# Patient Record
Sex: MALE | Race: BLACK OR AFRICAN AMERICAN | NOT HISPANIC OR LATINO | Employment: UNEMPLOYED | URBAN - METROPOLITAN AREA
[De-identification: names, ages, dates, MRNs, and addresses within clinical notes are randomized per-mention and may not be internally consistent; named-entity substitution may affect disease eponyms.]

---

## 2017-09-22 ENCOUNTER — APPOINTMENT (EMERGENCY)
Dept: RADIOLOGY | Facility: HOSPITAL | Age: 52
DRG: 310 | End: 2017-09-22
Payer: MEDICARE

## 2017-09-22 ENCOUNTER — HOSPITAL ENCOUNTER (INPATIENT)
Facility: HOSPITAL | Age: 52
LOS: 3 days | Discharge: HOME | DRG: 310 | End: 2017-09-25
Attending: EMERGENCY MEDICINE | Admitting: INTERNAL MEDICINE
Payer: MEDICARE

## 2017-09-22 DIAGNOSIS — R07.9 CHEST PAIN: ICD-10-CM

## 2017-09-22 DIAGNOSIS — I48.92 ATRIAL FLUTTER (HCC): Primary | ICD-10-CM

## 2017-09-22 DIAGNOSIS — E87.6 HYPOKALEMIA: ICD-10-CM

## 2017-09-22 DIAGNOSIS — I42.9 CARDIOMYOPATHY, SECONDARY (HCC): Chronic | ICD-10-CM

## 2017-09-22 DIAGNOSIS — F10.10 ALCOHOL ABUSE: Chronic | ICD-10-CM

## 2017-09-22 PROBLEM — R73.09 ELEVATED GLUCOSE LEVEL: Status: ACTIVE | Noted: 2017-09-22

## 2017-09-22 LAB
ALBUMIN SERPL BCP-MCNC: 3.6 G/DL (ref 3.5–5)
ALP SERPL-CCNC: 56 U/L (ref 46–116)
ALT SERPL W P-5'-P-CCNC: 20 U/L (ref 12–78)
ANION GAP SERPL CALCULATED.3IONS-SCNC: 12 MMOL/L (ref 4–13)
APTT PPP: 25 SECONDS (ref 23–35)
AST SERPL W P-5'-P-CCNC: 38 U/L (ref 5–45)
BASOPHILS # BLD AUTO: 0.1 THOUSANDS/ΜL (ref 0–0.1)
BASOPHILS NFR BLD AUTO: 1 % (ref 0–1)
BILIRUB SERPL-MCNC: 0.2 MG/DL (ref 0.2–1)
BUN SERPL-MCNC: 19 MG/DL (ref 5–25)
CALCIUM SERPL-MCNC: 8.5 MG/DL (ref 8.3–10.1)
CHLORIDE SERPL-SCNC: 103 MMOL/L (ref 100–108)
CO2 SERPL-SCNC: 25 MMOL/L (ref 21–32)
CREAT SERPL-MCNC: 0.95 MG/DL (ref 0.6–1.3)
DEPRECATED D DIMER PPP: 240 NG/ML (FEU) (ref 190–520)
EOSINOPHIL # BLD AUTO: 0.1 THOUSAND/ΜL (ref 0–0.61)
EOSINOPHIL NFR BLD AUTO: 1 % (ref 0–6)
ERYTHROCYTE [DISTWIDTH] IN BLOOD BY AUTOMATED COUNT: 13.6 % (ref 11.6–15.1)
ETHANOL SERPL-MCNC: 110 MG/DL (ref 0–3)
GFR SERPL CREATININE-BSD FRML MDRD: 104 ML/MIN/1.73SQ M
GLUCOSE SERPL-MCNC: 146 MG/DL (ref 65–140)
HCT VFR BLD AUTO: 44.4 % (ref 42–52)
HGB BLD-MCNC: 14.9 G/DL (ref 14–18)
INR PPP: 0.99 (ref 0.86–1.16)
LYMPHOCYTES # BLD AUTO: 1.9 THOUSANDS/ΜL (ref 0.6–4.47)
LYMPHOCYTES NFR BLD AUTO: 26 % (ref 14–44)
MAGNESIUM SERPL-MCNC: 1.8 MG/DL (ref 1.6–2.6)
MCH RBC QN AUTO: 31.2 PG (ref 27–31)
MCHC RBC AUTO-ENTMCNC: 33.5 G/DL (ref 31.4–37.4)
MCV RBC AUTO: 93 FL (ref 82–98)
MONOCYTES # BLD AUTO: 0.7 THOUSAND/ΜL (ref 0.17–1.22)
MONOCYTES NFR BLD AUTO: 9 % (ref 4–12)
NEUTROPHILS # BLD AUTO: 4.7 THOUSANDS/ΜL (ref 1.85–7.62)
NEUTS SEG NFR BLD AUTO: 63 % (ref 43–75)
NRBC BLD AUTO-RTO: 0 /100 WBCS
NT-PROBNP SERPL-MCNC: 272 PG/ML
PLATELET # BLD AUTO: 369 THOUSANDS/UL (ref 130–400)
PMV BLD AUTO: 6.6 FL (ref 8.9–12.7)
POTASSIUM SERPL-SCNC: 3.1 MMOL/L (ref 3.5–5.3)
PROT SERPL-MCNC: 6.9 G/DL (ref 6.4–8.2)
PROTHROMBIN TIME: 10.4 SECONDS (ref 9.4–11.7)
RBC # BLD AUTO: 4.78 MILLION/UL (ref 4.7–6.1)
SODIUM SERPL-SCNC: 140 MMOL/L (ref 136–145)
TROPONIN I SERPL-MCNC: <0.02 NG/ML
TROPONIN I SERPL-MCNC: <0.02 NG/ML
TSH SERPL DL<=0.05 MIU/L-ACNC: 1.26 UIU/ML (ref 0.36–3.74)
WBC # BLD AUTO: 7.4 THOUSAND/UL (ref 4.8–10.8)

## 2017-09-22 PROCEDURE — 82948 REAGENT STRIP/BLOOD GLUCOSE: CPT

## 2017-09-22 PROCEDURE — 85379 FIBRIN DEGRADATION QUANT: CPT | Performed by: EMERGENCY MEDICINE

## 2017-09-22 PROCEDURE — 93005 ELECTROCARDIOGRAM TRACING: CPT | Performed by: EMERGENCY MEDICINE

## 2017-09-22 PROCEDURE — 96361 HYDRATE IV INFUSION ADD-ON: CPT

## 2017-09-22 PROCEDURE — 84443 ASSAY THYROID STIM HORMONE: CPT | Performed by: NURSE PRACTITIONER

## 2017-09-22 PROCEDURE — 85610 PROTHROMBIN TIME: CPT | Performed by: EMERGENCY MEDICINE

## 2017-09-22 PROCEDURE — 71010 HB CHEST X-RAY 1 VIEW FRONTAL (PORTABLE): CPT

## 2017-09-22 PROCEDURE — 83735 ASSAY OF MAGNESIUM: CPT | Performed by: EMERGENCY MEDICINE

## 2017-09-22 PROCEDURE — 84484 ASSAY OF TROPONIN QUANT: CPT | Performed by: NURSE PRACTITIONER

## 2017-09-22 PROCEDURE — 80053 COMPREHEN METABOLIC PANEL: CPT | Performed by: EMERGENCY MEDICINE

## 2017-09-22 PROCEDURE — 93005 ELECTROCARDIOGRAM TRACING: CPT | Performed by: NURSE PRACTITIONER

## 2017-09-22 PROCEDURE — 85730 THROMBOPLASTIN TIME PARTIAL: CPT | Performed by: EMERGENCY MEDICINE

## 2017-09-22 PROCEDURE — 84484 ASSAY OF TROPONIN QUANT: CPT | Performed by: EMERGENCY MEDICINE

## 2017-09-22 PROCEDURE — 96374 THER/PROPH/DIAG INJ IV PUSH: CPT

## 2017-09-22 PROCEDURE — 99285 EMERGENCY DEPT VISIT HI MDM: CPT

## 2017-09-22 PROCEDURE — 80320 DRUG SCREEN QUANTALCOHOLS: CPT | Performed by: EMERGENCY MEDICINE

## 2017-09-22 PROCEDURE — 87081 CULTURE SCREEN ONLY: CPT | Performed by: INTERNAL MEDICINE

## 2017-09-22 PROCEDURE — 85025 COMPLETE CBC W/AUTO DIFF WBC: CPT | Performed by: EMERGENCY MEDICINE

## 2017-09-22 PROCEDURE — 36415 COLL VENOUS BLD VENIPUNCTURE: CPT | Performed by: EMERGENCY MEDICINE

## 2017-09-22 PROCEDURE — 83880 ASSAY OF NATRIURETIC PEPTIDE: CPT | Performed by: EMERGENCY MEDICINE

## 2017-09-22 RX ORDER — METOPROLOL TARTRATE 5 MG/5ML
5 INJECTION INTRAVENOUS
Status: DISCONTINUED | OUTPATIENT
Start: 2017-09-22 | End: 2017-09-22

## 2017-09-22 RX ORDER — CHLORDIAZEPOXIDE HYDROCHLORIDE 25 MG/1
25 CAPSULE, GELATIN COATED ORAL EVERY 6 HOURS SCHEDULED
Status: DISCONTINUED | OUTPATIENT
Start: 2017-09-23 | End: 2017-09-25 | Stop reason: HOSPADM

## 2017-09-22 RX ORDER — DILTIAZEM HYDROCHLORIDE 5 MG/ML
10 INJECTION INTRAVENOUS ONCE
Status: COMPLETED | OUTPATIENT
Start: 2017-09-22 | End: 2017-09-22

## 2017-09-22 RX ORDER — DILTIAZEM HYDROCHLORIDE 5 MG/ML
5 INJECTION INTRAVENOUS ONCE
Status: COMPLETED | OUTPATIENT
Start: 2017-09-22 | End: 2017-09-22

## 2017-09-22 RX ORDER — POTASSIUM CHLORIDE 20 MEQ/1
60 TABLET, EXTENDED RELEASE ORAL ONCE
Status: COMPLETED | OUTPATIENT
Start: 2017-09-22 | End: 2017-09-22

## 2017-09-22 RX ORDER — FOLIC ACID 1 MG/1
1 TABLET ORAL DAILY
Status: DISCONTINUED | OUTPATIENT
Start: 2017-09-23 | End: 2017-09-25 | Stop reason: HOSPADM

## 2017-09-22 RX ORDER — THIAMINE MONONITRATE (VIT B1) 100 MG
100 TABLET ORAL DAILY
Status: DISCONTINUED | OUTPATIENT
Start: 2017-09-23 | End: 2017-09-25 | Stop reason: HOSPADM

## 2017-09-22 RX ORDER — NICOTINE 21 MG/24HR
1 PATCH, TRANSDERMAL 24 HOURS TRANSDERMAL DAILY
Status: DISCONTINUED | OUTPATIENT
Start: 2017-09-23 | End: 2017-09-25 | Stop reason: HOSPADM

## 2017-09-22 RX ORDER — MAGNESIUM SULFATE HEPTAHYDRATE 40 MG/ML
2 INJECTION, SOLUTION INTRAVENOUS ONCE
Status: COMPLETED | OUTPATIENT
Start: 2017-09-22 | End: 2017-09-23

## 2017-09-22 RX ORDER — IPRATROPIUM BROMIDE AND ALBUTEROL SULFATE 2.5; .5 MG/3ML; MG/3ML
3 SOLUTION RESPIRATORY (INHALATION) EVERY 6 HOURS PRN
Status: DISCONTINUED | OUTPATIENT
Start: 2017-09-22 | End: 2017-09-25 | Stop reason: HOSPADM

## 2017-09-22 RX ORDER — POTASSIUM CHLORIDE 14.9 MG/ML
20 INJECTION INTRAVENOUS ONCE
Status: COMPLETED | OUTPATIENT
Start: 2017-09-22 | End: 2017-09-23

## 2017-09-22 RX ORDER — POTASSIUM CHLORIDE 20 MEQ/1
40 TABLET, EXTENDED RELEASE ORAL ONCE
Status: CANCELLED | OUTPATIENT
Start: 2017-09-22

## 2017-09-22 RX ADMIN — ENOXAPARIN SODIUM 60 MG: 80 INJECTION SUBCUTANEOUS at 21:24

## 2017-09-22 RX ADMIN — DILTIAZEM HYDROCHLORIDE 7.5 MG/HR: 5 INJECTION INTRAVENOUS at 22:00

## 2017-09-22 RX ADMIN — DILTIAZEM HYDROCHLORIDE 5 MG/HR: 5 INJECTION INTRAVENOUS at 20:39

## 2017-09-22 RX ADMIN — SODIUM CHLORIDE 1000 ML: 0.9 INJECTION, SOLUTION INTRAVENOUS at 19:19

## 2017-09-22 RX ADMIN — MAGNESIUM SULFATE HEPTAHYDRATE 2 G: 40 INJECTION, SOLUTION INTRAVENOUS at 22:30

## 2017-09-22 RX ADMIN — POTASSIUM CHLORIDE 20 MEQ: 200 INJECTION, SOLUTION INTRAVENOUS at 22:30

## 2017-09-22 RX ADMIN — POTASSIUM CHLORIDE 60 MEQ: 1500 TABLET, EXTENDED RELEASE ORAL at 20:57

## 2017-09-22 RX ADMIN — DILTIAZEM HYDROCHLORIDE 10 MG: 5 INJECTION INTRAVENOUS at 19:24

## 2017-09-22 RX ADMIN — DILTIAZEM HYDROCHLORIDE 5 MG: 5 INJECTION INTRAVENOUS at 20:36

## 2017-09-23 ENCOUNTER — APPOINTMENT (INPATIENT)
Dept: NON INVASIVE DIAGNOSTICS | Facility: HOSPITAL | Age: 52
DRG: 310 | End: 2017-09-23
Payer: MEDICARE

## 2017-09-23 ENCOUNTER — GENERIC CONVERSION - ENCOUNTER (OUTPATIENT)
Dept: OTHER | Facility: OTHER | Age: 52
End: 2017-09-23

## 2017-09-23 PROBLEM — Z72.0 TOBACCO ABUSE: Chronic | Status: ACTIVE | Noted: 2017-09-23

## 2017-09-23 PROBLEM — R73.9 HYPERGLYCEMIA: Status: ACTIVE | Noted: 2017-09-22

## 2017-09-23 PROBLEM — I48.91 ATRIAL FIBRILLATION (HCC): Status: ACTIVE | Noted: 2017-09-23

## 2017-09-23 LAB
ANION GAP SERPL CALCULATED.3IONS-SCNC: 5 MMOL/L (ref 4–13)
BUN SERPL-MCNC: 13 MG/DL (ref 5–25)
CALCIUM SERPL-MCNC: 8.2 MG/DL (ref 8.3–10.1)
CHLORIDE SERPL-SCNC: 103 MMOL/L (ref 100–108)
CO2 SERPL-SCNC: 27 MMOL/L (ref 21–32)
CREAT SERPL-MCNC: 0.76 MG/DL (ref 0.6–1.3)
ERYTHROCYTE [DISTWIDTH] IN BLOOD BY AUTOMATED COUNT: 13.8 % (ref 11.6–15.1)
EST. AVERAGE GLUCOSE BLD GHB EST-MCNC: 108 MG/DL
GFR SERPL CREATININE-BSD FRML MDRD: 120 ML/MIN/1.73SQ M
GLUCOSE SERPL-MCNC: 135 MG/DL (ref 65–140)
HBA1C MFR BLD: 5.4 % (ref 4.2–6.3)
HCT VFR BLD AUTO: 42.3 % (ref 42–52)
HGB BLD-MCNC: 14 G/DL (ref 14–18)
MAGNESIUM SERPL-MCNC: 2.1 MG/DL (ref 1.6–2.6)
MCH RBC QN AUTO: 31.1 PG (ref 27–31)
MCHC RBC AUTO-ENTMCNC: 33.1 G/DL (ref 31.4–37.4)
MCV RBC AUTO: 94 FL (ref 82–98)
PLATELET # BLD AUTO: 347 THOUSANDS/UL (ref 130–400)
PMV BLD AUTO: 6 FL (ref 8.9–12.7)
POTASSIUM SERPL-SCNC: 3.7 MMOL/L (ref 3.5–5.3)
RBC # BLD AUTO: 4.51 MILLION/UL (ref 4.7–6.1)
SODIUM SERPL-SCNC: 135 MMOL/L (ref 136–145)
TROPONIN I SERPL-MCNC: <0.02 NG/ML
WBC # BLD AUTO: 7.4 THOUSAND/UL (ref 4.8–10.8)

## 2017-09-23 PROCEDURE — 84484 ASSAY OF TROPONIN QUANT: CPT | Performed by: NURSE PRACTITIONER

## 2017-09-23 PROCEDURE — 80048 BASIC METABOLIC PNL TOTAL CA: CPT | Performed by: NURSE PRACTITIONER

## 2017-09-23 PROCEDURE — 93306 TTE W/DOPPLER COMPLETE: CPT

## 2017-09-23 PROCEDURE — 94760 N-INVAS EAR/PLS OXIMETRY 1: CPT

## 2017-09-23 PROCEDURE — 93005 ELECTROCARDIOGRAM TRACING: CPT | Performed by: NURSE PRACTITIONER

## 2017-09-23 PROCEDURE — 83036 HEMOGLOBIN GLYCOSYLATED A1C: CPT | Performed by: NURSE PRACTITIONER

## 2017-09-23 PROCEDURE — 85027 COMPLETE CBC AUTOMATED: CPT | Performed by: NURSE PRACTITIONER

## 2017-09-23 PROCEDURE — 83735 ASSAY OF MAGNESIUM: CPT | Performed by: NURSE PRACTITIONER

## 2017-09-23 RX ORDER — TEMAZEPAM 15 MG/1
15 CAPSULE ORAL
Status: DISCONTINUED | OUTPATIENT
Start: 2017-09-23 | End: 2017-09-25 | Stop reason: HOSPADM

## 2017-09-23 RX ORDER — POTASSIUM CHLORIDE 20 MEQ/1
40 TABLET, EXTENDED RELEASE ORAL ONCE
Status: COMPLETED | OUTPATIENT
Start: 2017-09-23 | End: 2017-09-23

## 2017-09-23 RX ORDER — POTASSIUM CHLORIDE 14.9 MG/ML
20 INJECTION INTRAVENOUS ONCE
Status: COMPLETED | OUTPATIENT
Start: 2017-09-23 | End: 2017-09-23

## 2017-09-23 RX ORDER — SOTALOL HYDROCHLORIDE 80 MG/1
80 TABLET ORAL EVERY 12 HOURS SCHEDULED
Status: DISCONTINUED | OUTPATIENT
Start: 2017-09-23 | End: 2017-09-25 | Stop reason: HOSPADM

## 2017-09-23 RX ADMIN — POTASSIUM CHLORIDE 20 MEQ: 200 INJECTION, SOLUTION INTRAVENOUS at 08:57

## 2017-09-23 RX ADMIN — APIXABAN 5 MG: 5 TABLET, FILM COATED ORAL at 17:34

## 2017-09-23 RX ADMIN — CHLORDIAZEPOXIDE HYDROCHLORIDE 25 MG: 25 CAPSULE ORAL at 11:58

## 2017-09-23 RX ADMIN — CHLORDIAZEPOXIDE HYDROCHLORIDE 25 MG: 25 CAPSULE ORAL at 00:00

## 2017-09-23 RX ADMIN — CHLORDIAZEPOXIDE HYDROCHLORIDE 25 MG: 25 CAPSULE ORAL at 05:38

## 2017-09-23 RX ADMIN — POTASSIUM CHLORIDE 40 MEQ: 1500 TABLET, EXTENDED RELEASE ORAL at 08:57

## 2017-09-23 RX ADMIN — CHLORDIAZEPOXIDE HYDROCHLORIDE 25 MG: 25 CAPSULE ORAL at 23:46

## 2017-09-23 RX ADMIN — TEMAZEPAM 15 MG: 15 CAPSULE ORAL at 23:46

## 2017-09-23 RX ADMIN — CHLORDIAZEPOXIDE HYDROCHLORIDE 25 MG: 25 CAPSULE ORAL at 17:35

## 2017-09-23 RX ADMIN — FOLIC ACID 1 MG: 1 TABLET ORAL at 08:53

## 2017-09-23 RX ADMIN — NICOTINE 1 PATCH: 21 PATCH, EXTENDED RELEASE TRANSDERMAL at 08:53

## 2017-09-23 RX ADMIN — Medication 100 MG: at 08:53

## 2017-09-23 RX ADMIN — DILTIAZEM HYDROCHLORIDE 5 MG/HR: 5 INJECTION INTRAVENOUS at 15:38

## 2017-09-23 RX ADMIN — SOTALOL HYDROCHLORIDE 80 MG: 80 TABLET ORAL at 21:04

## 2017-09-23 RX ADMIN — ENOXAPARIN SODIUM 60 MG: 60 INJECTION SUBCUTANEOUS at 08:54

## 2017-09-23 RX ADMIN — Medication 1 TABLET: at 08:53

## 2017-09-24 LAB
ANION GAP SERPL CALCULATED.3IONS-SCNC: 6 MMOL/L (ref 4–13)
BUN SERPL-MCNC: 13 MG/DL (ref 5–25)
CALCIUM SERPL-MCNC: 9 MG/DL (ref 8.3–10.1)
CHLORIDE SERPL-SCNC: 102 MMOL/L (ref 100–108)
CO2 SERPL-SCNC: 30 MMOL/L (ref 21–32)
CREAT SERPL-MCNC: 0.77 MG/DL (ref 0.6–1.3)
GFR SERPL CREATININE-BSD FRML MDRD: 119 ML/MIN/1.73SQ M
GLUCOSE SERPL-MCNC: 89 MG/DL (ref 65–140)
MAGNESIUM SERPL-MCNC: 1.7 MG/DL (ref 1.6–2.6)
MRSA NOSE QL CULT: NORMAL
PHOSPHATE SERPL-MCNC: 2.9 MG/DL (ref 2.7–4.5)
POTASSIUM SERPL-SCNC: 4.1 MMOL/L (ref 3.5–5.3)
SODIUM SERPL-SCNC: 138 MMOL/L (ref 136–145)

## 2017-09-24 PROCEDURE — 83735 ASSAY OF MAGNESIUM: CPT | Performed by: NURSE PRACTITIONER

## 2017-09-24 PROCEDURE — 94760 N-INVAS EAR/PLS OXIMETRY 1: CPT

## 2017-09-24 PROCEDURE — 80048 BASIC METABOLIC PNL TOTAL CA: CPT | Performed by: NURSE PRACTITIONER

## 2017-09-24 PROCEDURE — 84100 ASSAY OF PHOSPHORUS: CPT | Performed by: NURSE PRACTITIONER

## 2017-09-24 RX ORDER — MAGNESIUM SULFATE HEPTAHYDRATE 40 MG/ML
4 INJECTION, SOLUTION INTRAVENOUS ONCE
Status: COMPLETED | OUTPATIENT
Start: 2017-09-24 | End: 2017-09-24

## 2017-09-24 RX ADMIN — APIXABAN 5 MG: 5 TABLET, FILM COATED ORAL at 08:14

## 2017-09-24 RX ADMIN — Medication 100 MG: at 08:14

## 2017-09-24 RX ADMIN — APIXABAN 5 MG: 5 TABLET, FILM COATED ORAL at 18:36

## 2017-09-24 RX ADMIN — NICOTINE 1 PATCH: 21 PATCH, EXTENDED RELEASE TRANSDERMAL at 08:14

## 2017-09-24 RX ADMIN — TEMAZEPAM 15 MG: 15 CAPSULE ORAL at 21:29

## 2017-09-24 RX ADMIN — SOTALOL HYDROCHLORIDE 80 MG: 80 TABLET ORAL at 08:14

## 2017-09-24 RX ADMIN — Medication 1 TABLET: at 08:14

## 2017-09-24 RX ADMIN — SOTALOL HYDROCHLORIDE 80 MG: 80 TABLET ORAL at 21:29

## 2017-09-24 RX ADMIN — CHLORDIAZEPOXIDE HYDROCHLORIDE 25 MG: 25 CAPSULE ORAL at 18:36

## 2017-09-24 RX ADMIN — MAGNESIUM SULFATE HEPTAHYDRATE 4 G: 40 INJECTION, SOLUTION INTRAVENOUS at 08:14

## 2017-09-24 RX ADMIN — FOLIC ACID 1 MG: 1 TABLET ORAL at 08:14

## 2017-09-24 RX ADMIN — CHLORDIAZEPOXIDE HYDROCHLORIDE 25 MG: 25 CAPSULE ORAL at 12:18

## 2017-09-24 RX ADMIN — CHLORDIAZEPOXIDE HYDROCHLORIDE 25 MG: 25 CAPSULE ORAL at 06:08

## 2017-09-25 ENCOUNTER — GENERIC CONVERSION - ENCOUNTER (OUTPATIENT)
Dept: OTHER | Facility: OTHER | Age: 52
End: 2017-09-25

## 2017-09-25 ENCOUNTER — APPOINTMENT (INPATIENT)
Dept: RADIOLOGY | Facility: HOSPITAL | Age: 52
DRG: 310 | End: 2017-09-25
Payer: MEDICARE

## 2017-09-25 ENCOUNTER — APPOINTMENT (INPATIENT)
Dept: NON INVASIVE DIAGNOSTICS | Facility: HOSPITAL | Age: 52
DRG: 310 | End: 2017-09-25
Payer: MEDICARE

## 2017-09-25 VITALS
BODY MASS INDEX: 23.42 KG/M2 | TEMPERATURE: 98.2 F | HEART RATE: 78 BPM | OXYGEN SATURATION: 97 % | HEIGHT: 66 IN | SYSTOLIC BLOOD PRESSURE: 99 MMHG | DIASTOLIC BLOOD PRESSURE: 61 MMHG | RESPIRATION RATE: 16 BRPM | WEIGHT: 145.72 LBS

## 2017-09-25 PROBLEM — R07.9 CHEST PAIN: Status: RESOLVED | Noted: 2017-09-22 | Resolved: 2017-09-25

## 2017-09-25 PROBLEM — E87.6 HYPOKALEMIA: Status: RESOLVED | Noted: 2017-09-22 | Resolved: 2017-09-25

## 2017-09-25 PROBLEM — R73.9 HYPERGLYCEMIA: Status: RESOLVED | Noted: 2017-09-22 | Resolved: 2017-09-25

## 2017-09-25 LAB
ANION GAP SERPL CALCULATED.3IONS-SCNC: 9 MMOL/L (ref 4–13)
ATRIAL RATE: 117 BPM
ATRIAL RATE: 117 BPM
BUN SERPL-MCNC: 15 MG/DL (ref 5–25)
CALCIUM SERPL-MCNC: 9 MG/DL (ref 8.3–10.1)
CHLORIDE SERPL-SCNC: 102 MMOL/L (ref 100–108)
CO2 SERPL-SCNC: 26 MMOL/L (ref 21–32)
CREAT SERPL-MCNC: 0.8 MG/DL (ref 0.6–1.3)
ERYTHROCYTE [DISTWIDTH] IN BLOOD BY AUTOMATED COUNT: 13.6 % (ref 11.6–15.1)
GFR SERPL CREATININE-BSD FRML MDRD: 117 ML/MIN/1.73SQ M
GLUCOSE SERPL-MCNC: 104 MG/DL (ref 65–140)
GLUCOSE SERPL-MCNC: 95 MG/DL (ref 65–140)
HCT VFR BLD AUTO: 45.5 % (ref 42–52)
HGB BLD-MCNC: 15.2 G/DL (ref 14–18)
MAGNESIUM SERPL-MCNC: 1.8 MG/DL (ref 1.6–2.6)
MCH RBC QN AUTO: 31.1 PG (ref 27–31)
MCHC RBC AUTO-ENTMCNC: 33.5 G/DL (ref 31.4–37.4)
MCV RBC AUTO: 93 FL (ref 82–98)
PHOSPHATE SERPL-MCNC: 3.8 MG/DL (ref 2.7–4.5)
PLATELET # BLD AUTO: 303 THOUSANDS/UL (ref 130–400)
PMV BLD AUTO: 6.6 FL (ref 8.9–12.7)
POTASSIUM SERPL-SCNC: 4 MMOL/L (ref 3.5–5.3)
QRS AXIS: -23 DEGREES
QRS AXIS: -25 DEGREES
QRSD INTERVAL: 100 MS
QRSD INTERVAL: 90 MS
QT INTERVAL: 320 MS
QT INTERVAL: 340 MS
QTC INTERVAL: 410 MS
QTC INTERVAL: 429 MS
RBC # BLD AUTO: 4.89 MILLION/UL (ref 4.7–6.1)
SODIUM SERPL-SCNC: 137 MMOL/L (ref 136–145)
T WAVE AXIS: 28 DEGREES
T WAVE AXIS: 32 DEGREES
VENTRICULAR RATE: 96 BPM
VENTRICULAR RATE: 99 BPM
WBC # BLD AUTO: 7.1 THOUSAND/UL (ref 4.8–10.8)

## 2017-09-25 PROCEDURE — 85027 COMPLETE CBC AUTOMATED: CPT | Performed by: STUDENT IN AN ORGANIZED HEALTH CARE EDUCATION/TRAINING PROGRAM

## 2017-09-25 PROCEDURE — 83735 ASSAY OF MAGNESIUM: CPT | Performed by: NURSE PRACTITIONER

## 2017-09-25 PROCEDURE — 80048 BASIC METABOLIC PNL TOTAL CA: CPT | Performed by: NURSE PRACTITIONER

## 2017-09-25 PROCEDURE — 94760 N-INVAS EAR/PLS OXIMETRY 1: CPT

## 2017-09-25 PROCEDURE — 93017 CV STRESS TEST TRACING ONLY: CPT

## 2017-09-25 PROCEDURE — A9502 TC99M TETROFOSMIN: HCPCS

## 2017-09-25 PROCEDURE — 78452 HT MUSCLE IMAGE SPECT MULT: CPT

## 2017-09-25 PROCEDURE — 84100 ASSAY OF PHOSPHORUS: CPT | Performed by: NURSE PRACTITIONER

## 2017-09-25 RX ORDER — NICOTINE 21 MG/24HR
1 PATCH, TRANSDERMAL 24 HOURS TRANSDERMAL DAILY
Qty: 28 PATCH | Refills: 0 | Status: SHIPPED | OUTPATIENT
Start: 2017-09-25 | End: 2018-06-06 | Stop reason: ALTCHOICE

## 2017-09-25 RX ORDER — FOLIC ACID 1 MG/1
1 TABLET ORAL DAILY
Qty: 30 TABLET | Refills: 0 | Status: SHIPPED | OUTPATIENT
Start: 2017-09-25 | End: 2018-06-06 | Stop reason: ALTCHOICE

## 2017-09-25 RX ORDER — LANOLIN ALCOHOL/MO/W.PET/CERES
100 CREAM (GRAM) TOPICAL DAILY
Qty: 30 TABLET | Refills: 0 | Status: SHIPPED | OUTPATIENT
Start: 2017-09-25 | End: 2018-06-06 | Stop reason: ALTCHOICE

## 2017-09-25 RX ORDER — SOTALOL HYDROCHLORIDE 80 MG/1
80 TABLET ORAL EVERY 12 HOURS SCHEDULED
Qty: 60 TABLET | Refills: 0 | Status: SHIPPED | OUTPATIENT
Start: 2017-09-25 | End: 2018-06-06 | Stop reason: DRUGHIGH

## 2017-09-25 RX ORDER — MAGNESIUM SULFATE HEPTAHYDRATE 40 MG/ML
2 INJECTION, SOLUTION INTRAVENOUS ONCE
Status: COMPLETED | OUTPATIENT
Start: 2017-09-25 | End: 2017-09-25

## 2017-09-25 RX ADMIN — Medication 1 TABLET: at 10:57

## 2017-09-25 RX ADMIN — APIXABAN 5 MG: 5 TABLET, FILM COATED ORAL at 10:58

## 2017-09-25 RX ADMIN — REGADENOSON 0.4 MG: 0.08 INJECTION, SOLUTION INTRAVENOUS at 10:20

## 2017-09-25 RX ADMIN — CHLORDIAZEPOXIDE HYDROCHLORIDE 25 MG: 25 CAPSULE ORAL at 11:18

## 2017-09-25 RX ADMIN — Medication 100 MG: at 10:57

## 2017-09-25 RX ADMIN — MAGNESIUM SULFATE HEPTAHYDRATE 2 G: 40 INJECTION, SOLUTION INTRAVENOUS at 11:00

## 2017-09-25 RX ADMIN — NICOTINE 1 PATCH: 21 PATCH, EXTENDED RELEASE TRANSDERMAL at 10:58

## 2017-09-25 RX ADMIN — FOLIC ACID 1 MG: 1 TABLET ORAL at 10:57

## 2017-09-26 LAB
ATRIAL RATE: 124 BPM
CHEST PAIN STATEMENT: NORMAL
MAX DIASTOLIC BP: 80 MMHG
MAX HEART RATE: 133 BPM
MAX PREDICTED HEART RATE: 166 BPM
MAX. SYSTOLIC BP: 132 MMHG
PR INTERVAL: 198 MS
PROTOCOL NAME: NORMAL
QRS AXIS: -32 DEGREES
QRSD INTERVAL: 88 MS
QT INTERVAL: 308 MS
QTC INTERVAL: 442 MS
REASON FOR TERMINATION: NORMAL
T WAVE AXIS: 11 DEGREES
TARGET HR FORMULA: NORMAL
TEST INDICATION: NORMAL
TIME IN EXERCISE PHASE: 548 S
VENTRICULAR RATE: 124 BPM

## 2017-10-09 ENCOUNTER — ALLSCRIPTS OFFICE VISIT (OUTPATIENT)
Dept: OTHER | Facility: OTHER | Age: 52
End: 2017-10-09

## 2017-11-28 ENCOUNTER — GENERIC CONVERSION - ENCOUNTER (OUTPATIENT)
Dept: OTHER | Facility: OTHER | Age: 52
End: 2017-11-28

## 2018-01-09 NOTE — PROCEDURES
Procedures by Cha Jean-Baptiste MD at  6/10/2016  3:43 PM      Author:  Cha Jean-Baptiste MD Service:  Cardiology Author Type:  Physician     Filed:  6/10/2016  3:46 PM Date of Service:  6/10/2016  3:43 PM Status:  Signed     :  Cha Jean-Baptiste MD (Physician)         Pre-procedure Diagnoses:       1  Uncontrolled atrial flutter [I48 92]                Procedures:       1  ELECTRICAL CARDIOVERSION [SBF678 (Custom)]                   PRE-OP DIAGNOSIS: Recurrent atrial fibrillation    POST-OP DIAGNOSIS:  Successful electrical cardioversion of atrial flutter to normal sinus rhythm     :  Dr Kun Huang  ANESTHESIA:  Propofol given by anesthesiology  COMPLICATIONS:  None  OPERATIVE TERM:  DC cardioversion  The nature of the procedure, risks and alternatives were discussed with the patient who gave informed consent  OPERATIVE TECHNIQUE:  The patient was sedated with propofol  When the patient was no longer responsive to quiet voice, DC cardioversion was performed with 100 J biphasically  and synchronously  The patient was then monitored until fully alert and left the procedure area in stable condition  The patient had TONYA prior to cardioversion with demonstration of no left atrial appendage thrombus  IMPRESSION:  Successful DC cardioversion of atrial flutter to normal sinus rhythm  No complications             Received for:Provider  [facility]   John 10 2016  3:45PM [facility] Standard Time

## 2018-01-13 NOTE — MISCELLANEOUS
Assessment    1  Atrial fibrillation, unspecified type (427 31) (I48 91)    Plan  Need for influenza vaccination    · Fluzone Quadrivalent 0 5 ML Intramuscular Suspension Prefilled Syringe   For: Need for influenza vaccination; Ordered By:Mindi Avelar; Effective Date:09Oct2017; Administered by: Simona Flow: 10/9/2017 4:37:00 PM; Last Updated By: Simona Sharma; 10/9/2017 4:38:24 PM  Screening for depression    · *VB-Depression Screening; Status:Resulted - Requires Verification;   Done: [id number]  01:47PM   Performed: In Office; [id number] [phone]; Ordered; For:Screening for depression; Ordered By:Mindi Avelar;    Discussion/Summary  Discussion Summary:   45years old male came for follow-up visit after hospitalization  EKG in the office today showed normal sinus rhythm  Patient has an upcoming appointment with Dr Mono Bentley next month  Patient was prescribed eliiquis in the hospital but he was not able to take because insurance was not covering the medication  As per cardiology recommendation He was told to take Eliquis for a month and after that he can take aspirin  Advised to continue with aspirin and we will do prior authorization for eliquis  Currently patient also started to drink alcohol( half a pine daily) and to smoke cigarettes  Advised to quit smoking and tried cut down alcohol intake slowly  Discussed with Dr Carolina Rowan  Medication SE Review and Pt Understands Tx: Possible side effects of new medications were reviewed with the patient/guardian today  The treatment plan was reviewed with the patient/guardian  The patient/guardian understands and agrees with the treatment plan   Self Referrals:   Self Referrals: No      Chief Complaint  Chief Complaint Free Text Note Form: OUMOU 2 weeks ago, wants flu vaccine      History of Present Illness  TCM Communication [facility]: The patient is being contacted for follow-up after hospitalization  Hospital records were reviewed  He was hospitalized at [address] [place] [address]  The date of admission: 9/22/17, date of discharge: 9/25/17  Diagnosis: Afib / AFlutter ,cardiomyopathy ,alcoholism  He was discharged to home  Medications reviewed and updated today  He scheduled a follow up appointment  Follow-up appointments with other specialists: Cardiology  Communication performed and completed by FAUSTINO Zhang LPN      Review of Systems  Complete-Male:   Constitutional: No fever or chills, feels well, no tiredness, no recent weight gain or weight loss  Cardiovascular: No complaints of slow heart rate, no fast heart rate, no chest pain, no palpitations, no leg claudication, no lower extremity  Respiratory: No complaints of shortness of breath, no wheezing, no cough, no SOB on exertion, no orthopnea or PND  Gastrointestinal: No complaints of abdominal pain, no constipation, no nausea or vomiting, no diarrhea or bloody stools  Active Problems    1  Atrial flutter (427 32) (I48 92)    Surgical History    1  History of Heart Valve Repair    Family History  Mother    1  No pertinent family history  Father    2  Family history of malignant neoplasm (V16 9) (Z80 9)    Social History    · Current every day smoker (305 1) (F17 200)   · Heavy alcohol use (305 00) (Z72 89)    Current Meds   1  Aspirin 81 MG TABS; TAKE 1 TABLET DAILY after the completion of Xarelto 20 mg;   Therapy: (Recorded:[id number]) to Recorded   2  Cardizem TABS; Therapy: (Recorded:13Zuw5274) to Recorded   3  Daily Multivitamin TABS; Therapy: (Tessa Newsome) to Recorded   4  DuoNeb 0 5-2 5 (3) MG/3ML Inhalation Solution; Therapy: (Recorded:66Gkv3359) to Recorded   5  Folic Acid 1 MG Oral Tablet; Therapy: (Tessa Newsome) to Recorded   6  Sotalol HCl - 80 MG Oral Tablet; TAKE 1 TABLET EVERY 12 HOURS DAILY; Therapy: (Recorded:38Chq4186) to Recorded   7  Thiamine HCl - 100 MG Oral Tablet; Therapy: (Tessa Newsome) to Recorded   8   Xarelto 20 MG Oral Tablet; take 1 20mg PO daily x 4weeks then switch to Aspirin 81 mg   PO Daily; Therapy: (Recorded:45Ugn7854) to Recorded    Allergies    1  No Known Drug Allergies    2  No Known Environmental Allergies   3  No Known Food Allergies   4  No Known Latex Allergies    Vitals  Signs   Recorded: 26ZVY3662 01:18PM   Heart Rate: 90  Respiration: 18  Systolic: 460  Diastolic: 90  Height: 5 ft 7 5 in  Weight: 147 lb   BMI Calculated: 22 68  BSA Calculated: 1 78  O2 Saturation: 100    Physical Exam    Constitutional   General appearance: No acute distress, well appearing and well nourished  Pulmonary   Respiratory effort: No increased work of breathing or signs of respiratory distress  Auscultation of lungs: Clear to auscultation, equal breath sounds bilaterally, no wheezes, no rales, no rhonci  Cardiovascular   Auscultation of heart: Normal rate and rhythm, normal S1 and S2, without murmurs  Results/Data  *VB-Depression Screening [id number] 01:47PM Patrizia Mayen     Test Name Result Flag Reference   Depression Scale Result      Depression Screen - Negative For Symptoms     PHQ-2 Adult Depression Screening [id number] 01:45PM Chance Castañeda     Test Name Result Flag Reference   PHQ-2 Adult Depression Score 0     Over the last two weeks, how often have you been bothered by any of the following problems? Little interest or pleasure in doing things: Not at all - 0  Feeling down, depressed, or hopeless: Not at all - 0   PHQ-2 Adult Depression Screening Negative         Attending Note  Attending Note: Attending Note: I interviewed and examined the patient, I supervised the Resident and I agree with the Resident management plan as it was presented to me  Level of Participation: I was present in clinic and examined the patient  Patient's History: Pt  here for OUMOU visit s/p hosp  for paroxysmal atrial fibrillation successfully chemically cardioverted to NSR  Key Parts of the Exam: As per resident's note  Diagnosis and Plan: Paroxysmal A  Fib- resolved, now in [facility]    Plan- Cont  present meds and f/y with cardiology as recommended  I agree with the Resident's note  Future Appointments    Date/Time Provider Specialty Site   11/14/2017 02:00 PM RACHEL Cruz  Cardiology [address]     Signatures   Electronically signed by : RACHEL Julien ; Oct 12 2017 11:28PM EST                       (Author)    Electronically signed by :  RACHEL De Leon ; Oct 13 2017  2:57PM EST                       (Co-author)

## 2018-01-14 VITALS
OXYGEN SATURATION: 100 % | WEIGHT: 147 LBS | BODY MASS INDEX: 22.28 KG/M2 | DIASTOLIC BLOOD PRESSURE: 90 MMHG | HEART RATE: 90 BPM | RESPIRATION RATE: 18 BRPM | SYSTOLIC BLOOD PRESSURE: 140 MMHG | HEIGHT: 68 IN

## 2018-01-16 NOTE — PROGRESS NOTES
History of Present Illness  Care Coordination Encounter Information:   Type of Encounter: Telephonic   Last Office Visit: 7/29/16   Spoke to Patient  Care Coordination SL Nurse Jorge Jaramillo:   The reason for call is to discuss outreach for follow up/needed services and coordination of meeting care plan treatment goals  I called Nic as per [facility] program  He did attend his PCP appointment on 7/29/16  He still has not picked up his medication which I have again encouraged him to do  We reviewed the dosing schedule  He claims he will get the medication today, keep track of 4 weeks time on the calendar, and then replace the Xarelto with 81 mg Aspirin daily  He claims he is decreasing his alcohol consumption  He claims he will have a colonoscopy this month and return to the PCP  Active Problems    1  Atrial flutter (427 32) (I48 92)    Surgical History    1  History of Heart Valve Repair    Family History  Mother    1  No pertinent family history  Father    2  Family history of malignant neoplasm (V16 9) (Z80 9)    Social History    · Current every day smoker (305 1) (F17 200)   · Heavy alcohol use (305 00) (Z78 9)    Current Meds    1  Aspirin 81 MG TABS; TAKE 1 TABLET DAILY after the completion of Xarelto 20 mg;   Therapy: (Recorded:97Xnt4350) to Recorded   2  Sotalol HCl - 80 MG Oral Tablet; TAKE 1 TABLET EVERY 12 HOURS DAILY; Therapy: (Recorded:46Kfb8060) to Recorded   3  Xarelto 20 MG Oral Tablet; take 1 20mg PO daily x 4weeks then switch to Aspirin 81 mg   PO Daily; Therapy: (Recorded:42Jgl8256) to Recorded    Allergies    1  No Known Drug Allergies    2  No Known Environmental Allergies   3  No Known Food Allergies   4  No Known Latex Allergies    End of Encounter Meds    1  Aspirin 81 MG TABS; TAKE 1 TABLET DAILY after the completion of Xarelto 20 mg;   Therapy: (Recorded:11Zvy2641) to Recorded   2  Sotalol HCl - 80 MG Oral Tablet; TAKE 1 TABLET EVERY 12 HOURS DAILY;    Therapy: (Recorded:00Omb4652) to Recorded   3  Xarelto 20 MG Oral Tablet; take 1 20mg PO daily x 4weeks then switch to Aspirin 81 mg   PO Daily; Therapy: (Recorded:87Zxw3094) to Recorded    Patient Care Team    Care Team Member Role Specialty Office Number   Neptali Weathers RN   [phone]     Signatures   Electronically signed by :  Brent Craig RN; Aug  4 2016  9:27AM EST                       (Author)

## 2018-02-13 NOTE — RESULT NOTES
Verified Results  *VB-Depression Screening [id number] 01:47PM Shanika Will     Test Name Result Flag Reference   Depression Scale Result      Depression Screen - Negative For Symptoms

## 2018-06-06 ENCOUNTER — HOSPITAL ENCOUNTER (INPATIENT)
Facility: HOSPITAL | Age: 53
LOS: 1 days | Discharge: HOME | DRG: 309 | End: 2018-06-08
Attending: EMERGENCY MEDICINE | Admitting: FAMILY MEDICINE
Payer: MEDICARE

## 2018-06-06 ENCOUNTER — APPOINTMENT (EMERGENCY)
Dept: RADIOLOGY | Facility: HOSPITAL | Age: 53
DRG: 309 | End: 2018-06-06
Payer: MEDICARE

## 2018-06-06 DIAGNOSIS — I48.91 ATRIAL FIBRILLATION WITH RAPID VENTRICULAR RESPONSE (HCC): Primary | ICD-10-CM

## 2018-06-06 DIAGNOSIS — I48.92 ATRIAL FLUTTER, UNSPECIFIED TYPE (HCC): ICD-10-CM

## 2018-06-06 DIAGNOSIS — I42.9 CARDIOMYOPATHY, SECONDARY (HCC): Chronic | ICD-10-CM

## 2018-06-06 DIAGNOSIS — I48.20 CHRONIC ATRIAL FIBRILLATION (HCC): ICD-10-CM

## 2018-06-06 PROBLEM — R00.2 HEART PALPITATIONS: Status: ACTIVE | Noted: 2018-06-06

## 2018-06-06 PROBLEM — R00.2 HEART PALPITATIONS: Status: RESOLVED | Noted: 2018-06-06 | Resolved: 2018-06-06

## 2018-06-06 LAB
ALBUMIN SERPL BCP-MCNC: 4 G/DL (ref 3.5–5)
ALP SERPL-CCNC: 52 U/L (ref 46–116)
ALT SERPL W P-5'-P-CCNC: 30 U/L (ref 12–78)
ANION GAP SERPL CALCULATED.3IONS-SCNC: 13 MMOL/L (ref 4–13)
APTT PPP: 25 SECONDS (ref 24–33)
AST SERPL W P-5'-P-CCNC: 40 U/L (ref 5–45)
BASOPHILS # BLD AUTO: 0.1 THOUSANDS/ΜL (ref 0–0.1)
BASOPHILS NFR BLD AUTO: 2 % (ref 0–1)
BILIRUB SERPL-MCNC: 0.5 MG/DL (ref 0.2–1)
BUN SERPL-MCNC: 10 MG/DL (ref 5–25)
CALCIUM SERPL-MCNC: 8.6 MG/DL (ref 8.3–10.1)
CHLORIDE SERPL-SCNC: 100 MMOL/L (ref 100–108)
CO2 SERPL-SCNC: 29 MMOL/L (ref 21–32)
CREAT SERPL-MCNC: 0.99 MG/DL (ref 0.6–1.3)
EOSINOPHIL # BLD AUTO: 0.09 THOUSAND/ΜL (ref 0–0.61)
EOSINOPHIL NFR BLD AUTO: 2 % (ref 0–6)
ERYTHROCYTE [DISTWIDTH] IN BLOOD BY AUTOMATED COUNT: 13.7 % (ref 11.6–15.1)
GFR SERPL CREATININE-BSD FRML MDRD: 99 ML/MIN/1.73SQ M
GLUCOSE SERPL-MCNC: 94 MG/DL (ref 65–140)
HCT VFR BLD AUTO: 47.5 % (ref 36.5–49.3)
HGB BLD-MCNC: 15.4 G/DL (ref 12–17)
IMM GRANULOCYTES # BLD AUTO: 0.03 THOUSAND/UL (ref 0–0.2)
IMM GRANULOCYTES NFR BLD AUTO: 1 % (ref 0–2)
INR PPP: 0.98 (ref 0.86–1.16)
LYMPHOCYTES # BLD AUTO: 1.47 THOUSANDS/ΜL (ref 0.6–4.47)
LYMPHOCYTES NFR BLD AUTO: 28 % (ref 14–44)
MCH RBC QN AUTO: 30.9 PG (ref 26.8–34.3)
MCHC RBC AUTO-ENTMCNC: 32.4 G/DL (ref 31.4–37.4)
MCV RBC AUTO: 95 FL (ref 82–98)
MONOCYTES # BLD AUTO: 0.51 THOUSAND/ΜL (ref 0.17–1.22)
MONOCYTES NFR BLD AUTO: 10 % (ref 4–12)
NEUTROPHILS # BLD AUTO: 3 THOUSANDS/ΜL (ref 1.85–7.62)
NEUTS SEG NFR BLD AUTO: 57 % (ref 43–75)
NRBC BLD AUTO-RTO: 0 /100 WBCS
PLATELET # BLD AUTO: 352 THOUSANDS/UL (ref 149–390)
PMV BLD AUTO: 8.2 FL (ref 8.9–12.7)
POTASSIUM SERPL-SCNC: 3.5 MMOL/L (ref 3.5–5.3)
PROT SERPL-MCNC: 8.1 G/DL (ref 6.4–8.2)
PROTHROMBIN TIME: 10.3 SECONDS (ref 9.4–11.7)
RBC # BLD AUTO: 4.99 MILLION/UL (ref 3.88–5.62)
SODIUM SERPL-SCNC: 142 MMOL/L (ref 136–145)
TROPONIN I SERPL-MCNC: <0.02 NG/ML
TROPONIN I SERPL-MCNC: <0.02 NG/ML
WBC # BLD AUTO: 5.2 THOUSAND/UL (ref 4.31–10.16)

## 2018-06-06 PROCEDURE — 99285 EMERGENCY DEPT VISIT HI MDM: CPT

## 2018-06-06 PROCEDURE — 96376 TX/PRO/DX INJ SAME DRUG ADON: CPT

## 2018-06-06 PROCEDURE — 80053 COMPREHEN METABOLIC PANEL: CPT | Performed by: EMERGENCY MEDICINE

## 2018-06-06 PROCEDURE — 99219 PR INITIAL OBSERVATION CARE/DAY 50 MINUTES: CPT | Performed by: FAMILY MEDICINE

## 2018-06-06 PROCEDURE — 85025 COMPLETE CBC W/AUTO DIFF WBC: CPT | Performed by: EMERGENCY MEDICINE

## 2018-06-06 PROCEDURE — 96365 THER/PROPH/DIAG IV INF INIT: CPT

## 2018-06-06 PROCEDURE — 36415 COLL VENOUS BLD VENIPUNCTURE: CPT | Performed by: EMERGENCY MEDICINE

## 2018-06-06 PROCEDURE — 84484 ASSAY OF TROPONIN QUANT: CPT | Performed by: EMERGENCY MEDICINE

## 2018-06-06 PROCEDURE — 85610 PROTHROMBIN TIME: CPT | Performed by: EMERGENCY MEDICINE

## 2018-06-06 PROCEDURE — 71045 X-RAY EXAM CHEST 1 VIEW: CPT

## 2018-06-06 PROCEDURE — 96366 THER/PROPH/DIAG IV INF ADDON: CPT

## 2018-06-06 PROCEDURE — 93005 ELECTROCARDIOGRAM TRACING: CPT

## 2018-06-06 PROCEDURE — 85730 THROMBOPLASTIN TIME PARTIAL: CPT | Performed by: EMERGENCY MEDICINE

## 2018-06-06 PROCEDURE — 96372 THER/PROPH/DIAG INJ SC/IM: CPT

## 2018-06-06 RX ORDER — CHLORDIAZEPOXIDE HYDROCHLORIDE 25 MG/1
50 CAPSULE, GELATIN COATED ORAL EVERY 6 HOURS SCHEDULED
Status: DISCONTINUED | OUTPATIENT
Start: 2018-06-07 | End: 2018-06-06

## 2018-06-06 RX ORDER — ONDANSETRON 2 MG/ML
4 INJECTION INTRAMUSCULAR; INTRAVENOUS EVERY 6 HOURS PRN
Status: DISCONTINUED | OUTPATIENT
Start: 2018-06-06 | End: 2018-06-08 | Stop reason: HOSPADM

## 2018-06-06 RX ORDER — SODIUM CHLORIDE 9 MG/ML
50 INJECTION, SOLUTION INTRAVENOUS CONTINUOUS
Status: DISCONTINUED | OUTPATIENT
Start: 2018-06-06 | End: 2018-06-08 | Stop reason: HOSPADM

## 2018-06-06 RX ORDER — ACETAMINOPHEN 325 MG/1
650 TABLET ORAL EVERY 6 HOURS PRN
Status: DISCONTINUED | OUTPATIENT
Start: 2018-06-06 | End: 2018-06-08 | Stop reason: HOSPADM

## 2018-06-06 RX ORDER — DILTIAZEM HYDROCHLORIDE 5 MG/ML
10 INJECTION INTRAVENOUS ONCE
Status: COMPLETED | OUTPATIENT
Start: 2018-06-06 | End: 2018-06-06

## 2018-06-06 RX ORDER — SOTALOL HYDROCHLORIDE 160 MG/1
320 TABLET ORAL 2 TIMES DAILY
COMMUNITY
End: 2018-06-08 | Stop reason: HOSPADM

## 2018-06-06 RX ORDER — CHLORDIAZEPOXIDE HYDROCHLORIDE 25 MG/1
50 CAPSULE, GELATIN COATED ORAL EVERY 6 HOURS SCHEDULED
Status: DISCONTINUED | OUTPATIENT
Start: 2018-06-08 | End: 2018-06-08 | Stop reason: HOSPADM

## 2018-06-06 RX ORDER — CHLORDIAZEPOXIDE HYDROCHLORIDE 25 MG/1
25 CAPSULE, GELATIN COATED ORAL EVERY 6 HOURS SCHEDULED
Status: DISCONTINUED | OUTPATIENT
Start: 2018-06-10 | End: 2018-06-06

## 2018-06-06 RX ORDER — CALCIUM CARBONATE 200(500)MG
1000 TABLET,CHEWABLE ORAL DAILY PRN
Status: DISCONTINUED | OUTPATIENT
Start: 2018-06-06 | End: 2018-06-08 | Stop reason: HOSPADM

## 2018-06-06 RX ORDER — CHLORDIAZEPOXIDE HYDROCHLORIDE 25 MG/1
25 CAPSULE, GELATIN COATED ORAL EVERY 6 HOURS SCHEDULED
Status: DISCONTINUED | OUTPATIENT
Start: 2018-06-10 | End: 2018-06-08 | Stop reason: HOSPADM

## 2018-06-06 RX ORDER — LORAZEPAM 2 MG/ML
1 INJECTION INTRAMUSCULAR EVERY 6 HOURS PRN
Status: DISCONTINUED | OUTPATIENT
Start: 2018-06-06 | End: 2018-06-08 | Stop reason: HOSPADM

## 2018-06-06 RX ORDER — CHLORDIAZEPOXIDE HYDROCHLORIDE 25 MG/1
25 CAPSULE, GELATIN COATED ORAL EVERY 4 HOURS
Status: DISCONTINUED | OUTPATIENT
Start: 2018-06-09 | End: 2018-06-08 | Stop reason: HOSPADM

## 2018-06-06 RX ORDER — CHLORDIAZEPOXIDE HYDROCHLORIDE 25 MG/1
50 CAPSULE, GELATIN COATED ORAL EVERY 4 HOURS
Status: COMPLETED | OUTPATIENT
Start: 2018-06-06 | End: 2018-06-07

## 2018-06-06 RX ORDER — NICOTINE 21 MG/24HR
1 PATCH, TRANSDERMAL 24 HOURS TRANSDERMAL DAILY
Status: DISCONTINUED | OUTPATIENT
Start: 2018-06-07 | End: 2018-06-08 | Stop reason: HOSPADM

## 2018-06-06 RX ORDER — SODIUM CHLORIDE 9 MG/ML
125 INJECTION, SOLUTION INTRAVENOUS CONTINUOUS
Status: DISCONTINUED | OUTPATIENT
Start: 2018-06-06 | End: 2018-06-06

## 2018-06-06 RX ORDER — CHLORDIAZEPOXIDE HYDROCHLORIDE 25 MG/1
25 CAPSULE, GELATIN COATED ORAL EVERY 4 HOURS
Status: DISCONTINUED | OUTPATIENT
Start: 2018-06-09 | End: 2018-06-06

## 2018-06-06 RX ORDER — CHLORDIAZEPOXIDE HYDROCHLORIDE 25 MG/1
25 CAPSULE, GELATIN COATED ORAL EVERY 4 HOURS
Status: DISCONTINUED | OUTPATIENT
Start: 2018-06-06 | End: 2018-06-06

## 2018-06-06 RX ORDER — MAGNESIUM HYDROXIDE/ALUMINUM HYDROXICE/SIMETHICONE 120; 1200; 1200 MG/30ML; MG/30ML; MG/30ML
30 SUSPENSION ORAL EVERY 6 HOURS PRN
Status: DISCONTINUED | OUTPATIENT
Start: 2018-06-06 | End: 2018-06-08 | Stop reason: HOSPADM

## 2018-06-06 RX ADMIN — SODIUM CHLORIDE 125 ML/HR: 0.9 INJECTION, SOLUTION INTRAVENOUS at 17:37

## 2018-06-06 RX ADMIN — SODIUM CHLORIDE 50 ML/HR: 0.9 INJECTION, SOLUTION INTRAVENOUS at 22:35

## 2018-06-06 RX ADMIN — CHLORDIAZEPOXIDE HYDROCHLORIDE 50 MG: 25 CAPSULE ORAL at 23:26

## 2018-06-06 RX ADMIN — ENOXAPARIN SODIUM 60 MG: 80 INJECTION SUBCUTANEOUS at 18:35

## 2018-06-06 RX ADMIN — DILTIAZEM HYDROCHLORIDE 10 MG: 5 INJECTION INTRAVENOUS at 17:25

## 2018-06-06 RX ADMIN — DILTIAZEM HYDROCHLORIDE 10 MG/HR: 5 INJECTION INTRAVENOUS at 16:21

## 2018-06-06 RX ADMIN — SODIUM CHLORIDE 1000 ML: 0.9 INJECTION, SOLUTION INTRAVENOUS at 16:08

## 2018-06-06 RX ADMIN — ALUMINUM HYDROXIDE, MAGNESIUM HYDROXIDE, AND SIMETHICONE 30 ML: 200; 200; 20 SUSPENSION ORAL at 23:07

## 2018-06-06 RX ADMIN — DILTIAZEM HYDROCHLORIDE 10 MG: 5 INJECTION INTRAVENOUS at 16:10
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
## 2018-06-06 NOTE — ED NOTES
Pt awake, alert and oriented, evaluated by Dr Bolivar Ta   Pt noncompliant with taking meds     Javier Garay RN  06/06/18 2115

## 2018-06-07 PROBLEM — F10.930 ALCOHOL WITHDRAWAL SYNDROME WITHOUT COMPLICATION (HCC): Status: ACTIVE | Noted: 2018-06-07

## 2018-06-07 PROBLEM — F10.230 ALCOHOL WITHDRAWAL SYNDROME WITHOUT COMPLICATION (HCC): Status: ACTIVE | Noted: 2018-06-07

## 2018-06-07 PROBLEM — E83.42 HYPOMAGNESEMIA: Status: ACTIVE | Noted: 2018-06-07

## 2018-06-07 LAB
AMPHETAMINES SERPL QL SCN: NEGATIVE
ANION GAP SERPL CALCULATED.3IONS-SCNC: 5 MMOL/L (ref 4–13)
ANION GAP SERPL CALCULATED.3IONS-SCNC: 6 MMOL/L (ref 4–13)
BARBITURATES UR QL: NEGATIVE
BENZODIAZ UR QL: POSITIVE
BUN SERPL-MCNC: 10 MG/DL (ref 5–25)
BUN SERPL-MCNC: 9 MG/DL (ref 5–25)
CALCIUM SERPL-MCNC: 7.8 MG/DL (ref 8.3–10.1)
CALCIUM SERPL-MCNC: 8.3 MG/DL (ref 8.3–10.1)
CHLORIDE SERPL-SCNC: 100 MMOL/L (ref 100–108)
CHLORIDE SERPL-SCNC: 98 MMOL/L (ref 100–108)
CO2 SERPL-SCNC: 28 MMOL/L (ref 21–32)
CO2 SERPL-SCNC: 29 MMOL/L (ref 21–32)
COCAINE UR QL: NEGATIVE
CREAT SERPL-MCNC: 0.78 MG/DL (ref 0.6–1.3)
CREAT SERPL-MCNC: 0.91 MG/DL (ref 0.6–1.3)
ERYTHROCYTE [DISTWIDTH] IN BLOOD BY AUTOMATED COUNT: 13.4 % (ref 11.6–15.1)
GFR SERPL CREATININE-BSD FRML MDRD: 109 ML/MIN/1.73SQ M
GFR SERPL CREATININE-BSD FRML MDRD: 117 ML/MIN/1.73SQ M
GLUCOSE SERPL-MCNC: 131 MG/DL (ref 65–140)
GLUCOSE SERPL-MCNC: 134 MG/DL (ref 65–140)
HCT VFR BLD AUTO: 40.6 % (ref 36.5–49.3)
HGB BLD-MCNC: 13.4 G/DL (ref 12–17)
MAGNESIUM SERPL-MCNC: 1.4 MG/DL (ref 1.6–2.6)
MCH RBC QN AUTO: 30.8 PG (ref 26.8–34.3)
MCHC RBC AUTO-ENTMCNC: 33 G/DL (ref 31.4–37.4)
MCV RBC AUTO: 93 FL (ref 82–98)
METHADONE UR QL: NEGATIVE
OPIATES UR QL SCN: NEGATIVE
PCP UR QL: NEGATIVE
PHOSPHATE SERPL-MCNC: 1.8 MG/DL (ref 2.7–4.5)
PLATELET # BLD AUTO: 333 THOUSANDS/UL (ref 149–390)
PMV BLD AUTO: 9.1 FL (ref 8.9–12.7)
POTASSIUM SERPL-SCNC: 2.9 MMOL/L (ref 3.5–5.3)
POTASSIUM SERPL-SCNC: 4 MMOL/L (ref 3.5–5.3)
RBC # BLD AUTO: 4.35 MILLION/UL (ref 3.88–5.62)
SODIUM SERPL-SCNC: 133 MMOL/L (ref 136–145)
SODIUM SERPL-SCNC: 133 MMOL/L (ref 136–145)
THC UR QL: NEGATIVE
WBC # BLD AUTO: 6.1 THOUSAND/UL (ref 4.31–10.16)

## 2018-06-07 PROCEDURE — 83735 ASSAY OF MAGNESIUM: CPT | Performed by: STUDENT IN AN ORGANIZED HEALTH CARE EDUCATION/TRAINING PROGRAM

## 2018-06-07 PROCEDURE — 85027 COMPLETE CBC AUTOMATED: CPT | Performed by: STUDENT IN AN ORGANIZED HEALTH CARE EDUCATION/TRAINING PROGRAM

## 2018-06-07 PROCEDURE — 99223 1ST HOSP IP/OBS HIGH 75: CPT | Performed by: INTERNAL MEDICINE

## 2018-06-07 PROCEDURE — 80048 BASIC METABOLIC PNL TOTAL CA: CPT | Performed by: FAMILY MEDICINE

## 2018-06-07 PROCEDURE — 93005 ELECTROCARDIOGRAM TRACING: CPT

## 2018-06-07 PROCEDURE — 84100 ASSAY OF PHOSPHORUS: CPT | Performed by: STUDENT IN AN ORGANIZED HEALTH CARE EDUCATION/TRAINING PROGRAM

## 2018-06-07 PROCEDURE — 80048 BASIC METABOLIC PNL TOTAL CA: CPT | Performed by: STUDENT IN AN ORGANIZED HEALTH CARE EDUCATION/TRAINING PROGRAM

## 2018-06-07 PROCEDURE — 87081 CULTURE SCREEN ONLY: CPT | Performed by: STUDENT IN AN ORGANIZED HEALTH CARE EDUCATION/TRAINING PROGRAM

## 2018-06-07 PROCEDURE — 80307 DRUG TEST PRSMV CHEM ANLYZR: CPT | Performed by: FAMILY MEDICINE

## 2018-06-07 RX ORDER — MAGNESIUM SULFATE HEPTAHYDRATE 40 MG/ML
4 INJECTION, SOLUTION INTRAVENOUS ONCE
Status: COMPLETED | OUTPATIENT
Start: 2018-06-07 | End: 2018-06-07

## 2018-06-07 RX ORDER — POTASSIUM CHLORIDE 29.8 MG/ML
40 INJECTION INTRAVENOUS ONCE
Status: DISCONTINUED | OUTPATIENT
Start: 2018-06-07 | End: 2018-06-07 | Stop reason: CLARIF

## 2018-06-07 RX ORDER — POTASSIUM CHLORIDE 20 MEQ/1
40 TABLET, EXTENDED RELEASE ORAL ONCE
Status: COMPLETED | OUTPATIENT
Start: 2018-06-07 | End: 2018-06-07

## 2018-06-07 RX ORDER — LANOLIN ALCOHOL/MO/W.PET/CERES
6 CREAM (GRAM) TOPICAL
Status: COMPLETED | OUTPATIENT
Start: 2018-06-07 | End: 2018-06-07

## 2018-06-07 RX ORDER — THIAMINE MONONITRATE (VIT B1) 100 MG
100 TABLET ORAL DAILY
Status: DISCONTINUED | OUTPATIENT
Start: 2018-06-07 | End: 2018-06-08 | Stop reason: HOSPADM

## 2018-06-07 RX ORDER — FOLIC ACID 1 MG/1
1 TABLET ORAL DAILY
Status: DISCONTINUED | OUTPATIENT
Start: 2018-06-07 | End: 2018-06-08 | Stop reason: HOSPADM

## 2018-06-07 RX ORDER — POTASSIUM CHLORIDE 14.9 MG/ML
20 INJECTION INTRAVENOUS ONCE
Status: COMPLETED | OUTPATIENT
Start: 2018-06-07 | End: 2018-06-07

## 2018-06-07 RX ADMIN — CHLORDIAZEPOXIDE HYDROCHLORIDE 50 MG: 25 CAPSULE ORAL at 19:22

## 2018-06-07 RX ADMIN — POTASSIUM CHLORIDE 20 MEQ: 200 INJECTION, SOLUTION INTRAVENOUS at 08:42

## 2018-06-07 RX ADMIN — POTASSIUM CHLORIDE 20 MEQ: 200 INJECTION, SOLUTION INTRAVENOUS at 11:26

## 2018-06-07 RX ADMIN — POTASSIUM CHLORIDE 40 MEQ: 1500 TABLET, EXTENDED RELEASE ORAL at 07:37

## 2018-06-07 RX ADMIN — CHLORDIAZEPOXIDE HYDROCHLORIDE 50 MG: 25 CAPSULE ORAL at 23:58

## 2018-06-07 RX ADMIN — Medication 12.5 MG/HR: at 12:43

## 2018-06-07 RX ADMIN — NICOTINE 1 PATCH: 21 PATCH, EXTENDED RELEASE TRANSDERMAL at 08:42

## 2018-06-07 RX ADMIN — MELATONIN TAB 3 MG 6 MG: 3 TAB at 23:58

## 2018-06-07 RX ADMIN — CHLORDIAZEPOXIDE HYDROCHLORIDE 50 MG: 25 CAPSULE ORAL at 03:32

## 2018-06-07 RX ADMIN — SODIUM CHLORIDE 50 ML/HR: 0.9 INJECTION, SOLUTION INTRAVENOUS at 19:04

## 2018-06-07 RX ADMIN — CHLORDIAZEPOXIDE HYDROCHLORIDE 50 MG: 25 CAPSULE ORAL at 07:37

## 2018-06-07 RX ADMIN — ENOXAPARIN SODIUM 60 MG: 80 INJECTION SUBCUTANEOUS at 05:19

## 2018-06-07 RX ADMIN — DIBASIC SODIUM PHOSPHATE, MONOBASIC POTASSIUM PHOSPHATE AND MONOBASIC SODIUM PHOSPHATE 1 TABLET: 852; 155; 130 TABLET ORAL at 07:37

## 2018-06-07 RX ADMIN — Medication 14 MG/HR: at 21:25

## 2018-06-07 RX ADMIN — Medication 100 MG: at 11:26

## 2018-06-07 RX ADMIN — FOLIC ACID 1 MG: 1 TABLET ORAL at 11:26

## 2018-06-07 RX ADMIN — Medication 12.5 MG/HR: at 01:44

## 2018-06-07 RX ADMIN — MAGNESIUM SULFATE HEPTAHYDRATE 4 G: 40 INJECTION, SOLUTION INTRAVENOUS at 09:05

## 2018-06-07 RX ADMIN — CHLORDIAZEPOXIDE HYDROCHLORIDE 50 MG: 25 CAPSULE ORAL at 11:28

## 2018-06-07 RX ADMIN — ENOXAPARIN SODIUM 60 MG: 80 INJECTION SUBCUTANEOUS at 21:25

## 2018-06-07 RX ADMIN — CHLORDIAZEPOXIDE HYDROCHLORIDE 50 MG: 25 CAPSULE ORAL at 15:40

## 2018-06-07 NOTE — SOCIAL WORK
Met with pt  Resides with sister Bev Dc in a house  Has no physical limitations  Uses no dme  No hhc  Has been independent, disabled, and does not   Sister provides transport  Per pt, has been drinking every day, mainly uses hard liquor  States he has quit before, but did offer information for etoh outpatient rehab  Called, left  for [facility]  Pt states he has not been following up with his physicians, encouraged him to do so  Explained role of cm, no anticipated d/c needs  CM reviewed d/c planning process including the following: identifying help at home, patient preference for d/c planning needs, and availability of the treatment team to discuss questions or concerns patient and/or family may have regarding understanding of medications and recognizing signs and symptoms once discharged  CM also encouraged patient to follow up with all recommended appointments after discharge  Patient advised of importance for patient and family to participate in managing patient's medical well being

## 2018-06-07 NOTE — ASSESSMENT & PLAN NOTE
· Patient has a history of notable noncompliance with his home medications and previous episodes of atrial fibrillation spurred by alcohol binges  In the ED patient EKG showed atrial fibrillation with a heart rate of 126 with no acute ST changes  Troponin I's were negative x3  Chest x-ray showed no acute disease  Home medications of sotalol and Eliquis were held at this time  Patient is on a cardizem drip  Placed on lovenox 1 mg/kg BID   · TONYA cardioversion to be done today  · Cardiology consult  · Will restart eliquis after cardioversion

## 2018-06-07 NOTE — CASE MANAGEMENT
Initial Clinical Review    PATIENT WAS OBSERVATION STATUS 6/6/18 CONVERTED TO INPATIENT ADMISSION FOR CONTINUED NEED IV CARDIZEM GTT FOR AFIB    Admission: Date/Time/Statement: 6/7/18     Inpatient Admission (Order [id number])   Admission   Date: 6/7/2018 Department: [place] [id number] Released By/Authorizing: Saadia Alex MD (auto-released)   Order Information     Order Name   INPATIENT ADMISSION [[id number]]     Order History   Inpatient   Date/Time Action Taken User Additional Information   06/07/18 1324 Release Saadia Alex MD (auto-released) sharita Puckettky   06/07/18 1324 Complete Saadia Alex MD    Order Details     Frequency Duration Priority Order Class   Once 1  occurrence Routine Hospital Performed   Inpatient Admission   Order: [id number]   Status:  Completed   Visible to patient:  No (Not Released) Next appt:  None                            Order Questions     Question Answer Comment   Admitting Physician Elvis Matias    Level of Care Med Surg    Estimated length of stay More than 2 Midnights    Certification I certify that inpatient services are medically necessary for this patient for a duration of greater than two midnights  See H&P and MD Progress Notes for additional information about the patient's course of treatment              ED: Date/Time/Mode of Arrival:   ED Arrival Information     Expected Arrival Acuity Means of Arrival Escorted By Service Admission Type    - 6/6/2018 15:29 Urgent Walk-In Self General Medicine Urgent    Arrival Complaint    heart palpitations          Chief Complaint:   Chief Complaint   Patient presents with    Rapid Heart Rate     Pt reports he noticed his heart was beating "real, real, real fast" when he woke up at 2pm         History of Illness: Stefan Adler is a 49-year-old male with a past medical history of chronic atrial fibrillation, nonischemic cardiomyopathy, chronic alcohol abuse, and tobacco dependence who presents with palpitations since earlier this afternoon  The patient states that his most recent episode began at approximately 2 o'clock this afternoon as he was waking up  He states that he woke up feeling palpitations and a rapid heart rate  Notably, he states that his birthday was yesterday and he drank an excessive amount of alcohol  He does not remember how much alcohol was consumed only that he was drinking hard liquor  He also states that he has been noncompliant with his medications  When asked why he does not take his medications, he simply states that he does not like to take medicine        ED Vital Signs:   ED Triage Vitals [06/06/18 1541]   Temperature Pulse Respirations Blood Pressure SpO2   (!) 96 5 °F (35 8 °C) (!) 114 18 120/80 97 %      Temp Source Heart Rate Source Patient Position - Orthostatic VS BP Location FiO2 (%)   Tympanic Monitor Sitting Right arm --      Pain Score       No Pain        Wt Readings from Last 1 Encounters:   06/06/18 62 kg (136 lb 11 oz)       Vital Signs (abnormal): Abnormal Labs/Diagnostic Test Results:  K 2 9 CA 7 8 PHOS 1 8 MG 1 4 TROPONIN <0 02 X2   CXR No acute cardiopulmonary disease  Suspected visualization of the nipple shadows    If relevant, repeat film with nipple markers could be obtained       ED Treatment:   Medication Administration from 06/06/2018 1529 to 06/06/2018 2043       Date/Time Order Dose Route Action Action by Comments     06/06/2018 1737 sodium chloride 0 9 % bolus 1,000 mL 0 mL Intravenous Stopped Darrin Davey RN      06/06/2018 1608 sodium chloride 0 9 % bolus 1,000 mL 1,000 mL Intravenous [address] Sapphire Mccoy RN      06/06/2018 1652 diltiazem (CARDIZEM) 125 mg in sodium chloride 0 9 % 125 mL infusion 12 5 mg/hr Intravenous Rate/Dose Change Darrin Davey RN      06/06/2018 1621 diltiazem (CARDIZEM) 125 mg in sodium chloride 0 9 % 125 mL infusion 10 mg/hr Intravenous [address] Sapphire Mccoy RN      06/06/2018 1610 diltiazem (CARDIZEM) injection 10 mg 10 mg Intravenous Given Alonzo Key RN      06/06/2018 1725 diltiazem (CARDIZEM) injection 10 mg 10 mg Intravenous Given Gian Staley RN      06/06/2018 1737 sodium chloride 0 9 % infusion 125 mL/hr Intravenous [address] [place], [address]      06/06/2018 1835 enoxaparin (LOVENOX) subcutaneous injection 60 mg 60 mg Subcutaneous Given Ping Prajapati RN           Past Medical/Surgical History: Active Ambulatory Problems     Diagnosis Date Noted    Cardiomyopathy, 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 06/10/2016    Alcohol abuse 06/10/2016    Acute on Chronic atrial fibrillation with RVR ([address] ) 09/23/2017    Tobacco abuse 09/23/2017     Resolved Ambulatory Problems     Diagnosis Date Noted    Atrial flutter ([address] ) 06/10/2016    Chest pain 09/22/2017    Hypokalemia 09/22/2017    Hyperglycemia 09/22/2017     Past Medical History:   Diagnosis Date    Alcohol abuse     Alcoholic hepatitis     Atrial flutter ([address] ) 08/2015    Cardiomyopathy, nonischemic (HCC)     Congenital heart disease     Hypokalemia     Murmur     Smoking        Admitting Diagnosis: Cardiomyopathy, secondary ([address] ) [I42 9]  Heart palpitations [R00 2]  Chronic atrial fibrillation (HCC) [I48 2]  Atrial fibrillation with rapid ventricular response (HCC) [I48 91]  Atrial flutter, unspecified type ([address] ) [I48 92]    Age/Sex: 52 y o  male    Assessment/Plan:   Assessment:  LAMONT Ayala is a 38-year-old male with a past medical history of chronic atrial fibrillation, nonischemic cardiomyopathy, chronic alcohol abuse, and tobacco dependence who presents with palpitations     Plan:     * Acute on Chronic atrial fibrillation with RVR ([address] )   Assessment & Plan     · Patient is notably noncompliant with his home medications & has several prior episodes of afib spurred by alcohol binges  · EKG on admission shows afib at 126 bpm, no acute ST changes  · Trop neg x2, CXR shows NAD  · CBC, CMP grossly WNL on admission     · Holding home sotalol  Continue rate control with cardizem gtt, titrate/wean as indicated  ? Currently rate-controlled but still in afib with HR in the low 90's and /70  · Anticoagulate with lovenox 1 mg / kg bid  First dose received in ED  Holding eliquis for now  · Cardiology consult for possible TONYA/cardioversion  Recommendations appreciated  · Monitor on telemetry  · Repeat EKG in AM  Will check for spontaneous conversion back to NSR while on cardizem gtt  · Monitor vital signs as per floor protocols  · IVF NS 50 cc/h       Cardiomyopathy, 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   Assessment & Plan     · Likely 2/2 chronic tachycardia vs chronic alcohol abuse  · NM Stress test 9/2017 was normal without reproduction of symptoms  · Echo 9/2017 shows EF 50-55% with wall thickness at upper limit of normal  LA moderately dilated  PA pressure 35 mmHg  · Followed by cardiology  · Management as above for atrial fibrillation  · Gentle fluid hydration with NS 50 cc/h        Alcohol abuse   Assessment & Plan     · Monitor with neuro checks q4h, watch for DTs or other signs of withdrawal   · If necessary, may need to initiate librium protocol or use prn ativan  · No  / psych consult will be placed, as patient does not seem interested in getting help for this issue       Tobacco abuse   Assessment & Plan     · 1 ppd smoker  · Tobacco cessation materials offered  · Nicotine patch 21 mg daily         Global  · IVF NS 50 cc/h  · Replete electrolytes as indicated  · Cardiac diet  NPO after midnight for possible TONYA/Cardioversion     · VTE prophylaxis with lovenox 1 mg/kg bid as above & SCDs          Admission Orders:  TELE MON  NPO  NEUROVASCULAR CHECKS  CONSULT CARDIOLOGY  Scheduled Meds:   Current Facility-Administered Medications:  acetaminophen 650 mg Oral Q6H PRN Yue Santos,     aluminum-magnesium hydroxide-simethicone 30 mL Oral Q6H PRN Yue Santos,     calcium carbonate 1,000 mg Oral Daily PRN Latanya Donovan, DO    [START ON 6/9/2018] chlordiazePOXIDE 25 mg Oral Q4H Latanya Donovan, DO    [START ON 6/10/2018] chlordiazePOXIDE 25 mg Oral Q6H [facility] Latanya Donovan, DO    chlordiazePOXIDE 50 mg Oral Q4H Latanya Donovan, DO    [START ON 6/8/2018] chlordiazePOXIDE 50 mg Oral Q6H [facility] Latanya Donovan, DO    diltiazem 12 5 mg/hr Intravenous Continuous Latanya Donovan, DO Last Rate: 12 5 mg/hr (06/07/18 0701)   enoxaparin 1 mg/kg Subcutaneous Q12H [facility] Latanya Donovan, DO    LORazepam 1 mg Intravenous Q6H PRN Latanya Donovan, DO    nicotine 1 patch Transdermal Daily Latanya Donovan, DO    ondansetron 4 mg Intravenous Q6H PRN Latanya Donovan, DO    potassium chloride 20 mEq Intravenous Once Edmundo Yo MD Last Rate: 20 mEq (06/07/18 0842)   Followed by        potassium chloride 20 mEq Intravenous Once Edmundo Yo MD    sodium chloride 50 mL/hr Intravenous Continuous Latanya Donovan, DO Last Rate: 50 mL/hr (06/06/18 2235)     Continuous Infusions:   diltiazem 12 5 mg/hr Last Rate: 12 5 mg/hr (06/07/18 0701)   sodium chloride 50 mL/hr Last Rate: 50 mL/hr (06/06/18 2235)     PRN Meds:   acetaminophen    aluminum-magnesium hydroxide-simethicone    calcium carbonate    LORazepam    ondansetron

## 2018-06-07 NOTE — ASSESSMENT & PLAN NOTE
· Likely 2/2 chronic tachycardia vs chronic alcohol abuse  · NM Stress test 9/2017 was normal without reproduction of symptoms  · Echo 9/2017 shows EF 50-55% with wall thickness at upper limit of normal  LA moderately dilated  PA pressure 35 mmHg  Today will get TONYA     · Cardiology is consulted

## 2018-06-07 NOTE — PLAN OF CARE
CARDIOVASCULAR - ADULT     Maintains optimal cardiac output and hemodynamic stability Progressing     Absence of cardiac dysrhythmias or at baseline rhythm Progressing        METABOLIC, FLUID AND ELECTROLYTES - ADULT     Electrolytes maintained within normal limits Progressing        Potential for Falls     Patient will remain free of falls Progressing

## 2018-06-07 NOTE — PROGRESS NOTES
[address] [facility] [address] [facility] Progress Note - Nic Ayala 52 y o  male MRN: [id number]    Unit/Bed#: [address] [place] [address] Encounter: [id number]      Assessment/Plan:  58-year-old male with a history of atrial fibrillation presents with atrial fibrillation with RVR  * Acute on Chronic atrial fibrillation with RVR (HCC)   Assessment & Plan    · Patient has a history of notable noncompliance with his home medications and previous episodes of atrial fibrillation spurred by alcohol binges  In the ED patient EKG showed atrial fibrillation with a heart rate of 126 with no acute ST changes  Troponin I's were negative x3  Chest x-ray showed no acute disease  Home medications of sotalol and Eliquis were held at this time  · Patient is currently on the Cardizem drip and will titrate as needed  · Lovenox 1 bassam per kg b i d  for possible TONYA with cardioversion  · Cardiology consult  · EKG in the morning        Hypomagnesemia   Assessment & Plan    1 4  Will replete        Alcohol withdrawal syndrome without complication (HCC)   Assessment & Plan    · Starting librium taper:  CIWA score on admission 8-9  · 50 mg po q4h for 6 doses  · 50 mg po q6h for 4 doses  · 25 mg po q4h for 6 doses  · 25 mg po q6h for 4 doses  · Ativan 1 mg IV q6h prn seizures  · Monitor for DTs/seizures  · Monitor vital signs per floor protocol  Tobacco abuse   Assessment & Plan    · 1 ppd smoker  · Tobacco cessation materials offered  · Continue with Nicotine patch 21 mg daily  Hypokalemia   Assessment & Plan    2 4  Will replete        Alcohol abuse   Assessment & Plan    · Librium taper as above  · Case management consulted for alcohol abuse concerns  Cardiomyopathy, 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   Assessment & Plan    · Likely 2/2 chronic tachycardia vs chronic alcohol abuse  · NM Stress test 9/2017 was normal without reproduction of symptoms     · Echo 9/2017 shows EF 50-55% with wall thickness at upper limit of normal  LA moderately dilated  PA pressure 35 mmHg  · Cardiology is consulted                Subjective:   Patient seen and examined at bedside  He states that his heart palpitations feel better at this time  His last drink was the day prior to admission for his birthday  He has an advocate every day drinker  He states that he is not compliant with his medications he takes his Eliquis and sotalol about once a week  He does not want to interact with his drinking  At this time he denies any shortness of breath or abdominal pain  Objective:     Vitals: Blood pressure 102/56, pulse 81, temperature 98 1 °F (36 7 °C), temperature source Oral, resp  rate 20, height 5' 6" (1 676 m), weight 62 kg (136 lb 11 oz), SpO2 96 %  ,Body mass index is 22 06 kg/m²  Wt Readings from Last 3 Encounters:   06/06/18 62 kg (136 lb 11 oz)   10/09/17 66 7 kg (147 lb)   09/25/17 66 1 kg (145 lb 11 6 oz)       Intake/Output Summary (Last 24 hours) at 06/07/18 1207  Last data filed at 06/07/18 1000   Gross per 24 hour   Intake             1120 ml   Output              400 ml   Net              720 ml       Physical Exam:   Physical Exam   Constitutional: He is oriented to person, place, and time  He appears well-developed and well-nourished  HENT:   Head: Normocephalic and atraumatic  Nose: Nose normal    Mouth/Throat: Oropharynx is clear and moist    Eyes: EOM are normal  Right eye exhibits no discharge  Left eye exhibits no discharge  Neck: Normal range of motion  Neck supple  Cardiovascular: Normal rate and intact distal pulses  A regularly irregular rhythm present  Murmur heard  Pulmonary/Chest: Effort normal and breath sounds normal  He has no wheezes  Abdominal: Soft  Bowel sounds are normal  There is no tenderness  Musculoskeletal: He exhibits no edema or tenderness  Neurological: He is alert and oriented to person, place, and time  + tremor   Skin: Skin is warm  No erythema     Psychiatric: He has a normal mood and affect  His behavior is normal    Vitals reviewed  Recent Results (from the past 24 hour(s))   CBC and differential    Collection Time: 06/06/18  4:03 PM   Result Value Ref Range    WBC 5 20 4  31 - 10 16 Thousand/uL    RBC 4 99 3 88 - 5 62 Million/uL    Hemoglobin 15 4 12 0 - 17 0 g/dL    Hematocrit 47 5 36 5 - 49 3 %    MCV 95 82 - 98 fL    MCH 30 9 26 8 - 34 3 pg    MCHC 32 4 31 4 - 37 4 g/dL    RDW 13 7 11 6 - 15 1 %    MPV 8 2 (L) 8 9 - 12 7 fL    Platelets 825 041 - 738 Thousands/uL    nRBC 0 /100 WBCs    Neutrophils Relative 57 43 - 75 %    Immat GRANS % 1 0 - 2 %    Lymphocytes Relative 28 14 - 44 %    Monocytes Relative 10 4 - 12 %    Eosinophils Relative 2 0 - 6 %    Basophils Relative 2 (H) 0 - 1 %    Neutrophils Absolute 3 00 1 85 - 7 62 Thousands/µL    Immature Grans Absolute 0 03 0 00 - 0 20 Thousand/uL    Lymphocytes Absolute 1 47 0 60 - 4 47 Thousands/µL    Monocytes Absolute 0 51 0 17 - 1 22 Thousand/µL    Eosinophils Absolute 0 09 0 00 - 0 61 Thousand/µL    Basophils Absolute 0 10 0 00 - 0 10 Thousands/µL   Protime-INR    Collection Time: 06/06/18  4:03 PM   Result Value Ref Range    Protime 10 3 9 4 - 11 7 seconds    INR 0 98 0 86 - 1 16   APTT    Collection Time: 06/06/18  4:03 PM   Result Value Ref Range    PTT 25 24 - 33 seconds   Comprehensive metabolic panel    Collection Time: 06/06/18  4:03 PM   Result Value Ref Range    Sodium 142 136 - 145 mmol/L    Potassium 3 5 3 5 - 5 3 mmol/L    Chloride 100 100 - 108 mmol/L    CO2 29 21 - 32 mmol/L    Anion Gap 13 4 - 13 mmol/L    BUN 10 5 - 25 mg/dL    Creatinine 0 99 0 60 - 1 30 mg/dL    Glucose 94 65 - 140 mg/dL    Calcium 8 6 8 3 - 10 1 mg/dL    AST 40 5 - 45 U/L    ALT 30 12 - 78 U/L    Alkaline Phosphatase 52 46 - 116 U/L    Total Protein 8 1 6 4 - 8 2 g/dL    Albumin 4 0 3 5 - 5 0 g/dL    Total Bilirubin 0 50 0 20 - 1 00 mg/dL    eGFR 99 ml/min/1 73sq m   Troponin I    Collection Time: 06/06/18  4:03 PM   Result Value Ref Range    Troponin I <0 02 <=0 04 ng/mL   Troponin I    Collection Time: 06/06/18  6:34 PM   Result Value Ref Range    Troponin I <0 02 <=0 04 ng/mL   Basic metabolic panel    Collection Time: 06/07/18  5:27 AM   Result Value Ref Range    Sodium 133 (L) 136 - 145 mmol/L    Potassium 2 9 (L) 3 5 - 5 3 mmol/L    Chloride 98 (L) 100 - 108 mmol/L    CO2 29 21 - 32 mmol/L    Anion Gap 6 4 - 13 mmol/L    BUN 10 5 - 25 mg/dL    Creatinine 0 78 0 60 - 1 30 mg/dL    Glucose 131 65 - 140 mg/dL    Calcium 7 8 (L) 8 3 - 10 1 mg/dL    eGFR 117 ml/min/1 73sq m   Magnesium    Collection Time: 06/07/18  5:27 AM   Result Value Ref Range    Magnesium 1 4 (L) 1 6 - 2 6 mg/dL   Phosphorus    Collection Time: 06/07/18  5:27 AM   Result Value Ref Range    Phosphorus 1 8 (L) 2 7 - 4 5 mg/dL   CBC (With Platelets)    Collection Time: 06/07/18  5:27 AM   Result Value Ref Range    WBC 6 10 4 31 - 10 16 Thousand/uL    RBC 4 35 3 88 - 5 62 Million/uL    Hemoglobin 13 4 12 0 - 17 0 g/dL    Hematocrit 40 6 36 5 - 49 3 %    MCV 93 82 - 98 fL    MCH 30 8 26 8 - 34 3 pg    MCHC 33 0 31 4 - 37 4 g/dL    RDW 13 4 11 6 - 15 1 %    Platelets 663 527 - 881 Thousands/uL    MPV 9 1 8 9 - 12 7 fL   Rapid drug screen, urine    Collection Time: 06/07/18 11:30 AM   Result Value Ref Range    Amph/Meth UR Negative Negative    Barbiturate Ur Negative Negative    Benzodiazepine Urine Positive (A) Negative    Cocaine Urine Negative Negative    Methadone Urine Negative Negative    Opiate Urine Negative Negative    PCP Ur Negative Negative    THC Urine Negative Negative       Current Facility-Administered Medications   Medication Dose Route Frequency Provider Last Rate Last Dose    acetaminophen (TYLENOL) tablet 650 mg  650 mg Oral Q6H PRN Clarnce Cain, DO        aluminum-magnesium hydroxide-simethicone (MYLANTA) 200-200-20 mg/5 mL oral suspension 30 mL  30 mL Oral Q6H PRN Clarnce Cain, DO   30 mL at 06/06/18 2307    calcium carbonate (TUMS) chewable tablet 1,000 mg  1,000 mg Oral Daily PRN [place] Barclay, DO        [START ON 6/9/2018] chlordiazePOXIDE (LIBRIUM) capsule 25 mg  25 mg Oral Q4H [place] Barclay, DO        [START ON 6/10/2018] chlordiazePOXIDE (LIBRIUM) capsule 25 mg  25 mg Oral Q6H [facility] [place] Barclay, DO        chlordiazePOXIDE (LIBRIUM) capsule 50 mg  50 mg Oral Q4H [place] Barclay, DO   50 mg at 06/07/18 1128    [START ON 6/8/2018] chlordiazePOXIDE (LIBRIUM) capsule 50 mg  50 mg Oral Q6H [facility] [place] Barclay, DO        diltiazem (CARDIZEM) 125 mg in sodium chloride 0 9 % 125 mL infusion  12 5 mg/hr Intravenous Continuous Juliocesar Barclay, DO 12 5 mL/hr at 06/07/18 0701 12 5 mg/hr at 06/07/18 0701    enoxaparin (LOVENOX) subcutaneous injection 60 mg  1 mg/kg Subcutaneous Q12H [facility] [place] Barclay, DO   60 mg at 89/24/80 6791    folic acid (FOLVITE) tablet 1 mg  1 mg Oral Daily Chelsea Haywood MD   1 mg at 06/07/18 1126    LORazepam (ATIVAN) 2 mg/mL injection 1 mg  1 mg Intravenous Q6H PRN [place] Barclay, DO        nicotine (NICODERM CQ) 21 mg/24 hr TD 24 hr patch 1 patch  1 patch Transdermal Daily [place] Barclay, DO   1 patch at 06/07/18 0842    ondansetron (ZOFRAN) injection 4 mg  4 mg Intravenous Q6H PRN [place] Barclay, DO        potassium chloride 20 mEq IVPB (premix)  20 mEq Intravenous Once Chelsea Haywood MD 50 mL/hr at 06/07/18 1126 20 mEq at 06/07/18 1126    sodium chloride 0 9 % infusion  50 mL/hr Intravenous Continuous Juliocesar Barclay, DO 50 mL/hr at 06/06/18 2235 50 mL/hr at 06/06/18 2235    thiamine (VITAMIN B1) tablet 100 mg  100 mg Oral Daily Chelsea Haywood MD   100 mg at 06/07/18 1126       Invasive Devices     Peripheral Intravenous Line            Peripheral IV 06/06/18 Left Antecubital less than 1 day    Peripheral IV 06/07/18 Left Arm less than 1 day                Lab, Imaging and other studies: I have personally reviewed pertinent reports      VTE Pharmacologic Prophylaxis: Enoxaparin (Lovenox)  VTE Mechanical Prophylaxis: sequential compression Marcello Maldonado MD

## 2018-06-07 NOTE — ASSESSMENT & PLAN NOTE
· Patient states he drinks everyday usually gin  · Librium taper as above  · Case management consulted for alcohol abuse concerns

## 2018-06-07 NOTE — ASSESSMENT & PLAN NOTE
· Starting librium taper:  CIWA score on admission 8-9  · 50 mg po q4h for 6 doses  · 50 mg po q6h for 4 doses  · 25 mg po q4h for 6 doses  · 25 mg po q6h for 4 doses  · Ativan 1 mg IV q6h prn seizures  · Monitor for DTs/seizures  · Monitor vital signs per floor protocol
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
## 2018-06-08 ENCOUNTER — ANESTHESIA (INPATIENT)
Dept: NON INVASIVE DIAGNOSTICS | Facility: HOSPITAL | Age: 53
DRG: 309 | End: 2018-06-08
Payer: MEDICARE

## 2018-06-08 VITALS
DIASTOLIC BLOOD PRESSURE: 79 MMHG | RESPIRATION RATE: 18 BRPM | OXYGEN SATURATION: 99 % | BODY MASS INDEX: 21.97 KG/M2 | TEMPERATURE: 97.7 F | WEIGHT: 136.69 LBS | HEIGHT: 66 IN | SYSTOLIC BLOOD PRESSURE: 132 MMHG | HEART RATE: 76 BPM

## 2018-06-08 PROBLEM — E87.6 HYPOKALEMIA: Status: RESOLVED | Noted: 2017-09-22 | Resolved: 2018-06-08

## 2018-06-08 LAB
ANION GAP SERPL CALCULATED.3IONS-SCNC: 7 MMOL/L (ref 4–13)
ATRIAL RATE: 234 BPM
BASOPHILS # BLD AUTO: 0.04 THOUSANDS/ΜL (ref 0–0.1)
BASOPHILS NFR BLD AUTO: 1 % (ref 0–1)
BUN SERPL-MCNC: 6 MG/DL (ref 5–25)
CALCIUM SERPL-MCNC: 8.1 MG/DL (ref 8.3–10.1)
CHLORIDE SERPL-SCNC: 101 MMOL/L (ref 100–108)
CO2 SERPL-SCNC: 25 MMOL/L (ref 21–32)
CREAT SERPL-MCNC: 0.7 MG/DL (ref 0.6–1.3)
EOSINOPHIL # BLD AUTO: 0.19 THOUSAND/ΜL (ref 0–0.61)
EOSINOPHIL NFR BLD AUTO: 3 % (ref 0–6)
ERYTHROCYTE [DISTWIDTH] IN BLOOD BY AUTOMATED COUNT: 12.8 % (ref 11.6–15.1)
GFR SERPL CREATININE-BSD FRML MDRD: 123 ML/MIN/1.73SQ M
GLUCOSE SERPL-MCNC: 93 MG/DL (ref 65–140)
HCT VFR BLD AUTO: 42 % (ref 36.5–49.3)
HGB BLD-MCNC: 14.2 G/DL (ref 12–17)
IMM GRANULOCYTES # BLD AUTO: 0.02 THOUSAND/UL (ref 0–0.2)
IMM GRANULOCYTES NFR BLD AUTO: 0 % (ref 0–2)
LYMPHOCYTES # BLD AUTO: 1.71 THOUSANDS/ΜL (ref 0.6–4.47)
LYMPHOCYTES NFR BLD AUTO: 29 % (ref 14–44)
MAGNESIUM SERPL-MCNC: 1.7 MG/DL (ref 1.6–2.6)
MCH RBC QN AUTO: 31.3 PG (ref 26.8–34.3)
MCHC RBC AUTO-ENTMCNC: 33.8 G/DL (ref 31.4–37.4)
MCV RBC AUTO: 93 FL (ref 82–98)
MONOCYTES # BLD AUTO: 0.7 THOUSAND/ΜL (ref 0.17–1.22)
MONOCYTES NFR BLD AUTO: 12 % (ref 4–12)
MRSA NOSE QL CULT: NORMAL
NEUTROPHILS # BLD AUTO: 3.24 THOUSANDS/ΜL (ref 1.85–7.62)
NEUTS SEG NFR BLD AUTO: 55 % (ref 43–75)
NRBC BLD AUTO-RTO: 0 /100 WBCS
P AXIS: 265 DEGREES
PHOSPHATE SERPL-MCNC: 1.9 MG/DL (ref 2.7–4.5)
PLATELET # BLD AUTO: 303 THOUSANDS/UL (ref 149–390)
PMV BLD AUTO: 9 FL (ref 8.9–12.7)
POTASSIUM SERPL-SCNC: 3.5 MMOL/L (ref 3.5–5.3)
QRS AXIS: -20 DEGREES
QRSD INTERVAL: 90 MS
QT INTERVAL: 360 MS
QTC INTERVAL: 459 MS
RBC # BLD AUTO: 4.53 MILLION/UL (ref 3.88–5.62)
SODIUM SERPL-SCNC: 133 MMOL/L (ref 136–145)
T WAVE AXIS: 46 DEGREES
VENTRICULAR RATE: 98 BPM
WBC # BLD AUTO: 5.9 THOUSAND/UL (ref 4.31–10.16)

## 2018-06-08 PROCEDURE — 92960 CARDIOVERSION ELECTRIC EXT: CPT

## 2018-06-08 PROCEDURE — 93320 DOPPLER ECHO COMPLETE: CPT | Performed by: INTERNAL MEDICINE

## 2018-06-08 PROCEDURE — 85025 COMPLETE CBC W/AUTO DIFF WBC: CPT | Performed by: FAMILY MEDICINE

## 2018-06-08 PROCEDURE — 93312 ECHO TRANSESOPHAGEAL: CPT

## 2018-06-08 PROCEDURE — 80048 BASIC METABOLIC PNL TOTAL CA: CPT | Performed by: FAMILY MEDICINE

## 2018-06-08 PROCEDURE — 84100 ASSAY OF PHOSPHORUS: CPT | Performed by: FAMILY MEDICINE

## 2018-06-08 PROCEDURE — 93312 ECHO TRANSESOPHAGEAL: CPT | Performed by: INTERNAL MEDICINE

## 2018-06-08 PROCEDURE — 99232 SBSQ HOSP IP/OBS MODERATE 35: CPT | Performed by: INTERNAL MEDICINE

## 2018-06-08 PROCEDURE — 93325 DOPPLER ECHO COLOR FLOW MAPG: CPT | Performed by: INTERNAL MEDICINE

## 2018-06-08 PROCEDURE — 92960 CARDIOVERSION ELECTRIC EXT: CPT | Performed by: INTERNAL MEDICINE

## 2018-06-08 PROCEDURE — 83735 ASSAY OF MAGNESIUM: CPT | Performed by: FAMILY MEDICINE

## 2018-06-08 PROCEDURE — 93010 ELECTROCARDIOGRAM REPORT: CPT | Performed by: INTERNAL MEDICINE

## 2018-06-08 PROCEDURE — 99238 HOSP IP/OBS DSCHRG MGMT 30/<: CPT | Performed by: FAMILY MEDICINE

## 2018-06-08 PROCEDURE — 93005 ELECTROCARDIOGRAM TRACING: CPT

## 2018-06-08 PROCEDURE — 5A2204Z RESTORATION OF CARDIAC RHYTHM, SINGLE: ICD-10-PCS | Performed by: INTERNAL MEDICINE

## 2018-06-08 RX ORDER — METOPROLOL SUCCINATE 25 MG/1
25 TABLET, EXTENDED RELEASE ORAL DAILY
Qty: 30 TABLET | Refills: 0
Start: 2018-06-09 | End: 2022-07-27

## 2018-06-08 RX ORDER — MAGNESIUM SULFATE HEPTAHYDRATE 40 MG/ML
2 INJECTION, SOLUTION INTRAVENOUS ONCE
Status: COMPLETED | OUTPATIENT
Start: 2018-06-08 | End: 2018-06-08

## 2018-06-08 RX ORDER — METOPROLOL SUCCINATE 25 MG/1
25 TABLET, EXTENDED RELEASE ORAL DAILY
Status: DISCONTINUED | OUTPATIENT
Start: 2018-06-08 | End: 2018-06-08 | Stop reason: HOSPADM

## 2018-06-08 RX ORDER — PROPOFOL 10 MG/ML
INJECTION, EMULSION INTRAVENOUS AS NEEDED
Status: DISCONTINUED | OUTPATIENT
Start: 2018-06-08 | End: 2018-06-08 | Stop reason: SURG

## 2018-06-08 RX ORDER — SODIUM CHLORIDE 9 MG/ML
75 INJECTION, SOLUTION INTRAVENOUS CONTINUOUS
Status: CANCELLED | OUTPATIENT
Start: 2018-06-08

## 2018-06-08 RX ORDER — PROPOFOL 10 MG/ML
INJECTION, EMULSION INTRAVENOUS CONTINUOUS PRN
Status: DISCONTINUED | OUTPATIENT
Start: 2018-06-08 | End: 2018-06-08 | Stop reason: SURG

## 2018-06-08 RX ADMIN — PROPOFOL 50 MG: 10 INJECTION, EMULSION INTRAVENOUS at 08:58

## 2018-06-08 RX ADMIN — Medication 14 MG/HR: at 07:46

## 2018-06-08 RX ADMIN — CHLORDIAZEPOXIDE HYDROCHLORIDE 50 MG: 25 CAPSULE ORAL at 05:58

## 2018-06-08 RX ADMIN — FOLIC ACID 1 MG: 1 TABLET ORAL at 10:11

## 2018-06-08 RX ADMIN — METOPROLOL SUCCINATE 25 MG: 25 TABLET, FILM COATED, EXTENDED RELEASE ORAL at 10:24

## 2018-06-08 RX ADMIN — Medication 100 MG: at 10:11

## 2018-06-08 RX ADMIN — PROPOFOL 30 MG: 10 INJECTION, EMULSION INTRAVENOUS at 09:00

## 2018-06-08 RX ADMIN — DIBASIC SODIUM PHOSPHATE, MONOBASIC POTASSIUM PHOSPHATE AND MONOBASIC SODIUM PHOSPHATE 2 TABLET: 852; 155; 130 TABLET ORAL at 10:11

## 2018-06-08 RX ADMIN — PROPOFOL 150 MCG/KG/MIN: 10 INJECTION, EMULSION INTRAVENOUS at 08:58

## 2018-06-08 RX ADMIN — MAGNESIUM SULFATE HEPTAHYDRATE 2 G: 40 INJECTION, SOLUTION INTRAVENOUS at 10:12

## 2018-06-08 RX ADMIN — ENOXAPARIN SODIUM 60 MG: 80 INJECTION SUBCUTANEOUS at 10:12

## 2018-06-08 RX ADMIN — CHLORDIAZEPOXIDE HYDROCHLORIDE 50 MG: 25 CAPSULE ORAL at 11:36

## 2018-06-08 RX ADMIN — NICOTINE 1 PATCH: 21 PATCH, EXTENDED RELEASE TRANSDERMAL at 10:25

## 2018-06-08 NOTE — ANESTHESIA PREPROCEDURE EVALUATION
Review of Systems/Medical History  Patient summary reviewed  Chart reviewed      Cardiovascular  EKG reviewed,   Comment: INDICATIONS: Detection of coronary artery disease      HISTORY: The patient is a 45year old  male  Chest pain status: no chest pain  Other symptoms: palpitations  Coronary artery disease risk factors: hypertension, smoking, and family history of premature coronary artery  disease  Cardiovascular history: congenital heart disease- per patient , he had surgery done  Co-morbidity: history of alcohol abuse  Medications: a beta blocker, Eliquis       PHYSICAL EXAM: Baseline physical exam screening: normal and no wheezes audible      REST ECG: Normal sinus rhythm      PROCEDURE: The study was performed in the stress lab  A regadenoson infusion pharmacologic stress test was performed  Gated SPECT myocardial perfusion imaging was performed after stress and at rest  Systolic blood pressure was 122 mmHg, at  the start of the study  Diastolic blood pressure was 80 mmHg, at the start of the study  The heart rate was 71 bpm, at the start of the study  IV double checked  Regadenoson protocol:  Time HR bpm SBP mmHg DBP mmHg Symptoms Rhythm/conduct  Baseline 10:04 86 122 80 none NSR  Immediate 10:20 111 111 61 mild dyspnea sinus tach  1 min 10:21 110 123 74 subsiding --  2 min 10:22 106 117 67 subsiding --  3 min 10:23 100 123 67 none --  protocol changed to Lexiscan, patient exercised for 9:08 mins, unable to achieve THR- blunted  No medications or fluids given      STRESS SUMMARY: Duration of pharmacologic stress was 3 min  Maximal heart rate during stress was 133 bpm  The heart rate response to stress was normal  There was normal resting blood pressure with an appropriate response to stress  The  rate-pressure product for the peak heart rate and blood pressure was 31115  There was no chest pain during stress  The stress test was terminated due to protocol completion   Pre oxygen saturation: 98 %  Peak oxygen saturation: 98 %  The  stress ECG was equivocal for ischemia  There were no stress arrhythmias or conduction abnormalities    ,  Pulmonary       GI/Hepatic    Liver disease , alcohol related, Hepatitis ,             Endo/Other     GYN       Hematology   Musculoskeletal       Neurology   Psychology         Cardiomyopathy, 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   Alcohol abuse   Hypokalemia   Acute on Chronic atrial fibrillation with RVR (HCC)   Tobacco abuse   Alcohol withdrawal syndrome without complication (HCC)   Hypomagnesemia         Physical Exam    Airway    Mallampati score: II  TM Distance: >3 FB  Neck ROM: full     Dental       Cardiovascular  Rhythm: regular, Rate: normal,     Pulmonary  Breath sounds clear to auscultation,     Other Findings        Anesthesia Plan  ASA Score- 3     Anesthesia Type- IV sedation with anesthesia with ASA Monitors  Additional Monitors:   Airway Plan:         Plan Factors-    Induction- intravenous  Postoperative Plan-     Informed Consent- Anesthetic plan and risks discussed with patient  I personally reviewed this patient with the CRNA  Discussed and agreed on the Anesthesia Plan with the CRNA  Oleg Vega

## 2018-06-08 NOTE — PROGRESS NOTES
[address] [facility] [address] [facility] Practice Progress Note - Nic Ayala 52 y o  male MRN: [id number]    Unit/Bed#: [address] [place], [place] [id number] Encounter: [id number]      Assessment/Plan:  51 y/o male with history of chronic atrial fibrillation noncompliant with medications presents with atrial fibrillation with RVR after alcohol binge  * Acute on Chronic atrial fibrillation with RVR (HCC)   Assessment & Plan    · Patient has a history of notable noncompliance with his home medications and previous episodes of atrial fibrillation spurred by alcohol binges  In the ED patient EKG showed atrial fibrillation with a heart rate of 126 with no acute ST changes  Troponin I's were negative x3  Chest x-ray showed no acute disease  Home medications of sotalol and Eliquis were held at this time  Patient is on a cardizem drip  Placed on lovenox 1 mg/kg BID   · TONYA cardioversion to be done today  · Cardiology consult  · Will restart eliquis after cardioversion  Hypomagnesemia   Assessment & Plan    1 7  Will give 2 g mag sulfate due to alcohol abuse history  Alcohol withdrawal syndrome without complication (HCC)   Assessment & Plan    · Starting librium taper:  CIWA score on admission 8-9  · 50 mg po q4h for 6 doses  · 50 mg po q6h for 4 doses  · 25 mg po q4h for 6 doses  · 25 mg po q6h for 4 doses  · Ativan 1 mg IV q6h prn seizures  · Monitor for DTs/seizures  · Monitor vital signs per floor protocol  Tobacco abuse   Assessment & Plan    · 1 ppd smoker  · Encourage smoking cessation  · Continue with Nicotine patch 21 mg daily  Alcohol abuse   Assessment & Plan    · Patient states he drinks everyday usually gin  · Librium taper as above  · Case management consulted for alcohol abuse concerns  Cardiomyopathy, 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   Assessment & Plan    · Likely 2/2 chronic tachycardia vs chronic alcohol abuse  · NM Stress test 9/2017 was normal without reproduction of symptoms     · Echo 9/2017 shows EF 50-55% with wall thickness at upper limit of normal  LA moderately dilated  PA pressure 35 mmHg  Today will get TONYA  · Cardiology is consulted        Hypokalemia-resolved as of 6/8/2018   Assessment & Plan    Stable after repletion  Subjective:   Patient seen and examined at bedside  Patient is anxious to get his procedure because he is hungry  Patient denies any chest pain, abdominal pain, or shortness of breath  He states his tremors have improved  Objective:     Vitals: Blood pressure 107/60, pulse 55, temperature 98 4 °F (36 9 °C), temperature source Oral, resp  rate 20, height 5' 6" (1 676 m), weight 62 kg (136 lb 11 oz), SpO2 94 %  ,Body mass index is 22 06 kg/m²  Wt Readings from Last 3 Encounters:   06/06/18 62 kg (136 lb 11 oz)   10/09/17 66 7 kg (147 lb)   09/25/17 66 1 kg (145 lb 11 6 oz)       Intake/Output Summary (Last 24 hours) at 06/08/18 0914  Last data filed at 06/07/18 1605   Gross per 24 hour   Intake              840 ml   Output             1250 ml   Net             -410 ml       Physical Exam:   Physical Exam   Constitutional: He is oriented to person, place, and time  He appears well-developed and well-nourished  HENT:   Head: Normocephalic and atraumatic  Nose: Nose normal    Mouth/Throat: Oropharynx is clear and moist    Eyes: EOM are normal  Right eye exhibits no discharge  Left eye exhibits no discharge  Neck: Normal range of motion  Neck supple  Cardiovascular: Normal rate, regular rhythm, normal heart sounds and intact distal pulses  Pulmonary/Chest: Effort normal and breath sounds normal  He has no wheezes  Abdominal: Soft  Bowel sounds are normal  There is no tenderness  Musculoskeletal: He exhibits no edema or tenderness  Neurological: He is alert and oriented to person, place, and time  Skin: Skin is warm  No erythema  Psychiatric: He has a normal mood and affect  His behavior is normal    Vitals reviewed        Recent Results (from the past 24 hour(s))   Rapid drug screen, urine    Collection Time: 06/07/18 11:30 AM   Result Value Ref Range    Amph/Meth UR Negative Negative    Barbiturate Ur Negative Negative    Benzodiazepine Urine Positive (A) Negative    Cocaine Urine Negative Negative    Methadone Urine Negative Negative    Opiate Urine Negative Negative    PCP Ur Negative Negative    THC Urine Negative Negative   Basic metabolic panel    Collection Time: 06/07/18  6:33 PM   Result Value Ref Range    Sodium 133 (L) 136 - 145 mmol/L    Potassium 4 0 3 5 - 5 3 mmol/L    Chloride 100 100 - 108 mmol/L    CO2 28 21 - 32 mmol/L    Anion Gap 5 4 - 13 mmol/L    BUN 9 5 - 25 mg/dL    Creatinine 0 91 0 60 - 1 30 mg/dL    Glucose 134 65 - 140 mg/dL    Calcium 8 3 8 3 - 10 1 mg/dL    eGFR 109 ml/min/1 73sq m   CBC and differential    Collection Time: 06/08/18  5:57 AM   Result Value Ref Range    WBC 5 90 4 31 - 10 16 Thousand/uL    RBC 4 53 3 88 - 5 62 Million/uL    Hemoglobin 14 2 12 0 - 17 0 g/dL    Hematocrit 42 0 36 5 - 49 3 %    MCV 93 82 - 98 fL    MCH 31 3 26 8 - 34 3 pg    MCHC 33 8 31 4 - 37 4 g/dL    RDW 12 8 11 6 - 15 1 %    MPV 9 0 8 9 - 12 7 fL    Platelets 231 417 - 775 Thousands/uL    nRBC 0 /100 WBCs    Neutrophils Relative 55 43 - 75 %    Immat GRANS % 0 0 - 2 %    Lymphocytes Relative 29 14 - 44 %    Monocytes Relative 12 4 - 12 %    Eosinophils Relative 3 0 - 6 %    Basophils Relative 1 0 - 1 %    Neutrophils Absolute 3 24 1 85 - 7 62 Thousands/µL    Immature Grans Absolute 0 02 0 00 - 0 20 Thousand/uL    Lymphocytes Absolute 1 71 0 60 - 4 47 Thousands/µL    Monocytes Absolute 0 70 0 17 - 1 22 Thousand/µL    Eosinophils Absolute 0 19 0 00 - 0 61 Thousand/µL    Basophils Absolute 0 04 0 00 - 0 10 Thousands/µL   Magnesium    Collection Time: 06/08/18  5:57 AM   Result Value Ref Range    Magnesium 1 7 1 6 - 2 6 mg/dL   Basic metabolic panel    Collection Time: 06/08/18  5:57 AM   Result Value Ref Range    Sodium 133 (L) 136 - 145 mmol/L Potassium 3 5 3 5 - 5 3 mmol/L    Chloride 101 100 - 108 mmol/L    CO2 25 21 - 32 mmol/L    Anion Gap 7 4 - 13 mmol/L    BUN 6 5 - 25 mg/dL    Creatinine 0 70 0 60 - 1 30 mg/dL    Glucose 93 65 - 140 mg/dL    Calcium 8 1 (L) 8 3 - 10 1 mg/dL    eGFR 123 ml/min/1 73sq m   Phosphorus    Collection Time: 06/08/18  5:57 AM   Result Value Ref Range    Phosphorus 1 9 (L) 2 7 - 4 5 mg/dL       Current Facility-Administered Medications   Medication Dose Route Frequency Provider Last Rate Last Dose    acetaminophen (TYLENOL) tablet 650 mg  650 mg Oral Q6H PRN Maki Riga, DO        aluminum-magnesium hydroxide-simethicone (MYLANTA) 200-200-20 mg/5 mL oral suspension 30 mL  30 mL Oral Q6H PRN Maki Riga, DO   30 mL at 06/06/18 2307    calcium carbonate (TUMS) chewable tablet 1,000 mg  1,000 mg Oral Daily PRN Maki Riga, DO        [START ON 6/9/2018] chlordiazePOXIDE (LIBRIUM) capsule 25 mg  25 mg Oral Q4H Maki Riga, DO        [START ON 6/10/2018] chlordiazePOXIDE (LIBRIUM) capsule 25 mg  25 mg Oral Q6H [address] Maki Riga, DO        chlordiazePOXIDE (LIBRIUM) capsule 50 mg  50 mg Oral Q6H [address] Maki Riga, DO   50 mg at 06/08/18 0558    diltiazem (CARDIZEM) 125 mg in sodium chloride 0 9 % 125 mL infusion  14 mg/hr Intravenous Continuous Sanford Schaffer MD 14 mL/hr at 06/08/18 0746 14 mg/hr at 06/08/18 0746    enoxaparin (LOVENOX) subcutaneous injection 60 mg  1 mg/kg Subcutaneous Q12H [address] Maki Riga, DO   60 mg at 99/72/09 7012    folic acid (FOLVITE) tablet 1 mg  1 mg Oral Daily Tereza Webb MD   1 mg at 06/07/18 1126    LORazepam (ATIVAN) 2 mg/mL injection 1 mg  1 mg Intravenous Q6H PRN Maki Riga, DO        magnesium sulfate 2 g/50 mL IVPB (premix) 2 g  2 g Intravenous Once Tereza Webb MD        nicotine (NICODERM CQ) 21 mg/24 hr TD 24 hr patch 1 patch  1 patch Transdermal Daily Shanthi Arriaza DO   1 patch at 06/07/18 0842    ondansetron (ZOFRAN) injection 4 mg  4 mg Intravenous Q6H PRN Layo Hives, DO        potassium-sodium phosphateS (K-PHOS,PHOSPHA 250) -506 mg tablet 2 tablet  2 tablet Oral Once Lesley Lindsay MD        sodium chloride 0 9 % infusion  50 mL/hr Intravenous Continuous Layo Hives, DO 50 mL/hr at 06/07/18 1904 50 mL/hr at 06/07/18 1904    thiamine (VITAMIN B1) tablet 100 mg  100 mg Oral Daily Lesley Lindsay MD   100 mg at 06/07/18 1126     Facility-Administered Medications Ordered in Other Encounters   Medication Dose Route Frequency Provider Last Rate Last Dose    propofol (DIPRIVAN) 1000 mg in 100 mL infusion (premix)    Continuous PRN Aura Block, CRNA 55 8 mL/hr at 06/08/18 0858 150 mcg/kg/min at 06/08/18 0858    propofol (DIPRIVAN) 200 MG/20ML bolus injection   Intravenous PRN Aura Block, CRNA   30 mg at 06/08/18 0900       Invasive Devices     Peripheral Intravenous Line            Peripheral IV 06/06/18 Left Antecubital 1 day    Peripheral IV 06/07/18 Left Arm 1 day                Lab, Imaging and other studies: I have personally reviewed pertinent reports      VTE Pharmacologic Prophylaxis: Enoxaparin (Lovenox)  VTE Mechanical Prophylaxis: sequential compression device    Lesley Lindsay MD

## 2018-06-08 NOTE — DISCHARGE SUMMARY
[facility] Discharge Summary - Medical [facility] [address] 52 y o  male MRN: [id number]    [address] CATH LAB Room / Bed: [place] [address]-* Encounter: [id number]    BRIEF OVERVIEW  Admitting Provider: Christiano Alvarez MD  Discharge Provider: Christiano Alvarez MD    Discharge To:  home  Facility: none    Outpatient Follow-Up:   1  [facility]   2  Cardiology     Things to address at first follow up visit:   1  Atrial Fibrillation   2  Alcohol abuse   3  Hypomagnesia   Labs and results pending at discharge: none    Admission Date: 6/6/2018     Discharge Date: 6/8/18    Primary Discharge Diagnosis  Principal Problem:    Acute on Chronic atrial fibrillation with RVR ([address] )  Active Problems:    Cardiomyopathy, 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    Alcohol abuse    Tobacco abuse    Alcohol withdrawal syndrome without complication (HCC)    Hypomagnesemia  Resolved Problems:    Atrial flutter ([address] )    Hypokalemia    Heart palpitations      Secondary Discharge Diagnoses  hypomagnesemia   Consulting Providers   Cardiology         [address] STAY    Procedures Performed/Pertinent Test results  TONYA with cardioversion     HPI  As per H &P     LAMONT Jamie is a 68-year-old male with a past medical history of chronic atrial fibrillation, nonischemic cardiomyopathy, chronic alcohol abuse, and tobacco dependence who presents with palpitations  he is being admitted under observation status under the service of Dr Zack Servin, within expected length of stay of less than 2 midnights, and anticipated disposition at discharge to home/self-care  Hospital Course    Patient has a history of notable noncompliance with his home medications and previous episodes of atrial fibrillation spurred by alcohol binges  In the ED patient EKG showed atrial fibrillation with a heart rate of 126 with no acute ST changes  Troponin I's were negative x3  Chest x-ray showed no acute disease  Home medications of sotalol and Eliquis were held at this time  Patient was on a cardizem drip  Placed on lovenox 1 mg/kg BID until TONYA with cardioversion and patient tolerated procedure  Converted back to normal sinus rhythm  After conversation with cardiology, it was decided patient will be discharged with toprol XL 25 mg qd  Patient given resources for alcohol abuse  Medication was called into the pharmacy  On day of discharge, patient was tolerating oral diet and ambulating at will  Advised patient to follow up in the office in 1 week  Medications   STOP taking these medications     STOP taking these medications    apixaban 5 mg   Commonly known as: ELIQUIS     sotalol 160 MG tablet   Commonly known as: BETAPACE    TAKE these medications     TAKE these medications     Morning Afternoon Evening Bedtime As Needed    metoprolol succinate 25 mg 24 hr tablet   Commonly known as: TOPROL-XL   Start taking on: 6/9/2018   Take 1 tablet (25 mg total) by mouth daily   Refills: 0               Allergies  No Known Allergies    Diet restrictions: cardiac diet  Activity restrictions: as tolerated  Code Status: Level 1 - Full Code  Advance Directive and Living Will: <no information>  Power of :    POLST:      Discharge Condition: stable      Discharge  Statement   I spent 25 minutes discharging the patient  This time was spent on the day of discharge  I had direct contact with the patient on the day of discharge  Additional documentation is required if more than 30 minutes were spent on discharge

## 2018-06-08 NOTE — ANESTHESIA POSTPROCEDURE EVALUATION
Post-Op Assessment Note      CV Status:  Stable    Mental Status:  Alert and awake    Hydration Status:  Euvolemic    PONV Controlled:  Controlled    Airway Patency:  Patent    Post Op Vitals Reviewed: Yes          Staff: Anesthesiologist           BP      Temp      Pulse     Resp      SpO2      Teeth in place, no missing teeth

## 2018-06-08 NOTE — NURSING NOTE
Patient refused to take pneumonia shot and was discharged in stable condition walking with a steady gait and waiting for sister for a ride  Discharge instructions reviewed and understood by patient

## 2018-06-08 NOTE — DISCHARGE INSTRUCTIONS
A-fib (Atrial Fibrillation)   WHAT YOU NEED TO KNOW:   A-fib may come and go, or it may be a long-term condition  A-fib can cause blood clots, stroke, or heart failure  These conditions may become life-threatening  It is important to treat and manage a-fib to help prevent a blood clot, stroke, or heart failure  DISCHARGE INSTRUCTIONS:   Call 911 for any of the following:   · You have any of the following signs of a heart attack:      ¨ Squeezing, pressure, or pain in your chest that lasts longer than 5 minutes or returns    ¨ Discomfort or pain in your back, neck, jaw, stomach, or arm     ¨ Trouble breathing    ¨ Nausea or vomiting    ¨ Lightheadedness or a sudden cold sweat, especially with chest pain or trouble breathing    · You have any of the following signs of a stroke:      ¨ Numbness or drooping on one side of your face     ¨ Weakness in an arm or leg    ¨ Confusion or difficulty speaking    ¨ Dizziness, a severe headache, or vision loss  Seek care immediately if:  You have any of the following signs of a blood clot:  · You feel lightheaded, are short of breath, and have chest pain  · You cough up blood  · You have swelling, redness, pain, or warmth in your arm or leg  Contact your cardiologist or healthcare provider if:   · Your heart rate is higher than your healthcare provider said it should be  · You have new or worsening swelling in your legs, feet, ankles, or abdomen  · You are short of breath, even at rest      · You have questions or concerns about your condition or care  Medicines: You may need any of the following:  · Heart medicines  help control your heart rate and rhythm  You may need more than one medicine to treat your symptoms  · Blood thinners    help prevent blood clots  Examples of blood thinners include heparin and warfarin  Clots can cause strokes, heart attacks, and death   The following are general safety guidelines to follow while you are taking a blood thinner:    ¨ Watch for bleeding and bruising while you take blood thinners  Watch for bleeding from your gums or nose  Watch for blood in your urine and bowel movements  Use a soft washcloth on your skin, and a soft toothbrush to brush your teeth  This can keep your skin and gums from bleeding  If you shave, use an electric shaver  Do not play contact sports  ¨ Tell your dentist and other healthcare providers that you take anticoagulants  Wear a bracelet or necklace that says you take this medicine  ¨ Do not start or stop any medicines unless your healthcare provider tells you to  Many medicines cannot be used with blood thinners  ¨ Tell your healthcare provider right away if you forget to take the medicine, or if you take too much  ¨ Warfarin  is a blood thinner that you may need to take  The following are things you should be aware of if you take warfarin  § Foods and medicines can affect the amount of warfarin in your blood  Do not make major changes to your diet while you take warfarin  Warfarin works best when you eat about the same amount of vitamin K every day  Vitamin K is found in green leafy vegetables and certain other foods  Ask for more information about what to eat when you are taking warfarin  § You will need to see your healthcare provider for follow-up visits when you are on warfarin  You will need regular blood tests  These tests are used to decide how much medicine you need  · Antiplatelets , such as aspirin, help prevent blood clots  Take your antiplatelet medicine exactly as directed  These medicines make it more likely for you to bleed or bruise  If you are told to take aspirin, do not take acetaminophen or ibuprofen instead  · Take your medicine as directed  Contact your healthcare provider if you think your medicine is not helping or if you have side effects  Tell him or her if you are allergic to any medicine   Keep a list of the medicines, vitamins, and herbs you take  Include the amounts, and when and why you take them  Bring the list or the pill bottles to follow-up visits  Carry your medicine list with you in case of an emergency  Follow up with your cardiologist as directed: You will need regular blood tests and monitoring  Write down your questions so you remember to ask them during your visits  Manage A-fib:   · Know your target heart rate  Learn how to take your pulse and monitor your heart rate  · Manage other health conditions  This includes high blood pressure, sleep apnea, thyroid disease, diabetes, and other heart conditions  Take medicine as directed and follow your treatment plan  · Limit or do not drink alcohol  Alcohol can make a-fib hard to manage  Ask your healthcare provider if it is safe for you to drink alcohol  A drink of alcohol is 12 ounces of beer, 5 ounces of wine, or 1½ ounces of liquor  · Do not smoke  Nicotine and other chemicals in cigarettes and cigars can cause heart and lung damage  Ask your healthcare provider for information if you currently smoke and need help to quit  E-cigarettes or smokeless tobacco still contain nicotine  Talk to your healthcare provider before you use these products  · Eat heart-healthy foods  Heart healthy foods will help keep your cholesterol low  These include fruits, vegetables, whole-grain breads, low-fat dairy products, beans, lean meats, and fish  Replace butter and margarine with heart-healthy oils such as olive oil and canola oil  · Maintain a healthy weight  Ask your healthcare provider how much you should weigh  Ask him to help you create a weight loss plan if you are overweight  · Exercise for 30 minutes  most days of the week  Ask your healthcare provider about the best exercise plan for you  © 2017 [employer] Information is for End User's use only and may not be sold, redistributed or otherwise used for commercial purposes   All illustrations and images included in CareNotes® are the copyrighted property of A [employer]  or Titus Gonzalez  The above information is an  only  It is not intended as medical advice for individual conditions or treatments  Talk to your doctor, nurse or pharmacist before following any medical regimen to see if it is safe and effective for you

## 2018-06-08 NOTE — PROGRESS NOTES
Progress Note - Cardiology   Nic Ayala 52 y o  male MRN: [id number]  : 1965  Unit/Bed#: [address] [place] [address] Encounter: [id number]    Assessment and Plan:   1  Recurrent atrial flutter/fibrillation secondary to EtOH abuse  Multiple previous episodes including 2017, 2016, 2015 and 2015  2  Chronic alcohol abuse  3  Tobacco abuse  4  Congenital heart disease  5  Chronic CHF without acute exacerbation - normal EF on recent echocardiogram but was previously reduced (chronic systolic CHF - ACC/AHA Stage B)  6  Hypokalemia    - Patient underwent cardioversion successfully  - no significant disease on mylene except for moderate pulmonic valve regurgitation  - patient has a history of nonadherence with medications and is not a good candidate for long-term anti rhythmic therapy because of this  If he eventually agrees with anticoagulation, should be on Coumadin to ensure adherence  - replete potassium  - may be discharged home later today if remains in sinus  - switch Cardizem to metoprolol ER 25 mg daily    Subjective / Objective:   Patient seen prior to cardioversion  Denies any complaints  Converted to atrial flutter overnight  Vitals: Blood pressure 124/71, pulse 71, temperature 98 6 °F (37 °C), temperature source Oral, resp  rate 20, height 5' 6" (1 676 m), weight 62 kg (136 lb 11 oz), SpO2 99 %  Vitals:    18 1541 18   Weight: 61 2 kg (135 lb) 62 kg (136 lb 11 oz)     Body mass index is 22 06 kg/m²  BP Readings from Last 3 Encounters:   18 124/71   10/09/17 140/90   17 99/61     Orthostatic Blood Pressures      Most Recent Value   Blood Pressure  124/71 filed at 2018 0914   Patient Position - Orthostatic VS  Lying filed at 2018 0357        I/O        07 -  0700 701 -  0700  07 -  0700    P  O   960     I V  (mL/kg)   50 (0 8)    IV Piggyback 1000      Total Intake(mL/kg) 1000 (16 1) 960 (15 5) 50 (0 8) Urine (mL/kg/hr) 200 1250 (0 8)     Total Output 200 1250      Net +800 -290 +50               Invasive Devices     Peripheral Intravenous Line            Peripheral IV 06/06/18 Left Antecubital 1 day    Peripheral IV 06/07/18 Left Arm 1 day                  Intake/Output Summary (Last 24 hours) at 06/08/18 0917  Last data filed at 06/08/18 0915   Gross per 24 hour   Intake              890 ml   Output             1250 ml   Net             -360 ml         Physical Exam:   Physical Exam   Constitutional: He is oriented to person, place, and time  He appears well-developed  No distress  HENT:   Head: Normocephalic and atraumatic  Poor dentition  Loose/missing lower teeth    Eyes: Conjunctivae and EOM are normal  Pupils are equal, round, and reactive to light  Neck: Neck supple  No JVD present  No thyromegaly present  Cardiovascular: Normal rate  Exam reveals no gallop and no friction rub  Murmur heard  Pulmonary/Chest: Effort normal and breath sounds normal    Abdominal: Soft  He exhibits no distension  There is no tenderness  Musculoskeletal: He exhibits deformity  He exhibits no edema or tenderness  Neurological: He is alert and oriented to person, place, and time  No cranial nerve deficit  Skin: Skin is warm and dry  No rash noted  He is not diaphoretic  No erythema  Psychiatric: He has a normal mood and affect   His behavior is normal  Judgment and thought content normal               Medications/ Allergies:     Current Facility-Administered Medications:  acetaminophen 650 mg Oral Q6H PRN Hunterell Momanuelito, DO    aluminum-magnesium hydroxide-simethicone 30 mL Oral Q6H PRN Kirsty Hagan, DO    calcium carbonate 1,000 mg Oral Daily PRN Kirsty Hagan, DO    [START ON 6/9/2018] chlordiazePOXIDE 25 mg Oral Q4H Hunterell Momanuelito, DO    [START ON 6/10/2018] chlordiazePOXIDE 25 mg Oral Q6H [address] Hunterell Mocha, DO    chlordiazePOXIDE 50 mg Oral Q6H [address] Hunterell Mocha, DO    diltiazem 14 mg/hr Intravenous Continuous Walter Mcdowell MD Last Rate: 14 mg/hr (06/08/18 0746)   enoxaparin 1 mg/kg Subcutaneous Q12H [address] Juliocesar Barclay, DO    folic acid 1 mg Oral Daily Chelsea Haywood MD    LORazepam 1 mg Intravenous Q6H PRN [place] Barclay, DO    magnesium sulfate 2 g Intravenous Once Chelsea Haywood MD    nicotine 1 patch Transdermal Daily [place] Barclay, DO    ondansetron 4 mg Intravenous Q6H PRN [place] Barclay, DO    potassium-sodium phosphateS 2 tablet Oral Once Chelsea Hawyood MD    sodium chloride 50 mL/hr Intravenous Continuous [place] Barclay, DO Last Rate: Stopped (06/08/18 0915)   thiamine 100 mg Oral Daily Chelsea Haywood MD        acetaminophen 650 mg Q6H PRN   aluminum-magnesium hydroxide-simethicone 30 mL Q6H PRN   calcium carbonate 1,000 mg Daily PRN   LORazepam 1 mg Q6H PRN   ondansetron 4 mg Q6H PRN     No Known Allergies    VTE Pharmacologic Prophylaxis:   Enoxaparin (Lovenox)    Labs:   Troponins:  Results from last 7 days  Lab Units 06/06/18  1834 06/06/18  1603   TROPONIN I ng/mL <0 02 <0 02       CBC with diff:  Results from last 7 days  Lab Units 06/08/18  0557 06/07/18  0527 06/06/18  1603   WBC Thousand/uL 5 90 6 10 5 20   HEMOGLOBIN g/dL 14 2 13 4 15 4   HEMATOCRIT % 42 0 40 6 47 5   MCV fL 93 93 95   PLATELETS Thousands/uL 303 333 352   MCH pg 31 3 30 8 30 9   MCHC g/dL 33 8 33 0 32 4   RDW % 12 8 13 4 13 7   MPV fL 9 0 9 1 8 2*   NRBC AUTO /100 WBCs 0  --  0       CMP:  Results from last 7 days  Lab Units 06/08/18  0557 06/07/18  1833 06/07/18  0527 06/06/18  1603   SODIUM mmol/L 133* 133* 133* 142   POTASSIUM mmol/L 3 5 4 0 2 9* 3 5   CHLORIDE mmol/L 101 100 98* 100   CO2 mmol/L 25 28 29 29   ANION GAP mmol/L 7 5 6 13   BUN mg/dL 6 9 10 10   CREATININE mg/dL 0 70 0 91 0 78 0 99   GLUCOSE RANDOM mg/dL 93 134 131 94   CALCIUM mg/dL 8 1* 8 3 7 8* 8 6   AST U/L  --   --   --  40   ALT U/L  --   --   --  30   ALK PHOS U/L  --   --   --  52   TOTAL PROTEIN g/dL  --   --   --  8 1   BILIRUBIN TOTAL mg/dL  -- --   --  0 50   EGFR ml/min/1 73sq m 123 109 117 99       Magnesium:  Results from last 7 days  Lab Units 18  0557 18  0527   MAGNESIUM mg/dL 1 7 1 4*     Coags:  Results from last 7 days  Lab Units 18  1603   PTT seconds 25   INR  0 98     TSH:    Lipid Profile:    Hgb A1c:    NT-proBNP: No results for input(s): NTBNP in the last 72 hours  Imaging & Testing   I have personally reviewed pertinent reports  Xr Chest 1 View Portable    Result Date: 2018  Narrative: CHEST INDICATION:   Atrial fibrillation  Chest pain, shortness of breath  COMPARISON:  2017, 2015 EXAM PERFORMED/VIEWS:  XR CHEST PORTABLE FINDINGS:  Monitoring leads and clips project over the chest  Cardiomediastinal silhouette appears stable  Left hilar fullness which is believed to be vascular again noted  Status post median sternotomy  Visualization of the bilateral nipple shadows suggested  The lungs are otherwise clear  No pneumothorax or pleural effusion  Osseous structures appear within normal limits for patient age  Impression: No acute cardiopulmonary disease  Suspected visualization of the nipple shadows  If relevant, repeat film with nipple markers could be obtained  Workstation performed: [id number]        EKG / Monitor: Personally reviewed      Atrial flutter with variable block    Cardiac testing:   Results for orders placed during the hospital encounter of 17   Echo complete with contrast if indicated    Narrative Keshawn 39  [address] [facility]  [phone]    Transthoracic Echocardiogram  2D, M-mode, Doppler, and Color Doppler    Study date:  23-Sep-2017    Patient: Romina Alexander  MR number: [id number]  Account number: [de-identified]  : 1965  Age: 45 years  Gender: Male  Status: Routine  Location: Bedside  Height: 66 in  Weight: 136 6 lb  BP: 104/ 68 mmHg    Indications: Atrial Flutter    Diagnoses: 427 32 - ATRIAL FLUTTER    Sonographer: Graham Rogers  Referring Physician:  Kitty Carey MD  Group:  Lacie Koo  Interpreting Physician:  Bobby Loyd MD    SUMMARY    LEFT VENTRICLE:  Systolic function was at the lower limits of normal  Ejection fraction was estimated in the range of 50 % to 55 % to be 55 %  Although no diagnostic regional wall motion abnormality was identified, this possibility cannot be completely excluded on the basis of this study  Wall thickness was at the upper limits of normal     LEFT ATRIUM:  The atrium was mildly to moderately dilated  RIGHT ATRIUM:  The atrium was mildly dilated  MITRAL VALVE:  There was mild regurgitation  TRICUSPID VALVE:  There was mild regurgitation  Estimated peak PA pressure was 35 mmHg  PULMONIC VALVE:  There was mild to moderate regurgitation  HISTORY: PRIOR HISTORY: CM, Alcoholic Hepatitis    PROCEDURE: The procedure was performed at the bedside  This was a routine study  The transthoracic approach was used  The study included complete 2D imaging, M-mode, complete spectral Doppler, and color Doppler  The heart rate was 80 bpm,  at the start of the study  Image quality was adequate  LEFT VENTRICLE: Size was normal  Systolic function was at the lower limits of normal  Ejection fraction was estimated in the range of 50 % to 55 % to be 55 %  Although no diagnostic regional wall motion abnormality was identified, this  possibility cannot be completely excluded on the basis of this study  Wall thickness was at the upper limits of normal  DOPPLER: The study was not technically sufficient to allow evaluation of LV diastolic function  RIGHT VENTRICLE: The size was normal  Systolic function was normal     LEFT ATRIUM: The atrium was mildly to moderately dilated  RIGHT ATRIUM: The atrium was mildly dilated  MITRAL VALVE: There was normal leaflet separation  DOPPLER: There was no evidence for stenosis  There was mild regurgitation      AORTIC VALVE: The valve was trileaflet  Leaflets exhibited mildly increased thickness and normal cuspal separation  DOPPLER: Transaortic velocity was within the normal range  There was no evidence for stenosis  There was no regurgitation  TRICUSPID VALVE: DOPPLER: There was mild regurgitation  Estimated peak PA pressure was 35 mmHg  PULMONIC VALVE: DOPPLER: There was mild to moderate regurgitation  PERICARDIUM: There was no thickening or calcification  There was no pericardial effusion  AORTA: The root exhibited normal size  SYSTEMIC VEINS: IVC: The inferior vena cava was normal in size  Respirophasic changes were normal     SYSTEM MEASUREMENT TABLES    2D mode  AoR Diam 2D: 3 cm  LA Diam (2D): 3 4 cm  LA/Ao (2D): 1 13  FS (2D Teich): 20 1 %  IVSd (2D): 1 07 cm  LVDEV: 67 1 cm³  LVESV: 39 1 cm³  LVIDd(2D): 3 93 cm  LVISd (2D): 3 14 cm  LVPWd (2D): 1 15 cm  SV (Teich): 28 cm³    Apical four chamber  LVEF A4C: 42 %    Unspecified Scan Mode  MV Peak E Buddy  Mean: 1080 mm/s  MVA (PHT): 3 14 cm squared  PHT: 70 ms  Max P mm[Hg]  V Max: 2260 mm/s  Vmax: 2390 mm/s  RA Area: 20 6 cm squared  RA Volume: 58 8 cm³  TAPSE: 1 7 cm    [employer] Accredited Echocardiography Laboratory    Prepared and electronically signed by    Karan Woodruff MD  Signed 23-Sep-2017 16:34:47           Jules Suggs DO, [facility]      "This note has been constructed using a voice recognition system  Therefore there may be syntax, spelling, and/or grammatical errors   Please call if you have any questions  "

## 2018-06-08 NOTE — PLAN OF CARE
CARDIOVASCULAR - ADULT     Maintains optimal cardiac output and hemodynamic stability Progressing     Absence of cardiac dysrhythmias or at baseline rhythm Progressing        DISCHARGE PLANNING - CARE MANAGEMENT     Discharge to post-acute care or home with appropriate resources Progressing        METABOLIC, FLUID AND ELECTROLYTES - ADULT     Electrolytes maintained within normal limits Progressing        Potential for Falls     Patient will remain free of falls Progressing

## 2018-06-09 LAB
ATRIAL RATE: 234 BPM
ATRIAL RATE: 240 BPM
P AXIS: 264 DEGREES
QRS AXIS: -20 DEGREES
QRS AXIS: -32 DEGREES
QRSD INTERVAL: 86 MS
QRSD INTERVAL: 88 MS
QT INTERVAL: 332 MS
QT INTERVAL: 420 MS
QTC INTERVAL: 478 MS
QTC INTERVAL: 480 MS
T WAVE AXIS: 26 DEGREES
T WAVE AXIS: 8 DEGREES
VENTRICULAR RATE: 126 BPM
VENTRICULAR RATE: 78 BPM

## 2018-06-09 PROCEDURE — 93010 ELECTROCARDIOGRAM REPORT: CPT | Performed by: INTERNAL MEDICINE

## 2018-06-16 ENCOUNTER — HOSPITAL ENCOUNTER (EMERGENCY)
Facility: HOSPITAL | Age: 53
Discharge: HOME | End: 2018-06-16
Attending: EMERGENCY MEDICINE
Payer: MEDICARE

## 2018-06-16 ENCOUNTER — APPOINTMENT (EMERGENCY)
Dept: RADIOLOGY | Facility: HOSPITAL | Age: 53
End: 2018-06-16
Payer: MEDICARE

## 2018-06-16 VITALS
OXYGEN SATURATION: 99 % | RESPIRATION RATE: 20 BRPM | SYSTOLIC BLOOD PRESSURE: 140 MMHG | DIASTOLIC BLOOD PRESSURE: 82 MMHG | TEMPERATURE: 97.6 F | HEART RATE: 102 BPM

## 2018-06-16 DIAGNOSIS — S09.90XA INJURY OF HEAD, INITIAL ENCOUNTER: Primary | ICD-10-CM

## 2018-06-16 DIAGNOSIS — F10.929 ALCOHOL INTOXICATION (HCC): ICD-10-CM
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
## 2018-06-16 PROCEDURE — 70450 CT HEAD/BRAIN W/O DYE: CPT

## 2018-06-16 PROCEDURE — 99284 EMERGENCY DEPT VISIT MOD MDM: CPT

## 2018-06-16 PROCEDURE — 72125 CT NECK SPINE W/O DYE: CPT

## 2018-06-16 RX ORDER — ASPIRIN 81 MG/1
81 TABLET, CHEWABLE ORAL DAILY
COMMUNITY
End: 2022-04-22 | Stop reason: HOSPADM

## 2018-06-16 NOTE — DISCHARGE INSTRUCTIONS
Alcohol Intoxication   WHAT YOU NEED TO KNOW:   What is alcohol intoxication? Alcohol intoxication is a harmful physical condition caused when you drink more alcohol than your body can handle  It is also called ethanol poisoning, or being drunk  What are the physical signs and symptoms of alcohol intoxication? · Breath that smells like alcohol     · Blackouts or seizures    · Enlarged pupils    · Eye movements that are faster than normal for you    · Fast heartbeats and slow breaths     · Loss of balance, or no ability to walk straight or stand still    · Nausea and vomiting     · Slurred or loud speech  What behaviors are common with alcohol intoxication? · Quick mood changes: You feel happy and quickly become angry, or you easily become sad  You may act out violently  · Risky sexual behavior:  You have sex that is not protected, or you have sex with many people  · Work or school trouble: You have many absences or do not finish your work  How is alcohol intoxication diagnosed? Your healthcare provider will examine you  He will ask about your signs and symptoms and use of alcohol  These questions may include how much, how often, and what kind of alcohol you drink  He may ask you questions to test your memory and judgment  He may also send blood or urine samples to a lab  The samples are tested for alcohol and for signs of liver, kidney, or heart damage caused by alcohol  You may need to have these tests more than once  How is alcohol intoxication treated? · Medicines:      ¨ Sedative: This medicine is given to help you stay calm and relaxed  ¨ Antinausea medicine: This medicine may be given to calm your stomach and prevent vomiting  ¨ Glucose: This medicine may be given to increase the amount of sugar in your blood  ¨ Vitamin B1:  This is also called Thiamine  You may be given vitamin B1 if your levels are low from excess alcohol      · Brief intervention therapy:  A healthcare provider meets with you to discuss ways to control your risky behaviors, such as drinking and driving  This therapy also helps you set goals to decrease the amount of alcohol you drink  · Breathing support: You may need the following if you are so intoxicated that you cannot breathe well on your own:     Geovanny Ware You may need extra oxygen  if your blood oxygen level is lower than it should be  You may get oxygen through a mask placed over your nose and mouth or through small tubes placed in your nostrils  Ask your healthcare provider before you take off the mask or oxygen tubing  ¨ A ventilator  is a machine that gives you oxygen and breathes for you when you cannot breathe well on your own  An endotracheal (ET) tube is put into your mouth or nose and attached to the ventilator  You may need a trach if an ET tube cannot be placed  A trach is a tube put through an incision and into your windpipe  What are the risks of alcohol intoxication? Alcohol intoxication puts you at risk for disease and injury  Alcohol can damage your brain, liver, heart, kidneys, and lungs  You may be more likely to act violently when you are intoxicated  You may break the law, or harm yourself and others  Risky sexual behavior could lead to sexually transmitted diseases (STDs)  Alcohol intoxication and poisoning can put you into a coma (sleep that you cannot wake up from) and may be life-threatening  Where can I find more information? · Alcoholics Anonymous  Web Address: http://www lezama info/  When should I contact my healthcare provider? Contact your healthcare provider if:  · You need help to stop drinking alcohol  · You have trouble with work or school because you drink too much alcohol  · You have physical or verbal fights because of alcohol  · You have questions or concerns about your condition or care  When should I seek immediate care?   Seek care immediately or call 911 if:  · You have sudden trouble breathing or chest pain     · You have a seizure  · You feel sad enough to harm yourself or others  · You have hallucinations (you see or hear things that are not real)  · You cannot stop vomiting  · You were in an accident because of alcohol  CARE AGREEMENT:   You have the right to help plan your care  Learn about your health condition and how it may be treated  Discuss treatment options with your caregivers to decide what care you want to receive  You always have the right to refuse treatment  The above information is an  only  It is not intended as medical advice for individual conditions or treatments  Talk to your doctor, nurse or pharmacist before following any medical regimen to see if it is safe and effective for you  © 2017 [address] Information is for End User's use only and may not be sold, redistributed or otherwise used for commercial purposes  All illustrations and images included in CareNotes® are the copyrighted property of [employer] M [employer]  or Titus Gonzalez  Head Injury   WHAT YOU NEED TO KNOW:   A head injury is most often caused by a blow to the head  This may occur from a fall, bicycle injury, sports injury, being struck in the head, or a motor vehicle accident  DISCHARGE INSTRUCTIONS:   Call 911 or have someone else call for any of the following:   · You cannot be woken  · You have a seizure  · You stop responding to others or you faint  · You have blurry or double vision  · Your speech becomes slurred or confused  · You have arm or leg weakness, loss of feeling, or new problems with coordination  · Your pupils are larger than usual or one pupil is a different size than the other  · You have blood or clear fluid coming out of your ears or nose  Seek care immediately if:   · You have repeated or forceful vomiting  · You feel confused  · Your headache gets worse or becomes severe      · You or someone caring for you notices that you are harder to wake than usual   Contact your healthcare provider if:   · Your symptoms last longer than 6 weeks after the injury  · You have questions or concerns about your condition or care  Medicines:   · Acetaminophen  decreases pain  Acetaminophen is available without a doctor's order  Ask how much to take and how often to take it  Follow directions  Acetaminophen can cause liver damage if not taken correctly  · Take your medicine as directed  Contact your healthcare provider if you think your medicine is not helping or if you have side effects  Tell him or her if you are allergic to any medicine  Keep a list of the medicines, vitamins, and herbs you take  Include the amounts, and when and why you take them  Bring the list or the pill bottles to follow-up visits  Carry your medicine list with you in case of an emergency  Self-care:   · Rest  or do quiet activities for 24 to 48 hours  Limit your time watching TV, using the computer, or doing tasks that require a lot of thinking  Slowly return to your normal activities as directed  Do not play sports or do activities that may cause you to get hit in the head  Ask your healthcare provider when you can return to sports  · Apply ice  on your head for 15 to 20 minutes every hour or as directed  Use an ice pack, or put crushed ice in a plastic bag  Cover it with a towel before you apply it to your skin  Ice helps prevent tissue damage and decreases swelling and pain  · Have someone stay with you for 24 hours  or as directed  This person can monitor you for complications and call [phone]  When you are awake the person should ask you a few questions to see if you are thinking clearly  An example would be to ask your name or your address  Prevent another head injury:   · Wear a helmet that fits properly  Do this when you play sports, or ride a bike, scooter, or skateboard  Helmets help decrease your risk of a serious head injury   Talk to your healthcare provider about other ways you can protect yourself if you play sports  · Wear your seat belt every time you are in a car  This helps to decrease your risk for a head injury if you are in a car accident  Follow up with your healthcare provider as directed:  Write down your questions so you remember to ask them during your visits  © 2017 [address]  Information is for End User's use only and may not be sold, redistributed or otherwise used for commercial purposes  All illustrations and images included in CareNotes® are the copyrighted property of [employer]  or Titus Gonzalez  The above information is an  only  It is not intended as medical advice for individual conditions or treatments  Talk to your doctor, nurse or pharmacist before following any medical regimen to see if it is safe and effective for you

## 2018-06-16 NOTE — ED NOTES
Pt sleeping,regular,unlabored respirations noted,friends at bedside aware CT results pending       Patrizia Juarez RN  06/16/18 3315
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
## 2018-09-04 PROBLEM — I48.0 PAROXYSMAL ATRIAL FIBRILLATION (HCC): Status: ACTIVE | Noted: 2017-09-23

## 2020-09-30 ENCOUNTER — HOSPITAL ENCOUNTER (EMERGENCY)
Facility: HOSPITAL | Age: 55
Discharge: HOME | End: 2020-09-30
Attending: EMERGENCY MEDICINE
Payer: COMMERCIAL

## 2020-09-30 VITALS
SYSTOLIC BLOOD PRESSURE: 149 MMHG | HEART RATE: 106 BPM | TEMPERATURE: 96.9 F | DIASTOLIC BLOOD PRESSURE: 88 MMHG | OXYGEN SATURATION: 99 %

## 2020-09-30 DIAGNOSIS — T50.901A OVERDOSE: Primary | ICD-10-CM

## 2020-09-30 LAB — ETHANOL SERPL-MCNC: 238 MG/DL (ref 0–3)

## 2020-09-30 PROCEDURE — 93005 ELECTROCARDIOGRAM TRACING: CPT

## 2020-09-30 PROCEDURE — 99285 EMERGENCY DEPT VISIT HI MDM: CPT | Performed by: EMERGENCY MEDICINE

## 2020-09-30 PROCEDURE — 80320 DRUG SCREEN QUANTALCOHOLS: CPT | Performed by: EMERGENCY MEDICINE

## 2020-09-30 PROCEDURE — 99284 EMERGENCY DEPT VISIT MOD MDM: CPT

## 2020-09-30 PROCEDURE — 36415 COLL VENOUS BLD VENIPUNCTURE: CPT | Performed by: EMERGENCY MEDICINE

## 2020-09-30 RX ORDER — NALOXONE HYDROCHLORIDE 1 MG/ML
1 INJECTION PARENTERAL ONCE
Status: COMPLETED | OUTPATIENT
Start: 2020-09-30 | End: 2020-09-30

## 2020-10-03 LAB
ATRIAL RATE: 98 BPM
P AXIS: 20 DEGREES
PR INTERVAL: 192 MS
QRS AXIS: -19 DEGREES
QRSD INTERVAL: 102 MS
QT INTERVAL: 382 MS
QTC INTERVAL: 487 MS
T WAVE AXIS: 25 DEGREES
VENTRICULAR RATE: 98 BPM

## 2020-10-03 PROCEDURE — 93010 ELECTROCARDIOGRAM REPORT: CPT | Performed by: INTERNAL MEDICINE

## 2020-10-04 ENCOUNTER — HOSPITAL ENCOUNTER (EMERGENCY)
Facility: HOSPITAL | Age: 55
Discharge: HOME | End: 2020-10-04
Attending: EMERGENCY MEDICINE | Admitting: EMERGENCY MEDICINE
Payer: COMMERCIAL

## 2020-10-04 VITALS
OXYGEN SATURATION: 98 % | BODY MASS INDEX: 22.35 KG/M2 | SYSTOLIC BLOOD PRESSURE: 140 MMHG | DIASTOLIC BLOOD PRESSURE: 70 MMHG | HEART RATE: 90 BPM | WEIGHT: 138.45 LBS | TEMPERATURE: 98.1 F | RESPIRATION RATE: 18 BRPM

## 2020-10-04 DIAGNOSIS — F10.929 ALCOHOL INTOXICATION (HCC): Primary | ICD-10-CM

## 2020-10-04 LAB — ETHANOL SERPL-MCNC: 257 MG/DL (ref 0–3)

## 2020-10-04 PROCEDURE — 36415 COLL VENOUS BLD VENIPUNCTURE: CPT | Performed by: EMERGENCY MEDICINE

## 2020-10-04 PROCEDURE — 99282 EMERGENCY DEPT VISIT SF MDM: CPT | Performed by: EMERGENCY MEDICINE

## 2020-10-04 PROCEDURE — 99284 EMERGENCY DEPT VISIT MOD MDM: CPT

## 2020-10-04 PROCEDURE — 80320 DRUG SCREEN QUANTALCOHOLS: CPT | Performed by: EMERGENCY MEDICINE

## 2021-04-01 ENCOUNTER — NURSE TRIAGE (OUTPATIENT)
Dept: OTHER | Facility: OTHER | Age: 56
End: 2021-04-01

## 2021-04-01 DIAGNOSIS — Z20.828 SARS-ASSOCIATED CORONAVIRUS EXPOSURE: Primary | ICD-10-CM

## 2021-04-01 NOTE — TELEPHONE ENCOUNTER
Reason for Disposition   [1] COVID-19 EXPOSURE (Close Contact) AND [2] within last 14 days BUT [3] NO symptoms    Answer Assessment - Initial Assessment Questions  1  COVID-19 CLOSE CONTACT: "Who is the person with the confirmed or suspected COVID-19 infection that you were exposed to?"      Cousin  2  PLACE of CONTACT: "Where were you when you were exposed to COVID-19?" (e g , home, school, medical waiting room; which city?)      Home  3  TYPE of CONTACT: "How much contact was there?" (e g , sitting next to, live in same house, work in same office, same building)      Sitting next to  4  DURATION of CONTACT: "How long were you in contact with the COVID-19 patient?" (e g , a few seconds, passed by person, a few minutes, 15 minutes or longer, live with the patient)      > 15 min  5  MASK: "Were you wearing a mask?" "Was the other person wearing a mask?" Note: wearing a mask reduces the risk of an otherwise close contact  Denies  6  DATE of CONTACT: "When did you have contact with a COVID-19 patient?" (e g , how many days ago)     3/30/21  7  COMMUNITY SPREAD: "Are there lots of cases of COVID-19 (community spread) where you live?" (See public health department website, if unsure)        Major  8  SYMPTOMS: "Do you have any symptoms?" (e g , fever, cough, breathing difficulty, loss of taste or smell)      Denies    10   HIGH RISK: "Do you have any heart or lung problems?" "Do you have a weak immune system?" (e g , heart failure, COPD, asthma, HIV positive, chemotherapy, renal failure, diabetes mellitus, sickle cell anemia, obesity)        Denies    Protocols used: CORONAVIRUS (COVID-19) EXPOSURE-ADULT-[place]

## 2021-04-01 NOTE — TELEPHONE ENCOUNTER
Regarding: COVID, exposure - 3/4 (Jamie/Juan)  ----- Message from Xavi Da Silva RN sent at 4/1/2021  1:13 PM EDT -----  "My family was exposed to a cousin who tested positive  She visited me on Tuesday (3/30/2021)  He doesn't have any symptoms  "

## 2021-04-05 DIAGNOSIS — Z20.828 SARS-ASSOCIATED CORONAVIRUS EXPOSURE: ICD-10-CM

## 2021-04-05 PROCEDURE — U0003 INFECTIOUS AGENT DETECTION BY NUCLEIC ACID (DNA OR RNA); SEVERE ACUTE RESPIRATORY SYNDROME CORONAVIRUS 2 (SARS-COV-2) (CORONAVIRUS DISEASE [COVID-19]), AMPLIFIED PROBE TECHNIQUE, MAKING USE OF HIGH THROUGHPUT TECHNOLOGIES AS DESCRIBED BY CMS-2020-01-R: HCPCS | Performed by: FAMILY MEDICINE

## 2021-04-05 PROCEDURE — U0005 INFEC AGEN DETEC AMPLI PROBE: HCPCS | Performed by: FAMILY MEDICINE

## 2021-04-06 ENCOUNTER — TELEPHONE (OUTPATIENT)
Dept: OTHER | Facility: OTHER | Age: 56
End: 2021-04-06

## 2021-04-06 LAB — SARS-COV-2 RNA RESP QL NAA+PROBE: POSITIVE

## 2021-04-06 NOTE — TELEPHONE ENCOUNTER
Your test for the novel coronavirus, also known as COVID-19, was positive  The sample showed that the virus was present  Positive COVID-19 test results are reportable to the [place] Department of Health  You may receive a call from trained public health staff to conduct an interview  It is important to answer their call  They will ask you to verify who you are  During the call they will ask you about what symptoms you have, what you did before you got sick, and who you were close to while sick  The health department does this to make sure everyone stays healthy and to reduce the spread of the virus  If you would like to verify if the caller does in fact work in contact tracing, call the [address] [place] [address] at [employer] ([phone])  For additional information, please visit the [employer]  website: www health [place] gov     If you have any additional questions, we can schedule a virtual visit for you with a provider or call the [facility] [phone], option 7, for care advice    For additional information, please visit the Coronavirus FAQ on the [facility] home page ([facility]ec[facility] Monreal  org)

## 2021-04-28 ENCOUNTER — VBI (OUTPATIENT)
Dept: ADMINISTRATIVE | Facility: OTHER | Age: 56
End: 2021-04-28

## 2021-04-28 NOTE — TELEPHONE ENCOUNTER
04/28/21 2:01 PM     See documentation in the [employer] Smart[employer]       Phylicia Dhaliwal, [address] [facility] Fany Julian

## 2021-05-04 ENCOUNTER — HOSPITAL ENCOUNTER (EMERGENCY)
Facility: HOSPITAL | Age: 56
Discharge: HOME | End: 2021-05-04
Attending: GENERAL PRACTICE | Admitting: GENERAL PRACTICE
Payer: COMMERCIAL

## 2021-05-04 VITALS
SYSTOLIC BLOOD PRESSURE: 99 MMHG | DIASTOLIC BLOOD PRESSURE: 61 MMHG | TEMPERATURE: 97.4 F | OXYGEN SATURATION: 95 % | RESPIRATION RATE: 20 BRPM | HEART RATE: 99 BPM

## 2021-05-04 DIAGNOSIS — F10.920 ALCOHOLIC INTOXICATION WITHOUT COMPLICATION (HCC): Primary | ICD-10-CM

## 2021-05-04 LAB — GLUCOSE SERPL-MCNC: 101 MG/DL (ref 65–140)

## 2021-05-04 PROCEDURE — 99285 EMERGENCY DEPT VISIT HI MDM: CPT

## 2021-05-04 PROCEDURE — 82948 REAGENT STRIP/BLOOD GLUCOSE: CPT

## 2021-05-04 PROCEDURE — 99282 EMERGENCY DEPT VISIT SF MDM: CPT | Performed by: GENERAL PRACTICE

## 2021-05-04 NOTE — ED NOTES
Rounded on patient attempting to get out bed saying he wants to pee  Very unstaedy on his feet  Jeanine PCA help out to him him with me by giving him the urinal, voided 500 cc clear yellow urine  Unfortunately patient also oulled his IV access out,  DEE DEE RN help put pressure and dressing on it  Now patient is back in bed and fell asleep, will continue to monitor       Danielito Morales RN  05/04/21 7039
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
## 2021-08-18 ENCOUNTER — VBI (OUTPATIENT)
Dept: ADMINISTRATIVE | Facility: OTHER | Age: 56
End: 2021-08-18

## 2022-04-15 ENCOUNTER — HOSPITAL ENCOUNTER (INPATIENT)
Facility: HOSPITAL | Age: 57
LOS: 6 days | Discharge: NURSING, REHAB OR LONG-TERM CARE | DRG: 439 | End: 2022-04-22
Attending: EMERGENCY MEDICINE | Admitting: INTERNAL MEDICINE
Payer: COMMERCIAL

## 2022-04-15 DIAGNOSIS — F10.929 ALCOHOL INTOXICATION (HCC): Primary | ICD-10-CM

## 2022-04-15 DIAGNOSIS — R06.2 WHEEZING: ICD-10-CM

## 2022-04-15 DIAGNOSIS — K85.90 PANCREATITIS: ICD-10-CM

## 2022-04-15 DIAGNOSIS — H57.89 EYE DRAINAGE: ICD-10-CM

## 2022-04-15 DIAGNOSIS — E83.42 HYPOMAGNESEMIA: ICD-10-CM

## 2022-04-15 DIAGNOSIS — M54.9 BACK PAIN: ICD-10-CM

## 2022-04-15 DIAGNOSIS — E87.6 HYPOKALEMIA: ICD-10-CM

## 2022-04-15 DIAGNOSIS — S51.019A SKIN TEAR OF ELBOW WITHOUT COMPLICATION: ICD-10-CM

## 2022-04-15 PROCEDURE — 99285 EMERGENCY DEPT VISIT HI MDM: CPT

## 2022-04-15 PROCEDURE — 99285 EMERGENCY DEPT VISIT HI MDM: CPT | Performed by: EMERGENCY MEDICINE

## 2022-04-16 ENCOUNTER — APPOINTMENT (EMERGENCY)
Dept: RADIOLOGY | Facility: HOSPITAL | Age: 57
DRG: 439 | End: 2022-04-16
Payer: COMMERCIAL

## 2022-04-16 PROBLEM — K85.90 PANCREATITIS: Status: ACTIVE | Noted: 2022-04-16

## 2022-04-16 PROBLEM — F10.929 ALCOHOL INTOXICATION (HCC): Status: ACTIVE | Noted: 2022-04-16

## 2022-04-16 LAB
ALBUMIN SERPL BCP-MCNC: 3.5 G/DL (ref 3.5–5)
ALBUMIN SERPL BCP-MCNC: 3.6 G/DL (ref 3.5–5)
ALP SERPL-CCNC: 130 U/L (ref 46–116)
ALP SERPL-CCNC: 131 U/L (ref 46–116)
ALT SERPL W P-5'-P-CCNC: 29 U/L (ref 12–78)
ALT SERPL W P-5'-P-CCNC: 30 U/L (ref 12–78)
ANION GAP SERPL CALCULATED.3IONS-SCNC: 14 MMOL/L (ref 4–13)
ANION GAP SERPL CALCULATED.3IONS-SCNC: 14 MMOL/L (ref 4–13)
AST SERPL W P-5'-P-CCNC: 110 U/L (ref 5–45)
AST SERPL W P-5'-P-CCNC: 129 U/L (ref 5–45)
BACTERIA UR QL AUTO: NORMAL /HPF
BASOPHILS # BLD AUTO: 0.09 THOUSANDS/ΜL (ref 0–0.1)
BASOPHILS # BLD AUTO: 0.11 THOUSANDS/ΜL (ref 0–0.1)
BASOPHILS NFR BLD AUTO: 2 % (ref 0–1)
BASOPHILS NFR BLD AUTO: 2 % (ref 0–1)
BILIRUB SERPL-MCNC: 0.72 MG/DL (ref 0.2–1)
BILIRUB SERPL-MCNC: 0.76 MG/DL (ref 0.2–1)
BILIRUB UR QL STRIP: NEGATIVE
BUN SERPL-MCNC: 6 MG/DL (ref 5–25)
BUN SERPL-MCNC: 9 MG/DL (ref 5–25)
CALCIUM SERPL-MCNC: 7.5 MG/DL (ref 8.3–10.1)
CALCIUM SERPL-MCNC: 7.7 MG/DL (ref 8.3–10.1)
CHLORIDE SERPL-SCNC: 92 MMOL/L (ref 100–108)
CHLORIDE SERPL-SCNC: 97 MMOL/L (ref 100–108)
CHOLEST SERPL-MCNC: 211 MG/DL
CLARITY UR: CLEAR
CO2 SERPL-SCNC: 27 MMOL/L (ref 21–32)
CO2 SERPL-SCNC: 30 MMOL/L (ref 21–32)
COLOR UR: YELLOW
CREAT SERPL-MCNC: 0.4 MG/DL (ref 0.6–1.3)
CREAT SERPL-MCNC: 0.44 MG/DL (ref 0.6–1.3)
EOSINOPHIL # BLD AUTO: 0.1 THOUSAND/ΜL (ref 0–0.61)
EOSINOPHIL # BLD AUTO: 0.12 THOUSAND/ΜL (ref 0–0.61)
EOSINOPHIL NFR BLD AUTO: 2 % (ref 0–6)
EOSINOPHIL NFR BLD AUTO: 2 % (ref 0–6)
ERYTHROCYTE [DISTWIDTH] IN BLOOD BY AUTOMATED COUNT: 12.7 % (ref 11.6–15.1)
ERYTHROCYTE [DISTWIDTH] IN BLOOD BY AUTOMATED COUNT: 12.8 % (ref 11.6–15.1)
ETHANOL SERPL-MCNC: 418 MG/DL (ref 0–3)
FLUAV RNA RESP QL NAA+PROBE: NEGATIVE
FLUBV RNA RESP QL NAA+PROBE: NEGATIVE
GFR SERPL CREATININE-BSD FRML MDRD: 125 ML/MIN/1.73SQ M
GFR SERPL CREATININE-BSD FRML MDRD: 130 ML/MIN/1.73SQ M
GLUCOSE SERPL-MCNC: 78 MG/DL (ref 65–140)
GLUCOSE SERPL-MCNC: 90 MG/DL (ref 65–140)
GLUCOSE UR STRIP-MCNC: NEGATIVE MG/DL
HCT VFR BLD AUTO: 36.8 % (ref 36.5–49.3)
HCT VFR BLD AUTO: 37.7 % (ref 36.5–49.3)
HCV AB SER QL: NORMAL
HDLC SERPL-MCNC: 111 MG/DL
HGB BLD-MCNC: 12.6 G/DL (ref 12–17)
HGB BLD-MCNC: 12.8 G/DL (ref 12–17)
HGB UR QL STRIP.AUTO: ABNORMAL
IMM GRANULOCYTES # BLD AUTO: 0.02 THOUSAND/UL (ref 0–0.2)
IMM GRANULOCYTES # BLD AUTO: 0.02 THOUSAND/UL (ref 0–0.2)
IMM GRANULOCYTES NFR BLD AUTO: 0 % (ref 0–2)
IMM GRANULOCYTES NFR BLD AUTO: 0 % (ref 0–2)
KETONES UR STRIP-MCNC: ABNORMAL MG/DL
LDLC SERPL CALC-MCNC: 90 MG/DL (ref 0–100)
LEUKOCYTE ESTERASE UR QL STRIP: NEGATIVE
LIPASE SERPL-CCNC: 1703 U/L (ref 73–393)
LIPASE SERPL-CCNC: 2224 U/L (ref 73–393)
LYMPHOCYTES # BLD AUTO: 0.98 THOUSANDS/ΜL (ref 0.6–4.47)
LYMPHOCYTES # BLD AUTO: 1.36 THOUSANDS/ΜL (ref 0.6–4.47)
LYMPHOCYTES NFR BLD AUTO: 19 % (ref 14–44)
LYMPHOCYTES NFR BLD AUTO: 23 % (ref 14–44)
MAGNESIUM SERPL-MCNC: 1.4 MG/DL (ref 1.6–2.6)
MAGNESIUM SERPL-MCNC: 1.9 MG/DL (ref 1.6–2.6)
MCH RBC QN AUTO: 30.4 PG (ref 26.8–34.3)
MCH RBC QN AUTO: 30.7 PG (ref 26.8–34.3)
MCHC RBC AUTO-ENTMCNC: 34 G/DL (ref 31.4–37.4)
MCHC RBC AUTO-ENTMCNC: 34.2 G/DL (ref 31.4–37.4)
MCV RBC AUTO: 90 FL (ref 82–98)
MCV RBC AUTO: 90 FL (ref 82–98)
MONOCYTES # BLD AUTO: 0.62 THOUSAND/ΜL (ref 0.17–1.22)
MONOCYTES # BLD AUTO: 0.72 THOUSAND/ΜL (ref 0.17–1.22)
MONOCYTES NFR BLD AUTO: 12 % (ref 4–12)
MONOCYTES NFR BLD AUTO: 12 % (ref 4–12)
NEUTROPHILS # BLD AUTO: 3.41 THOUSANDS/ΜL (ref 1.85–7.62)
NEUTROPHILS # BLD AUTO: 3.68 THOUSANDS/ΜL (ref 1.85–7.62)
NEUTS SEG NFR BLD AUTO: 61 % (ref 43–75)
NEUTS SEG NFR BLD AUTO: 65 % (ref 43–75)
NITRITE UR QL STRIP: NEGATIVE
NON-SQ EPI CELLS URNS QL MICRO: NORMAL /HPF
NONHDLC SERPL-MCNC: 100 MG/DL
NRBC BLD AUTO-RTO: 0 /100 WBCS
NRBC BLD AUTO-RTO: 0 /100 WBCS
PH UR STRIP.AUTO: 6 [PH]
PLATELET # BLD AUTO: 235 THOUSANDS/UL (ref 149–390)
PLATELET # BLD AUTO: 252 THOUSANDS/UL (ref 149–390)
PMV BLD AUTO: 8.9 FL (ref 8.9–12.7)
PMV BLD AUTO: 9.3 FL (ref 8.9–12.7)
POTASSIUM SERPL-SCNC: 2.7 MMOL/L (ref 3.5–5.3)
POTASSIUM SERPL-SCNC: 3.1 MMOL/L (ref 3.5–5.3)
PROT SERPL-MCNC: 6.6 G/DL (ref 6.4–8.2)
PROT SERPL-MCNC: 7.1 G/DL (ref 6.4–8.2)
PROT UR STRIP-MCNC: NEGATIVE MG/DL
RBC # BLD AUTO: 4.1 MILLION/UL (ref 3.88–5.62)
RBC # BLD AUTO: 4.21 MILLION/UL (ref 3.88–5.62)
RBC #/AREA URNS AUTO: NORMAL /HPF
RSV RNA RESP QL NAA+PROBE: NEGATIVE
SARS-COV-2 RNA RESP QL NAA+PROBE: NEGATIVE
SODIUM SERPL-SCNC: 136 MMOL/L (ref 136–145)
SODIUM SERPL-SCNC: 138 MMOL/L (ref 136–145)
SP GR UR STRIP.AUTO: 1.01 (ref 1–1.03)
TRIGL SERPL-MCNC: 52 MG/DL
UROBILINOGEN UR QL STRIP.AUTO: 1 E.U./DL
WBC # BLD AUTO: 5.24 THOUSAND/UL (ref 4.31–10.16)
WBC # BLD AUTO: 5.99 THOUSAND/UL (ref 4.31–10.16)
WBC #/AREA URNS AUTO: NORMAL /HPF

## 2022-04-16 PROCEDURE — 0241U HB NFCT DS VIR RESP RNA 4 TRGT: CPT | Performed by: EMERGENCY MEDICINE

## 2022-04-16 PROCEDURE — 80053 COMPREHEN METABOLIC PANEL: CPT | Performed by: EMERGENCY MEDICINE

## 2022-04-16 PROCEDURE — 99223 1ST HOSP IP/OBS HIGH 75: CPT | Performed by: FAMILY MEDICINE

## 2022-04-16 PROCEDURE — 83690 ASSAY OF LIPASE: CPT | Performed by: NURSE PRACTITIONER

## 2022-04-16 PROCEDURE — 86803 HEPATITIS C AB TEST: CPT | Performed by: NURSE PRACTITIONER

## 2022-04-16 PROCEDURE — 83735 ASSAY OF MAGNESIUM: CPT | Performed by: NURSE PRACTITIONER

## 2022-04-16 PROCEDURE — 83690 ASSAY OF LIPASE: CPT | Performed by: EMERGENCY MEDICINE

## 2022-04-16 PROCEDURE — 36415 COLL VENOUS BLD VENIPUNCTURE: CPT | Performed by: EMERGENCY MEDICINE

## 2022-04-16 PROCEDURE — 83735 ASSAY OF MAGNESIUM: CPT | Performed by: EMERGENCY MEDICINE

## 2022-04-16 PROCEDURE — 96374 THER/PROPH/DIAG INJ IV PUSH: CPT

## 2022-04-16 PROCEDURE — 82077 ASSAY SPEC XCP UR&BREATH IA: CPT | Performed by: EMERGENCY MEDICINE

## 2022-04-16 PROCEDURE — 80061 LIPID PANEL: CPT | Performed by: NURSE PRACTITIONER

## 2022-04-16 PROCEDURE — 81001 URINALYSIS AUTO W/SCOPE: CPT | Performed by: EMERGENCY MEDICINE

## 2022-04-16 PROCEDURE — 74177 CT ABD & PELVIS W/CONTRAST: CPT

## 2022-04-16 PROCEDURE — 85025 COMPLETE CBC W/AUTO DIFF WBC: CPT | Performed by: EMERGENCY MEDICINE

## 2022-04-16 PROCEDURE — 93005 ELECTROCARDIOGRAM TRACING: CPT

## 2022-04-16 PROCEDURE — 85025 COMPLETE CBC W/AUTO DIFF WBC: CPT | Performed by: NURSE PRACTITIONER

## 2022-04-16 PROCEDURE — G1004 CDSM NDSC: HCPCS

## 2022-04-16 PROCEDURE — 80053 COMPREHEN METABOLIC PANEL: CPT | Performed by: NURSE PRACTITIONER

## 2022-04-16 PROCEDURE — 96361 HYDRATE IV INFUSION ADD-ON: CPT

## 2022-04-16 RX ORDER — LIDOCAINE 50 MG/G
1 PATCH TOPICAL DAILY
Status: DISCONTINUED | OUTPATIENT
Start: 2022-04-16 | End: 2022-04-22 | Stop reason: HOSPADM

## 2022-04-16 RX ORDER — FOLIC ACID 1 MG/1
1 TABLET ORAL DAILY
Status: DISCONTINUED | OUTPATIENT
Start: 2022-04-16 | End: 2022-04-22 | Stop reason: HOSPADM

## 2022-04-16 RX ORDER — HYDROMORPHONE HCL/PF 1 MG/ML
0.5 SYRINGE (ML) INJECTION ONCE
Status: COMPLETED | OUTPATIENT
Start: 2022-04-16 | End: 2022-04-16

## 2022-04-16 RX ORDER — ONDANSETRON 2 MG/ML
4 INJECTION INTRAMUSCULAR; INTRAVENOUS EVERY 6 HOURS PRN
Status: DISCONTINUED | OUTPATIENT
Start: 2022-04-16 | End: 2022-04-22 | Stop reason: HOSPADM

## 2022-04-16 RX ORDER — POTASSIUM CHLORIDE 20 MEQ/1
40 TABLET, EXTENDED RELEASE ORAL EVERY 4 HOURS
Status: COMPLETED | OUTPATIENT
Start: 2022-04-16 | End: 2022-04-16

## 2022-04-16 RX ORDER — POTASSIUM CHLORIDE 20 MEQ/1
60 TABLET, EXTENDED RELEASE ORAL ONCE
Status: COMPLETED | OUTPATIENT
Start: 2022-04-16 | End: 2022-04-16

## 2022-04-16 RX ORDER — LANOLIN ALCOHOL/MO/W.PET/CERES
100 CREAM (GRAM) TOPICAL DAILY
Status: DISCONTINUED | OUTPATIENT
Start: 2022-04-16 | End: 2022-04-22 | Stop reason: HOSPADM

## 2022-04-16 RX ORDER — SODIUM CHLORIDE, SODIUM GLUCONATE, SODIUM ACETATE, POTASSIUM CHLORIDE, MAGNESIUM CHLORIDE, SODIUM PHOSPHATE, DIBASIC, AND POTASSIUM PHOSPHATE .53; .5; .37; .037; .03; .012; .00082 G/100ML; G/100ML; G/100ML; G/100ML; G/100ML; G/100ML; G/100ML
125 INJECTION, SOLUTION INTRAVENOUS CONTINUOUS
Status: DISCONTINUED | OUTPATIENT
Start: 2022-04-16 | End: 2022-04-18

## 2022-04-16 RX ORDER — LORAZEPAM 2 MG/ML
2 INJECTION INTRAMUSCULAR ONCE
Status: COMPLETED | OUTPATIENT
Start: 2022-04-16 | End: 2022-04-16

## 2022-04-16 RX ORDER — POTASSIUM CHLORIDE 14.9 MG/ML
20 INJECTION INTRAVENOUS ONCE
Status: COMPLETED | OUTPATIENT
Start: 2022-04-16 | End: 2022-04-16

## 2022-04-16 RX ORDER — MAGNESIUM SULFATE HEPTAHYDRATE 40 MG/ML
2 INJECTION, SOLUTION INTRAVENOUS ONCE
Status: COMPLETED | OUTPATIENT
Start: 2022-04-16 | End: 2022-04-16

## 2022-04-16 RX ADMIN — LORAZEPAM 2 MG: 2 INJECTION INTRAMUSCULAR; INTRAVENOUS at 17:52

## 2022-04-16 RX ADMIN — POTASSIUM CHLORIDE 20 MEQ: 14.9 INJECTION, SOLUTION INTRAVENOUS at 04:29

## 2022-04-16 RX ADMIN — MORPHINE SULFATE 2 MG: 2 INJECTION, SOLUTION INTRAMUSCULAR; INTRAVENOUS at 22:16

## 2022-04-16 RX ADMIN — MORPHINE SULFATE 2 MG: 2 INJECTION, SOLUTION INTRAMUSCULAR; INTRAVENOUS at 12:10

## 2022-04-16 RX ADMIN — FOLIC ACID 1 MG: 1 TABLET ORAL at 08:02

## 2022-04-16 RX ADMIN — IOHEXOL 100 ML: 350 INJECTION, SOLUTION INTRAVENOUS at 01:51

## 2022-04-16 RX ADMIN — SODIUM CHLORIDE, SODIUM GLUCONATE, SODIUM ACETATE, POTASSIUM CHLORIDE, MAGNESIUM CHLORIDE, SODIUM PHOSPHATE, DIBASIC, AND POTASSIUM PHOSPHATE 125 ML/HR: .53; .5; .37; .037; .03; .012; .00082 INJECTION, SOLUTION INTRAVENOUS at 04:29

## 2022-04-16 RX ADMIN — ONDANSETRON 4 MG: 2 INJECTION INTRAMUSCULAR; INTRAVENOUS at 17:56

## 2022-04-16 RX ADMIN — SODIUM CHLORIDE 1000 ML: 0.9 INJECTION, SOLUTION INTRAVENOUS at 00:13

## 2022-04-16 RX ADMIN — THIAMINE HCL TAB 100 MG 100 MG: 100 TAB at 08:02

## 2022-04-16 RX ADMIN — Medication 1 TABLET: at 08:02

## 2022-04-16 RX ADMIN — POTASSIUM CHLORIDE 40 MEQ: 1500 TABLET, EXTENDED RELEASE ORAL at 10:33

## 2022-04-16 RX ADMIN — SODIUM CHLORIDE, SODIUM GLUCONATE, SODIUM ACETATE, POTASSIUM CHLORIDE, MAGNESIUM CHLORIDE, SODIUM PHOSPHATE, DIBASIC, AND POTASSIUM PHOSPHATE 125 ML/HR: .53; .5; .37; .037; .03; .012; .00082 INJECTION, SOLUTION INTRAVENOUS at 19:26

## 2022-04-16 RX ADMIN — POTASSIUM CHLORIDE 60 MEQ: 1500 TABLET, EXTENDED RELEASE ORAL at 01:03

## 2022-04-16 RX ADMIN — HYDROMORPHONE HYDROCHLORIDE 0.5 MG: 1 INJECTION, SOLUTION INTRAMUSCULAR; INTRAVENOUS; SUBCUTANEOUS at 14:28

## 2022-04-16 RX ADMIN — MAGNESIUM SULFATE HEPTAHYDRATE 2 G: 40 INJECTION, SOLUTION INTRAVENOUS at 01:05

## 2022-04-16 RX ADMIN — LIDOCAINE 5% 1 PATCH: 700 PATCH TOPICAL at 14:28

## 2022-04-16 RX ADMIN — POTASSIUM CHLORIDE 40 MEQ: 1500 TABLET, EXTENDED RELEASE ORAL at 13:27

## 2022-04-16 RX ADMIN — SODIUM CHLORIDE, SODIUM GLUCONATE, SODIUM ACETATE, POTASSIUM CHLORIDE, MAGNESIUM CHLORIDE, SODIUM PHOSPHATE, DIBASIC, AND POTASSIUM PHOSPHATE 125 ML/HR: .53; .5; .37; .037; .03; .012; .00082 INJECTION, SOLUTION INTRAVENOUS at 12:11
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
## 2022-04-16 NOTE — PLAN OF CARE
Problem: Potential for Falls  Goal: Patient will remain free of falls  Description: INTERVENTIONS:  - Educate patient/family on patient safety including physical limitations  - Instruct patient to call for assistance with activity   - Consult OT/PT to assist with strengthening/mobility   - Keep Call bell within reach  - Keep bed low and locked with side rails adjusted as appropriate  - Keep care items and personal belongings within reach  - Initiate and maintain comfort rounds  - Make Fall Risk Sign visible to staff  - Offer Toileting every 2 Hours, in advance of need  - Initiate/Maintain bed alarm  - Obtain necessary fall risk management equipment: yes  - Apply yellow socks and bracelet for high fall risk patients  - Consider moving patient to room near nurses station  Outcome: Progressing     Problem: CARDIOVASCULAR - ADULT  Goal: Absence of cardiac dysrhythmias or at baseline rhythm  Description: INTERVENTIONS:  - Continuous cardiac monitoring, vital signs, obtain 12 lead EKG if ordered  - Administer antiarrhythmic and heart rate control medications as ordered  - Monitor electrolytes and administer replacement therapy as ordered  Outcome: Progressing     Problem: METABOLIC, FLUID AND ELECTROLYTES - ADULT  Goal: Electrolytes maintained within normal limits  Description: INTERVENTIONS:  - Monitor labs and assess patient for signs and symptoms of electrolyte imbalances  - Administer electrolyte replacement as ordered  - Monitor response to electrolyte replacements, including repeat lab results as appropriate  - Instruct patient on fluid and nutrition as appropriate  Outcome: Progressing     Problem: DISCHARGE PLANNING  Goal: Discharge to home or other facility with appropriate resources  Description: INTERVENTIONS:  - Identify barriers to discharge w/patient and caregiver  - Arrange for needed discharge resources and transportation as appropriate  - Identify discharge learning needs (meds, wound care, etc )  - Arrange for interpretive services to assist at discharge as needed  - Refer to Case Management Department for coordinating discharge planning if the patient needs post-hospital services based on physician/advanced practitioner order or complex needs related to functional status, cognitive ability, or social support system  Outcome: Progressing     Problem: Knowledge Deficit  Goal: Patient/family/caregiver demonstrates understanding of disease process, treatment plan, medications, and discharge instructions  Description: Complete learning assessment and assess knowledge base    Interventions:  - Provide teaching at level of understanding  - Provide teaching via preferred learning methods  Outcome: Progressing     Problem: Prexisting or High Potential for Compromised Skin Integrity  Goal: Skin integrity is maintained or improved  Description: INTERVENTIONS:  - Identify patients at risk for skin breakdown  - Assess and monitor skin integrity  - Assess and monitor nutrition and hydration status  - Monitor labs   - Assess for incontinence   - Turn and reposition patient  - Assist with mobility/ambulation  - Relieve pressure over bony prominences  - Avoid friction and shearing  - Provide appropriate hygiene as needed including keeping skin clean and dry  - Evaluate need for skin moisturizer/barrier cream  - Collaborate with interdisciplinary team   - Patient/family teaching  - Consider wound care consult   Outcome: Progressing

## 2022-04-16 NOTE — PLAN OF CARE
Problem: Potential for Falls  Goal: Patient will remain free of falls  Description: INTERVENTIONS:  - Educate patient/family on patient safety including physical limitations  - Instruct patient to call for assistance with activity   - Consult OT/PT to assist with strengthening/mobility   - Keep Call bell within reach  - Keep bed low and locked with side rails adjusted as appropriate  - Keep care items and personal belongings within reach  - Initiate and maintain comfort rounds  - Make Fall Risk Sign visible to staff  - Offer Toileting every 2 Hours, in advance of need  - Initiate/Maintain bed alarm  - Obtain necessary fall risk management equipment: yes  - Apply yellow socks and bracelet for high fall risk patients  - Consider moving patient to room near nurses station  Outcome: Progressing     Problem: MOBILITY - ADULT  Goal: Maintain or return to baseline ADL function  Description: INTERVENTIONS:  -  Assess patient's ability to carry out ADLs; assess patient's baseline for ADL function and identify physical deficits which impact ability to perform ADLs (bathing, care of mouth/teeth, toileting, grooming, dressing, etc )  - Assess/evaluate cause of self-care deficits   - Assess range of motion  - Assess patient's mobility; develop plan if impaired  - Assess patient's need for assistive devices and provide as appropriate  - Encourage maximum independence but intervene and supervise when necessary  - Involve family in performance of ADLs  - Assess for home care needs following discharge   - Consider OT consult to assist with ADL evaluation and planning for discharge  - Provide patient education as appropriate  Outcome: Progressing  Goal: Maintains/Returns to pre admission functional level  Description: INTERVENTIONS:  - Perform BMAT or MOVE assessment daily    - Set and communicate daily mobility goal to care team and patient/family/caregiver     - Collaborate with rehabilitation services on mobility goals if consulted  - Perform Range of Motion 3 times a day  - Reposition patient every 2 hours    - Dangle patient 3 times a day  - Stand patient 3 times a day  - Ambulate patient 3 times a day  - Out of bed to chair 3 times a day   - Out of bed for meals 3 times a day  - Out of bed for toileting  - Record patient progress and toleration of activity level   Outcome: Progressing     Problem: CARDIOVASCULAR - ADULT  Goal: Absence of cardiac dysrhythmias or at baseline rhythm  Description: INTERVENTIONS:  - Continuous cardiac monitoring, vital signs, obtain 12 lead EKG if ordered  - Administer antiarrhythmic and heart rate control medications as ordered  - Monitor electrolytes and administer replacement therapy as ordered  Outcome: Progressing     Problem: METABOLIC, FLUID AND ELECTROLYTES - ADULT  Goal: Electrolytes maintained within normal limits  Description: INTERVENTIONS:  - Monitor labs and assess patient for signs and symptoms of electrolyte imbalances  - Administer electrolyte replacement as ordered  - Monitor response to electrolyte replacements, including repeat lab results as appropriate  - Instruct patient on fluid and nutrition as appropriate  Outcome: Progressing     Problem: DISCHARGE PLANNING  Goal: Discharge to home or other facility with appropriate resources  Description: INTERVENTIONS:  - Identify barriers to discharge w/patient and caregiver  - Arrange for needed discharge resources and transportation as appropriate  - Identify discharge learning needs (meds, wound care, etc )  - Arrange for interpretive services to assist at discharge as needed  - Refer to Case Management Department for coordinating discharge planning if the patient needs post-hospital services based on physician/advanced practitioner order or complex needs related to functional status, cognitive ability, or social support system  Outcome: Progressing     Problem: Knowledge Deficit  Goal: Patient/family/caregiver demonstrates understanding of disease process, treatment plan, medications, and discharge instructions  Description: Complete learning assessment and assess knowledge base    Interventions:  - Provide teaching at level of understanding  - Provide teaching via preferred learning methods  Outcome: Progressing

## 2022-04-16 NOTE — ASSESSMENT & PLAN NOTE
Patient presented to ED with generalized weakness after drinking a bottle of gin  Lipase 2224  CT consistent with pancreatitis  Pancreatitis 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 abuse       Admit to medicine   NPO   Aggressive IV hydration   Trend labs - CBC, lipase   Check lipid panel   Antiemetics   Consider GI consult depending on clinical course

## 2022-04-16 NOTE — ASSESSMENT & PLAN NOTE
Patient is a daily drinker  [facility] protocol, start librium when withdrawal symptoms    Admit to telemetry   CIWA protocol   Neuro checks q 4 hours   Seizure precautions   Aspiration precautions   Folic acid, thiamine, MVI   IV hydration   Case management consult

## 2022-04-17 LAB
ALBUMIN SERPL BCP-MCNC: 3.2 G/DL (ref 3.5–5)
ALP SERPL-CCNC: 174 U/L (ref 46–116)
ALT SERPL W P-5'-P-CCNC: 28 U/L (ref 12–78)
ANION GAP SERPL CALCULATED.3IONS-SCNC: 20 MMOL/L (ref 4–13)
AST SERPL W P-5'-P-CCNC: 102 U/L (ref 5–45)
BILIRUB SERPL-MCNC: 1.13 MG/DL (ref 0.2–1)
BUN SERPL-MCNC: 5 MG/DL (ref 5–25)
CALCIUM ALBUM COR SERPL-MCNC: 8 MG/DL (ref 8.3–10.1)
CALCIUM SERPL-MCNC: 7.4 MG/DL (ref 8.3–10.1)
CHLORIDE SERPL-SCNC: 96 MMOL/L (ref 100–108)
CO2 SERPL-SCNC: 17 MMOL/L (ref 21–32)
CREAT SERPL-MCNC: 0.48 MG/DL (ref 0.6–1.3)
ERYTHROCYTE [DISTWIDTH] IN BLOOD BY AUTOMATED COUNT: 13.1 % (ref 11.6–15.1)
GFR SERPL CREATININE-BSD FRML MDRD: 121 ML/MIN/1.73SQ M
GLUCOSE SERPL-MCNC: 95 MG/DL (ref 65–140)
HCT VFR BLD AUTO: 37.7 % (ref 36.5–49.3)
HGB BLD-MCNC: 12.1 G/DL (ref 12–17)
LIPASE SERPL-CCNC: 973 U/L (ref 73–393)
MAGNESIUM SERPL-MCNC: 1.5 MG/DL (ref 1.6–2.6)
MCH RBC QN AUTO: 30.5 PG (ref 26.8–34.3)
MCHC RBC AUTO-ENTMCNC: 32.1 G/DL (ref 31.4–37.4)
MCV RBC AUTO: 95 FL (ref 82–98)
PLATELET # BLD AUTO: 221 THOUSANDS/UL (ref 149–390)
PMV BLD AUTO: 9.8 FL (ref 8.9–12.7)
POTASSIUM SERPL-SCNC: 4.1 MMOL/L (ref 3.5–5.3)
PROT SERPL-MCNC: 6.9 G/DL (ref 6.4–8.2)
RBC # BLD AUTO: 3.97 MILLION/UL (ref 3.88–5.62)
SODIUM SERPL-SCNC: 133 MMOL/L (ref 136–145)
WBC # BLD AUTO: 10.01 THOUSAND/UL (ref 4.31–10.16)

## 2022-04-17 PROCEDURE — 83690 ASSAY OF LIPASE: CPT | Performed by: FAMILY MEDICINE

## 2022-04-17 PROCEDURE — 83735 ASSAY OF MAGNESIUM: CPT | Performed by: FAMILY MEDICINE

## 2022-04-17 PROCEDURE — 80053 COMPREHEN METABOLIC PANEL: CPT | Performed by: FAMILY MEDICINE

## 2022-04-17 PROCEDURE — 99232 SBSQ HOSP IP/OBS MODERATE 35: CPT | Performed by: FAMILY MEDICINE

## 2022-04-17 PROCEDURE — 85027 COMPLETE CBC AUTOMATED: CPT | Performed by: FAMILY MEDICINE

## 2022-04-17 RX ORDER — LORAZEPAM 2 MG/ML
2 INJECTION INTRAMUSCULAR ONCE
Status: COMPLETED | OUTPATIENT
Start: 2022-04-17 | End: 2022-04-17

## 2022-04-17 RX ORDER — LORAZEPAM 1 MG/1
2 TABLET ORAL ONCE
Status: COMPLETED | OUTPATIENT
Start: 2022-04-17 | End: 2022-04-17

## 2022-04-17 RX ADMIN — ONDANSETRON 4 MG: 2 INJECTION INTRAMUSCULAR; INTRAVENOUS at 01:27

## 2022-04-17 RX ADMIN — LIDOCAINE 5% 1 PATCH: 700 PATCH TOPICAL at 09:08

## 2022-04-17 RX ADMIN — LORAZEPAM 2 MG: 1 TABLET ORAL at 22:40

## 2022-04-17 RX ADMIN — SODIUM CHLORIDE, SODIUM GLUCONATE, SODIUM ACETATE, POTASSIUM CHLORIDE, MAGNESIUM CHLORIDE, SODIUM PHOSPHATE, DIBASIC, AND POTASSIUM PHOSPHATE 125 ML/HR: .53; .5; .37; .037; .03; .012; .00082 INJECTION, SOLUTION INTRAVENOUS at 20:29

## 2022-04-17 RX ADMIN — THIAMINE HCL TAB 100 MG 100 MG: 100 TAB at 09:01

## 2022-04-17 RX ADMIN — FOLIC ACID 1 MG: 1 TABLET ORAL at 09:01

## 2022-04-17 RX ADMIN — MORPHINE SULFATE 2 MG: 2 INJECTION, SOLUTION INTRAMUSCULAR; INTRAVENOUS at 20:36

## 2022-04-17 RX ADMIN — ENOXAPARIN SODIUM 40 MG: 40 INJECTION SUBCUTANEOUS at 17:12

## 2022-04-17 RX ADMIN — LORAZEPAM 2 MG: 2 INJECTION INTRAMUSCULAR; INTRAVENOUS at 01:26

## 2022-04-17 RX ADMIN — Medication 1 TABLET: at 09:01

## 2022-04-17 RX ADMIN — SODIUM CHLORIDE, SODIUM GLUCONATE, SODIUM ACETATE, POTASSIUM CHLORIDE, MAGNESIUM CHLORIDE, SODIUM PHOSPHATE, DIBASIC, AND POTASSIUM PHOSPHATE 125 ML/HR: .53; .5; .37; .037; .03; .012; .00082 INJECTION, SOLUTION INTRAVENOUS at 03:39

## 2022-04-17 RX ADMIN — MAGNESIUM OXIDE TAB 400 MG (241.3 MG ELEMENTAL MG) 400 MG: 400 (241.3 MG) TAB at 11:30

## 2022-04-17 RX ADMIN — SODIUM CHLORIDE, SODIUM GLUCONATE, SODIUM ACETATE, POTASSIUM CHLORIDE, MAGNESIUM CHLORIDE, SODIUM PHOSPHATE, DIBASIC, AND POTASSIUM PHOSPHATE 125 ML/HR: .53; .5; .37; .037; .03; .012; .00082 INJECTION, SOLUTION INTRAVENOUS at 12:37

## 2022-04-17 NOTE — UTILIZATION REVIEW
Initial Clinical Review    Admission: Date/Time/Statement:   Admission Orders (From admission, onward)     Ordered        04/16/22 0201  INPATIENT ADMISSION  Once,   Status:                  Orders Placed This Encounter   Procedures    Inpatient Admission     Standing Status:   Standing     Number of Occurrences:   1     Order Specific Question:   Level of Care     Answer:   Med Surg [16]     Order Specific Question:   Estimated length of stay     Answer:   More than 2 Midnights     Order Specific Question:   Certification     Answer:   I certify that inpatient services are medically necessary for this patient for a duration of greater than two midnights  See H&P and MD Progress Notes for additional information about the patient's course of treatment  ED Arrival Information     Expected Arrival Acuity    - 4/15/2022 23:38 Urgent    Means of arrival Escorted by Service Admission type    [facility] Urgent    Arrival complaint    ALCOHOL INTOXICATION, WEAKNESS     Chief Complaint   Patient presents with    Alcohol Intoxication     Pt's family told squad he was lethargic  Pt admits to a 1/5th+ of liquor today  Denies seizures from withdrawl  Initial Presentation: 60 y o  male with PMH Atrial Fib post cardioversion no longer on meds, alcoholic hepatitis - presents urgently to [facility] ED on 4/15/22 late pm 2nd with generalized weakness and alcohol intoxication after drinking a bottle of gin  In ED -   Exam: intoxicated but AA+O x 3      CT consistent with pancreatitis  Labs:  Lipase 2224  K+ 2 7  Ca+ 7 7   Mg+ 1 4   ED Tx:  IVF x 1 L, IV KCL, IV Mag SO4  Admitted inpatient 4/16/22 at 0201 2nd Pancreatitis + Alcohol Intoxication   - CIWA protocol, Seizure precautions, neuro checks, continue NPO, IVF, IV antiemetics     4/27/22 - DAY 2:  Lipase improved to 973 this morning  No acute overnight events  Abdominal pain improved  Start a clear liquid diet at this time  Continue to monitor, Continue CIWA protocol  Continue folic acid, thiamine and MVI      ED Triage Vitals [04/15/22 2341]   Temperature Pulse Respirations Blood Pressure SpO2   97 5 °F (36 4 °C) (!) 107 22 126/82 95 %      Temp Source Heart Rate Source Patient Position - Orthostatic VS BP Location FiO2 (%)   Tympanic Monitor Sitting Left arm --      Wt Readings from Last 1 Encounters:   04/16/22 59 8 kg (131 lb 12 8 oz)     Additional Vital Signs:   Date/Time Temp Pulse Resp BP MAP (mmHg) SpO2 O2 Device Patient Position - Orthostatic VS   04/17/22 15:44:10 98 5 °F (36 9 °C) 118 Abnormal  -- 116/75 89 95 % -- --   04/17/22 13:28:26 -- 122 Abnormal  -- -- -- 96 % -- --   04/17/22 11:35:23 -- 122 Abnormal  -- 123/84 97 95 % -- --   04/17/22 07:08:39 98 6 °F (37 °C) 118 Abnormal  18 125/82 96 94 % None (Room air) Lying   04/17/22 02:34:15 98 7 °F (37 1 °C) 118 Abnormal  18 108/74 85 91 % -- --   04/17/22 0056 -- -- -- -- -- -- None (Room air) --   04/17/22 00:50:54 98 6 °F (37 °C) 122 Abnormal  20 138/93 108 94 % -- --   04/16/22 21:22:02 98 7 °F (37 1 °C) -- 20 156/93 114 -- -- --   04/16/22 21:21:53 98 7 °F (37 1 °C) 109 Abnormal  -- -- -- 88 % Abnormal  -- --   04/16/22 18:38:11 98 6 °F (37 °C) 107 Abnormal  19 136/89 105 94 % None (Room air) Lying   04/16/22 1725 -- 99 -- 158/95 116 94 % -- --   04/16/22 14:29:50 99 1 °F (37 3 °C) 105 17 150/83 105 94 % None (Room air) Lying   04/16/22 10:54:45 98 7 °F (37 1 °C) 108 Abnormal  18 145/82 103 91 % None (Room air) Lying   04/16/22 07:18:58 98 6 °F (37 °C) 107 Abnormal  16 129/94 106 90 % None (Room air) Lying   04/16/22 03:40:06 97 9 °F (36 6 °C) 97 17 137/91 106 95 % -- --   04/16/22 0335 -- -- -- -- -- 95 % None (Room air) --   04/16/22 0300 -- 94 12 107/67 81 95 % -- --   04/16/22 0230 -- 100 16 149/92 114 96 % -- --   04/16/22 0200 -- 114 Abnormal  17 137/84 105 94 % None (Room air) Lying   04/16/22 0115 -- 106 Abnormal  21 133/98 111 98 % -- --   04/15/22 2341 97 5 °F (36 4 °C) 107 Abnormal  22 126/82 -- 95 % None (Room air) Sitting     CIWA - AR SCORE  /89  -/95  -PT -- --   Pulse 107 Abnormal   -DI 99  -PT -- --   Nausea and Vomiting 0  -PT 0  -PT 0  -PT 0  -PT   Tactile Disturbances 0  -PT 0  -PT 0  -PT 0  -PT   Tremor 5  -PT 6  -PT 6  -PT 5  -PT   Auditory Disturbances 0  -PT 0  -PT 0  -PT 0  -PT   Paroxysmal Sweats 1  -PT 1  -PT 1  -PT 0  -PT   Visual Disturbances 0  -PT 0  -PT 0  -PT 0  -PT   Anxiety 1  -PT 3  -PT 3  -PT 0  -PT   Headache, Fullness in Head 0  -PT 0  -PT 0  -PT 0  -PT   Agitation 0  -PT 0  -PT 0  -PT 0  -PT   Orientation and Clouding of Sensorium 0  -PT 0  -PT 0  -PT 0  -PT   CIWA-Ar Total 7  -PT 10  -PT 10  -PT 5  -PT   Row Name 04/16/22 10:54:45 04/16/22 07:18:58 04/16/22 03:40:06 04/16/22 0300      04/16 0701   04/17 0700 04/17 0701   -   P  O   630   I V  (mL/kg) 1316 7 (22) 40 (0 7)   IV Piggyback     Total Intake(mL/kg) 1316 7 (22) 670 (11 2)   Urine (mL/kg/hr) 1125 (0 8) 650 (0 9)   Total Output 1125 650   Net +191 7 +20        Unmeasured Urine Occurrence  1 x     Pertinent Labs/Diagnostic Test Results:   4/16/22 -12 LEAD EKG:  Rhythm: sinus rhythm    Ectopy: none    QRS axis:  Normal  Conduction: normal    ST segments:  Non-specific  T waves: non-specific      CT abdomen pelvis with contrast   Final Result by Renetta Payne MD (04/16 0729)      Subtle peripancreatic haziness  Given clinical history acute pancreatitis is suspected  No evidence of collection or necrosis  Diffuse hepatic steatosis  Prostatomegaly          Results from last 7 days   Lab Units 04/16/22  0109   SARS-COV-2  Negative     Results from last 7 days   Lab Units 04/17/22  0456 04/16/22  0610 04/16/22  0012   WBC Thousand/uL 10 01 5 24 5 99   HEMOGLOBIN g/dL 12 1 12 6 12 8   HEMATOCRIT % 37 7 36 8 37 7   PLATELETS Thousands/uL 221 235 252   NEUTROS ABS Thousands/µL  --  3 41 3 68       Results from last 7 days   Lab Units 04/17/22  0650 04/16/22  0610 04/16/22  0012   SODIUM mmol/L 133* 138 136   POTASSIUM mmol/L 4 1 3 1* 2 7*   CHLORIDE mmol/L 96* 97* 92*   CO2 mmol/L 17* 27 30   ANION GAP mmol/L 20* 14* 14*   BUN mg/dL 5 6 9   CREATININE mg/dL 0 48* 0 40* 0 44*   EGFR ml/min/1 73sq m 121 130 125   CALCIUM mg/dL 7 4* 7 5* 7 7*   MAGNESIUM mg/dL 1 5* 1 9 1 4*     Results from last 7 days   Lab Units 04/17/22  0650 04/16/22  0610 04/16/22  0012   AST U/L 102* 110* 129*   ALT U/L 28 29 30   ALK PHOS U/L 174* 130* 131*   TOTAL PROTEIN g/dL 6 9 6 6 7 1   ALBUMIN g/dL 3 2* 3 5 3 6   TOTAL BILIRUBIN mg/dL 1 13* 0 72 0 76     Results from last 7 days   Lab Units 04/17/22  0650 04/16/22  0610 04/16/22  0012   GLUCOSE RANDOM mg/dL 95 78 90         Results from last 7 days   Lab Units 04/16/22  0610   HEP C AB  Non-reactive     Results from last 7 days   Lab Units 04/17/22  0650 04/16/22  0610 04/16/22  0012   LIPASE u/L 973* 1,703* 2,224*       Results from last 7 days   Lab Units 04/16/22  0149   CLARITY UA  Clear   COLOR UA  Yellow   SPEC GRAV UA  1 010   PH UA  6 0   GLUCOSE UA mg/dl Negative   KETONES UA mg/dl Trace*   BLOOD UA  Trace-Intact*   PROTEIN UA mg/dl Negative   NITRITE UA  Negative   BILIRUBIN UA  Negative   UROBILINOGEN UA E U /dl 1 0   LEUKOCYTES UA  Negative   WBC UA /hpf None Seen   RBC UA /hpf 0-1   BACTERIA UA /hpf Occasional   EPITHELIAL CELLS WET PREP /hpf Occasional     Results from last 7 days   Lab Units 04/16/22  0109   INFLUENZA A PCR  Negative   INFLUENZA B PCR  Negative   RSV PCR  Negative       Results from last 7 days   Lab Units 04/16/22  0012   ETHANOL LVL mg/dL 418*     ED Treatment:   Medication Administration from 04/15/2022 2337 to 04/16/2022 0334       Date/Time Order Dose Route Action     04/16/2022 0013 sodium chloride 0 9 % bolus 1,000 mL 1,000 mL Intravenous New Bag     04/16/2022 0103 potassium chloride (K-DUR,KLOR-CON) CR tablet 60 mEq 60 mEq Oral Given     04/16/2022 0105 magnesium sulfate 2 g/50 mL IVPB (premix) 2 g 2 g Intravenous New Bag     04/16/2022 0151 iohexol (OMNIPAQUE) 350 MG/ML injection (SINGLE-DOSE) 100 mL 100 mL Intravenous Given     Past Medical History:   Diagnosis Date    Alcohol abuse     1 pint of gin daily on weekends    Alcoholic hepatitis     Mild    Atrial flutter ([facility] [address] ) 08/2015    Recurrent and symptomatic, also occurring on 1/7/2015    Cardiomyopathy, nonischemic (HCC)     Caused by uncontrolled tachycardia    Congenital heart disease     Type unknown and not evident on echocardiography    Hypokalemia     Murmur     Smoking     One pack per day for 38 years     Present on Admission:   Hypomagnesemia    Admitting Diagnosis: Hypokalemia [E87 6]  Hypomagnesemia [E83 42]  Alcohol intoxication ([facility] [address] ) [F10 929]  Pancreatitis [K85 90]    Age/Sex: 60 y o  male    Admission Orders:  Telemetry  VS q4hrs  CIWA Ar Score q1 hr x 4 - q4hrs  Continuous Pulse Oximetry   NPO  I+O q shift  Up with assistance    Scheduled Medications:  enoxaparin, 40 mg, Subcutaneous, G57V [facility]  folic acid, 1 mg, Oral, Daily  lidocaine, 1 patch, Topical, Daily  magnesium oxide, 400 mg, Oral, Daily  multivitamin-minerals, 1 tablet, Oral, Daily  thiamine, 100 mg, Oral, Daily    Continuous IV Infusions:  IVF multi-electrolyte, 125 mL/hr, Intravenous, Continuous    PRN Meds:  IV morphine injection, 2 mg, Intravenous, Q4H PRN - 4/16 X 2  IV ondansetron, 4 mg, Intravenous, Q6H PRN - 4/16 X 1, 4/17 X 1    IP CONSULT TO CASE MANAGEMENT    Network Utilization Review Department  ATTENTION: Please call with any questions or concerns to [phone] and carefully listen to the prompts so that you are directed to the right person  All voicemails are confidential   Tasha Muss all requests for admission clinical reviews, approved or denied determinations and any other requests to dedicated fax number below belonging to the campus where the patient is receiving treatment   List of dedicated fax numbers for the Facilities:  [facility] ADMISSION DENIALS (Administrative/Medical Necessity) [phone]   [address] (Maternity/NICU/Pediatrics) [address] [place] [address],[address] Floor [facility] [address] [facility]  [phone]   [address] [address] [place] [address] [address] Chloe [address]eria [address] [facility] [address] [facility] [phone]

## 2022-04-17 NOTE — PLAN OF CARE
Problem: Potential for Falls  Goal: Patient will remain free of falls  Description: INTERVENTIONS:  - Educate patient/family on patient safety including physical limitations  - Instruct patient to call for assistance with activity   - Consult OT/PT to assist with strengthening/mobility   - Keep Call bell within reach  - Keep bed low and locked with side rails adjusted as appropriate  - Keep care items and personal belongings within reach  - Initiate and maintain comfort rounds  - Make Fall Risk Sign visible to staff  - Offer Toileting every 2 Hours, in advance of need  - Initiate/Maintain bed alarm  - Obtain necessary fall risk management equipment: yes  - Apply yellow socks and bracelet for high fall risk patients  - Consider moving patient to room near nurses station  Outcome: Progressing     Problem: MOBILITY - ADULT  Goal: Maintain or return to baseline ADL function  Description: INTERVENTIONS:  -  Assess patient's ability to carry out ADLs; assess patient's baseline for ADL function and identify physical deficits which impact ability to perform ADLs (bathing, care of mouth/teeth, toileting, grooming, dressing, etc )  - Assess/evaluate cause of self-care deficits   - Assess range of motion  - Assess patient's mobility; develop plan if impaired  - Assess patient's need for assistive devices and provide as appropriate  - Encourage maximum independence but intervene and supervise when necessary  - Involve family in performance of ADLs  - Assess for home care needs following discharge   - Consider OT consult to assist with ADL evaluation and planning for discharge  - Provide patient education as appropriate  Outcome: Progressing  Goal: Maintains/Returns to pre admission functional level  Description: INTERVENTIONS:  - Perform BMAT or MOVE assessment daily    - Set and communicate daily mobility goal to care team and patient/family/caregiver     - Collaborate with rehabilitation services on mobility goals if consulted  - Perform Range of Motion 3 times a day  - Reposition patient every 2 hours    - Dangle patient 3 times a day  - Stand patient 3 times a day  - Ambulate patient 3 times a day  - Out of bed to chair 3 times a day   - Out of bed for meals 3 times a day  - Out of bed for toileting  - Record patient progress and toleration of activity level   Outcome: Progressing     Problem: CARDIOVASCULAR - ADULT  Goal: Absence of cardiac dysrhythmias or at baseline rhythm  Description: INTERVENTIONS:  - Continuous cardiac monitoring, vital signs, obtain 12 lead EKG if ordered  - Administer antiarrhythmic and heart rate control medications as ordered  - Monitor electrolytes and administer replacement therapy as ordered  Outcome: Progressing     Problem: METABOLIC, FLUID AND ELECTROLYTES - ADULT  Goal: Electrolytes maintained within normal limits  Description: INTERVENTIONS:  - Monitor labs and assess patient for signs and symptoms of electrolyte imbalances  - Administer electrolyte replacement as ordered  - Monitor response to electrolyte replacements, including repeat lab results as appropriate  - Instruct patient on fluid and nutrition as appropriate  Outcome: Progressing     Problem: DISCHARGE PLANNING  Goal: Discharge to home or other facility with appropriate resources  Description: INTERVENTIONS:  - Identify barriers to discharge w/patient and caregiver  - Arrange for needed discharge resources and transportation as appropriate  - Identify discharge learning needs (meds, wound care, etc )  - Arrange for interpretive services to assist at discharge as needed  - Refer to Case Management Department for coordinating discharge planning if the patient needs post-hospital services based on physician/advanced practitioner order or complex needs related to functional status, cognitive ability, or social support system  Outcome: Progressing     Problem: Knowledge Deficit  Goal: Patient/family/caregiver demonstrates understanding of disease process, treatment plan, medications, and discharge instructions  Description: Complete learning assessment and assess knowledge base    Interventions:  - Provide teaching at level of understanding  - Provide teaching via preferred learning methods  Outcome: Progressing     Problem: Prexisting or High Potential for Compromised Skin Integrity  Goal: Skin integrity is maintained or improved  Description: INTERVENTIONS:  - Identify patients at risk for skin breakdown  - Assess and monitor skin integrity  - Assess and monitor nutrition and hydration status  - Monitor labs   - Assess for incontinence   - Turn and reposition patient  - Assist with mobility/ambulation  - Relieve pressure over bony prominences  - Avoid friction and shearing  - Provide appropriate hygiene as needed including keeping skin clean and dry  - Evaluate need for skin moisturizer/barrier cream  - Collaborate with interdisciplinary team   - Patient/family teaching  - Consider wound care consult   Outcome: Progressing

## 2022-04-17 NOTE — ASSESSMENT & PLAN NOTE
Patient is a daily drinker  Continue with CIWA protocol  Continue with folic acid, thiamine and MVI

## 2022-04-17 NOTE — ASSESSMENT & PLAN NOTE
Patient presented to ED with generalized weakness after drinking a bottle of gin  Lipase 2224 on admission  CT consistent with pancreatitis  Pancreatitis 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 abuse  Lipase improved to 973 this morning  Abdominal pain improved  Will start a clear liquid diet at this time  Will continue to monitor

## 2022-04-17 NOTE — PROGRESS NOTES
[facility] [address]  Progress Note - [address] 1965, 60 y o  male MRN: [id number]  Unit/Bed#: 2 [place] [address] Encounter: [id number]  Primary Care Provider: Arjun Teague MD   Date and time admitted to hospital: 4/15/2022 11:39 PM    Alcohol intoxication [facility])  Assessment & Plan  Patient is a daily drinker  Continue with CIWA protocol  Continue with folic acid, thiamine and MVI  Hypomagnesemia  Assessment & Plan  Repleted  Will continue to monitor  Hypokalemia  Assessment & Plan  Resolved  Potassium this morning is 4 1  Will continue to monitor  * Pancreatitis  Assessment & Plan  Patient presented to ED with generalized weakness after drinking a bottle of gin  Lipase 2224 on admission  CT consistent with pancreatitis  Pancreatitis 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 abuse  Lipase improved to 973 this morning  Abdominal pain improved  Will start a clear liquid diet at this time  Will continue to monitor  VTE Pharmacologic Prophylaxis: VTE Score: 2 Lovenox prophylaxis  Current Length of Stay: 1 day(s)  Current Patient Status: Inpatient     Code Status: Level 1 - Full Code    Subjective:   Patient seen and examined at bedside  No acute events overnight  Patient denies any new complaints  No abdominal pain  Objective:     Vitals:   Temp (24hrs), Av 7 °F (37 1 °C), Min:98 6 °F (37 °C), Max:99 1 °F (37 3 °C)    Temp:  [98 6 °F (37 °C)-99 1 °F (37 3 °C)] 98 6 °F (37 °C)  HR:  [] 118  Resp:  [17-20] 18  BP: (108-158)/(74-95) 125/82  SpO2:  [88 %-94 %] 94 %  Body mass index is 22 62 kg/m²  Input and Output Summary (last 24 hours): Intake/Output Summary (Last 24 hours) at 2022 1109  Last data filed at 2022 0037  Gross per 24 hour   Intake 1000 ml   Output 450 ml   Net 550 ml       Physical Exam:   Physical Exam  HENT:      Head: Normocephalic  Mouth/Throat:      Mouth: Mucous membranes are moist    Eyes:      Extraocular Movements: Extraocular movements intact  Cardiovascular:      Rate and Rhythm: Normal rate  Pulmonary:      Effort: Pulmonary effort is normal  No respiratory distress  Abdominal:      Palpations: Abdomen is soft  Tenderness: There is no abdominal tenderness  Skin:     General: Skin is warm  Neurological:      Mental Status: He is alert and oriented to person, place, and time  Psychiatric:         Mood and Affect: Mood normal          Behavior: Behavior normal         Additional Data:     Labs:  Results from last 7 days   Lab Units 04/17/22  0456 04/16/22  0610 04/16/22  0610   WBC Thousand/uL 10 01   < > 5 24   HEMOGLOBIN g/dL 12 1   < > 12 6   HEMATOCRIT % 37 7   < > 36 8   PLATELETS Thousands/uL 221   < > 235   NEUTROS PCT %  --   --  65   LYMPHS PCT %  --   --  19   MONOS PCT %  --   --  12   EOS PCT %  --   --  2    < > = values in this interval not displayed  Results from last 7 days   Lab Units 04/17/22  0650   SODIUM mmol/L 133*   POTASSIUM mmol/L 4 1   CHLORIDE mmol/L 96*   CO2 mmol/L 17*   BUN mg/dL 5   CREATININE mg/dL 0 48*   ANION GAP mmol/L 20*   CALCIUM mg/dL 7 4*   ALBUMIN g/dL 3 2*   TOTAL BILIRUBIN mg/dL 1 13*   ALK PHOS U/L 174*   ALT U/L 28   AST U/L 102*   GLUCOSE RANDOM mg/dL 95     Lines/Drains:  Invasive Devices  Report    Peripheral Intravenous Line            Peripheral IV 04/16/22 Dorsal (posterior); Left 1 day              Telemetry:  Telemetry Orders (From admission, onward)             24 Hour Telemetry Monitoring  Continuous x 24 Hours (Telem)        References:    Telemetry Guidelines   Question:  Reason for 24 Hour Telemetry  Answer:  Metabolic/Electrolyte Disturbance with High Probability of Dysrhythmia (K level <3 or >6, or KCL infusion >=10mEq/hr)                          Imaging: Reviewed      Recent Cultures (last 7 days):         Last 24 Hours Medication List:   Current Facility-Administered Medications   Medication Dose Route Frequency Provider Last Rate    folic acid  1 mg Oral Daily Susan HARMON CHEN Amezquita      lidocaine  1 patch Topical Daily Tacos Guerrero MD      magnesium oxide  400 mg Oral Daily Tacos Guerrero MD      morphine injection  2 mg Intravenous Q4H PRN Tacos Guerrero MD      multi-electrolyte  125 mL/hr Intravenous Continuous CHEN Marion 125 mL/hr (04/17/22 0339)    multivitamin-minerals  1 tablet Oral Daily CHEN Marion      ondansetron  4 mg Intravenous Q6H PRN CHEN Marion      thiamine  100 mg Oral Daily CHEN Marion          Today, Patient Was Seen By: Tacos Guerrero MD    **Please Note: This note may have been constructed using a voice recognition system  **

## 2022-04-18 LAB
ALBUMIN SERPL BCP-MCNC: 2.7 G/DL (ref 3.5–5)
ALP SERPL-CCNC: 146 U/L (ref 46–116)
ALT SERPL W P-5'-P-CCNC: 25 U/L (ref 12–78)
ANION GAP SERPL CALCULATED.3IONS-SCNC: 14 MMOL/L (ref 4–13)
AST SERPL W P-5'-P-CCNC: 87 U/L (ref 5–45)
BILIRUB SERPL-MCNC: 1.06 MG/DL (ref 0.2–1)
BUN SERPL-MCNC: 4 MG/DL (ref 5–25)
CALCIUM ALBUM COR SERPL-MCNC: 8.2 MG/DL (ref 8.3–10.1)
CALCIUM SERPL-MCNC: 7.2 MG/DL (ref 8.3–10.1)
CHLORIDE SERPL-SCNC: 94 MMOL/L (ref 100–108)
CO2 SERPL-SCNC: 23 MMOL/L (ref 21–32)
CREAT SERPL-MCNC: 0.38 MG/DL (ref 0.6–1.3)
ERYTHROCYTE [DISTWIDTH] IN BLOOD BY AUTOMATED COUNT: 12.7 % (ref 11.6–15.1)
GFR SERPL CREATININE-BSD FRML MDRD: 133 ML/MIN/1.73SQ M
GLUCOSE SERPL-MCNC: 94 MG/DL (ref 65–140)
HCT VFR BLD AUTO: 35.2 % (ref 36.5–49.3)
HGB BLD-MCNC: 11.7 G/DL (ref 12–17)
LIPASE SERPL-CCNC: 847 U/L (ref 73–393)
MAGNESIUM SERPL-MCNC: 1.6 MG/DL (ref 1.6–2.6)
MCH RBC QN AUTO: 30.9 PG (ref 26.8–34.3)
MCHC RBC AUTO-ENTMCNC: 33.2 G/DL (ref 31.4–37.4)
MCV RBC AUTO: 93 FL (ref 82–98)
PLATELET # BLD AUTO: 181 THOUSANDS/UL (ref 149–390)
PMV BLD AUTO: 9.7 FL (ref 8.9–12.7)
POTASSIUM SERPL-SCNC: 3 MMOL/L (ref 3.5–5.3)
PROT SERPL-MCNC: 5.5 G/DL (ref 6.4–8.2)
RBC # BLD AUTO: 3.79 MILLION/UL (ref 3.88–5.62)
SODIUM SERPL-SCNC: 131 MMOL/L (ref 136–145)
WBC # BLD AUTO: 9.54 THOUSAND/UL (ref 4.31–10.16)

## 2022-04-18 PROCEDURE — 99232 SBSQ HOSP IP/OBS MODERATE 35: CPT | Performed by: FAMILY MEDICINE

## 2022-04-18 PROCEDURE — 80053 COMPREHEN METABOLIC PANEL: CPT | Performed by: FAMILY MEDICINE

## 2022-04-18 PROCEDURE — 83690 ASSAY OF LIPASE: CPT | Performed by: FAMILY MEDICINE

## 2022-04-18 PROCEDURE — 85027 COMPLETE CBC AUTOMATED: CPT | Performed by: FAMILY MEDICINE

## 2022-04-18 PROCEDURE — 83735 ASSAY OF MAGNESIUM: CPT | Performed by: FAMILY MEDICINE

## 2022-04-18 RX ORDER — SODIUM CHLORIDE AND POTASSIUM CHLORIDE .9; .15 G/100ML; G/100ML
125 SOLUTION INTRAVENOUS CONTINUOUS
Status: DISCONTINUED | OUTPATIENT
Start: 2022-04-18 | End: 2022-04-22

## 2022-04-18 RX ORDER — POTASSIUM CHLORIDE 20 MEQ/1
40 TABLET, EXTENDED RELEASE ORAL EVERY 4 HOURS
Status: COMPLETED | OUTPATIENT
Start: 2022-04-18 | End: 2022-04-18

## 2022-04-18 RX ORDER — LORAZEPAM 2 MG/ML
4 INJECTION INTRAMUSCULAR ONCE
Status: COMPLETED | OUTPATIENT
Start: 2022-04-18 | End: 2022-04-18

## 2022-04-18 RX ORDER — ACETAMINOPHEN 325 MG/1
650 TABLET ORAL ONCE
Status: COMPLETED | OUTPATIENT
Start: 2022-04-18 | End: 2022-04-18

## 2022-04-18 RX ORDER — CHLORDIAZEPOXIDE HYDROCHLORIDE 25 MG/1
50 CAPSULE, GELATIN COATED ORAL EVERY 6 HOURS SCHEDULED
Status: DISCONTINUED | OUTPATIENT
Start: 2022-04-18 | End: 2022-04-21

## 2022-04-18 RX ORDER — LORAZEPAM 1 MG/1
2 TABLET ORAL ONCE
Status: COMPLETED | OUTPATIENT
Start: 2022-04-18 | End: 2022-04-18

## 2022-04-18 RX ADMIN — Medication 1 TABLET: at 08:12

## 2022-04-18 RX ADMIN — SODIUM CHLORIDE AND POTASSIUM CHLORIDE 125 ML/HR: .9; .15 SOLUTION INTRAVENOUS at 09:30

## 2022-04-18 RX ADMIN — CHLORDIAZEPOXIDE HYDROCHLORIDE 50 MG: 25 CAPSULE ORAL at 09:46

## 2022-04-18 RX ADMIN — POTASSIUM CHLORIDE 40 MEQ: 20 TABLET, EXTENDED RELEASE ORAL at 13:20

## 2022-04-18 RX ADMIN — ENOXAPARIN SODIUM 40 MG: 40 INJECTION SUBCUTANEOUS at 08:12

## 2022-04-18 RX ADMIN — FOLIC ACID 1 MG: 1 TABLET ORAL at 08:10

## 2022-04-18 RX ADMIN — SODIUM CHLORIDE, SODIUM GLUCONATE, SODIUM ACETATE, POTASSIUM CHLORIDE, MAGNESIUM CHLORIDE, SODIUM PHOSPHATE, DIBASIC, AND POTASSIUM PHOSPHATE 125 ML/HR: .53; .5; .37; .037; .03; .012; .00082 INJECTION, SOLUTION INTRAVENOUS at 04:22

## 2022-04-18 RX ADMIN — POTASSIUM CHLORIDE 40 MEQ: 20 TABLET, EXTENDED RELEASE ORAL at 09:48

## 2022-04-18 RX ADMIN — LIDOCAINE 5% 1 PATCH: 700 PATCH TOPICAL at 08:18

## 2022-04-18 RX ADMIN — LORAZEPAM 2 MG: 1 TABLET ORAL at 00:21

## 2022-04-18 RX ADMIN — CHLORDIAZEPOXIDE HYDROCHLORIDE 50 MG: 25 CAPSULE ORAL at 17:00

## 2022-04-18 RX ADMIN — LORAZEPAM 4 MG: 2 INJECTION INTRAMUSCULAR; INTRAVENOUS at 04:35

## 2022-04-18 RX ADMIN — THIAMINE HCL TAB 100 MG 100 MG: 100 TAB at 08:10

## 2022-04-18 RX ADMIN — MAGNESIUM OXIDE TAB 400 MG (241.3 MG ELEMENTAL MG) 400 MG: 400 (241.3 MG) TAB at 08:10

## 2022-04-18 RX ADMIN — ACETAMINOPHEN 650 MG: 325 TABLET, FILM COATED ORAL at 16:07

## 2022-04-18 RX ADMIN — LORAZEPAM 4 MG: 2 INJECTION INTRAMUSCULAR; INTRAVENOUS at 08:12

## 2022-04-18 RX ADMIN — SODIUM CHLORIDE AND POTASSIUM CHLORIDE 125 ML/HR: .9; .15 SOLUTION INTRAVENOUS at 16:52

## 2022-04-18 NOTE — ASSESSMENT & PLAN NOTE
· ETOH on admission was 418, alert and talking at the time  · Patient is a daily drinker  · Daily folic acid, thiamine and MVI  · Was on CIWA protocol, had escalating scores  Was 4 yesterday, increased to 14-->17 overnight with development of visual hallucinations, agitation and anxiety  Also with tremor   See Alcohol withdrawal below for plan

## 2022-04-18 NOTE — ASSESSMENT & PLAN NOTE
Patient presented to ED with generalized weakness after drinking a bottle of gin  Lipase 2224 on admission  CT consistent with pancreatitis  Pancreatitis 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 abuse  Lipase improved and adominal pain resolved  Continue low fat diet

## 2022-04-18 NOTE — PLAN OF CARE
Problem: Potential for Falls  Goal: Patient will remain free of falls  Description: INTERVENTIONS:  - Educate patient/family on patient safety including physical limitations  - Instruct patient to call for assistance with activity   - Consult OT/PT to assist with strengthening/mobility   - Keep Call bell within reach  - Keep bed low and locked with side rails adjusted as appropriate  - Keep care items and personal belongings within reach  - Initiate and maintain comfort rounds  - Make Fall Risk Sign visible to staff  - Offer Toileting every 2 Hours, in advance of need  - Initiate/Maintain bed alarm  - Obtain necessary fall risk management equipment: yes  - Apply yellow socks and bracelet for high fall risk patients  - Consider moving patient to room near nurses station  Outcome: Progressing     Problem: MOBILITY - ADULT  Goal: Maintain or return to baseline ADL function  Description: INTERVENTIONS:  -  Assess patient's ability to carry out ADLs; assess patient's baseline for ADL function and identify physical deficits which impact ability to perform ADLs (bathing, care of mouth/teeth, toileting, grooming, dressing, etc )  - Assess/evaluate cause of self-care deficits   - Assess range of motion  - Assess patient's mobility; develop plan if impaired  - Assess patient's need for assistive devices and provide as appropriate  - Encourage maximum independence but intervene and supervise when necessary  - Involve family in performance of ADLs  - Assess for home care needs following discharge   - Consider OT consult to assist with ADL evaluation and planning for discharge  - Provide patient education as appropriate  Outcome: Progressing  Goal: Maintains/Returns to pre admission functional level  Description: INTERVENTIONS:  - Perform BMAT or MOVE assessment daily    - Set and communicate daily mobility goal to care team and patient/family/caregiver     - Collaborate with rehabilitation services on mobility goals if consulted  - Perform Range of Motion 3 times a day  - Reposition patient every 2 hours    - Dangle patient 3 times a day  - Stand patient 3 times a day  - Ambulate patient 3 times a day  - Out of bed to chair 3 times a day   - Out of bed for meals 3 times a day  - Out of bed for toileting  - Record patient progress and toleration of activity level   Outcome: Progressing     Problem: CARDIOVASCULAR - ADULT  Goal: Absence of cardiac dysrhythmias or at baseline rhythm  Description: INTERVENTIONS:  - Continuous cardiac monitoring, vital signs, obtain 12 lead EKG if ordered  - Administer antiarrhythmic and heart rate control medications as ordered  - Monitor electrolytes and administer replacement therapy as ordered  Outcome: Progressing

## 2022-04-18 NOTE — PROGRESS NOTES
Leticia [address]  Progress Note - Pj [address] 1965, 60 y o  male MRN: [id number]  Unit/Bed#: 2 [place] [address] Encounter: [id number]  Primary Care Provider: Doree Bence, MD   Date and time admitted to hospital: 4/15/2022 11:39 PM    Alcohol withdrawal syndrome without complication [facility])  Assessment & Plan  Patient with worsening withdrawal symptoms this morning necessitating ativan  Will start oral librium 50mg every 6 hours  Will continue to closely monitor symptoms  Alcohol intoxication [facility])  Assessment & Plan  Patient is a daily drinker  Continue with CIWA protocol with the addition of standing librium given worsening withdrawal symptoms  Continue with folic acid, thiamine and MVI  * Pancreatitis  Assessment & Plan  Patient presented to ED with generalized weakness after drinking a bottle of gin  Lipase 2224 on admission  CT consistent with pancreatitis  Pancreatitis 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 abuse  Lipase improved to 847 this morning  Abdominal pain resolved  Continue with clear liquid diet for now  Patient with worsening alcohol withdrawal symptoms this morning as noted above  Hypomagnesemia  Assessment & Plan  Repleted  Will continue to monitor  Hypokalemia  Assessment & Plan  Potassium 3 0 this morning  Patient also noted to be hyponatremic with a sodium level of 131 and hypochloremic with a chloride level of 94  Will order 40meq of oral potassium every 4 hours for 2 doses in addition to changing the IV fluid to normal saline with 20meq of potassium at 125ml/hr  VTE Pharmacologic Prophylaxis: VTE Score: 2 Lovenox prophylaxis  Current Length of Stay: 2 day(s)  Current Patient Status: Inpatient     Code Status: Level 1 - Full Code    Subjective:   Patient seen and examined at bedside  Patient with worsening alcohol withdrawal symptoms overnight necessitating ativan administration  Patient currently sleeping      Objective:     Vitals:   Temp (24hrs), Av 7 °F (37 1 °C), Min:98 4 °F (36 9 °C), Max:99 °F (37 2 °C)    Temp:  [98 4 °F (36 9 °C)-99 °F (37 2 °C)] 98 7 °F (37 1 °C)  HR:  [110-130] 111  Resp:  [18-20] 18  BP: (106-123)/(65-84) 108/65  SpO2:  [93 %-96 %] 95 %  Body mass index is 22 62 kg/m²  Input and Output Summary (last 24 hours): Intake/Output Summary (Last 24 hours) at 4/18/2022 0937  Last data filed at 4/17/2022 2305  Gross per 24 hour   Intake 670 ml   Output 750 ml   Net -80 ml     Physical Exam:   Physical Exam  HENT:      Head: Normocephalic  Mouth/Throat:      Mouth: Mucous membranes are moist    Eyes:      Extraocular Movements: Extraocular movements intact  Cardiovascular:      Rate and Rhythm: Normal rate  Pulmonary:      Effort: Pulmonary effort is normal  No respiratory distress  Abdominal:      Palpations: Abdomen is soft  Tenderness: There is no abdominal tenderness  Skin:     General: Skin is warm  Neurological:      Mental Status: He is alert  Comments: Currently sleeping  More confused today secondary to worsening alcohol withdrawal    Psychiatric:         Mood and Affect: Mood normal          Behavior: Behavior normal         Additional Data:     Labs:  Results from last 7 days   Lab Units 04/18/22  0645 04/17/22  0456 04/16/22  0610   WBC Thousand/uL 9 54   < > 5 24   HEMOGLOBIN g/dL 11 7*   < > 12 6   HEMATOCRIT % 35 2*   < > 36 8   PLATELETS Thousands/uL 181   < > 235   NEUTROS PCT %  --   --  65   LYMPHS PCT %  --   --  19   MONOS PCT %  --   --  12   EOS PCT %  --   --  2    < > = values in this interval not displayed       Results from last 7 days   Lab Units 04/18/22  0645   SODIUM mmol/L 131*   POTASSIUM mmol/L 3 0*   CHLORIDE mmol/L 94*   CO2 mmol/L 23   BUN mg/dL 4*   CREATININE mg/dL 0 38*   ANION GAP mmol/L 14*   CALCIUM mg/dL 7 2*   ALBUMIN g/dL 2 7*   TOTAL BILIRUBIN mg/dL 1 06*   ALK PHOS U/L 146*   ALT U/L 25   AST U/L 87*   GLUCOSE RANDOM mg/dL 94     Lines/Drains:  Invasive Devices  Report Peripheral Intravenous Line            Peripheral IV 04/17/22 Left;Ventral (anterior) Forearm <1 day              Telemetry:  Telemetry Orders (From admission, onward)             24 Hour Telemetry Monitoring  Continuous x 24 Hours (Telem)        References:    Telemetry Guidelines   Question:  Reason for 24 Hour Telemetry  Answer:  Metabolic/Electrolyte Disturbance with High Probability of Dysrhythmia (K level <3 or >6, or KCL infusion >=10mEq/hr)                          Imaging: Reviewed  Recent Cultures (last 7 days):         Last 24 Hours Medication List:   Current Facility-Administered Medications   Medication Dose Route Frequency Provider Last Rate    chlordiazePOXIDE  50 mg Oral Q6H Sandy Delgado MD      enoxaparin  40 mg Subcutaneous Q24H [address] Javier Prabhakar MD      folic acid  1 mg Oral Daily Meriam Pouch, CRNP      lidocaine  1 patch Topical Daily Javier Prabhakar MD      magnesium oxide  400 mg Oral Daily Javier Prabhakar MD      morphine injection  2 mg Intravenous Q4H PRN Javier Prabhakar MD      multivitamin-minerals  1 tablet Oral Daily Meriam Pouch, CRNP      ondansetron  4 mg Intravenous Q6H PRN Meriam Pouch, CRNP      potassium chloride  40 mEq Oral Q4H Javier Prabhakar MD      sodium chloride 0 9 % with KCl 20 mEq/L  125 mL/hr Intravenous Continuous Javier Prabhakar  mL/hr (04/18/22 0930)    thiamine  100 mg Oral Daily Meriam Pouch, CRNP          Today, Patient Was Seen By: Javier Prabhakar MD    **Please Note: This note may have been constructed using a voice recognition system  **

## 2022-04-18 NOTE — ASSESSMENT & PLAN NOTE
Patient is a daily drinker  Alcohol withdrawal symptoms improved  Currently on librium taper  Continue with folic acid, thiamine and MVI

## 2022-04-18 NOTE — CASE MANAGEMENT
Case Management Assessment & Discharge Planning Note    Patient name Saray Francis  Location 18 [facility] 221/2 Chayo Hodges MRN [id number]  : 1965 Date 2022       Current Admission Date: 4/15/2022  Current Admission Diagnosis:Pancreatitis   Patient Active Problem List    Diagnosis Date Noted    Alcohol intoxication ([facility] ) 2022    Pancreatitis 2022    Alcohol withdrawal syndrome without complication ([place] [address] )     Hypomagnesemia 2018    Paroxysmal atrial fibrillation ([facility] ) 2017    Tobacco abuse 2017    Hypokalemia 2017    Cardiomyopathy, 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 06/10/2016    Alcohol abuse 06/10/2016      LOS (days): 2  Geometric Mean LOS (GMLOS) (days):   Days to GMLOS:     OBJECTIVE:    Risk of Unplanned Readmission Score: 12         Current admission status: Inpatient       Preferred Pharmacy:   PATIENT/FAMILY REPORTS NO PREFERRED PHARMACY  No address on file      [facility] [address] [facility] [address] [id number]  Phone: [phone] Fax: [phone]    Primary Care Provider: Octavio Hinds MD    Primary Insurance: [facility]  Secondary Insurance: [address] [place] [address]    ASSESSMENT:  Brie 26 Proxies    There are no active Health Care Proxies on file  Readmission Root Cause  30 Day Readmission: No    Patient Information  Admitted from[de-identified] Home  Mental Status: Other (Comment) (pt drowsy, withdrawal from etoh)  During Assessment patient was accompanied by: Not accompanied during assessment  Assessment information provided by[de-identified] Sister  Primary Caregiver: Self  Caregiver's Name[de-identified] Alec Button Relationship to Patient[de-identified] Family Member  Caregiver's Telephone Number[de-identified] [phone]  Support Systems: Family members  [place] of Residence: [address] [place] [address] do you live in?: [address] [place] [address] Road entry access options   Select all that apply : Stairs  Number of steps to enter home : 3  Do the steps have railings?: Yes  Type of Current Residence: 3 story home  In the last 12 months, was there a time when you were not able to pay the mortgage or rent on time?: No  In the last 12 months, was there a time when you did not have a steady place to sleep or slept in a shelter (including now)?: No  Living Arrangements: Lives w/ Extended Family  Is patient a ?: No    Activities of Daily Living Prior to Admission  Functional Status: Independent  Completes ADLs independently?: Yes  Ambulates independently?: Yes  Does patient use assisted devices?: Yes  Assisted Devices (DME) used:  Other (Comment) (heart monitor)  Does patient currently own DME?: No         Patient Information Continued  Income Source: SSI/SSD  Does patient have prescription coverage?: Yes  Within the past 12 months, you worried that your food would run out before you got the money to buy more : Never true  Within the past 12 months, the food you bought just didnt last and you didnt have money to get more : Never true  Does patient have a history of substance abuse?: Yes  Historical substance use preference: Alcohol/ETOH (pt having DT)  History of Withdrawal Symptoms: Delirium tremors  Does patient have a history of Mental Health Diagnosis?: No         Means of Transportation  Means of Transport to Appts[de-identified] Family transport  In the past 12 months, has lack of transportation kept you from medical appointments or from getting medications?: No  In the past 12 months, has lack of transportation kept you from meetings, work, or from getting things needed for daily living?: No  Was application for public transport provided?: N/A        DISCHARGE DETAILS:    Discharge planning discussed with[de-identified] pt's sister  Freedom of Choice: Yes  Comments - Freedom of Choice: pt's family understands ability to be involved in hospital stay and d/c planning  CM contacted family/caregiver?: Yes Ladarius Fabian)  Were Treatment Team discharge recommendations reviewed with patient/caregiver?: Yes  Did patient/caregiver verbalize understanding of patient care needs?: Yes  Were patient/caregiver advised of the risks associated with not following Treatment Team discharge recommendations?: Yes    Contacts  Relationship to Patient[de-identified] Family  Contact Method: Phone  Reason/Outcome: Discharge [address]         Is the patient interested in [place] [address] at discharge?: No    DME Referral Provided  Referral made for DME?: No         Would you like to participate in our [address] service program?  : No - Declined     Cm spoke with pt's sister and he is sleeping  Sister states that he would be open to going to a psych inpt rehab after post acute stay  Cm will speak with pt about it after he rests  Cm will follow

## 2022-04-18 NOTE — ASSESSMENT & PLAN NOTE
Alcohol withdrawal symptoms significantly improved     Continue Librium taper  Complete alcohol cessation discussed with patient

## 2022-04-18 NOTE — ASSESSMENT & PLAN NOTE
· History of ETOH withdrawal  · Per SLIM, was not interested in Detox on arrival  · Now in withdrawal with visual hallucinations, agitation, tremor  · S/p lorazepam 8mg total overnight  Did improve briefly with lorazepam 4mg dose around 0400    · May require transfer to ICU for phenobarb load: 236mg, followed by 177mg at 3 hour increments x2  · Haldol PRN if needed for severe agitation during load

## 2022-04-18 NOTE — ASSESSMENT & PLAN NOTE
· Presented to ED 4/15 with generalized weakness after drinking a bottle of gin  · Lipase 2224-> 1703->973  · CT consistent with pancreatitis  Likely secondary to alcohol abuse  · Abdominal pain improved  Started on clear liquid diet at this time     · Trend lipase  · Encourage total ETOH abstinence, see plan below

## 2022-04-19 LAB
ALBUMIN SERPL BCP-MCNC: 2.6 G/DL (ref 3.5–5)
ALP SERPL-CCNC: 118 U/L (ref 46–116)
ALT SERPL W P-5'-P-CCNC: 16 U/L (ref 12–78)
ANION GAP SERPL CALCULATED.3IONS-SCNC: 12 MMOL/L (ref 4–13)
AST SERPL W P-5'-P-CCNC: 50 U/L (ref 5–45)
BILIRUB SERPL-MCNC: 0.75 MG/DL (ref 0.2–1)
BUN SERPL-MCNC: 5 MG/DL (ref 5–25)
CALCIUM ALBUM COR SERPL-MCNC: 8.3 MG/DL (ref 8.3–10.1)
CALCIUM SERPL-MCNC: 7.2 MG/DL (ref 8.3–10.1)
CHLORIDE SERPL-SCNC: 103 MMOL/L (ref 100–108)
CO2 SERPL-SCNC: 21 MMOL/L (ref 21–32)
CREAT SERPL-MCNC: 0.44 MG/DL (ref 0.6–1.3)
ERYTHROCYTE [DISTWIDTH] IN BLOOD BY AUTOMATED COUNT: 13.1 % (ref 11.6–15.1)
GFR SERPL CREATININE-BSD FRML MDRD: 125 ML/MIN/1.73SQ M
GLUCOSE SERPL-MCNC: 86 MG/DL (ref 65–140)
HCT VFR BLD AUTO: 33.4 % (ref 36.5–49.3)
HGB BLD-MCNC: 10.8 G/DL (ref 12–17)
MAGNESIUM SERPL-MCNC: 1.5 MG/DL (ref 1.6–2.6)
MCH RBC QN AUTO: 30.8 PG (ref 26.8–34.3)
MCHC RBC AUTO-ENTMCNC: 32.3 G/DL (ref 31.4–37.4)
MCV RBC AUTO: 95 FL (ref 82–98)
PLATELET # BLD AUTO: 166 THOUSANDS/UL (ref 149–390)
PMV BLD AUTO: 10 FL (ref 8.9–12.7)
POTASSIUM SERPL-SCNC: 3.9 MMOL/L (ref 3.5–5.3)
PROT SERPL-MCNC: 5.5 G/DL (ref 6.4–8.2)
RBC # BLD AUTO: 3.51 MILLION/UL (ref 3.88–5.62)
SODIUM SERPL-SCNC: 136 MMOL/L (ref 136–145)
WBC # BLD AUTO: 7.91 THOUSAND/UL (ref 4.31–10.16)

## 2022-04-19 PROCEDURE — 83735 ASSAY OF MAGNESIUM: CPT | Performed by: FAMILY MEDICINE

## 2022-04-19 PROCEDURE — 85027 COMPLETE CBC AUTOMATED: CPT | Performed by: FAMILY MEDICINE

## 2022-04-19 PROCEDURE — 80053 COMPREHEN METABOLIC PANEL: CPT | Performed by: FAMILY MEDICINE

## 2022-04-19 PROCEDURE — 99232 SBSQ HOSP IP/OBS MODERATE 35: CPT | Performed by: FAMILY MEDICINE

## 2022-04-19 RX ADMIN — CHLORDIAZEPOXIDE HYDROCHLORIDE 50 MG: 25 CAPSULE ORAL at 00:24

## 2022-04-19 RX ADMIN — CHLORDIAZEPOXIDE HYDROCHLORIDE 50 MG: 25 CAPSULE ORAL at 11:45

## 2022-04-19 RX ADMIN — MAGNESIUM OXIDE TAB 400 MG (241.3 MG ELEMENTAL MG) 400 MG: 400 (241.3 MG) TAB at 18:25

## 2022-04-19 RX ADMIN — CHLORDIAZEPOXIDE HYDROCHLORIDE 50 MG: 25 CAPSULE ORAL at 06:04

## 2022-04-19 RX ADMIN — THIAMINE HCL TAB 100 MG 100 MG: 100 TAB at 08:39

## 2022-04-19 RX ADMIN — SODIUM CHLORIDE AND POTASSIUM CHLORIDE 125 ML/HR: .9; .15 SOLUTION INTRAVENOUS at 08:48

## 2022-04-19 RX ADMIN — MORPHINE SULFATE 2 MG: 2 INJECTION, SOLUTION INTRAMUSCULAR; INTRAVENOUS at 16:21

## 2022-04-19 RX ADMIN — Medication 1 TABLET: at 08:38

## 2022-04-19 RX ADMIN — LIDOCAINE 5% 1 PATCH: 700 PATCH TOPICAL at 08:41

## 2022-04-19 RX ADMIN — MAGNESIUM OXIDE TAB 400 MG (241.3 MG ELEMENTAL MG) 400 MG: 400 (241.3 MG) TAB at 08:37

## 2022-04-19 RX ADMIN — SODIUM CHLORIDE AND POTASSIUM CHLORIDE 125 ML/HR: .9; .15 SOLUTION INTRAVENOUS at 21:00

## 2022-04-19 RX ADMIN — FOLIC ACID 1 MG: 1 TABLET ORAL at 08:38

## 2022-04-19 RX ADMIN — SODIUM CHLORIDE AND POTASSIUM CHLORIDE 125 ML/HR: .9; .15 SOLUTION INTRAVENOUS at 00:45

## 2022-04-19 RX ADMIN — ENOXAPARIN SODIUM 40 MG: 40 INJECTION SUBCUTANEOUS at 08:44

## 2022-04-19 RX ADMIN — CHLORDIAZEPOXIDE HYDROCHLORIDE 50 MG: 25 CAPSULE ORAL at 18:25

## 2022-04-19 NOTE — PLAN OF CARE
Problem: Potential for Falls  Goal: Patient will remain free of falls  Description: INTERVENTIONS:  - Educate patient/family on patient safety including physical limitations  - Instruct patient to call for assistance with activity   - Consult OT/PT to assist with strengthening/mobility   - Keep Call bell within reach  - Keep bed low and locked with side rails adjusted as appropriate  - Keep care items and personal belongings within reach  - Initiate and maintain comfort rounds  - Make Fall Risk Sign visible to staff  - Offer Toileting every 2 Hours, in advance of need  - Initiate/Maintain bed alarm  - Obtain necessary fall risk management equipment: yes  - Apply yellow socks and bracelet for high fall risk patients  - Consider moving patient to room near nurses station  Outcome: Progressing     Problem: MOBILITY - ADULT  Goal: Maintain or return to baseline ADL function  Description: INTERVENTIONS:  -  Assess patient's ability to carry out ADLs; assess patient's baseline for ADL function and identify physical deficits which impact ability to perform ADLs (bathing, care of mouth/teeth, toileting, grooming, dressing, etc )  - Assess/evaluate cause of self-care deficits   - Assess range of motion  - Assess patient's mobility; develop plan if impaired  - Assess patient's need for assistive devices and provide as appropriate  - Encourage maximum independence but intervene and supervise when necessary  - Involve family in performance of ADLs  - Assess for home care needs following discharge   - Consider OT consult to assist with ADL evaluation and planning for discharge  - Provide patient education as appropriate  Outcome: Progressing  Goal: Maintains/Returns to pre admission functional level  Description: INTERVENTIONS:  - Perform BMAT or MOVE assessment daily    - Set and communicate daily mobility goal to care team and patient/family/caregiver     - Collaborate with rehabilitation services on mobility goals if consulted  - Perform Range of Motion 3 times a day  - Reposition patient every 2 hours    - Dangle patient 3 times a day  - Stand patient 3 times a day  - Ambulate patient 3 times a day  - Out of bed to chair 3 times a day   - Out of bed for meals 3 times a day  - Out of bed for toileting  - Record patient progress and toleration of activity level   Outcome: Progressing     Problem: CARDIOVASCULAR - ADULT  Goal: Absence of cardiac dysrhythmias or at baseline rhythm  Description: INTERVENTIONS:  - Continuous cardiac monitoring, vital signs, obtain 12 lead EKG if ordered  - Administer antiarrhythmic and heart rate control medications as ordered  - Monitor electrolytes and administer replacement therapy as ordered  Outcome: Progressing     Problem: METABOLIC, FLUID AND ELECTROLYTES - ADULT  Goal: Electrolytes maintained within normal limits  Description: INTERVENTIONS:  - Monitor labs and assess patient for signs and symptoms of electrolyte imbalances  - Administer electrolyte replacement as ordered  - Monitor response to electrolyte replacements, including repeat lab results as appropriate  - Instruct patient on fluid and nutrition as appropriate  Outcome: Progressing     Problem: DISCHARGE PLANNING  Goal: Discharge to home or other facility with appropriate resources  Description: INTERVENTIONS:  - Identify barriers to discharge w/patient and caregiver  - Arrange for needed discharge resources and transportation as appropriate  - Identify discharge learning needs (meds, wound care, etc )  - Refer to Case Management Department for coordinating discharge planning if the patient needs post-hospital services based on physician/advanced practitioner order or complex needs related to functional status, cognitive ability, or social support system  Outcome: Progressing     Problem: Knowledge Deficit  Goal: Patient/family/caregiver demonstrates understanding of disease process, treatment plan, medications, and discharge instructions  Description: Complete learning assessment and assess knowledge base    Interventions:  - Provide teaching at level of understanding  - Provide teaching via preferred learning methods  Outcome: Progressing     Problem: Prexisting or High Potential for Compromised Skin Integrity  Goal: Skin integrity is maintained or improved  Description: INTERVENTIONS:  - Identify patients at risk for skin breakdown  - Assess and monitor skin integrity  - Assess and monitor nutrition and hydration status  - Monitor labs   - Assess for incontinence   - Turn and reposition patient  - Assist with mobility/ambulation  - Relieve pressure over bony prominences  - Avoid friction and shearing  - Provide appropriate hygiene as needed including keeping skin clean and dry  - Evaluate need for skin moisturizer/barrier cream  - Collaborate with interdisciplinary team   - Patient/family teaching  - Consider wound care consult   Outcome: Progressing

## 2022-04-19 NOTE — PROGRESS NOTES
Leticia [address]  Progress Note - Pj [address] 1965, 60 y o  male MRN: [id number]  Unit/Bed#: 2 [place] [address] Encounter: [id number]  Primary Care Provider: Cris Lopez MD   Date and time admitted to hospital: 4/15/2022 11:39 PM    Alcohol withdrawal syndrome without complication [facility])  Assessment & Plan  Alcohol withdrawal symptoms appear to be improved today compared to yesterday  Continue with oral Librium 50 mg every 6 hours in addition to Ativan p r n  per CIWA protocol  Patient's family would like to patient to go to alcohol rehab upon discharge  Discussed with case management/social work  Alcohol intoxication [facility])  Assessment & Plan  Patient is a daily drinker  Continue with CIWA protocol with the addition of standing librium given withdrawal symptoms  Continue with folic acid, thiamine and MVI  * Pancreatitis  Assessment & Plan  Patient presented to ED with generalized weakness after drinking a bottle of gin  Lipase 2224 on admission  CT consistent with pancreatitis  Pancreatitis 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 abuse  Lipase improved and adominal pain resolved  Continue with clear liquid diet for now  Continue with treatment for alcohol withdrawal as noted above  Hypomagnesemia  Assessment & Plan  Repleted  Will continue to monitor  Hypokalemia  Assessment & Plan  Improved  Potassium is noted to be 3 9 this morning from 3 0 yesterday  Sodium is improved to 136 today from 131 yesterday and chloride is improved to 103 from 94 yesterday  Will continue to monitor and make further adjustments as needed  VTE Pharmacologic Prophylaxis: VTE Score: 2 Lovenox prophylaxis  Current Length of Stay: 3 day(s)  Current Patient Status: Inpatient     Code Status: Level 1 - Full Code    Subjective:   Patient seen and examined at bedside  Patient still with alcohol withdrawal symptoms however they appear improved compared to yesterday  Patient denies any complaints   No chest pain, shortness of breath or abdominal pain  Objective:     Vitals:   Temp (24hrs), Av 8 °F (37 1 °C), Min:98 °F (36 7 °C), Max:99 8 °F (37 7 °C)    Temp:  [98 °F (36 7 °C)-99 8 °F (37 7 °C)] 98 2 °F (36 8 °C)  HR:  [] 114  Resp:  [16-20] 16  BP: (102-123)/(57-80) 123/79  SpO2:  [94 %-99 %] 96 %  Body mass index is 22 62 kg/m²  Input and Output Summary (last 24 hours): Intake/Output Summary (Last 24 hours) at 2022 1334  Last data filed at 2022 0848  Gross per 24 hour   Intake 1531 25 ml   Output 200 ml   Net 1331 25 ml     Physical Exam:   Physical Exam  HENT:      Head: Normocephalic  Mouth/Throat:      Mouth: Mucous membranes are moist    Eyes:      Extraocular Movements: Extraocular movements intact  Cardiovascular:      Rate and Rhythm: Normal rate  Pulmonary:      Effort: Pulmonary effort is normal  No respiratory distress  Abdominal:      Palpations: Abdomen is soft  Tenderness: There is no abdominal tenderness  Skin:     General: Skin is warm  Neurological:      Mental Status: He is alert  Comments: Confused but improved compared to yesterday  Psychiatric:         Mood and Affect: Mood normal          Behavior: Behavior normal         Additional Data:     Labs:  Results from last 7 days   Lab Units 22  0623 22  0456 22  0610   WBC Thousand/uL 7 91   < > 5 24   HEMOGLOBIN g/dL 10 8*   < > 12 6   HEMATOCRIT % 33 4*   < > 36 8   PLATELETS Thousands/uL 166   < > 235   NEUTROS PCT %  --   --  65   LYMPHS PCT %  --   --  19   MONOS PCT %  --   --  12   EOS PCT %  --   --  2    < > = values in this interval not displayed       Results from last 7 days   Lab Units 22  0623   SODIUM mmol/L 136   POTASSIUM mmol/L 3 9   CHLORIDE mmol/L 103   CO2 mmol/L 21   BUN mg/dL 5   CREATININE mg/dL 0 44*   ANION GAP mmol/L 12   CALCIUM mg/dL 7 2*   ALBUMIN g/dL 2 6*   TOTAL BILIRUBIN mg/dL 0 75   ALK PHOS U/L 118*   ALT U/L 16   AST U/L 50*   GLUCOSE RANDOM mg/dL 86 Lines/Drains:  Invasive Devices  Report    Peripheral Intravenous Line            Peripheral IV 04/17/22 Left;Ventral (anterior) Forearm 2 days              Telemetry:  Telemetry Orders (From admission, onward)             24 Hour Telemetry Monitoring  Continuous x 24 Hours (Telem)        References:    Telemetry Guidelines   Question:  Reason for 24 Hour Telemetry  Answer:  Metabolic/Electrolyte Disturbance with High Probability of Dysrhythmia (K level <3 or >6, or KCL infusion >=10mEq/hr)                          Imaging: Reviewed  Recent Cultures (last 7 days):         Last 24 Hours Medication List:   Current Facility-Administered Medications   Medication Dose Route Frequency Provider Last Rate    chlordiazePOXIDE  50 mg Oral Q6H Jonathan Sen MD      enoxaparin  40 mg Subcutaneous Q24H [address] Daniel Pérez MD      folic acid  1 mg Oral Daily CHEN Tovar      lidocaine  1 patch Topical Daily Daniel Pérez MD      magnesium oxide  400 mg Oral BID Daniel Pérez MD      morphine injection  2 mg Intravenous Q4H PRN Daniel Pérez MD      multivitamin-minerals  1 tablet Oral Daily CHEN Tovar      ondansetron  4 mg Intravenous Q6H PRN CHEN Tovar      sodium chloride 0 9 % with KCl 20 mEq/L  125 mL/hr Intravenous Continuous Daniel Pérez  mL/hr (04/19/22 0848)    thiamine  100 mg Oral Daily CHEN Tovar          Today, Patient Was Seen By: Daniel Pérez MD    **Please Note: This note may have been constructed using a voice recognition system  **

## 2022-04-20 LAB
ANION GAP SERPL CALCULATED.3IONS-SCNC: 11 MMOL/L (ref 4–13)
ATRIAL RATE: 103 BPM
BUN SERPL-MCNC: 6 MG/DL (ref 5–25)
CALCIUM SERPL-MCNC: 7.4 MG/DL (ref 8.3–10.1)
CHLORIDE SERPL-SCNC: 104 MMOL/L (ref 100–108)
CO2 SERPL-SCNC: 20 MMOL/L (ref 21–32)
CREAT SERPL-MCNC: 0.48 MG/DL (ref 0.6–1.3)
ERYTHROCYTE [DISTWIDTH] IN BLOOD BY AUTOMATED COUNT: 13.2 % (ref 11.6–15.1)
GFR SERPL CREATININE-BSD FRML MDRD: 121 ML/MIN/1.73SQ M
GLUCOSE SERPL-MCNC: 101 MG/DL (ref 65–140)
HCT VFR BLD AUTO: 31.7 % (ref 36.5–49.3)
HGB BLD-MCNC: 10.3 G/DL (ref 12–17)
MAGNESIUM SERPL-MCNC: 1.5 MG/DL (ref 1.6–2.6)
MCH RBC QN AUTO: 31.1 PG (ref 26.8–34.3)
MCHC RBC AUTO-ENTMCNC: 32.5 G/DL (ref 31.4–37.4)
MCV RBC AUTO: 96 FL (ref 82–98)
P AXIS: 28 DEGREES
PLATELET # BLD AUTO: 182 THOUSANDS/UL (ref 149–390)
PMV BLD AUTO: 10 FL (ref 8.9–12.7)
POTASSIUM SERPL-SCNC: 3.4 MMOL/L (ref 3.5–5.3)
PR INTERVAL: 200 MS
QRS AXIS: -36 DEGREES
QRSD INTERVAL: 100 MS
QT INTERVAL: 380 MS
QTC INTERVAL: 497 MS
RBC # BLD AUTO: 3.31 MILLION/UL (ref 3.88–5.62)
SODIUM SERPL-SCNC: 135 MMOL/L (ref 136–145)
T WAVE AXIS: 27 DEGREES
VENTRICULAR RATE: 103 BPM
WBC # BLD AUTO: 6.29 THOUSAND/UL (ref 4.31–10.16)

## 2022-04-20 PROCEDURE — 83735 ASSAY OF MAGNESIUM: CPT | Performed by: FAMILY MEDICINE

## 2022-04-20 PROCEDURE — 99232 SBSQ HOSP IP/OBS MODERATE 35: CPT | Performed by: FAMILY MEDICINE

## 2022-04-20 PROCEDURE — 80048 BASIC METABOLIC PNL TOTAL CA: CPT | Performed by: FAMILY MEDICINE

## 2022-04-20 PROCEDURE — 85027 COMPLETE CBC AUTOMATED: CPT | Performed by: FAMILY MEDICINE

## 2022-04-20 PROCEDURE — 93010 ELECTROCARDIOGRAM REPORT: CPT | Performed by: INTERNAL MEDICINE

## 2022-04-20 RX ORDER — POTASSIUM CHLORIDE 20 MEQ/1
40 TABLET, EXTENDED RELEASE ORAL ONCE
Status: COMPLETED | OUTPATIENT
Start: 2022-04-20 | End: 2022-04-20

## 2022-04-20 RX ADMIN — SODIUM CHLORIDE AND POTASSIUM CHLORIDE 125 ML/HR: .9; .15 SOLUTION INTRAVENOUS at 12:27

## 2022-04-20 RX ADMIN — MAGNESIUM OXIDE TAB 400 MG (241.3 MG ELEMENTAL MG) 400 MG: 400 (241.3 MG) TAB at 17:58

## 2022-04-20 RX ADMIN — SODIUM CHLORIDE AND POTASSIUM CHLORIDE 125 ML/HR: .9; .15 SOLUTION INTRAVENOUS at 04:27

## 2022-04-20 RX ADMIN — CHLORDIAZEPOXIDE HYDROCHLORIDE 50 MG: 25 CAPSULE ORAL at 23:08

## 2022-04-20 RX ADMIN — CHLORDIAZEPOXIDE HYDROCHLORIDE 50 MG: 25 CAPSULE ORAL at 12:16

## 2022-04-20 RX ADMIN — CHLORDIAZEPOXIDE HYDROCHLORIDE 50 MG: 25 CAPSULE ORAL at 17:58

## 2022-04-20 RX ADMIN — FOLIC ACID 1 MG: 1 TABLET ORAL at 09:25

## 2022-04-20 RX ADMIN — THIAMINE HCL TAB 100 MG 100 MG: 100 TAB at 09:25

## 2022-04-20 RX ADMIN — POTASSIUM CHLORIDE 40 MEQ: 1500 TABLET, EXTENDED RELEASE ORAL at 09:25

## 2022-04-20 RX ADMIN — CHLORDIAZEPOXIDE HYDROCHLORIDE 50 MG: 25 CAPSULE ORAL at 06:41

## 2022-04-20 RX ADMIN — Medication 1 TABLET: at 09:25

## 2022-04-20 RX ADMIN — MAGNESIUM OXIDE TAB 400 MG (241.3 MG ELEMENTAL MG) 400 MG: 400 (241.3 MG) TAB at 09:25

## 2022-04-20 RX ADMIN — ENOXAPARIN SODIUM 40 MG: 40 INJECTION SUBCUTANEOUS at 09:25

## 2022-04-20 NOTE — PROGRESS NOTES
Sarath 43  Progress Note - Pj [address] 1965, 60 y o  male MRN: [id number]  Unit/Bed#: [address] [place] [address] Encounter: [id number]  Primary Care Provider: Arlene Rudolph MD   Date and time admitted to hospital: 4/15/2022 11:39 PM    Alcohol withdrawal syndrome without complication [facility])  Assessment & Plan  Alcohol withdrawal symptoms continue to gradually improve  Continue with oral Librium 50 mg every 6 hours  Patient has not been requiring Ativan per CIWA protocol  Mentation improving  Patient's family would like to patient to go to alcohol rehab upon discharge  Discussed with case management/social work  Alcohol intoxication [facility])  Assessment & Plan  Patient is a daily drinker  Continue with CIWA protocol with the addition of standing librium given withdrawal symptoms  Continue with folic acid, thiamine and MVI  * Pancreatitis  Assessment & Plan  Patient presented to ED with generalized weakness after drinking a bottle of gin  Lipase 2224 on admission  CT consistent with pancreatitis  Pancreatitis 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 abuse  Lipase improved and adominal pain resolved  Will advance to GI low fat diet  Continue with treatment for alcohol withdrawal as noted above  Hypomagnesemia  Assessment & Plan  Repleted  Will continue to monitor  Hypokalemia  Assessment & Plan  Potassium 3 4  Will replete  VTE Pharmacologic Prophylaxis: VTE Score: 2 Lovenox prophylaxis  Current Length of Stay: 4 day(s)  Current Patient Status: Inpatient     Code Status: Level 1 - Full Code    Subjective:   Patient seen and examined at bedside  No acute events overnight  Not requiring Ativan  Mentation gradually improving  No abdominal pain      Objective:     Vitals:   Temp (24hrs), Av 4 °F (36 9 °C), Min:97 7 °F (36 5 °C), Max:98 7 °F (37 1 °C)    Temp:  [97 7 °F (36 5 °C)-98 7 °F (37 1 °C)] 98 7 °F (37 1 °C)  HR:  [105-130] 105  Resp:  [18-20] 18  BP: ()/(61-81) 100/68  SpO2:  [91 %-97 %] 96 %  Body mass index is 22 62 kg/m²  Input and Output Summary (last 24 hours): Intake/Output Summary (Last 24 hours) at 4/20/2022 1022  Last data filed at 4/20/2022 0601  Gross per 24 hour   Intake 2652 08 ml   Output 300 ml   Net 2352 08 ml     Physical Exam:   Physical Exam  HENT:      Head: Normocephalic  Mouth/Throat:      Mouth: Mucous membranes are moist    Eyes:      Extraocular Movements: Extraocular movements intact  Cardiovascular:      Rate and Rhythm: Normal rate  Pulmonary:      Effort: Pulmonary effort is normal  No respiratory distress  Abdominal:      Palpations: Abdomen is soft  Tenderness: There is no abdominal tenderness  Skin:     General: Skin is warm  Neurological:      Mental Status: He is alert  Comments: Mentation improving  Oriented to person and place  Psychiatric:         Mood and Affect: Mood normal          Behavior: Behavior normal         Additional Data:     Labs:  Results from last 7 days   Lab Units 04/20/22  0557 04/17/22  0456 04/16/22  0610   WBC Thousand/uL 6 29   < > 5 24   HEMOGLOBIN g/dL 10 3*   < > 12 6   HEMATOCRIT % 31 7*   < > 36 8   PLATELETS Thousands/uL 182   < > 235   NEUTROS PCT %  --   --  65   LYMPHS PCT %  --   --  19   MONOS PCT %  --   --  12   EOS PCT %  --   --  2    < > = values in this interval not displayed  Results from last 7 days   Lab Units 04/20/22  0557 04/19/22  0623 04/19/22  0623   SODIUM mmol/L 135*   < > 136   POTASSIUM mmol/L 3 4*   < > 3 9   CHLORIDE mmol/L 104   < > 103   CO2 mmol/L 20*   < > 21   BUN mg/dL 6   < > 5   CREATININE mg/dL 0 48*   < > 0 44*   ANION GAP mmol/L 11   < > 12   CALCIUM mg/dL 7 4*   < > 7 2*   ALBUMIN g/dL  --   --  2 6*   TOTAL BILIRUBIN mg/dL  --   --  0 75   ALK PHOS U/L  --   --  118*   ALT U/L  --   --  16   AST U/L  --   --  50*   GLUCOSE RANDOM mg/dL 101   < > 86    < > = values in this interval not displayed       Lines/Drains:  Invasive Devices  Report    Peripheral Intravenous Line            Peripheral IV 04/17/22 Left;Ventral (anterior) Forearm 2 days    Peripheral IV 04/19/22 Dorsal (posterior); Proximal;Right Forearm <1 day              Telemetry:  Telemetry Orders (From admission, onward)             24 Hour Telemetry Monitoring  Continuous x 24 Hours (Telem)        References:    Telemetry Guidelines   Question:  Reason for 24 Hour Telemetry  Answer:  Metabolic/Electrolyte Disturbance with High Probability of Dysrhythmia (K level <3 or >6, or KCL infusion >=10mEq/hr)                          Imaging: Reviewed  Recent Cultures (last 7 days):         Last 24 Hours Medication List:   Current Facility-Administered Medications   Medication Dose Route Frequency Provider Last Rate    chlordiazePOXIDE  50 mg Oral Q6H Sadaf Thorpe MD      enoxaparin  40 mg Subcutaneous Q24H [address] Surjit Gonzalez MD      folic acid  1 mg Oral Daily Margretta Severe, CRNP      lidocaine  1 patch Topical Daily Surjit Gonzalez MD      magnesium oxide  400 mg Oral BID Surjit Gonzalez MD      morphine injection  2 mg Intravenous Q4H PRN Surjit Gonzalez MD      multivitamin-minerals  1 tablet Oral Daily Margretta Severe, CRNP      ondansetron  4 mg Intravenous Q6H PRN Margretta Severe, CRNP      sodium chloride 0 9 % with KCl 20 mEq/L  125 mL/hr Intravenous Continuous Surjit Gonzalez  mL/hr (04/20/22 0427)    thiamine  100 mg Oral Daily Margretta Severe, CRNP          Today, Patient Was Seen By: Surjit Gonzalez MD    **Please Note: This note may have been constructed using a voice recognition system  **

## 2022-04-20 NOTE — PLAN OF CARE
Problem: Potential for Falls  Goal: Patient will remain free of falls  Description: INTERVENTIONS:  - Educate patient/family on patient safety including physical limitations  - Instruct patient to call for assistance with activity   - Consult OT/PT to assist with strengthening/mobility   - Keep Call bell within reach  - Keep bed low and locked with side rails adjusted as appropriate  - Keep care items and personal belongings within reach  - Initiate and maintain comfort rounds  - Make Fall Risk Sign visible to staff  - Offer Toileting every 2 Hours, in advance of need  - Initiate/Maintain bed alarm  - Obtain necessary fall risk management equipment: yes  - Apply yellow socks and bracelet for high fall risk patients  - Consider moving patient to room near nurses station  Outcome: Progressing     Problem: METABOLIC, FLUID AND ELECTROLYTES - ADULT  Goal: Electrolytes maintained within normal limits  Description: INTERVENTIONS:  - Monitor labs and assess patient for signs and symptoms of electrolyte imbalances  - Administer electrolyte replacement as ordered  - Monitor response to electrolyte replacements, including repeat lab results as appropriate  - Instruct patient on fluid and nutrition as appropriate  Outcome: Progressing     Problem: Knowledge Deficit  Goal: Patient/family/caregiver demonstrates understanding of disease process, treatment plan, medications, and discharge instructions  Description: Complete learning assessment and assess knowledge base    Interventions:  - Provide teaching at level of understanding  - Provide teaching via preferred learning methods  Outcome: Progressing

## 2022-04-20 NOTE — POST FALL NOTE
Post Fall Note    Date of Fall: 04/20/22  Observer/Reported time of Fall: 805 Pamela Joshi  Name of Provider Notified: Dax Yi  Time Provider Notified: 1506  Assessment of Patient Injury: No injury noted  Post Fall Interventions: Physician notified; Circumstances of fall reviewed and documented; Fall risk precautions implemented/maitained; Comforts rounds continued  Family/Next of kin notified?: No (Attempted to call both contacts twice- contact not made)      Brief Description of Events  Patient found by nursing assistant sitting on floor with feces on him  Patient helped back into bed by nursing assistants and cleaned up  Vital signs taken and were stable  Dr Umair Muñoz made aware and assessed patient  Patient denied hitting head and upon assessment had no injuries noted  Last Recorded Vitals  Blood pressure 121/80, pulse (!) 123, temperature (!) 96 7 °F (35 9 °C), resp  rate 18, height 5' 4" (1 626 m), weight 59 8 kg (131 lb 12 8 oz), SpO2 94 %        Principal Problem:    Pancreatitis  Active Problems:    Hypokalemia    Alcohol withdrawal syndrome without complication (HCC)    Hypomagnesemia    Alcohol intoxication ([address] )

## 2022-04-21 PROBLEM — R00.0 TACHYCARDIA: Status: ACTIVE | Noted: 2022-04-21

## 2022-04-21 LAB
ANION GAP SERPL CALCULATED.3IONS-SCNC: 10 MMOL/L (ref 4–13)
BUN SERPL-MCNC: 8 MG/DL (ref 5–25)
CALCIUM SERPL-MCNC: 7.7 MG/DL (ref 8.3–10.1)
CHLORIDE SERPL-SCNC: 106 MMOL/L (ref 100–108)
CO2 SERPL-SCNC: 21 MMOL/L (ref 21–32)
CREAT SERPL-MCNC: 0.57 MG/DL (ref 0.6–1.3)
ERYTHROCYTE [DISTWIDTH] IN BLOOD BY AUTOMATED COUNT: 13.6 % (ref 11.6–15.1)
GFR SERPL CREATININE-BSD FRML MDRD: 113 ML/MIN/1.73SQ M
GLUCOSE SERPL-MCNC: 109 MG/DL (ref 65–140)
HCT VFR BLD AUTO: 32 % (ref 36.5–49.3)
HGB BLD-MCNC: 10.5 G/DL (ref 12–17)
MAGNESIUM SERPL-MCNC: 1.5 MG/DL (ref 1.6–2.6)
MCH RBC QN AUTO: 32.3 PG (ref 26.8–34.3)
MCHC RBC AUTO-ENTMCNC: 32.8 G/DL (ref 31.4–37.4)
MCV RBC AUTO: 99 FL (ref 82–98)
PLATELET # BLD AUTO: 204 THOUSANDS/UL (ref 149–390)
PMV BLD AUTO: 10.3 FL (ref 8.9–12.7)
POTASSIUM SERPL-SCNC: 3.8 MMOL/L (ref 3.5–5.3)
RBC # BLD AUTO: 3.25 MILLION/UL (ref 3.88–5.62)
SODIUM SERPL-SCNC: 137 MMOL/L (ref 136–145)
WBC # BLD AUTO: 6.06 THOUSAND/UL (ref 4.31–10.16)

## 2022-04-21 PROCEDURE — 80048 BASIC METABOLIC PNL TOTAL CA: CPT | Performed by: FAMILY MEDICINE

## 2022-04-21 PROCEDURE — 97167 OT EVAL HIGH COMPLEX 60 MIN: CPT

## 2022-04-21 PROCEDURE — 97163 PT EVAL HIGH COMPLEX 45 MIN: CPT

## 2022-04-21 PROCEDURE — 83735 ASSAY OF MAGNESIUM: CPT | Performed by: FAMILY MEDICINE

## 2022-04-21 PROCEDURE — 99232 SBSQ HOSP IP/OBS MODERATE 35: CPT | Performed by: FAMILY MEDICINE

## 2022-04-21 PROCEDURE — 85027 COMPLETE CBC AUTOMATED: CPT | Performed by: FAMILY MEDICINE

## 2022-04-21 RX ORDER — CHLORDIAZEPOXIDE HYDROCHLORIDE 25 MG/1
50 CAPSULE, GELATIN COATED ORAL EVERY 12 HOURS
Status: DISCONTINUED | OUTPATIENT
Start: 2022-04-22 | End: 2022-04-22 | Stop reason: HOSPADM

## 2022-04-21 RX ORDER — CHLORDIAZEPOXIDE HYDROCHLORIDE 25 MG/1
50 CAPSULE, GELATIN COATED ORAL EVERY 8 HOURS SCHEDULED
Status: COMPLETED | OUTPATIENT
Start: 2022-04-21 | End: 2022-04-22

## 2022-04-21 RX ORDER — KETOTIFEN FUMARATE 0.35 MG/ML
1 SOLUTION/ DROPS OPHTHALMIC 2 TIMES DAILY
Status: DISCONTINUED | OUTPATIENT
Start: 2022-04-21 | End: 2022-04-22 | Stop reason: HOSPADM

## 2022-04-21 RX ORDER — CHLORDIAZEPOXIDE HYDROCHLORIDE 25 MG/1
25 CAPSULE, GELATIN COATED ORAL EVERY MORNING
Status: DISCONTINUED | OUTPATIENT
Start: 2022-04-24 | End: 2022-04-22 | Stop reason: HOSPADM

## 2022-04-21 RX ORDER — ATENOLOL 25 MG/1
12.5 TABLET ORAL DAILY
Status: DISCONTINUED | OUTPATIENT
Start: 2022-04-21 | End: 2022-04-22 | Stop reason: HOSPADM

## 2022-04-21 RX ORDER — CHLORDIAZEPOXIDE HYDROCHLORIDE 25 MG/1
50 CAPSULE, GELATIN COATED ORAL EVERY MORNING
Status: DISCONTINUED | OUTPATIENT
Start: 2022-04-23 | End: 2022-04-22 | Stop reason: HOSPADM

## 2022-04-21 RX ADMIN — MAGNESIUM OXIDE TAB 400 MG (241.3 MG ELEMENTAL MG) 400 MG: 400 (241.3 MG) TAB at 08:05

## 2022-04-21 RX ADMIN — FOLIC ACID 1 MG: 1 TABLET ORAL at 08:05

## 2022-04-21 RX ADMIN — SODIUM CHLORIDE AND POTASSIUM CHLORIDE 125 ML/HR: .9; .15 SOLUTION INTRAVENOUS at 01:08

## 2022-04-21 RX ADMIN — CHLORDIAZEPOXIDE HYDROCHLORIDE 50 MG: 25 CAPSULE ORAL at 21:56

## 2022-04-21 RX ADMIN — Medication 1 TABLET: at 08:05

## 2022-04-21 RX ADMIN — SODIUM CHLORIDE AND POTASSIUM CHLORIDE 125 ML/HR: .9; .15 SOLUTION INTRAVENOUS at 09:00

## 2022-04-21 RX ADMIN — CHLORDIAZEPOXIDE HYDROCHLORIDE 50 MG: 25 CAPSULE ORAL at 13:54

## 2022-04-21 RX ADMIN — KETOTIFEN FUMARATE 1 DROP: 0.35 SOLUTION/ DROPS OPHTHALMIC at 17:44

## 2022-04-21 RX ADMIN — CHLORDIAZEPOXIDE HYDROCHLORIDE 50 MG: 25 CAPSULE ORAL at 05:38

## 2022-04-21 RX ADMIN — SODIUM CHLORIDE AND POTASSIUM CHLORIDE 125 ML/HR: .9; .15 SOLUTION INTRAVENOUS at 17:00

## 2022-04-21 RX ADMIN — LIDOCAINE 5% 1 PATCH: 700 PATCH TOPICAL at 08:05

## 2022-04-21 RX ADMIN — MAGNESIUM OXIDE TAB 400 MG (241.3 MG ELEMENTAL MG) 400 MG: 400 (241.3 MG) TAB at 17:00

## 2022-04-21 RX ADMIN — THIAMINE HCL TAB 100 MG 100 MG: 100 TAB at 08:05

## 2022-04-21 RX ADMIN — ATENOLOL 12.5 MG: 25 TABLET ORAL at 11:27

## 2022-04-21 RX ADMIN — ENOXAPARIN SODIUM 40 MG: 40 INJECTION SUBCUTANEOUS at 08:05

## 2022-04-21 NOTE — PHYSICAL THERAPY NOTE
PT EVALUATION       04/21/22 1100   PT Last Visit   PT Visit Date 04/21/22   Note Type   Note type Evaluation   Pain Assessment   Pain Assessment Tool 0-10   Pain Score No Pain   Restrictions/Precautions   Weight Bearing Precautions Per Order No   Other Precautions Chair Alarm; Bed Alarm;Cognitive; Fall Risk   Home Living   Type of Home House  (4 WANDER)   Home Layout Multi-level  (bathrooms on 1st and 2nd floor, his bedroom on 3rd floor)   Prior Function   Level of [place] Independent with ADLs and functional mobility   Lives With Family  (sister)   Receives Help From Family   ADL Assistance Independent   IADLs Independent   General   Additional Pertinent History admitted with alcohol intoxication, pancreatitis   Family/Caregiver Present No   Cognition   Overall Cognitive Status Impaired   Arousal/Participation Cooperative   Attention Attends with cues to redirect   Orientation Level Oriented to person;Oriented to situation   Following Commands Follows one step commands with increased time or repetition   Subjective   Subjective questionable historian   RLE Assessment   RLE Assessment WFL  (strength: at least 3+/5)   LLE Assessment   LLE Assessment WFL  (strength: at least 3+/5)   Bed Mobility   Supine to Sit 3  Moderate assistance   Additional items Verbal cues;LE management   Sit to Supine 3  Moderate assistance   Additional items Verbal cues;LE management   Transfers   Sit to Stand 3  Moderate assistance   Additional items Assist x 2; Increased time required;Verbal cues   Stand to Sit 3  Moderate assistance   Additional items Assist x 2;Verbal cues   Additional Comments Did not get to chair due to mentation and safety awareness at this time     Ambulation/Elevation   Gait pattern   (unsteady)   Gait Assistance 3  Moderate assist   Additional items Assist x 2;Verbal cues   Assistive Device None  (bilateral UE support)   Distance several steps along bed    Ambulation/Elevation Additional Comments steps taken only at bedside for safety  Pt is very unsteady and not safe to walk away from the bed at this time  Balance   Static Sitting Fair   Dynamic Sitting Fair -   Static Standing Poor   Dynamic Standing Poor   Ambulatory Poor -   Activity Tolerance   Activity Tolerance Treatment limited secondary to medical complications (Comment); Patient limited by fatigue  (due to mentation and very unsteady gait)   Nurse Made Aware yes: pt  back in bed with alarm activated   Assessment   Prognosis Good   Problem List Decreased strength;Decreased endurance; Impaired balance;Decreased mobility; Decreased coordination;Decreased cognition; Impaired judgement;Decreased safety awareness   Assessment Patient seen for Physical Therapy evaluation  Patient admitted with Pancreatitis  Comorbidities affecting patient's physical performance include: alcohol abuse, alcoholic hepatitis, cardiomyopathy   Personal factors affecting patient at time of initial evaluation include: lives in multi story house, ambulating with assistive device, stairs to enter home, inability to ambulate household distances, inability to navigate community distances, inability to navigate level surfaces without external assistance, decreased cognition, decreased initiation and engagement, inability to perform physical activity, limited insight into impairments, inability to perform ADLS and inability to perform IADLS    Prior to admission, patient was independent with functional mobility with probable use of AD, independent with ADLS, requiring assist for IADLS, living in a multi-level home, living with sister in a multi level home with 1 flight of  steps to enter, ambulating household distance and home with family assist   Please find objective findings from Physical Therapy assessment regarding body systems outlined above with impairments and limitations including weakness, impaired balance, decreased endurance, impaired coordination, gait deviations, decreased activity tolerance, decreased functional mobility tolerance, decreased safety awareness, impaired judgement, fall risk and decreased cognition  The Barthel Index was used as a functional outcome tool presenting with a score of Barthel Index Score: 25 today indicating marked limitations of functional mobility and ADLS  Patient's clinical presentation is currently unstable/unpredictable as seen in patient's presentation of vital sign response, varying levels of cognitive performance, increased fall risk, new onset of impairment of functional mobility, decreased endurance and new onset of weakness  Pt would benefit from continued Physical Therapy treatment to address deficits as defined above and maximize level of functional mobility  As demonstrated by objective findings, the assigned level of complexity for this evaluation is high  The patient's AM-[facility] Basic Mobility Inpatient Short Form Raw Score is 12  A Raw score of less than or equal to 16 suggests the patient may benefit from discharge to post-acute rehabilitation services  Please also refer to the recommendation of the Physical Therapist for safe discharge planning  Goals   Patient Goals does not state due to mentation ; cue to keep eyes open  STG Expiration Date 04/28/22   Short Term Goal #1 Min A for transfers supine <> short sit  and sit <> stand with use of RW and with fair balance   Short Term Goal #2 Min A for amb  with RW for functional household distances with fair balance   LTG Expiration Date 05/05/22   Long Term Goal #1 Supervision/Mod Ind  for all transfers supine <> short sit and sit <> stand with fair + balance or better   Long Term Goal #2 Supervision/Mod Ind  for amb  with RW  for functional household distances with good balance  Plan   Treatment/Interventions ADL retraining;Functional transfer training;LE strengthening/ROM; Therapeutic exercise; Endurance training;Cognitive reorientation;Patient/family training;Equipment eval/education; Bed mobility;Gait training;Spoke to nursing;OT;Spoke to case management   PT Frequency Other (Comment)  (5/wk)   Recommendation   PT Discharge Recommendation Post acute rehabilitation services   AM-PAC Basic Mobility Inpatient   Turning in Bed Without Bedrails 3   Lying on Back to Sitting on Edge of Flat Bed 2   Moving Bed to Chair 2   Standing Up From Chair 2   Walk in Room 2   Climb 3-5 Stairs 1   Basic Mobility Inpatient Raw Score 12   Basic Mobility Standardized Score 32 23   Highest Level Of Mobility   [facility] Goal 4: Move to chair/commode   [facility] Highest Level of Mobility 5: Stand (1 or more minutes)   [facility] Goal Achieved Yes  (no transfer to chair for safety)   Barthel Index   Feeding 5   Bathing 0   Grooming Score 0   Dressing Score 0   Bladder Score 5   Bowels Score 5   Toilet Use Score 5   Transfers (Bed/Chair) Score 5   Mobility (Level Surface) Score 0   Stairs Score 0   Barthel Index Score 25   End of Consult   Patient Position at End of Consult Supine;Bed/Chair alarm activated; All needs within reach   Licensure   [address] Number  Qiana Guzmanbooker Bravo PT  [phone]

## 2022-04-21 NOTE — CASE MANAGEMENT
Case Management Discharge Planning Note    Patient name Bina Agent  Location [address] [facility] [address] [place] [address] MRN [id number]  : 1965 Date 2022       Current Admission Date: 4/15/2022  Current Admission Diagnosis:Pancreatitis   Patient Active Problem List    Diagnosis Date Noted    Tachycardia 2022    Alcohol intoxication ([facility] ) 2022    Pancreatitis 2022    Alcohol withdrawal syndrome without complication ([facility] )     Hypomagnesemia 2018    Paroxysmal atrial fibrillation ([facility] ) 2017    Tobacco abuse 2017    Hypokalemia 2017    Cardiomyopathy, 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 06/10/2016    Alcohol abuse 06/10/2016      LOS (days): 5  Geometric Mean LOS (GMLOS) (days): 3 10  Days to GMLOS:-2 5     OBJECTIVE:  Risk of Unplanned Readmission Score: 13         Current admission status: Inpatient   Preferred Pharmacy:   PATIENT/FAMILY REPORTS NO PREFERRED PHARMACY  No address on file      [facility] [address] [facility]  [place] [address]  Phone: [phone] Fax: [phone]    Primary Care Provider: Grzegorz Bennett MD    Primary Insurance: [facility] [employer] [employer]  Secondary Insurance: [address] [place] [address]    DISCHARGE DETAILS:                                                                                                               [facility] Number: [facility]# [id number] for [facility]   clinicals attached in availity

## 2022-04-21 NOTE — PROGRESS NOTES
Leticia 89  Progress Note - Pj [address] 1965, 60 y o  male MRN: [id number]  Unit/Bed#: [address] [place] [address] Encounter: [id number]  Primary Care Provider: Alexandria Arnold MD   Date and time admitted to hospital: 4/15/2022 11:39 PM    Alcohol withdrawal syndrome without complication [facility])  Assessment & Plan  Alcohol withdrawal symptoms significantly improved  Will begin a librium taper at this time as patient has not been requiring any Ativan per CIWA protocol  Mentation significantly improved  Patient's family would like the patient to go to alcohol rehab upon discharge  Patient is noted to be weak  Will need a PT/OT evaluation as there is concern patient may need inpatient physical therapy rehab  Discussed in detail with the patient's sister  Discussed with case management/social work  Alcohol intoxication [facility])  Assessment & Plan  Patient is a daily drinker  Alcohol withdrawal symptoms improved  Will begin to taper librium  Continue with folic acid, thiamine and MVI  * Pancreatitis  Assessment & Plan  Patient presented to ED with generalized weakness after drinking a bottle of gin  Lipase 2224 on admission  CT consistent with pancreatitis  Pancreatitis 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 abuse  Lipase improved and adominal pain resolved  Continue with GI low fat diet  Continue with treatment for alcohol withdrawal as noted above  Tachycardia  Assessment & Plan  Secondary to alcohol withdrawal  Will add on low dose atenolol (12 5mg)  Hypomagnesemia  Assessment & Plan  Continue supplementation  Hypokalemia  Assessment & Plan  Resolved  Will continue to monitor  VTE Pharmacologic Prophylaxis: VTE Score: 2 Lovenox prophylaxis  Current Length of Stay: 5 day(s)  Current Patient Status: Inpatient     Code Status: Level 1 - Full Code    Subjective:   Patient seen and examined at bedside  No acute events overnight  Mentation significantly improved  No new complaints      Objective: Vitals:   Temp (24hrs), Av 9 °F (36 6 °C), Min:96 7 °F (35 9 °C), Max:98 6 °F (37 °C)    Temp:  [96 7 °F (35 9 °C)-98 6 °F (37 °C)] 97 6 °F (36 4 °C)  HR:  [109-124] 109  Resp:  [18-19] 18  BP: (109-123)/(75-88) 123/88  SpO2:  [92 %-98 %] 95 %  Body mass index is 22 62 kg/m²  Input and Output Summary (last 24 hours): Intake/Output Summary (Last 24 hours) at 2022 1016  Last data filed at 2022 0108  Gross per 24 hour   Intake --   Output 300 ml   Net -300 ml     Physical Exam:   Physical Exam  HENT:      Head: Normocephalic  Mouth/Throat:      Mouth: Mucous membranes are moist    Eyes:      Extraocular Movements: Extraocular movements intact  Cardiovascular:      Rate and Rhythm: Tachycardia present  Pulmonary:      Effort: Pulmonary effort is normal  No respiratory distress  Abdominal:      Palpations: Abdomen is soft  Tenderness: There is no abdominal tenderness  Skin:     General: Skin is warm  Neurological:      Mental Status: He is alert  Comments: Mentation significantly improved  Psychiatric:         Mood and Affect: Mood normal          Behavior: Behavior normal         Additional Data:     Labs:  Results from last 7 days   Lab Units 22  0550 22  0456 22  0610   WBC Thousand/uL 6 06   < > 5 24   HEMOGLOBIN g/dL 10 5*   < > 12 6   HEMATOCRIT % 32 0*   < > 36 8   PLATELETS Thousands/uL 204   < > 235   NEUTROS PCT %  --   --  65   LYMPHS PCT %  --   --  19   MONOS PCT %  --   --  12   EOS PCT %  --   --  2    < > = values in this interval not displayed       Results from last 7 days   Lab Units 22  0550 22  0557 22  0623   SODIUM mmol/L 137   < > 136   POTASSIUM mmol/L 3 8   < > 3 9   CHLORIDE mmol/L 106   < > 103   CO2 mmol/L 21   < > 21   BUN mg/dL 8   < > 5   CREATININE mg/dL 0 57*   < > 0 44*   ANION GAP mmol/L 10   < > 12   CALCIUM mg/dL 7 7*   < > 7 2*   ALBUMIN g/dL  --   --  2 6*   TOTAL BILIRUBIN mg/dL  --   --  0 75 ALK PHOS U/L  --   --  118*   ALT U/L  --   --  16   AST U/L  --   --  50*   GLUCOSE RANDOM mg/dL 109   < > 86    < > = values in this interval not displayed  Lines/Drains:  Invasive Devices  Report    Peripheral Intravenous Line            Peripheral IV 04/20/22 Left Forearm <1 day              Telemetry:  Telemetry Orders (From admission, onward)             24 Hour Telemetry Monitoring  Continuous x 24 Hours (Telem)        References:    Telemetry Guidelines   Question:  Reason for 24 Hour Telemetry  Answer:  Metabolic/Electrolyte Disturbance with High Probability of Dysrhythmia (K level <3 or >6, or KCL infusion >=10mEq/hr)                          Imaging: Reviewed  Recent Cultures (last 7 days):         Last 24 Hours Medication List:   Current Facility-Administered Medications   Medication Dose Route Frequency Provider Last Rate    atenolol  12 5 mg Oral Daily La Dorsey MD      chlordiazePOXIDE  50 mg Oral Q6H [address] La Dorsey MD      enoxaparin  40 mg Subcutaneous Q24H [address] La Dorsey MD      folic acid  1 mg Oral Daily CEHN Anderson      lidocaine  1 patch Topical Daily La Dorsey MD      magnesium oxide  400 mg Oral BID La Dorsey MD      morphine injection  2 mg Intravenous Q4H PRN La Dorsey MD      multivitamin-minerals  1 tablet Oral Daily CHEN Anderson      ondansetron  4 mg Intravenous Q6H PRN CHEN Anderson      sodium chloride 0 9 % with KCl 20 mEq/L  125 mL/hr Intravenous Continuous La Dorsey  mL/hr (04/21/22 0900)    thiamine  100 mg Oral Daily CHEN Anderson          Today, Patient Was Seen By: La Dorsey MD    **Please Note: This note may have been constructed using a voice recognition system  **

## 2022-04-21 NOTE — OCCUPATIONAL THERAPY NOTE
OT EVALUATION       04/21/22 1120   Note Type   Note type Evaluation   Restrictions/Precautions   Other Precautions Chair Alarm; Bed Alarm;Cognitive; Fall Risk   Pain Assessment   Pain Assessment Tool 0-10   Pain Score No Pain   Home Living   Type of Home House   Home Layout Multi-level  (4 WANDER, bathroom on 1st and 2nd floors )   Bathroom Shower/Tub Tub/shower unit   Prior Function   Level of [place] Independent with ADLs and functional mobility   Lives With Family  (sister)   Receives Help From Family   ADL Assistance Independent   IADLs Independent   Comments Patient admitted with alcohol intoxication and pancreatitis      ADL   Eating Assistance 4  Minimal Assistance   Grooming Assistance 3  Moderate Assistance   UB Bathing Assistance 3  Moderate Assistance   LB Bathing Assistance 2  Maximal Assistance   UB Dressing Assistance 3  Moderate Assistance   LB Dressing Assistance 2  Maximal Assistance   Toileting Assistance  2  Maximal Assistance   Bed Mobility   Supine to Sit 3  Moderate assistance   Sit to Supine 3  Moderate assistance   Transfers   Sit to Stand 3  Moderate assistance   Additional items Assist x 2;Verbal cues   Stand to Sit 3  Moderate assistance   Additional items Assist x 2;Verbal cues   Functional Mobility   Functional Mobility 3  Moderate assistance   Additional Comments few steps to head of bed with hand hold assist, poor balance    Balance   Static Sitting Fair   Dynamic Sitting Fair -   Static Standing Poor   Dynamic Standing Poor   Activity Tolerance   Activity Tolerance Patient limited by fatigue;Treatment limited secondary to medical complications (Comment)  (cognition, unsteady gait)   Nurse Made Aware yes   RUE Assessment   RUE Assessment WFL   LUE Assessment   LUE Assessment WFL   Cognition   Overall Cognitive Status Impaired   Arousal/Participation Cooperative   Attention Attends with cues to redirect   Orientation Level Oriented to person;Oriented to situation   Following Commands Follows one step commands with increased time or repetition   Assessment   Limitation Decreased ADL status; Decreased UE strength;Decreased Safe judgement during ADL;Decreased cognition;Decreased endurance;Decreased high-level ADLs; Decreased self-care trans  (decreased balance and mobility)   Prognosis Good   Assessment Patient evaluated by Occupational Therapy  Patient admitted with Pancreatitis  The patients occupational profile, medical and therapy history includes a extensive additional review of physical, cognitive, or psychosocial history related to current functional performance  Comorbidities affecting functional mobility and ADLS include: atrial flutter, alcohol abuse and cardiomyopathy  Prior to admission, patient was independent with functional mobility without assistive device, independent with ADLS and independent with IADLS  The evaluation identifies the following performance deficits: weakness, impaired balance, decreased endurance, increased fall risk, new onset of impairment of functional mobility, decreased ADLS, decreased IADLS, decreased activity tolerance, decreased safety awareness, impaired judgement, decreased cognition and decreased strength, that result in activity limitations and/or participation restrictions  This evaluation requires clinical decision making of high complexity, because the patient presents with comorbidites that affect occupational performance and required significant modification of tasks or assistance with consideration of multiple treatment options  The Barthel Index was used as a functional outcome tool presenting with a score of Barthel Index Score: 25, indicating marked limitations of functional mobility and ADLS  The patient's raw score on the -PAC Daily Activity inpatient short form is 11, standardized score is 29 04, less than 39 4  Patients at this level are likely to benefit from DC to post-acute rehabilitation services   Please refer to the recommendation of the Occupational Therapist for safe DC planning  Patient will benefit from skilled Occupational Therapy services to address above deficits and facilitate a safe return to prior level of function  Goals   Patient Goals unable to state due to cognitive impairment   STG Time Frame   (1-7 days)   Short Term Goal  Goals established to promote Patient Goals: unable to state due to cognitive impairment:  Patient will increase standing tolerance to 3 minutes during ADL task to decrease assistance level and decrease fall risk; Patient will increase bed mobility to min assist in preparation for ADLS and transfers; Patient will increase functional mobility to and from bathroom with rolling walker with mod assist to increase performance with ADLS and to use a toilet; Patient will tolerate 10 minutes of UE ROM/strengthening to increase general activity tolerance and performance in ADLS/IADLS; Patient will improve functional activity tolerance to 10 minutes of sustained functional tasks to increase participation in basic self-care and decrease assistance level;   Patient will increase dynamic sitting balance to fair to improve the ability to sit at edge of bed or on a chair for ADLS;  Patient will increase dynamic standing balance to poor+ to improve postural stability and decrease fall risk during standing ADLS and transfers  LTG Time Frame   (8-14 days)   Long Term Goal Patient will increase standing tolerance to 6 minutes during ADL task to decrease assistance level and decrease fall risk; Patient will increase bed mobility to supervision in preparation for ADLS and transfers;  Patient will increase functional mobility to and from bathroom with rolling walker with min assist to increase performance with ADLS and to use a toilet; Patient will tolerate 20 minutes of UE ROM/strengthening to increase general activity tolerance and performance in ADLS/IADLS; Patient will improve functional activity tolerance to 20 minutes of sustained functional tasks to increase participation in basic self-care and decrease assistance level;   Patient will increase dynamic sitting balance to fair+ to improve the ability to sit at edge of bed or on a chair for ADLS;  Patient will increase dynamic standing balance to fair- to improve postural stability and decrease fall risk during standing ADLS and transfers  Pt will score >/= 15/24 on AM-PAC Daily Activity Inpatient scale to promote safe independence with ADLs and functional mobility; Pt will score >/= 55/100 on Barthel Index in order to decrease caregiver assistance needed and increase ability to perform ADLs and functional mobility  Functional Transfer Goals   Pt Will Perform All Functional Transfers   (STG mod assist LTG min assist)   ADL Goals   Pt Will Perform Eating   (STG supervision LTG independent )   Pt Will Perform Grooming   (STG supervision LTG independent )   Pt Will Perform Bathing   (STG mod assist LTG min assist )   Pt Will Perform UE Dressing   (STG min assist LTG supervision)   Pt Will Perform LE Dressing   (STG mod assist LTG min assist )   Pt Will Perform Toileting   (STG mod assist LTG min assist )   Plan   Treatment Interventions ADL retraining;Functional transfer training;UE strengthening/ROM; Endurance training;Patient/family training;Equipment evaluation/education; Activityengagement; Compensatory technique education   Goal Expiration Date 05/05/22   OT Frequency 3-5x/wk   Recommendation   OT Discharge Recommendation Post acute rehabilitation services   AM-PAC Daily Activity Inpatient   Lower Body Dressing 1   Bathing 1   Toileting 1   Upper Body Dressing 2   Grooming 3   Eating 3   Daily Activity Raw Score 11   Daily Activity Standardized Score (Calc for Raw Score >=11) 29 04   AM-PAC Applied Cognition Inpatient   Following a Speech/Presentation 3   Understanding Ordinary Conversation 3   Taking Medications 1   Remembering Where Things Are Placed or Put Away 1   Remembering List of 4-5 Errands 1   Taking Care of Complicated Tasks 1   Applied Cognition Raw Score 10   Applied Cognition Standardized Score 24 98   Barthel Index   Feeding 5   Bathing 0   Grooming Score 0   Dressing Score 0   Bladder Score 5   Bowels Score 5   Toilet Use Score 5   Transfers (Bed/Chair) Score 5   Mobility (Level Surface) Score 0   Stairs Score 0   Barthel Index Score 25   Licensure   [place] License Number  [facility] Luite Agustin 85 OTR/L [id number]

## 2022-04-21 NOTE — CASE MANAGEMENT
Case Management Progress Note    Patient name Judd Conception  Location [address] [facility] [address] Daryl Reeder MRN [id number]  : 1965 Date 2022       LOS (days): 5  Geometric Mean LOS (GMLOS) (days): 3 10  Days to GMLOS:-2 4        OBJECTIVE:        Current admission status: Inpatient  Preferred Pharmacy:   PATIENT/FAMILY REPORTS NO PREFERRED PHARMACY  No address on file      [facility] [address] VIZCARRACHARY [facility], [address] [address]  Phone: [phone] Fax: [phone]    Primary Care Provider: Lupe Kang MD    Primary Insurance: [facility] [employer] [employer]  Secondary Insurance: [address] [place] [address]    PROGRESS NOTE:    Family and pt decided for pt to go to rehab at [facility]  Auth submitted via  d/c support  [facility] has bed available for time of d/c

## 2022-04-21 NOTE — CASE MANAGEMENT
Case Management Discharge Planning Note    Patient name Tania Catherine  Location 18 [facility] [address] [place] [address] MRN [id number]  : 1965 Date 2022       Current Admission Date: 4/15/2022  Current Admission Diagnosis:Pancreatitis   Patient Active Problem List    Diagnosis Date Noted    Tachycardia 2022    Alcohol intoxication ([facility] ) 2022    Pancreatitis 2022    Alcohol withdrawal syndrome without complication ([facility] )     Hypomagnesemia 2018    Paroxysmal atrial fibrillation ([facility] ) 2017    Tobacco abuse 2017    Hypokalemia 2017    Cardiomyopathy, 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 06/10/2016    Alcohol abuse 06/10/2016      LOS (days): 5  Geometric Mean LOS (GMLOS) (days): 3 10  Days to GMLOS:-2 3     OBJECTIVE:  Risk of Unplanned Readmission Score: 12         Current admission status: Inpatient   Preferred Pharmacy:   PATIENT/FAMILY REPORTS NO PREFERRED PHARMACY  No address on file      [address] [facility]  [place] [id number]  Phone: [phone] Fax: [phone]    Primary Care Provider: Sheri Dobson MD    Primary Insurance: [facility] [employer]  Secondary Insurance: [address] [place] [address]    DISCHARGE DETAILS:    Discharge planning discussed with[de-identified] patient  Freedom of Choice: Yes  Comments - Freedom of Choice: pt wants to go to [facility], he is ok with blanket referrals, referrals sent in ecin     Were Treatment Team discharge recommendations reviewed with patient/caregiver?: Yes  Did patient/caregiver verbalize understanding of patient care needs?: Yes  Were patient/caregiver advised of the risks associated with not following Treatment Team discharge recommendations?: Yes         [address]         Is the patient interested in [facility] at discharge?: No    DME Referral Provided  Referral made for DME?: No         Would you like to participate in our [address] service program?  : No - Declined    Treatment Team Recommendation: Short Term Rehab

## 2022-04-22 ENCOUNTER — APPOINTMENT (INPATIENT)
Dept: RADIOLOGY | Facility: HOSPITAL | Age: 57
DRG: 439 | End: 2022-04-22
Payer: COMMERCIAL

## 2022-04-22 VITALS
WEIGHT: 131.8 LBS | DIASTOLIC BLOOD PRESSURE: 94 MMHG | RESPIRATION RATE: 20 BRPM | SYSTOLIC BLOOD PRESSURE: 132 MMHG | BODY MASS INDEX: 22.5 KG/M2 | HEART RATE: 116 BPM | TEMPERATURE: 97.6 F | HEIGHT: 64 IN | OXYGEN SATURATION: 98 %
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
## 2022-04-22 PROBLEM — E87.6 HYPOKALEMIA: Status: RESOLVED | Noted: 2017-09-22 | Resolved: 2022-04-22

## 2022-04-22 PROBLEM — F10.929 ALCOHOL INTOXICATION (HCC): Status: RESOLVED | Noted: 2022-04-16 | Resolved: 2022-04-22

## 2022-04-22 PROBLEM — K85.90 PANCREATITIS: Status: RESOLVED | Noted: 2022-04-16 | Resolved: 2022-04-22

## 2022-04-22 LAB
ANION GAP SERPL CALCULATED.3IONS-SCNC: 14 MMOL/L (ref 4–13)
BUN SERPL-MCNC: 7 MG/DL (ref 5–25)
CALCIUM SERPL-MCNC: 8.5 MG/DL (ref 8.3–10.1)
CHLORIDE SERPL-SCNC: 106 MMOL/L (ref 100–108)
CO2 SERPL-SCNC: 19 MMOL/L (ref 21–32)
CREAT SERPL-MCNC: 0.67 MG/DL (ref 0.6–1.3)
GFR SERPL CREATININE-BSD FRML MDRD: 105 ML/MIN/1.73SQ M
GLUCOSE SERPL-MCNC: 110 MG/DL (ref 65–140)
MAGNESIUM SERPL-MCNC: 1.5 MG/DL (ref 1.6–2.6)
POTASSIUM SERPL-SCNC: 4.5 MMOL/L (ref 3.5–5.3)
SODIUM SERPL-SCNC: 139 MMOL/L (ref 136–145)

## 2022-04-22 PROCEDURE — 94640 AIRWAY INHALATION TREATMENT: CPT

## 2022-04-22 PROCEDURE — 80048 BASIC METABOLIC PNL TOTAL CA: CPT | Performed by: FAMILY MEDICINE

## 2022-04-22 PROCEDURE — 94760 N-INVAS EAR/PLS OXIMETRY 1: CPT

## 2022-04-22 PROCEDURE — 70450 CT HEAD/BRAIN W/O DYE: CPT

## 2022-04-22 PROCEDURE — G1004 CDSM NDSC: HCPCS

## 2022-04-22 PROCEDURE — 99239 HOSP IP/OBS DSCHRG MGMT >30: CPT | Performed by: FAMILY MEDICINE

## 2022-04-22 PROCEDURE — 83735 ASSAY OF MAGNESIUM: CPT | Performed by: FAMILY MEDICINE

## 2022-04-22 RX ORDER — MULTIVITAMIN
1 CAPSULE ORAL DAILY
Refills: 0 | Status: ON HOLD
Start: 2022-04-22

## 2022-04-22 RX ORDER — CHLORDIAZEPOXIDE HYDROCHLORIDE 25 MG/1
50 CAPSULE, GELATIN COATED ORAL EVERY MORNING
Refills: 0
Start: 2022-04-23 | End: 2022-05-16 | Stop reason: ALTCHOICE

## 2022-04-22 RX ORDER — LEVALBUTEROL 1.25 MG/.5ML
1.25 SOLUTION, CONCENTRATE RESPIRATORY (INHALATION)
Status: DISCONTINUED | OUTPATIENT
Start: 2022-04-22 | End: 2022-04-22 | Stop reason: HOSPADM

## 2022-04-22 RX ORDER — LEVALBUTEROL 1.25 MG/.5ML
1.25 SOLUTION, CONCENTRATE RESPIRATORY (INHALATION) EVERY 6 HOURS PRN
Refills: 0 | Status: ON HOLD
Start: 2022-04-22

## 2022-04-22 RX ORDER — LIDOCAINE 50 MG/G
1 PATCH TOPICAL DAILY
Refills: 0 | Status: ON HOLD
Start: 2022-04-23

## 2022-04-22 RX ORDER — LANOLIN ALCOHOL/MO/W.PET/CERES
100 CREAM (GRAM) TOPICAL DAILY
Refills: 0 | Status: ON HOLD
Start: 2022-04-23

## 2022-04-22 RX ORDER — CHLORDIAZEPOXIDE HYDROCHLORIDE 25 MG/1
25 CAPSULE, GELATIN COATED ORAL EVERY MORNING
Refills: 0
Start: 2022-04-24 | End: 2022-05-16 | Stop reason: ALTCHOICE

## 2022-04-22 RX ORDER — MAGNESIUM SULFATE HEPTAHYDRATE 40 MG/ML
2 INJECTION, SOLUTION INTRAVENOUS ONCE
Status: COMPLETED | OUTPATIENT
Start: 2022-04-22 | End: 2022-04-22

## 2022-04-22 RX ORDER — CHLORDIAZEPOXIDE HYDROCHLORIDE 25 MG/1
50 CAPSULE, GELATIN COATED ORAL EVERY 12 HOURS
Refills: 0
Start: 2022-04-22 | End: 2022-05-16 | Stop reason: ALTCHOICE

## 2022-04-22 RX ORDER — KETOTIFEN FUMARATE 0.35 MG/ML
1 SOLUTION/ DROPS OPHTHALMIC 2 TIMES DAILY
Qty: 5 ML | Refills: 0 | Status: ON HOLD
Start: 2022-04-22 | End: 2022-08-10

## 2022-04-22 RX ORDER — FOLIC ACID 1 MG/1
1 TABLET ORAL DAILY
Refills: 0 | Status: ON HOLD
Start: 2022-04-23

## 2022-04-22 RX ORDER — BACITRACIN, NEOMYCIN, POLYMYXIN B 400; 3.5; 5 [USP'U]/G; MG/G; [USP'U]/G
OINTMENT TOPICAL 2 TIMES DAILY
Qty: 15 G | Refills: 0 | Status: ON HOLD
Start: 2022-04-22

## 2022-04-22 RX ADMIN — Medication 1 TABLET: at 08:39

## 2022-04-22 RX ADMIN — LIDOCAINE 5% 1 PATCH: 700 PATCH TOPICAL at 08:39

## 2022-04-22 RX ADMIN — SODIUM CHLORIDE AND POTASSIUM CHLORIDE 125 ML/HR: .9; .15 SOLUTION INTRAVENOUS at 02:14

## 2022-04-22 RX ADMIN — LEVALBUTEROL HYDROCHLORIDE 1.25 MG: 1.25 SOLUTION, CONCENTRATE RESPIRATORY (INHALATION) at 10:17

## 2022-04-22 RX ADMIN — MAGNESIUM OXIDE TAB 400 MG (241.3 MG ELEMENTAL MG) 400 MG: 400 (241.3 MG) TAB at 08:39

## 2022-04-22 RX ADMIN — LEVALBUTEROL HYDROCHLORIDE 1.25 MG: 1.25 SOLUTION, CONCENTRATE RESPIRATORY (INHALATION) at 14:18

## 2022-04-22 RX ADMIN — CHLORDIAZEPOXIDE HYDROCHLORIDE 50 MG: 25 CAPSULE ORAL at 15:56

## 2022-04-22 RX ADMIN — THIAMINE HCL TAB 100 MG 100 MG: 100 TAB at 08:39

## 2022-04-22 RX ADMIN — ATENOLOL 12.5 MG: 25 TABLET ORAL at 08:38

## 2022-04-22 RX ADMIN — KETOTIFEN FUMARATE 1 DROP: 0.35 SOLUTION/ DROPS OPHTHALMIC at 08:39

## 2022-04-22 RX ADMIN — ENOXAPARIN SODIUM 40 MG: 40 INJECTION SUBCUTANEOUS at 08:38

## 2022-04-22 RX ADMIN — CHLORDIAZEPOXIDE HYDROCHLORIDE 50 MG: 25 CAPSULE ORAL at 06:19

## 2022-04-22 RX ADMIN — FOLIC ACID 1 MG: 1 TABLET ORAL at 08:39

## 2022-04-22 RX ADMIN — MAGNESIUM SULFATE HEPTAHYDRATE 2 G: 40 INJECTION, SOLUTION INTRAVENOUS at 10:20

## 2022-04-22 NOTE — CASE MANAGEMENT
Case Management Discharge Planning Note    Patient name Lara Malone  Location 18 [address] MRN [id number]  : 1965 Date 2022       Current Admission Date: 4/15/2022  Current Admission Diagnosis:Pancreatitis   Patient Active Problem List    Diagnosis Date Noted    Tachycardia 2022    Alcohol intoxication ([facility] ) 2022    Pancreatitis 2022    Alcohol withdrawal syndrome without complication ([facility] )     Hypomagnesemia 2018    Paroxysmal atrial fibrillation ([facility] ) 2017    Tobacco abuse 2017    Hypokalemia 2017    Cardiomyopathy, 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 06/10/2016    Alcohol abuse 06/10/2016      LOS (days): 6  Geometric Mean LOS (GMLOS) (days): 3 10  Days to GMLOS:-3 2     OBJECTIVE:  Risk of Unplanned Readmission Score: 13         Current admission status: Inpatient   Preferred Pharmacy:   PATIENT/FAMILY REPORTS NO PREFERRED PHARMACY  No address on file      [facility] [address] [facility] [facility]  [address] [place] [address]  Phone: [phone] Fax: [phone]    Primary Care Provider: Yusra Baldwin MD    Primary Insurance: [facility] [employer] [employer]  Secondary Insurance: [address] [place] [address]    DISCHARGE DETAILS:    Discharge planning discussed with[de-identified] patient  Freedom of Choice: Yes  Comments - Freedom of Choice: pt would like to go to [facility], family is in agreement, awaiting auth from insurance now  CM contacted family/caregiver?: Yes  Were Treatment Team discharge recommendations reviewed with patient/caregiver?: Yes  Did patient/caregiver verbalize understanding of patient care needs?: Yes  Were patient/caregiver advised of the risks associated with not following Treatment Team discharge recommendations?: Yes         [address]         Is the patient interested in [address] at discharge?: No    DME Referral Provided  Referral made for DME?: No         Would you like to participate in our [address] service program?  : No - Declined    Treatment Team Recommendation: Short Term Rehab  Discharge Destination Plan[de-identified] Short Term Rehab         IMM Given (Date):: 04/22/22 (reviewed with pt, pt agreeable to plan, imm placed in scan bin)  IMM Given to[de-identified] Patient

## 2022-04-22 NOTE — DISCHARGE SUMMARY
Discharge Summary - [facility] Internal Medicine    Patient Information: Julissa Horton 60 y o  male MRN: [id number]  Unit/Bed#: [address] [place] [address] Encounter: [id number]    Discharging Physician / Practitioner: Shaista Marroquin DO  PCP: Naomi Butler MD  Admission Date: 4/15/2022  Discharge Date: 04/22/22    Reason for Admission: Alcohol Intoxication (Pt's family told squad he was lethargic  Pt admits to a 1/5th+ of liquor today  Denies seizures from withdrawl )      Discharge Diagnoses:     Principal Problem (Resolved): Pancreatitis  Active Problems:    Hypomagnesemia    Fall    Alcohol withdrawal syndrome without complication ([address] )    Tachycardia  Resolved Problems:    Hypokalemia    Alcohol intoxication ([facility] )        * Pancreatitis-resolved as of 4/22/2022  Assessment & Plan  Patient presented to ED with generalized weakness after drinking a bottle of gin  Lipase 2224 on admission  CT consistent with pancreatitis  Pancreatitis 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 abuse  Lipase improved and adominal pain resolved  Continue low fat diet  Hypomagnesemia  Assessment & Plan  Continue supplementation  Repeat labs as outpatient    Fall  Assessment & Plan  Reported by nursing that patient was found sitting on the floor by the window  Fall was unwitnessed  Patient was evaluated in denies any head trauma or loss of consciousness  Stat CT head done which was negative for acute pathology  Patient does have a skin tear to the right elbow - neosporin to the area    Alcohol withdrawal syndrome without complication ([facility] )  Assessment & Plan  Alcohol withdrawal symptoms significantly improved  Continue Librium taper  Complete alcohol cessation discussed with patient    Tachycardia  Assessment & Plan  Secondary to alcohol withdrawal    Restart Toprol-XL as he was supposed to be taking at home    Alcohol intoxication (HCC)-resolved as of 4/22/2022  Assessment & Plan  Patient is a daily drinker  Alcohol withdrawal symptoms improved  Currently on librium taper  Continue with folic acid, thiamine and MVI  Hypokalemia-resolved as of 4/22/2022  Assessment & Plan  Resolved  Rpt labs as outpatient      Consultations During Hospital Stay:  Maritza Grande TO CASE MANAGEMENT    Procedures Performed:     · none    Significant Findings:     · Refer to hospital course and above listed diagnosis related plan for details    Imaging while in hospital:    CT head wo contrast    Result Date: 4/22/2022  Narrative: CT BRAIN - WITHOUT CONTRAST INDICATION:   unwitnessed fall, possible head trauma  COMPARISON:  June 16, 2018 TECHNIQUE:  CT examination of the brain was performed  In addition to axial images, sagittal and coronal 2D reformatted images were created and submitted for interpretation  Radiation dose length product (DLP) for this visit:  879 85 mGy-cm   This examination, like all CT scans performed in the [facility], was performed utilizing techniques to minimize radiation dose exposure, including the use of iterative  reconstruction and automated exposure control  IMAGE QUALITY:  Diagnostic  FINDINGS: PARENCHYMA:  No intracranial mass, mass effect or midline shift  No CT signs of acute infarction  No acute parenchymal hemorrhage  VENTRICLES AND EXTRA-AXIAL SPACES:  Normal for the patient's age  VISUALIZED ORBITS AND PARANASAL SINUSES:  Unremarkable  CALVARIUM AND EXTRACRANIAL SOFT TISSUES:  Normal      Impression: No acute intracranial abnormality  Workstation performed: [id number]     CT abdomen pelvis with contrast    Result Date: 4/16/2022  Narrative: CT ABDOMEN AND PELVIS WITH IV CONTRAST INDICATION:   Abdominal pain, acute, nonlocalized abdominal pain  COMPARISON:  None  TECHNIQUE:  CT examination of the abdomen and pelvis was performed  Axial, sagittal, and coronal 2D reformatted images were created from the source data and submitted for interpretation  Radiation dose length product (DLP) for this visit:  1000 mGy-cm     This examination, like all CT scans performed in the [facility], was performed utilizing techniques to minimize radiation dose exposure, including the use of iterative reconstruction and automated exposure control  IV Contrast:  100 mL of iohexol (OMNIPAQUE) Enteric Contrast:  Enteric contrast was not administered  FINDINGS: ABDOMEN LOWER CHEST:  No clinically significant abnormality identified in the visualized lower chest  LIVER/BILIARY TREE:  Liver is diffusely decreased in density consistent with fatty change  No CT evidence of suspicious hepatic mass  Normal hepatic contours  No biliary dilatation  GALLBLADDER:  No calcified gallstones  No pericholecystic inflammatory change  SPLEEN:  Unremarkable  PANCREAS:  Subtle peripancreatic haziness is noted  Mild prominence of the main pancreatic duct, nonspecific  ADRENAL GLANDS:  Unremarkable  KIDNEYS/URETERS:  Unremarkable  No hydronephrosis  STOMACH AND BOWEL:  Unremarkable  APPENDIX:  No findings to suggest appendicitis  ABDOMINOPELVIC CAVITY:  Mild mesenteric vascular congestion  No pneumoperitoneum  No lymphadenopathy  VESSELS:  Unremarkable for patient's age  PELVIS REPRODUCTIVE ORGANS:  The prostate is enlarged  URINARY BLADDER:  Unremarkable  ABDOMINAL WALL/INGUINAL REGIONS:  Unremarkable  OSSEOUS STRUCTURES:  No acute fracture or destructive osseous lesion  Impression: Subtle peripancreatic haziness  Given clinical history acute pancreatitis is suspected  No evidence of collection or necrosis  Diffuse hepatic steatosis  Prostatomegaly   Workstation performed: [id number]       Incidental Findings:   · Hepatic steatosis    Test Results Pending at Discharge (will require follow up):   · As per After Visit Summary     Outpatient Tests Requested:  · BMP, Mag    Complications:  Refer to hospital course and above listed diagnosis related plan, if any    Hospital Course:     Tasha Douglas is a 60 y o  male patient who originally presented to the hospital on 4/15/2022 due to Alcohol intoxication  Reported some abdominal discomfort/bloating  On imaging patient was noted to have pancreatitis with an elevated lipase level  Patient was placed on CIWA protocol and started on Librium  Lipase level has improved  Patient also noted to have electrolyte abnormalities which were repleted  Patient being discharged to short-term rehab facility  Discharge plan discussed with patient and his sister Levy Noonan over the phone    Please see above list of diagnoses and related plan for additional information  Condition at Discharge: stable     Discharge Day Visit / Exam:     Subjective:  Patient seen after the fall  Denies any head trauma or loss of consciousness  States he is doing better    Vitals: Blood Pressure: 132/94 (04/22/22 0905)  Pulse: (!) 116 (04/22/22 0905)  Temperature: 97 6 °F (36 4 °C) (04/22/22 0905)  Temp Source: Oral (04/22/22 0905)  Respirations: 20 (04/22/22 0905)  Height: 5' 4" (162 6 cm) (04/16/22 0342)  Weight - Scale: 59 8 kg (131 lb 12 8 oz) (04/16/22 0342)  SpO2: 98 % (04/22/22 1418)  Exam:   Physical Exam  Constitutional:       General: He is not in acute distress  Appearance: He is ill-appearing ( chronically)  He is not diaphoretic  HENT:      Head: Normocephalic and atraumatic  Eyes:      General:         Right eye: Discharge present  Left eye: Discharge present  Cardiovascular:      Rate and Rhythm: Regular rhythm  Tachycardia present  Pulmonary:      Effort: Pulmonary effort is normal  No respiratory distress  Breath sounds: Wheezing (Intermittent) present  No rales  Comments: Decreased breath sounds bilaterally  Abdominal:      General: Bowel sounds are normal  There is no distension  Palpations: Abdomen is soft  Tenderness: There is no abdominal tenderness  Musculoskeletal:      Right lower leg: No edema  Left lower leg: No edema        Comments: Right elbow skin tear Neurological:      Mental Status: He is alert  Comments: Oriented to self and place         Discharge instructions/Information to patient and family:(Discharge Medications and Follow up):   See after visit summary for information provided to patient and family  Provisions for Follow-Up Care:  See after visit summary for information related to follow-up care and any pertinent home health orders  Disposition: Short-term rehab at [address] [place] [address]    Planned Readmission:  No     Discharge Statement:  I spent 35 minutes discharging the patient  This time was spent on the day of discharge  I had direct contact with the patient on the day of discharge  Greater than 50% of the total time was spent examining patient, answering all patient questions, arranging and discussing plan of care with patient as well as directly providing post-discharge instructions  Additional time then spent on discharge activities  Discharge Medications:  See after visit summary for reconciled discharge medications provided to patient and family  ** Please Note: "This note has been constructed using a voice recognition system  Therefore there may be syntax, spelling, and/or grammatical errors   Please call if you have any questions  "**

## 2022-04-22 NOTE — [place]IVERSAL TRANSF[place] FORM
[place] UN[place] FOR[place]  (ALL ITEMS MUST BE COMPLETED)    1  TRANSFER FROM: [address]      TRANSFER TO: [facility]    2  DATE OF TRANSFER: 4/22/2022                        TIME OF TRANSFER: 1500    3  PATIENT NAME: Nic Ayala,        YOB: 1965                             GENDER: male    4  LANGUAGE:   English    5  PHYSICIAN NAME:  Meek Franklin DO                   PHONE: [phone]    6  CODE STATUS: Level 1 - Full Code        Out of Hospital DNR Attached: No    7  :                                      :  Extended Emergency Contact Information  Primary Emergency Contact: Radha Butler   [place]  Mobile Phone: [phone]  Relation: Sister  Secondary Emergency Contact: Lacey Collazo  Mobile Phone: [phone]  Relation: Nia Cr Kevin Representative/Proxy:  Yes           Legal Guardian:  No             NAME OF:           HEALTH CARE REPRESENTATIVE/PROXY:                                         OR           LEGAL GUARDIAN, IF NOT :                                               PHONE:  (Day)           (Night)                        (Cell)    8  REASON FOR TRANSFER: (Must include brief medical history and recent changes in physical function or cognition ) weakness            V/S: /94 (BP Location: Right arm)   Pulse (!) 116   Temp 97 6 °F (36 4 °C) (Oral)   Resp 20   Ht 5' 4" (1 626 m)   Wt 59 8 kg (131 lb 12 8 oz)   SpO2 98%   BMI 22 62 kg/m²           PAIN: None    9  PRIMARY DIAGNOSIS: Pancreatitis      Secondary Diagnosis:         Pacemaker: No      Internal Defib: No          Mental Health Diagnosis (if Applicable):    10  RESTRAINTS: No     11  RESPIRATORY NEEDS: None    12  ISOLATION/PRECAUTION: None    13  ALLERGY: Patient has no known allergies  14  SENSORY:       Vision Good, Hearing Good  and Speech Clear    15   SKIN CONDITION: Yes:  Other skin tear - R elbow    16  DIET: Special (describe) GI Lo Fat    17  IV ACCESS: None    18  PERSONAL ITEMS SENT WITH PATIENT: Other clothes    19  ATTACHED DOCUMENTS: MUST ATTACH CURRENT MEDICATION INFORMATION Face Sheet, MAR and Discharge Summary    20  AT RISK ALERTS:Falls        HARM TO: N/A    21  WEIGHT BEARING STATUS:         Left Leg: Full        Right Leg: Full    22  MENTAL STATUS:Alert and Oriented    23  FUNCTION:        Walk: With Help        Transfer: With Help        Toilet: With Help        Feed: Self    24  IMMUNIZATIONS/SCREENING:     Immunization History   Administered Date(s) Administered    Influenza Quadrivalent Preservative Free 3 years and older IM 10/09/2017       25  BOWEL: Continent    26  BLADDER: Continent    27   SENDING FACILITY CONTACT: Vahe Rosado                  Title: RN        Unit: 2S - med/surg        Phone: [phone] [address] (if known):        Title:        Unit:         Phone:         FORM PREFILLED BY (if applicable)       Title:       Unit:        Phone:         FORM COMPLETED BY Vahe Rosado RN      Title:       Phone: [phone]

## 2022-04-22 NOTE — POST FALL NOTE
Post Fall Note    Date of Fall: 04/22/22  Observer/Reported time of Fall: 0900  Name of Provider Notified: Aurelia Hence  Time Provider Notified: [id number]  Assessment of Patient Injury: Interruption in skin integrity (skin tear - R elbow)  Post Fall Interventions: Physician notified; Circumstances of fall reviewed and documented; Fall risk precautions implemented/maitained; Comforts rounds continued; Need for additional safety measures intiated if necessary; Other (comment) (Q15 safety check)  Family/Next of kin notified?: No      Brief Description of Events  I was notified by another nurse Jorge Rae) that the pt was on the floor and that the fall was not witnessed  When I came into the room the pt was sitting on the floor by the chair near the window  His back was propped up against the heating vent  Bethany Elroy and I picked him up and walked him to the bed  While checking his skin, we found a skin tear on his R elbow, no laceration or bump on his head  Revankar was made awareI was notified by another nurse Jorge Rae) that the pt was on the floor and that the fall was not witnessed  When I came into the room the pt was sitting on the floor by the chair near the window  His back was propped up against the heating vent  Bethany Elroy and I picked him up and walked him to the bed  While checking his skin, we found a skin tear on his R elbow, no laceration or bump on his head  Revankar was made aware    Last Recorded Vitals  Blood pressure 132/94, pulse (!) 116, temperature 97 6 °F (36 4 °C), temperature source Oral, resp  rate 20, height 5' 4" (1 626 m), weight 59 8 kg (131 lb 12 8 oz), SpO2 98 %        Principal Problem:    Pancreatitis  Active Problems:    Alcohol withdrawal syndrome without complication (HCC)    Hypomagnesemia    Tachycardia    Fall

## 2022-04-22 NOTE — PLAN OF CARE
Problem: Potential for Falls  Goal: Patient will remain free of falls  Description: INTERVENTIONS:  - Educate patient/family on patient safety including physical limitations  - Instruct patient to call for assistance with activity   - Consult OT/PT to assist with strengthening/mobility   - Keep Call bell within reach  - Keep bed low and locked with side rails adjusted as appropriate  - Keep care items and personal belongings within reach  - Initiate and maintain comfort rounds  - Make Fall Risk Sign visible to staff  - Offer Toileting every 2 Hours, in advance of need  - Initiate/Maintain bed alarm  - Obtain necessary fall risk management equipment: yes  - Apply yellow socks and bracelet for high fall risk patients  - Consider moving patient to room near nurses station  Outcome: Progressing     Problem: MOBILITY - ADULT  Goal: Maintain or return to baseline ADL function  Description: INTERVENTIONS:  -  Assess patient's ability to carry out ADLs; assess patient's baseline for ADL function and identify physical deficits which impact ability to perform ADLs (bathing, care of mouth/teeth, toileting, grooming, dressing, etc )  - Assess/evaluate cause of self-care deficits   - Assess range of motion  - Assess patient's mobility; develop plan if impaired  - Assess patient's need for assistive devices and provide as appropriate  - Encourage maximum independence but intervene and supervise when necessary  - Involve family in performance of ADLs  - Assess for home care needs following discharge   - Consider OT consult to assist with ADL evaluation and planning for discharge  - Provide patient education as appropriate  Outcome: Progressing  Goal: Maintains/Returns to pre admission functional level  Description: INTERVENTIONS:  - Perform BMAT or MOVE assessment daily    - Set and communicate daily mobility goal to care team and patient/family/caregiver     - Collaborate with rehabilitation services on mobility goals if consulted  - Perform Range of Motion 3 times a day  - Reposition patient every 2 hours    - Dangle patient 3 times a day  - Stand patient 3 times a day  - Ambulate patient 3 times a day  - Out of bed to chair 3 times a day   - Out of bed for meals 3 times a day  - Out of bed for toileting  - Record patient progress and toleration of activity level   Outcome: Progressing     Problem: CARDIOVASCULAR - ADULT  Goal: Absence of cardiac dysrhythmias or at baseline rhythm  Description: INTERVENTIONS:  - Continuous cardiac monitoring, vital signs, obtain 12 lead EKG if ordered  - Administer antiarrhythmic and heart rate control medications as ordered  - Monitor electrolytes and administer replacement therapy as ordered  Outcome: Progressing     Problem: METABOLIC, FLUID AND ELECTROLYTES - ADULT  Goal: Electrolytes maintained within normal limits  Description: INTERVENTIONS:  - Monitor labs and assess patient for signs and symptoms of electrolyte imbalances  - Administer electrolyte replacement as ordered  - Monitor response to electrolyte replacements, including repeat lab results as appropriate  - Instruct patient on fluid and nutrition as appropriate  Outcome: Progressing     Problem: DISCHARGE PLANNING  Goal: Discharge to home or other facility with appropriate resources  Description: INTERVENTIONS:  - Identify barriers to discharge w/patient and caregiver  - Arrange for needed discharge resources and transportation as appropriate  - Identify discharge learning needs (meds, wound care, etc )  - Refer to Case Management Department for coordinating discharge planning if the patient needs post-hospital services based on physician/advanced practitioner order or complex needs related to functional status, cognitive ability, or social support system  Outcome: Progressing     Problem: Knowledge Deficit  Goal: Patient/family/caregiver demonstrates understanding of disease process, treatment plan, medications, and discharge instructions  Description: Complete learning assessment and assess knowledge base    Interventions:  - Provide teaching at level of understanding  - Provide teaching via preferred learning methods  Outcome: Progressing     Problem: Prexisting or High Potential for Compromised Skin Integrity  Goal: Skin integrity is maintained or improved  Description: INTERVENTIONS:  - Identify patients at risk for skin breakdown  - Assess and monitor skin integrity  - Assess and monitor nutrition and hydration status  - Monitor labs   - Assess for incontinence   - Turn and reposition patient  - Assist with mobility/ambulation  - Relieve pressure over bony prominences  - Avoid friction and shearing  - Provide appropriate hygiene as needed including keeping skin clean and dry  - Evaluate need for skin moisturizer/barrier cream  - Collaborate with interdisciplinary team   - Patient/family teaching  - Consider wound care consult   Outcome: Progressing     Problem: RESPIRATORY - ADULT  Goal: Achieves optimal ventilation and oxygenation  Description: INTERVENTIONS:  - Assess for changes in respiratory status  - Assess for changes in mentation and behavior  - Position to facilitate oxygenation and minimize respiratory effort  - Oxygen administered by appropriate delivery if ordered  - Initiate smoking cessation education as indicated  - Encourage broncho-pulmonary hygiene including cough, deep breathe, Incentive Spirometry  - Assess the need for suctioning and aspirate as needed  - Assess and instruct to report SOB or any respiratory difficulty  - Respiratory Therapy support as indicated  Outcome: Progressing

## 2022-04-22 NOTE — ASSESSMENT & PLAN NOTE
Reported by nursing that patient was found sitting on the floor by the window  Fall was unwitnessed  Patient was evaluated in denies any head trauma or loss of consciousness  Stat CT head done which was negative for acute pathology  Patient does have a skin tear to the right elbow - neosporin to the area

## 2022-04-22 NOTE — NURSING NOTE
I was notified by another nurse Luh Bardales) that the pt was on the floor and that the fall was not witnessed  When I came into the room the pt was sitting on the floor by the chair near the window  His back was propped up against the heating vent  Roberta  and I picked him up and walked him to the bed  While checking his skin, we found a skin tear on his R elbow, no laceration or bump on his head  Revankar was made aware

## 2022-04-22 NOTE — PLAN OF CARE
Problem: Potential for Falls  Goal: Patient will remain free of falls  Description: INTERVENTIONS:  - Educate patient/family on patient safety including physical limitations  - Instruct patient to call for assistance with activity   - Consult OT/PT to assist with strengthening/mobility   - Keep Call bell within reach  - Keep bed low and locked with side rails adjusted as appropriate  - Keep care items and personal belongings within reach  - Initiate and maintain comfort rounds  - Make Fall Risk Sign visible to staff  - Offer Toileting every 2 Hours, in advance of need  - Initiate/Maintain bed alarm  - Obtain necessary fall risk management equipment: yes  - Apply yellow socks and bracelet for high fall risk patients  - Consider moving patient to room near nurses station  4/22/2022 1216 by Shirleyann Sacks, RN  Outcome: Completed  4/22/2022 1104 by Shirleyann Sacks, RN  Outcome: Progressing     Problem: MOBILITY - ADULT  Goal: Maintain or return to baseline ADL function  Description: INTERVENTIONS:  -  Assess patient's ability to carry out ADLs; assess patient's baseline for ADL function and identify physical deficits which impact ability to perform ADLs (bathing, care of mouth/teeth, toileting, grooming, dressing, etc )  - Assess/evaluate cause of self-care deficits   - Assess range of motion  - Assess patient's mobility; develop plan if impaired  - Assess patient's need for assistive devices and provide as appropriate  - Encourage maximum independence but intervene and supervise when necessary  - Involve family in performance of ADLs  - Assess for home care needs following discharge   - Consider OT consult to assist with ADL evaluation and planning for discharge  - Provide patient education as appropriate  4/22/2022 1216 by Shirleyann Sacks, RN  Outcome: Completed  4/22/2022 1104 by Shirleyann Sacks, RN  Outcome: Progressing  Goal: Maintains/Returns to pre admission functional level  Description: INTERVENTIONS:  - Perform BMAT or MOVE assessment daily    - Set and communicate daily mobility goal to care team and patient/family/caregiver  - Collaborate with rehabilitation services on mobility goals if consulted  - Perform Range of Motion 3 times a day  - Reposition patient every 2 hours    - Dangle patient 3 times a day  - Stand patient 3 times a day  - Ambulate patient 3 times a day  - Out of bed to chair 3 times a day   - Out of bed for meals 3 times a day  - Out of bed for toileting  - Record patient progress and toleration of activity level   4/22/2022 1216 by Leanne Baldwin RN  Outcome: Completed  4/22/2022 1104 by Leanne Baldwin RN  Outcome: Progressing     Problem: CARDIOVASCULAR - ADULT  Goal: Absence of cardiac dysrhythmias or at baseline rhythm  Description: INTERVENTIONS:  - Continuous cardiac monitoring, vital signs, obtain 12 lead EKG if ordered  - Administer antiarrhythmic and heart rate control medications as ordered  - Monitor electrolytes and administer replacement therapy as ordered  4/22/2022 1216 by Leanne Baldwin RN  Outcome: Completed  4/22/2022 1104 by Leanne Baldwin RN  Outcome: Progressing     Problem: METABOLIC, FLUID AND ELECTROLYTES - ADULT  Goal: Electrolytes maintained within normal limits  Description: INTERVENTIONS:  - Monitor labs and assess patient for signs and symptoms of electrolyte imbalances  - Administer electrolyte replacement as ordered  - Monitor response to electrolyte replacements, including repeat lab results as appropriate  - Instruct patient on fluid and nutrition as appropriate  4/22/2022 1216 by Leanne Baldwin RN  Outcome: Completed  4/22/2022 1104 by Leanne Baldwin RN  Outcome: Progressing     Problem: DISCHARGE PLANNING  Goal: Discharge to home or other facility with appropriate resources  Description: INTERVENTIONS:  - Identify barriers to discharge w/patient and caregiver  - Arrange for needed discharge resources and transportation as appropriate  - Identify discharge learning needs (meds, wound care, etc )  - Refer to Case Management Department for coordinating discharge planning if the patient needs post-hospital services based on physician/advanced practitioner order or complex needs related to functional status, cognitive ability, or social support system  4/22/2022 1216 by Lori Geller RN  Outcome: Completed  4/22/2022 1104 by Lori Geller RN  Outcome: Progressing     Problem: Knowledge Deficit  Goal: Patient/family/caregiver demonstrates understanding of disease process, treatment plan, medications, and discharge instructions  Description: Complete learning assessment and assess knowledge base    Interventions:  - Provide teaching at level of understanding  - Provide teaching via preferred learning methods  4/22/2022 1216 by Lori Geller RN  Outcome: Completed  4/22/2022 1104 by Lori Geller RN  Outcome: Progressing     Problem: Prexisting or High Potential for Compromised Skin Integrity  Goal: Skin integrity is maintained or improved  Description: INTERVENTIONS:  - Identify patients at risk for skin breakdown  - Assess and monitor skin integrity  - Assess and monitor nutrition and hydration status  - Monitor labs   - Assess for incontinence   - Turn and reposition patient  - Assist with mobility/ambulation  - Relieve pressure over bony prominences  - Avoid friction and shearing  - Provide appropriate hygiene as needed including keeping skin clean and dry  - Evaluate need for skin moisturizer/barrier cream  - Collaborate with interdisciplinary team   - Patient/family teaching  - Consider wound care consult   4/22/2022 1216 by Lori Geller RN  Outcome: Completed  4/22/2022 1104 by Lori Geller RN  Outcome: Progressing     Problem: RESPIRATORY - ADULT  Goal: Achieves optimal ventilation and oxygenation  Description: INTERVENTIONS:  - Assess for changes in respiratory status  - Assess for changes in mentation and behavior  - Position to facilitate oxygenation and minimize respiratory effort  - Oxygen administered by appropriate delivery if ordered  - Initiate smoking cessation education as indicated  - Encourage broncho-pulmonary hygiene including cough, deep breathe, Incentive Spirometry  - Assess the need for suctioning and aspirate as needed  - Assess and instruct to report SOB or any respiratory difficulty  - Respiratory Therapy support as indicated  4/22/2022 1216 by Leanne Baldwin RN  Outcome: Completed  4/22/2022 1104 by Leanne Baldwin RN  Outcome: Progressing

## 2022-04-22 NOTE — PLAN OF CARE
Problem: Potential for Falls  Goal: Patient will remain free of falls  Description: INTERVENTIONS:  - Educate patient/family on patient safety including physical limitations  - Instruct patient to call for assistance with activity   - Consult OT/PT to assist with strengthening/mobility   - Keep Call bell within reach  - Keep bed low and locked with side rails adjusted as appropriate  - Keep care items and personal belongings within reach  - Initiate and maintain comfort rounds  - Make Fall Risk Sign visible to staff  - Offer Toileting every 2 Hours, in advance of need  - Initiate/Maintain bed alarm  - Obtain necessary fall risk management equipment: yes  - Apply yellow socks and bracelet for high fall risk patients  - Consider moving patient to room near nurses station  Outcome: Progressing     Problem: MOBILITY - ADULT  Goal: Maintain or return to baseline ADL function  Description: INTERVENTIONS:  -  Assess patient's ability to carry out ADLs; assess patient's baseline for ADL function and identify physical deficits which impact ability to perform ADLs (bathing, care of mouth/teeth, toileting, grooming, dressing, etc )  - Assess/evaluate cause of self-care deficits   - Assess range of motion  - Assess patient's mobility; develop plan if impaired  - Assess patient's need for assistive devices and provide as appropriate  - Encourage maximum independence but intervene and supervise when necessary  - Involve family in performance of ADLs  - Assess for home care needs following discharge   - Consider OT consult to assist with ADL evaluation and planning for discharge  - Provide patient education as appropriate  Outcome: Progressing  Goal: Maintains/Returns to pre admission functional level  Description: INTERVENTIONS:  - Perform BMAT or MOVE assessment daily    - Set and communicate daily mobility goal to care team and patient/family/caregiver     - Collaborate with rehabilitation services on mobility goals if consulted  - Perform Range of Motion 3 times a day  - Reposition patient every 2 hours    - Dangle patient 3 times a day  - Stand patient 3 times a day  - Ambulate patient 3 times a day  - Out of bed to chair 3 times a day   - Out of bed for meals 3 times a day  - Out of bed for toileting  - Record patient progress and toleration of activity level   Outcome: Progressing

## 2022-04-22 NOTE — CASE MANAGEMENT
Case Management Discharge Planning Note    Patient name Bina Agent  Location 18 [facility] 221/2 [address] 221 MRN [id number]  : 1965 Date 2022       Current Admission Date: 4/15/2022  Current Admission Diagnosis:Pancreatitis   Patient Active Problem List    Diagnosis Date Noted    Tachycardia 2022    [facility] 20[facility]    Pancreatitis 2022    Alcohol withdrawal syndrome without complication [facility]     Hypomagnesemia 2018    [facility]oxysmal atrial fibrillation [facility] 2017    Tobacco abuse 2017    Hypokalemia 2017    Cardiomyopathy, 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 06/10/2016    Alcohol abuse 06/10/2016      LOS (days): 6  Geometric Mean LOS (GMLOS) (days): 3 10  Days to GMLOS:-3 3     OBJECTIVE:  Risk of Unplanned Readmission Score: 13         Current admission status: Inpatient   Preferred Pharmacy:   PATIENT/FAMILY REPORTS NO PREFERRED PHARMACY  No address on file      [facility] [address]  [address] [address]  Phone: [phone] Fax: [phone]    Primary Care Provider: Grzegorz Bennett MD    Primary Insurance: [facility]  Secondary Insurance: [address] [place] [address] - [facility]    DISCHARGE DETAILS:                                                                                                               [facility] [address] [place] [address] Number: Approved SNF auth # [id number] for Complete Care SOC: 22 NRD: 2022 Care Coord: Enmanuel Escobar Fax clinical review to [phone]

## 2022-04-22 NOTE — CASE MANAGEMENT
Case Management Progress Note    Patient name Shahram Maravilla  Location 18 [facility] 221/2 Daryl Reeder MRN [id number]  : 1965 Date 2022       LOS (days): 6  Geometric Mean LOS (GMLOS) (days): 3 10  Days to GMLOS:-3 4        OBJECTIVE:        Current admission status: Inpatient  Preferred Pharmacy:   PATIENT/FAMILY REPORTS NO PREFERRED PHARMACY  No address on file      [facility] [address] [facility], [address] [address] [place] [address]  Phone: [phone] Fax: [phone]    Primary Care Provider: Jia Hurley MD    Primary Insurance: [facility] RE[facility]  Secondary Insurance: [address] [place] [address]    PROGRESS NOTE:    Facility, Pt, and RN notified of transport time to [facility] by [employer]

## 2022-04-25 ENCOUNTER — TELEPHONE (OUTPATIENT)
Dept: FAMILY MEDICINE CLINIC | Facility: CLINIC | Age: 57
End: 2022-04-25

## 2022-04-25 ENCOUNTER — TRANSITIONAL CARE MANAGEMENT (OUTPATIENT)
Dept: FAMILY MEDICINE CLINIC | Facility: CLINIC | Age: 57
End: 2022-04-25

## 2022-04-25 NOTE — TELEPHONE ENCOUNTER
Complete care    Pt was admitted om 4/25/2022    Scanned into encounter    Placed in blue clinical folder    Fax [phone]

## 2022-04-25 NOTE — UTILIZATION REVIEW
Notification of Discharge   This is a Notification of Discharge from our facility [address]  Please be advised that this patient has been discharge from our facility  Below you will find the admission and discharge date and time including the patients disposition  UTILIZATION REVIEW CONTACT:  Kay Montana  Utilization   Network Utilization Review Department  Phone: [phone] x carefully listen to the prompts  All voicemails are confidential   Email: [email or web address]     PHYSICIAN ADVISORY SERVICES:  FOR [id number] REVIEW - MEDICAL NECESSITY DENIAL  Phone: [phone]  Fax: [phone]  Email: [email or web address]     PRESENTATION DATE: 4/15/2022 11:39 PM  OBERVATION ADMISSION DATE:   INPATIENT ADMISSION DATE: 4/16/22  1:20 AM   DISCHARGE DATE: 4/22/2022  5:57 PM  DISPOSITION: Non [facility] Non [facility]      IMPORTANT INFORMATION:  Send all requests for admission clinical reviews, approved or denied determinations and any other requests to dedicated fax number below belonging to the campus where the patient is receiving treatment   List of dedicated fax numbers:  [address] DENIALS (Administrative/Medical Necessity) [phone]   [address] (Maternity/NICU/Pediatrics) [phone]   Terri Thao [phone]   130 [facility] [phone]   [address] [place] [address] [phone]   [address] [place] [address] [address] [place] [address],[address] [phone]   [facility]  [phone]   [address] [place] [address], [facility]  [address] [facility] [address] [facility] [phone]

## 2022-05-16 RX ORDER — IPRATROPIUM BROMIDE AND ALBUTEROL SULFATE 2.5; .5 MG/3ML; MG/3ML
SOLUTION RESPIRATORY (INHALATION)
COMMUNITY
End: 2022-08-17 | Stop reason: SDUPTHER

## 2022-05-16 RX ORDER — SOTALOL HYDROCHLORIDE 80 MG/1
1 TABLET ORAL EVERY 12 HOURS
COMMUNITY
End: 2022-07-19

## 2022-05-16 RX ORDER — DILTIAZEM HYDROCHLORIDE 60 MG/1
TABLET, FILM COATED ORAL
COMMUNITY
End: 2022-07-19

## 2022-05-17 ENCOUNTER — TELEPHONE (OUTPATIENT)
Dept: UROLOGY | Facility: CLINIC | Age: 57
End: 2022-05-17

## 2022-05-17 NOTE — TELEPHONE ENCOUNTER
Called and spoke to pt He missed his appointment today do to thinking it was yesterday   After speaking to him and his sister pt will get the lab work for PSA or try and contact his PCP for get a new PSA PTV

## 2022-06-23 ENCOUNTER — TELEPHONE (OUTPATIENT)
Dept: UROLOGY | Facility: CLINIC | Age: 57
End: 2022-06-23

## 2022-07-08 ENCOUNTER — APPOINTMENT (EMERGENCY)
Dept: RADIOLOGY | Facility: HOSPITAL | Age: 57
DRG: 199 | End: 2022-07-08
Payer: COMMERCIAL

## 2022-07-08 ENCOUNTER — HOSPITAL ENCOUNTER (INPATIENT)
Facility: HOSPITAL | Age: 57
LOS: 11 days | DRG: 199 | End: 2022-07-19
Attending: EMERGENCY MEDICINE | Admitting: ANESTHESIOLOGY
Payer: COMMERCIAL

## 2022-07-08 DIAGNOSIS — F10.10 ALCOHOL ABUSE: ICD-10-CM

## 2022-07-08 DIAGNOSIS — I42.9 CARDIOMYOPATHY, SECONDARY (HCC): ICD-10-CM

## 2022-07-08 DIAGNOSIS — J93.9 PNEUMOTHORAX: Primary | ICD-10-CM

## 2022-07-08 DIAGNOSIS — R10.10 PAIN OF UPPER ABDOMEN: ICD-10-CM

## 2022-07-08 DIAGNOSIS — K85.90 PANCREATITIS: ICD-10-CM

## 2022-07-08 PROBLEM — R79.89 ELEVATED LFTS: Status: ACTIVE | Noted: 2022-07-08

## 2022-07-08 PROBLEM — E87.6 HYPOKALEMIA: Status: ACTIVE | Noted: 2022-07-08

## 2022-07-08 PROBLEM — K85.20 ALCOHOL-INDUCED ACUTE PANCREATITIS: Status: ACTIVE | Noted: 2022-07-08

## 2022-07-08 LAB
2HR DELTA HS TROPONIN: -1 NG/L
ALBUMIN SERPL BCP-MCNC: 4 G/DL (ref 3.5–5)
ALP SERPL-CCNC: 58 U/L (ref 46–116)
ALT SERPL W P-5'-P-CCNC: 18 U/L (ref 12–78)
ANION GAP SERPL CALCULATED.3IONS-SCNC: 17 MMOL/L (ref 4–13)
APTT PPP: 30 SECONDS (ref 23–37)
AST SERPL W P-5'-P-CCNC: 80 U/L (ref 5–45)
BACTERIA UR QL AUTO: ABNORMAL /HPF
BASOPHILS # BLD AUTO: 0.04 THOUSANDS/ΜL (ref 0–0.1)
BASOPHILS NFR BLD AUTO: 1 % (ref 0–1)
BILIRUB SERPL-MCNC: 1.13 MG/DL (ref 0.2–1)
BILIRUB UR QL STRIP: ABNORMAL
BUN SERPL-MCNC: 12 MG/DL (ref 5–25)
CALCIUM SERPL-MCNC: 9.1 MG/DL (ref 8.3–10.1)
CARDIAC TROPONIN I PNL SERPL HS: 5 NG/L
CARDIAC TROPONIN I PNL SERPL HS: 6 NG/L
CHLORIDE SERPL-SCNC: 90 MMOL/L (ref 100–108)
CLARITY UR: CLEAR
CO2 SERPL-SCNC: 30 MMOL/L (ref 21–32)
COLOR UR: ABNORMAL
CREAT SERPL-MCNC: 0.65 MG/DL (ref 0.6–1.3)
EOSINOPHIL # BLD AUTO: 0.03 THOUSAND/ΜL (ref 0–0.61)
EOSINOPHIL NFR BLD AUTO: 1 % (ref 0–6)
ERYTHROCYTE [DISTWIDTH] IN BLOOD BY AUTOMATED COUNT: 14.1 % (ref 11.6–15.1)
ETHANOL SERPL-MCNC: 182 MG/DL (ref 0–3)
GFR SERPL CREATININE-BSD FRML MDRD: 106 ML/MIN/1.73SQ M
GLUCOSE SERPL-MCNC: 84 MG/DL (ref 65–140)
GLUCOSE UR STRIP-MCNC: NEGATIVE MG/DL
HCT VFR BLD AUTO: 41 % (ref 36.5–49.3)
HGB BLD-MCNC: 13.9 G/DL (ref 12–17)
HGB UR QL STRIP.AUTO: ABNORMAL
HYALINE CASTS #/AREA URNS LPF: ABNORMAL /LPF
IMM GRANULOCYTES # BLD AUTO: 0.02 THOUSAND/UL (ref 0–0.2)
IMM GRANULOCYTES NFR BLD AUTO: 0 % (ref 0–2)
INR PPP: 0.92 (ref 0.84–1.19)
KETONES UR STRIP-MCNC: ABNORMAL MG/DL
LEUKOCYTE ESTERASE UR QL STRIP: NEGATIVE
LIPASE SERPL-CCNC: 1700 U/L (ref 73–393)
LYMPHOCYTES # BLD AUTO: 1.23 THOUSANDS/ΜL (ref 0.6–4.47)
LYMPHOCYTES NFR BLD AUTO: 19 % (ref 14–44)
MAGNESIUM SERPL-MCNC: 1.4 MG/DL (ref 1.6–2.6)
MCH RBC QN AUTO: 30.2 PG (ref 26.8–34.3)
MCHC RBC AUTO-ENTMCNC: 33.9 G/DL (ref 31.4–37.4)
MCV RBC AUTO: 89 FL (ref 82–98)
MONOCYTES # BLD AUTO: 0.72 THOUSAND/ΜL (ref 0.17–1.22)
MONOCYTES NFR BLD AUTO: 11 % (ref 4–12)
MUCOUS THREADS UR QL AUTO: ABNORMAL
NEUTROPHILS # BLD AUTO: 4.55 THOUSANDS/ΜL (ref 1.85–7.62)
NEUTS SEG NFR BLD AUTO: 68 % (ref 43–75)
NITRITE UR QL STRIP: NEGATIVE
NON-SQ EPI CELLS URNS QL MICRO: ABNORMAL /HPF
NRBC BLD AUTO-RTO: 0 /100 WBCS
PH UR STRIP.AUTO: 6 [PH]
PHOSPHATE SERPL-MCNC: 4.2 MG/DL (ref 2.7–4.5)
PLATELET # BLD AUTO: 211 THOUSANDS/UL (ref 149–390)
PMV BLD AUTO: 9 FL (ref 8.9–12.7)
POTASSIUM SERPL-SCNC: 2.5 MMOL/L (ref 3.5–5.3)
PROT SERPL-MCNC: 7.8 G/DL (ref 6.4–8.2)
PROT UR STRIP-MCNC: ABNORMAL MG/DL
PROTHROMBIN TIME: 12.2 SECONDS (ref 11.6–14.5)
RBC # BLD AUTO: 4.6 MILLION/UL (ref 3.88–5.62)
RBC #/AREA URNS AUTO: ABNORMAL /HPF
SODIUM SERPL-SCNC: 137 MMOL/L (ref 136–145)
SP GR UR STRIP.AUTO: 1.02 (ref 1–1.03)
UROBILINOGEN UR QL STRIP.AUTO: 2 E.U./DL
WBC # BLD AUTO: 6.59 THOUSAND/UL (ref 4.31–10.16)
WBC #/AREA URNS AUTO: ABNORMAL /HPF

## 2022-07-08 PROCEDURE — 93005 ELECTROCARDIOGRAM TRACING: CPT

## 2022-07-08 PROCEDURE — 99285 EMERGENCY DEPT VISIT HI MDM: CPT

## 2022-07-08 PROCEDURE — 82077 ASSAY SPEC XCP UR&BREATH IA: CPT | Performed by: EMERGENCY MEDICINE

## 2022-07-08 PROCEDURE — 74176 CT ABD & PELVIS W/O CONTRAST: CPT

## 2022-07-08 PROCEDURE — 85610 PROTHROMBIN TIME: CPT | Performed by: EMERGENCY MEDICINE

## 2022-07-08 PROCEDURE — C9113 INJ PANTOPRAZOLE SODIUM, VIA: HCPCS | Performed by: EMERGENCY MEDICINE

## 2022-07-08 PROCEDURE — 83735 ASSAY OF MAGNESIUM: CPT | Performed by: NURSE PRACTITIONER

## 2022-07-08 PROCEDURE — 32556 INSERT CATH PLEURA W/O IMAGE: CPT | Performed by: EMERGENCY MEDICINE

## 2022-07-08 PROCEDURE — 96365 THER/PROPH/DIAG IV INF INIT: CPT

## 2022-07-08 PROCEDURE — 99285 EMERGENCY DEPT VISIT HI MDM: CPT | Performed by: EMERGENCY MEDICINE

## 2022-07-08 PROCEDURE — 83690 ASSAY OF LIPASE: CPT | Performed by: EMERGENCY MEDICINE

## 2022-07-08 PROCEDURE — 84100 ASSAY OF PHOSPHORUS: CPT | Performed by: NURSE PRACTITIONER

## 2022-07-08 PROCEDURE — 36415 COLL VENOUS BLD VENIPUNCTURE: CPT

## 2022-07-08 PROCEDURE — 96374 THER/PROPH/DIAG INJ IV PUSH: CPT

## 2022-07-08 PROCEDURE — 81001 URINALYSIS AUTO W/SCOPE: CPT | Performed by: EMERGENCY MEDICINE

## 2022-07-08 PROCEDURE — 71045 X-RAY EXAM CHEST 1 VIEW: CPT

## 2022-07-08 PROCEDURE — 80053 COMPREHEN METABOLIC PANEL: CPT | Performed by: EMERGENCY MEDICINE

## 2022-07-08 PROCEDURE — 96375 TX/PRO/DX INJ NEW DRUG ADDON: CPT

## 2022-07-08 PROCEDURE — 85025 COMPLETE CBC W/AUTO DIFF WBC: CPT | Performed by: EMERGENCY MEDICINE

## 2022-07-08 PROCEDURE — 84484 ASSAY OF TROPONIN QUANT: CPT | Performed by: EMERGENCY MEDICINE

## 2022-07-08 PROCEDURE — 99222 1ST HOSP IP/OBS MODERATE 55: CPT | Performed by: NURSE PRACTITIONER

## 2022-07-08 PROCEDURE — G1004 CDSM NDSC: HCPCS

## 2022-07-08 PROCEDURE — 85730 THROMBOPLASTIN TIME PARTIAL: CPT | Performed by: EMERGENCY MEDICINE

## 2022-07-08 PROCEDURE — 96366 THER/PROPH/DIAG IV INF ADDON: CPT

## 2022-07-08 RX ORDER — MAGNESIUM SULFATE HEPTAHYDRATE 40 MG/ML
4 INJECTION, SOLUTION INTRAVENOUS ONCE
Status: DISCONTINUED | OUTPATIENT
Start: 2022-07-08 | End: 2022-07-08 | Stop reason: SDUPTHER

## 2022-07-08 RX ORDER — LANOLIN ALCOHOL/MO/W.PET/CERES
100 CREAM (GRAM) TOPICAL DAILY
Status: DISCONTINUED | OUTPATIENT
Start: 2022-07-09 | End: 2022-07-10

## 2022-07-08 RX ORDER — MAGNESIUM SULFATE HEPTAHYDRATE 40 MG/ML
2 INJECTION, SOLUTION INTRAVENOUS ONCE
Status: COMPLETED | OUTPATIENT
Start: 2022-07-09 | End: 2022-07-09

## 2022-07-08 RX ORDER — MAGNESIUM SULFATE HEPTAHYDRATE 40 MG/ML
2 INJECTION, SOLUTION INTRAVENOUS ONCE
Status: COMPLETED | OUTPATIENT
Start: 2022-07-08 | End: 2022-07-09

## 2022-07-08 RX ORDER — HYDROMORPHONE HCL/PF 1 MG/ML
0.5 SYRINGE (ML) INJECTION ONCE
Status: COMPLETED | OUTPATIENT
Start: 2022-07-08 | End: 2022-07-08

## 2022-07-08 RX ORDER — FAMOTIDINE 10 MG/ML
20 INJECTION, SOLUTION INTRAVENOUS EVERY 12 HOURS SCHEDULED
Status: DISCONTINUED | OUTPATIENT
Start: 2022-07-09 | End: 2022-07-10

## 2022-07-08 RX ORDER — PANTOPRAZOLE SODIUM 40 MG/10ML
40 INJECTION, POWDER, LYOPHILIZED, FOR SOLUTION INTRAVENOUS ONCE
Status: COMPLETED | OUTPATIENT
Start: 2022-07-08 | End: 2022-07-08

## 2022-07-08 RX ORDER — KETOROLAC TROMETHAMINE 30 MG/ML
15 INJECTION, SOLUTION INTRAMUSCULAR; INTRAVENOUS EVERY 6 HOURS PRN
Status: DISCONTINUED | OUTPATIENT
Start: 2022-07-08 | End: 2022-07-10

## 2022-07-08 RX ORDER — FOLIC ACID 1 MG/1
1 TABLET ORAL DAILY
Status: DISCONTINUED | OUTPATIENT
Start: 2022-07-09 | End: 2022-07-10

## 2022-07-08 RX ORDER — POTASSIUM CHLORIDE 20 MEQ/1
40 TABLET, EXTENDED RELEASE ORAL ONCE
Status: COMPLETED | OUTPATIENT
Start: 2022-07-08 | End: 2022-07-08

## 2022-07-08 RX ORDER — POTASSIUM CHLORIDE 14.9 MG/ML
20 INJECTION INTRAVENOUS ONCE
Status: COMPLETED | OUTPATIENT
Start: 2022-07-08 | End: 2022-07-08

## 2022-07-08 RX ADMIN — PANTOPRAZOLE SODIUM 40 MG: 40 INJECTION, POWDER, FOR SOLUTION INTRAVENOUS at 18:45

## 2022-07-08 RX ADMIN — POTASSIUM CHLORIDE 40 MEQ: 1500 TABLET, EXTENDED RELEASE ORAL at 19:23

## 2022-07-08 RX ADMIN — POTASSIUM CHLORIDE 20 MEQ: 14.9 INJECTION, SOLUTION INTRAVENOUS at 19:22

## 2022-07-08 RX ADMIN — HYDROMORPHONE HYDROCHLORIDE 0.5 MG: 1 INJECTION, SOLUTION INTRAMUSCULAR; INTRAVENOUS; SUBCUTANEOUS at 21:16
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
## 2022-07-09 ENCOUNTER — APPOINTMENT (INPATIENT)
Dept: RADIOLOGY | Facility: HOSPITAL | Age: 57
DRG: 199 | End: 2022-07-09
Payer: COMMERCIAL

## 2022-07-09 ENCOUNTER — APPOINTMENT (INPATIENT)
Dept: RADIOLOGY | Facility: HOSPITAL | Age: 57
DRG: 199 | End: 2022-07-09
Attending: RADIOLOGY
Payer: COMMERCIAL

## 2022-07-09 LAB
4HR DELTA HS TROPONIN: -1 NG/L
ALBUMIN SERPL BCP-MCNC: 3.6 G/DL (ref 3.5–5)
ALP SERPL-CCNC: 49 U/L (ref 46–116)
ALT SERPL W P-5'-P-CCNC: 16 U/L (ref 12–78)
ANION GAP SERPL CALCULATED.3IONS-SCNC: 22 MMOL/L (ref 4–13)
ANION GAP SERPL CALCULATED.3IONS-SCNC: 25 MMOL/L (ref 4–13)
AST SERPL W P-5'-P-CCNC: 67 U/L (ref 5–45)
BILIRUB SERPL-MCNC: 1.2 MG/DL (ref 0.2–1)
BUN SERPL-MCNC: 11 MG/DL (ref 5–25)
BUN SERPL-MCNC: 11 MG/DL (ref 5–25)
CALCIUM SERPL-MCNC: 8.6 MG/DL (ref 8.3–10.1)
CALCIUM SERPL-MCNC: 8.8 MG/DL (ref 8.3–10.1)
CARDIAC TROPONIN I PNL SERPL HS: 5 NG/L
CHLORIDE SERPL-SCNC: 91 MMOL/L (ref 100–108)
CHLORIDE SERPL-SCNC: 94 MMOL/L (ref 100–108)
CO2 SERPL-SCNC: 20 MMOL/L (ref 21–32)
CO2 SERPL-SCNC: 22 MMOL/L (ref 21–32)
CREAT SERPL-MCNC: 0.63 MG/DL (ref 0.6–1.3)
CREAT SERPL-MCNC: 0.82 MG/DL (ref 0.6–1.3)
ERYTHROCYTE [DISTWIDTH] IN BLOOD BY AUTOMATED COUNT: 14.4 % (ref 11.6–15.1)
GFR SERPL CREATININE-BSD FRML MDRD: 107 ML/MIN/1.73SQ M
GFR SERPL CREATININE-BSD FRML MDRD: 96 ML/MIN/1.73SQ M
GLUCOSE SERPL-MCNC: 78 MG/DL (ref 65–140)
GLUCOSE SERPL-MCNC: 79 MG/DL (ref 65–140)
HCT VFR BLD AUTO: 37.2 % (ref 36.5–49.3)
HGB BLD-MCNC: 12.4 G/DL (ref 12–17)
INR PPP: 1.04 (ref 0.84–1.19)
LIPASE SERPL-CCNC: 971 U/L (ref 73–393)
MAGNESIUM SERPL-MCNC: 2.5 MG/DL (ref 1.6–2.6)
MAGNESIUM SERPL-MCNC: 3.3 MG/DL (ref 1.6–2.6)
MCH RBC QN AUTO: 30.6 PG (ref 26.8–34.3)
MCHC RBC AUTO-ENTMCNC: 33.3 G/DL (ref 31.4–37.4)
MCV RBC AUTO: 92 FL (ref 82–98)
PLATELET # BLD AUTO: 196 THOUSANDS/UL (ref 149–390)
PMV BLD AUTO: 9.6 FL (ref 8.9–12.7)
POTASSIUM SERPL-SCNC: 3 MMOL/L (ref 3.5–5.3)
POTASSIUM SERPL-SCNC: 3 MMOL/L (ref 3.5–5.3)
PROT SERPL-MCNC: 7 G/DL (ref 6.4–8.2)
PROTHROMBIN TIME: 13.4 SECONDS (ref 11.6–14.5)
RBC # BLD AUTO: 4.05 MILLION/UL (ref 3.88–5.62)
SODIUM SERPL-SCNC: 136 MMOL/L (ref 136–145)
SODIUM SERPL-SCNC: 138 MMOL/L (ref 136–145)
WBC # BLD AUTO: 8.54 THOUSAND/UL (ref 4.31–10.16)

## 2022-07-09 PROCEDURE — 80048 BASIC METABOLIC PNL TOTAL CA: CPT | Performed by: NURSE PRACTITIONER

## 2022-07-09 PROCEDURE — 84484 ASSAY OF TROPONIN QUANT: CPT | Performed by: NURSE PRACTITIONER

## 2022-07-09 PROCEDURE — C1769 GUIDE WIRE: HCPCS

## 2022-07-09 PROCEDURE — 83690 ASSAY OF LIPASE: CPT | Performed by: NURSE PRACTITIONER

## 2022-07-09 PROCEDURE — 71045 X-RAY EXAM CHEST 1 VIEW: CPT

## 2022-07-09 PROCEDURE — 99223 1ST HOSP IP/OBS HIGH 75: CPT | Performed by: INTERNAL MEDICINE

## 2022-07-09 PROCEDURE — 32557 INSERT CATH PLEURA W/ IMAGE: CPT | Performed by: RADIOLOGY

## 2022-07-09 PROCEDURE — C1729 CATH, DRAINAGE: HCPCS

## 2022-07-09 PROCEDURE — 85027 COMPLETE CBC AUTOMATED: CPT | Performed by: NURSE PRACTITIONER

## 2022-07-09 PROCEDURE — 85610 PROTHROMBIN TIME: CPT | Performed by: NURSE PRACTITIONER

## 2022-07-09 PROCEDURE — 32551 INSERTION OF CHEST TUBE: CPT

## 2022-07-09 PROCEDURE — 0W9930Z DRAINAGE OF RIGHT PLEURAL CAVITY WITH DRAINAGE DEVICE, PERCUTANEOUS APPROACH: ICD-10-PCS | Performed by: RADIOLOGY

## 2022-07-09 PROCEDURE — 93005 ELECTROCARDIOGRAM TRACING: CPT

## 2022-07-09 PROCEDURE — 80053 COMPREHEN METABOLIC PANEL: CPT | Performed by: NURSE PRACTITIONER

## 2022-07-09 PROCEDURE — 83735 ASSAY OF MAGNESIUM: CPT | Performed by: NURSE PRACTITIONER

## 2022-07-09 PROCEDURE — NC001 PR NO CHARGE: Performed by: RADIOLOGY

## 2022-07-09 PROCEDURE — 99232 SBSQ HOSP IP/OBS MODERATE 35: CPT | Performed by: FAMILY MEDICINE

## 2022-07-09 RX ORDER — POTASSIUM CHLORIDE 14.9 MG/ML
20 INJECTION INTRAVENOUS ONCE
Status: COMPLETED | OUTPATIENT
Start: 2022-07-09 | End: 2022-07-09

## 2022-07-09 RX ORDER — POTASSIUM CHLORIDE 20 MEQ/1
40 TABLET, EXTENDED RELEASE ORAL ONCE
Status: COMPLETED | OUTPATIENT
Start: 2022-07-09 | End: 2022-07-09

## 2022-07-09 RX ORDER — LIDOCAINE WITH 8.4% SOD BICARB 0.9%(10ML)
SYRINGE (ML) INJECTION CODE/TRAUMA/SEDATION MEDICATION
Status: COMPLETED | OUTPATIENT
Start: 2022-07-09 | End: 2022-07-09

## 2022-07-09 RX ORDER — HYDROMORPHONE HCL/PF 1 MG/ML
0.5 SYRINGE (ML) INJECTION EVERY 4 HOURS PRN
Status: DISCONTINUED | OUTPATIENT
Start: 2022-07-09 | End: 2022-07-10

## 2022-07-09 RX ORDER — NICOTINE 21 MG/24HR
1 PATCH, TRANSDERMAL 24 HOURS TRANSDERMAL DAILY
Status: DISCONTINUED | OUTPATIENT
Start: 2022-07-09 | End: 2022-07-19 | Stop reason: HOSPADM

## 2022-07-09 RX ORDER — SODIUM CHLORIDE, SODIUM GLUCONATE, SODIUM ACETATE, POTASSIUM CHLORIDE, MAGNESIUM CHLORIDE, SODIUM PHOSPHATE, DIBASIC, AND POTASSIUM PHOSPHATE .53; .5; .37; .037; .03; .012; .00082 G/100ML; G/100ML; G/100ML; G/100ML; G/100ML; G/100ML; G/100ML
75 INJECTION, SOLUTION INTRAVENOUS CONTINUOUS
Status: DISCONTINUED | OUTPATIENT
Start: 2022-07-09 | End: 2022-07-11

## 2022-07-09 RX ORDER — ENOXAPARIN SODIUM 100 MG/ML
40 INJECTION SUBCUTANEOUS DAILY
Status: DISCONTINUED | OUTPATIENT
Start: 2022-07-09 | End: 2022-07-15

## 2022-07-09 RX ORDER — SODIUM CHLORIDE, SODIUM LACTATE, POTASSIUM CHLORIDE, CALCIUM CHLORIDE 600; 310; 30; 20 MG/100ML; MG/100ML; MG/100ML; MG/100ML
150 INJECTION, SOLUTION INTRAVENOUS CONTINUOUS
Status: DISCONTINUED | OUTPATIENT
Start: 2022-07-09 | End: 2022-07-09

## 2022-07-09 RX ORDER — ONDANSETRON 2 MG/ML
4 INJECTION INTRAMUSCULAR; INTRAVENOUS EVERY 6 HOURS PRN
Status: DISCONTINUED | OUTPATIENT
Start: 2022-07-09 | End: 2022-07-19 | Stop reason: HOSPADM

## 2022-07-09 RX ADMIN — ENOXAPARIN SODIUM 40 MG: 40 INJECTION SUBCUTANEOUS at 09:07

## 2022-07-09 RX ADMIN — Medication 10 ML: at 17:07

## 2022-07-09 RX ADMIN — POTASSIUM CHLORIDE 20 MEQ: 14.9 INJECTION, SOLUTION INTRAVENOUS at 04:39

## 2022-07-09 RX ADMIN — ONDANSETRON 4 MG: 2 INJECTION INTRAMUSCULAR; INTRAVENOUS at 06:40

## 2022-07-09 RX ADMIN — FOLIC ACID 1 MG: 1 TABLET ORAL at 09:07

## 2022-07-09 RX ADMIN — POTASSIUM CHLORIDE 40 MEQ: 1500 TABLET, EXTENDED RELEASE ORAL at 10:54

## 2022-07-09 RX ADMIN — ONDANSETRON 4 MG: 2 INJECTION INTRAMUSCULAR; INTRAVENOUS at 12:52

## 2022-07-09 RX ADMIN — Medication 1 TABLET: at 09:07

## 2022-07-09 RX ADMIN — MAGNESIUM SULFATE HEPTAHYDRATE 2 G: 40 INJECTION, SOLUTION INTRAVENOUS at 00:02

## 2022-07-09 RX ADMIN — NICOTINE 1 PATCH: 21 PATCH, EXTENDED RELEASE TRANSDERMAL at 09:07

## 2022-07-09 RX ADMIN — THIAMINE HCL TAB 100 MG 100 MG: 100 TAB at 09:07

## 2022-07-09 RX ADMIN — HYDROMORPHONE HYDROCHLORIDE 0.5 MG: 1 INJECTION, SOLUTION INTRAMUSCULAR; INTRAVENOUS; SUBCUTANEOUS at 14:15

## 2022-07-09 RX ADMIN — SODIUM CHLORIDE, SODIUM GLUCONATE, SODIUM ACETATE, POTASSIUM CHLORIDE, MAGNESIUM CHLORIDE, SODIUM PHOSPHATE, DIBASIC, AND POTASSIUM PHOSPHATE 150 ML/HR: .53; .5; .37; .037; .03; .012; .00082 INJECTION, SOLUTION INTRAVENOUS at 10:54

## 2022-07-09 RX ADMIN — SODIUM CHLORIDE, SODIUM GLUCONATE, SODIUM ACETATE, POTASSIUM CHLORIDE, MAGNESIUM CHLORIDE, SODIUM PHOSPHATE, DIBASIC, AND POTASSIUM PHOSPHATE 150 ML/HR: .53; .5; .37; .037; .03; .012; .00082 INJECTION, SOLUTION INTRAVENOUS at 04:39

## 2022-07-09 RX ADMIN — SODIUM CHLORIDE, SODIUM GLUCONATE, SODIUM ACETATE, POTASSIUM CHLORIDE, MAGNESIUM CHLORIDE, SODIUM PHOSPHATE, DIBASIC, AND POTASSIUM PHOSPHATE 150 ML/HR: .53; .5; .37; .037; .03; .012; .00082 INJECTION, SOLUTION INTRAVENOUS at 21:34

## 2022-07-09 RX ADMIN — HYDROMORPHONE HYDROCHLORIDE 0.5 MG: 1 INJECTION, SOLUTION INTRAMUSCULAR; INTRAVENOUS; SUBCUTANEOUS at 02:27

## 2022-07-09 RX ADMIN — SODIUM CHLORIDE, SODIUM LACTATE, POTASSIUM CHLORIDE, AND CALCIUM CHLORIDE 150 ML/HR: .6; .31; .03; .02 INJECTION, SOLUTION INTRAVENOUS at 01:13

## 2022-07-09 RX ADMIN — KETOROLAC TROMETHAMINE 15 MG: 30 INJECTION, SOLUTION INTRAMUSCULAR at 00:03

## 2022-07-09 RX ADMIN — FAMOTIDINE 20 MG: 10 INJECTION INTRAVENOUS at 22:17

## 2022-07-09 RX ADMIN — POTASSIUM CHLORIDE 40 MEQ: 1500 TABLET, EXTENDED RELEASE ORAL at 04:39

## 2022-07-09 RX ADMIN — HYDROMORPHONE HYDROCHLORIDE 0.5 MG: 1 INJECTION, SOLUTION INTRAMUSCULAR; INTRAVENOUS; SUBCUTANEOUS at 22:28

## 2022-07-09 RX ADMIN — HYDROMORPHONE HYDROCHLORIDE 0.5 MG: 1 INJECTION, SOLUTION INTRAMUSCULAR; INTRAVENOUS; SUBCUTANEOUS at 07:24

## 2022-07-09 RX ADMIN — MAGNESIUM SULFATE HEPTAHYDRATE 2 G: 40 INJECTION, SOLUTION INTRAVENOUS at 01:33

## 2022-07-09 RX ADMIN — KETOROLAC TROMETHAMINE 15 MG: 30 INJECTION, SOLUTION INTRAMUSCULAR at 12:05

## 2022-07-09 RX ADMIN — KETOROLAC TROMETHAMINE 15 MG: 30 INJECTION, SOLUTION INTRAMUSCULAR at 19:42

## 2022-07-09 RX ADMIN — FAMOTIDINE 20 MG: 10 INJECTION INTRAVENOUS at 09:09

## 2022-07-09 NOTE — SEDATION DOCUMENTATION
New anterior right chest tube successfully placed with resolution of PTX  Old right sided chest tube removed  Patient tolerated well and transferred back to room via bed  Report and care assumed by primary RN

## 2022-07-09 NOTE — ASSESSMENT & PLAN NOTE
Incidental finding  Patient denies SOB, chest pain  Reports having cough for a while  Denies fall or trauma, however patient is a poor historian  · Large right pneumothorax  · Status post right-sided chest tube insertion in ED,to wall suction  · Patient is room air, satting mid 80s    · Incentive spirometer  · Consult Pulmonary

## 2022-07-09 NOTE — ASSESSMENT & PLAN NOTE
Incidental finding  Patient denies SOB, chest pain  Reports having cough for a while  Denies fall or trauma, however patient is a poor historian  · Large right pneumothorax  · Status post right-sided chest tube insertion in ED,to wall suction  · Patient is room air, satting mid 80s  · Incentive spirometer    - Large residual right sided spontaneous pneumothorax despite chest tube placement  Discussed with pulmonology  Interventional Radiology was consulted with plan for right apical chest tube placement and removal of the existing chest tube as it is likely in a fissure and non-functional   Follow-up repeat chest x-ray  We will continue to closely monitor and follow up with further recommendations from pulmonology

## 2022-07-09 NOTE — ASSESSMENT & PLAN NOTE
Patient presents with epigastric pain nausea vomiting for about 1 week  Reports daily alcohol use, denies history of alcohol withdrawal seizure,does report hands shaking if not drinking  History of alcohol induced pancreatitis and alcoholic hepatitis  · CT showed -Mild peripancreatic stranding most concerning for pancreatitis  No peripancreatic fluid collections  Severe hepatic steatosis  · Lipase 1700 on admission  · Alcoholic level on admission 182  · Check lipid panel  · Aggressive IV hydrate  · Pain control  · Antiemetic p r n   · NPO  · Repeat lipase on 7/9 is 971

## 2022-07-09 NOTE — ASSESSMENT & PLAN NOTE
Patient reports daily alcohol use, about 6 drinks per day  Last drink today    · Follow CIWA protocol  · Alcohol cessation  · Patient declined inpatient alcohol rehab

## 2022-07-09 NOTE — ASSESSMENT & PLAN NOTE
Patient reports daily alcohol use, about 6 drinks per day  Last drink today    · Follow CIWA protocol  · Alcohol cessation  · Patient declined inpatient alcohol rehab  · MVI, thiamine, folic acid

## 2022-07-09 NOTE — DISCHARGE INSTRUCTIONS
Chest Tubes   AMBULATORY CARE:   What you need to know about a chest tube:  A chest tube is also known as chest drain or chest drainage tube  It is a plastic tube that is put through the side of your chest  It uses a suction device to remove air, blood, or fluid from around your heart or lung  A chest tube will help you breathe more easily  How to prepare for a chest tube to be inserted: Your healthcare provider will tell you how to prepare  You may be given general anesthesia to keep you asleep and free from pain during the procedure  You may instead be given local anesthesia or a nerve block  You will be able to feel some pressure during the procedure, but you should not feel any pain  You may be given antibiotics to prevent a bacterial infection  Tell your healthcare provider if you have ever had an allergic reaction to anesthesia or an antibiotic  What happens when a chest tube is inserted: Your healthcare provider will make a small incision in your chest  A tool is used to make an opening through the chest muscle  The chest tube is inserted slowly until it reaches the pleural space or chest cavity  Your healthcare provider may use an ultrasound to guide him or her  When the tube is in place, it will be connected to a suction and drainage system  Stitches may be sewn into your chest wall to hold the tube in place  Tape may also be used to secure the tube before it is covered with a bandage  What happens after a chest tube has been inserted:   Medicines  may be given to relieve pain or prevent a bacterial infection  An x-ray or a CT scan  may be used to make sure the tube is in the right place  You may also need an x-ray after your chest tube is removed  What you need to know about chest tube removal:   Your healthcare provider will tell you when the chest tube can be removed  After heart surgery, your chest tube may be removed within 72 hours   For lungs, the chest tube can be taken out when your lung is working normally again  One sign of this is little or no fluid draining into the chest tube  Another sign is no air leaking for 1 to 2 days  You may need a chest x-ray to make sure your lung is working as it should  You may be given medicine to treat pain before the tube is removed  The tape will be removed  The stitches holding the tube in place will be loosened  You may need to breathe a certain way as the tube is taken out  Your healthcare provider will remove the tube  He or she may tighten the stitches to close the opening  He or she will cover the area with a bandage that will stop air from getting into your chest     Risks of a chest tube: You may get an infection in the area where the tube was inserted  The tube may damage organs that are close to your lungs  Your chest tube may move out of place when you move or turn  If this happens, you may need to have another chest tube put in  You may get a blood clot in your leg or arm  This can cause pain and swelling, and it can stop blood from flowing where it needs to go in your body  The blood clot can break loose and travel to your lungs  A blood clot in your lungs can cause chest pain and trouble breathing  This can be life-threatening  Seek care immediately if:   Blood or fluid soaks through your bandage  Your bandage comes off  Your arm or leg feels warm, tender, and painful  It may look swollen and red  You suddenly feel lightheaded and have shortness of breath  You have chest pain  You may have more pain when you take a deep breath or cough  You may cough up blood  Contact your healthcare provider if:   You have a fever  You have severe pain and swelling at your wound area  Your wound is red, draining pus, or has a bad smell coming from it  You have questions or concerns about your condition or care  Medicines:   You may need any of the following:  Antibiotics  help prevent or fight an infection caused by bacteria  Prescription pain medicine  may be given  Ask your healthcare provider how to take this medicine safely  Some prescription pain medicines contain acetaminophen  Do not take other medicines that contain acetaminophen without talking to your healthcare provider  Too much acetaminophen may cause liver damage  Prescription pain medicine may cause constipation  Ask your healthcare provider how to prevent or treat constipation  Take your medicine as directed  Contact your healthcare provider if you think your medicine is not helping or if you have side effects  Tell him or her if you are allergic to any medicine  Keep a list of the medicines, vitamins, and herbs you take  Include the amounts, and when and why you take them  Bring the list or the pill bottles to follow-up visits  Carry your medicine list with you in case of an emergency  Self-care:   Find a comfortable position  You may have pain or discomfort while the chest tube is in  Lie in a different position to help decrease your pain  Cough and breathe deeply as directed  This will decrease your risk for a lung infection  Take deep breaths and cough 10 times each hour  Hold a pillow tightly against your incision when you cough  Take a deep breath and hold it for as long as your can  Then let the air out and cough strongly  Care for your chest tube:   Check your chest tube for kinks or loops  Keep the tube close to you when you are in bed, but do not lie on it  Do not let loops of tubing hang down the side of your bed  Be sure your tubing is long enough so that you can move and turn in bed without pulling on it  Never clamp the tube yourself  Keep the suction device below the level of your chest   This will help fluids drain from your chest to the container below  This will also help prevent fluids from flowing back into your chest     Make sure your chest tube is secure  Make sure your chest tube is securely taped to your body  Your chest tube may also be taped to the suction device to help prevent the tubes from coming apart  Do not turn knobs or change settings on your device unless a healthcare provider tells you to  If your suction device has water in it, the water should bubble gently, with short periods of no bubbling  A lot of bubbling that does not stop may mean air is leaking  Wound care:  Keep your bandage clean and dry  Ask your healthcare provider when you can bathe  Follow up with your doctor as directed:  Write down your questions so you remember to ask them during your visits  © Copyright [employer] 2022 Information is for End User's use only and may not be sold, redistributed or otherwise used for commercial purposes  All illustrations and images included in CareNotes® are the copyrighted property of [employer]  or Paul Springer  The above information is an  only  It is not intended as medical advice for individual conditions or treatments  Talk to your doctor, nurse or pharmacist before following any medical regimen to see if it is safe and effective for you

## 2022-07-09 NOTE — ASSESSMENT & PLAN NOTE
Potassium 2 5   · EKG findings as above  · Patient received total of 60 mEq potassium in ED  · Will repeat potassium level at midnight    · Telemetry

## 2022-07-09 NOTE — CONSULTS
e-Consult (IPC)  - Interventional Radiology  Nic Ayala 59 y o  male MRN: [id number]  Unit/Bed#: [address] [place] [address] Encounter: [id number]          Interventional Radiology has been consulted to evaluate Nic Ayala    IP Consult to IR  Consult performed by: Darby Molina MD  Consult ordered by: Derrick Mclaughlin DO        07/09/22    Assessment/Recommendation:   59year old male with right pneumothorax s/p chest tube placement has persistent large pneumothorax  - Plan for right apical chest tube placement and removal of the existing chest tube as it is likely in a fissure and non-functional       Total time spent in review of data, discussion with requesting provider and rendering advice was 10 min  Thank you for allowing Interventional Radiology to participate in the care of [address]  Please don't hesitate to call or TigerText us with any questions       Darby Molina MD

## 2022-07-09 NOTE — PROGRESS NOTES
[facility] 43  Progress Note - Nic Ayala 1965, 59 y o  male MRN: [id number]  Unit/Bed#: [address] [place] [address] Encounter: [id number]  Primary Care Provider: Chela Mcgill MD   Date and time admitted to hospital: 7/8/2022  5:50 PM    Hypokalemia  Assessment & Plan  Potassium 2 5   · EKG findings as above  · Patient received total of 60 mEq potassium in ED  · Will repeat potassium level at midnight  · Telemetry        Elevated LFTs  Assessment & Plan  Mild AST and TB elevation  Chronic  Likely from alcohol use  INR normal   CT showed -Severe hepatic steatosis  Monitor LFTs  Alcohol cessation  Results from last 7 days   Lab Units 07/09/22  0454 07/08/22  1801   TOTAL BILIRUBIN mg/dL 1 20* 1 13*   ALK PHOS U/L 49 58   ALT U/L 16 18   AST U/L 67* 80*          Pneumothorax  Assessment & Plan  Incidental finding  Patient denies SOB, chest pain  Reports having cough for a while  Denies fall or trauma, however patient is a poor historian  · Large right pneumothorax  · Status post right-sided chest tube insertion in ED,to wall suction  · Patient is room air, satting mid 80s  · Incentive spirometer    - Large residual right sided spontaneous pneumothorax despite chest tube placement  Discussed with pulmonology  Interventional Radiology was consulted with plan for right apical chest tube placement and removal of the existing chest tube as it is likely in a fissure and non-functional   Follow-up repeat chest x-ray  We will continue to closely monitor and follow up with further recommendations from pulmonology  Hypomagnesemia  Assessment & Plan  Mag 1 4  · Will order Mag sulfate 4 g x 1   · Telemetry      Tobacco abuse  Assessment & Plan  Smoking cessation, nicotine patch  Paroxysmal atrial fibrillation [facility])  Assessment & Plan  Patient reports he stopped taking Xarelto about 1 month ago due to out of supply  Patient appears to be on Cardizem, sotalol, Toprol XL in the past as well  Patient denies taking any prescription medications except Xarelto which he stopped 1 month ago  History of cardioversion in 2018  · Tachycardic in ED  · EKG showed sinus tach, rate 106, , V1 to V3 TWI  · Troponins unremarkable  · Telemetry  · Resume Toprol XL 25mg p o  Daily  · Check 2D echo  · Optimize electrolytes   · Cardiology consulted        Alcohol abuse  Assessment & Plan  Patient reports daily alcohol use, about 6 drinks per day  Last drink today  · Follow [facility] protocol  · Alcohol cessation  · Patient declined inpatient alcohol rehab  · MVI, thiamine, folic acid    Cardiomyopathy, 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  Assessment & Plan  TONYA in 2018 showed normal EF  Update 2D echo    * Alcohol-induced acute pancreatitis  Assessment & Plan  Patient presents with epigastric pain nausea vomiting for about 1 week  Reports daily alcohol use, denies history of alcohol withdrawal seizure,does report hands shaking if not drinking  History of alcohol induced pancreatitis and alcoholic hepatitis  · CT showed -Mild peripancreatic stranding most concerning for pancreatitis  No peripancreatic fluid collections  Severe hepatic steatosis  · Lipase 1700 on admission  · Alcoholic level on admission 182  · Check lipid panel  · Aggressive IV hydrate  · Pain control  · Antiemetic p r n   · NPO  · Repeat lipase on  is 971  VTE Pharmacologic Prophylaxis:   Lovenox prophylaxis  Code Status: Level 1 - Full Code    Subjective:   Patient seen and examined at bedside  Repeat chest x-ray shows large residual pneumothorax  Patient reports some pain with the chest tube is currently inserted however denies any abdominal pain      Objective:     Vitals:   Temp (24hrs), Av 5 °F (36 4 °C), Min:96 8 °F (36 °C), Max:97 8 °F (36 6 °C)    Temp:  [96 8 °F (36 °C)-97 8 °F (36 6 °C)] 97 8 °F (36 6 °C)  HR:  [101-120] 109  Resp:  [17-75] 18  BP: (120-154)/(81-94) 139/87  SpO2:  [91 %-97 %] 94 %  Body mass index is 19 99 kg/m²  Input and Output Summary (last 24 hours): Intake/Output Summary (Last 24 hours) at 7/9/2022 1609  Last data filed at 7/9/2022 1401  Gross per 24 hour   Intake 2945 5 ml   Output 1250 ml   Net 1695 5 ml       Physical Exam:   Physical Exam  HENT:      Head: Normocephalic  Mouth/Throat:      Mouth: Mucous membranes are moist    Eyes:      Extraocular Movements: Extraocular movements intact  Cardiovascular:      Rate and Rhythm: Normal rate  Pulmonary:      Effort: Pulmonary effort is normal       Comments: Right-sided chest tube  Diminished breath sounds on the right side  Abdominal:      Palpations: Abdomen is soft  Tenderness: There is no abdominal tenderness  Skin:     General: Skin is warm  Neurological:      Mental Status: He is alert     Psychiatric:         Mood and Affect: Mood normal          Behavior: Behavior normal         Additional Data:     Labs:  Results from last 7 days   Lab Units 07/09/22  0454 07/08/22  1801   WBC Thousand/uL 8 54 6 59   HEMOGLOBIN g/dL 12 4 13 9   HEMATOCRIT % 37 2 41 0   PLATELETS Thousands/uL 196 211   NEUTROS PCT %  --  68   LYMPHS PCT %  --  19   MONOS PCT %  --  11   EOS PCT %  --  1     Results from last 7 days   Lab Units 07/09/22  0454   SODIUM mmol/L 138   POTASSIUM mmol/L 3 0*   CHLORIDE mmol/L 94*   CO2 mmol/L 22   BUN mg/dL 11   CREATININE mg/dL 0 82   ANION GAP mmol/L 22*   CALCIUM mg/dL 8 6   ALBUMIN g/dL 3 6   TOTAL BILIRUBIN mg/dL 1 20*   ALK PHOS U/L 49   ALT U/L 16   AST U/L 67*   GLUCOSE RANDOM mg/dL 78     Results from last 7 days   Lab Units 07/09/22  0454   INR  1 04     Lines/Drains:  Invasive Devices  Report    Peripheral Intravenous Line  Duration           Peripheral IV 07/08/22 Left Arm <1 day    Peripheral IV 07/08/22 Proximal;Right;Ventral (anterior) Forearm <1 day          Drain  Duration           Chest Tube Right <1 day              Telemetry:  Telemetry Orders (From admission, onward)             24 Hour Telemetry Monitoring  Continuous x 24 Hours (Telem)        References:    Telemetry Guidelines   Question:  Reason for 24 Hour Telemetry  Answer:  Metabolic/Electrolyte Disturbance with High Probability of Dysrhythmia (K level <3 or >6, or KCL infusion >=10mEq/hr)                          Imaging:  Reviewed    Recent Cultures (last 7 days):         Last 24 Hours Medication List:   Current Facility-Administered Medications   Medication Dose Route Frequency Provider Last Rate    enoxaparin  40 mg Subcutaneous Daily Cuipati Ohk, DOYLENP      famotidine  20 mg Intravenous Q12H [facility] Cuiyin Jeanetterik, CRNP      folic acid  1 mg Oral Daily Cuiyin Jeanetterik, CRNP      HYDROmorphone  0 5 mg Intravenous Q4H PRN Cuiyin Yurik, CRNP      ketorolac  15 mg Intravenous Q6H PRN Cuiyin Yurik, CRNP      multi-electrolyte  150 mL/hr Intravenous Continuous Cuiyin Yurik, CRNP 150 mL/hr (07/09/22 1054)    multivitamin-minerals  1 tablet Oral Daily Cuipati Reidrik, CRNP      nicotine  1 patch Transdermal Daily Cuiyin Jeanetterik, CRNP      ondansetron  4 mg Intravenous Q6H PRN Cuiyin Yurik, CRNP      thiamine  100 mg Oral Daily Cuipati Shetty, DOYLENP          Today, Patient Was Seen By: Laura London MD    **Please Note: This note may have been constructed using a voice recognition system  **

## 2022-07-09 NOTE — BRIEF OP NOTE (RAD/CATH)
INTERVENTIONAL RADIOLOGY PROCEDURE NOTE    Date: 7/9/2022    Procedure:   1  Right apical chest tube placement  2  Removal of existing right lateral chest tube  Preoperative diagnosis:   1  Pneumothorax    2  Pain of upper abdomen    3  Pancreatitis    4  Alcohol abuse         Postoperative diagnosis: Same  Surgeon: Maryan Macario MD     Assistant: None  No qualified resident was available  Blood loss: 1 mL    Specimens: None     Findings:   1  Successful right apical chest tube placement using 10 Fr catheter  2  Existing right lateral chest tube was in a fissure and non functional   This was removed  Complications: None immediate      Anesthesia: local

## 2022-07-09 NOTE — ASSESSMENT & PLAN NOTE
Patient presents with epigastric pain nausea vomiting for about 1 week  Reports daily alcohol use, denies history of alcohol withdrawal seizure,does report hands shaking if not drinking  History of alcohol induced pancreatitis and alcoholic hepatitis  · CT showed -Mild peripancreatic stranding most concerning for pancreatitis  No peripancreatic fluid collections  Severe hepatic steatosis  · Lipase 1700  · Alcoholic level 893  · Check lipid panel  · Aggressive IV hydrate  · Pain control  · Antiemetic p r n   · NPO  · Repeat lipase in [place]
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
## 2022-07-09 NOTE — ASSESSMENT & PLAN NOTE
Patient reports he stopped taking Xarelto about 1 month ago due to out of supply  Patient appears to be on Cardizem, sotalol, Toprol XL in the past as well  Patient denies taking any prescription medications except Xarelto which he stopped 1 month ago  History of cardioversion in 2018  · Tachycardic in ED  · EKG showed sinus tach, rate 106, , V1 to V3 TWI  · Troponins unremarkable  · Telemetry  · Resume Toprol XL 25mg p o   Daily  · Check 2D echo  · Optimize electrolytes   · Consult Cardiology  · Repeat EKG in the morning

## 2022-07-09 NOTE — UTILIZATION REVIEW
Inpatient Admission Authorization Request   NOTIFICATION OF INPATIENT ADMISSION/INPATIENT AUTHORIZATION REQUEST   SERVICING FACILITY:   [place] [address]  Tax ID: [id number]  NPI: [id number]  Place of Service: Inpatient [address] [facility]  [address]  Place of Service Code: 24     ATTENDING PROVIDER:  Attending Name and NPI#: Jada Noemi Salcedo [[id number]]  Address: [address] [place] [address]  Phone: [phone]     UTILIZATION REVIEW CONTACT:  Juan Cano Utilization   Network Utilization Review Department  Phone: [phone]  Fax [phone]  Email: [email or web address]     PHYSICIAN ADVISORY SERVICES:  FOR [id number] REVIEW - MEDICAL NECESSITY DENIAL  Phone: [phone]  Fax: [phone]  Email: [email or web address]     TYPE OF REQUEST:  Inpatient Status     ADMISSION INFORMATION:  ADMISSION DATE/TIME: 7/8/22  9:30 PM  PATIENT DIAGNOSIS CODE/DESCRIPTION:  Alcohol abuse [F10 10]  Pancreatitis [K85 90]  Abdominal pain [R10 9]  Pain of upper abdomen [R10 10]  Pneumothorax [J93 9]  DISCHARGE DATE/TIME: No discharge date for patient encounter  IMPORTANT INFORMATION:  Please contact Veronica Ceron directly with any questions or concerns regarding this request  Department voicemails are confidential     Send requests for admission clinical reviews, concurrent reviews, approvals, and administrative denials due to lack of clinical to fax [phone]

## 2022-07-09 NOTE — ASSESSMENT & PLAN NOTE
Mild AST and TB elevation  Chronic  Likely from alcohol use  INR normal   CT showed -Severe hepatic steatosis  Monitor LFTs  Alcohol cessation      Results from last 7 days   Lab Units 07/08/22  1801   TOTAL BILIRUBIN mg/dL 1 13*   ALK PHOS U/L 58   ALT U/L 18   AST U/L 80*

## 2022-07-09 NOTE — ASSESSMENT & PLAN NOTE
Patient reports he stopped taking Xarelto about 1 month ago due to out of supply  Patient appears to be on Cardizem, sotalol, Toprol XL in the past as well  Patient denies taking any prescription medications except Xarelto which he stopped 1 month ago  History of cardioversion in 2018  · Tachycardic in ED  · EKG showed sinus tach, rate 106, , V1 to V3 TWI  · Troponins unremarkable  · Telemetry  · Resume Toprol XL 25mg p o   Daily  · Check 2D echo  · Optimize electrolytes   · Cardiology consulted

## 2022-07-09 NOTE — ASSESSMENT & PLAN NOTE
Mild AST and TB elevation  Chronic  Likely from alcohol use  INR normal   CT showed -Severe hepatic steatosis  Monitor LFTs  Alcohol cessation      Results from last 7 days   Lab Units 07/09/22  0454 07/08/22  1801   TOTAL BILIRUBIN mg/dL 1 20* 1 13*   ALK PHOS U/L 49 58   ALT U/L 16 18   AST U/L 67* 80*
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
## 2022-07-09 NOTE — PLAN OF CARE
Problem: Nutrition/Hydration-ADULT  Goal: Nutrient/Hydration intake appropriate for improving, restoring or maintaining nutritional needs  Description: Monitor and assess patient's nutrition/hydration status for malnutrition  Collaborate with interdisciplinary team and initiate plan and interventions as ordered  Monitor patient's weight and dietary intake as ordered or per policy  Utilize nutrition screening tool and intervene as necessary  Determine patient's food preferences and provide high-protein, high-caloric foods as appropriate       INTERVENTIONS:  - Monitor oral intake, urinary output, labs, and treatment plans  - Assess nutrition and hydration status and recommend course of action  - Evaluate amount of meals eaten  - Assist patient with eating if necessary   - Allow adequate time for meals  - Recommend/ encourage appropriate diets, oral nutritional supplements, and vitamin/mineral supplements  - Order, calculate, and assess calorie counts as needed  - Recommend, monitor, and adjust tube feedings and TPN/PPN based on assessed needs  - Assess need for intravenous fluids  - Provide specific nutrition/hydration education as appropriate  - Include patient/family/caregiver in decisions related to nutrition  Outcome: Progressing     Problem: MOBILITY - ADULT  Goal: Maintain or return to baseline ADL function  Description: INTERVENTIONS:  -  Assess patient's ability to carry out ADLs; assess patient's baseline for ADL function and identify physical deficits which impact ability to perform ADLs (bathing, care of mouth/teeth, toileting, grooming, dressing, etc )  - Assess/evaluate cause of self-care deficits   - Assess range of motion  - Assess patient's mobility; develop plan if impaired  - Assess patient's need for assistive devices and provide as appropriate  - Encourage maximum independence but intervene and supervise when necessary  - Involve family in performance of ADLs  - Assess for home care needs following discharge   - Consider OT consult to assist with ADL evaluation and planning for discharge  - Provide patient education as appropriate  Outcome: Progressing  Goal: Maintains/Returns to pre admission functional level  Description: INTERVENTIONS:  - Perform BMAT or MOVE assessment daily    - Set and communicate daily mobility goal to care team and patient/family/caregiver  - Collaborate with rehabilitation services on mobility goals if consulted  - Perform Range of Motion 2 times a day  - Reposition patient every 2 hours    - Dangle patient 2 times a day  - Stand patient 2 times a day  - Ambulate patient                                                                                                                                                                                                                                                      times a day  - Out of bed to chair 2 times a day   - Out of bed for meals 3 times a day  - Out of bed for toileting  - Record patient progress and toleration of activity level   Outcome: Progressing     Problem: PAIN - ADULT  Goal: Verbalizes/displays adequate comfort level or baseline comfort level  Description: Interventions:  - Encourage patient to monitor pain and request assistance  - Assess pain using appropriate pain scale  - Administer analgesics based on type and severity of pain and evaluate response  - Implement non-pharmacological measures as appropriate and evaluate response  - Consider cultural and social influences on pain and pain management  - Notify physician/advanced practitioner if interventions unsuccessful or patient reports new pain  Outcome: Progressing     Problem: INFECTION - ADULT  Goal: Absence or prevention of progression during hospitalization  Description: INTERVENTIONS:  - Assess and monitor for signs and symptoms of infection  - Monitor lab/diagnostic results  - Monitor all insertion sites, i e  indwelling lines, tubes, and drains  - Monitor endotracheal if appropriate and nasal secretions for changes in amount and color  - [place] appropriate cooling/warming therapies per order  - Administer medications as ordered  - Instruct and encourage patient and family to use good hand hygiene technique  - Identify and instruct in appropriate isolation precautions for identified infection/condition  Outcome: Progressing  Goal: Absence of fever/infection during neutropenic period  Description: INTERVENTIONS:  - Monitor WBC    Outcome: Progressing

## 2022-07-09 NOTE — ED NOTES
Pt given incentive spirometer and educated on use  Pt demonstrated teach back and voiced understanding         Gurpreet Donahue [facility]  07/08/22 3164

## 2022-07-09 NOTE — UTILIZATION REVIEW
Initial Clinical Review    Admission: Date/Time/Statement:   Admission Orders (From admission, onward)     Ordered        07/08/22 2130  Inpatient Admission  Once                   Orders Placed This Encounter   Procedures    Inpatient Admission     Standing Status:   Standing     Number of Occurrences:   1     Order Specific Question:   Level of Care     Answer:   Med Surg [16]     Order Specific Question:   Estimated length of stay     Answer:   More than 2 Midnights     Order Specific Question:   Certification     Answer:   I certify that inpatient services are medically necessary for this patient for a duration of greater than two midnights  See H&P and MD Progress Notes for additional information about the patient's course of treatment  ED Arrival Information     Expected   -    Arrival   7/8/2022 17:13    Acuity   Urgent            Means of arrival   Walk-In    Escorted by   Self    Service   Hospitalist    Admission type   Urgent            Arrival complaint   abdominal pain        Chief Complaint   Patient presents with    Abdominal Pain     Abd pain for few days, unable to eat for 3 days  NO nausea, just pain  Daily drinking 6 drinks a day per pt  Initial Presentation:   58 y/o male with PMHx alcohol abuse, alcohol-induced pancreatitis, alcoholic hepatitis, Cardiomyopathy,  paroxysmal Atrial Fib - Atrial Flutter, nicotine abuse - presents urgently to [facility] ED on 7/8/22 2nd 1 week history of intermittent epigastric pain with nausea vomiting and cough, unable to eat x 3 days  Reports daily alcohol use of about 6 drinks per day in past 8 months, denies history alcohol withdrawal seizures but reports hands shake if not drinking  In ED -   Rm Air SpO2 94 %  Exam: Right upper lobe diminished breath sounds  Chest x ray + CT chest showed large right pneumothorax and partial right lung atelectasis    Labs: Alcohol 182   K+ 2 5  Mg+ 1 4   Cl 90  T Bili 1 13   BNP 1700   UA 3+ ketones, +protein  ED TX: # 16 Right chest tube inserted, IV Protonix, IV KCL, IV Dilaudid  Admitted Inpatient 7/8/22 at 2130 2nd Acute Alcohol-induced acute pancreatitis - NPO, IVF, pain control; Large Right Pneumothorax - chest tube to suction, supp O2 prn, Incentive Spirometry; Hypokalemia - monitor + replace K+ prn    7/9/22:   Acute Alcohol-induced acute pancreatiti:  + epigastric pain - IV Toradol x 2 + IV Dilaudid x 2   nausea - IV Zofran x 2  Lipase down 974     IVF continues at 150 cc/hr  Right Pneumothorax - Rt chest tube to suction continues   Hypokalemia - K+ 3 0    ED Triage Vitals [07/08/22 1757]   Temperature Pulse Respirations Blood Pressure SpO2   (!) 96 8 °F (36 °C) (!) 120 20 120/85 94 %      Temp Source Heart Rate Source Patient Position - Orthostatic VS BP Location FiO2 (%)   Tympanic Monitor Sitting Right arm --      Pain Score       8          Wt Readings from Last 1 Encounters:   07/08/22 59 6 kg (131 lb 8 oz)     Additional Vital Signs:   Date/Time Temp Pulse Resp BP MAP (mmHg) SpO2 O2 Device Patient Position - Orthostatic VS   07/09/22 0720 -- 114 Abnormal  20 154/94 114 93 % None (Room air) --   07/09/22 04:56:18 97 6 °F (36 4 °C) 101 20 138/90 106 91 % -- --   07/09/22 02:14:11 -- 119 Abnormal  18 146/91 109 96 % -- --   07/08/22 23:38:14 -- 105 -- 143/93 110 93 % None (Room air) Lying   07/08/22 23:27:18 97 6 °F (36 4 °C) 105 20 -- -- 94 % -- Lying   07/08/22 2320 -- -- -- -- -- -- None (Room air) --   07/08/22 2030 -- 108 Abnormal  75 Abnormal  146/82 106 96 % -- --   07/08/22 1915 -- 109 Abnormal  17 135/81 103 97 % None (Room air) Lying   07/08/22 1757 96 8 °F (36 °C) Abnormal  120 Abnormal  20 120/85 -- 94 % None (Room air) Sitting      07/08 0701   07/09 0700 07/09 0701   -   I V  (mL/kg) 2008 (33 7) 937 5 (15 7)   Total Intake(mL/kg) 2008 (33 7) 937 5 (15 7)   Urine (mL/kg/hr) 650    Emesis/NG output 0    Stool 0    Total Output 650    Net +1358 +937 5        Unmeasured Stool Occurrence 0 x    Unmeasured Emesis Occurrence 400 x      Pertinent Labs/Diagnostic Test Results:   XR chest portable   Right chest tube with persistent large right pneumothorax and partial right lung atelectasis  XR chest 1 view portable   Right chest tube with persistent large right pneumothorax and partial right lung atelectasis  CT abdomen pelvis wo contrast   Final Result by Barbra Guerra MD (07/08 1904)   1  Incompletely visualized large right pneumothorax  Radiographic evaluation recommended  2   Mild peripancreatic stranding most concerning for pancreatitis  No peripancreatic fluid collections  3   Severe hepatic steatosis              Results from last 7 days   Lab Units 07/09/22  0454 07/08/22  1801   WBC Thousand/uL 8 54 6 59   HEMOGLOBIN g/dL 12 4 13 9   HEMATOCRIT % 37 2 41 0   PLATELETS Thousands/uL 196 211   NEUTROS ABS Thousands/µL  --  4 55     Results from last 7 days   Lab Units 07/09/22  0454 07/09/22  0159 07/08/22  1801   SODIUM mmol/L 138 136 137   POTASSIUM mmol/L 3 0* 3 0* 2 5*   CHLORIDE mmol/L 94* 91* 90*   CO2 mmol/L 22 20* 30   ANION GAP mmol/L 22* 25* 17*   BUN mg/dL 11 11 12   CREATININE mg/dL 0 82 0 63 0 65   EGFR ml/min/1 73sq m 96 107 106   CALCIUM mg/dL 8 6 8 8 9 1   MAGNESIUM mg/dL 3 3* 2 5 1 4*   PHOSPHORUS mg/dL  --   --  4 2     Results from last 7 days   Lab Units 07/09/22  0454 07/08/22  1801   AST U/L 67* 80*   ALT U/L 16 18   ALK PHOS U/L 49 58   TOTAL PROTEIN g/dL 7 0 7 8   ALBUMIN g/dL 3 6 4 0   TOTAL BILIRUBIN mg/dL 1 20* 1 13*     Results from last 7 days   Lab Units 07/09/22  0454 07/09/22  0159 07/08/22  1801   GLUCOSE RANDOM mg/dL 78 79 84     Results from last 7 days   Lab Units 07/09/22  0159 07/08/22  2041 07/08/22  1845   HS TNI 0HR ng/L  --   --  6   HS TNI 2HR ng/L  --  5  --    HSTNI D2 ng/L  --  -1  --    HS TNI 4HR ng/L 5  --   --    HSTNI D4 ng/L -1  --   --      Results from last 7 days   Lab Units 07/09/22  0454 07/08/22  1845   PROTIME seconds 13 4 12 2   INR  1 04 0 92   PTT seconds  --  30     Results from last 7 days   Lab Units 07/09/22  0454 07/08/22  1801   LIPASE u/L 971* 1,700*     Results from last 7 days   Lab Units 07/08/22  2224   CLARITY UA  Clear   COLOR UA  [place]   SPEC GRAV UA  1 025   PH UA  6 0   GLUCOSE UA mg/dl Negative   KETONES UA mg/dl >=80 (3+)*   BLOOD UA  Small*   PROTEIN UA mg/dl 100 (2+)*   NITRITE UA  Negative   BILIRUBIN UA  Interference- unable to analyze*   UROBILINOGEN UA E U /dl 2 0*   LEUKOCYTES UA  Negative   WBC UA /hpf 1-2   RBC UA /hpf 2-4   BACTERIA UA /hpf Occasional   EPITHELIAL CELLS WET PREP /hpf Occasional   MUCUS THREADS  Occasional*     Results from last 7 days   Lab Units 07/08/22  1801   ETHANOL LVL mg/dL 182*     ED Treatment:   Medication Administration from 07/08/2022 1712 to 07/08/2022 2314       Date/Time Order Dose Route Action     07/08/2022 1845 pantoprazole (PROTONIX) injection 40 mg 40 mg Intravenous Given     07/08/2022 1923 potassium chloride (K-DUR,KLOR-CON) CR tablet 40 mEq 40 mEq Oral Given     07/08/2022 1922 potassium chloride 20 mEq IVPB (premix) 20 mEq Intravenous New Bag     07/08/2022 2116 HYDROmorphone (DILAUDID) injection 0 5 mg 0 5 mg Intravenous Given     Past Medical History:   Diagnosis Date    Alcohol abuse     1 pint of gin daily on weekends    Alcoholic hepatitis     Mild    Atrial flutter ([facility] [address] ) 08/2015    Recurrent and symptomatic, also occurring on 1/7/2015    Cardiomyopathy, nonischemic (HCC)     Caused by uncontrolled tachycardia    Congenital heart disease     Type unknown and not evident on echocardiography    Hypokalemia     Murmur     Smoking     One pack per day for 38 years     Present on Admission:   Alcohol abuse   Cardiomyopathy, 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   Paroxysmal atrial fibrillation (HCC)   Tobacco abuse   Hypomagnesemia    Admitting Diagnosis: Alcohol abuse [F10 10]  Pancreatitis [K85 90]  Abdominal pain [R10 9]  Pain of upper abdomen [R10 10]  Pneumothorax [J93 9]    Age/Sex: 59 y o  male    Admission Orders:  1:1 Continual Observation  Telemetry  VS q4hrs  CIWA Ar Score q1 hr x 4 - q4hrs  Nasal O2 at 2 L/min - Titrate SpO2 > 92 %  Continuous Pulse Oximetry  Rt Chest Tube - suction  NPO  SCD  Up + OOB as tolerated  I+O q shift    Scheduled Medications:  enoxaparin, 40 mg, Subcutaneous, Daily  famotidine, 20 mg, Intravenous, E25P BRANDY  folic acid, 1 mg, Oral, Daily  multivitamin-minerals, 1 tablet, Oral, Daily  nicotine, 1 patch, Transdermal, Daily  thiamine, 100 mg, Oral, Daily    Continuous IV Infusions:  IVF multi-electrolyte, 150 mL/hr, Intravenous, Continuous    PRN Meds:  IV HYDROmorphone, 0 5 mg, Intravenous, Q4H PRN - 7/9 X 2  IV ketorolac, 15 mg, Intravenous, Q6H PRN - 7/9 X 2  IV ondansetron, 4 mg, Intravenous, Q6H PRN - 7/9 X 2    IP CONSULT TO PULMONOLOGY    Network Utilization Review Department  ATTENTION: Please call with any questions or concerns to [phone] and carefully listen to the prompts so that you are directed to the right person  All voicemails are confidential   [facility] all requests for admission clinical reviews, approved or denied determinations and any other requests to dedicated fax number below belonging to the campus where the patient is receiving treatment   List of dedicated fax numbers for the Facilities:  [address] DENIALS (Administrative/Medical Necessity) [phone]   [address] (Maternity/NICU/Pediatrics) [phone]   [address] [facility] [place] [address] [address] [address] 974 [facility] [phone]   [address] [place] [address] [phone]

## 2022-07-10 ENCOUNTER — APPOINTMENT (INPATIENT)
Dept: RADIOLOGY | Facility: HOSPITAL | Age: 57
DRG: 199 | End: 2022-07-10
Payer: COMMERCIAL

## 2022-07-10 PROBLEM — F10.939 ALCOHOL WITHDRAWAL (HCC): Status: ACTIVE | Noted: 2018-06-07
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
## 2022-07-10 PROBLEM — E87.6 HYPOKALEMIA: Status: RESOLVED | Noted: 2022-07-08 | Resolved: 2022-07-10

## 2022-07-10 PROBLEM — F10.239 ALCOHOL WITHDRAWAL (HCC): Status: ACTIVE | Noted: 2018-06-07

## 2022-07-10 PROBLEM — G93.40 ENCEPHALOPATHY: Status: ACTIVE | Noted: 2022-07-10

## 2022-07-10 PROBLEM — R00.0 TACHYCARDIA: Status: RESOLVED | Noted: 2022-04-21 | Resolved: 2022-07-10

## 2022-07-10 LAB
ANION GAP SERPL CALCULATED.3IONS-SCNC: 16 MMOL/L (ref 4–13)
BASOPHILS # BLD AUTO: 0.03 THOUSANDS/ΜL (ref 0–0.1)
BASOPHILS NFR BLD AUTO: 0 % (ref 0–1)
BUN SERPL-MCNC: 7 MG/DL (ref 5–25)
CALCIUM SERPL-MCNC: 7.9 MG/DL (ref 8.3–10.1)
CHLORIDE SERPL-SCNC: 99 MMOL/L (ref 100–108)
CHOLEST SERPL-MCNC: 153 MG/DL
CO2 SERPL-SCNC: 20 MMOL/L (ref 21–32)
CREAT SERPL-MCNC: 0.64 MG/DL (ref 0.6–1.3)
EOSINOPHIL # BLD AUTO: 0.04 THOUSAND/ΜL (ref 0–0.61)
EOSINOPHIL NFR BLD AUTO: 0 % (ref 0–6)
ERYTHROCYTE [DISTWIDTH] IN BLOOD BY AUTOMATED COUNT: 14.6 % (ref 11.6–15.1)
GFR SERPL CREATININE-BSD FRML MDRD: 107 ML/MIN/1.73SQ M
GLUCOSE SERPL-MCNC: 97 MG/DL (ref 65–140)
HCT VFR BLD AUTO: 34.6 % (ref 36.5–49.3)
HDLC SERPL-MCNC: 95 MG/DL
HGB BLD-MCNC: 11 G/DL (ref 12–17)
IMM GRANULOCYTES # BLD AUTO: 0.04 THOUSAND/UL (ref 0–0.2)
IMM GRANULOCYTES NFR BLD AUTO: 0 % (ref 0–2)
LDLC SERPL CALC-MCNC: 49 MG/DL (ref 0–100)
LIPASE SERPL-CCNC: 1165 U/L (ref 73–393)
LYMPHOCYTES # BLD AUTO: 1.04 THOUSANDS/ΜL (ref 0.6–4.47)
LYMPHOCYTES NFR BLD AUTO: 12 % (ref 14–44)
MAGNESIUM SERPL-MCNC: 1.7 MG/DL (ref 1.6–2.6)
MCH RBC QN AUTO: 30.1 PG (ref 26.8–34.3)
MCHC RBC AUTO-ENTMCNC: 31.8 G/DL (ref 31.4–37.4)
MCV RBC AUTO: 95 FL (ref 82–98)
MONOCYTES # BLD AUTO: 0.62 THOUSAND/ΜL (ref 0.17–1.22)
MONOCYTES NFR BLD AUTO: 7 % (ref 4–12)
NEUTROPHILS # BLD AUTO: 7.15 THOUSANDS/ΜL (ref 1.85–7.62)
NEUTS SEG NFR BLD AUTO: 81 % (ref 43–75)
NRBC BLD AUTO-RTO: 0 /100 WBCS
PHOSPHATE SERPL-MCNC: 2 MG/DL (ref 2.7–4.5)
PLATELET # BLD AUTO: 186 THOUSANDS/UL (ref 149–390)
PMV BLD AUTO: 10.2 FL (ref 8.9–12.7)
POTASSIUM SERPL-SCNC: 4.1 MMOL/L (ref 3.5–5.3)
RBC # BLD AUTO: 3.66 MILLION/UL (ref 3.88–5.62)
SODIUM SERPL-SCNC: 135 MMOL/L (ref 136–145)
TRIGL SERPL-MCNC: 44 MG/DL
WBC # BLD AUTO: 8.92 THOUSAND/UL (ref 4.31–10.16)

## 2022-07-10 PROCEDURE — 84100 ASSAY OF PHOSPHORUS: CPT | Performed by: NURSE PRACTITIONER

## 2022-07-10 PROCEDURE — 80048 BASIC METABOLIC PNL TOTAL CA: CPT | Performed by: FAMILY MEDICINE

## 2022-07-10 PROCEDURE — 83690 ASSAY OF LIPASE: CPT | Performed by: FAMILY MEDICINE

## 2022-07-10 PROCEDURE — 87081 CULTURE SCREEN ONLY: CPT | Performed by: NURSE PRACTITIONER

## 2022-07-10 PROCEDURE — 83735 ASSAY OF MAGNESIUM: CPT | Performed by: FAMILY MEDICINE

## 2022-07-10 PROCEDURE — 71045 X-RAY EXAM CHEST 1 VIEW: CPT

## 2022-07-10 PROCEDURE — 80061 LIPID PANEL: CPT | Performed by: NURSE PRACTITIONER

## 2022-07-10 PROCEDURE — 99223 1ST HOSP IP/OBS HIGH 75: CPT | Performed by: ANESTHESIOLOGY

## 2022-07-10 PROCEDURE — 85025 COMPLETE CBC W/AUTO DIFF WBC: CPT | Performed by: FAMILY MEDICINE

## 2022-07-10 RX ORDER — DIAZEPAM 5 MG/ML
5 INJECTION, SOLUTION INTRAMUSCULAR; INTRAVENOUS ONCE
Status: COMPLETED | OUTPATIENT
Start: 2022-07-10 | End: 2022-07-10

## 2022-07-10 RX ORDER — LEVALBUTEROL INHALATION SOLUTION 0.63 MG/3ML
0.63 SOLUTION RESPIRATORY (INHALATION) EVERY 6 HOURS PRN
Status: DISCONTINUED | OUTPATIENT
Start: 2022-07-10 | End: 2022-07-19 | Stop reason: HOSPADM

## 2022-07-10 RX ORDER — ACETAMINOPHEN 650 MG/1
650 SUPPOSITORY RECTAL EVERY 6 HOURS PRN
Status: DISCONTINUED | OUTPATIENT
Start: 2022-07-10 | End: 2022-07-12

## 2022-07-10 RX ORDER — LORAZEPAM 2 MG/ML
4 INJECTION INTRAMUSCULAR ONCE
Status: COMPLETED | OUTPATIENT
Start: 2022-07-10 | End: 2022-07-10

## 2022-07-10 RX ORDER — ALBUMIN, HUMAN INJ 5% 5 %
12.5 SOLUTION INTRAVENOUS ONCE
Status: COMPLETED | OUTPATIENT
Start: 2022-07-10 | End: 2022-07-10

## 2022-07-10 RX ORDER — CHLORDIAZEPOXIDE HYDROCHLORIDE 25 MG/1
50 CAPSULE, GELATIN COATED ORAL EVERY 6 HOURS SCHEDULED
Status: DISCONTINUED | OUTPATIENT
Start: 2022-07-10 | End: 2022-07-10

## 2022-07-10 RX ORDER — DEXMEDETOMIDINE HYDROCHLORIDE 4 UG/ML
.1-.7 INJECTION, SOLUTION INTRAVENOUS
Status: DISCONTINUED | OUTPATIENT
Start: 2022-07-10 | End: 2022-07-12

## 2022-07-10 RX ORDER — DIAZEPAM 5 MG/ML
INJECTION, SOLUTION INTRAMUSCULAR; INTRAVENOUS
Status: COMPLETED
Start: 2022-07-10 | End: 2022-07-10

## 2022-07-10 RX ORDER — MAGNESIUM SULFATE HEPTAHYDRATE 40 MG/ML
2 INJECTION, SOLUTION INTRAVENOUS ONCE
Status: COMPLETED | OUTPATIENT
Start: 2022-07-10 | End: 2022-07-10

## 2022-07-10 RX ORDER — PHENOBARBITAL SODIUM 65 MG/ML
130 INJECTION INTRAMUSCULAR ONCE
Status: COMPLETED | OUTPATIENT
Start: 2022-07-10 | End: 2022-07-10

## 2022-07-10 RX ORDER — PHENOBARBITAL SODIUM 65 MG/ML
130 INJECTION INTRAMUSCULAR EVERY 6 HOURS PRN
Status: DISCONTINUED | OUTPATIENT
Start: 2022-07-10 | End: 2022-07-10

## 2022-07-10 RX ORDER — LORAZEPAM 2 MG/ML
2 INJECTION INTRAMUSCULAR ONCE
Status: COMPLETED | OUTPATIENT
Start: 2022-07-10 | End: 2022-07-10

## 2022-07-10 RX ADMIN — DIAZEPAM 5 MG: 10 INJECTION, SOLUTION INTRAMUSCULAR; INTRAVENOUS at 19:14

## 2022-07-10 RX ADMIN — PHENOBARBITAL SODIUM 136 MG: 65 INJECTION INTRAMUSCULAR; INTRAVENOUS at 11:34

## 2022-07-10 RX ADMIN — PHENOBARBITAL SODIUM 182 MG: 65 INJECTION INTRAMUSCULAR; INTRAVENOUS at 07:28

## 2022-07-10 RX ADMIN — FAMOTIDINE 20 MG: 10 INJECTION INTRAVENOUS at 09:08

## 2022-07-10 RX ADMIN — MAGNESIUM SULFATE HEPTAHYDRATE 2 G: 40 INJECTION, SOLUTION INTRAVENOUS at 09:11

## 2022-07-10 RX ADMIN — NICOTINE 1 PATCH: 21 PATCH, EXTENDED RELEASE TRANSDERMAL at 09:12

## 2022-07-10 RX ADMIN — LORAZEPAM 4 MG: 2 INJECTION INTRAMUSCULAR; INTRAVENOUS at 05:47

## 2022-07-10 RX ADMIN — SODIUM CHLORIDE, SODIUM GLUCONATE, SODIUM ACETATE, POTASSIUM CHLORIDE, MAGNESIUM CHLORIDE, SODIUM PHOSPHATE, DIBASIC, AND POTASSIUM PHOSPHATE 150 ML/HR: .53; .5; .37; .037; .03; .012; .00082 INJECTION, SOLUTION INTRAVENOUS at 04:21

## 2022-07-10 RX ADMIN — PHENOBARBITAL SODIUM 136 MG: 65 INJECTION INTRAMUSCULAR; INTRAVENOUS at 14:13

## 2022-07-10 RX ADMIN — FOLIC ACID: 5 INJECTION, SOLUTION INTRAMUSCULAR; INTRAVENOUS; SUBCUTANEOUS at 09:04

## 2022-07-10 RX ADMIN — SODIUM CHLORIDE, SODIUM GLUCONATE, SODIUM ACETATE, POTASSIUM CHLORIDE, MAGNESIUM CHLORIDE, SODIUM PHOSPHATE, DIBASIC, AND POTASSIUM PHOSPHATE 150 ML/HR: .53; .5; .37; .037; .03; .012; .00082 INJECTION, SOLUTION INTRAVENOUS at 13:07

## 2022-07-10 RX ADMIN — SODIUM CHLORIDE, SODIUM GLUCONATE, SODIUM ACETATE, POTASSIUM CHLORIDE, MAGNESIUM CHLORIDE, SODIUM PHOSPHATE, DIBASIC, AND POTASSIUM PHOSPHATE 150 ML/HR: .53; .5; .37; .037; .03; .012; .00082 INJECTION, SOLUTION INTRAVENOUS at 18:51

## 2022-07-10 RX ADMIN — PHENOBARBITAL SODIUM 130 MG: 65 INJECTION INTRAMUSCULAR; INTRAVENOUS at 19:47

## 2022-07-10 RX ADMIN — SODIUM PHOSPHATE, MONOBASIC, MONOHYDRATE 21 MMOL: 276; 142 INJECTION, SOLUTION INTRAVENOUS at 10:04

## 2022-07-10 RX ADMIN — PHENOBARBITAL SODIUM 150 MG: 65 INJECTION INTRAMUSCULAR; INTRAVENOUS at 17:15

## 2022-07-10 RX ADMIN — ENOXAPARIN SODIUM 40 MG: 40 INJECTION SUBCUTANEOUS at 09:10

## 2022-07-10 RX ADMIN — DIAZEPAM 5 MG: 10 INJECTION, SOLUTION INTRAMUSCULAR; INTRAVENOUS at 17:42

## 2022-07-10 RX ADMIN — DEXMEDETOMIDINE HYDROCHLORIDE 0.2 MCG/KG/HR: 400 INJECTION INTRAVENOUS at 18:51

## 2022-07-10 RX ADMIN — DIAZEPAM 5 MG: 5 INJECTION, SOLUTION INTRAMUSCULAR; INTRAVENOUS at 19:14

## 2022-07-10 RX ADMIN — LORAZEPAM 2 MG: 2 INJECTION INTRAMUSCULAR; INTRAVENOUS at 04:46

## 2022-07-10 RX ADMIN — SODIUM CHLORIDE, SODIUM GLUCONATE, SODIUM ACETATE, POTASSIUM CHLORIDE, MAGNESIUM CHLORIDE, SODIUM PHOSPHATE, DIBASIC, AND POTASSIUM PHOSPHATE 150 ML/HR: .53; .5; .37; .037; .03; .012; .00082 INJECTION, SOLUTION INTRAVENOUS at 06:50

## 2022-07-10 RX ADMIN — ALBUMIN (HUMAN) 12.5 G: 12.5 INJECTION, SOLUTION INTRAVENOUS at 09:06

## 2022-07-10 NOTE — ASSESSMENT & PLAN NOTE
· Patient presented with abdominal pain and underwent CT and incidentally was found to have a large right pneumothorax     · 12 [place] percutaneous chest tube was inserted in the ED, however on repeat imaging patient had persistent pneumothorax, therefore went to IR and had a 10 [employer] apical right chest tube placed with resolution of pneumothorax  · Continue CT to -20cm suction  · Monitor serial CXRs, if repeat imaging stable tomorrow will place to [facility]

## 2022-07-10 NOTE — ASSESSMENT & PLAN NOTE
· On admission reported drinking 6 beers a day  · Per note yesterday declined alcohol rehab  · Encourage alcohol cessation once medically optimized   · Rest of plan as above

## 2022-07-10 NOTE — ASSESSMENT & PLAN NOTE
· Drinks 6 beer a day  · Was drinking up to the point of admission (last drink 7/8)   · Alcohol level 182 on admission  · Patient started on CIWA protocol on admission  · Patient reported hx of handshaking when not drinking upon admission  · Patient with developed severe alcohol withdrawal symptoms overnight with DTs, tachycardia, confusion, and agitation requiring 4 point restraints  · Received total of 6mg IV Ativan without improvement   · Transferred to [place] and started on IV phenobarb load-[facility] 39 at this time   · Total load 10mg/kg in 3 divided doses   · Close monitoring of respiratory status  · Thiamine and folic acid supplementation (changed to IV)  · Seizure precautions  · 1:1 monitoring

## 2022-07-10 NOTE — ASSESSMENT & PLAN NOTE
· Per documentation PAF after alcohol binges  · Currently sinus tachycardia 100-120s  · Home regimen: cardizem, sotolol, xarelto  · Unable to take PO, unclear if patient is compliant with this regimen at home   · Consider heparin gtt for [facility]   · BPs soft will hold rate control agents   · S/p cardioversion 2018  · Continue to monitor on telemetry  · Optimize electrolytes

## 2022-07-10 NOTE — QUICK NOTE
Sister Felisha Guido updated via phone  We discussed patient's alcohol withdrawal and treatment with IV phenobarbital necessitating ICU transfer  She was be in to visit this afternoon  All questions have been answered to her satisfaction

## 2022-07-10 NOTE — ASSESSMENT & PLAN NOTE
· Secondary to alcoholic hepatitis   · CT 7/8 with severe hepatic steatosis  · MDF 7 1   · Trend hepatic function panel daily

## 2022-07-10 NOTE — ASSESSMENT & PLAN NOTE
· Patient presented 7/8 with complaints of epigastric pain, nausea, and vomiting X 1 week  · History of alcohol induced pancreatitis   · CT A/P 7/8: Mild peripancreatic stranding most concerning for pancreatitis  No peripancreatic fluid collections  · Lipase 1700 on admission, decreased to 971, now 1165  · Continue to trend daily   · Alcoholic level on admission 182  · Check lipid panel  · Continue IVF;  Isolyte 150mL/hr   · Antiemetic p r n   · NPO  · Serial abdominal exams

## 2022-07-10 NOTE — QUICK NOTE
Called by RN's for severe agitation, shaking, combativeness  Unable to de-escalate verbally  Kicking and punching at staff  Valium 5mg IVP given with minimal result  Ordered additional doses of PRN phenobarbital 130mg IV, and increased precedex to max 0 7mcg/kg/hr

## 2022-07-10 NOTE — NURSING NOTE
Patient is with severe alcohol widrawal with a score of 24 on ciwa protocol   4 mg of ativan given with no relief of symptoms  Nurse practitioner Zechariah Rodriguez at bedside to assess the patient for transfer to step down   Patient is dozing on and off during transfer

## 2022-07-10 NOTE — ASSESSMENT & PLAN NOTE
· Secondary to alcohol withdrawal delirium-plan as above  · Thiamine/folic acid supplementation   · Neuro checks q2h  · Delirium precautions; regulate sleep/wake cycle, daily CAM ICU, environmental controls
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
## 2022-07-11 ENCOUNTER — APPOINTMENT (INPATIENT)
Dept: RADIOLOGY | Facility: HOSPITAL | Age: 57
DRG: 199 | End: 2022-07-11
Payer: COMMERCIAL

## 2022-07-11 LAB
ALBUMIN SERPL BCP-MCNC: 2.5 G/DL (ref 3.5–5)
ALP SERPL-CCNC: 36 U/L (ref 46–116)
ALT SERPL W P-5'-P-CCNC: 7 U/L (ref 12–78)
ANION GAP SERPL CALCULATED.3IONS-SCNC: 10 MMOL/L (ref 4–13)
ANION GAP SERPL CALCULATED.3IONS-SCNC: 9 MMOL/L (ref 4–13)
AST SERPL W P-5'-P-CCNC: 28 U/L (ref 5–45)
ATRIAL RATE: 100 BPM
ATRIAL RATE: 106 BPM
ATRIAL RATE: 96 BPM
BASOPHILS # BLD AUTO: 0.02 THOUSANDS/ΜL (ref 0–0.1)
BASOPHILS NFR BLD AUTO: 0 % (ref 0–1)
BILIRUB SERPL-MCNC: 0.6 MG/DL (ref 0.2–1)
BUN SERPL-MCNC: 4 MG/DL (ref 5–25)
BUN SERPL-MCNC: 6 MG/DL (ref 5–25)
CA-I BLD-SCNC: 1.08 MMOL/L (ref 1.12–1.32)
CALCIUM ALBUM COR SERPL-MCNC: 8.8 MG/DL (ref 8.3–10.1)
CALCIUM SERPL-MCNC: 7.6 MG/DL (ref 8.3–10.1)
CALCIUM SERPL-MCNC: 8 MG/DL (ref 8.3–10.1)
CHLORIDE SERPL-SCNC: 96 MMOL/L (ref 100–108)
CHLORIDE SERPL-SCNC: 99 MMOL/L (ref 100–108)
CO2 SERPL-SCNC: 26 MMOL/L (ref 21–32)
CO2 SERPL-SCNC: 27 MMOL/L (ref 21–32)
CREAT SERPL-MCNC: 0.51 MG/DL (ref 0.6–1.3)
CREAT SERPL-MCNC: 0.55 MG/DL (ref 0.6–1.3)
EOSINOPHIL # BLD AUTO: 0.25 THOUSAND/ΜL (ref 0–0.61)
EOSINOPHIL NFR BLD AUTO: 4 % (ref 0–6)
ERYTHROCYTE [DISTWIDTH] IN BLOOD BY AUTOMATED COUNT: 14.1 % (ref 11.6–15.1)
GFR SERPL CREATININE-BSD FRML MDRD: 114 ML/MIN/1.73SQ M
GFR SERPL CREATININE-BSD FRML MDRD: 117 ML/MIN/1.73SQ M
GLUCOSE SERPL-MCNC: 90 MG/DL (ref 65–140)
GLUCOSE SERPL-MCNC: 98 MG/DL (ref 65–140)
HCT VFR BLD AUTO: 29.9 % (ref 36.5–49.3)
HGB BLD-MCNC: 9.7 G/DL (ref 12–17)
IMM GRANULOCYTES # BLD AUTO: 0.04 THOUSAND/UL (ref 0–0.2)
IMM GRANULOCYTES NFR BLD AUTO: 1 % (ref 0–2)
LIPASE SERPL-CCNC: 392 U/L (ref 73–393)
LYMPHOCYTES # BLD AUTO: 1.1 THOUSANDS/ΜL (ref 0.6–4.47)
LYMPHOCYTES NFR BLD AUTO: 18 % (ref 14–44)
MAGNESIUM SERPL-MCNC: 1.4 MG/DL (ref 1.6–2.6)
MAGNESIUM SERPL-MCNC: 1.5 MG/DL (ref 1.6–2.6)
MCH RBC QN AUTO: 30.1 PG (ref 26.8–34.3)
MCHC RBC AUTO-ENTMCNC: 32.4 G/DL (ref 31.4–37.4)
MCV RBC AUTO: 93 FL (ref 82–98)
MONOCYTES # BLD AUTO: 0.52 THOUSAND/ΜL (ref 0.17–1.22)
MONOCYTES NFR BLD AUTO: 8 % (ref 4–12)
NEUTROPHILS # BLD AUTO: 4.29 THOUSANDS/ΜL (ref 1.85–7.62)
NEUTS SEG NFR BLD AUTO: 69 % (ref 43–75)
NRBC BLD AUTO-RTO: 0 /100 WBCS
P AXIS: 0 DEGREES
P AXIS: 42 DEGREES
P AXIS: 6 DEGREES
PHOSPHATE SERPL-MCNC: 2.3 MG/DL (ref 2.7–4.5)
PLATELET # BLD AUTO: 187 THOUSANDS/UL (ref 149–390)
PMV BLD AUTO: 10.2 FL (ref 8.9–12.7)
POTASSIUM SERPL-SCNC: 2.9 MMOL/L (ref 3.5–5.3)
POTASSIUM SERPL-SCNC: 3.5 MMOL/L (ref 3.5–5.3)
PR INTERVAL: 140 MS
PR INTERVAL: 166 MS
PR INTERVAL: 180 MS
PROT SERPL-MCNC: 5.1 G/DL (ref 6.4–8.2)
QRS AXIS: -14 DEGREES
QRS AXIS: -18 DEGREES
QRS AXIS: -25 DEGREES
QRSD INTERVAL: 84 MS
QRSD INTERVAL: 94 MS
QRSD INTERVAL: 96 MS
QT INTERVAL: 350 MS
QT INTERVAL: 374 MS
QT INTERVAL: 378 MS
QTC INTERVAL: 464 MS
QTC INTERVAL: 477 MS
QTC INTERVAL: 482 MS
RBC # BLD AUTO: 3.22 MILLION/UL (ref 3.88–5.62)
SODIUM SERPL-SCNC: 132 MMOL/L (ref 136–145)
SODIUM SERPL-SCNC: 135 MMOL/L (ref 136–145)
T WAVE AXIS: 20 DEGREES
T WAVE AXIS: 29 DEGREES
T WAVE AXIS: 76 DEGREES
VENTRICULAR RATE: 100 BPM
VENTRICULAR RATE: 106 BPM
VENTRICULAR RATE: 96 BPM
WBC # BLD AUTO: 6.22 THOUSAND/UL (ref 4.31–10.16)

## 2022-07-11 PROCEDURE — 80053 COMPREHEN METABOLIC PANEL: CPT | Performed by: NURSE PRACTITIONER

## 2022-07-11 PROCEDURE — 83735 ASSAY OF MAGNESIUM: CPT | Performed by: NURSE PRACTITIONER

## 2022-07-11 PROCEDURE — 99291 CRITICAL CARE FIRST HOUR: CPT | Performed by: ANESTHESIOLOGY

## 2022-07-11 PROCEDURE — 84100 ASSAY OF PHOSPHORUS: CPT | Performed by: NURSE PRACTITIONER

## 2022-07-11 PROCEDURE — 80048 BASIC METABOLIC PNL TOTAL CA: CPT

## 2022-07-11 PROCEDURE — 93010 ELECTROCARDIOGRAM REPORT: CPT | Performed by: INTERNAL MEDICINE

## 2022-07-11 PROCEDURE — 85025 COMPLETE CBC W/AUTO DIFF WBC: CPT | Performed by: NURSE PRACTITIONER

## 2022-07-11 PROCEDURE — 83690 ASSAY OF LIPASE: CPT | Performed by: NURSE PRACTITIONER

## 2022-07-11 PROCEDURE — 82330 ASSAY OF CALCIUM: CPT | Performed by: NURSE PRACTITIONER

## 2022-07-11 PROCEDURE — 71045 X-RAY EXAM CHEST 1 VIEW: CPT

## 2022-07-11 RX ORDER — POTASSIUM CHLORIDE 14.9 MG/ML
20 INJECTION INTRAVENOUS
Status: COMPLETED | OUTPATIENT
Start: 2022-07-11 | End: 2022-07-11

## 2022-07-11 RX ORDER — ALBUMIN, HUMAN INJ 5% 5 %
25 SOLUTION INTRAVENOUS ONCE
Status: COMPLETED | OUTPATIENT
Start: 2022-07-11 | End: 2022-07-11

## 2022-07-11 RX ORDER — POTASSIUM CHLORIDE 14.9 MG/ML
20 INJECTION INTRAVENOUS ONCE
Status: COMPLETED | OUTPATIENT
Start: 2022-07-11 | End: 2022-07-12

## 2022-07-11 RX ORDER — MAGNESIUM SULFATE HEPTAHYDRATE 40 MG/ML
2 INJECTION, SOLUTION INTRAVENOUS ONCE
Status: COMPLETED | OUTPATIENT
Start: 2022-07-11 | End: 2022-07-11

## 2022-07-11 RX ORDER — MAGNESIUM SULFATE HEPTAHYDRATE 40 MG/ML
4 INJECTION, SOLUTION INTRAVENOUS ONCE
Status: COMPLETED | OUTPATIENT
Start: 2022-07-11 | End: 2022-07-12

## 2022-07-11 RX ORDER — POTASSIUM CHLORIDE 14.9 MG/ML
20 INJECTION INTRAVENOUS ONCE
Status: COMPLETED | OUTPATIENT
Start: 2022-07-12 | End: 2022-07-12

## 2022-07-11 RX ADMIN — SODIUM PHOSPHATE, MONOBASIC, MONOHYDRATE 21 MMOL: 276; 142 INJECTION, SOLUTION INTRAVENOUS at 11:12

## 2022-07-11 RX ADMIN — MAGNESIUM SULFATE HEPTAHYDRATE 2 G: 40 INJECTION, SOLUTION INTRAVENOUS at 07:44

## 2022-07-11 RX ADMIN — ENOXAPARIN SODIUM 40 MG: 40 INJECTION SUBCUTANEOUS at 09:02

## 2022-07-11 RX ADMIN — POTASSIUM CHLORIDE 20 MEQ: 14.9 INJECTION, SOLUTION INTRAVENOUS at 22:35

## 2022-07-11 RX ADMIN — POTASSIUM CHLORIDE 20 MEQ: 200 INJECTION, SOLUTION INTRAVENOUS at 07:44

## 2022-07-11 RX ADMIN — FOLIC ACID: 5 INJECTION, SOLUTION INTRAMUSCULAR; INTRAVENOUS; SUBCUTANEOUS at 10:01

## 2022-07-11 RX ADMIN — ALBUMIN (HUMAN) 25 G: 12.5 INJECTION, SOLUTION INTRAVENOUS at 06:16

## 2022-07-11 RX ADMIN — SODIUM CHLORIDE, SODIUM GLUCONATE, SODIUM ACETATE, POTASSIUM CHLORIDE, MAGNESIUM CHLORIDE, SODIUM PHOSPHATE, DIBASIC, AND POTASSIUM PHOSPHATE 150 ML/HR: .53; .5; .37; .037; .03; .012; .00082 INJECTION, SOLUTION INTRAVENOUS at 01:52

## 2022-07-11 RX ADMIN — DEXMEDETOMIDINE HYDROCHLORIDE 0.3 MCG/KG/HR: 400 INJECTION INTRAVENOUS at 14:45

## 2022-07-11 RX ADMIN — MAGNESIUM SULFATE HEPTAHYDRATE 4 G: 40 INJECTION, SOLUTION INTRAVENOUS at 22:35

## 2022-07-11 RX ADMIN — POTASSIUM CHLORIDE 20 MEQ: 200 INJECTION, SOLUTION INTRAVENOUS at 09:54

## 2022-07-11 RX ADMIN — POTASSIUM CHLORIDE 20 MEQ: 200 INJECTION, SOLUTION INTRAVENOUS at 17:18

## 2022-07-11 RX ADMIN — NICOTINE 1 PATCH: 21 PATCH, EXTENDED RELEASE TRANSDERMAL at 09:02

## 2022-07-11 RX ADMIN — DEXMEDETOMIDINE HYDROCHLORIDE 0.7 MCG/KG/HR: 400 INJECTION INTRAVENOUS at 01:52

## 2022-07-11 RX ADMIN — SODIUM CHLORIDE, SODIUM GLUCONATE, SODIUM ACETATE, POTASSIUM CHLORIDE, MAGNESIUM CHLORIDE, SODIUM PHOSPHATE, DIBASIC, AND POTASSIUM PHOSPHATE 150 ML/HR: .53; .5; .37; .037; .03; .012; .00082 INJECTION, SOLUTION INTRAVENOUS at 09:01

## 2022-07-11 RX ADMIN — POTASSIUM CHLORIDE 20 MEQ: 200 INJECTION, SOLUTION INTRAVENOUS at 14:44

## 2022-07-11 NOTE — ASSESSMENT & PLAN NOTE
· Per documentation PAF after alcohol binges; s/p cardioversion in 2018  · Currently NSR  · Outpatient regimen: cardizem, sotolol, xarelto  · Unable to take PO + unclear if patient is compliant with this regimen at home   · Hold anticoagulation as risk may out way benefit   · Continue to monitor on telemetry  · Optimize electrolytes

## 2022-07-11 NOTE — PROGRESS NOTES
[address]  Progress Note - Nic Ayala 1965, 59 y o  male MRN: [id number]  Unit/Bed#: ICU 11 Encounter: [id number]  Primary Care Provider: Howie Crowley MD   Date and time admitted to hospital: 7/8/2022  5:50 PM    * Alcohol withdrawal ([address] )  Assessment & Plan  · Reportedly drinks 6 beers/ day +/- liquor   · Was drinking up to the point of admission (last drink 7/8)   · Alcohol level 182 on admission  · Patient started on CIWA protocol on admission  · Patient reported hx of handshaking when not drinking upon admission  · Patient with developed severe alcohol withdrawal symptoms on 7/9- 7/10 with DTs, tachycardia, confusion, and agitation requiring 4 point restraints  · Received total of 6mg IV Ativan without improvement   · Transferred to SD and started on IV phenobarb load-CIWA 39 at this time   · Total load 10mg/kg in 3 divided doses   · Close monitoring of respiratory status  · Continue thiamine and folic acid supplementation   · Continue precedex with goal RASS 0  · Seizure precautions  · 1:1 monitoring    Alcohol-induced acute pancreatitis  Assessment & Plan  · Patient presented 7/8 with complaints of epigastric pain, nausea, and vomiting X 1 week  · History of alcohol induced pancreatitis   · CT A/P 7/8: Mild peripancreatic stranding most concerning for pancreatitis  No peripancreatic fluid collections  · Lipase 1700 on admission> 971 >1165 > now 210  · Alcoholic level on admission 182  · NPO; IVF with Isolyte @ 150mL/hr   · Antiemetic p r n  · Serial abdominal exams       Pneumothorax, right  Assessment & Plan  · Patient presented with abdominal pain and underwent CT and incidentally was found to have a large right pneumothorax     · 16 Greenlandic percutaneous chest tube was inserted in the ED on admission, 7/8  · Repeat imaging with persistent pneumothorax  · 7/9 S/p IR chest tube placement with 10 Greenlandic to apical right chest tube with resolution of pneumothorax  · CXR 7/11 revealing Interval development of small right apical pneumothorax with diffuse right lung airspace disease  Right-sided chest tube remains in place  · Continue CT to -20cm suction  · Monitor serial CXRs    Hypomagnesemia  Assessment & Plan  · Secondary to alcohol abuse  · Trend daily and Replete PRN     Elevated LFTs  Assessment & Plan  · Secondary to alcoholic hepatitis   · CT 7/8 with severe hepatic steatosis  · Currently trending down; Trend as appropriate     Encephalopathy  Assessment & Plan  · Secondary to alcohol withdrawal  · Thiamine/folic acid supplementation   · Neuro checks q2h  · Delirium precautions; regulate sleep/wake cycle, daily CAM ICU, environmental controls    Alcohol abuse  Assessment & Plan  · On admission reported drinking 6 beers a day  · Encourage alcohol cessation once medically optimized   · Per previous notes, declining alcohol rehab at this time  · Rest of plan as outlined    Paroxysmal atrial fibrillation ([facility] [address] )  Assessment & Plan  · Per documentation PAF after alcohol binges; s/p cardioversion in 2018  · Currently NSR  · Outpatient regimen: cardizem, sotolol, xarelto  · Unable to take PO + unclear if patient is compliant with this regimen at home   · Hold anticoagulation as risk may out way benefit   · Continue to monitor on telemetry  · Optimize electrolytes     Tobacco abuse  Assessment & Plan  · Smokes 1 ppd  · Continue nicotine patch  · Smoking cessation education upon discharge    ----------------------------------------------------------------------------------------  HPI/24hr events: Patient with increasing agitation overnight requiring  5 mg of Valium x2  He continues on Precedex infusion  He subsequently had decrease in blood pressure with systolics in the 34'W, but maintained a MAP of 65  1x dose of 5%/25g Albumin ordered  He also required straight cath x1 for urinary retention overnight  No other significant overnight events       Patient appropriate for transfer out of the ICU today?: No  Disposition: Continue Critical Care   Code Status: Level 1 - Full Code  ---------------------------------------------------------------------------------------  SUBJECTIVE  TEE    Review of Systems  Review of systems was reviewed and negative unless stated above in HPI/24-hour events   ---------------------------------------------------------------------------------------  OBJECTIVE    Vitals   Vitals:    22 0100 22 0200 22 0300 22 0400   BP: (!) 87/59 (!) 87/57 (!) 86/56 96/57   Pulse: 98 80 81 80   Resp: 14 15 14 15   Temp:    (!) 97 3 °F (36 3 °C)   TempSrc:    Temporal   SpO2: 94% 97% 96% 96%   Weight:       Height:         Temp (24hrs), Av 5 °F (36 4 °C), Min:96 6 °F (35 9 °C), Max:98 1 °F (36 7 °C)  Current: Temperature: (!) 97 3 °F (36 3 °C)          Respiratory:  SpO2: SpO2: 96 %, SpO2 Activity: SpO2 Activity: At Rest       Invasive/non-invasive ventilation settings   Respiratory  Report   Lab Data (Last 4 hours)    None         O2/Vent Data (Last 4 hours)    None                Physical Exam  Vitals reviewed  Constitutional:       General: He is not in acute distress  Interventions: He is restrained  HENT:      Head: Normocephalic  Nose: Nose normal       Mouth/Throat:      Mouth: Mucous membranes are moist       Pharynx: Oropharynx is clear  Eyes:      Pupils: Pupils are equal, round, and reactive to light  Cardiovascular:      Rate and Rhythm: Normal rate and regular rhythm  Pulses: Normal pulses  Heart sounds: Normal heart sounds  Pulmonary:      Effort: Pulmonary effort is normal  No respiratory distress  Breath sounds: Normal breath sounds  No wheezing, rhonchi or rales  Abdominal:      General: Bowel sounds are normal       Palpations: Abdomen is soft  Skin:     General: Skin is warm and dry  Neurological:      Mental Status: He is lethargic        Comments: Responds to sternal rub  precedex held for further assessment Laboratory and Diagnostics:  Results from last 7 days   Lab Units 07/11/22  0517 07/10/22  0450 07/09/22  0454 07/08/22  1801   WBC Thousand/uL 6 22 8 92 8 54 6 59   HEMOGLOBIN g/dL 9 7* 11 0* 12 4 13 9   HEMATOCRIT % 29 9* 34 6* 37 2 41 0   PLATELETS Thousands/uL 187 186 196 211   NEUTROS PCT % 69 81*  --  68   MONOS PCT % 8 7  --  11     Results from last 7 days   Lab Units 07/11/22  0517 07/10/22  0450 07/09/22  0454 07/09/22  0159 07/08/22  1801   SODIUM mmol/L 135* 135* 138 136 137   POTASSIUM mmol/L 2 9* 4 1 3 0* 3 0* 2 5*   CHLORIDE mmol/L 99* 99* 94* 91* 90*   CO2 mmol/L 26 20* 22 20* 30   ANION GAP mmol/L 10 16* 22* 25* 17*   BUN mg/dL 4* 7 11 11 12   CREATININE mg/dL 0 51* 0 64 0 82 0 63 0 65   CALCIUM mg/dL 7 6* 7 9* 8 6 8 8 9 1   GLUCOSE RANDOM mg/dL 90 97 78 79 84   ALT U/L 7*  --  16  --  18   AST U/L 28  --  67*  --  80*   ALK PHOS U/L 36*  --  49  --  58   ALBUMIN g/dL 2 5*  --  3 6  --  4 0   TOTAL BILIRUBIN mg/dL 0 60  --  1 20*  --  1 13*     Results from last 7 days   Lab Units 07/11/22  0517 07/10/22  0450 07/09/22  0454 07/09/22  0159 07/08/22  1801   MAGNESIUM mg/dL 1 5* 1 7 3 3* 2 5 1 4*   PHOSPHORUS mg/dL 2 3* 2 0*  --   --  4 2      Results from last 7 days   Lab Units 07/09/22 0454 07/08/22  1845   INR  1 04 0 92   PTT seconds  --  30              ABG:    VBG:          Micro        EKG: NSR  Imaging: I have personally reviewed pertinent reports  Intake and Output  I/O       07/09 0701  07/10 0700 07/10 0701  07/11 0700 07/11 0701  07/12 0700    I V  (mL/kg) 2955 5 (48 8) 2047 5 (33 8)     IV Piggyback  800     Total Intake(mL/kg) 2955 5 (48 8) 2847 5 (47)     Urine (mL/kg/hr) 1400 (1) 600 (0 4)     Emesis/NG output       Stool       Chest Tube 0 35     Total Output 1400 635     Net +1555 5 +2212 5            Unmeasured Urine Occurrence  4 x           Height and Weights   Height: 5' 8" (172 7 cm)  IBW (Ideal Body Weight): 68 4 kg  Body mass index is 20 31 kg/m²    Weight (last 2 days)     Date/Time Weight    07/10/22 0650 60 6 (133 6)            Nutrition       Diet Orders   (From admission, onward)             Start     Ordered    07/10/22 1220  Diet NPO  Diet effective now        References:    Nutrtion Support Algorithm Enteral vs  Parenteral   Question Answer Comment   Diet Type NPO    RD to adjust diet per protocol?  No        07/10/22 1219    07/09/22 1219  Room Service  Once        Question:  Type of Service  Answer:  Room Service-Appropriate    07/09/22 1218                  Active Medications  Scheduled Meds:  Current Facility-Administered Medications   Medication Dose Route Frequency Provider Last Rate    acetaminophen  650 mg Rectal Q6H PRN Dorothy Ra V, CRNP      dexmedetomidine  0 1-0 7 mcg/kg/hr Intravenous Titrated Olga Spironello V, CRNP 0 7 mcg/kg/hr (07/11/22 0152)    enoxaparin  40 mg Subcutaneous Daily Olga Spironello V, CRNP      folic acid 1 mg, thiamine 100 mg in 0 9% sodium chloride 100 mL IVPB   Intravenous Daily Olga Spironello V, CRNP Stopped (07/10/22 0945)    levalbuterol  0 63 mg Nebulization Q6H PRN Olga Spironello V, CRNP      magnesium sulfate  2 g Intravenous Once Prudy Quirk, CRNP      multi-electrolyte  150 mL/hr Intravenous Continuous Olga Spironello V, CRNP 150 mL/hr (07/11/22 0152)    nicotine  1 patch Transdermal Daily Olga Spironello V, CRNP      ondansetron  4 mg Intravenous Q6H PRN Olga Spironello V, CRNP      potassium chloride  20 mEq Intravenous Q2H Prudy Quirk, CRNP       Continuous Infusions:  dexmedetomidine, 0 1-0 7 mcg/kg/hr, Last Rate: 0 7 mcg/kg/hr (07/11/22 0152)  multi-electrolyte, 150 mL/hr, Last Rate: 150 mL/hr (07/11/22 0152)      PRN Meds:   acetaminophen, 650 mg, Q6H PRN  levalbuterol, 0 63 mg, Q6H PRN  ondansetron, 4 mg, Q6H PRN        Invasive Devices Review  Invasive Devices  Report    Peripheral Intravenous Line  Duration           Peripheral IV 07/08/22 Proximal;Right;Ventral (anterior) Forearm 2 days    Long-Dwell Peripheral IV (Midline) 52/58/92 Right Basilic <1 day          Drain  Duration           Chest Tube Right Second intercostal space 10 2 Fr  1 day                Rationale for remaining devices: medically necessary   ---------------------------------------------------------------------------------------  Advance Directive and Living Will:      Power of :    POLST:    ---------------------------------------------------------------------------------------  Care Time Delivered:   No Critical Care time spent       CHEN Augustin      Portions of the record may have been created with voice recognition software  Occasional wrong word or "sound a like" substitutions may have occurred due to the inherent limitations of voice recognition software    Read the chart carefully and recognize, using context, where substitutions have occurred

## 2022-07-11 NOTE — ASSESSMENT & PLAN NOTE
· Secondary to alcoholic hepatitis   · CT 7/8 with severe hepatic steatosis  · Currently trending down; Trend as appropriate

## 2022-07-11 NOTE — ASSESSMENT & PLAN NOTE
· On admission reported drinking 6 beers a day  · Encourage alcohol cessation once medically optimized   · Per previous notes, declining alcohol rehab at this time  · Rest of plan as outlined

## 2022-07-11 NOTE — ASSESSMENT & PLAN NOTE
· Reportedly drinks 6 beers/ day +/- liquor   · Was drinking up to the point of admission (last drink 7/8)   · Alcohol level 182 on admission  · Patient started on CIWA protocol on admission  · Patient reported hx of handshaking when not drinking upon admission  · Patient with developed severe alcohol withdrawal symptoms on 7/9- 7/10 with DTs, tachycardia, confusion, and agitation requiring 4 point restraints  · Received total of 6mg IV Ativan without improvement   · Transferred to [place] and started on IV phenobarb load-WA 39 at this time   · Total load 10mg/kg in 3 divided doses   · Close monitoring of respiratory status  · Continue thiamine and folic acid supplementation   · Continue precedex with goal RASS 0  · Seizure precautions  · 1:1 monitoring

## 2022-07-11 NOTE — CASE MANAGEMENT
Case Management Assessment & Discharge Planning Note    Patient name Abby Rivers  Location ICU 11ICU 11 MRN [id number]  : 1965 Date 2022       Current Admission Date: 2022  Current Admission Diagnosis:Alcohol withdrawal [facility])   Patient Active Problem List    Diagnosis Date Noted    Encephalopathy 07/10/2022    Pneumothorax, right 2022    Alcohol-induced acute pancreatitis 2022    Elevated LFTs 2022    Alcohol withdrawal ([facility] [address] ) 2018    Hypomagnesemia 2018    Paroxysmal atrial fibrillation ([facility] [address] ) 2017    Tobacco abuse 2017    Alcohol abuse 06/10/2016      LOS (days): 3  Geometric Mean LOS (GMLOS) (days):   Days to GMLOS:     OBJECTIVE:    Risk of Unplanned Readmission Score: 16 55         Current admission status: Inpatient     Preferred Pharmacy:   [address] ([address], [address] ([address] [place] [address])  [id number] [place] [address] [address] ([address])  [place] [address]  Phone: [phone] Fax: [phone]    Primary Care Provider: Migdalia Smalls MD    Primary Insurance: [facility]  Secondary Insurance: [facility] [address] [facility]    ASSESSMENT:  [address] Proxies    There are no active Health Care Proxies on file  Readmission Root Cause  30 Day Readmission: No    Patient Information  Admitted from[de-identified] Home  Mental Status: Confused, Sedated (due to ETOH withdrawal/agitation)  During Assessment patient was accompanied by: Not accompanied during assessment  Assessment information provided by[de-identified] Sister  Primary Caregiver: Self  Support Systems: Self, Family members  [place] of Residence: [address] [place] [address] do you live in?: Sarath Meyers 45 entry access options   Select all that apply : Stairs  Number of steps to enter home : 10  Type of Current Residence: [address] [place] home  Upon entering residence, is there a bedroom on the main floor (no further steps)?: Yes  Upon entering residence, is there a bathroom on the main floor (no further steps)?: No  Indicate which floors of current residence have a bathroom (select all the apply):: 2nd Floor (Tub/shower)  Number of steps to 2nd floor from main floor: One Flight  In the last 12 months, was there a time when you were not able to pay the mortgage or rent on time?: No  In the last 12 months, how many places have you lived?: 2  In the last 12 months, was there a time when you did not have a steady place to sleep or slept in a shelter (including now)?: No  Homeless/housing insecurity resource given?: N/A  Living Arrangements:  (Lives with sister Gail Mckinney)  Is patient a ?: No    Activities of Daily Living Prior to Admission  Functional Status: Independent  Completes ADLs independently?: Yes  Ambulates independently?: Yes  Does patient use assisted devices?: No  Does patient currently own DME?: No  Does patient have a history of Outpatient Therapy (PT/OT)?: No  Does the patient have a history of Short-Term Rehab?: Yes (Hx at [address] [place] [address])  Does patient have a history of HHC?: No  Does patient currently have [facility]?: No     Patient Information Continued  Income Source: SSI/SSD  Does patient have prescription coverage?: Yes (Uses [facility] pharmacy in [place])  Within the past 12 months, you worried that your food would run out before you got the money to buy more : Never true  Within the past 12 months, the food you bought just didn't last and you didn't have money to get more : Never true  Food insecurity resource given?: N/A  Does patient receive dialysis treatments?: No  Does patient have a history of substance abuse?: Yes  Historical substance use preference: Alcohol/ETOH (Pt reports to drinking 6 beers/day)  Is patient currently in treatment for substance abuse?:  (No hx of rehab treatment)     Means of Transportation  Means of Transport to Appts[de-identified] Family transport  In the past 12 months, has lack of transportation kept you from medical appointments or from getting medications?: No  In the past 12 months, has lack of transportation kept you from meetings, work, or from getting things needed for daily living?: No  Was application for public transport provided?: N/A    DISCHARGE DETAILS:    Discharge planning discussed with[de-identified] Sister     CM contacted family/caregiver?: Yes  Were Treatment Team discharge recommendations reviewed with patient/caregiver?: Yes  Did patient/caregiver verbalize understanding of patient care needs?: Yes  Were patient/caregiver advised of the risks associated with not following Treatment Team discharge recommendations?: Yes    Contacts  Patient Contacts: Trace Snider (sister)  Relationship to Patient[de-identified] Family  Contact Method: Phone  Phone Number: [phone]  Reason/Outcome: Emergency Contact, Continuity of Care, Discharge Planning    SW contacted pt's sister Charles Gamez to introduce role, complete assessment, and discuss discharge planning needs  Charles Gamez confirmed that pt lives with her  They recently moved to another home in [facility] updated demographics information  Prior to admission, pt was fully independent with mobility and IADLs w/o AD  Charles Gamez assists with all transportation needs  She confirmed pt uses [facility] for [facility] care and [employer]  Charles Gamez provided with SW's contact information and was encouraged to reach out with questions/concerns  Charles Gamez is hopeful that pt will be agreeable to going to ETOH rehab  SW will f/u with patient once he is closer to being medically stable for discharge  No questions or concerns expressed by Charles Gamez at this time

## 2022-07-11 NOTE — ASSESSMENT & PLAN NOTE
· Patient presented with abdominal pain and underwent CT and incidentally was found to have a large right pneumothorax  · 16 Spanish percutaneous chest tube was inserted in the ED on admission, 7/8  · Repeat imaging with persistent pneumothorax  · 7/9 S/p IR chest tube placement with 10 Spanish to apical right chest tube with resolution of pneumothorax  · CXR 7/11 revealing Interval development of small right apical pneumothorax with diffuse right lung airspace disease   Right-sided chest tube remains in place  · Continue CT to -20cm suction  · Monitor serial CXRs

## 2022-07-11 NOTE — ASSESSMENT & PLAN NOTE
· Secondary to alcohol withdrawal  · Thiamine/folic acid supplementation   · Neuro checks q2h  · Delirium precautions; regulate sleep/wake cycle, daily CAM ICU, environmental controls

## 2022-07-11 NOTE — ASSESSMENT & PLAN NOTE
· Patient presented 7/8 with complaints of epigastric pain, nausea, and vomiting X 1 week  · History of alcohol induced pancreatitis   · CT A/P 7/8: Mild peripancreatic stranding most concerning for pancreatitis  No peripancreatic fluid collections  · Lipase 1700 on admission> 971 >1165 > now 629  · Alcoholic level on admission 182  · NPO; IVF with Isolyte @ 150mL/hr   · Antiemetic p r n    · Serial abdominal exams

## 2022-07-12 ENCOUNTER — APPOINTMENT (INPATIENT)
Dept: RADIOLOGY | Facility: HOSPITAL | Age: 57
DRG: 199 | End: 2022-07-12
Payer: COMMERCIAL

## 2022-07-12 PROBLEM — R79.89 ELEVATED LFTS: Status: RESOLVED | Noted: 2022-07-08 | Resolved: 2022-07-12

## 2022-07-12 LAB
ALBUMIN SERPL BCP-MCNC: 2.7 G/DL (ref 3.5–5)
ALP SERPL-CCNC: 46 U/L (ref 46–116)
ALT SERPL W P-5'-P-CCNC: 10 U/L (ref 12–78)
ANION GAP SERPL CALCULATED.3IONS-SCNC: 6 MMOL/L (ref 4–13)
ANION GAP SERPL CALCULATED.3IONS-SCNC: 6 MMOL/L (ref 4–13)
AST SERPL W P-5'-P-CCNC: 26 U/L (ref 5–45)
BASOPHILS # BLD AUTO: 0.03 THOUSANDS/ΜL (ref 0–0.1)
BASOPHILS NFR BLD AUTO: 0 % (ref 0–1)
BILIRUB SERPL-MCNC: 0.37 MG/DL (ref 0.2–1)
BUN SERPL-MCNC: 4 MG/DL (ref 5–25)
BUN SERPL-MCNC: 5 MG/DL (ref 5–25)
CALCIUM ALBUM COR SERPL-MCNC: 8.5 MG/DL (ref 8.3–10.1)
CALCIUM SERPL-MCNC: 7.5 MG/DL (ref 8.3–10.1)
CALCIUM SERPL-MCNC: 8.2 MG/DL (ref 8.3–10.1)
CARDIAC TROPONIN I PNL SERPL HS: 18 NG/L (ref 8–18)
CHLORIDE SERPL-SCNC: 98 MMOL/L (ref 100–108)
CHLORIDE SERPL-SCNC: 98 MMOL/L (ref 100–108)
CO2 SERPL-SCNC: 28 MMOL/L (ref 21–32)
CO2 SERPL-SCNC: 30 MMOL/L (ref 21–32)
CREAT SERPL-MCNC: 0.51 MG/DL (ref 0.6–1.3)
CREAT SERPL-MCNC: 0.58 MG/DL (ref 0.6–1.3)
EOSINOPHIL # BLD AUTO: 0.22 THOUSAND/ΜL (ref 0–0.61)
EOSINOPHIL NFR BLD AUTO: 3 % (ref 0–6)
ERYTHROCYTE [DISTWIDTH] IN BLOOD BY AUTOMATED COUNT: 13.9 % (ref 11.6–15.1)
GFR SERPL CREATININE-BSD FRML MDRD: 111 ML/MIN/1.73SQ M
GFR SERPL CREATININE-BSD FRML MDRD: 117 ML/MIN/1.73SQ M
GLUCOSE SERPL-MCNC: 139 MG/DL (ref 65–140)
GLUCOSE SERPL-MCNC: 194 MG/DL (ref 65–140)
HCT VFR BLD AUTO: 30.2 % (ref 36.5–49.3)
HGB BLD-MCNC: 10.2 G/DL (ref 12–17)
IMM GRANULOCYTES # BLD AUTO: 0.09 THOUSAND/UL (ref 0–0.2)
IMM GRANULOCYTES NFR BLD AUTO: 1 % (ref 0–2)
LYMPHOCYTES # BLD AUTO: 1.02 THOUSANDS/ΜL (ref 0.6–4.47)
LYMPHOCYTES NFR BLD AUTO: 14 % (ref 14–44)
MAGNESIUM SERPL-MCNC: 1.8 MG/DL (ref 1.6–2.6)
MAGNESIUM SERPL-MCNC: 1.9 MG/DL (ref 1.6–2.6)
MCH RBC QN AUTO: 30.5 PG (ref 26.8–34.3)
MCHC RBC AUTO-ENTMCNC: 33.8 G/DL (ref 31.4–37.4)
MCV RBC AUTO: 90 FL (ref 82–98)
MONOCYTES # BLD AUTO: 0.47 THOUSAND/ΜL (ref 0.17–1.22)
MONOCYTES NFR BLD AUTO: 6 % (ref 4–12)
MRSA NOSE QL CULT: NORMAL
NEUTROPHILS # BLD AUTO: 5.53 THOUSANDS/ΜL (ref 1.85–7.62)
NEUTS SEG NFR BLD AUTO: 76 % (ref 43–75)
NRBC BLD AUTO-RTO: 0 /100 WBCS
PHOSPHATE SERPL-MCNC: 1.6 MG/DL (ref 2.7–4.5)
PHOSPHATE SERPL-MCNC: 4.6 MG/DL (ref 2.7–4.5)
PLATELET # BLD AUTO: 230 THOUSANDS/UL (ref 149–390)
PMV BLD AUTO: 9.8 FL (ref 8.9–12.7)
POTASSIUM SERPL-SCNC: 2.9 MMOL/L (ref 3.5–5.3)
POTASSIUM SERPL-SCNC: 4 MMOL/L (ref 3.5–5.3)
PROT SERPL-MCNC: 5.5 G/DL (ref 6.4–8.2)
RBC # BLD AUTO: 3.34 MILLION/UL (ref 3.88–5.62)
SODIUM SERPL-SCNC: 132 MMOL/L (ref 136–145)
SODIUM SERPL-SCNC: 134 MMOL/L (ref 136–145)
WBC # BLD AUTO: 7.36 THOUSAND/UL (ref 4.31–10.16)

## 2022-07-12 PROCEDURE — 83735 ASSAY OF MAGNESIUM: CPT | Performed by: NURSE PRACTITIONER

## 2022-07-12 PROCEDURE — 84100 ASSAY OF PHOSPHORUS: CPT | Performed by: NURSE PRACTITIONER

## 2022-07-12 PROCEDURE — 80053 COMPREHEN METABOLIC PANEL: CPT

## 2022-07-12 PROCEDURE — 84100 ASSAY OF PHOSPHORUS: CPT

## 2022-07-12 PROCEDURE — 80048 BASIC METABOLIC PNL TOTAL CA: CPT | Performed by: NURSE PRACTITIONER

## 2022-07-12 PROCEDURE — 71045 X-RAY EXAM CHEST 1 VIEW: CPT

## 2022-07-12 PROCEDURE — 83735 ASSAY OF MAGNESIUM: CPT

## 2022-07-12 PROCEDURE — 85025 COMPLETE CBC W/AUTO DIFF WBC: CPT

## 2022-07-12 PROCEDURE — 84484 ASSAY OF TROPONIN QUANT: CPT | Performed by: NURSE PRACTITIONER

## 2022-07-12 PROCEDURE — 99232 SBSQ HOSP IP/OBS MODERATE 35: CPT | Performed by: ANESTHESIOLOGY

## 2022-07-12 RX ORDER — ASPIRIN 81 MG/1
81 TABLET ORAL DAILY
Status: DISCONTINUED | OUTPATIENT
Start: 2022-07-12 | End: 2022-07-15

## 2022-07-12 RX ORDER — POTASSIUM CHLORIDE 20 MEQ/1
40 TABLET, EXTENDED RELEASE ORAL ONCE
Status: COMPLETED | OUTPATIENT
Start: 2022-07-12 | End: 2022-07-12

## 2022-07-12 RX ORDER — MAGNESIUM SULFATE HEPTAHYDRATE 40 MG/ML
2 INJECTION, SOLUTION INTRAVENOUS ONCE
Status: COMPLETED | OUTPATIENT
Start: 2022-07-12 | End: 2022-07-12

## 2022-07-12 RX ORDER — FOLIC ACID 1 MG/1
1 TABLET ORAL DAILY
Status: DISCONTINUED | OUTPATIENT
Start: 2022-07-13 | End: 2022-07-19 | Stop reason: HOSPADM

## 2022-07-12 RX ORDER — LANOLIN ALCOHOL/MO/W.PET/CERES
100 CREAM (GRAM) TOPICAL DAILY
Status: DISCONTINUED | OUTPATIENT
Start: 2022-07-13 | End: 2022-07-19 | Stop reason: HOSPADM

## 2022-07-12 RX ORDER — ACETAMINOPHEN 325 MG/1
650 TABLET ORAL EVERY 6 HOURS PRN
Status: DISCONTINUED | OUTPATIENT
Start: 2022-07-12 | End: 2022-07-19 | Stop reason: HOSPADM

## 2022-07-12 RX ADMIN — ASPIRIN 81 MG: 81 TABLET, COATED ORAL at 10:08

## 2022-07-12 RX ADMIN — MAGNESIUM SULFATE HEPTAHYDRATE 2 G: 40 INJECTION, SOLUTION INTRAVENOUS at 08:15

## 2022-07-12 RX ADMIN — MAGNESIUM SULFATE HEPTAHYDRATE 2 G: 40 INJECTION, SOLUTION INTRAVENOUS at 15:42

## 2022-07-12 RX ADMIN — POTASSIUM CHLORIDE 40 MEQ: 1500 TABLET, EXTENDED RELEASE ORAL at 08:16

## 2022-07-12 RX ADMIN — ENOXAPARIN SODIUM 40 MG: 40 INJECTION SUBCUTANEOUS at 08:16

## 2022-07-12 RX ADMIN — POTASSIUM CHLORIDE 20 MEQ: 14.9 INJECTION, SOLUTION INTRAVENOUS at 01:21

## 2022-07-12 RX ADMIN — POTASSIUM PHOSPHATE, MONOBASIC AND POTASSIUM PHOSPHATE, DIBASIC 30 MMOL: 224; 236 INJECTION, SOLUTION, CONCENTRATE INTRAVENOUS at 07:56

## 2022-07-12 RX ADMIN — FOLIC ACID: 5 INJECTION, SOLUTION INTRAMUSCULAR; INTRAVENOUS; SUBCUTANEOUS at 10:08

## 2022-07-12 RX ADMIN — NICOTINE 1 PATCH: 21 PATCH, EXTENDED RELEASE TRANSDERMAL at 08:16

## 2022-07-12 NOTE — ASSESSMENT & PLAN NOTE
· Secondary to alcohol withdrawal  · Improving, patient alert and oriented this morning, occasional confusion but easily redirectable   · Thiamine/folic acid supplementation   · Neuro checks q4h  · Delirium precautions; regulate sleep/wake cycle, daily CAM ICU, environmental controls

## 2022-07-12 NOTE — ASSESSMENT & PLAN NOTE
· Patient presented 7/8 with complaints of epigastric pain, nausea, and vomiting X 1 week  · History of alcohol induced pancreatitis   · CT A/P 7/8: Mild peripancreatic stranding most concerning for pancreatitis  No peripancreatic fluid collections  · Lipase 1700 on admission> 971 >1165 > now 392 (7/11)  · IVF d/c yesterday, tolerating PO diet   · Antiemetic p r n    · Serial abdominal exams

## 2022-07-12 NOTE — ASSESSMENT & PLAN NOTE
· Reportedly drinks 6 beers/ day +/- liquor   · Was drinking up to the point of admission (last drink 7/8)   · Alcohol level 182 on admission  · Patient started on CIWA protocol on admission  · Patient reported hx of handshaking when not drinking upon admission  · Patient developed severe alcohol withdrawal symptoms on 7/9- 7/10 with DTs, tachycardia, confusion, and agitation requiring 4 point restraints  · Received total of 6mg IV Ativan without improvement   · Transferred to [place] and started on IV phenobarb load-[facility] 39 at this time   · Total load 10mg/kg in 3 divided doses   · PRN doses and PRN valium doses also required (has not needed in the last 24 hours)   · Close monitoring of respiratory status  · Continue thiamine and folic acid supplementation   · Precedex on at 0 3mcg, plan to wean off today  · Seizure precautions  · Appropriate for 1:1 monitoring D/C today

## 2022-07-12 NOTE — QUICK NOTE
Patient seen and evaluated by Critical Care today and deemed to be appropriate for transfer to [facility] with Telemetry   Spoke to Dr Emily Youngblood from [address] [place] [address] to accept patient transfer  Major changes to treatment plan from the day of transfer include check surveillance Echo, negative trop-will repeat again in AM and repeat EKG in AM, no further complaints of chest pain    Critical care can be contacted via [employer] with any questions or concerns

## 2022-07-12 NOTE — ASSESSMENT & PLAN NOTE
· Per documentation PAF after alcohol binges; s/p cardioversion in 2018  · Currently NSR  · Outpatient regimen: cardizem, sotolol, xarelto  · Unclear if patient is compliant with this regimen at home   · Hold anticoagulation as risk may out way benefit, patient admits to frequent falls at home, 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 intoxication   · Continue to monitor on telemetry-no Afib events  · Continue to hold home meds  · Optimize electrolytes

## 2022-07-12 NOTE — ASSESSMENT & PLAN NOTE
· Patient presented with abdominal pain and underwent CT and incidentally was found to have a large right pneumothorax  · 16 Sinhala percutaneous chest tube was inserted in the ED on admission, 7/8  · Repeat imaging with persistent pneumothorax  · 7/9 S/p IR chest tube placement with 10 Sinhala to apical right chest tube with resolution of pneumothorax  · CXR 7/11 revealing Interval development of small right apical pneumothorax with diffuse right lung airspace disease   Right-sided chest tube remains in place  · CXR 7/12 resolution of right apical pneumo, improved right lung airspace disease  · Plan to place chest tube to H2O seal today and repeat CXR in AM

## 2022-07-12 NOTE — ASSESSMENT & PLAN NOTE
· On admission reported drinking 6 beers a day, family also reporting frequent liquor use   · Encourage alcohol cessation once medically optimized   · Per previous notes, declining alcohol rehab, will re-address and consult case management when appropriate   · Rest of plan as outlined

## 2022-07-12 NOTE — PROGRESS NOTES
[address] 43  Progress Note - Nic Ayala 1965, 59 y o  male MRN: [id number]  Unit/Bed#: ICU 11 Encounter: [id number]  Primary Care Provider: Andrey Hobbs MD   Date and time admitted to hospital: 7/8/2022  5:50 PM    * Alcohol withdrawal ([facility] [address] )  Assessment & Plan  · Reportedly drinks 6 beers/ day +/- liquor   · Was drinking up to the point of admission (last drink 7/8)   · Alcohol level 182 on admission  · Patient started on CIWA protocol on admission  · Patient reported hx of handshaking when not drinking upon admission  · Patient developed severe alcohol withdrawal symptoms on 7/9- 7/10 with DTs, tachycardia, confusion, and agitation requiring 4 point restraints  · Received total of 6mg IV Ativan without improvement   · Transferred to [place] and started on IV phenobarb load-[facility] 39 at this time   · Total load 10mg/kg in 3 divided doses   · PRN doses and PRN valium doses also required (has not needed in the last 24 hours)   · Close monitoring of respiratory status  · Continue thiamine and folic acid supplementation   · Precedex on at 0 3mcg, plan to wean off today  · Seizure precautions  · Appropriate for 1:1 monitoring D/C today    Alcohol-induced acute pancreatitis  Assessment & Plan  · Patient presented 7/8 with complaints of epigastric pain, nausea, and vomiting X 1 week  · History of alcohol induced pancreatitis   · CT A/P 7/8: Mild peripancreatic stranding most concerning for pancreatitis  No peripancreatic fluid collections  · Lipase 1700 on admission> 971 >1165 > now 392 (7/11)  · IVF d/c yesterday, tolerating PO diet   · Antiemetic p r n  · Serial abdominal exams       Pneumothorax, right  Assessment & Plan  · Patient presented with abdominal pain and underwent CT and incidentally was found to have a large right pneumothorax     · 16 Italian percutaneous chest tube was inserted in the ED on admission, 7/8  · Repeat imaging with persistent pneumothorax  · 7/9 S/p IR chest tube placement with 10 Malagasy to apical right chest tube with resolution of pneumothorax  · CXR 7/11 revealing Interval development of small right apical pneumothorax with diffuse right lung airspace disease   Right-sided chest tube remains in place  · CXR 7/12 resolution of right apical pneumo, improved right lung airspace disease  · Plan to place chest tube to H2O seal today and repeat CXR in AM    Hypomagnesemia  Assessment & Plan  · Secondary to alcohol abuse  · Trend daily and Replete PRN     Elevated LFTs-resolved as of 7/12/2022  Assessment & Plan  · Secondary to alcoholic hepatitis   · CT 7/8 with severe hepatic steatosis  · Currently trending down; Trend as appropriate     Encephalopathy  Assessment & Plan  · Secondary to alcohol withdrawal  · Improving, patient alert and oriented this morning, occasional confusion but easily redirectable   · Thiamine/folic acid supplementation   · Neuro checks q4h  · Delirium precautions; regulate sleep/wake cycle, daily CAM ICU, environmental controls    Chest pain  Assessment & Plan  · Patient c/o chest pain this morning, pointing to area around chest tube site  · EKG obtained which demonstrated new ST depressions in I, aVL, new ST elevation in AVR, ST depression V2 which looks stable from prior, has resolution of ST depressions that were previously present in V1, V4-V6, stable left korey deviation  · Check troponin (trops negative on admission)  · Start daily ASA     Alcohol abuse  Assessment & Plan  · On admission reported drinking 6 beers a day, family also reporting frequent liquor use   · Encourage alcohol cessation once medically optimized   · Per previous notes, declining alcohol rehab, will re-address and consult case management when appropriate   · Rest of plan as outlined    Paroxysmal atrial fibrillation ([facility] [address] )  Assessment & Plan  · Per documentation PAF after alcohol binges; s/p cardioversion in 2018  · Currently NSR  · Outpatient regimen: cardizem, sotolol, xarelto  · Unclear if patient is compliant with this regimen at home   · Hold anticoagulation as risk may out way benefit, patient admits to frequent falls at home, 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 intoxication   · Continue to monitor on telemetry-no Afib events  · Continue to hold home meds  · Optimize electrolytes     Tobacco abuse  Assessment & Plan  · Smokes 1 ppd  · Continue nicotine patch  · Smoking cessation education upon discharge      ----------------------------------------------------------------------------------------  HPI/24hr events: new c/o chest pain this morning, EKG and troponin obtained  Patient alert, oriented, and cooperative  Periodic confusion but easily redirectable  Remains on precedex 0 3mcg, plan to D/C today  Patient appropriate for transfer out of the ICU today?: Patient does not meet criteria for ICU Follow-up Clinic; referral has not been made  Disposition: Transfer to Med Surg with Telemetry   Code Status: Level 1 - Full Code  ---------------------------------------------------------------------------------------  SUBJECTIVE  "It hurts right here (points to chest tube insertio site)    Review of Systems   Cardiovascular: Positive for chest pain  All other systems reviewed and are negative      Review of systems was reviewed and negative unless stated above in HPI/24-hour events   ---------------------------------------------------------------------------------------  OBJECTIVE    Vitals   Vitals:    22 0400 22 0500 22 0554 22 0600   BP: 108/74 106/62  116/74   Pulse: 100 101  (!) 109   Resp: (!) 29 (!) 27  (!) 27   Temp: (!) 97 4 °F (36 3 °C)      TempSrc: Temporal      SpO2: 98% 98%  98%   Weight:   65 7 kg (144 lb 13 5 oz)    Height:         Temp (24hrs), Av 4 °F (36 3 °C), Min:96 8 °F (36 °C), Max:97 7 °F (36 5 °C)  Current: Temperature: (!) 97 4 °F (36 3 °C)          Respiratory:  SpO2: SpO2: 98 %, SpO2 Activity: SpO2 Activity: At Rest, SpO2 Device: O2 Device: None (Room air)       Invasive/non-invasive ventilation settings   Respiratory  Report   Lab Data (Last 4 hours)    None         O2/Vent Data (Last 4 hours)    None                Physical Exam  Constitutional:       General: He is awake  Appearance: He is well-developed and underweight  HENT:      Head: Normocephalic and atraumatic  Eyes:      General: Lids are normal       Extraocular Movements: Extraocular movements intact  Conjunctiva/sclera: Conjunctivae normal    Cardiovascular:      Rate and Rhythm: Normal rate and regular rhythm  Pulses: Normal pulses  Radial pulses are 2+ on the right side and 2+ on the left side  Dorsalis pedis pulses are 2+ on the right side and 2+ on the left side  Heart sounds: Normal heart sounds, S1 normal and S2 normal    Pulmonary:      Effort: Pulmonary effort is normal       Comments: Course rhonchi RUL, left sided breath sounds clear  Chest:      Comments: R apical chest tube in place, intermittent tidaling, no air leak present   Abdominal:      General: Bowel sounds are normal       Palpations: Abdomen is soft  Musculoskeletal:      Cervical back: Full passive range of motion without pain, normal range of motion and neck supple  Comments: Normal ROM    Skin:     General: Skin is warm and dry  Capillary Refill: Capillary refill takes less than 2 seconds  Neurological:      General: No focal deficit present  Mental Status: He is alert and oriented to person, place, and time  GCS: GCS eye subscore is 4  GCS verbal subscore is 5  GCS motor subscore is 6  Comments: Periodically confused    Psychiatric:         Attention and Perception: Attention and perception normal          Mood and Affect: Mood and affect normal          Speech: Speech normal          Behavior: Behavior normal  Behavior is cooperative  Thought Content:  Thought content normal          Cognition and Memory: Cognition and memory normal          Judgment: Judgment is inappropriate               Laboratory and Diagnostics:  Results from last 7 days   Lab Units 07/12/22  0541 07/11/22  0517 07/10/22  0450 07/09/22  0454 07/08/22  1801   WBC Thousand/uL 7 36 6 22 8 92 8 54 6 59   HEMOGLOBIN g/dL 10 2* 9 7* 11 0* 12 4 13 9   HEMATOCRIT % 30 2* 29 9* 34 6* 37 2 41 0   PLATELETS Thousands/uL 230 187 186 196 211   NEUTROS PCT % 76* 69 81*  --  68   MONOS PCT % 6 8 7  --  11     Results from last 7 days   Lab Units 07/12/22  0541 07/11/22 2108 07/11/22  0517 07/10/22  0450 07/09/22  0454 07/09/22  0159 07/08/22  1801   SODIUM mmol/L 132* 132* 135* 135* 138 136 137   POTASSIUM mmol/L 2 9* 3 5 2 9* 4 1 3 0* 3 0* 2 5*   CHLORIDE mmol/L 98* 96* 99* 99* 94* 91* 90*   CO2 mmol/L 28 27 26 20* 22 20* 30   ANION GAP mmol/L 6 9 10 16* 22* 25* 17*   BUN mg/dL 5 6 4* 7 11 11 12   CREATININE mg/dL 0 58* 0 55* 0 51* 0 64 0 82 0 63 0 65   CALCIUM mg/dL 7 5* 8 0* 7 6* 7 9* 8 6 8 8 9 1   GLUCOSE RANDOM mg/dL 194* 98 90 97 78 79 84   ALT U/L 10*  --  7*  --  16  --  18   AST U/L 26  --  28  --  67*  --  80*   ALK PHOS U/L 46  --  36*  --  49  --  58   ALBUMIN g/dL 2 7*  --  2 5*  --  3 6  --  4 0   TOTAL BILIRUBIN mg/dL 0 37  --  0 60  --  1 20*  --  1 13*     Results from last 7 days   Lab Units 07/12/22  0541 07/11/22 2108 07/11/22  0517 07/10/22  0450 07/09/22  0454 07/09/22  0159 07/08/22  1801   MAGNESIUM mg/dL 1 9 1 4* 1 5* 1 7 3 3* 2 5 1 4*   PHOSPHORUS mg/dL 1 6*  --  2 3* 2 0*  --   --  4 2      Results from last 7 days   Lab Units 07/09/22  0454 07/08/22  1845   INR  1 04 0 92   PTT seconds  --  30              ABG:    VBG:          Micro        EKG: new ST depressions in I, aVL, new ST elevation in AVR, ST depression V2 which looks stable from prior, has resolution of ST depressions that were previously present in V1, V4-V6, stable left korey deviation  Imaging:CXR 7/12 resolution of right apical pneumo, improved right lung airspace disease I have personally reviewed pertinent films in PACS    Intake and Output  I/O       07/10 0701  07/11 0700 07/11 0701 07/12 0700 07/12 0701 07/13 0700    P  O   120     I V  (mL/kg) 4083 2 (66) 193 3 (2 9)     IV Piggyback 1300 650     Total Intake(mL/kg) 5383 2 (87) 963 3 (14 7)     Urine (mL/kg/hr) 600 (0 4) 1600 (1)     Stool  0     Chest Tube 85 20     Total Output 685 1620     Net +4698 2 -656  7            Unmeasured Urine Occurrence 4 x 3 x     Unmeasured Stool Occurrence  3 x           Height and Weights   Height: 5' 8" (172 7 cm)  IBW (Ideal Body Weight): 68 4 kg  Body mass index is 22 02 kg/m²  Weight (last 2 days)     Date/Time Weight    07/12/22 0554 65 7 (144 84)    07/11/22 0600 61 9 (136 47)    07/10/22 0650 60 6 (133 6)            Nutrition       Diet Orders   (From admission, onward)             Start     Ordered    07/11/22 1737  Diet Regular; Regular House  Diet effective now        References:    Nutrtion Support Algorithm Enteral vs  Parenteral   Question Answer Comment   Diet Type Regular    Regular Regular House    RD to adjust diet per protocol?  No        07/11/22 1737    07/09/22 1219  Room Service  Once        Question:  Type of Service  Answer:  Room Service-Appropriate    07/09/22 1218                  Active Medications  Scheduled Meds:  Current Facility-Administered Medications   Medication Dose Route Frequency Provider Last Rate    acetaminophen  650 mg Rectal Q6H PRN CHEN Sun      aspirin  81 mg Oral Daily Olga Spironello V, CRNP      dexmedetomidine  0 1-0 7 mcg/kg/hr Intravenous Titrated Olga Spironello V, CRNP 0 3 mcg/kg/hr (07/11/22 1445)    enoxaparin  40 mg Subcutaneous Daily Olga Spironello V, CRNP      folic acid 1 mg, thiamine 100 mg in 0 9% sodium chloride 100 mL IVPB   Intravenous Daily Olga Spironello V, CRNP 0 mL/hr at 07/10/22 0945    levalbuterol  0 63 mg Nebulization Q6H PRN Olga Spironello V, CRNP      magnesium sulfate  2 g Intravenous Once [employer], CRNP      nicotine  1 patch Transdermal Daily Olga Spironello V, CRNP      ondansetron  4 mg Intravenous Q6H PRN Olga Spironello V, CRNP      potassium phosphate  30 mmol Intravenous Once Olga Spironello V, CRNP 30 mmol (07/12/22 1506)     Continuous Infusions:  dexmedetomidine, 0 1-0 7 mcg/kg/hr, Last Rate: 0 3 mcg/kg/hr (07/11/22 3275)      PRN Meds:   acetaminophen, 650 mg, Q6H PRN  levalbuterol, 0 63 mg, Q6H PRN  ondansetron, 4 mg, Q6H PRN        Invasive Devices Review  Invasive Devices  Report    Peripheral Intravenous Line  Duration           Long-Dwell Peripheral IV (Midline) 11/95/11 Right Basilic 1 day    Peripheral IV 07/12/22 Distal;Dorsal (posterior); Right Forearm <1 day          Drain  Duration           Chest Tube Right Second intercostal space 10 2 Fr  2 days                Rationale for remaining devices: maintain chest tube for treatment of pneumothorax   ---------------------------------------------------------------------------------------  Advance Directive and Living Will:      Power of :    POLST:    ---------------------------------------------------------------------------------------  Care Time Delivered:   No Critical Care time spent       CHEN Starr      Portions of the record may have been created with voice recognition software  Occasional wrong word or "sound a like" substitutions may have occurred due to the inherent limitations of voice recognition software    Read the chart carefully and recognize, using context, where substitutions have occurred

## 2022-07-12 NOTE — ASSESSMENT & PLAN NOTE
· Patient c/o chest pain this morning, pointing to area around chest tube site  · EKG obtained which demonstrated new ST depressions in I, aVL, new ST elevation in AVR, ST depression V2 which looks stable from prior, has resolution of ST depressions that were previously present in V1, V4-V6, stable left korey deviation  · Check troponin (trops negative on admission)  · Start daily ASA

## 2022-07-12 NOTE — QUICK NOTE
Sister Adriana Merlin updated by phone, she was made aware that patient continues to clinically improve and will be transferred to the general medicine service today

## 2022-07-13 ENCOUNTER — APPOINTMENT (INPATIENT)
Dept: NON INVASIVE DIAGNOSTICS | Facility: HOSPITAL | Age: 57
DRG: 199 | End: 2022-07-13
Payer: COMMERCIAL

## 2022-07-13 ENCOUNTER — APPOINTMENT (INPATIENT)
Dept: RADIOLOGY | Facility: HOSPITAL | Age: 57
DRG: 199 | End: 2022-07-13
Payer: COMMERCIAL

## 2022-07-13 ENCOUNTER — TELEPHONE (OUTPATIENT)
Dept: FAMILY MEDICINE CLINIC | Facility: CLINIC | Age: 57
End: 2022-07-13

## 2022-07-13 PROBLEM — E87.8 ELECTROLYTE ABNORMALITY: Status: ACTIVE | Noted: 2018-06-07

## 2022-07-13 LAB
ANION GAP SERPL CALCULATED.3IONS-SCNC: 9 MMOL/L (ref 4–13)
AORTIC ROOT: 3.2 CM
APICAL FOUR CHAMBER EJECTION FRACTION: 44 %
BASOPHILS # BLD AUTO: 0.03 THOUSANDS/ΜL (ref 0–0.1)
BASOPHILS NFR BLD AUTO: 0 % (ref 0–1)
BUN SERPL-MCNC: 3 MG/DL (ref 5–25)
CALCIUM SERPL-MCNC: 7.9 MG/DL (ref 8.3–10.1)
CARDIAC TROPONIN I PNL SERPL HS: 15 NG/L (ref 8–18)
CHLORIDE SERPL-SCNC: 96 MMOL/L (ref 100–108)
CO2 SERPL-SCNC: 28 MMOL/L (ref 21–32)
CREAT SERPL-MCNC: 0.48 MG/DL (ref 0.6–1.3)
E WAVE DECELERATION TIME: 134 MS
EOSINOPHIL # BLD AUTO: 0.17 THOUSAND/ΜL (ref 0–0.61)
EOSINOPHIL NFR BLD AUTO: 2 % (ref 0–6)
ERYTHROCYTE [DISTWIDTH] IN BLOOD BY AUTOMATED COUNT: 14.5 % (ref 11.6–15.1)
FRACTIONAL SHORTENING: 22 (ref 28–44)
GFR SERPL CREATININE-BSD FRML MDRD: 120 ML/MIN/1.73SQ M
GLUCOSE SERPL-MCNC: 101 MG/DL (ref 65–140)
HCT VFR BLD AUTO: 30.2 % (ref 36.5–49.3)
HGB BLD-MCNC: 10.1 G/DL (ref 12–17)
IMM GRANULOCYTES # BLD AUTO: 0.1 THOUSAND/UL (ref 0–0.2)
IMM GRANULOCYTES NFR BLD AUTO: 1 % (ref 0–2)
INTERVENTRICULAR SEPTUM IN DIASTOLE (PARASTERNAL SHORT AXIS VIEW): 1 CM
INTERVENTRICULAR SEPTUM: 1 CM (ref 0.6–1.1)
LAAS-AP2: 15.9 CM2
LAAS-AP4: 17.3 CM2
LEFT ATRIUM SIZE: 3.1 CM
LEFT INTERNAL DIMENSION IN SYSTOLE: 3.8 CM (ref 2.1–4)
LEFT VENTRICLE DIASTOLIC VOLUME (MOD BIPLANE): 64 ML
LEFT VENTRICLE SYSTOLIC VOLUME (MOD BIPLANE): 37 ML
LEFT VENTRICULAR INTERNAL DIMENSION IN DIASTOLE: 4.9 CM (ref 3.5–6)
LEFT VENTRICULAR POSTERIOR WALL IN END DIASTOLE: 1.1 CM
LEFT VENTRICULAR STROKE VOLUME: 50 ML
LV EF: 42 %
LVSV (TEICH): 50 ML
LYMPHOCYTES # BLD AUTO: 1.59 THOUSANDS/ΜL (ref 0.6–4.47)
LYMPHOCYTES NFR BLD AUTO: 21 % (ref 14–44)
MAGNESIUM SERPL-MCNC: 1.3 MG/DL (ref 1.6–2.6)
MCH RBC QN AUTO: 30.3 PG (ref 26.8–34.3)
MCHC RBC AUTO-ENTMCNC: 33.4 G/DL (ref 31.4–37.4)
MCV RBC AUTO: 91 FL (ref 82–98)
MONOCYTES # BLD AUTO: 0.93 THOUSAND/ΜL (ref 0.17–1.22)
MONOCYTES NFR BLD AUTO: 12 % (ref 4–12)
MV E'TISSUE VEL-SEP: 5 CM/S
MV PEAK A VEL: 0.56 M/S
MV PEAK E VEL: 43 CM/S
MV STENOSIS PRESSURE HALF TIME: 39 MS
MV VALVE AREA P 1/2 METHOD: 5.64
NEUTROPHILS # BLD AUTO: 4.7 THOUSANDS/ΜL (ref 1.85–7.62)
NEUTS SEG NFR BLD AUTO: 64 % (ref 43–75)
NRBC BLD AUTO-RTO: 1 /100 WBCS
PHOSPHATE SERPL-MCNC: 3.5 MG/DL (ref 2.7–4.5)
PLATELET # BLD AUTO: 277 THOUSANDS/UL (ref 149–390)
PMV BLD AUTO: 9.7 FL (ref 8.9–12.7)
POTASSIUM SERPL-SCNC: 2.9 MMOL/L (ref 3.5–5.3)
RBC # BLD AUTO: 3.33 MILLION/UL (ref 3.88–5.62)
RIGHT ATRIUM AREA SYSTOLE A4C: 14.1 CM2
RIGHT VENTRICLE ID DIMENSION: 3.5 CM
SL CV LEFT ATRIUM LENGTH A2C: 4.9 CM
SL CV PED ECHO LEFT VENTRICLE DIASTOLIC VOLUME (MOD BIPLANE) 2D: 112 ML
SL CV PED ECHO LEFT VENTRICLE SYSTOLIC VOLUME (MOD BIPLANE) 2D: 62 ML
SODIUM SERPL-SCNC: 133 MMOL/L (ref 136–145)
TR MAX PG: 28 MMHG
TR PEAK VELOCITY: 2.7 M/S
TRICUSPID VALVE PEAK REGURGITATION VELOCITY: 2.65 M/S
WBC # BLD AUTO: 7.52 THOUSAND/UL (ref 4.31–10.16)

## 2022-07-13 PROCEDURE — 93005 ELECTROCARDIOGRAM TRACING: CPT

## 2022-07-13 PROCEDURE — NC001 PR NO CHARGE: Performed by: INTERNAL MEDICINE

## 2022-07-13 PROCEDURE — 97163 PT EVAL HIGH COMPLEX 45 MIN: CPT

## 2022-07-13 PROCEDURE — 93306 TTE W/DOPPLER COMPLETE: CPT

## 2022-07-13 PROCEDURE — 84100 ASSAY OF PHOSPHORUS: CPT | Performed by: NURSE PRACTITIONER

## 2022-07-13 PROCEDURE — 83735 ASSAY OF MAGNESIUM: CPT | Performed by: NURSE PRACTITIONER

## 2022-07-13 PROCEDURE — 97110 THERAPEUTIC EXERCISES: CPT

## 2022-07-13 PROCEDURE — 80048 BASIC METABOLIC PNL TOTAL CA: CPT | Performed by: NURSE PRACTITIONER

## 2022-07-13 PROCEDURE — 99232 SBSQ HOSP IP/OBS MODERATE 35: CPT | Performed by: INTERNAL MEDICINE

## 2022-07-13 PROCEDURE — 84484 ASSAY OF TROPONIN QUANT: CPT | Performed by: NURSE PRACTITIONER

## 2022-07-13 PROCEDURE — 71045 X-RAY EXAM CHEST 1 VIEW: CPT

## 2022-07-13 PROCEDURE — 93306 TTE W/DOPPLER COMPLETE: CPT | Performed by: INTERNAL MEDICINE

## 2022-07-13 PROCEDURE — 99232 SBSQ HOSP IP/OBS MODERATE 35: CPT | Performed by: FAMILY MEDICINE

## 2022-07-13 PROCEDURE — 85025 COMPLETE CBC W/AUTO DIFF WBC: CPT | Performed by: NURSE PRACTITIONER

## 2022-07-13 RX ORDER — POTASSIUM CHLORIDE 20 MEQ/1
20 TABLET, EXTENDED RELEASE ORAL ONCE
Status: COMPLETED | OUTPATIENT
Start: 2022-07-13 | End: 2022-07-13

## 2022-07-13 RX ORDER — MAGNESIUM SULFATE HEPTAHYDRATE 40 MG/ML
2 INJECTION, SOLUTION INTRAVENOUS ONCE
Status: COMPLETED | OUTPATIENT
Start: 2022-07-13 | End: 2022-07-13

## 2022-07-13 RX ORDER — POTASSIUM CHLORIDE 29.8 MG/ML
40 INJECTION INTRAVENOUS ONCE
Status: DISCONTINUED | OUTPATIENT
Start: 2022-07-13 | End: 2022-07-13 | Stop reason: RX

## 2022-07-13 RX ORDER — LIDOCAINE 50 MG/G
1 PATCH TOPICAL DAILY
Status: DISCONTINUED | OUTPATIENT
Start: 2022-07-14 | End: 2022-07-19 | Stop reason: HOSPADM

## 2022-07-13 RX ORDER — POTASSIUM CHLORIDE 14.9 MG/ML
20 INJECTION INTRAVENOUS ONCE
Status: COMPLETED | OUTPATIENT
Start: 2022-07-13 | End: 2022-07-13

## 2022-07-13 RX ORDER — METOPROLOL SUCCINATE 25 MG/1
25 TABLET, EXTENDED RELEASE ORAL DAILY
Status: DISCONTINUED | OUTPATIENT
Start: 2022-07-14 | End: 2022-07-14

## 2022-07-13 RX ADMIN — POTASSIUM CHLORIDE 20 MEQ: 14.9 INJECTION, SOLUTION INTRAVENOUS at 10:56

## 2022-07-13 RX ADMIN — ASPIRIN 81 MG: 81 TABLET, COATED ORAL at 09:17

## 2022-07-13 RX ADMIN — POTASSIUM CHLORIDE 20 MEQ: 20 TABLET, EXTENDED RELEASE ORAL at 20:45

## 2022-07-13 RX ADMIN — POTASSIUM CHLORIDE 20 MEQ: 14.9 INJECTION, SOLUTION INTRAVENOUS at 15:02

## 2022-07-13 RX ADMIN — ACETAMINOPHEN 650 MG: 325 TABLET ORAL at 23:09

## 2022-07-13 RX ADMIN — THIAMINE HCL TAB 100 MG 100 MG: 100 TAB at 09:17

## 2022-07-13 RX ADMIN — NICOTINE 1 PATCH: 21 PATCH, EXTENDED RELEASE TRANSDERMAL at 09:17

## 2022-07-13 RX ADMIN — MAGNESIUM SULFATE HEPTAHYDRATE 2 G: 40 INJECTION, SOLUTION INTRAVENOUS at 11:40

## 2022-07-13 RX ADMIN — ENOXAPARIN SODIUM 40 MG: 40 INJECTION SUBCUTANEOUS at 09:17

## 2022-07-13 RX ADMIN — FOLIC ACID 1 MG: 1 TABLET ORAL at 09:17

## 2022-07-13 RX ADMIN — ACETAMINOPHEN 650 MG: 325 TABLET ORAL at 05:49

## 2022-07-13 NOTE — ASSESSMENT & PLAN NOTE
Patient reports drinking about 6 drinks per day  Last drink was on day of admission  · Complete alcohol cessation discussed with patient  · Initially on CIWA protocol on admission  Continue multivitamin, thiamine, folic acid  · Developed alcohol withdrawal symptoms 07/09-7/10 with confusion, tachycardia, agitation, DTs  Received total of 6 mg of IV Ativan without any improvement  · Transferred to step-down unit and received IV phenobarb and also required p r n   Valium doses  · Was also on Precedex drip which was later discontinued  · Patient interested in inpatient alcohol rehab after discharge

## 2022-07-13 NOTE — ASSESSMENT & PLAN NOTE
Incidental finding  Denied SOB, chest pain  Denied fall or trauma  · Large right pneumothorax initially, S/P right chest tube insertion in ED to wall suction on 07/08  · On 07/09 underwent IR chest tube placement due to persistent pneumothorax  · Chest x-ray 7/13 shows small PTX  · CT chest 7/14 - moderate right PTX, bullous changes with largest bulla 2 5 cm  Hematoma in the tract of prior large bore chest tube, hematoma in chest wall, moderate right pleural effusion with some hemorrhagic fluid noted  · Repeat chest x-ray 7/18 shows small pneumothorax  · Case discussed with thoracic surgery and plan is for transfer to [place] when bed available  · Case was discussed with Dr Ange Winston over there  · Risks/benefits of transfer discussed with patient and he is in agreement  Patient's sister also aware of the transfer  · Hemoglobin stable  · Patient is being transferred to [facility] to be evaluated by thoracic surgery

## 2022-07-13 NOTE — PROGRESS NOTES
Progress Note - Pulmonary   Nic yAala 59 y o  male MRN: [id number]  Unit/Bed#: 2 [place] [address] Encounter: [id number]    Assessment:  Alcohol withdraw last EtOH consumption 7/8    Alcohol abuse    Acute pancreatitis EtOH induced, now downtrending    R pneumothorax s/p apical CT     Hx of cardiomyopathy, EF recovered    Hx of afib/flutter was cardioverted in 2018    Tobacco abuse     Plan:  Monitor for ETOH withdrawal  Pain control: add lidocaine patch 5%   Incentive spirometry  CXR done today AM reviewed: slight apical pneumothorax: cont under negative suction for now   Rest of the management as per primary         Subjective:   Pt is sleeping comfortably  He denies any symptoms except pain upon deep inhalation       Objective:     Vitals: Blood pressure 118/79, pulse (!) 106, temperature 98 6 °F (37 °C), resp  rate 20, height 5' 8" (1 727 m), weight 65 7 kg (144 lb 13 5 oz), SpO2 96 %  ,Body mass index is 22 02 kg/m²  Intake/Output Summary (Last 24 hours) at 7/13/2022 1657  Last data filed at 7/13/2022 0357  Gross per 24 hour   Intake --   Output 1140 ml   Net -1140 ml       Invasive Devices  Report    Peripheral Intravenous Line  Duration           Long-Dwell Peripheral IV (Midline) 66/42/53 Right Basilic 3 days    Peripheral IV 07/12/22 Distal;Dorsal (posterior); Right Forearm 1 day          Drain  Duration           Chest Tube Right Second intercostal space 10 2 Fr  3 days    External Urinary Catheter 1 day                Physical Exam:   General: AAOX3  HEENT: atrumatic head  Lungs: decreased breath sound; chest tube present   CVS: S1S2+, no m/r/g  Abdomen: soft, Nd, NT  Ext; no edema  Neuro: AAXO3         Labs: I have personally reviewed pertinent lab results  Imaging and other studies: I have personally reviewed pertinent reports     and I have personally reviewed pertinent films in Suzan Cavazos MD

## 2022-07-13 NOTE — PROGRESS NOTES
[facility] Internal [facility] Progress Note  Patient: Yong Swain 59 y o  male   MRN: [id number]  PCP: Drake Dia MD  Unit/Bed#: [address] [place] [address] Encounter: [id number]  Date Of Visit: 07/14/22    Problem List:    Active Problems:    Pneumothorax    Electrolyte abnormality    Alcohol withdrawal ([facility] )    Cardiomyopathy, 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    Chest pain    Paroxysmal atrial fibrillation (HCC)    Tobacco abuse    Alcohol-induced acute pancreatitis    Encephalopathy      Assessment & Plan:    Pneumothorax  Assessment & Plan  Incidental finding  Denied SOB, chest pain  Denied fall or trauma, however patient is a poor historian  · Large right pneumothorax  · Status post right-sided chest tube insertion in ED to wall suction on 07/08  · On 07/09 underwent IR chest tube placement due to persistent pneumothorax  · Chest x-ray 7/13 shows small pneumothorax  · Check CT chest per pulmonary  · Incentive spirometer      Electrolyte abnormality  Assessment & Plan  · Patient with hypomagnesemia on lab work today  · Replete with IV Mag   20 mEq p o  Potassium level given today  · get repeat lab work in a m  Alcohol withdrawal ([facility] )  Assessment & Plan  Patient reports drinking about 6 drinks per day  Last drink was on day of admission  · Complete Alcohol cessation discussed  · Patient was started on CIWA protocol on admission  · Developed alcohol withdrawal symptoms on 07/09-7/10 bid tachycardia, confusion, agitation, DTs  Received total of 6 mg of IV Ativan without any improvement  · Transferred to step-down unit and started on IV phenobarb and also required p r n  Valium doses  · Was also on Precedex drip which has been discontinued  · Patient states he would be interested in inpatient alcohol rehab if that is what it takes to quit drinking  · MVI, thiamine, folic acid    Cardiomyopathy, 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  Assessment & Plan  TONYA in 2018 showed normal EF     Echo on this admission shows EF of 25-30% with grade 1 diastolic dysfunction  Cardiology consulted for medication optimization as patient on many medications at that are not ideal  Continue Toprol-XL  Aldactone, losartan added    Encephalopathy  Assessment & Plan  Due to alcohol abuse/withdrawal  Mental status has improved    Alcohol-induced acute pancreatitis  Assessment & Plan  Patient presented with epigastric pain nausea vomiting for about 1 week  History of alcohol induced pancreatitis and alcoholic hepatitis  · CT showed - Mild peripancreatic stranding suggestive of pancreatitis  Severe hepatic steatosis was noted  No fluid collections  · Lipase 1700 on admission --> 392 on 7/11  Denies any abdominal pain  Tolerating diet  · Alcohol level on admission 182  · Triglycerides 44  · Was on IV fluids previously which has been discontinued      Tobacco abuse  Assessment & Plan  Smoking cessation  Nicotine patch    Paroxysmal atrial fibrillation (HCC)  Assessment & Plan  Reported he stopped taking Xarelto about 1 month ago as he ran out of it  Patient appears to be on Cardizem, sotalol, Toprol XL in the past as well  He denies taking any prescription medications except Xarelto which he stopped 1 month ago  History of cardioversion in 2018  · Tachycardic in ED  · Per prior provider, EKG showed sinus tach, rate 106, , V1 to V3 TWI  · Continue Toprol XL 25mg p o  Daily  · Holding anticoagulation as patient reports frequent falls at home        Chest pain  Assessment & Plan  Per ICU note, patient reported some chest pain around chest tube site  Troponins negative          VTE Pharmacologic Prophylaxis: VTE Score: 1 Lovenox    Patient Centered Rounds: I performed bedside rounds with nursing staff today  Discussions with Specialists or Other Care Team Provider: yes - cardio    Education and Discussions with Family / Patient: Updated  (sister) at bedside  Time Spent for Care: 30 minutes   More than 50% of total time spent on counseling and coordination of care as described above  Current Length of Stay: 6 day(s)  Current Patient Status: Inpatient   Certification Statement: The patient will continue to require additional inpatient hospital stay due to Pneumothorax with chest tube in place, cardiomyopathy requiring adjustment of medications  Discharge Plan: Anticipate discharge in 48-72 hrs to rehab facility  Code Status: Level 1 - Full Code    Subjective:     Patient denies any abdominal pain, nausea, vomiting  Denies any shortness of breath    Objective:     Vitals:   Temp (24hrs), Av 3 °F (36 8 °C), Min:97 4 °F (36 3 °C), Max:99 2 °F (37 3 °C)    Temp:  [97 4 °F (36 3 °C)-99 2 °F (37 3 °C)] 97 6 °F (36 4 °C)  HR:  [101-110] 101  Resp:  [20-21] 21  BP: (118-133)/(79-84) 133/84  SpO2:  [90 %-97 %] 90 %  Body mass index is 22 02 kg/m²  Input and Output Summary (last 24 hours): Intake/Output Summary (Last 24 hours) at 2022 0947  Last data filed at 2022 0442  Gross per 24 hour   Intake --   Output 1420 ml   Net -1420 ml       Physical Exam:   Physical Exam  Constitutional:       General: He is not in acute distress  Appearance: He is ill-appearing (Chronically)  He is not diaphoretic  HENT:      Head: Normocephalic and atraumatic  Eyes:      General:         Right eye: No discharge  Left eye: No discharge  Cardiovascular:      Rate and Rhythm: Regular rhythm  Tachycardia present  Pulmonary:      Effort: Pulmonary effort is normal  No respiratory distress  Breath sounds: Normal breath sounds  No wheezing or rales  Comments: Right-sided chest tube in place  Abdominal:      General: Bowel sounds are normal  There is no distension  Palpations: Abdomen is soft  Tenderness: There is no abdominal tenderness  Neurological:      Mental Status: He is alert           Additional Data:     Labs:  Results from last 7 days   Lab Units 22  0544   WBC Thousand/uL 7 52   HEMOGLOBIN g/dL 10 1*   HEMATOCRIT % 30 2*   PLATELETS Thousands/uL 277   NEUTROS PCT % 64   LYMPHS PCT % 21   MONOS PCT % 12   EOS PCT % 2     Results from last 7 days   Lab Units 07/14/22  0459   SODIUM mmol/L 133*   POTASSIUM mmol/L 3 7   CHLORIDE mmol/L 98*   CO2 mmol/L 30   BUN mg/dL 6   CREATININE mg/dL 0 53*   ANION GAP mmol/L 5   CALCIUM mg/dL 8 2*   ALBUMIN g/dL 2 7*   TOTAL BILIRUBIN mg/dL 0 35   ALK PHOS U/L 43*   ALT U/L 12   AST U/L 26   GLUCOSE RANDOM mg/dL 95     Results from last 7 days   Lab Units 07/09/22  0454   INR  1 04                   Lines/Drains:  Invasive Devices  Report    Peripheral Intravenous Line  Duration           Long-Dwell Peripheral IV (Midline) 77/98/71 Right Basilic 4 days    Peripheral IV 07/13/22 Distal;Left;Ventral (anterior) Forearm <1 day          Drain  Duration           Chest Tube Right Second intercostal space 10 2 Fr  4 days    External Urinary Catheter 1 day                  Telemetry:  Telemetry Orders (From admission, onward)             24 Hour Telemetry Monitoring  Continuous x 24 Hours (Telem)        References:    Telemetry Guidelines   Question:  Reason for 24 Hour Telemetry  Answer:  Metabolic/Electrolyte Disturbance with High Probability of Dysrhythmia (K level <3 or >6, or KCL infusion >=10mEq/hr)                 Telemetry Reviewed: Sinus Tachycardia  Indication for Continued Telemetry Use: Arrthymias requiring medical therapy             Imaging: No pertinent imaging reviewed      Recent Cultures (last 7 days):         Last 24 Hours Medication List:   Current Facility-Administered Medications   Medication Dose Route Frequency Provider Last Rate    acetaminophen  650 mg Oral Q6H PRN Olga Spironello V, CRNP      aspirin  81 mg Oral Daily Olga Spironello V, CRNP      enoxaparin  40 mg Subcutaneous Daily Olga Spironello V, CRNP      folic acid  1 mg Oral Daily Olga Spironello V, CRNP      levalbuterol  0 63 mg Nebulization Q6H PRN Dennis Shelby CHEN YOUNG      lidocaine  1 patch Topical Daily Ernesto Zamarripa MD      magnesium sulfate  2 g Intravenous Once Denita Revankar, DO      metoprolol succinate  25 mg Oral Daily Denita Revankar, DO      nicotine  1 patch Transdermal Daily CHEN Melendrez      ondansetron  4 mg Intravenous Q6H PRN CHEN Melendrez      potassium chloride  20 mEq Oral Once Denita Revankar, DO      thiamine  100 mg Oral Daily CHEN Melendrez          Today, Patient Was Seen By: Jordana Barrios DO    ** Please Note: "This note has been constructed using a voice recognition system  Therefore there may be syntax, spelling, and/or grammatical errors   Please call if you have any questions  "**

## 2022-07-13 NOTE — PHYSICAL THERAPY NOTE
PHYSICAL THERAPY EVALUATION/TREATMENT     07/13/22 1500   Note Type   Note type Evaluation   Pain Assessment   Pain Assessment Tool 0-10   Pain Score 6  (Chest tube site)   Restrictions/Precautions   Other Precautions Chair Alarm; Bed Alarm;Multiple lines; Fall Risk;Pain  (Chest tube with wall suction)   Home Living   Type of [address] [place] [address] Two level; Able to live on main level with bedroom/bathroom;Stairs to enter with rails  (Ten stairs to enter)   Home Equipment   (None)   Additional Comments Patient states independence without assistive device prior to admission   Prior Function   Level of [place] Independent with ADLs and functional mobility   Lives With Family  (Sister)   Receives Help From Family   ADL Assistance Independent   IADLs Needs assistance   Comments Patient states not driving and sister providing transportation   General   Additional Pertinent History Chart reviewed, patient admitted with alcohol withdrawal   Patient now with chest tube in place and presents as generally weak and deconditioned   Family/Caregiver Present No   Cognition   Overall Cognitive Status WFL   Arousal/Participation Cooperative   Orientation Level Oriented to person;Oriented to place;Oriented to time   Following Commands Follows multistep commands with increased time or repetition   Subjective   Subjective Patient states inability to walk at this time   RLE Assessment   RLE Assessment   (ROM WFL, strength 3+/5)   LLE Assessment   LLE Assessment   (ROM WFL, strength 3+/5)   Coordination   Movements are Fluid and Coordinated 0   Bed Mobility   Supine to Sit 3  Moderate assistance   Additional items Assist x 1   Sit to Supine 3  Moderate assistance   Additional items Assist x 1   Transfers   Sit to Stand 3  Moderate assistance   Additional items Assist x 1   Stand to Sit 3  Moderate assistance   Additional items Assist x 1;Verbal cues   Ambulation/Elevation   Gait Assistance 3  Moderate assist   Additional items Assist x 1   Assistive Device   (Handhold assist)   Distance 5 ft at bedside with antalgic gait patterning narrow base of support shortened stride length   Balance   Static Sitting Fair   Dynamic Sitting Fair -   Static Standing Fair -   Dynamic Standing Poor +   Ambulatory Poor +   Activity Tolerance   Activity Tolerance Patient limited by fatigue;Patient limited by pain   Nurse Made Aware yes   Assessment   Prognosis Good   Problem List Decreased strength;Decreased range of motion;Decreased endurance; Impaired balance;Decreased mobility; Decreased coordination;Pain   Assessment Patient seen for Physical Therapy evaluation  Patient admitted with Alcohol withdrawal ([facility] )  Comorbidities affecting patient's physical performance include: alcohol abuse, alcohol-induced pancreatitis, alcoholic hepatitis, cardiomyopathy, paroxysmal AFib/a flutter, nicotine abuse who presents with epigastric pain nausea vomiting for about    Personal factors affecting patient at time of initial evaluation include: lives in two story house, inability to ambulate household distances, inability to navigate community distances, inability to navigate level surfaces without external assistance, inability to perform dynamic tasks in community, limited home support, inability to perform physical activity, inability to perform ADLS and inability to perform IADLS    Prior to admission, patient was independent with functional mobility without assistive device, independent with ADLS, requiring assist for IADLS, living in a multi-level home, ambulating household distance, ambulating community distances and home with family assist   Please find objective findings from Physical Therapy assessment regarding body systems outlined above with impairments and limitations including weakness, decreased ROM, impaired balance, decreased endurance, impaired coordination, gait deviations, pain, decreased activity tolerance, decreased functional mobility tolerance, fall risk and SOB upon exertion  The Barthel Index was used as a functional outcome tool presenting with a score of Barthel Index Score: 30 today indicating marked limitations of functional mobility and ADLS  Patient's clinical presentation is currently unstable/unpredictable as seen in patient's presentation of vital sign response, changing level of pain, increased fall risk, new onset of impairment of functional mobility, decreased endurance and new onset of weakness  Pt would benefit from continued Physical Therapy treatment to address deficits as defined above and maximize level of functional mobility  As demonstrated by objective findings, the assigned level of complexity for this evaluation is high  The patient's [facility] Basic Mobility Inpatient Short Form Raw Score is 12  A Raw score of less than or equal to 16 suggests the patient may benefit from discharge to post-acute rehabilitation services  Please also refer to the recommendation of the Physical Therapist for safe discharge planning  Goals   Patient Goals To have less pain andget stronger   STG Expiration Date 07/20/22   Short Term Goal #1 Transfers and gait with roller walker with supervision   Short Term Goal #2 Gait endurance to 50 ft   LTG Expiration Date 07/27/22   Long Term Goal #1 Strength bilateral lower extremities 4- /5   Long Term Goal #2 Transfers and gait independently with roller walker for functional household distances   Plan   Treatment/Interventions ADL retraining;Functional transfer training;LE strengthening/ROM; Therapeutic exercise; Endurance training;Patient/family training;Equipment eval/education; Bed mobility;Gait training; Compensatory technique education;Elevations   PT Frequency Other (Comment)  (5 times per week)   Recommendation   PT Discharge Recommendation Post acute rehabilitation services   [facility] Basic Mobility Inpatient   Turning in Bed Without Bedrails 3   Lying on Back to Sitting on Edge of Flat Bed 2 Moving Bed to Chair 2   Standing Up From Chair 2   Walk in Room 2   Climb 3-5 Stairs 1   Basic Mobility Inpatient Raw Score 12   Basic Mobility Standardized Score 32 23   Highest Level Of Mobility   [facility] Goal 4: Move to chair/commode   -[facility] Achieved 6: Walk 10 steps or more   Barthel Index   Feeding 5   Bathing 0   Grooming Score 0   Dressing Score 5   Bladder Score 0   Bowels Score 10   Toilet Use Score 5   Transfers (Bed/Chair) Score 5   Mobility (Level Surface) Score 0   Stairs Score 0   Barthel Index Score 30   Additional Treatment Session   Start Time 1315   End Time 1330   Treatment Assessment S:  6/10 chest wall pain O:  Bilateral lower extremity exercise completed as listed below, standing balance activity also completed A:  Patient will benefit from continued physical therapy with progression as tolerated   Exercises   Heelslides Supine;10 reps;Bilateral   Glute Sets Supine;5 reps   Hip Flexion Sitting;5 reps;Bilateral   Hip Abduction Sitting;5 reps;Bilateral   Knee AROM Short Arc Quad Supine;5 reps;Bilateral   Knee AROM Long Arc Quad Sitting;10 reps;Bilateral   Ankle Pumps Sitting;10 reps;Bilateral   Balance training  Sidestepping completed for balance coordination   Licensure   [address] [place] [address] License Number  Denae Lion  [id number]

## 2022-07-13 NOTE — ASSESSMENT & PLAN NOTE
· Hyponatremia worsening  Hold Aldactone  Check urine sodium, osm, serum osm for further evaluation  · Other Electrolytes improved status post repletion  · get repeat lab work in a m

## 2022-07-13 NOTE — ASSESSMENT & PLAN NOTE
Patient states he stopped taking Xarelto about 1 month ago as he ran out of it  Patient was on Cardizem, sotalol, Toprol XL in the past as well  He denies taking any prescription medications except Xarelto which he stopped 1 month ago  History of cardioversion in 2018  · Tachycardic in ED  · Per prior provider, EKG showed sinus tach, rate 106, , V1 to V3 TWI  · Continue Toprol-XL  · 81 mg of aspirin discontinued due to findings of hematoma/hemorrhagic fluid    Per Cardiology ASA can be started at a later time  · Continue to hold anticoagulation as patient reports frequent falls at home

## 2022-07-13 NOTE — ASSESSMENT & PLAN NOTE
Patient presented with epigastric pain, nausea, vomiting for about 1 week  History of alcohol induced pancreatitis and alcoholic hepatitis  · CT showed - Mild peripancreatic stranding suggestive of pancreatitis  Severe hepatic steatosis was noted  No fluid collections  · Lipase 1700 on admission --> 392 on 7/11  Denies any abdominal pain  Tolerating diet  · Alcohol level on admission 182    · Triglycerides 44  · Was on IV fluids previously which has been discontinued

## 2022-07-13 NOTE — TELEPHONE ENCOUNTER
Patient Attribution- called pt to confirm if he has established care with another office or not  He confirmed he has not  Informed pt- once released from hospital to call to schedule appt to continue care       Keeping Dr Luh Lyons as PCP

## 2022-07-13 NOTE — TELEPHONE ENCOUNTER
----- Message from Ying Amaro DO sent at 7/12/2022  8:02 PM EDT -----  Regarding: Attribution  Hello! Pt has not been seen in clinic over 3 years  Last office visit in 2017  Please check for attribution  Etelvina Amanda, is this a pt Dr Leisa Severs sees for home visits or anything? I don't see any notes from him  Thank you!

## 2022-07-13 NOTE — ASSESSMENT & PLAN NOTE
· TONYA in 2018 showed normal EF  · Echo on this admission shows EF of 25-30% with grade 1 diastolic dysfunction  · Continue losartan, Toprol-XL with holding parameter    Holding Aldactone due to hyponatremia  · Cardiology was consulted for medication optimization as patient on many medications at home that are not ideal

## 2022-07-13 NOTE — PROGRESS NOTES
[facility] Internal [facility] Progress Note  Patient: Konrad Brody 59 y o  male   MRN: [id number]  PCP: Danilo Lucas MD  Unit/Bed#: [address] [place] [address] Encounter: [id number]  Date Of Visit: 07/13/22    Problem List:    Active Problems:    Pneumothorax, right    Electrolyte abnormality    Alcohol withdrawal ([facility] )    Cardiomyopathy, 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    Chest pain    Paroxysmal atrial fibrillation (HCC)    Tobacco abuse    Alcohol-induced acute pancreatitis    Encephalopathy      Assessment & Plan:    Pneumothorax, right  Assessment & Plan  Incidental finding  Denied SOB, chest pain  Denied fall or trauma, however patient is a poor historian  · Large right pneumothorax  · Status post right-sided chest tube insertion in ED to wall suction on 07/08  · On 07/09 underwent IR chest tube placement due to persistent pneumothorax  · Chest x-ray today shows small pneumothorax  · Awaiting pulmonary put for further recommendations  · Incentive spirometer      Electrolyte abnormality  Assessment & Plan  · Patient with hypokalemia/hypomagnesemia on lab work today  · Replete with IV NPO and get repeat lab work in a m  Alcohol withdrawal ([facility] )  Assessment & Plan  Patient reports drinking about 6 drinks per day  Last drink was on day of admission  · Alcohol cessation discussed  · Patient was started on CIWA protocol on admission  · Developed alcohol withdrawal symptoms on 07/09-7/10 bid tachycardia, confusion, agitation, DTs  Received total of 6 mg of IV Ativan without any improvement  · Transferred to step-down unit and started on IV phenobarb and also required p r n  Valium doses  · Was also on Precedex drip which has been discontinued  · Patient declined inpatient alcohol rehab  · MVI, thiamine, folic acid    Cardiomyopathy, 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  Assessment & Plan  TONYA in 2018 showed normal EF     Echo on this admission shows EF of 25-30% with grade 1 diastolic dysfunction    Encephalopathy  Assessment & Plan  Due to alcohol abuse/withdrawal  Mental status improving    Alcohol-induced acute pancreatitis  Assessment & Plan  Patient presented with epigastric pain nausea vomiting for about 1 week  History of alcohol induced pancreatitis and alcoholic hepatitis  · CT showed - Mild peripancreatic stranding suggestive of pancreatitis  Severe hepatic steatosis was noted  No fluid collections  · Lipase 1700 on admission --> 392 on 7/11  · Alcohol level on admission 182  · Triglycerides 44  · Was on IV fluids previously which has been discontinued      Tobacco abuse  Assessment & Plan  Smoking cessation  Nicotine patch     Paroxysmal atrial fibrillation ([facility] )  Assessment & Plan  Reported he stopped taking Xarelto about 1 month ago as he ran out of it  Patient appears to be on Cardizem, sotalol, Toprol XL in the past as well  He denies taking any prescription medications except Xarelto which he stopped 1 month ago  History of cardioversion in 2018  · Tachycardic in ED  · Per prior provider, EKG showed sinus tach, rate 106, , V1 to V3 TWI  · Telemetry  · Resume Toprol XL 25mg p o  Daily  · Holding anticoagulation as patient reports frequent falls at home        Chest pain  Assessment & Plan  Per ICU note, patient reported some chest pain around chest tube site  Troponin negative            VTE Pharmacologic Prophylaxis: VTE Score: 1 Lovenox    Patient Centered Rounds: I performed bedside rounds with nursing staff today  Discussions with Specialists or Other Care Team Provider: yes - pulm    Education and Discussions with Family / Patient: Attempted to update  (sister) via phone  Left voicemail  Time Spent for Care: 35 min  More than 50% of total time spent on counseling and coordination of care as described above      Current Length of Stay: 5 day(s)  Current Patient Status: Inpatient   Certification Statement: The patient will continue to require additional inpatient hospital stay due to Pneumothorax, cardiomyopathy  Discharge Plan: Anticipate discharge in 48-72 hrs to discharge location to be determined pending rehab evaluations  Code Status: Level 1 - Full Code    Subjective:     Patient denies any shortness of breath  States he is doing better compared to before    Objective:     Vitals:   Temp (24hrs), Av 8 °F (37 1 °C), Min:97 4 °F (36 3 °C), Max:100 4 °F (38 °C)    Temp:  [97 4 °F (36 3 °C)-100 4 °F (38 °C)] 98 6 °F (37 °C)  HR:  [101-123] 106  Resp:  [18-24] 20  BP: (118-138)/(69-88) 118/79  SpO2:  [96 %-99 %] 96 %  Body mass index is 22 02 kg/m²  Input and Output Summary (last 24 hours): Intake/Output Summary (Last 24 hours) at 2022 1706  Last data filed at 2022 0357  Gross per 24 hour   Intake --   Output 1140 ml   Net -1140 ml       Physical Exam:   Physical Exam  Constitutional:       General: He is not in acute distress  Appearance: He is not diaphoretic  HENT:      Head: Normocephalic and atraumatic  Eyes:      General: No scleral icterus  Cardiovascular:      Rate and Rhythm: Normal rate and regular rhythm  Pulmonary:      Effort: Pulmonary effort is normal  No respiratory distress  Breath sounds: No wheezing or rales  Comments: Right-sided chest tube in place  Decreased breath sounds  Abdominal:      General: Bowel sounds are normal  There is no distension  Palpations: Abdomen is soft  Tenderness: There is no abdominal tenderness  Neurological:      Mental Status: He is alert           Additional Data:     Labs:  Results from last 7 days   Lab Units 22  0544   WBC Thousand/uL 7 52   HEMOGLOBIN g/dL 10 1*   HEMATOCRIT % 30 2*   PLATELETS Thousands/uL 277   NEUTROS PCT % 64   LYMPHS PCT % 21   MONOS PCT % 12   EOS PCT % 2     Results from last 7 days   Lab Units 22  0544 22  1356 22  0541   SODIUM mmol/L 133*   < > 132*   POTASSIUM mmol/L 2 9*   < > 2 9*   CHLORIDE mmol/L 96*   < > 98*   CO2 mmol/L 28 < > 28   BUN mg/dL 3*   < > 5   CREATININE mg/dL 0 48*   < > 0 58*   ANION GAP mmol/L 9   < > 6   CALCIUM mg/dL 7 9*   < > 7 5*   ALBUMIN g/dL  --   --  2 7*   TOTAL BILIRUBIN mg/dL  --   --  0 37   ALK PHOS U/L  --   --  46   ALT U/L  --   --  10*   AST U/L  --   --  26   GLUCOSE RANDOM mg/dL 101   < > 194*    < > = values in this interval not displayed  Results from last 7 days   Lab Units 07/09/22  0454   INR  1 04                   Lines/Drains:  Invasive Devices  Report    Peripheral Intravenous Line  Duration           Long-Dwell Peripheral IV (Midline) 93/64/16 Right Basilic 3 days    Peripheral IV 07/12/22 Distal;Dorsal (posterior); Right Forearm 1 day          Drain  Duration           Chest Tube Right Second intercostal space 10 2 Fr  3 days    External Urinary Catheter 1 day              Imaging: Reviewed radiology reports from this admission including: chest xray and ECHO    Recent Cultures (last 7 days):         Last 24 Hours Medication List:   Current Facility-Administered Medications   Medication Dose Route Frequency Provider Last Rate    acetaminophen  650 mg Oral Q6H PRN Olga Spironello V, CRNP      aspirin  81 mg Oral Daily Olga Spironello V, CRNP      enoxaparin  40 mg Subcutaneous Daily Olga Spironello V, DOYLENP      folic acid  1 mg Oral Daily Olga Spironello V, CRNP      levalbuterol  0 63 mg Nebulization Q6H PRN Olga Spironello DOYLE YOUNGNP      [START ON 7/14/2022] lidocaine  1 patch Topical Daily Cecilia House MD      [START ON 7/14/2022] metoprolol succinate  25 mg Oral Daily Denita Hammer DO      nicotine  1 patch Transdermal Daily Olga Spironello VDOYLENP      ondansetron  4 mg Intravenous Q6H PRN Olga Spironello VDOYLENP      potassium chloride  20 mEq Oral Once Denita Hammer DO      thiamine  100 mg Oral Daily Olga Spironello VCHEN          Today, Patient Was Seen By: Mark Pastor DO    ** Please Note: "This note has been constructed using a voice recognition system  Therefore there may be syntax, spelling, and/or grammatical errors   Please call if you have any questions  "**

## 2022-07-14 ENCOUNTER — APPOINTMENT (INPATIENT)
Dept: RADIOLOGY | Facility: HOSPITAL | Age: 57
DRG: 199 | End: 2022-07-14
Payer: COMMERCIAL

## 2022-07-14 LAB
ALBUMIN SERPL BCP-MCNC: 2.7 G/DL (ref 3.5–5)
ALP SERPL-CCNC: 43 U/L (ref 46–116)
ALT SERPL W P-5'-P-CCNC: 12 U/L (ref 12–78)
ANION GAP SERPL CALCULATED.3IONS-SCNC: 5 MMOL/L (ref 4–13)
AST SERPL W P-5'-P-CCNC: 26 U/L (ref 5–45)
BILIRUB SERPL-MCNC: 0.35 MG/DL (ref 0.2–1)
BUN SERPL-MCNC: 6 MG/DL (ref 5–25)
CALCIUM ALBUM COR SERPL-MCNC: 9.2 MG/DL (ref 8.3–10.1)
CALCIUM SERPL-MCNC: 8.2 MG/DL (ref 8.3–10.1)
CHLORIDE SERPL-SCNC: 98 MMOL/L (ref 100–108)
CO2 SERPL-SCNC: 30 MMOL/L (ref 21–32)
CREAT SERPL-MCNC: 0.53 MG/DL (ref 0.6–1.3)
GFR SERPL CREATININE-BSD FRML MDRD: 115 ML/MIN/1.73SQ M
GLUCOSE SERPL-MCNC: 95 MG/DL (ref 65–140)
MAGNESIUM SERPL-MCNC: 1.4 MG/DL (ref 1.6–2.6)
POTASSIUM SERPL-SCNC: 3.7 MMOL/L (ref 3.5–5.3)
PROT SERPL-MCNC: 5.5 G/DL (ref 6.4–8.2)
SODIUM SERPL-SCNC: 133 MMOL/L (ref 136–145)

## 2022-07-14 PROCEDURE — G1004 CDSM NDSC: HCPCS

## 2022-07-14 PROCEDURE — 71045 X-RAY EXAM CHEST 1 VIEW: CPT

## 2022-07-14 PROCEDURE — 71250 CT THORAX DX C-: CPT

## 2022-07-14 PROCEDURE — NC001 PR NO CHARGE: Performed by: INTERNAL MEDICINE

## 2022-07-14 PROCEDURE — 99232 SBSQ HOSP IP/OBS MODERATE 35: CPT | Performed by: INTERNAL MEDICINE

## 2022-07-14 PROCEDURE — 99222 1ST HOSP IP/OBS MODERATE 55: CPT | Performed by: INTERNAL MEDICINE

## 2022-07-14 PROCEDURE — 80053 COMPREHEN METABOLIC PANEL: CPT | Performed by: FAMILY MEDICINE

## 2022-07-14 PROCEDURE — 97167 OT EVAL HIGH COMPLEX 60 MIN: CPT

## 2022-07-14 PROCEDURE — 83735 ASSAY OF MAGNESIUM: CPT | Performed by: FAMILY MEDICINE

## 2022-07-14 PROCEDURE — 99232 SBSQ HOSP IP/OBS MODERATE 35: CPT | Performed by: FAMILY MEDICINE

## 2022-07-14 RX ORDER — SPIRONOLACTONE 25 MG/1
25 TABLET ORAL DAILY
Status: DISCONTINUED | OUTPATIENT
Start: 2022-07-14 | End: 2022-07-19

## 2022-07-14 RX ORDER — METOPROLOL SUCCINATE 25 MG/1
25 TABLET, EXTENDED RELEASE ORAL
Status: DISCONTINUED | OUTPATIENT
Start: 2022-07-15 | End: 2022-07-19 | Stop reason: HOSPADM

## 2022-07-14 RX ORDER — LOSARTAN POTASSIUM 25 MG/1
25 TABLET ORAL DAILY
Status: DISCONTINUED | OUTPATIENT
Start: 2022-07-15 | End: 2022-07-19 | Stop reason: HOSPADM

## 2022-07-14 RX ORDER — POTASSIUM CHLORIDE 20 MEQ/1
20 TABLET, EXTENDED RELEASE ORAL ONCE
Status: COMPLETED | OUTPATIENT
Start: 2022-07-14 | End: 2022-07-14

## 2022-07-14 RX ORDER — MAGNESIUM SULFATE HEPTAHYDRATE 40 MG/ML
2 INJECTION, SOLUTION INTRAVENOUS ONCE
Status: COMPLETED | OUTPATIENT
Start: 2022-07-14 | End: 2022-07-14

## 2022-07-14 RX ADMIN — MAGNESIUM SULFATE HEPTAHYDRATE 2 G: 40 INJECTION, SOLUTION INTRAVENOUS at 15:03

## 2022-07-14 RX ADMIN — ASPIRIN 81 MG: 81 TABLET, COATED ORAL at 08:44

## 2022-07-14 RX ADMIN — POTASSIUM CHLORIDE 20 MEQ: 1500 TABLET, EXTENDED RELEASE ORAL at 10:00

## 2022-07-14 RX ADMIN — THIAMINE HCL TAB 100 MG 100 MG: 100 TAB at 08:44

## 2022-07-14 RX ADMIN — LIDOCAINE 5% 1 PATCH: 700 PATCH TOPICAL at 08:44

## 2022-07-14 RX ADMIN — MAGNESIUM SULFATE HEPTAHYDRATE 2 G: 40 INJECTION, SOLUTION INTRAVENOUS at 10:00

## 2022-07-14 RX ADMIN — ENOXAPARIN SODIUM 40 MG: 40 INJECTION SUBCUTANEOUS at 08:45

## 2022-07-14 RX ADMIN — NICOTINE 1 PATCH: 21 PATCH, EXTENDED RELEASE TRANSDERMAL at 08:44

## 2022-07-14 RX ADMIN — METOPROLOL SUCCINATE 25 MG: 25 TABLET, EXTENDED RELEASE ORAL at 08:44

## 2022-07-14 RX ADMIN — FOLIC ACID 1 MG: 1 TABLET ORAL at 08:44

## 2022-07-14 NOTE — OCCUPATIONAL THERAPY NOTE
OT EVALUATION     07/14/22 1526   Note Type   Note type Evaluation   Restrictions/Precautions   Other Precautions Chair Alarm; Bed Alarm; Fall Risk;Multiple lines  (Chest tube with wall suction on R side)   Pain Assessment   Pain Assessment Tool 0-10   Pain Score No Pain   Home Living   Type of Home House   Home Layout Two level; Able to live on main level with bedroom/bathroom;Stairs to enter with rails  (10STE)   Bathroom Shower/Tub Tub/shower unit   Bathroom Toilet Standard   Home Equipment Other (Comment)  (None)   Additional Comments Patient reports being independent without assistive device PTA  Pt's sister assists with cooking, driving, shopping  Prior Function   Level of [place] Independent with ADLs and functional mobility; Needs assistance with IADLs   Lives With Family  (Sister)   Receives Help From Family   ADL Assistance Independent   IADLs Needs assistance   Vocational On disability   Comments Patient admitted with epigastric pain nausea vomiting for about 1 week  Reports daily alcohol use  Lifestyle   Intrinsic Gratification Watching T V     Subjective   Subjective "I want to get walking and get stronger"   ADL   Eating Assistance 5  Supervision/Setup   Grooming Assistance 5  Supervision/Setup   UB Bathing Assistance 5  Supervision/Setup    [address] 5  Supervision/Setup   LB Dressing Assistance 4  Minimal Assistance   LB Dressing Deficit Don/doff R sock   Toileting Assistance  4  Minimal Assistance   Bed Mobility   Supine to Sit 5  Supervision   Additional items Assist x 1;Verbal cues   Sit to Supine 5  Supervision   Additional items Assist x 1;Verbal cues   Transfers   Sit to Stand 4  Minimal assistance   Additional items Assist x 1;Verbal cues   Stand to Sit 4  Minimal assistance   Additional items Assist x 1;Verbal cues   Functional Mobility   Functional Mobility 4  Minimal assistance   Additional Comments assist of 1, few steps to the head of bed with RW  Balance   Static Sitting Fair   Dynamic Sitting Fair -   Static Standing Fair -   Dynamic Standing Poor +   Activity Tolerance   Activity Tolerance Patient tolerated treatment well;Patient limited by fatigue   RUE Assessment   RUE Assessment WFL   LUE Assessment   LUE Assessment WFL   Cognition   Overall Cognitive Status WFL   Arousal/Participation Alert; Responsive; Cooperative   Attention Within functional limits   Orientation Level Oriented X4   Following Commands Follows all commands and directions without difficulty   Assessment   Limitation Decreased ADL status; Decreased endurance;Decreased self-care trans;Decreased high-level ADLs   Prognosis Good   Assessment Patient evaluated by Occupational Therapy  Patient admitted with <principal problem not specified>  The patients occupational profile, medical and therapy history includes a extensive additional review of physical, cognitive, or psychosocial history related to current functional performance  Comorbidities affecting functional mobility and ADLS include: Afib and alcohol abuse  Prior to admission, patient was independent with ADLS and requiring assist for IADLS  The evaluation identifies the following performance deficits: weakness, decreased endurance, increased fall risk, decreased ADLS, decreased IADLS, pain, decreased activity tolerance and decreased strength, that result in activity limitations and/or participation restrictions  This evaluation requires clinical decision making of high complexity, because the patient presents with comorbidites that affect occupational performance and required significant modification of tasks or assistance with consideration of multiple treatment options  The Barthel Index was used as a functional outcome tool presenting with a score of Barthel Index Score: 35, indicating marked limitations of functional mobility and ADLS    The patient's raw score on the -PAC Daily Activity inpatient short form is 18, standardized score is 38 66, less than 39 4  Patients at this level are likely to benefit from DC to post-acute rehabilitation services  Please refer to the recommendation of the Occupational Therapist for safe DC planning  Patient will benefit from skilled Occupational Therapy services to address above deficits and facilitate a safe return to prior level of function  Goals   Patient Goals to get stronger   STG Time Frame   (1-7 days)   Short Term Goal  Goals established to promote Patient Goals: to get stronger:  Patient will increase standing tolerance to 5 minutes during ADL task to decrease assistance level and decrease fall risk; Patient will increase bed mobility to independent in preparation for ADLS and transfers; Patient will increase functional mobility to and from bathroom with no assistive device with supervision to increase performance with ADLS and to use a toilet; Patient will tolerate 10 minutes of UE ROM/strengthening to increase general activity tolerance and performance in ADLS/IADLS; Patient will improve functional activity tolerance to 10 minutes of sustained functional tasks to increase participation in basic self-care and decrease assistance level;  Patient will be able to to verbalize understanding and perform energy conservation/proper body mechanics during ADLS and functional mobility at least 75% of the time with minimal cueing to decrease signs of fatigue and increase stamina to return to prior level of function; Patient will increase dynamic sitting balance to fair to improve the ability to sit at edge of bed or on a chair for ADLS;  Patient will increase dynamic standing balance to fair- to improve postural stability and decrease fall risk during standing ADLS and transfers     LTG Time Frame   (8-14 days)   Long Term Goal Patient will increase standing tolerance to 10 minutes during ADL task to decrease assistance level and decrease fall risk; Patient will increase functional mobility to and from bathroom with no assistive device independently to increase performance with ADLS and to use a toilet; Patient will tolerate 20 minutes of UE ROM/strengthening to increase general activity tolerance and performance in ADLS/IADLS; Patient will improve functional activity tolerance to 20 minutes of sustained functional tasks to increase participation in basic self-care and decrease assistance level;  Patient will be able to to verbalize understanding and perform energy conservation/proper body mechanics during ADLS and functional mobility at least 90% of the time with minimal cueing to decrease signs of fatigue and increase stamina to return to prior level of function; Patient will increase static/dynamic sitting balance to fair+ to improve the ability to sit at edge of bed or on a chair for ADLS;  Patient will increase static/dynamic standing balance to fair to improve postural stability and decrease fall risk during standing ADLS and transfers  Pt will score >/= 22/24 on AM-PAC Daily Activity Inpatient scale to promote safe independence with ADLs and functional mobility; Pt will score >/= 65/100 on Barthel Index in order to decrease caregiver assistance needed and increase ability to perform ADLs and functional mobility  Functional Transfer Goals   Pt Will Perform All Functional Transfers   (STG supervision LTG independent)   ADL Goals   Pt Will Perform Eating   (STG independent)   Pt Will Perform Grooming   (STG independent)   Pt Will Perform Bathing   (STG supervision LTG independent)   Pt Will Perform UE Dressing   (STG independent)   Pt Will Perform LE Dressing   (STG supervision LTG independent)   Pt Will Perform Toileting   (STG supervision LTG independent)   Plan   Treatment Interventions ADL retraining;Functional transfer training;UE strengthening/ROM; Endurance training;Patient/family training;Equipment evaluation/education; Compensatory technique education; Energy conservation; Activityengagement   Goal Expiration Date 07/28/22   OT Frequency 3-5x/wk   Recommendation   OT Discharge Recommendation Post acute rehabilitation services   AM-PAC Daily Activity Inpatient   Lower Body Dressing 3   Bathing 3   Toileting 3   Upper Body Dressing 3   Grooming 3   Eating 3   Daily Activity Raw Score 18   Daily Activity Standardized Score (Calc for Raw Score >=11) 38 66   AM-PAC Applied Cognition Inpatient   Following a Speech/Presentation 4   Understanding Ordinary Conversation 4   Taking Medications 4   Remembering Where Things Are Placed or Put Away 4   Remembering List of 4-5 Errands 4   Taking Care of Complicated Tasks 4   Applied Cognition Raw Score 24   Applied Cognition Standardized Score 62 21   Barthel Index   Feeding 5   Bathing 0   Grooming Score 0   Dressing Score 5   Bladder Score 0   Bowels Score 10   Toilet Use Score 5   Transfers (Bed/Chair) Score 10   Mobility (Level Surface) Score 0   Stairs Score 0   Barthel Index Score 35   Licensure   [place] License Number  [employer], OTS

## 2022-07-14 NOTE — CASE MANAGEMENT
Case Management Discharge Planning Note    Patient name Jose Montgomery  Location [facility] [facility] MRN [id number]  : 1965 Date 2022       Current Admission Date: 2022  Current Admission Diagnosis:Alcohol withdrawal [facility])   Patient Active Problem List    Diagnosis Date Noted    Encephalopathy 07/10/2022    Pneumothorax, right 2022    Alcohol-induced acute pancreatitis 2022    Electrolyte abnormality 2018    Paroxysmal atrial fibrillation ([facility] ) 2017    Tobacco abuse 2017    Chest pain 2017    Cardiomyopathy, 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 06/10/2016    Alcohol withdrawal ([facility] ) 06/10/2016      LOS (days): 6  Geometric Mean LOS (GMLOS) (days): 5 10  Days to GMLOS:-0 5     OBJECTIVE:  Risk of Unplanned Readmission Score: 20 15         Current admission status: Inpatient   Preferred Pharmacy:   [address] ([address] [facility], [facility] ([address] [place] [address])  [address] [facility] [address] ([address])  [place] [address]  Phone: [phone] Fax: [phone]    Primary Care Provider: Shari Gomez MD    Primary Insurance: [facility]  Secondary Insurance: [address] [facility]    DISCHARGE DETAILS:    Discharge planning discussed with[de-identified] Patient  Freedom of Choice: Yes  Comments - Freedom of Choice: Choice is for blanket STR referrals  CM contacted family/caregiver?: Yes  Were Treatment Team discharge recommendations reviewed with patient/caregiver?: Yes  Did patient/caregiver verbalize understanding of patient care needs?: N/A- going to facility  Were patient/caregiver advised of the risks associated with not following Treatment Team discharge recommendations?: Yes    Contacts  Patient Contacts: Jackie Paredes  Relationship to Patient[de-identified] Family  Contact Method: Phone  Phone Number: [phone]  Reason/Outcome: Emergency Contact, Continuity of Care, Discharge Planning (VM left to review patient's choice for STR at discharge   Notified of pending referrals )    Requested [address]         Is the patient interested in [address] at discharge?: No    DME Referral Provided  Referral made for DME?: No    Other Referral/Resources/Interventions Provided:  Interventions: Short Term Rehab  Referral Comments: [place] referrals opened via [address]  Would you like to participate in our [address] service program?  : No - Declined    Treatment Team Recommendation: Short Term Rehab  Discharge Destination Plan[de-identified] Short Term Rehab (REFERRALS PENDING )     Additional Comments: Per rounding, patient still with chest tube in place - pulm following for management  ECHO results yesterday showing drop in EF, concerns for alcoholic cardiomyopathy  Expecting D/C in 72+ hours  Patient will need insurance authorization to admit to STR  CM will following through D/C to STR

## 2022-07-14 NOTE — PLAN OF CARE
Problem: Nutrition/Hydration-ADULT  Goal: Nutrient/Hydration intake appropriate for improving, restoring or maintaining nutritional needs  Description: Monitor and assess patient's nutrition/hydration status for malnutrition  Collaborate with interdisciplinary team and initiate plan and interventions as ordered  Monitor patient's weight and dietary intake as ordered or per policy  Utilize nutrition screening tool and intervene as necessary  Determine patient's food preferences and provide high-protein, high-caloric foods as appropriate       INTERVENTIONS:  - Monitor oral intake, urinary output, labs, and treatment plans  - Assess nutrition and hydration status and recommend course of action  - Evaluate amount of meals eaten  - Assist patient with eating if necessary   - Allow adequate time for meals  - Recommend/ encourage appropriate diets, oral nutritional supplements, and vitamin/mineral supplements  - Order, calculate, and assess calorie counts as needed  - Recommend, monitor, and adjust tube feedings and TPN/PPN based on assessed needs  - Assess need for intravenous fluids  - Provide specific nutrition/hydration education as appropriate  - Include patient/family/caregiver in decisions related to nutrition  Outcome: Progressing     Problem: MOBILITY - ADULT  Goal: Maintain or return to baseline ADL function  Description: INTERVENTIONS:  -  Assess patient's ability to carry out ADLs; assess patient's baseline for ADL function and identify physical deficits which impact ability to perform ADLs (bathing, care of mouth/teeth, toileting, grooming, dressing, etc )  - Assess/evaluate cause of self-care deficits   - Assess range of motion  - Assess patient's mobility; develop plan if impaired  - Assess patient's need for assistive devices and provide as appropriate  - Encourage maximum independence but intervene and supervise when necessary  - Involve family in performance of ADLs  - Assess for home care needs following discharge   - Consider OT consult to assist with ADL evaluation and planning for discharge  - Provide patient education as appropriate  Outcome: Progressing  Goal: Maintains/Returns to pre admission functional level  Description: INTERVENTIONS:  - Perform BMAT or MOVE assessment daily    - Set and communicate daily mobility goal to care team and patient/family/caregiver  - Collaborate with rehabilitation services on mobility goals if consulted  - Perform Range of Motion 3 times a day  - Reposition patient every prn hours    - Dangle patient 2 times a day  - Stand patient 4 times a day  - Ambulate patient 4 times a day  - Out of bed to chair 4 times a day   - Out of bed for meals 3  Problem: PAIN - ADULT  Goal: Verbalizes/displays adequate comfort level or baseline comfort level  Description: Interventions:  - Encourage patient to monitor pain and request assistance  - Assess pain using appropriate pain scale  - Administer analgesics based on type and severity of pain and evaluate response  - Implement non-pharmacological measures as appropriate and evaluate response  - Consider cultural and social influences on pain and pain management  - Notify physician/advanced practitioner if interventions unsuccessful or patient reports new pain  Outcome: Progressing     Problem: INFECTION - ADULT  Goal: Absence or prevention of progression during hospitalization  Description: INTERVENTIONS:  - Assess and monitor for signs and symptoms of infection  - Monitor lab/diagnostic results  - Monitor all insertion sites, i e  indwelling lines, tubes, and drains  - Monitor endotracheal if appropriate and nasal secretions for changes in amount and color  - [place] appropriate cooling/warming therapies per order  - Administer medications as ordered  - Instruct and encourage patient and family to use good hand hygiene technique  - Identify and instruct in appropriate isolation precautions for identified infection/condition  Outcome: Progressing  Goal: Absence of fever/infection during neutropenic period  Description: INTERVENTIONS:  - Monitor WBC    Outcome: Progressing     Problem: DISCHARGE PLANNING  Goal: Discharge to home or other facility with appropriate resources  Description: INTERVENTIONS:  - Identify barriers to discharge w/patient and caregiver  - Arrange for needed discharge resources and transportation as appropriate  - Identify discharge learning needs (meds, wound care, etc )  - Arrange for interpretive services to assist at discharge as needed  - Refer to Case Management Department for coordinating discharge planning if the patient needs post-hospital services based on physician/advanced practitioner order or complex needs related to functional status, cognitive ability, or social support system  Outcome: Progressing     Problem: Knowledge Deficit  Goal: Patient/family/caregiver demonstrates understanding of disease process, treatment plan, medications, and discharge instructions  Description: Complete learning assessment and assess knowledge base    Interventions:  - Provide teaching at level of understanding  - Provide teaching via preferred learning methods  Outcome: Progressing     Problem: RESPIRATORY - ADULT  Goal: Achieves optimal ventilation and oxygenation  Description: INTERVENTIONS:  - Assess for changes in respiratory status  - Assess for changes in mentation and behavior  - Position to facilitate oxygenation and minimize respiratory effort  - Oxygen administered by appropriate delivery if ordered  - Initiate smoking cessation education as indicated  - Encourage broncho-pulmonary hygiene including cough, deep breathe, Incentive Spirometry  - Assess the need for suctioning and aspirate as needed  - Assess and instruct to report SOB or any respiratory difficulty  - Respiratory Therapy support as indicated  Outcome: Progressing   times a day  - Out of bed for toileting  - Record patient progress and toleration of activity level   Outcome: Progressing

## 2022-07-14 NOTE — PLAN OF CARE
Problem: Nutrition/Hydration-ADULT  Goal: Nutrient/Hydration intake appropriate for improving, restoring or maintaining nutritional needs  Description: Monitor and assess patient's nutrition/hydration status for malnutrition  Collaborate with interdisciplinary team and initiate plan and interventions as ordered  Monitor patient's weight and dietary intake as ordered or per policy  Utilize nutrition screening tool and intervene as necessary  Determine patient's food preferences and provide high-protein, high-caloric foods as appropriate       INTERVENTIONS:  - Monitor oral intake, urinary output, labs, and treatment plans  - Assess nutrition and hydration status and recommend course of action  - Evaluate amount of meals eaten  - Assist patient with eating if necessary   - Allow adequate time for meals  - Recommend/ encourage appropriate diets, oral nutritional supplements, and vitamin/mineral supplements  - Order, calculate, and assess calorie counts as needed  - Recommend, monitor, and adjust tube feedings and TPN/PPN based on assessed needs  - Assess need for intravenous fluids  - Provide specific nutrition/hydration education as appropriate  - Include patient/family/caregiver in decisions related to nutrition  Outcome: Progressing     Problem: MOBILITY - ADULT  Goal: Maintain or return to baseline ADL function  Description: INTERVENTIONS:  -  Assess patient's ability to carry out ADLs; assess patient's baseline for ADL function and identify physical deficits which impact ability to perform ADLs (bathing, care of mouth/teeth, toileting, grooming, dressing, etc )  - Assess/evaluate cause of self-care deficits   - Assess range of motion  - Assess patient's mobility; develop plan if impaired  - Assess patient's need for assistive devices and provide as appropriate  - Encourage maximum independence but intervene and supervise when necessary  - Involve family in performance of ADLs  - Assess for home care needs following discharge   - Consider OT consult to assist with ADL evaluation and planning for discharge  - Provide patient education as appropriate  Outcome: Progressing  Goal: Maintains/Returns to pre admission functional level  Description: INTERVENTIONS:  - Perform BMAT or MOVE assessment daily    - Set and communicate daily mobility goal to care team and patient/family/caregiver  - Collaborate with rehabilitation services on mobility goals if consulted  - Perform Range of Motion 2 times a day  - Reposition patient every 2 hours    - Dangle patient 2 times a day  - Stand patient 2 times a day  - Ambulate patient 2 times a day  - Out of bed to chair 2 times a day   - Out of bed for meals 2 times a day  - Out of bed for toileting  - Record patient progress and toleration of activity level   Outcome: Progressing     Problem: PAIN - ADULT  Goal: Verbalizes/displays adequate comfort level or baseline comfort level  Description: Interventions:  - Encourage patient to monitor pain and request assistance  - Assess pain using appropriate pain scale  - Administer analgesics based on type and severity of pain and evaluate response  - Implement non-pharmacological measures as appropriate and evaluate response  - Consider cultural and social influences on pain and pain management  - Notify physician/advanced practitioner if interventions unsuccessful or patient reports new pain  Outcome: Progressing     Problem: INFECTION - ADULT  Goal: Absence or prevention of progression during hospitalization  Description: INTERVENTIONS:  - Assess and monitor for signs and symptoms of infection  - Monitor lab/diagnostic results  - Monitor all insertion sites, i e  indwelling lines, tubes, and drains  - Monitor endotracheal if appropriate and nasal secretions for changes in amount and color  - [place] appropriate cooling/warming therapies per order  - Administer medications as ordered  - Instruct and encourage patient and family to use good hand hygiene technique  - Identify and instruct in appropriate isolation precautions for identified infection/condition  Outcome: Progressing  Goal: Absence of fever/infection during neutropenic period  Description: INTERVENTIONS:  - Monitor WBC    Outcome: Progressing

## 2022-07-14 NOTE — PROGRESS NOTES
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   Hx of cardiomyopathy, EF recovered    Hx of afib/flutter was cardioverted in 2018    Tobacco abuse     Plan:  Obtain CT chest: removal or adjustment of chest tube will depend upon CT chest   Monitor for ETOH withdrawal  Pain control: add lidocaine patch 5%   Incentive spirometry  Rest of the management as per primary          Subjective:   Pt is sitting comfortably  He denies any symptoms except pain upon deep inhalation        Objective:     Vitals: Blood pressure 95/62, pulse 86, temperature 98 7 °F (37 1 °C), resp  rate 21, height 5' 8" (1 727 m), weight 65 7 kg (144 lb 13 5 oz), SpO2 95 %  ,Body mass index is 22 02 kg/m²  Intake/Output Summary (Last 24 hours) at 7/14/2022 1644  Last data filed at 7/14/2022 0442  Gross per 24 hour   Intake --   Output 1420 ml   Net -1420 ml       Invasive Devices  Report    Peripheral Intravenous Line  Duration           Long-Dwell Peripheral IV (Midline) 85/82/18 Right Basilic 4 days    Peripheral IV 07/13/22 Distal;Left;Ventral (anterior) Forearm 1 day          Drain  Duration           Chest Tube Right Second intercostal space 10 2 Fr  4 days    External Urinary Catheter 2 days                Physical Exam:   General: AAOX3  HEENT: atrumatic head  Lungs: decreased breath sound; chest tube present   CVS: S1S2+, no m/r/g  Abdomen: soft, Nd, NT  Ext; no edema  Neuro: AAXO3    Labs: I have personally reviewed pertinent lab results    Imaging and other studies: I have personally reviewed pertinent films in Yvette Crystal MD
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
## 2022-07-14 NOTE — PLAN OF CARE
Problem: RESPIRATORY - ADULT  Goal: Achieves optimal ventilation and oxygenation  Description: INTERVENTIONS:  - Assess for changes in respiratory status  - Assess for changes in mentation and behavior  - Position to facilitate oxygenation and minimize respiratory effort  - Oxygen administered by appropriate delivery if ordered  - Initiate smoking cessation education as indicated  - Encourage broncho-pulmonary hygiene including cough, deep breathe, Incentive Spirometry  - Assess the need for suctioning and aspirate as needed  - Assess and instruct to report SOB or any respiratory difficulty  - Respiratory Therapy support as indicated  Outcome: Progressing  Patient has chest tube  Monitoring oxygen saturation   On room air     Problem: METABOLIC, FLUID AND ELECTROLYTES - ADULT  Goal: Electrolytes maintained within normal limits  Description: INTERVENTIONS:  - Monitor labs and assess patient for signs and symptoms of electrolyte imbalances  - Administer electrolyte replacement as ordered  - Monitor response to electrolyte replacements, including repeat lab results as appropriate  - Instruct patient on fluid and nutrition as appropriate  Outcome: Progressing  IV potassium and magnesium ordered for repletion     Problem: Potential for Falls  Goal: Patient will remain free of falls  Description: INTERVENTIONS:  - Educate patient/family on patient safety including physical limitations  - Instruct patient to call for assistance with activity   - Consult OT/PT to assist with strengthening/mobility   - Keep Call bell within reach  - Keep bed low and locked with side rails adjusted as appropriate  - Keep care items and personal belongings within reach  - Initiate and maintain comfort rounds  - Make Fall Risk Sign visible to staff  - Offer Toileting every 2 Hours, in advance of need  - Initiate/Maintain bed/chair alarm  - Obtain necessary fall risk management equipment: fall risk sign  - Apply yellow socks and bracelet for high fall risk patients  - Consider moving patient to room near nurses station  Outcome: Progressing  Bed alarm on  Call bell in reach of patient

## 2022-07-15 PROBLEM — K85.20 ALCOHOL-INDUCED ACUTE PANCREATITIS: Status: RESOLVED | Noted: 2022-07-08 | Resolved: 2022-07-15

## 2022-07-15 PROBLEM — G93.40 ENCEPHALOPATHY: Status: RESOLVED | Noted: 2022-07-10 | Resolved: 2022-07-15

## 2022-07-15 LAB
ANION GAP SERPL CALCULATED.3IONS-SCNC: 8 MMOL/L (ref 4–13)
BUN SERPL-MCNC: 6 MG/DL (ref 5–25)
CALCIUM SERPL-MCNC: 8.4 MG/DL (ref 8.3–10.1)
CHLORIDE SERPL-SCNC: 98 MMOL/L (ref 100–108)
CO2 SERPL-SCNC: 29 MMOL/L (ref 21–32)
CREAT SERPL-MCNC: 0.49 MG/DL (ref 0.6–1.3)
GFR SERPL CREATININE-BSD FRML MDRD: 119 ML/MIN/1.73SQ M
GLUCOSE SERPL-MCNC: 92 MG/DL (ref 65–140)
MAGNESIUM SERPL-MCNC: 1.7 MG/DL (ref 1.6–2.6)
POTASSIUM SERPL-SCNC: 4.1 MMOL/L (ref 3.5–5.3)
SODIUM SERPL-SCNC: 135 MMOL/L (ref 136–145)

## 2022-07-15 PROCEDURE — 99232 SBSQ HOSP IP/OBS MODERATE 35: CPT | Performed by: INTERNAL MEDICINE

## 2022-07-15 PROCEDURE — 80048 BASIC METABOLIC PNL TOTAL CA: CPT | Performed by: FAMILY MEDICINE

## 2022-07-15 PROCEDURE — 97110 THERAPEUTIC EXERCISES: CPT

## 2022-07-15 PROCEDURE — 99232 SBSQ HOSP IP/OBS MODERATE 35: CPT | Performed by: FAMILY MEDICINE

## 2022-07-15 PROCEDURE — 83735 ASSAY OF MAGNESIUM: CPT | Performed by: FAMILY MEDICINE

## 2022-07-15 PROCEDURE — NC001 PR NO CHARGE: Performed by: INTERNAL MEDICINE

## 2022-07-15 RX ADMIN — ASPIRIN 81 MG: 81 TABLET, COATED ORAL at 09:34

## 2022-07-15 RX ADMIN — MAGNESIUM OXIDE TAB 400 MG (241.3 MG ELEMENTAL MG) 400 MG: 400 (241.3 MG) TAB at 17:20

## 2022-07-15 RX ADMIN — METOPROLOL SUCCINATE 25 MG: 25 TABLET, EXTENDED RELEASE ORAL at 22:55

## 2022-07-15 RX ADMIN — FOLIC ACID 1 MG: 1 TABLET ORAL at 09:34

## 2022-07-15 RX ADMIN — MAGNESIUM OXIDE TAB 400 MG (241.3 MG ELEMENTAL MG) 400 MG: 400 (241.3 MG) TAB at 09:34

## 2022-07-15 RX ADMIN — NICOTINE 1 PATCH: 21 PATCH, EXTENDED RELEASE TRANSDERMAL at 09:35

## 2022-07-15 RX ADMIN — THIAMINE HCL TAB 100 MG 100 MG: 100 TAB at 09:34

## 2022-07-15 RX ADMIN — ENOXAPARIN SODIUM 40 MG: 40 INJECTION SUBCUTANEOUS at 09:33

## 2022-07-15 RX ADMIN — LIDOCAINE 5% 1 PATCH: 700 PATCH TOPICAL at 09:34

## 2022-07-15 NOTE — PLAN OF CARE
Problem: Nutrition/Hydration-ADULT  Goal: Nutrient/Hydration intake appropriate for improving, restoring or maintaining nutritional needs  Description: Monitor and assess patient's nutrition/hydration status for malnutrition  Collaborate with interdisciplinary team and initiate plan and interventions as ordered  Monitor patient's weight and dietary intake as ordered or per policy  Utilize nutrition screening tool and intervene as necessary  Determine patient's food preferences and provide high-protein, high-caloric foods as appropriate       INTERVENTIONS:  - Monitor oral intake, urinary output, labs, and treatment plans  - Assess nutrition and hydration status and recommend course of action  - Evaluate amount of meals eaten  - Assist patient with eating if necessary   - Allow adequate time for meals  - Recommend/ encourage appropriate diets, oral nutritional supplements, and vitamin/mineral supplements  - Order, calculate, and assess calorie counts as needed  - Recommend, monitor, and adjust tube feedings and TPN/PPN based on assessed needs  - Assess need for intravenous fluids  - Provide specific nutrition/hydration education as appropriate  - Include patient/family/caregiver in decisions related to nutrition  Outcome: Progressing     Problem: MOBILITY - ADULT  Goal: Maintain or return to baseline ADL function  Description: INTERVENTIONS:  -  Assess patient's ability to carry out ADLs; assess patient's baseline for ADL function and identify physical deficits which impact ability to perform ADLs (bathing, care of mouth/teeth, toileting, grooming, dressing, etc )  - Assess/evaluate cause of self-care deficits   - Assess range of motion  - Assess patient's mobility; develop plan if impaired  - Assess patient's need for assistive devices and provide as appropriate  - Encourage maximum independence but intervene and supervise when necessary  - Involve family in performance of ADLs  - Assess for home care needs following discharge   - Consider OT consult to assist with ADL evaluation and planning for discharge  - Provide patient education as appropriate  Outcome: Progressing  Goal: Maintains/Returns to pre admission functional level  Description: INTERVENTIONS:  - Perform BMAT or MOVE assessment daily    - Set and communicate daily mobility goal to care team and patient/family/caregiver  - Collaborate with rehabilitation services on mobility goals if consulted  - Perform Range of Motion 3 times a day  - Reposition patient every 2 hours    - Dangle patient 3 times a day  - Stand patient 3 times a day  - Ambulate patient 3 times a day  - Out of bed to chair 3 times a day   - Out of bed for meals 3 times a day  - Out of bed for toileting  - Record patient progress and toleration of activity level   Outcome: Progressing     Problem: PAIN - ADULT  Goal: Verbalizes/displays adequate comfort level or baseline comfort level  Description: Interventions:  - Encourage patient to monitor pain and request assistance  - Assess pain using appropriate pain scale  - Administer analgesics based on type and severity of pain and evaluate response  - Implement non-pharmacological measures as appropriate and evaluate response  - Consider cultural and social influences on pain and pain management  - Notify physician/advanced practitioner if interventions unsuccessful or patient reports new pain  Outcome: Progressing     Problem: INFECTION - ADULT  Goal: Absence or prevention of progression during hospitalization  Description: INTERVENTIONS:  - Assess and monitor for signs and symptoms of infection  - Monitor lab/diagnostic results  - Monitor all insertion sites, i e  indwelling lines, tubes, and drains  - Monitor endotracheal if appropriate and nasal secretions for changes in amount and color  - [place] appropriate cooling/warming therapies per order  - Administer medications as ordered  - Instruct and encourage patient and family to use good hand hygiene technique  - Identify and instruct in appropriate isolation precautions for identified infection/condition  Outcome: Progressing  Goal: Absence of fever/infection during neutropenic period  Description: INTERVENTIONS:  - Monitor WBC    Outcome: Progressing     Problem: SAFETY ADULT  Goal: Maintain or return to baseline ADL function  Description: INTERVENTIONS:  -  Assess patient's ability to carry out ADLs; assess patient's baseline for ADL function and identify physical deficits which impact ability to perform ADLs (bathing, care of mouth/teeth, toileting, grooming, dressing, etc )  - Assess/evaluate cause of self-care deficits   - Assess range of motion  - Assess patient's mobility; develop plan if impaired  - Assess patient's need for assistive devices and provide as appropriate  - Encourage maximum independence but intervene and supervise when necessary  - Involve family in performance of ADLs  - Assess for home care needs following discharge   - Consider OT consult to assist with ADL evaluation and planning for discharge  - Provide patient education as appropriate  Outcome: Progressing  Goal: Maintains/Returns to pre admission functional level  Description: INTERVENTIONS:  - Perform BMAT or MOVE assessment daily    - Set and communicate daily mobility goal to care team and patient/family/caregiver  - Collaborate with rehabilitation services on mobility goals if consulted  - Perform Range of Motion 3 times a day  - Reposition patient every 2 hours    - Dangle patient 3 times a day  - Stand patient 3 times a day  - Ambulate patient 3 times a day  - Out of bed to chair 3 times a day   - Out of bed for meals 3 times a day  - Out of bed for toileting  - Record patient progress and toleration of activity level   Outcome: Progressing  Goal: Patient will remain free of falls  Description: INTERVENTIONS:  - Educate patient/family on patient safety including physical limitations  - Instruct patient to call for assistance with activity   - Consult OT/PT to assist with strengthening/mobility   - Keep Call bell within reach  - Keep bed low and locked with side rails adjusted as appropriate  - Keep care items and personal belongings within reach  - Initiate and maintain comfort rounds  - Make Fall Risk Sign visible to staff  - Offer Toileting every 2 Hours, in advance of need  - Initiate/Maintain bed alarm  - Obtain necessary fall risk management equipment:   - Apply yellow socks and bracelet for high fall risk patients  - Consider moving patient to room near nurses station  Outcome: Progressing     Problem: DISCHARGE PLANNING  Goal: Discharge to home or other facility with appropriate resources  Description: INTERVENTIONS:  - Identify barriers to discharge w/patient and caregiver  - Arrange for needed discharge resources and transportation as appropriate  - Identify discharge learning needs (meds, wound care, etc )  - Arrange for interpretive services to assist at discharge as needed  - Refer to Case Management Department for coordinating discharge planning if the patient needs post-hospital services based on physician/advanced practitioner order or complex needs related to functional status, cognitive ability, or social support system  Outcome: Progressing     Problem: Knowledge Deficit  Goal: Patient/family/caregiver demonstrates understanding of disease process, treatment plan, medications, and discharge instructions  Description: Complete learning assessment and assess knowledge base    Interventions:  - Provide teaching at level of understanding  - Provide teaching via preferred learning methods  Outcome: Progressing     Problem: Prexisting or High Potential for Compromised Skin Integrity  Goal: Skin integrity is maintained or improved  Description: INTERVENTIONS:  - Identify patients at risk for skin breakdown  - Assess and monitor skin integrity  - Assess and monitor nutrition and hydration status  - Monitor labs   - Assess for incontinence   - Turn and reposition patient  - Assist with mobility/ambulation  - Relieve pressure over bony prominences  - Avoid friction and shearing  - Provide appropriate hygiene as needed including keeping skin clean and dry  - Evaluate need for skin moisturizer/barrier cream  - Collaborate with interdisciplinary team   - Patient/family teaching  - Consider wound care consult   Outcome: Progressing     Problem: NEUROSENSORY - ADULT  Goal: Achieves stable or improved neurological status  Description: INTERVENTIONS  - Monitor and report changes in neurological status  - Monitor vital signs such as temperature, blood pressure, glucose, and any other labs ordered   - Initiate measures to prevent increased intracranial pressure  - Monitor for seizure activity and implement precautions if appropriate      Outcome: Progressing  Goal: Remains free of injury related to seizures activity  Description: INTERVENTIONS  - Maintain airway, patient safety  and administer oxygen as ordered  - Monitor patient for seizure activity, document and report duration and description of seizure to physician/advanced practitioner  - If seizure occurs,  ensure patient safety during seizure  - Reorient patient post seizure  - Seizure pads on all 4 side rails  - Instruct patient/family to notify RN of any seizure activity including if an aura is experienced  - Instruct patient/family to call for assistance with activity based on nursing assessment  - Administer anti-seizure medications if ordered    Outcome: Progressing  Goal: Achieves maximal functionality and self care  Description: INTERVENTIONS  - Monitor swallowing and airway patency with patient fatigue and changes in neurological status  - Encourage and assist patient to increase activity and self care     - Encourage visually impaired, hearing impaired and aphasic patients to use assistive/communication devices  Outcome: Progressing     Problem: RESPIRATORY - ADULT  Goal: Achieves optimal ventilation and oxygenation  Description: INTERVENTIONS:  - Assess for changes in respiratory status  - Assess for changes in mentation and behavior  - Position to facilitate oxygenation and minimize respiratory effort  - Oxygen administered by appropriate delivery if ordered  - Initiate smoking cessation education as indicated  - Encourage broncho-pulmonary hygiene including cough, deep breathe, Incentive Spirometry  - Assess the need for suctioning and aspirate as needed  - Assess and instruct to report SOB or any respiratory difficulty  - Respiratory Therapy support as indicated  Outcome: Progressing     Problem: METABOLIC, FLUID AND ELECTROLYTES - ADULT  Goal: Electrolytes maintained within normal limits  Description: INTERVENTIONS:  - Monitor labs and assess patient for signs and symptoms of electrolyte imbalances  - Administer electrolyte replacement as ordered  - Monitor response to electrolyte replacements, including repeat lab results as appropriate  - Instruct patient on fluid and nutrition as appropriate  Outcome: Progressing  Goal: Fluid balance maintained  Description: INTERVENTIONS:  - Monitor labs   - Monitor I/O and WT  - Instruct patient on fluid and nutrition as appropriate  - Assess for signs & symptoms of volume excess or deficit  Outcome: Progressing  Goal: Glucose maintained within target range  Description: INTERVENTIONS:  - Monitor Blood Glucose as ordered  - Assess for signs and symptoms of hyperglycemia and hypoglycemia  - Administer ordered medications to maintain glucose within target range  - Assess nutritional intake and initiate nutrition service referral as needed  Outcome: Progressing     Problem: Potential for Falls  Goal: Patient will remain free of falls  Description: INTERVENTIONS:  - Educate patient/family on patient safety including physical limitations  - Instruct patient to call for assistance with activity   - Consult OT/PT to assist with strengthening/mobility   - Keep Call bell within reach  - Keep bed low and locked with side rails adjusted as appropriate  - Keep care items and personal belongings within reach  - Initiate and maintain comfort rounds  - Make Fall Risk Sign visible to staff  - Offer Toileting every 2 Hours, in advance of need  - Initiate/Maintain bed alarm  - Obtain necessary fall risk management equipment:   - Apply yellow socks and bracelet for high fall risk patients  - Consider moving patient to room near nurses station  Outcome: Progressing

## 2022-07-15 NOTE — PROGRESS NOTES
Progress Note - Cardiology   [facility]     Nic Ayala 59 y o  male MRN: [id number]  : 1965  Unit/Bed#: [address] [place] [address] Encounter: [id number]    Assessment and Plan:   1  Spontaneous right pneumothorax: Followed by Pulmonary    -  still with moderate right pneumothorax on CT, also concern for moderate right pleural effusion with hemorrhagic fluid    2  Nonischemic cardiomyopathy:  EF 25-30%    -  continue Toprol XL 25 mg daily    -  continue Aldactone 25 mg daily    -  continue to add heart failure medication as patient tolerates    -  monitor electrolytes and replete as needed    -  low-sodium diet    -  monitor I&O daily weights    3  Alcoholic pancreatitis:  Encourage abstinence of alcohol    4  Paroxysmal atrial fibrillation:  Patient currently sinus rhythm    -  has loop recorder for AFib surveillance    -  continue Toprol XL 25 mg daily    -  Betapace discontinue due to low EF    -  CHADS2 Vasc score = 0,  HASBLED = 2, due to significant use of alcohol patient would be at high risk for bleeding, GI or secondary to injury from fall       -  Would hold on Xarelto at this time  5  Tobacco abuse     Subjective / Objective:   Seen and examined  Denies any chest discomfort  Records from primary cardiologist still pending  He feels his breathing is stable  CT scan of the chest performed today ordered by pulmonology demonstrated a moderate right pneumothorax with right apical anterior approach pigtail in place  Bullous changes predominant in the right upper lobe near the apex    Vitals: Blood pressure 106/68, pulse 86, temperature 98 9 °F (37 2 °C), resp  rate 16, height 5' 8" (1 727 m), weight 65 7 kg (144 lb 13 5 oz), SpO2 95 %  Vitals:    22 0554 22 0749   Weight: 65 7 kg (144 lb 13 5 oz) 65 7 kg (144 lb 13 5 oz)     Body mass index is 22 02 kg/m²    BP Readings from Last 3 Encounters:   22 106/68   22 132/94   21 99/61     Orthostatic Blood Pressures    Flowsheet Row Most Recent Value   Blood Pressure 106/68 filed at 07/14/2022 2235   Patient Position - Orthostatic VS Sitting filed at 07/14/2022 0746        I/O       07/13 0701  07/14 0700 07/14 0701  07/15 0700 07/15 0701 07/16 0700    Urine (mL/kg/hr) 1400 (0 9) 1400 (0 9)     Stool  0     Chest Tube 20 50     Total Output 1420 1450     Net -1420 -1450            Unmeasured Stool Occurrence  1 x         Invasive Devices  Report    Peripheral Intravenous Line  Duration           Long-Dwell Peripheral IV (Midline) 15/66/35 Right Basilic 4 days    Peripheral IV 07/13/22 Distal;Left;Ventral (anterior) Forearm 1 day          Drain  Duration           Chest Tube Right Second intercostal space 10 2 Fr  5 days    External Urinary Catheter 2 days                  Intake/Output Summary (Last 24 hours) at 7/15/2022 0815  Last data filed at 7/15/2022 0600  Gross per 24 hour   Intake --   Output 1450 ml   Net -1450 ml         Physical Exam:   Physical Exam  Vitals and nursing note reviewed  Constitutional:       Appearance: Normal appearance  He is normal weight  He is ill-appearing (Chronically)  HENT:      Right Ear: External ear normal       Left Ear: External ear normal    Eyes:      General: No scleral icterus  Right eye: No discharge  Left eye: No discharge  Cardiovascular:      Rate and Rhythm: Normal rate and regular rhythm  Pulses: Normal pulses  Heart sounds: Normal heart sounds  Pulmonary:      Effort: Pulmonary effort is normal  No accessory muscle usage or respiratory distress  Breath sounds: Examination of the right-lower field reveals decreased breath sounds  Examination of the left-lower field reveals decreased breath sounds  Decreased breath sounds present  Comments: Chest to to right  No crepitus  Abdominal:      General: Bowel sounds are normal  There is no distension  Palpations: Abdomen is soft     Musculoskeletal:      Right lower leg: No edema  Left lower leg: No edema  Skin:     General: Skin is warm and dry  Capillary Refill: Capillary refill takes less than 2 seconds  Neurological:      General: No focal deficit present  Mental Status: He is alert and oriented to person, place, and time  Mental status is at baseline     Psychiatric:         Mood and Affect: Mood normal                    Medications/ Allergies:     Current Facility-Administered Medications   Medication Dose Route Frequency Provider Last Rate    acetaminophen  650 mg Oral Q6H PRN Olga Spironello V, CRNP      aspirin  81 mg Oral Daily Olga Spironello V, CRNP      enoxaparin  40 mg Subcutaneous Daily Olga Spironello V, CRNP      folic acid  1 mg Oral Daily Olga Spironello V, CRNP      levalbuterol  0 63 mg Nebulization Q6H PRN Olga Spironello V, CRNP      lidocaine  1 patch Topical Daily Micheal Stubbs MD      losartan  25 mg Oral Daily Jenel Galeazzi, MD      metoprolol succinate  25 mg Oral HS Shereen Phlegm, CRNP      nicotine  1 patch Transdermal Daily Olga Spironello V, CRNP      ondansetron  4 mg Intravenous Q6H PRN Olga Spironello V, CRNP      spironolactone  25 mg Oral Daily Shereen Phlegm, CRNP      thiamine  100 mg Oral Daily Olga Spironello V, CRNP       acetaminophen, 650 mg, Q6H PRN  levalbuterol, 0 63 mg, Q6H PRN  ondansetron, 4 mg, Q6H PRN      No Known Allergies    VTE Pharmacologic Prophylaxis:   Sequential compression device (Venodyne)     Labs:   Troponins:  Results from last 7 days   Lab Units 07/13/22  0544 07/12/22  0803 07/09/22  0159 07/08/22  2041   HS TNI RAND ng/L 15 18  --   --    HSTNI D2 ng/L  --   --   --  -1   HSTNI D4 ng/L  --   --  -1  --      CBC with diff:  Results from last 7 days   Lab Units 07/13/22  0544 07/12/22  0541 07/11/22  0517 07/10/22  0450 07/09/22  0454 07/08/22  1801   WBC Thousand/uL 7 52 7 36 6 22 8 92 8 54 6 59   HEMOGLOBIN g/dL 10 1* 10 2* 9 7* 11 0* 12 4 13 9 HEMATOCRIT % 30 2* 30 2* 29 9* 34 6* 37 2 41 0   MCV fL 91 90 93 95 92 89   PLATELETS Thousands/uL 277 230 187 186 196 211   MCH pg 30 3 30 5 30 1 30 1 30 6 30 2   MCHC g/dL 33 4 33 8 32 4 31 8 33 3 33 9   RDW % 14 5 13 9 14 1 14 6 14 4 14 1   MPV fL 9 7 9 8 10 2 10 2 9 6 9 0   NRBC AUTO /100 WBCs 1 0 0 0  --  0     CMP:  Results from last 7 days   Lab Units 07/15/22  0453 07/14/22  0459 07/13/22  0544 07/12/22  1356 07/12/22  0541 07/11/22  2108 07/11/22  0517 07/10/22  0450 07/09/22  0454 07/09/22  0159 07/08/22  1801   SODIUM mmol/L 135* 133* 133* 134* 132* 132* 135*   < > 138   < > 137   POTASSIUM mmol/L 4 1 3 7 2 9* 4 0 2 9* 3 5 2 9*   < > 3 0*   < > 2 5*   CHLORIDE mmol/L 98* 98* 96* 98* 98* 96* 99*   < > 94*   < > 90*   CO2 mmol/L 29 30 28 30 28 27 26   < > 22   < > 30   ANION GAP mmol/L 8 5 9 6 6 9 10   < > 22*   < > 17*   BUN mg/dL 6 6 3* 4* 5 6 4*   < > 11   < > 12   CREATININE mg/dL 0 49* 0 53* 0 48* 0 51* 0 58* 0 55* 0 51*   < > 0 82   < > 0 65   CALCIUM mg/dL 8 4 8 2* 7 9* 8 2* 7 5* 8 0* 7 6*   < > 8 6   < > 9 1   AST U/L  --  26  --   --  26  --  28  --  67*  --  80*   ALT U/L  --  12  --   --  10*  --  7*  --  16  --  18   ALK PHOS U/L  --  43*  --   --  46  --  36*  --  49  --  58   TOTAL PROTEIN g/dL  --  5 5*  --   --  5 5*  --  5 1*  --  7 0  --  7 8   ALBUMIN g/dL  --  2 7*  --   --  2 7*  --  2 5*  --  3 6  --  4 0   TOTAL BILIRUBIN mg/dL  --  0 35  --   --  0 37  --  0 60  --  1 20*  --  1 13*   EGFR ml/min/1 73sq m 119 115 120 117 111 114 117   < > 96   < > 106    < > = values in this interval not displayed       Magnesium:  Results from last 7 days   Lab Units 07/15/22  0453 07/14/22  0459 07/13/22  0544 07/12/22  1356 07/12/22  0541 07/11/22  2108 07/11/22  0517   MAGNESIUM mg/dL 1 7 1 4* 1 3* 1 8 1 9 1 4* 1 5*     Coags:  Results from last 7 days   Lab Units 07/09/22  0454 07/08/22  1845   PTT seconds  --  30   INR  1 04 0 92     Lipid Profile:  Results from last 7 days   Lab Units 07/10/22  0450   CHOLESTEROL mg/dL 153   TRIGLYCERIDES mg/dL 44   HDL mg/dL 95   LDL CALC mg/dL 49       Imaging & Testing   I have personally reviewed pertinent reports  CT abdomen pelvis wo contrast    Result Date: 7/8/2022  Narrative: CT ABDOMEN AND PELVIS WITHOUT IV CONTRAST INDICATION:   Abdominal pain  COMPARISON:  CT 4/16/2022 TECHNIQUE:  CT examination of the abdomen and pelvis was performed without intravenous contrast  This examination was performed without intravenous contrast in the context of the critical nationwide Omnipaque shortage  Axial, sagittal, and coronal 2D reformatted images were created from the source data and submitted for interpretation  Radiation dose length product (DLP) for this visit:  425 65 mGy-cm   This examination, like all CT scans performed in the [facility], was performed utilizing techniques to minimize radiation dose exposure, including the use of iterative  reconstruction and automated exposure control  Enteric contrast was not administered  FINDINGS: ABDOMEN LOWER CHEST:  Partially visualized large right pneumothorax  Right basilar atelectasis  LIVER/BILIARY TREE:  Liver is diffusely decreased in density consistent with fatty change  No CT evidence of suspicious hepatic mass  Normal hepatic contours  No biliary dilatation  GALLBLADDER:  No calcified gallstones  No pericholecystic inflammatory change  SPLEEN:  Unremarkable  PANCREAS:  Mild peripancreatic stranding suspicious for pancreatitis  No fluid collections  ADRENAL GLANDS:  Unremarkable  KIDNEYS/URETERS:  Unremarkable  No hydronephrosis  STOMACH AND BOWEL:  Unremarkable  APPENDIX:  No findings to suggest appendicitis  ABDOMINOPELVIC CAVITY:  No ascites  No pneumoperitoneum  No lymphadenopathy  VESSELS:  Unremarkable for patient's age  PELVIS REPRODUCTIVE ORGANS:  Prominent prostate  URINARY BLADDER:  Unremarkable  ABDOMINAL WALL/INGUINAL REGIONS:  Unremarkable   OSSEOUS STRUCTURES:  No acute fracture or destructive osseous lesion  Impression: 1  Incompletely visualized large right pneumothorax  Radiographic evaluation recommended  2   Mild peripancreatic stranding most concerning for pancreatitis  No peripancreatic fluid collections  3   Severe hepatic steatosis  I personally discussed this study with Dr Andrea Acosta on 7/8/2022 at 7:03 PM  Workstation performed: [id number]     XR chest portable    Result Date: 7/14/2022  Narrative: CHEST INDICATION:   chest tube  COMPARISON:  7/13/2022 EXAM PERFORMED/VIEWS:  XR CHEST PORTABLE FINDINGS:  There is a right-sided chest tube in place  Cardiomediastinal silhouette appears unremarkable  Loop recorder projects over the left chest wall  Stable small right-sided pneumothorax  Stable right lung hazy airspace disease  Left hilar prominence, also unchanged  Sternal wires are present  Visualized osseous structures appear otherwise unremarkable for the patient's age  Impression: Stable small right-sided pneumothorax with a chest tube in place  Workstation performed: [id number]     XR chest portable    Result Date: 7/13/2022  Narrative: CHEST INDICATION:   f/u R pneumothorax  COMPARISON:  07/12/2022 07/11/2022  EXAM PERFORMED/VIEWS:  XR CHEST PORTABLE FINDINGS: Right chest tube change in orientation and location, coiled at the medial superior mediastinum  EKG leads overlie the patient  Left chest loop recorder  Cardiomediastinal silhouette appears stable  Small right apical pneumothorax, similar from 07/11/2022  Patchy opacifications throughout the right lung, similar from 07/12/2022  Left perihilar opacities redemonstrated  Sternotomy wires  Osseous structures appear within normal limits for patient age  Impression: Small right apical pneumothorax, similar from 07/11/2022  The study was marked in [facility] for immediate notification   Workstation performed: [id number]     XR chest portable    Result Date: 7/11/2022  Narrative: CHEST INDICATION:   right sided pneumothorax  COMPARISON:  Chest x-ray 7/9/2022 EXAM PERFORMED/VIEWS:  XR CHEST PORTABLE  Image: 1 FINDINGS:  Interval replacement of pre-existing right chest tube with the right apical pigtail chest tube  Reexpansion of right lung with multiple opacities present  Left lung is clear without pneumothorax  No pleural effusion  Cardiomediastinal silhouette appears unremarkable  Osseous structures appear within normal limits for patient age  Impression: Right-sided chest tube in place without pneumothorax  Multiple pulmonary opacities likely representing airspace disease and pulmonary hemorrhage  Workstation performed: [id number]     XR chest portable    Result Date: 7/9/2022  Narrative: CHEST INDICATION:   right sided pneumothorax  COMPARISON:  Chest x-ray 7/9/2022 at 07:57 EXAM PERFORMED/VIEWS:  XR CHEST PORTABLE  Image: 1 FINDINGS:  Right-sided chest tube remains in stable position with residual large right pneumothorax  Left lung is clear, no left-sided pneumothorax  No pleural effusion  Cardiomediastinal silhouette appears unremarkable  Osseous structures appear within normal limits for patient age  Left chest wall loop recorder in place  Impression: Right-sided chest tube is stable in position with persistent large right pneumothorax  Workstation performed: [id number]     XR chest portable    Result Date: 7/9/2022  Narrative: CHEST INDICATION:   followup ptx  COMPARISON:  Chest radiograph and abdomen CT from 7/8/2022  EXAM PERFORMED/VIEWS:  XR CHEST PORTABLE FINDINGS: Normal heart size, median sternotomy  Loop recorder in the left chest wall  Right chest tube with persistent large right pneumothorax and partial right lung atelectasis  No effusion  Osseous structures appear within normal limits for patient age  Impression: Right chest tube with persistent large right pneumothorax and partial right lung atelectasis   Workstation performed: [id number]     XR chest 1 view portable    Result Date: 7/9/2022  Narrative: CHEST INDICATION:   s/p chest tube  COMPARISON:  Chest radiograph from 6/6/2018, abdomen CT from 7/8/2022  EXAM PERFORMED/VIEWS:  XR CHEST PORTABLE FINDINGS: Normal heart size  Median sternotomy  Loop recorder in the left chest wall  Right chest tube with persistent large right pneumothorax and partial right lung atelectasis  Osseous structures appear within normal limits for patient age  Impression: Right chest tube with persistent large right pneumothorax and partial right lung atelectasis  Workstation performed: [id number]     CT chest wo contrast    Result Date: 7/15/2022  Narrative: CT CHEST WITHOUT IV CONTRAST INDICATION:   Pneumothorax nonhealing pneumothorax: possible bullous lung disease  COMPARISON:  None  TECHNIQUE: CT examination of the chest was performed without intravenous contrast  This examination was performed without intravenous contrast in the context of the critical nationwide Omnipaque shortage  Axial, sagittal, and coronal 2D reformatted images were created from the source data and submitted for interpretation  Radiation dose length product (DLP) for this visit:  328 91 mGy-cm   This examination, like all CT scans performed in the [facility], was performed utilizing techniques to minimize radiation dose exposure, including the use of iterative  reconstruction and automated exposure control  FINDINGS: LUNGS: Posterior right lower lobe atelectasis  Bullous changes in the right apex with multiple subpleural bullae in the right upper lobe  Largest bulla measuring 2 5 cm  Right upper lobe parenchymal groundglass changes  Tubular appearing density extending from the chest wall to the right upper lobe may represent hematoma in the previously placed chest tube  PLEURA:  Moderate right pleural effusion with hyperdensity laterally suggesting hemorrhagic fluid  Right upper anterior approach pigtail chest tube in place  Moderate right pneumothorax  Septations in the pleural space anteriorly  HEART/GREAT VESSELS: Heart is unremarkable for patient's age  No thoracic aortic aneurysm  Enlarged main pulmonary artery measuring 4 cm  Left Main pulmonary artery measures 3 4 cm and right pulmonary artery measures 3 cm  MEDIASTINUM AND FRANCISCO:  Unremarkable  CHEST WALL AND LOWER NECK:  Right chest wall hematoma in series 2 image 29 measuring 5 7 x 2 3 cm in the subcutis fat with pleural deeper hematoma measuring 2 cm  Midline sternotomy  VISUALIZED STRUCTURES IN THE UPPER ABDOMEN:  Unremarkable  OSSEOUS STRUCTURES:  No acute fracture or destructive osseous lesion  Impression: 1  Moderate right pneumothorax with right apical anterior approach pigtail catheter in place  Septations in the anterior pleural space superiorly  2 Bullous changes predominantly in the right upper lobe near the apex  Largest bulla measuring 2 5 cm 3  Groundglass changes in the right upper lobe with a tubular hyperdensity extending to the pleural space laterally suggesting hematoma in the tract of the previous large bore chest tube  Hematoma in the chest wall is seen at the previous entry site  If there is persistent air leak in the Pleuro-evac pleural fistula cannot be excluded  4  Moderate right pleural effusion with small amount of hemorrhagic fluid  Workstation performed: [id number]     XR chest portable ICU    Result Date: 7/13/2022  Narrative: CHEST INDICATION:   eval pneumo  COMPARISON:  07/11/2022  EXAM PERFORMED/VIEWS:  XR CHEST PORTABLE ICU FINDINGS: Right chest tube at the lung apex  EKG leads overlie the patient  Sternotomy wires  Left chest loop recorder  Cardiomediastinal silhouette appears stable  Right pneumothorax decreased in size from prior with likely small amounts of gas trapped at the right minor fissure medially     Hazy opacification throughout the right lung slightly improved from prior  Left perihilar opacities    No sizable pleural effusion  Osseous structures appear within normal limits for patient age  Impression: Right pneumothorax decreased in size from prior with likely small amounts of gas trapped at the right minor fissure medially  Improved aeration of the right lung  Workstation performed: [id number]     XR chest portable ICU    Result Date: 7/11/2022  Narrative: CHEST INDICATION:   f/u R pneumothorax  COMPARISON:  Chest x-ray 7/10/2022 EXAM PERFORMED/VIEWS:  XR CHEST PORTABLE ICU  Images: 4 FINDINGS:  Right chest tube remains in place  Interval development of small right apical pneumothorax  Diffuse right lung opacity  Left lung is clear without pneumothorax  No pleural effusions  Cardiomediastinal silhouette appears unremarkable  Osseous structures appear within normal limits for patient age  Left chest loop recorder in place  Impression: Interval development of small right apical pneumothorax with diffuse right lung airspace disease  Right-sided chest tube remains in place  Workstation performed: [id number]     IR chest tube placement    Result Date: 7/9/2022  Narrative: PROCEDURE: Right chest tube placement Procedural Personnel Attending physician(s): Dr Maritza Evans Pre-procedure diagnosis: Pneumothorax Post-procedure diagnosis: Same Indication: Persistent right pneumothorax despite existing right-sided chest tube  PROCEDURE SUMMARY: - Right chest tube placement under CT guidance PROCEDURE DETAILS: Pre-procedure Consent: Informed consent for the procedure including risks, benefits and alternatives was obtained and time-out was performed prior to the procedure  Preparation: The site was prepared and draped using maximal sterile barrier technique including cutaneous antisepsis  Anesthesia/sedation Level of anesthesia/sedation: Local lidocaine Right chest tube placement The patient was positioned supine  Initial imaging was performed  Local anesthesia was administered   A 19-gauge needle was advanced into the right thoracic cavity through the anterior second intercostal space  Amplatz wire was advanced through the needle  to maintain the tract  Tract was dilated and a 10 Japanese catheter advanced over the wire with pigtail loop forming in the right apical chest cavity  Catheter was secured to skin using 2-0 Prolene suture and connected to pleural vac suction  Existing right lateral chest tube was removed  Radiation Dose CT dose length product (mGy-cm): 346 68 Additional Details Specimens removed: Existing right lateral chest tube  Estimated blood loss (mL): 1 Complications: No immediate complications  Impression: 1  Right apical chest tube placement using 10 Japanese catheter  2  Existing right lateral chest tube appeared to be within the right lung parenchyma and was nonfunctional  This existing chest tube was removed  Plan: - Right chest tube to pleural vac suction  - Monitor for resolution of right pneumothorax  Workstation performed: [id number]     Echo complete w/ contrast if indicated    Result Date: 7/13/2022  Narrative: Sergio Rincon  Left Ventricle: Left ventricular cavity size is normal  Wall thickness is mildly increased  Systolic function is severely reduced  Estimated ejection fraction is 25-30% on a noncontrast study  Wall motion cannot be accurately assessed  Diastolic function is mildly abnormal, consistent with grade I (abnormal) relaxation    Right Ventricle: Systolic function is low normal    Mitral Valve: There is mild regurgitation    Tricuspid Valve: There is mild regurgitation    Pulmonic Valve: There is mild to moderate regurgitation  EKG / Monitor: Personally reviewed  Sinus rhythm      "This note has been constructed using a voice recognition system  Therefore there may be syntax, spelling, and/or grammatical errors   Please call if you have any questions  "

## 2022-07-15 NOTE — PROGRESS NOTES
Pt is referred to CT surgery at [address]  I received phone call and upon reviewing all information, CT surgery team agree that patient likely will require intervention  Plan:  Transfer pt to [address] [place] [address] under hospitalist  CT surgery will follow  D/w Dr Lus Hashimoto

## 2022-07-15 NOTE — PHYSICAL THERAPY NOTE
PT TREATMENT     07/15/22 3690   Note Type   Note Type Treatment   Pain Assessment   Pain Assessment Tool Obando-Baker FACES   Obando-Baker FACES Pain Rating 0   Restrictions/Precautions   Other Precautions Chair Alarm; Bed Alarm; Fall Risk;Multiple lines  (Chest tube with wall suction)   General   Chart Reviewed Yes   Family/Caregiver Present No   Cognition   Arousal/Participation Cooperative   Subjective   Subjective Patient reports attempting to exercise on his own in the bed(lower extremity exercise)   Bed Mobility   Supine to Sit 7  Independent   Sit to Supine 7  Independent   Transfers   Sit to Stand 5  Supervision   Stand to Sit 5  Supervision   Ambulation/Elevation   Gait Assistance 4  Minimal assist   Additional items Assist x 1;Verbal cues; Tactile cues   Assistive Device Rolling walker   Distance 20 ft with numerous changes in direction verbal cuing for safe walker usage   Balance   Static Sitting Good   Dynamic Sitting Fair +   Static Standing Fair   Dynamic Standing Fair -   Ambulatory Fair -   Activity Tolerance   Activity Tolerance Patient tolerated treatment well;Patient limited by fatigue;Treatment limited secondary to medical complications (Comment)   Nurse Made Aware Yes   Exercises   Quad Sets Supine;20 reps;Bilateral   Heelslides Supine;20 reps;Bilateral   Glute Sets Supine;10 reps   Hip Flexion Sitting;15 reps;Bilateral   Hip Abduction Sitting;15 reps;Bilateral   Hip Extension Supine;10 reps;Bilateral  (Hip internal and external rotation)   Knee AROM Short Arc Quad Supine;10 reps;Bilateral   Knee AROM Long Arc Quad Sitting;10 reps;Bilateral   Ankle Pumps Sitting;10 reps;Bilateral   Balance training  Sidestepping and backward walking completed for balance and coordination   Assessment   Assessment Patient cooperative and demonstrating improving gait balance and endurance as well as transfer ability  Patient will benefit from continued physical therapy as medically able   The patient's AM-PAC Basic Mobility Inpatient Short Form Raw Score is 19  A Raw score of greater than 16 suggests the patient may benefit from discharge to homealthough patient with continued medical issues and may require STR  Please also refer to the recommendation of the Physical Therapist for safe discharge planning  Plan   Treatment/Interventions ADL retraining;Functional transfer training;LE strengthening/ROM; Elevations; Therapeutic exercise; Endurance training;Patient/family training;Equipment eval/education;Gait training; Compensatory technique education   PT Frequency Other (Comment)  (5 times a week)   Recommendation   PT Discharge Recommendation Post acute rehabilitation services   AM-PAC Basic Mobility Inpatient   Turning in Bed Without Bedrails 4   Lying on Back to Sitting on Edge of Flat Bed 4   Moving Bed to Chair 3   Standing Up From Chair 3   Walk in Room 3   Climb 3-5 Stairs 2   Basic Mobility Inpatient Raw Score 19   Basic Mobility Standardized Score 42 48   Highest Level Of Mobility   JH-HLM Goal 6: Walk 10 steps or more   JH-HLM Achieved 6: Walk 10 steps or more   Education   Patient Reinforcement needed; Explanation/teachback used;Demonstrates verbal understanding   Licensure   [place] License Number  Graciela Gabriel [id number]

## 2022-07-15 NOTE — PLAN OF CARE
Problem: Nutrition/Hydration-ADULT  Goal: Nutrient/Hydration intake appropriate for improving, restoring or maintaining nutritional needs  Description: Monitor and assess patient's nutrition/hydration status for malnutrition  Collaborate with interdisciplinary team and initiate plan and interventions as ordered  Monitor patient's weight and dietary intake as ordered or per policy  Utilize nutrition screening tool and intervene as necessary  Determine patient's food preferences and provide high-protein, high-caloric foods as appropriate       INTERVENTIONS:  - Monitor oral intake, urinary output, labs, and treatment plans  - Assess nutrition and hydration status and recommend course of action  - Evaluate amount of meals eaten  - Assist patient with eating if necessary   - Allow adequate time for meals  - Recommend/ encourage appropriate diets, oral nutritional supplements, and vitamin/mineral supplements  - Order, calculate, and assess calorie counts as needed  - Recommend, monitor, and adjust tube feedings and TPN/PPN based on assessed needs  - Assess need for intravenous fluids  - Provide specific nutrition/hydration education as appropriate  - Include patient/family/caregiver in decisions related to nutrition  Outcome: Progressing     Problem: MOBILITY - ADULT  Goal: Maintain or return to baseline ADL function  Description: INTERVENTIONS:  -  Assess patient's ability to carry out ADLs; assess patient's baseline for ADL function and identify physical deficits which impact ability to perform ADLs (bathing, care of mouth/teeth, toileting, grooming, dressing, etc )  - Assess/evaluate cause of self-care deficits   - Assess range of motion  - Assess patient's mobility; develop plan if impaired  - Assess patient's need for assistive devices and provide as appropriate  - Encourage maximum independence but intervene and supervise when necessary  - Involve family in performance of ADLs  - Assess for home care needs following discharge   - Consider OT consult to assist with ADL evaluation and planning for discharge  - Provide patient education as appropriate  Outcome: Progressing  Goal: Maintains/Returns to pre admission functional level  Description: INTERVENTIONS:  - Perform BMAT or MOVE assessment daily    - Set and communicate daily mobility goal to care team and patient/family/caregiver  - Collaborate with rehabilitation services on mobility goals if consulted  - Perform Range of Motion 2 times a day  - Reposition patient every 2 hours    - Dangle patient 2 times a day  - Stand patient 2 times a day  - Ambulate patient 2 times a day  - Out of bed to chair 2 times a day   - Out of bed for meals 2 times a day  - Out of bed for toileting  - Record patient progress and toleration of activity level   Outcome: Progressing     Problem: PAIN - ADULT  Goal: Verbalizes/displays adequate comfort level or baseline comfort level  Description: Interventions:  - Encourage patient to monitor pain and request assistance  - Assess pain using appropriate pain scale  - Administer analgesics based on type and severity of pain and evaluate response  - Implement non-pharmacological measures as appropriate and evaluate response  - Consider cultural and social influences on pain and pain management  - Notify physician/advanced practitioner if interventions unsuccessful or patient reports new pain  Outcome: Progressing     Problem: INFECTION - ADULT  Goal: Absence or prevention of progression during hospitalization  Description: INTERVENTIONS:  - Assess and monitor for signs and symptoms of infection  - Monitor lab/diagnostic results  - Monitor all insertion sites, i e  indwelling lines, tubes, and drains  - Monitor endotracheal if appropriate and nasal secretions for changes in amount and color  - [place] appropriate cooling/warming therapies per order  - Administer medications as ordered  - Instruct and encourage patient and family to use good hand hygiene technique  - Identify and instruct in appropriate isolation precautions for identified infection/condition  Outcome: Progressing  Goal: Absence of fever/infection during neutropenic period  Description: INTERVENTIONS:  - Monitor WBC    Outcome: Progressing     Problem: SAFETY ADULT  Goal: Maintain or return to baseline ADL function  Description: INTERVENTIONS:  -  Assess patient's ability to carry out ADLs; assess patient's baseline for ADL function and identify physical deficits which impact ability to perform ADLs (bathing, care of mouth/teeth, toileting, grooming, dressing, etc )  - Assess/evaluate cause of self-care deficits   - Assess range of motion  - Assess patient's mobility; develop plan if impaired  - Assess patient's need for assistive devices and provide as appropriate  - Encourage maximum independence but intervene and supervise when necessary  - Involve family in performance of ADLs  - Assess for home care needs following discharge   - Consider OT consult to assist with ADL evaluation and planning for discharge  - Provide patient education as appropriate  Outcome: Progressing  Goal: Maintains/Returns to pre admission functional level  Description: INTERVENTIONS:  - Perform BMAT or MOVE assessment daily    - Set and communicate daily mobility goal to care team and patient/family/caregiver  - Collaborate with rehabilitation services on mobility goals if consulted  - Perform Range of Motion 2 times a day  - Reposition patient every 2 hours    - Dangle patient 2 times a day  - Stand patient 2 times a day  - Ambulate patient 2 times a day  - Out of bed to chair 2 times a day   - Out of bed for meals 2 times a day  - Out of bed for toileting  - Record patient progress and toleration of activity level   Outcome: Progressing  Goal: Patient will remain free of falls  Description: INTERVENTIONS:  - Educate patient/family on patient safety including physical limitations  - Instruct patient to call for assistance with activity   - Consult OT/PT to assist with strengthening/mobility   - Keep Call bell within reach  - Keep bed low and locked with side rails adjusted as appropriate  - Keep care items and personal belongings within reach  - Initiate and maintain comfort rounds  - Make Fall Risk Sign visible to staff  - Offer Toileting every 2 Hours, in advance of need  - Initiate/Maintain bed alarm  - Obtain necessary fall risk management equipment: call bell   - Apply yellow socks and bracelet for high fall risk patients  - Consider moving patient to room near nurses station  Outcome: Progressing     Problem: DISCHARGE PLANNING  Goal: Discharge to home or other facility with appropriate resources  Description: INTERVENTIONS:  - Identify barriers to discharge w/patient and caregiver  - Arrange for needed discharge resources and transportation as appropriate  - Identify discharge learning needs (meds, wound care, etc )  - Arrange for interpretive services to assist at discharge as needed  - Refer to Case Management Department for coordinating discharge planning if the patient needs post-hospital services based on physician/advanced practitioner order or complex needs related to functional status, cognitive ability, or social support system  Outcome: Progressing     Problem: Knowledge Deficit  Goal: Patient/family/caregiver demonstrates understanding of disease process, treatment plan, medications, and discharge instructions  Description: Complete learning assessment and assess knowledge base    Interventions:  - Provide teaching at level of understanding  - Provide teaching via preferred learning methods  Outcome: Progressing     Problem: Prexisting or High Potential for Compromised Skin Integrity  Goal: Skin integrity is maintained or improved  Description: INTERVENTIONS:  - Identify patients at risk for skin breakdown  - Assess and monitor skin integrity  - Assess and monitor nutrition and hydration status  - Monitor labs   - Assess for incontinence   - Turn and reposition patient  - Assist with mobility/ambulation  - Relieve pressure over bony prominences  - Avoid friction and shearing  - Provide appropriate hygiene as needed including keeping skin clean and dry  - Evaluate need for skin moisturizer/barrier cream  - Collaborate with interdisciplinary team   - Patient/family teaching  - Consider wound care consult   Outcome: Progressing     Problem: NEUROSENSORY - ADULT  Goal: Achieves stable or improved neurological status  Description: INTERVENTIONS  - Monitor and report changes in neurological status  - Monitor vital signs such as temperature, blood pressure, glucose, and any other labs ordered   - Initiate measures to prevent increased intracranial pressure  - Monitor for seizure activity and implement precautions if appropriate      Outcome: Progressing  Goal: Remains free of injury related to seizures activity  Description: INTERVENTIONS  - Maintain airway, patient safety  and administer oxygen as ordered  - Monitor patient for seizure activity, document and report duration and description of seizure to physician/advanced practitioner  - If seizure occurs,  ensure patient safety during seizure  - Reorient patient post seizure  - Seizure pads on all 4 side rails  - Instruct patient/family to notify RN of any seizure activity including if an aura is experienced  - Instruct patient/family to call for assistance with activity based on nursing assessment  - Administer anti-seizure medications if ordered    Outcome: Progressing  Goal: Achieves maximal functionality and self care  Description: INTERVENTIONS  - Monitor swallowing and airway patency with patient fatigue and changes in neurological status  - Encourage and assist patient to increase activity and self care     - Encourage visually impaired, hearing impaired and aphasic patients to use assistive/communication devices  Outcome: Progressing     Problem: RESPIRATORY - ADULT  Goal: Achieves optimal ventilation and oxygenation  Description: INTERVENTIONS:  - Assess for changes in respiratory status  - Assess for changes in mentation and behavior  - Position to facilitate oxygenation and minimize respiratory effort  - Oxygen administered by appropriate delivery if ordered  - Initiate smoking cessation education as indicated  - Encourage broncho-pulmonary hygiene including cough, deep breathe, Incentive Spirometry  - Assess the need for suctioning and aspirate as needed  - Assess and instruct to report SOB or any respiratory difficulty  - Respiratory Therapy support as indicated  Outcome: Progressing     Problem: METABOLIC, FLUID AND ELECTROLYTES - ADULT  Goal: Electrolytes maintained within normal limits  Description: INTERVENTIONS:  - Monitor labs and assess patient for signs and symptoms of electrolyte imbalances  - Administer electrolyte replacement as ordered  - Monitor response to electrolyte replacements, including repeat lab results as appropriate  - Instruct patient on fluid and nutrition as appropriate  Outcome: Progressing  Goal: Fluid balance maintained  Description: INTERVENTIONS:  - Monitor labs   - Monitor I/O and WT  - Instruct patient on fluid and nutrition as appropriate  - Assess for signs & symptoms of volume excess or deficit  Outcome: Progressing  Goal: Glucose maintained within target range  Description: INTERVENTIONS:  - Monitor Blood Glucose as ordered  - Assess for signs and symptoms of hyperglycemia and hypoglycemia  - Administer ordered medications to maintain glucose within target range  - Assess nutritional intake and initiate nutrition service referral as needed  Outcome: Progressing     Problem: Potential for Falls  Goal: Patient will remain free of falls  Description: INTERVENTIONS:  - Educate patient/family on patient safety including physical limitations  - Instruct patient to call for assistance with activity   - Consult OT/PT to assist with strengthening/mobility   - Keep Call bell within reach  - Keep bed low and locked with side rails adjusted as appropriate  - Keep care items and personal belongings within reach  - Initiate and maintain comfort rounds  - Make Fall Risk Sign visible to staff  - Offer Toileting every 2 Hours, in advance of need  - Initiate/Maintain bed alarm  - Obtain necessary fall risk management equipment: call bell   - Apply yellow socks and bracelet for high fall risk patients  - Consider moving patient to room near nurses station  Outcome: Progressing

## 2022-07-15 NOTE — CASE MANAGEMENT
Case Management Progress Note    Patient name Abby Rivers  Location [address] [address] [id number] MRN [id number]  : 1965 Date 7/15/2022       LOS (days): 7  Geometric Mean LOS (GMLOS) (days): 5 10  Days to GMLOS:-1 7        OBJECTIVE:        Current admission status: Inpatient  Preferred Pharmacy:   [address] ([address], [address] [facility] ([address] [address])  [address] [address] [place] [address] [phone])  [place] [address]  Phone: [phone] Fax: [phone]    Primary Care Provider: Migdalia Smalls MD    Primary Insurance: [facility] [employer] [employer]  Secondary Insurance: [address] [place] [address]    PROGRESS NOTE:    Patient pending transfer to [address] [place] [address] for CT surgery evaluation/consult  [facility] made aware via [facility]

## 2022-07-15 NOTE — PROGRESS NOTES
[facility] Internal [facility] Progress Note  Patient: Elwyn Phalen 59 y o  male   MRN: [id number]  PCP: Huang Milner MD  Unit/Bed#: [address] [place] [address] Encounter: [id number]  Date Of Visit: 07/15/22    Problem List:    Principal Problem:    Pneumothorax  Active Problems:    Electrolyte abnormality    Alcohol withdrawal ([facility] )    Cardiomyopathy, 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    Chest pain    Paroxysmal atrial fibrillation ([facility] )    Tobacco abuse      Assessment & Plan:    * Pneumothorax  Assessment & Plan  Incidental finding  Denied SOB, chest pain  Denied fall or trauma  · Large right pneumothorax initially  · Status post right-sided chest tube insertion in ED to wall suction on 07/08  · On 07/09 underwent IR chest tube placement due to persistent pneumothorax  · Chest x-ray 7/13 shows small pneumothorax  · CT chest showed moderate right pneumothorax, bullous changes with largest bulla 2 5 cm  Hematoma in the tract of prior large bore chest tube, hematoma in chest wall, moderate right pleural effusion with some hemorrhagic fluid  · Case discussed with thoracic surgery and plan is for transfer to [place] when bed available  · Case was discussed with Dr Patricia Eisenberg over there      Electrolyte abnormality  Assessment & Plan  · Electrolytes have improved status post repletion  · get repeat lab work in a   Alcohol withdrawal ([place] [address] )  Assessment & Plan  Patient reports drinking about 6 drinks per day  Last drink was on day of admission  · Complete Alcohol cessation discussed with patient  · Initially started on CIWA protocol on admission  · Developed alcohol withdrawal symptoms on 07/09-7/10 with tachycardia, confusion, agitation, DTs  Received total of 6 mg of IV Ativan without any improvement  · Transferred to step-down unit and received IV phenobarb and also required p r n   Valium doses  · Was also on Precedex drip which has been discontinued  · Patient states he would be interested in inpatient alcohol rehab if that is what it takes to quit drinking  · MVI, thiamine, folic acid    Cardiomyopathy, 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  Assessment & Plan  TONYA in 2018 showed normal EF  Echo on this admission shows EF of 25-30% with grade 1 diastolic dysfunction  Cardiology consulted for medication optimization as patient on many medications at that are not ideal  Continue losartan, Aldactone, Toprol-XL    Tobacco abuse  Assessment & Plan  Complete Smoking cessation discussed  Nicotine patch    Paroxysmal atrial fibrillation (HCC)  Assessment & Plan  Reported he stopped taking Xarelto about 1 month ago as he ran out of it  Patient appears to be on Cardizem, sotalol, Toprol XL in the past as well  He denies taking any prescription medications except Xarelto which he stopped 1 month ago  History of cardioversion in 2018  · Tachycardic in ED  · Per prior provider, EKG showed sinus tach, rate 106, , V1 to V3 TWI  · Continue with Toprol-XL  · 81 mg of aspirin discontinued due to findings of hematoma  Discussed with Cardiology to who stated that this can be started at a later time  · Holding anticoagulation as patient reports frequent falls at home        Chest pain  Assessment & Plan  Per ICU note, patient reported some chest pain around chest tube site  Troponins negative    Encephalopathy-resolved as of 7/15/2022  Assessment & Plan  Due to alcohol abuse/withdrawal  Mental status has improved & patient AAO x3    Alcohol-induced acute pancreatitis-resolved as of 7/15/2022  Assessment & Plan  Patient presented with epigastric pain, nausea, vomiting for about 1 week  History of alcohol induced pancreatitis and alcoholic hepatitis  · CT showed - Mild peripancreatic stranding suggestive of pancreatitis  Severe hepatic steatosis was noted  No fluid collections  · Lipase 1700 on admission --> 392 on 7/11  Denies any abdominal pain  Tolerating diet  · Alcohol level on admission 182    · Triglycerides 44  · Was on IV fluids previously which has been discontinued        VTE Pharmacologic Prophylaxis: VTE Score: 1 Non due to hematoma on CT scan    Patient Centered Rounds: I performed bedside rounds with nursing staff today  Discussions with Specialists or Other Care Team Provider: yes - pulm    Education and Discussions with Family / Patient: Updated  (sister) via phone  Time Spent for Care: 40 min  More than 50% of total time spent on counseling and coordination of care as described above  Current Length of Stay: 7 day(s)  Current Patient Status: Inpatient   Certification Statement: The patient will continue to require additional inpatient hospital stay due to Persistent pneumothorax requiring transfer to [facility]  Discharge Plan: Anticipate discharge tomorrow to [facility]    Code Status: Level 1 - Full Code    Subjective:     Patient denies any shortness of breath  Reports some pain by the chest tube insertion site    Objective:     Vitals:   Temp (24hrs), Av 8 °F (37 1 °C), Min:98 7 °F (37 1 °C), Max:98 9 °F (37 2 °C)    Temp:  [98 7 °F (37 1 °C)-98 9 °F (37 2 °C)] 98 8 °F (37 1 °C)  HR:  [86-92] 89  Resp:  [16-21] 21  BP: (100-109)/(64-69) 109/69  SpO2:  [94 %-98 %] 97 %  Body mass index is 22 02 kg/m²  Input and Output Summary (last 24 hours): Intake/Output Summary (Last 24 hours) at 7/15/2022 1835  Last data filed at 7/15/2022 1601  Gross per 24 hour   Intake 420 ml   Output 2350 ml   Net -1930 ml       Physical Exam:   Physical Exam  Constitutional:       General: He is not in acute distress  Appearance: He is well-developed  He is ill-appearing (Chronically)  HENT:      Head: Normocephalic and atraumatic  Eyes:      General: No scleral icterus  Cardiovascular:      Rate and Rhythm: Normal rate and regular rhythm  Pulmonary:      Effort: Pulmonary effort is normal  No respiratory distress  Breath sounds: No wheezing or rales        Comments: Decreased breath sounds  Chest tube in place with air leak  Abdominal:      General: Bowel sounds are normal  There is no distension  Palpations: Abdomen is soft  Tenderness: There is no abdominal tenderness  Neurological:      Mental Status: He is alert and oriented to person, place, and time           Additional Data:     Labs:  Results from last 7 days   Lab Units 07/13/22  0544   WBC Thousand/uL 7 52   HEMOGLOBIN g/dL 10 1*   HEMATOCRIT % 30 2*   PLATELETS Thousands/uL 277   NEUTROS PCT % 64   LYMPHS PCT % 21   MONOS PCT % 12   EOS PCT % 2     Results from last 7 days   Lab Units 07/15/22  0453 07/14/22  0459   SODIUM mmol/L 135* 133*   POTASSIUM mmol/L 4 1 3 7   CHLORIDE mmol/L 98* 98*   CO2 mmol/L 29 30   BUN mg/dL 6 6   CREATININE mg/dL 0 49* 0 53*   ANION GAP mmol/L 8 5   CALCIUM mg/dL 8 4 8 2*   ALBUMIN g/dL  --  2 7*   TOTAL BILIRUBIN mg/dL  --  0 35   ALK PHOS U/L  --  43*   ALT U/L  --  12   AST U/L  --  26   GLUCOSE RANDOM mg/dL 92 95     Results from last 7 days   Lab Units 07/09/22  0454   INR  1 04                   Lines/Drains:  Invasive Devices  Report    Peripheral Intravenous Line  Duration           Long-Dwell Peripheral IV (Midline) 71/52/63 Right Basilic 5 days    Peripheral IV 07/13/22 Distal;Left;Ventral (anterior) Forearm 2 days          Drain  Duration           Chest Tube Right Second intercostal space 10 2 Fr  6 days    External Urinary Catheter 3 days                Imaging: Reviewed radiology reports from this admission including: chest CT scan    Recent Cultures (last 7 days):         Last 24 Hours Medication List:   Current Facility-Administered Medications   Medication Dose Route Frequency Provider Last Rate    acetaminophen  650 mg Oral Q6H PRN Olga Spironello V, CRNP      enoxaparin  40 mg Subcutaneous Daily Olga Spironello V, CRNP      folic acid  1 mg Oral Daily Olga Spironello V, CRNP      levalbuterol  0 63 mg Nebulization Q6H PRN Olga Spironello V, CRNP      lidocaine  1 patch Topical Daily Andi Morris MD      losartan  25 mg Oral Daily Marna Fabry, MD      magnesium oxide  400 mg Oral BID CHEN Valera      metoprolol succinate  25 mg Oral HS CHEN Valera      nicotine  1 patch Transdermal Daily CHEN Melendrez      ondansetron  4 mg Intravenous Q6H PRN CHEN Melendrez      spironolactone  25 mg Oral Daily CHEN Valera      thiamine  100 mg Oral Daily CHEN Melendrez          Today, Patient Was Seen By: Kareem Isabel DO    ** Please Note: "This note has been constructed using a voice recognition system  Therefore there may be syntax, spelling, and/or grammatical errors   Please call if you have any questions  "**

## 2022-07-15 NOTE — PROGRESS NOTES
Progress Note - Pulmonary   Nic Ayala 59 y o  male MRN: [id number]  Unit/Bed#: [address] [place] [address] Encounter: [id number]    Assessment:  Alcohol abuse    Acute pancreatitis EtOH induced, now downtrending    R pneumothorax s/p apical CT --  Ct chest reviewed: complex pneumothorax with bullous lung disease:    Hx of afib/flutter was cardioverted in 2018    Tobacco abuse     Plan:  CT surgery consultation: pt may require VATS due to persistent BPF  Monitor for ETOH withdrawal  Pain control: add lidocaine patch 5%   Aggressive incentive spirometry benefits explained  Rest of the management as per primary       Subjective:   Pt is sitting comfortably  He denies any symptoms except pain upon deep inhalation     Objective:     Vitals: Blood pressure 108/68, pulse 92, temperature 98 9 °F (37 2 °C), resp  rate 16, height 5' 8" (1 727 m), weight 65 7 kg (144 lb 13 5 oz), SpO2 94 %  ,Body mass index is 22 02 kg/m²  Intake/Output Summary (Last 24 hours) at 7/15/2022 0943  Last data filed at 7/15/2022 0600  Gross per 24 hour   Intake --   Output 1450 ml   Net -1450 ml       Invasive Devices  Report    Peripheral Intravenous Line  Duration           Long-Dwell Peripheral IV (Midline) 79/42/01 Right Basilic 5 days    Peripheral IV 07/13/22 Distal;Left;Ventral (anterior) Forearm 1 day          Drain  Duration           Chest Tube Right Second intercostal space 10 2 Fr  5 days    External Urinary Catheter 2 days                Physical Exam:    General: AAOX3  HEENT: atrumatic head  Lungs: decreased breath sound; chest tube present with persistent air leak  CVS: S1S2+, no m/r/g  Abdomen: soft, Nd, NT  Ext; no edema  Neuro: AAXO3    Labs: I have personally reviewed pertinent lab results    Imaging and other studies: I have personally reviewed pertinent films in Lyudmila Swann MD

## 2022-07-16 PROBLEM — R07.9 CHEST PAIN: Status: RESOLVED | Noted: 2017-09-22 | Resolved: 2022-07-16

## 2022-07-16 LAB
ANION GAP SERPL CALCULATED.3IONS-SCNC: 8 MMOL/L (ref 4–13)
ATRIAL RATE: 103 BPM
ATRIAL RATE: 104 BPM
ATRIAL RATE: 107 BPM
ATRIAL RATE: 107 BPM
BUN SERPL-MCNC: 6 MG/DL (ref 5–25)
CALCIUM SERPL-MCNC: 8.6 MG/DL (ref 8.3–10.1)
CHLORIDE SERPL-SCNC: 97 MMOL/L (ref 100–108)
CO2 SERPL-SCNC: 26 MMOL/L (ref 21–32)
CREAT SERPL-MCNC: 0.49 MG/DL (ref 0.6–1.3)
ERYTHROCYTE [DISTWIDTH] IN BLOOD BY AUTOMATED COUNT: 15.1 % (ref 11.6–15.1)
GFR SERPL CREATININE-BSD FRML MDRD: 119 ML/MIN/1.73SQ M
GLUCOSE SERPL-MCNC: 104 MG/DL (ref 65–140)
HCT VFR BLD AUTO: 33.4 % (ref 36.5–49.3)
HGB BLD-MCNC: 11 G/DL (ref 12–17)
MAGNESIUM SERPL-MCNC: 1.5 MG/DL (ref 1.6–2.6)
MCH RBC QN AUTO: 30.6 PG (ref 26.8–34.3)
MCHC RBC AUTO-ENTMCNC: 32.9 G/DL (ref 31.4–37.4)
MCV RBC AUTO: 93 FL (ref 82–98)
P AXIS: 13 DEGREES
P AXIS: 16 DEGREES
P AXIS: 48 DEGREES
P AXIS: 91 DEGREES
PLATELET # BLD AUTO: 494 THOUSANDS/UL (ref 149–390)
PMV BLD AUTO: 9.1 FL (ref 8.9–12.7)
POTASSIUM SERPL-SCNC: 4.1 MMOL/L (ref 3.5–5.3)
PR INTERVAL: 158 MS
PR INTERVAL: 162 MS
PR INTERVAL: 178 MS
PR INTERVAL: 202 MS
QRS AXIS: -52 DEGREES
QRS AXIS: -57 DEGREES
QRS AXIS: -57 DEGREES
QRS AXIS: -60 DEGREES
QRSD INTERVAL: 80 MS
QRSD INTERVAL: 80 MS
QRSD INTERVAL: 88 MS
QRSD INTERVAL: 90 MS
QT INTERVAL: 356 MS
QT INTERVAL: 356 MS
QT INTERVAL: 378 MS
QT INTERVAL: 382 MS
QTC INTERVAL: 466 MS
QTC INTERVAL: 468 MS
QTC INTERVAL: 504 MS
QTC INTERVAL: 509 MS
RBC # BLD AUTO: 3.6 MILLION/UL (ref 3.88–5.62)
SODIUM SERPL-SCNC: 131 MMOL/L (ref 136–145)
T WAVE AXIS: 134 DEGREES
T WAVE AXIS: 139 DEGREES
T WAVE AXIS: 143 DEGREES
T WAVE AXIS: 144 DEGREES
VENTRICULAR RATE: 103 BPM
VENTRICULAR RATE: 104 BPM
VENTRICULAR RATE: 107 BPM
VENTRICULAR RATE: 107 BPM
WBC # BLD AUTO: 6.9 THOUSAND/UL (ref 4.31–10.16)

## 2022-07-16 PROCEDURE — 93010 ELECTROCARDIOGRAM REPORT: CPT | Performed by: INTERNAL MEDICINE

## 2022-07-16 PROCEDURE — 83735 ASSAY OF MAGNESIUM: CPT | Performed by: FAMILY MEDICINE

## 2022-07-16 PROCEDURE — 80048 BASIC METABOLIC PNL TOTAL CA: CPT | Performed by: FAMILY MEDICINE

## 2022-07-16 PROCEDURE — 85027 COMPLETE CBC AUTOMATED: CPT | Performed by: FAMILY MEDICINE

## 2022-07-16 PROCEDURE — 99232 SBSQ HOSP IP/OBS MODERATE 35: CPT | Performed by: FAMILY MEDICINE

## 2022-07-16 PROCEDURE — 99232 SBSQ HOSP IP/OBS MODERATE 35: CPT | Performed by: INTERNAL MEDICINE

## 2022-07-16 RX ORDER — MAGNESIUM SULFATE HEPTAHYDRATE 40 MG/ML
2 INJECTION, SOLUTION INTRAVENOUS ONCE
Status: COMPLETED | OUTPATIENT
Start: 2022-07-16 | End: 2022-07-16

## 2022-07-16 RX ADMIN — METOPROLOL SUCCINATE 25 MG: 25 TABLET, EXTENDED RELEASE ORAL at 22:31

## 2022-07-16 RX ADMIN — NICOTINE 1 PATCH: 21 PATCH, EXTENDED RELEASE TRANSDERMAL at 08:51

## 2022-07-16 RX ADMIN — MAGNESIUM OXIDE TAB 400 MG (241.3 MG ELEMENTAL MG) 400 MG: 400 (241.3 MG) TAB at 08:51

## 2022-07-16 RX ADMIN — MAGNESIUM OXIDE TAB 400 MG (241.3 MG ELEMENTAL MG) 400 MG: 400 (241.3 MG) TAB at 18:12

## 2022-07-16 RX ADMIN — FOLIC ACID 1 MG: 1 TABLET ORAL at 08:51

## 2022-07-16 RX ADMIN — THIAMINE HCL TAB 100 MG 100 MG: 100 TAB at 08:51

## 2022-07-16 RX ADMIN — MAGNESIUM SULFATE HEPTAHYDRATE 2 G: 40 INJECTION, SOLUTION INTRAVENOUS at 10:14

## 2022-07-16 RX ADMIN — LOSARTAN POTASSIUM 25 MG: 25 TABLET, FILM COATED ORAL at 08:51

## 2022-07-16 RX ADMIN — LIDOCAINE 5% 1 PATCH: 700 PATCH TOPICAL at 08:50

## 2022-07-16 RX ADMIN — SPIRONOLACTONE 25 MG: 25 TABLET ORAL at 08:51

## 2022-07-16 NOTE — ASSESSMENT & PLAN NOTE
Patient reports drinking about 6 drinks per day  Last drink was on day of admission  - Complete Alcohol cessation discussed with patient  -Initially started on CIWA protocol on admission  -Developed alcohol withdrawal symptoms on 07/09-7/10 with tachycardia, confusion, agitation, DTs  Received total of 6 mg of IV Ativan without any improvement  -Transferred to step-down unit and received IV phenobarb and also required p r n   Valium doses  -Was also on Precedex drip which has been discontinued  -Patient states he would be interested in inpatient alcohol rehab if that is what it takes to quit drinking  -MVI, thiamine, folic acid

## 2022-07-16 NOTE — ASSESSMENT & PLAN NOTE
Patient reports drinking about 6 drinks per day  Last drink was on day of admission  Complete Alcohol cessation discussed with patient  Initially started on CIWA protocol on admission  Developed alcohol withdrawal symptoms on 07/09-7/10 with tachycardia, confusion, agitation, DTs  Received total of 6 mg of IV Ativan without any improvement  Transferred to step-down unit and received IV phenobarb and also required p r n  Valium doses  Was also on Precedex drip which has been discontinued  Patient states he would be interested in inpatient alcohol rehab if that is what it takes to quit drinking      Plan:  · Continue to encourage alcohol cessation and assess patient interest in rehab  · MVI, thiamine, folic acid

## 2022-07-16 NOTE — PLAN OF CARE
Problem: Nutrition/Hydration-ADULT  Goal: Nutrient/Hydration intake appropriate for improving, restoring or maintaining nutritional needs  Description: Monitor and assess patient's nutrition/hydration status for malnutrition  Collaborate with interdisciplinary team and initiate plan and interventions as ordered  Monitor patient's weight and dietary intake as ordered or per policy  Utilize nutrition screening tool and intervene as necessary  Determine patient's food preferences and provide high-protein, high-caloric foods as appropriate       INTERVENTIONS:  - Monitor oral intake, urinary output, labs, and treatment plans  - Assess nutrition and hydration status and recommend course of action  - Evaluate amount of meals eaten  - Assist patient with eating if necessary   - Allow adequate time for meals  - Recommend/ encourage appropriate diets, oral nutritional supplements, and vitamin/mineral supplements  - Order, calculate, and assess calorie counts as needed  - Recommend, monitor, and adjust tube feedings and TPN/PPN based on assessed needs  - Assess need for intravenous fluids  - Provide specific nutrition/hydration education as appropriate  - Include patient/family/caregiver in decisions related to nutrition  Outcome: Progressing     Problem: MOBILITY - ADULT  Goal: Maintain or return to baseline ADL function  Description: INTERVENTIONS:  -  Assess patient's ability to carry out ADLs; assess patient's baseline for ADL function and identify physical deficits which impact ability to perform ADLs (bathing, care of mouth/teeth, toileting, grooming, dressing, etc )  - Assess/evaluate cause of self-care deficits   - Assess range of motion  - Assess patient's mobility; develop plan if impaired  - Assess patient's need for assistive devices and provide as appropriate  - Encourage maximum independence but intervene and supervise when necessary  - Involve family in performance of ADLs  - Assess for home care needs following discharge   - Consider OT consult to assist with ADL evaluation and planning for discharge  - Provide patient education as appropriate  Outcome: Progressing  Goal: Maintains/Returns to pre admission functional level  Description: INTERVENTIONS:  - Perform BMAT or MOVE assessment daily    - Set and communicate daily mobility goal to care team and patient/family/caregiver  - Collaborate with rehabilitation services on mobility goals if consulted  - Perform Range of Motion 2 times a day  - Reposition patient every 2 hours    - Dangle patient 2 times a day  - Stand patient 2 times a day  - Ambulate patient 2 times a day  - Out of bed to chair 2 times a day   - Out of bed for meals 2 times a day  - Out of bed for toileting  - Record patient progress and toleration of activity level   Outcome: Progressing     Problem: PAIN - ADULT  Goal: Verbalizes/displays adequate comfort level or baseline comfort level  Description: Interventions:  - Encourage patient to monitor pain and request assistance  - Assess pain using appropriate pain scale  - Administer analgesics based on type and severity of pain and evaluate response  - Implement non-pharmacological measures as appropriate and evaluate response  - Consider cultural and social influences on pain and pain management  - Notify physician/advanced practitioner if interventions unsuccessful or patient reports new pain  Outcome: Progressing     Problem: INFECTION - ADULT  Goal: Absence or prevention of progression during hospitalization  Description: INTERVENTIONS:  - Assess and monitor for signs and symptoms of infection  - Monitor lab/diagnostic results  - Monitor all insertion sites, i e  indwelling lines, tubes, and drains  - Monitor endotracheal if appropriate and nasal secretions for changes in amount and color  - [place] appropriate cooling/warming therapies per order  - Administer medications as ordered  - Instruct and encourage patient and family to use good hand hygiene technique  - Identify and instruct in appropriate isolation precautions for identified infection/condition  Outcome: Progressing  Goal: Absence of fever/infection during neutropenic period  Description: INTERVENTIONS:  - Monitor WBC    Outcome: Progressing     Problem: SAFETY ADULT  Goal: Maintain or return to baseline ADL function  Description: INTERVENTIONS:  -  Assess patient's ability to carry out ADLs; assess patient's baseline for ADL function and identify physical deficits which impact ability to perform ADLs (bathing, care of mouth/teeth, toileting, grooming, dressing, etc )  - Assess/evaluate cause of self-care deficits   - Assess range of motion  - Assess patient's mobility; develop plan if impaired  - Assess patient's need for assistive devices and provide as appropriate  - Encourage maximum independence but intervene and supervise when necessary  - Involve family in performance of ADLs  - Assess for home care needs following discharge   - Consider OT consult to assist with ADL evaluation and planning for discharge  - Provide patient education as appropriate  Outcome: Progressing  Goal: Maintains/Returns to pre admission functional level  Description: INTERVENTIONS:  - Perform BMAT or MOVE assessment daily    - Set and communicate daily mobility goal to care team and patient/family/caregiver  - Collaborate with rehabilitation services on mobility goals if consulted  - Perform Range of Motion 2 times a day  - Reposition patient every 2 hours    - Dangle patient 2 times a day  - Stand patient 2 times a day  - Ambulate patient 2 times a day  - Out of bed to chair 2 times a day   - Out of bed for meals 2 times a day  - Out of bed for toileting  - Record patient progress and toleration of activity level   Outcome: Progressing  Goal: Patient will remain free of falls  Description: INTERVENTIONS:  - Educate patient/family on patient safety including physical limitations  - Instruct patient to call for assistance with activity   - Consult OT/PT to assist with strengthening/mobility   - Keep Call bell within reach  - Keep bed low and locked with side rails adjusted as appropriate  - Keep care items and personal belongings within reach  - Initiate and maintain comfort rounds  - Make Fall Risk Sign visible to staff  - Offer Toileting every 2 Hours, in advance of need  - Initiate/Maintain bed alarm  - Obtain necessary fall risk management equipment: call bell   - Apply yellow socks and bracelet for high fall risk patients  - Consider moving patient to room near nurses station  Outcome: Progressing     Problem: DISCHARGE PLANNING  Goal: Discharge to home or other facility with appropriate resources  Description: INTERVENTIONS:  - Identify barriers to discharge w/patient and caregiver  - Arrange for needed discharge resources and transportation as appropriate  - Identify discharge learning needs (meds, wound care, etc )  - Arrange for interpretive services to assist at discharge as needed  - Refer to Case Management Department for coordinating discharge planning if the patient needs post-hospital services based on physician/advanced practitioner order or complex needs related to functional status, cognitive ability, or social support system  Outcome: Progressing     Problem: Knowledge Deficit  Goal: Patient/family/caregiver demonstrates understanding of disease process, treatment plan, medications, and discharge instructions  Description: Complete learning assessment and assess knowledge base    Interventions:  - Provide teaching at level of understanding  - Provide teaching via preferred learning methods  Outcome: Progressing     Problem: Prexisting or High Potential for Compromised Skin Integrity  Goal: Skin integrity is maintained or improved  Description: INTERVENTIONS:  - Identify patients at risk for skin breakdown  - Assess and monitor skin integrity  - Assess and monitor nutrition and hydration status  - Monitor labs   - Assess for incontinence   - Turn and reposition patient  - Assist with mobility/ambulation  - Relieve pressure over bony prominences  - Avoid friction and shearing  - Provide appropriate hygiene as needed including keeping skin clean and dry  - Evaluate need for skin moisturizer/barrier cream  - Collaborate with interdisciplinary team   - Patient/family teaching  - Consider wound care consult   Outcome: Progressing     Problem: NEUROSENSORY - ADULT  Goal: Achieves stable or improved neurological status  Description: INTERVENTIONS  - Monitor and report changes in neurological status  - Monitor vital signs such as temperature, blood pressure, glucose, and any other labs ordered   - Initiate measures to prevent increased intracranial pressure  - Monitor for seizure activity and implement precautions if appropriate      Outcome: Progressing  Goal: Remains free of injury related to seizures activity  Description: INTERVENTIONS  - Maintain airway, patient safety  and administer oxygen as ordered  - Monitor patient for seizure activity, document and report duration and description of seizure to physician/advanced practitioner  - If seizure occurs,  ensure patient safety during seizure  - Reorient patient post seizure  - Seizure pads on all 4 side rails  - Instruct patient/family to notify RN of any seizure activity including if an aura is experienced  - Instruct patient/family to call for assistance with activity based on nursing assessment  - Administer anti-seizure medications if ordered    Outcome: Progressing  Goal: Achieves maximal functionality and self care  Description: INTERVENTIONS  - Monitor swallowing and airway patency with patient fatigue and changes in neurological status  - Encourage and assist patient to increase activity and self care     - Encourage visually impaired, hearing impaired and aphasic patients to use assistive/communication devices  Outcome: Progressing     Problem: RESPIRATORY - ADULT  Goal: Achieves optimal ventilation and oxygenation  Description: INTERVENTIONS:  - Assess for changes in respiratory status  - Assess for changes in mentation and behavior  - Position to facilitate oxygenation and minimize respiratory effort  - Oxygen administered by appropriate delivery if ordered  - Initiate smoking cessation education as indicated  - Encourage broncho-pulmonary hygiene including cough, deep breathe, Incentive Spirometry  - Assess the need for suctioning and aspirate as needed  - Assess and instruct to report SOB or any respiratory difficulty  - Respiratory Therapy support as indicated  Outcome: Progressing     Problem: METABOLIC, FLUID AND ELECTROLYTES - ADULT  Goal: Electrolytes maintained within normal limits  Description: INTERVENTIONS:  - Monitor labs and assess patient for signs and symptoms of electrolyte imbalances  - Administer electrolyte replacement as ordered  - Monitor response to electrolyte replacements, including repeat lab results as appropriate  - Instruct patient on fluid and nutrition as appropriate  Outcome: Progressing  Goal: Fluid balance maintained  Description: INTERVENTIONS:  - Monitor labs   - Monitor I/O and WT  - Instruct patient on fluid and nutrition as appropriate  - Assess for signs & symptoms of volume excess or deficit  Outcome: Progressing  Goal: Glucose maintained within target range  Description: INTERVENTIONS:  - Monitor Blood Glucose as ordered  - Assess for signs and symptoms of hyperglycemia and hypoglycemia  - Administer ordered medications to maintain glucose within target range  - Assess nutritional intake and initiate nutrition service referral as needed  Outcome: Progressing     Problem: Potential for Falls  Goal: Patient will remain free of falls  Description: INTERVENTIONS:  - Educate patient/family on patient safety including physical limitations  - Instruct patient to call for assistance with activity   - Consult OT/PT to assist with strengthening/mobility   - Keep Call bell within reach  - Keep bed low and locked with side rails adjusted as appropriate  - Keep care items and personal belongings within reach  - Initiate and maintain comfort rounds  - Make Fall Risk Sign visible to staff  - Offer Toileting every 2 Hours, in advance of need  - Initiate/Maintain bed alarm  - Obtain necessary fall risk management equipment: call bell   - Apply yellow socks and bracelet for high fall risk patients  - Consider moving patient to room near nurses station  Outcome: Progressing
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
## 2022-07-16 NOTE — ASSESSMENT & PLAN NOTE
Incidental finding  Denied SOB, chest pain  Denied fall or trauma  Large right pneumothorax initially, S/P right chest tube insertion in ED to wall suction on 07/08  On 07/09 underwent IR chest tube placement due to persistent pneumothorax  Chest x-ray 7/13 shows small PTX  CT chest - moderate right PTX, bullous changes with largest bulla 2 5 cm  Hematoma in the tract of prior large bore chest tube, hematoma in chest wall, moderate right pleural effusion with some hemorrhagic fluid noted  Transferred to [facility] for further management with thoracic surgery  Repeat chest x-ray 7/18 shows small pneumothorax  7/19 Stable on Room air  Denies any chest pain or respiratory distress    Plan:  · Right apical chest tube in place to suction with persistent large air leak    · Thoracic surgery consulted, appreciate recommendations  · Plan for VATS decortication procedure 7/21  · PT/OT eval pending after VATS

## 2022-07-16 NOTE — ASSESSMENT & PLAN NOTE
Reported he stopped taking Xarelto about 1 month ago as he ran out of it  Patient appears to be on Cardizem, sotalol, Toprol XL in the past as well  He denies taking any prescription medications except Xarelto which he stopped 1 month ago  History of cardioversion in 2018    -Tachycardic in ED  -Per prior provider, EKG showed sinus tach, rate 106, , V1 to V3 TWI  -Continue with Toprol-XL  -81 mg of aspirin discontinued due to findings of hematoma    Discussed with Cardiology to who stated that this can be started at a later time  -Holding anticoagulation as patient reports frequent falls at home

## 2022-07-16 NOTE — ASSESSMENT & PLAN NOTE
Reported he stopped taking Xarelto about 1 before admission as he ran out of it  Patient appears to be on Cardizem, sotalol, Toprol XL in the past as well  He denies taking any prescription medications except Xarelto which he stopped  History of cardioversion in 2018  CHADS2 Vasc score = 0,  HASBLED = 2, due to significant use of alcohol patient would be at high risk for bleeding    Plan:  · Continue Toprol-XL  · 81 mg of aspirin discontinued, holding Xarelto due to findings of hematoma

## 2022-07-16 NOTE — ASSESSMENT & PLAN NOTE
Incidental finding  Denied SOB, chest pain  Denied fall or trauma  -Large right pneumothorax initially  -Status post right-sided chest tube insertion in ED to wall suction on 07/08  -On 07/09 underwent IR chest tube placement due to persistent pneumothorax  -Chest x-ray 7/13 shows small pneumothorax  -CT chest showed moderate right pneumothorax, bullous changes with largest bulla 2 5 cm    Hematoma in the tract of prior large bore chest tube, hematoma in chest wall, moderate right pleural effusion with some hemorrhagic fluid  -Case discussed with thoracic surgery and plan is for transfer to [place] when bed available  -Case was discussed with Dr Tala Olmstead over there
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
## 2022-07-16 NOTE — PROGRESS NOTES
[address] [facility] Internal Medicine Progress Note  Patient: Ana Madrigal 59 y o  male   MRN: [id number]  PCP: Danielle Colon MD  Unit/Bed#: [address] [place] [address] Encounter: [id number]  Date Of Visit: 07/16/22    Problem List:    Principal Problem:    Pneumothorax  Active Problems:    Electrolyte abnormality    Alcohol withdrawal ([facility] )    Cardiomyopathy, 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    Paroxysmal atrial fibrillation ([facility] )    Tobacco abuse      Assessment & Plan:    * Pneumothorax  Assessment & Plan  Incidental finding  Denied SOB, chest pain  Denied fall or trauma  · Large right pneumothorax initially, S/P right chest tube insertion in ED to wall suction on 07/08  · On 07/09 underwent IR chest tube placement due to persistent pneumothorax  · Chest x-ray 7/13 shows small pneumothorax  · CT chest showed moderate right pneumothorax, bullous changes with largest bulla 2 5 cm  Hematoma in the tract of prior large bore chest tube, hematoma in chest wall, moderate right pleural effusion with some hemorrhagic fluid  · Case discussed with thoracic surgery and plan is for transfer to [facility] when bed available  · Case was discussed with Dr Tala Olmstead over there  · Risks/benefits of transfer discussed with patient and he is in agreement      Electrolyte abnormality  Assessment & Plan  · R [place] [address] today  · get repeat lab work in a m  Alcohol withdrawal ([place] [address] )  Assessment & Plan  Patient reports drinking about 6 drinks per day  Last drink was on day of admission  · Complete alcohol cessation discussed with patient  · Initially started on CIWA protocol on admission  · Developed alcohol withdrawal symptoms on 07/09-7/10 with tachycardia, confusion, agitation, DTs  Received total of 6 mg of IV Ativan without any improvement  · Transferred to step-down unit and received IV phenobarb and also required p r n   Valium doses  · Was also on Precedex drip which has been discontinued  · Patient now stating that he would be interested in inpatient alcohol rehab after discharge  · Continue MVI, thiamine, folic acid    Cardiomyopathy, 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  Assessment & Plan  · TONYA in 2018 showed normal EF  · Echo on this admission shows EF of 25-30% with grade 1 diastolic dysfunction  · Continue Toprol-XL, Aldactone, losartan  · Cardiology was consulted for medication optimization as patient on many medications at home that are not ideal    Tobacco abuse  Assessment & Plan  Continue nicotine patch   Complete Smoking cessation discussed  Paroxysmal atrial fibrillation [facility])  Assessment & Plan  Reported he stopped taking Xarelto about 1 month ago as he ran out of it  Patient was on Cardizem, sotalol, Toprol XL in the past as well  He denies taking any prescription medications except Xarelto which he stopped 1 month ago  History of cardioversion in 2018  · Tachycardic in ED  · Per prior provider, EKG showed sinus tach, rate 106, , V1 to V3 TWI  · Continue with Toprol-XL  · 81 mg of aspirin discontinued due to findings of hematoma  Per Cardiology ASA can be started at a later time  · Holding anticoagulation as patient reports frequent falls at home        Encephalopathy-resolved as of 7/15/2022  Assessment & Plan  Due to alcohol abuse/withdrawal  Mental status has improved & patient AAO x3    Alcohol-induced acute pancreatitis-resolved as of 7/15/2022  Assessment & Plan  Patient presented with epigastric pain, nausea, vomiting for about 1 week  History of alcohol induced pancreatitis and alcoholic hepatitis  · CT showed - Mild peripancreatic stranding suggestive of pancreatitis  Severe hepatic steatosis was noted  No fluid collections  · Lipase 1700 on admission --> 392 on 7/11  Denies any abdominal pain  Tolerating diet  · Alcohol level on admission 182    · Triglycerides 44  · Was on IV fluids previously which has been discontinued      Chest pain-resolved as of 7/16/2022  Assessment & Plan  Per ICU note, patient reported some chest pain around chest tube site  Troponins negative  VTE Pharmacologic Prophylaxis: VTE Score: 1 None due to hematoma    Patient Centered Rounds: I performed bedside rounds with nursing staff today  Discussions with Specialists or Other Care Team Provider: yes - pulm    Education and Discussions with Family / Patient: Yes    Time Spent for Care: 30 minutes  More than 50% of total time spent on counseling and coordination of care as described above  Current Length of Stay: 8 day(s)  Current Patient Status: Inpatient   Certification Statement: The patient will continue to require additional inpatient hospital stay due to Persistent pneumothorax with bullous lung disease requiring transfer to [place]  Discharge Plan: Discharge when bed available    Code Status: Level 1 - Full Code    Subjective:     Denies any chest pain, shortness of breath  Wants to know when he is being transferred    Objective:     Vitals:   Temp (24hrs), Av 2 °F (37 3 °C), Min:98 5 °F (36 9 °C), Max:100 1 °F (37 8 °C)    Temp:  [98 5 °F (36 9 °C)-100 1 °F (37 8 °C)] 98 5 °F (36 9 °C)  HR:  [87-95] 87  Resp:  [16-20] 16  BP: (107-119)/(69-74) 119/74  SpO2:  [93 %-98 %] 97 %  Body mass index is 22 02 kg/m²  Input and Output Summary (last 24 hours): Intake/Output Summary (Last 24 hours) at 2022 1801  Last data filed at 2022 0543  Gross per 24 hour   Intake --   Output 1550 ml   Net -1550 ml       Physical Exam:   Physical Exam  Constitutional:       General: He is not in acute distress  Appearance: He is well-developed  He is ill-appearing (Chronically)  HENT:      Head: Normocephalic and atraumatic  Eyes:      General: No scleral icterus  Cardiovascular:      Rate and Rhythm: Normal rate and regular rhythm  Pulmonary:      Effort: Pulmonary effort is normal  No respiratory distress  Breath sounds: No wheezing or rales  Comments: Right-sided chest tube in place    Decreased breath sounds  Abdominal: General: Bowel sounds are normal  There is no distension  Palpations: Abdomen is soft  Tenderness: There is no abdominal tenderness  Neurological:      Mental Status: He is alert and oriented to person, place, and time  Additional Data:     Labs:  Results from last 7 days   Lab Units 07/16/22  0538 07/13/22  0544   WBC Thousand/uL 6 90 7 52   HEMOGLOBIN g/dL 11 0* 10 1*   HEMATOCRIT % 33 4* 30 2*   PLATELETS Thousands/uL 494* 277   NEUTROS PCT %  --  64   LYMPHS PCT %  --  21   MONOS PCT %  --  12   EOS PCT %  --  2     Results from last 7 days   Lab Units 07/16/22  0538 07/15/22  0453 07/14/22  0459   SODIUM mmol/L 131*   < > 133*   POTASSIUM mmol/L 4 1   < > 3 7   CHLORIDE mmol/L 97*   < > 98*   CO2 mmol/L 26   < > 30   BUN mg/dL 6   < > 6   CREATININE mg/dL 0 49*   < > 0 53*   ANION GAP mmol/L 8   < > 5   CALCIUM mg/dL 8 6   < > 8 2*   ALBUMIN g/dL  --   --  2 7*   TOTAL BILIRUBIN mg/dL  --   --  0 35   ALK PHOS U/L  --   --  43*   ALT U/L  --   --  12   AST U/L  --   --  26   GLUCOSE RANDOM mg/dL 104   < > 95    < > = values in this interval not displayed  Lines/Drains:  Invasive Devices  Report    Peripheral Intravenous Line  Duration           Long-Dwell Peripheral IV (Midline) 50/53/45 Right Basilic 6 days    Peripheral IV 07/13/22 Distal;Left;Ventral (anterior) Forearm 3 days          Drain  Duration           Chest Tube Right Second intercostal space 10 2 Fr  7 days    External Urinary Catheter 4 days                Imaging: No pertinent imaging reviewed      Recent Cultures (last 7 days):         Last 24 Hours Medication List:   Current Facility-Administered Medications   Medication Dose Route Frequency Provider Last Rate    acetaminophen  650 mg Oral Q6H PRN Olga Spironello V, CRNP      folic acid  1 mg Oral Daily Olga Spironello V, CRNP      levalbuterol  0 63 mg Nebulization Q6H PRN Olga Spironello V, CRNP      lidocaine  1 patch Topical Daily Diane Roach MD      losartan  25 mg Oral Daily Reed Fox MD      magnesium oxide  400 mg Oral BID CHEN Guerrero      metoprolol succinate  25 mg Oral HS CHEN Guerrero      nicotine  1 patch Transdermal Daily CHEN Melendrez      ondansetron  4 mg Intravenous Q6H PRN CHEN Melendrez      spironolactone  25 mg Oral Daily CHEN Guerrero      thiamine  100 mg Oral Daily CHEN Melendrez          Today, Patient Was Seen By: Karissa Hobbs DO    ** Please Note: "This note has been constructed using a voice recognition system  Therefore there may be syntax, spelling, and/or grammatical errors   Please call if you have any questions  "**

## 2022-07-16 NOTE — ASSESSMENT & PLAN NOTE
TONYA in 2018 showed normal EF  Echo on this admission shows EF of 25-30% with grade 1 diastolic dysfunction   Cardiology consulted for medication optimization as patient on many medications that are not ideal    Plan:  · Consulted Heart failure team, appreciate recommendations:  · Continue losartan,Toprol-XL  · Aldactone held for hyponatremia

## 2022-07-16 NOTE — ASSESSMENT & PLAN NOTE
TONYA in 2018 showed normal EF     Echo on this admission shows EF of 25-30% with grade 1 diastolic dysfunction  Cardiology consulted for medication optimization as patient on many medications at that are not ideal  Continue losartan, Aldactone, Toprol-XL

## 2022-07-16 NOTE — PLAN OF CARE
Problem: Nutrition/Hydration-ADULT  Goal: Nutrient/Hydration intake appropriate for improving, restoring or maintaining nutritional needs  Description: Monitor and assess patient's nutrition/hydration status for malnutrition  Collaborate with interdisciplinary team and initiate plan and interventions as ordered  Monitor patient's weight and dietary intake as ordered or per policy  Utilize nutrition screening tool and intervene as necessary  Determine patient's food preferences and provide high-protein, high-caloric foods as appropriate       INTERVENTIONS:  - Monitor oral intake, urinary output, labs, and treatment plans  - Assess nutrition and hydration status and recommend course of action  - Evaluate amount of meals eaten  - Assist patient with eating if necessary   - Allow adequate time for meals  - Recommend/ encourage appropriate diets, oral nutritional supplements, and vitamin/mineral supplements  - Order, calculate, and assess calorie counts as needed  - Recommend, monitor, and adjust tube feedings and TPN/PPN based on assessed needs  - Assess need for intravenous fluids  - Provide specific nutrition/hydration education as appropriate  - Include patient/family/caregiver in decisions related to nutrition  Outcome: Progressing     Problem: MOBILITY - ADULT  Goal: Maintain or return to baseline ADL function  Description: INTERVENTIONS:  -  Assess patient's ability to carry out ADLs; assess patient's baseline for ADL function and identify physical deficits which impact ability to perform ADLs (bathing, care of mouth/teeth, toileting, grooming, dressing, etc )  - Assess/evaluate cause of self-care deficits   - Assess range of motion  - Assess patient's mobility; develop plan if impaired  - Assess patient's need for assistive devices and provide as appropriate  - Encourage maximum independence but intervene and supervise when necessary  - Involve family in performance of ADLs  - Assess for home care needs following discharge   - Consider OT consult to assist with ADL evaluation and planning for discharge  - Provide patient education as appropriate  Outcome: Progressing  Goal: Maintains/Returns to pre admission functional level  Description: INTERVENTIONS:  - Perform BMAT or MOVE assessment daily    - Set and communicate daily mobility goal to care team and patient/family/caregiver  - Collaborate with rehabilitation services on mobility goals if consulted  - Perform Range of Motion 3 times a day  - Reposition patient every 2 hours    - Dangle patient 3 times a day  - Stand patient 3 times a day  - Ambulate patient 3 times a day  - Out of bed to chair 3 times a day   - Out of bed for meals 3 times a day  - Out of bed for toileting  - Record patient progress and toleration of activity level   Outcome: Progressing     Problem: PAIN - ADULT  Goal: Verbalizes/displays adequate comfort level or baseline comfort level  Description: Interventions:  - Encourage patient to monitor pain and request assistance  - Assess pain using appropriate pain scale  - Administer analgesics based on type and severity of pain and evaluate response  - Implement non-pharmacological measures as appropriate and evaluate response  - Consider cultural and social influences on pain and pain management  - Notify physician/advanced practitioner if interventions unsuccessful or patient reports new pain  Outcome: Progressing     Problem: INFECTION - ADULT  Goal: Absence or prevention of progression during hospitalization  Description: INTERVENTIONS:  - Assess and monitor for signs and symptoms of infection  - Monitor lab/diagnostic results  - Monitor all insertion sites, i e  indwelling lines, tubes, and drains  - Monitor endotracheal if appropriate and nasal secretions for changes in amount and color  - [place] appropriate cooling/warming therapies per order  - Administer medications as ordered  - Instruct and encourage patient and family to use good hand hygiene technique  - Identify and instruct in appropriate isolation precautions for identified infection/condition  Outcome: Progressing  Goal: Absence of fever/infection during neutropenic period  Description: INTERVENTIONS:  - Monitor WBC    Outcome: Progressing     Problem: SAFETY ADULT  Goal: Maintain or return to baseline ADL function  Description: INTERVENTIONS:  -  Assess patient's ability to carry out ADLs; assess patient's baseline for ADL function and identify physical deficits which impact ability to perform ADLs (bathing, care of mouth/teeth, toileting, grooming, dressing, etc )  - Assess/evaluate cause of self-care deficits   - Assess range of motion  - Assess patient's mobility; develop plan if impaired  - Assess patient's need for assistive devices and provide as appropriate  - Encourage maximum independence but intervene and supervise when necessary  - Involve family in performance of ADLs  - Assess for home care needs following discharge   - Consider OT consult to assist with ADL evaluation and planning for discharge  - Provide patient education as appropriate  Outcome: Progressing  Goal: Maintains/Returns to pre admission functional level  Description: INTERVENTIONS:  - Perform BMAT or MOVE assessment daily    - Set and communicate daily mobility goal to care team and patient/family/caregiver  - Collaborate with rehabilitation services on mobility goals if consulted  - Perform Range of Motion 3 times a day  - Reposition patient every 2 hours    - Dangle patient 3 times a day  - Stand patient 3 times a day  - Ambulate patient 3 times a day  - Out of bed to chair 3 times a day   - Out of bed for meals 3 times a day  - Out of bed for toileting  - Record patient progress and toleration of activity level   Outcome: Progressing  Goal: Patient will remain free of falls  Description: INTERVENTIONS:  - Educate patient/family on patient safety including physical limitations  - Instruct patient to call for assistance with activity   - Consult OT/PT to assist with strengthening/mobility   - Keep Call bell within reach  - Keep bed low and locked with side rails adjusted as appropriate  - Keep care items and personal belongings within reach  - Initiate and maintain comfort rounds  - Make Fall Risk Sign visible to staff  - Offer Toileting every 2 Hours, in advance of need  - Initiate/Maintain bed alarm  - Obtain necessary fall risk management equipment:   - Apply yellow socks and bracelet for high fall risk patients  - Consider moving patient to room near nurses station  Outcome: Progressing     Problem: DISCHARGE PLANNING  Goal: Discharge to home or other facility with appropriate resources  Description: INTERVENTIONS:  - Identify barriers to discharge w/patient and caregiver  - Arrange for needed discharge resources and transportation as appropriate  - Identify discharge learning needs (meds, wound care, etc )  - Arrange for interpretive services to assist at discharge as needed  - Refer to Case Management Department for coordinating discharge planning if the patient needs post-hospital services based on physician/advanced practitioner order or complex needs related to functional status, cognitive ability, or social support system  Outcome: Progressing     Problem: Knowledge Deficit  Goal: Patient/family/caregiver demonstrates understanding of disease process, treatment plan, medications, and discharge instructions  Description: Complete learning assessment and assess knowledge base    Interventions:  - Provide teaching at level of understanding  - Provide teaching via preferred learning methods  Outcome: Progressing     Problem: Prexisting or High Potential for Compromised Skin Integrity  Goal: Skin integrity is maintained or improved  Description: INTERVENTIONS:  - Identify patients at risk for skin breakdown  - Assess and monitor skin integrity  - Assess and monitor nutrition and hydration status  - Monitor labs   - Assess for incontinence   - Turn and reposition patient  - Assist with mobility/ambulation  - Relieve pressure over bony prominences  - Avoid friction and shearing  - Provide appropriate hygiene as needed including keeping skin clean and dry  - Evaluate need for skin moisturizer/barrier cream  - Collaborate with interdisciplinary team   - Patient/family teaching  - Consider wound care consult   Outcome: Progressing     Problem: NEUROSENSORY - ADULT  Goal: Achieves stable or improved neurological status  Description: INTERVENTIONS  - Monitor and report changes in neurological status  - Monitor vital signs such as temperature, blood pressure, glucose, and any other labs ordered   - Initiate measures to prevent increased intracranial pressure  - Monitor for seizure activity and implement precautions if appropriate      Outcome: Progressing  Goal: Remains free of injury related to seizures activity  Description: INTERVENTIONS  - Maintain airway, patient safety  and administer oxygen as ordered  - Monitor patient for seizure activity, document and report duration and description of seizure to physician/advanced practitioner  - If seizure occurs,  ensure patient safety during seizure  - Reorient patient post seizure  - Seizure pads on all 4 side rails  - Instruct patient/family to notify RN of any seizure activity including if an aura is experienced  - Instruct patient/family to call for assistance with activity based on nursing assessment  - Administer anti-seizure medications if ordered    Outcome: Progressing  Goal: Achieves maximal functionality and self care  Description: INTERVENTIONS  - Monitor swallowing and airway patency with patient fatigue and changes in neurological status  - Encourage and assist patient to increase activity and self care     - Encourage visually impaired, hearing impaired and aphasic patients to use assistive/communication devices  Outcome: Progressing     Problem: RESPIRATORY - ADULT  Goal: Achieves optimal ventilation and oxygenation  Description: INTERVENTIONS:  - Assess for changes in respiratory status  - Assess for changes in mentation and behavior  - Position to facilitate oxygenation and minimize respiratory effort  - Oxygen administered by appropriate delivery if ordered  - Initiate smoking cessation education as indicated  - Encourage broncho-pulmonary hygiene including cough, deep breathe, Incentive Spirometry  - Assess the need for suctioning and aspirate as needed  - Assess and instruct to report SOB or any respiratory difficulty  - Respiratory Therapy support as indicated  Outcome: Progressing     Problem: METABOLIC, FLUID AND ELECTROLYTES - ADULT  Goal: Electrolytes maintained within normal limits  Description: INTERVENTIONS:  - Monitor labs and assess patient for signs and symptoms of electrolyte imbalances  - Administer electrolyte replacement as ordered  - Monitor response to electrolyte replacements, including repeat lab results as appropriate  - Instruct patient on fluid and nutrition as appropriate  Outcome: Progressing  Goal: Fluid balance maintained  Description: INTERVENTIONS:  - Monitor labs   - Monitor I/O and WT  - Instruct patient on fluid and nutrition as appropriate  - Assess for signs & symptoms of volume excess or deficit  Outcome: Progressing  Goal: Glucose maintained within target range  Description: INTERVENTIONS:  - Monitor Blood Glucose as ordered  - Assess for signs and symptoms of hyperglycemia and hypoglycemia  - Administer ordered medications to maintain glucose within target range  - Assess nutritional intake and initiate nutrition service referral as needed  Outcome: Progressing     Problem: Potential for Falls  Goal: Patient will remain free of falls  Description: INTERVENTIONS:  - Educate patient/family on patient safety including physical limitations  - Instruct patient to call for assistance with activity   - Consult OT/PT to assist with strengthening/mobility   - Keep Call bell within reach  - Keep bed low and locked with side rails adjusted as appropriate  - Keep care items and personal belongings within reach  - Initiate and maintain comfort rounds  - Make Fall Risk Sign visible to staff  - Offer Toileting every 2 Hours, in advance of need  - Initiate/Maintain  bed alarm  - Obtain necessary fall risk management equipment:   - Apply yellow socks and bracelet for high fall risk patients  - Consider moving patient to room near nurses station  Outcome: Progressing

## 2022-07-16 NOTE — PROGRESS NOTES
Progress Note - Cardiology   [facility]     Nic Ayala 59 y o  male MRN: [id number]  : 1965  Unit/Bed#: [address] [place] [address] Encounter: [id number]    ASSESSMENT:    Nonischemic cardiomyopathy, possibly secondary to alcohol abuse    EF 25-30%     PAF, currently in sinus rhythm,        [id number] score is 1  Status post loop recorder implantation      EF 25-30%, G1DD      Unsuccessful in obtaining records from patient's primary cardiologist         Spontaneous pneumothorax,     s/p right chest tube insertion         Alcoholic pancreatitis         Tobacco abuse      Hypomagnesemia, serum magnesium 1 5  Hyponatremia, serum sodium 131            RECOMMENDATIONS:    Continue aspirin, Toprol, losartan and spironolactone    Advised to quit alcohol and tobacco consumption   Magnesium supplementation  Management of pancreatitis and chest tube as per primary service and other specialists                 Subjective / Objective:     Review of Systems   Awake and alert , in no distress   Denies any chest pain or acute dyspnea  Vitals: Blood pressure 117/74, pulse 89, temperature 100 1 °F (37 8 °C), temperature source Oral, resp  rate 20, height 5' 8" (1 727 m), weight 65 7 kg (144 lb 13 5 oz), SpO2 97 %  Vitals:    22 0554 22 0749   Weight: 65 7 kg (144 lb 13 5 oz) 65 7 kg (144 lb 13 5 oz)     Body mass index is 22 02 kg/m²  BP Readings from Last 3 Encounters:   22 117/74   22 132/94   21 99/61     Orthostatic Blood Pressures    Flowsheet Row Most Recent Value   Blood Pressure 117/74 filed at 2022 0747   Patient Position - Orthostatic VS Lying filed at 2022 0747        I/O        0701  07/15 0700 07/15 0701   0700  0701   0700    P  O   400     I V  (mL/kg)  20 (0 3)     Total Intake(mL/kg)  420 (6 4)     Urine (mL/kg/hr) 1400 (0 9) 3150 (2)     Stool 0      Chest Tube 50 10     Total Output 1450 3160     Net -1450 -3330            Unmeasured Stool Occurrence 1 x          Invasive Devices  Report    Peripheral Intravenous Line  Duration           Long-Dwell Peripheral IV (Midline) 36/15/98 Right Basilic 6 days    Peripheral IV 07/13/22 Distal;Left;Ventral (anterior) Forearm 2 days          Drain  Duration           Chest Tube Right Second intercostal space 10 2 Fr  6 days    External Urinary Catheter 3 days                  Intake/Output Summary (Last 24 hours) at 7/16/2022 0928  Last data filed at 7/16/2022 0543  Gross per 24 hour   Intake 420 ml   Output 3160 ml   Net -2740 ml         Physical Exam:     Neurologic:  Alert & oriented x 3,  no focal deficits noted   Constitutional:  Appears chronically  Eyes:  PERRL, conjunctiva normal   HENT:  Atraumatic, external ears normal, nose normal,   NECK: Normal range of motion, no tenderness, neck is supple , No JVP  Respiratory:  Decreased breath sounds right base  Cardiovascular: Regular S1-S2 with a I/VI ejection systolic murmur  No pericardial rub or gallop audible  GI:  Soft, nondistended, audible bowel sounds, nontender, no hepatosplenomegaly appreciated  Musculoskeletal:  No tenderness, no deformities  Extremities:  No appreciable pitting edema   Distal pulses are present  Psychiatric:  Speech and behavior appropriate             Medications/ Allergies:     Current Facility-Administered Medications   Medication Dose Route Frequency Provider Last Rate    acetaminophen  650 mg Oral Q6H PRN Olga SpiroCHEN Valle      folic acid  1 mg Oral Daily Olga SpiroCHEN Valle      levalbuterol  0 63 mg Nebulization Q6H PRN CHEN Melendrez      lidocaine  1 patch Topical Daily Shane Domingo MD      losartan  25 mg Oral Daily Devora Davenport MD      magnesium oxide  400 mg Oral BID CHEN Hutchins      metoprolol succinate  25 mg Oral HS CHEN Hutchins      nicotine  1 patch Transdermal Daily CHEN Melendrez      ondansetron  4 mg Intravenous Q6H PRN Ramón Monroy Spironello V, CRNP      spironolactone  25 mg Oral Daily Aleksandar Brown, CRNP      thiamine  100 mg Oral Daily Olga Spironello V, CRNP       acetaminophen, 650 mg, Q6H PRN  levalbuterol, 0 63 mg, Q6H PRN  ondansetron, 4 mg, Q6H PRN      No Known Allergies        Labs:   Troponins:      CBC with diff:  Results from last 7 days   Lab Units 07/16/22  0538 07/13/22  0544 07/12/22  0541 07/11/22  0517 07/10/22  0450   WBC Thousand/uL 6 90 7 52 7 36 6 22 8 92   HEMOGLOBIN g/dL 11 0* 10 1* 10 2* 9 7* 11 0*   HEMATOCRIT % 33 4* 30 2* 30 2* 29 9* 34 6*   MCV fL 93 91 90 93 95   PLATELETS Thousands/uL 494* 277 230 187 186   MCH pg 30 6 30 3 30 5 30 1 30 1   MCHC g/dL 32 9 33 4 33 8 32 4 31 8   RDW % 15 1 14 5 13 9 14 1 14 6   MPV fL 9 1 9 7 9 8 10 2 10 2   NRBC AUTO /100 WBCs  --  1 0 0 0       CMP:  Results from last 7 days   Lab Units 07/16/22  0538 07/15/22  0453 07/14/22  0459 07/13/22  0544 07/12/22  1356 07/12/22  0541 07/11/22  2108 07/11/22  0517   SODIUM mmol/L 131* 135* 133* 133* 134* 132* 132* 135*   POTASSIUM mmol/L 4 1 4 1 3 7 2 9* 4 0 2 9* 3 5 2 9*   CHLORIDE mmol/L 97* 98* 98* 96* 98* 98* 96* 99*   CO2 mmol/L 26 29 30 28 30 28 27 26   ANION GAP mmol/L 8 8 5 9 6 6 9 10   BUN mg/dL 6 6 6 3* 4* 5 6 4*   CREATININE mg/dL 0 49* 0 49* 0 53* 0 48* 0 51* 0 58* 0 55* 0 51*   CALCIUM mg/dL 8 6 8 4 8 2* 7 9* 8 2* 7 5* 8 0* 7 6*   AST U/L  --   --  26  --   --  26  --  28   ALT U/L  --   --  12  --   --  10*  --  7*   ALK PHOS U/L  --   --  43*  --   --  46  --  36*   TOTAL PROTEIN g/dL  --   --  5 5*  --   --  5 5*  --  5 1*   ALBUMIN g/dL  --   --  2 7*  --   --  2 7*  --  2 5*   TOTAL BILIRUBIN mg/dL  --   --  0 35  --   --  0 37  --  0 60   EGFR ml/min/1 73sq m 119 119 115 120 117 111 114 117       Magnesium:  Results from last 7 days   Lab Units 07/16/22  0538 07/15/22  0453 07/14/22  0459 07/13/22  0544 07/12/22  1356 07/12/22  0541 07/11/22  2108   MAGNESIUM mg/dL 1 5* 1 7 1 4* 1 3* 1 8 1 9 1 4*     Coags: TSH:    No components found for: TSH3  Lipid Profile:  Results from last 7 days   Lab Units 07/10/22  0450   CHOLESTEROL mg/dL 153   TRIGLYCERIDES mg/dL 44   HDL mg/dL 95   LDL CALC mg/dL 49     Hgb A1c:    NT-proBNP: No results for input(s): NTBNP in the last 72 hours  Imaging & Testing   I have personally reviewed pertinent reports  CT abdomen pelvis wo contrast    Result Date: 7/8/2022  Narrative: CT ABDOMEN AND PELVIS WITHOUT IV CONTRAST INDICATION:   Abdominal pain  COMPARISON:  CT 4/16/2022 TECHNIQUE:  CT examination of the abdomen and pelvis was performed without intravenous contrast  This examination was performed without intravenous contrast in the context of the critical nationwide Omnipaque shortage  Axial, sagittal, and coronal 2D reformatted images were created from the source data and submitted for interpretation  Radiation dose length product (DLP) for this visit:  425 65 mGy-cm   This examination, like all CT scans performed in the [facility], was performed utilizing techniques to minimize radiation dose exposure, including the use of iterative  reconstruction and automated exposure control  Enteric contrast was not administered  FINDINGS: ABDOMEN LOWER CHEST:  Partially visualized large right pneumothorax  Right basilar atelectasis  LIVER/BILIARY TREE:  Liver is diffusely decreased in density consistent with fatty change  No CT evidence of suspicious hepatic mass  Normal hepatic contours  No biliary dilatation  GALLBLADDER:  No calcified gallstones  No pericholecystic inflammatory change  SPLEEN:  Unremarkable  PANCREAS:  Mild peripancreatic stranding suspicious for pancreatitis  No fluid collections  ADRENAL GLANDS:  Unremarkable  KIDNEYS/URETERS:  Unremarkable  No hydronephrosis  STOMACH AND BOWEL:  Unremarkable  APPENDIX:  No findings to suggest appendicitis  ABDOMINOPELVIC CAVITY:  No ascites  No pneumoperitoneum  No lymphadenopathy   VESSELS:  Unremarkable for patient's age  PELVIS REPRODUCTIVE ORGANS:  Prominent prostate  URINARY BLADDER:  Unremarkable  ABDOMINAL WALL/INGUINAL REGIONS:  Unremarkable  OSSEOUS STRUCTURES:  No acute fracture or destructive osseous lesion  Impression: 1  Incompletely visualized large right pneumothorax  Radiographic evaluation recommended  2   Mild peripancreatic stranding most concerning for pancreatitis  No peripancreatic fluid collections  3   Severe hepatic steatosis  I personally discussed this study with Dr Andrea Acosta on 7/8/2022 at 7:03 PM  Workstation performed: [id number]     XR chest portable    Result Date: 7/14/2022  Narrative: CHEST INDICATION:   chest tube  COMPARISON:  7/13/2022 EXAM PERFORMED/VIEWS:  XR CHEST PORTABLE FINDINGS:  There is a right-sided chest tube in place  Cardiomediastinal silhouette appears unremarkable  Loop recorder projects over the left chest wall  Stable small right-sided pneumothorax  Stable right lung hazy airspace disease  Left hilar prominence, also unchanged  Sternal wires are present  Visualized osseous structures appear otherwise unremarkable for the patient's age  Impression: Stable small right-sided pneumothorax with a chest tube in place  Workstation performed: [id number]     XR chest portable    Result Date: 7/13/2022  Narrative: CHEST INDICATION:   f/u R pneumothorax  COMPARISON:  07/12/2022 07/11/2022  EXAM PERFORMED/VIEWS:  XR CHEST PORTABLE FINDINGS: Right chest tube change in orientation and location, coiled at the medial superior mediastinum  EKG leads overlie the patient  Left chest loop recorder  Cardiomediastinal silhouette appears stable  Small right apical pneumothorax, similar from 07/11/2022  Patchy opacifications throughout the right lung, similar from 07/12/2022  Left perihilar opacities redemonstrated  Sternotomy wires  Osseous structures appear within normal limits for patient age       Impression: Small right apical pneumothorax, similar from 07/11/2022  The study was marked in [facility] for immediate notification  Workstation performed: [id number]     XR chest portable    Result Date: 7/11/2022  Narrative: CHEST INDICATION:   right sided pneumothorax  COMPARISON:  Chest x-ray 7/9/2022 EXAM PERFORMED/VIEWS:  XR CHEST PORTABLE  Image: 1 FINDINGS:  Interval replacement of pre-existing right chest tube with the right apical pigtail chest tube  Reexpansion of right lung with multiple opacities present  Left lung is clear without pneumothorax  No pleural effusion  Cardiomediastinal silhouette appears unremarkable  Osseous structures appear within normal limits for patient age  Impression: Right-sided chest tube in place without pneumothorax  Multiple pulmonary opacities likely representing airspace disease and pulmonary hemorrhage  Workstation performed: [id number]     XR chest portable    Result Date: 7/9/2022  Narrative: CHEST INDICATION:   right sided pneumothorax  COMPARISON:  Chest x-ray 7/9/2022 at 07:57 EXAM PERFORMED/VIEWS:  XR CHEST PORTABLE  Image: 1 FINDINGS:  Right-sided chest tube remains in stable position with residual large right pneumothorax  Left lung is clear, no left-sided pneumothorax  No pleural effusion  Cardiomediastinal silhouette appears unremarkable  Osseous structures appear within normal limits for patient age  Left chest wall loop recorder in place  Impression: Right-sided chest tube is stable in position with persistent large right pneumothorax  Workstation performed: [id number]     XR chest portable    Result Date: 7/9/2022  Narrative: CHEST INDICATION:   followup ptx  COMPARISON:  Chest radiograph and abdomen CT from 7/8/2022  EXAM PERFORMED/VIEWS:  XR CHEST PORTABLE FINDINGS: Normal heart size, median sternotomy  Loop recorder in the left chest wall  Right chest tube with persistent large right pneumothorax and partial right lung atelectasis  No effusion   Osseous structures appear within normal limits for patient age  Impression: Right chest tube with persistent large right pneumothorax and partial right lung atelectasis  Workstation performed: [id number]     XR chest 1 view portable    Result Date: 7/9/2022  Narrative: CHEST INDICATION:   s/p chest tube  COMPARISON:  Chest radiograph from 6/6/2018, abdomen CT from 7/8/2022  EXAM PERFORMED/VIEWS:  XR CHEST PORTABLE FINDINGS: Normal heart size  Median sternotomy  Loop recorder in the left chest wall  Right chest tube with persistent large right pneumothorax and partial right lung atelectasis  Osseous structures appear within normal limits for patient age  Impression: Right chest tube with persistent large right pneumothorax and partial right lung atelectasis  Workstation performed: [id number]     CT chest wo contrast    Result Date: 7/15/2022  Narrative: CT CHEST WITHOUT IV CONTRAST INDICATION:   Pneumothorax nonhealing pneumothorax: possible bullous lung disease  COMPARISON:  None  TECHNIQUE: CT examination of the chest was performed without intravenous contrast  This examination was performed without intravenous contrast in the context of the critical nationwide Omnipaque shortage  Axial, sagittal, and coronal 2D reformatted images were created from the source data and submitted for interpretation  Radiation dose length product (DLP) for this visit:  328 91 mGy-cm   This examination, like all CT scans performed in the [facility], was performed utilizing techniques to minimize radiation dose exposure, including the use of iterative  reconstruction and automated exposure control  FINDINGS: LUNGS: Posterior right lower lobe atelectasis  Bullous changes in the right apex with multiple subpleural bullae in the right upper lobe  Largest bulla measuring 2 5 cm  Right upper lobe parenchymal groundglass changes   Tubular appearing density extending from the chest wall to the right upper lobe may represent hematoma in the previously placed chest tube    PLEURA:  Moderate right pleural effusion with hyperdensity laterally suggesting hemorrhagic fluid  Right upper anterior approach pigtail chest tube in place  Moderate right pneumothorax  Septations in the pleural space anteriorly  HEART/GREAT VESSELS: Heart is unremarkable for patient's age  No thoracic aortic aneurysm  Enlarged main pulmonary artery measuring 4 cm  Left Main pulmonary artery measures 3 4 cm and right pulmonary artery measures 3 cm  MEDIASTINUM AND FRANCISCO:  Unremarkable  CHEST WALL AND LOWER NECK:  Right chest wall hematoma in series 2 image 29 measuring 5 7 x 2 3 cm in the subcutis fat with pleural deeper hematoma measuring 2 cm  Midline sternotomy  VISUALIZED STRUCTURES IN THE UPPER ABDOMEN:  Unremarkable  OSSEOUS STRUCTURES:  No acute fracture or destructive osseous lesion  Impression: 1  Moderate right pneumothorax with right apical anterior approach pigtail catheter in place  Septations in the anterior pleural space superiorly  2 Bullous changes predominantly in the right upper lobe near the apex  Largest bulla measuring 2 5 cm 3  Groundglass changes in the right upper lobe with a tubular hyperdensity extending to the pleural space laterally suggesting hematoma in the tract of the previous large bore chest tube  Hematoma in the chest wall is seen at the previous entry site  If there is persistent air leak in the Pleuro-evac pleural fistula cannot be excluded  4  Moderate right pleural effusion with small amount of hemorrhagic fluid  Workstation performed: [id number]     XR chest portable ICU    Result Date: 7/13/2022  Narrative: CHEST INDICATION:   eval pneumo  COMPARISON:  07/11/2022  EXAM PERFORMED/VIEWS:  XR CHEST PORTABLE ICU FINDINGS: Right chest tube at the lung apex  EKG leads overlie the patient  Sternotomy wires  Left chest loop recorder  Cardiomediastinal silhouette appears stable   Right pneumothorax decreased in size from prior with likely small amounts of gas trapped at the right minor fissure medially     Hazy opacification throughout the right lung slightly improved from prior  Left perihilar opacities  No sizable pleural effusion  Osseous structures appear within normal limits for patient age  Impression: Right pneumothorax decreased in size from prior with likely small amounts of gas trapped at the right minor fissure medially  Improved aeration of the right lung  Workstation performed: [id number]     XR chest portable ICU    Result Date: 7/11/2022  Narrative: CHEST INDICATION:   f/u R pneumothorax  COMPARISON:  Chest x-ray 7/10/2022 EXAM PERFORMED/VIEWS:  XR CHEST PORTABLE ICU  Images: 4 FINDINGS:  Right chest tube remains in place  Interval development of small right apical pneumothorax  Diffuse right lung opacity  Left lung is clear without pneumothorax  No pleural effusions  Cardiomediastinal silhouette appears unremarkable  Osseous structures appear within normal limits for patient age  Left chest loop recorder in place  Impression: Interval development of small right apical pneumothorax with diffuse right lung airspace disease  Right-sided chest tube remains in place  Workstation performed: [id number]     IR chest tube placement    Result Date: 7/9/2022  Narrative: PROCEDURE: Right chest tube placement Procedural Personnel Attending physician(s): Dr Ada Lim Pre-procedure diagnosis: Pneumothorax Post-procedure diagnosis: Same Indication: Persistent right pneumothorax despite existing right-sided chest tube  PROCEDURE SUMMARY: - Right chest tube placement under CT guidance PROCEDURE DETAILS: Pre-procedure Consent: Informed consent for the procedure including risks, benefits and alternatives was obtained and time-out was performed prior to the procedure  Preparation: The site was prepared and draped using maximal sterile barrier technique including cutaneous antisepsis   Anesthesia/sedation Level of anesthesia/sedation: Local lidocaine Right chest tube placement The patient was positioned supine  Initial imaging was performed  Local anesthesia was administered  A 19-gauge needle was advanced into the right thoracic cavity through the anterior second intercostal space  Amplatz wire was advanced through the needle  to maintain the tract  Tract was dilated and a 10 Swedish catheter advanced over the wire with pigtail loop forming in the right apical chest cavity  Catheter was secured to skin using 2-0 Prolene suture and connected to pleural vac suction  Existing right lateral chest tube was removed  Radiation Dose CT dose length product (mGy-cm): 346 68 Additional Details Specimens removed: Existing right lateral chest tube  Estimated blood loss (mL): 1 Complications: No immediate complications  Impression: 1  Right apical chest tube placement using 10 Swedish catheter  2  Existing right lateral chest tube appeared to be within the right lung parenchyma and was nonfunctional  This existing chest tube was removed  Plan: - Right chest tube to pleural vac suction  - Monitor for resolution of right pneumothorax  Workstation performed: [id number]     Echo complete w/ contrast if indicated    Result Date: 7/13/2022  Narrative: Ruth Narvaez  Left Ventricle: Left ventricular cavity size is normal  Wall thickness is mildly increased  Systolic function is severely reduced  Estimated ejection fraction is 25-30% on a noncontrast study  Wall motion cannot be accurately assessed  Diastolic function is mildly abnormal, consistent with grade I (abnormal) relaxation    Right Ventricle: Systolic function is low normal    Mitral Valve: There is mild regurgitation    Tricuspid Valve: There is mild regurgitation    Pulmonic Valve: There is mild to moderate regurgitation            Cardiac testing:   Results for orders placed during the hospital encounter of 09/22/17    Echo complete with contrast if indicated    Narrative  [address]  [address] [facility]  [place] [address]  [phone]    Transthoracic Echocardiogram  2D, M-mode, Doppler, and Color Doppler    Study date:  23-Sep-2017    Patient: Mishel Freeman  MR number: [id number]  Account number: [de-identified]  : 1965  Age: 45 years  Gender: Male  Status: Routine  Location: Bedside  Height: 66 in  Weight: 136 6 lb  BP: 104/ 68 mmHg    Indications: Atrial Flutter    Diagnoses: 427 32 - ATRIAL FLUTTER    Sonographer:  Graham Case  Referring Physician:  Sury Figueroa MD  Group:  Prakash Swann  Interpreting Physician:  Willow Galaviz MD    SUMMARY    LEFT VENTRICLE:  Systolic function was at the lower limits of normal  Ejection fraction was estimated in the range of 50 % to 55 % to be 55 %  Although no diagnostic regional wall motion abnormality was identified, this possibility cannot be completely excluded on the basis of this study  Wall thickness was at the upper limits of normal     LEFT ATRIUM:  The atrium was mildly to moderately dilated  RIGHT ATRIUM:  The atrium was mildly dilated  MITRAL VALVE:  There was mild regurgitation  TRICUSPID VALVE:  There was mild regurgitation  Estimated peak PA pressure was 35 mmHg  PULMONIC VALVE:  There was mild to moderate regurgitation  HISTORY: PRIOR HISTORY: CM, Alcoholic Hepatitis    PROCEDURE: The procedure was performed at the bedside  This was a routine study  The transthoracic approach was used  The study included complete 2D imaging, M-mode, complete spectral Doppler, and color Doppler  The heart rate was 80 bpm,  at the start of the study  Image quality was adequate  LEFT VENTRICLE: Size was normal  Systolic function was at the lower limits of normal  Ejection fraction was estimated in the range of 50 % to 55 % to be 55 %  Although no diagnostic regional wall motion abnormality was identified, this  possibility cannot be completely excluded on the basis of this study   Wall thickness was at the upper limits of normal  DOPPLER: The study was not technically sufficient to allow evaluation of LV diastolic function  RIGHT VENTRICLE: The size was normal  Systolic function was normal     LEFT ATRIUM: The atrium was mildly to moderately dilated  RIGHT ATRIUM: The atrium was mildly dilated  MITRAL VALVE: There was normal leaflet separation  DOPPLER: There was no evidence for stenosis  There was mild regurgitation  AORTIC VALVE: The valve was trileaflet  Leaflets exhibited mildly increased thickness and normal cuspal separation  DOPPLER: Transaortic velocity was within the normal range  There was no evidence for stenosis  There was no regurgitation  TRICUSPID VALVE: DOPPLER: There was mild regurgitation  Estimated peak PA pressure was 35 mmHg  PULMONIC VALVE: DOPPLER: There was mild to moderate regurgitation  PERICARDIUM: There was no thickening or calcification  There was no pericardial effusion  AORTA: The root exhibited normal size  SYSTEMIC VEINS: IVC: The inferior vena cava was normal in size  Respirophasic changes were normal     SYSTEM MEASUREMENT TABLES    2D mode  AoR Diam 2D: 3 cm  LA Diam (2D): 3 4 cm  LA/Ao (2D): 1 13  FS (2D Teich): 20 1 %  IVSd (2D): 1 07 cm  LVDEV: 67 1 cm³  LVESV: 39 1 cm³  LVIDd(2D): 3 93 cm  LVISd (2D): 3 14 cm  LVPWd (2D): 1 15 cm  SV (Teich): 28 cm³    Apical four chamber  LVEF A4C: 42 %    Unspecified Scan Mode  MV Peak E Buddy   Mean: 1080 mm/s  MVA (PHT): 3 14 cm squared  PHT: 70 ms  Max P mm[Hg]  V Max: 2260 mm/s  Vmax: 2390 mm/s  RA Area: 20 6 cm squared  RA Volume: 58 8 cm³  TAPSE: 1 7 cm    [facility]    Prepared and electronically signed by    Rosaline Sawyer MD  Signed 23-Sep-2017 16:34:47    Results for orders placed during the hospital encounter of 18    TONYA    Narrative  [address]  [address] [place] [address]  [phone]    Transesophageal Echocardiogram  2D and Doppler    Study date: 2018    Patient: Oliver Atkinson  MR number: [id number]  Account number: [de-identified]  : 1965  Age: 52 years  Gender: Male  Status: Routine  Location: Cath lab  Height: 66 in  Weight: 136 lb  BP: 116/ 74 mmHg    Indications: Atrial Flutter    Diagnoses: 427 32 - ATRIAL FLUTTER    Sonographer:  TD Hart  Primary Physician:  Leonila Bolden MD  Referring Physician:  Marian Ambrocio MD  Group:  [facility]  Interpreting Physician:  Gustabo Cuba DO    SUMMARY    LEFT VENTRICLE:  Systolic function was normal by visual assessment  Ejection fraction was estimated in the range of 55 % to 60 %  There were no regional wall motion abnormalities  ATRIAL SEPTUM:  No defect or patent foramen ovale was identified  Contrast injection was performed  There was no right-to-left shunt, with provocative maneuvers to increase right atrial pressure  MITRAL VALVE:  There was trace regurgitation  AORTIC VALVE:  There was trace regurgitation  TRICUSPID VALVE:  There was mild regurgitation  PULMONIC VALVE:  There was moderate regurgitation  HISTORY: PRIOR HISTORY: Atrial Flutter, Cardiomyopathy, Alcohol Abuse, Alcoholic Hepatitis    PROCEDURE: The procedure was performed in the catheterization laboratory  This was a routine study  The risks and alternatives of the procedure were explained to the patient and informed consent was obtained  The transesophageal approach  was used  The study included complete 2D imaging and limited spectral Doppler  The heart rate was 120 bpm, at the start of the study  An adult omniplane probe was inserted by the attending cardiologist  Intubated with ease  One intubation  attempt(s)  There was no blood detected on the probe  LEFT VENTRICLE: Size was normal  Systolic function was normal by visual assessment  Ejection fraction was estimated in the range of 55 % to 60 %  There were no regional wall motion abnormalities   Wall thickness was normal  DOPPLER: The  study was not technically sufficient to allow evaluation of LV diastolic function  RIGHT VENTRICLE: The size was normal  Systolic function was normal  Wall thickness was normal     LEFT ATRIUM: Size was normal  No thrombus was identified  APPENDAGE: The size was normal  No thrombus was identified  DOPPLER: The function was normal (normal emptying velocity)  ATRIAL SEPTUM: No defect or patent foramen ovale was identified  Contrast injection was performed  There was no right-to-left shunt, with provocative maneuvers to increase right atrial pressure  RIGHT ATRIUM: Size was normal     MITRAL VALVE: Valve structure was normal  There was normal leaflet separation  DOPPLER: The transmitral velocity was within the normal range  There was no evidence for stenosis  There was trace regurgitation  AORTIC VALVE: The valve was trileaflet  Leaflets exhibited normal cuspal separation and sclerosis  DOPPLER: There was no evidence for stenosis  There was trace regurgitation  TRICUSPID VALVE: The valve structure was normal  There was normal leaflet separation  DOPPLER: There was mild regurgitation  PULMONIC VALVE: Leaflets exhibited normal thickness, no calcification, and normal cuspal separation  DOPPLER: The transpulmonic velocity was within the normal range  There was moderate regurgitation  PERICARDIUM: There was no pericardial effusion  The pericardium was normal in appearance  AORTA: The root exhibited normal size  There was no atheroma  SYSTEMIC VEINS: IVC: The inferior vena cava was normal in size  [address] Accredited Echocardiography Laboratory    Prepared and electronically signed by    Raad Evans DO  Signed 08-Jun-2018 14:21:34          Dr Leonel Douglas MD, [facility] [place]      "This note has been constructed using a voice recognition system  Therefore there may be syntax, spelling, and/or grammatical errors   Please call if you have any questions  "

## 2022-07-16 NOTE — ASSESSMENT & PLAN NOTE
Patient reports drinking about 6 drinks per day  Last drink was on day of admission  Initially started on CIWA protocol on admission  Developed alcohol withdrawal symptoms on 07/09-7/10 with tachycardia, confusion, agitation, DTs  Received total of 6 mg of IV Ativan without any improvement  Transferred to step-down unit and received IV phenobarb and also required p r n   Valium doses    Plan  · MVI, thiamine, folic acid supplementation continue  · Evaluation for rehab on discharge - patient is agreeable    · ETOH cessation education provided

## 2022-07-16 NOTE — EMTALA/ACUTE CARE TRANSFER
[address] [facility] [address]  [place]  [place] [address]  Dept: [phone]      ACUTE CARE TRANSFER CONSENT    NAME Nic Ayala                                         1965                              MRN [id number]    I have been informed of my rights regarding examination, treatment, and transfer   by Dr Hiral Rosado DO    Benefits: Specialized equipment and/or services available at the receiving facility (Include comment)________________________, Continuity of care    Risks: Potential for delay in receiving treatment, Potential deterioration of medical condition, Loss of IV, Increased discomfort during transfer, Possible worsening of condition or death during transfer      Consent for Transfer:  I acknowledge that my medical condition has been evaluated and explained to me by the treating physician or other qualified medical person and/or my attending physician, who has recommended that I be transferred to the service of  Accepting Physician: Dr Iraida Lewis at [address], [address]  The above potential benefits of such transfer, the potential risks associated with such transfer, and the probable risks of not being transferred have been explained to me, and I fully understand them  The doctor has explained that, in my case, the benefits of transfer outweigh the risks  I agree to be transferred  I authorize the performance of emergency medical procedures and treatments upon me in both transit and upon arrival at the receiving facility  Additionally, I authorize the release of any and all medical records to the receiving facility and request they be transported with me, if possible  I understand that the safest mode of transportation during a medical emergency is an ambulance and that the Hospital advocates the use of this mode of transport   Risks of traveling to the receiving facility by car, including absence of medical control, life sustaining equipment, such as oxygen, and medical personnel has been explained to me and I fully understand them  (LINA CORRECT BOX BELOW)  [  ]  I consent to the stated transfer and to be transported by ambulance/helicopter  [  ]  I consent to the stated transfer, but refuse transportation by ambulance and accept full responsibility for my transportation by car  I understand the risks of non-ambulance transfers and I exonerate the Hospital and its staff from any deterioration in my condition that results from this refusal     X___________________________________________    DATE  22  TIME________  Signature of patient or legally responsible individual signing on patient behalf           RELATIONSHIP TO PATIENT_________________________          Provider Certification    NAME Sue Johnson                                        [facility] 1965                              MRN [id number]    A medical screening exam was performed on the above named patient    Based on the examination:    Condition Necessitating Transfer Persistent pneumothorax with bullous lung disease requiring further evaluation/management by Thoracic surgery    Patient Condition: The patient has been stabilized such that within reasonable medical probability, no material deterioration of the patient condition or the condition of the unborn child(oscar) is likely to result from the transfer    Reason for Transfer: Level of Care needed not available at this facility    [facility]   · Space available and qualified personnel available for treatment as acknowledged by    · Agreed to accept transfer and to provide appropriate medical treatment as acknowledged by       Dr Brenden Joshi  · Appropriate medical records of the examination and treatment of the patient are provided at the time of transfer   [address] _______  · Transfer will be performed by qualified personnel from    and appropriate transfer equipment as required, including the use of necessary and appropriate life support measures  Provider Certification: I have examined the patient and explained the following risks and benefits of being transferred/refusing transfer to the patient/family:  General risk, such as traffic hazards, adverse weather conditions, rough terrain or turbulence, possible failure of equipment (including vehicle or aircraft), or consequences of actions of persons outside the control of the transport personnel, Unanticipated needs of medical equipment and personnel during transport, Risk of worsening condition      Based on these reasonable risks and benefits to the patient and/or the unborn child(oscar), and based upon the information available at the time of the patients examination, I certify that the medical benefits reasonably to be expected from the provision of appropriate medical treatments at another medical facility outweigh the increasing risks, if any, to the individuals medical condition, and in the case of labor to the unborn child, from effecting the transfer      X____________________________________________ DATE 07/16/22        TIME_______      ORIGINAL - SEND TO MEDICAL RECORDS   COPY - SEND WITH PATIENT DURING TRANSFER

## 2022-07-17 LAB
ANION GAP SERPL CALCULATED.3IONS-SCNC: 7 MMOL/L (ref 4–13)
BUN SERPL-MCNC: 8 MG/DL (ref 5–25)
CALCIUM SERPL-MCNC: 8.9 MG/DL (ref 8.3–10.1)
CHLORIDE SERPL-SCNC: 98 MMOL/L (ref 100–108)
CO2 SERPL-SCNC: 27 MMOL/L (ref 21–32)
CREAT SERPL-MCNC: 0.61 MG/DL (ref 0.6–1.3)
GFR SERPL CREATININE-BSD FRML MDRD: 109 ML/MIN/1.73SQ M
GLUCOSE SERPL-MCNC: 99 MG/DL (ref 65–140)
HCT VFR BLD AUTO: 35.7 % (ref 36.5–49.3)
HGB BLD-MCNC: 11.5 G/DL (ref 12–17)
MAGNESIUM SERPL-MCNC: 1.8 MG/DL (ref 1.6–2.6)
POTASSIUM SERPL-SCNC: 4.7 MMOL/L (ref 3.5–5.3)
SODIUM SERPL-SCNC: 132 MMOL/L (ref 136–145)

## 2022-07-17 PROCEDURE — 85018 HEMOGLOBIN: CPT | Performed by: FAMILY MEDICINE

## 2022-07-17 PROCEDURE — 83735 ASSAY OF MAGNESIUM: CPT | Performed by: FAMILY MEDICINE

## 2022-07-17 PROCEDURE — NC001 PR NO CHARGE: Performed by: INTERNAL MEDICINE

## 2022-07-17 PROCEDURE — 99232 SBSQ HOSP IP/OBS MODERATE 35: CPT | Performed by: FAMILY MEDICINE

## 2022-07-17 PROCEDURE — 80048 BASIC METABOLIC PNL TOTAL CA: CPT | Performed by: FAMILY MEDICINE

## 2022-07-17 PROCEDURE — 99232 SBSQ HOSP IP/OBS MODERATE 35: CPT | Performed by: INTERNAL MEDICINE

## 2022-07-17 PROCEDURE — 85014 HEMATOCRIT: CPT | Performed by: FAMILY MEDICINE

## 2022-07-17 RX ADMIN — LIDOCAINE 5% 1 PATCH: 700 PATCH TOPICAL at 09:33

## 2022-07-17 RX ADMIN — METOPROLOL SUCCINATE 25 MG: 25 TABLET, EXTENDED RELEASE ORAL at 22:03

## 2022-07-17 RX ADMIN — THIAMINE HCL TAB 100 MG 100 MG: 100 TAB at 09:29

## 2022-07-17 RX ADMIN — MAGNESIUM OXIDE TAB 400 MG (241.3 MG ELEMENTAL MG) 400 MG: 400 (241.3 MG) TAB at 17:43

## 2022-07-17 RX ADMIN — MAGNESIUM OXIDE TAB 400 MG (241.3 MG ELEMENTAL MG) 400 MG: 400 (241.3 MG) TAB at 09:29

## 2022-07-17 RX ADMIN — FOLIC ACID 1 MG: 1 TABLET ORAL at 09:29

## 2022-07-17 RX ADMIN — NICOTINE 1 PATCH: 21 PATCH, EXTENDED RELEASE TRANSDERMAL at 09:33

## 2022-07-17 NOTE — PROGRESS NOTES
[facility] Internal Medicine Progress Note  Patient: Sue Johnson 59 y o  male   MRN: [id number]  PCP: Chela Mcgill MD  Unit/Bed#: [address] [place] [address] Encounter: [id number]  Date Of Visit: 07/17/22    Problem List:    Principal Problem:    Pneumothorax  Active Problems:    Electrolyte abnormality    Alcohol withdrawal ([facility] )    Cardiomyopathy, 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    Paroxysmal atrial fibrillation ([facility] )    Tobacco abuse      Assessment & Plan:    * Pneumothorax  Assessment & Plan  Incidental finding  Denied SOB, chest pain  Denied fall or trauma  · Large right pneumothorax initially, S/P right chest tube insertion in ED to wall suction on 07/08  · On 07/09 underwent IR chest tube placement due to persistent pneumothorax  · Chest x-ray 7/13 shows small PTX  · CT chest - moderate right PTX, bullous changes with largest bulla 2 5 cm  Hematoma in the tract of prior large bore chest tube, hematoma in chest wall, moderate right pleural effusion with some hemorrhagic fluid  · Case discussed with thoracic surgery and plan is for transfer to [place] when bed available  · Case was discussed with Dr Yamil Shaikh over there  · Risks/benefits of transfer discussed with patient and he is in agreement      Electrolyte abnormality  Assessment & Plan  · Electrolytes improved status post repletion  · get repeat lab work in a m  Alcohol withdrawal ([place] [address] )  Assessment & Plan  Patient reports drinking about 6 drinks per day  Last drink was on day of admission  · Complete alcohol cessation discussed with patient  · Initially on CIWA protocol on admission  · Developed alcohol withdrawal symptoms 07/09-7/10 with confusion, tachycardia, agitation, DTs  Received total of 6 mg of IV Ativan without any improvement  · Transferred to step-down unit and received IV phenobarb and also required p r n   Valium doses  · Was also on Precedex drip which was later discontinued  · Patient now stating that he would be interested in inpatient alcohol rehab after discharge  · Continue MVI, thiamine, folic acid    Cardiomyopathy, 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  Assessment & Plan  · TONYA in 2018 showed normal EF  · Echo on this admission shows EF of 25-30% with grade 1 diastolic dysfunction  · Continue losartan, Toprol-XL, Aldactone  · Cardiology was consulted for medication optimization as patient on many medications at home that are not ideal    Tobacco abuse  Assessment & Plan  Continue nicotine patch   Smoking cessation discussed  Paroxysmal atrial fibrillation [facility])  Assessment & Plan  Reported he stopped taking Xarelto about 1 month ago as he ran out of it  Patient was on Cardizem, sotalol, Toprol XL in the past as well  He denies taking any prescription medications except Xarelto which he stopped 1 month ago  History of cardioversion in 2018  · Tachycardic in ED  · Per prior provider, EKG showed sinus tach, rate 106, , V1 to V3 TWI  · Continue Toprol-XL  · 81 mg of aspirin discontinued due to findings of hematoma  Per Cardiology ASA can be started at a later time  · Holding anticoagulation as patient reports frequent falls at home        Encephalopathy-resolved as of 7/15/2022  Assessment & Plan  Due to alcohol abuse/withdrawal  Mental status has improved & patient AAO x3    Alcohol-induced acute pancreatitis-resolved as of 7/15/2022  Assessment & Plan  Patient presented with epigastric pain, nausea, vomiting for about 1 week  History of alcohol induced pancreatitis and alcoholic hepatitis  · CT showed - Mild peripancreatic stranding suggestive of pancreatitis  Severe hepatic steatosis was noted  No fluid collections  · Lipase 1700 on admission --> 392 on 7/11  Denies any abdominal pain  Tolerating diet  · Alcohol level on admission 182    · Triglycerides 44  · Was on IV fluids previously which has been discontinued      Chest pain-resolved as of 7/16/2022  Assessment & Plan  Per ICU note, patient reported some chest pain around chest tube site  Troponins negative  VTE Pharmacologic Prophylaxis: VTE Score: 1 Low Risk (Score 0-2) - Encourage Ambulation  Patient Centered Rounds: I performed bedside rounds with nursing staff today  Discussions with Specialists or Other Care Team Provider: yes    Education and Discussions with Family / Patient: Updated  (sister) via phone  Time Spent for Care: 30 minutes  More than 50% of total time spent on counseling and coordination of care as described above  Current Length of Stay: 9 day(s)  Current Patient Status: Inpatient   Certification Statement: The patient will continue to require additional inpatient hospital stay due to Persistent pneumothorax requiring transfer to [facility]  Discharge Plan: Pending bed availability    Code Status: Level 1 - Full Code    Subjective:     Wants to know when he is getting transferred out  Denies any chest pain, shortness of breath    Objective:     Vitals:   Temp (24hrs), Av 5 °F (36 9 °C), Min:98 2 °F (36 8 °C), Max:98 9 °F (37 2 °C)    Temp:  [98 2 °F (36 8 °C)-98 9 °F (37 2 °C)] 98 2 °F (36 8 °C)  HR:  [72-86] 73  Resp:  [15-18] 18  BP: ()/(55-74) 114/66  SpO2:  [95 %-99 %] 99 %  Body mass index is 22 02 kg/m²  Input and Output Summary (last 24 hours): Intake/Output Summary (Last 24 hours) at 2022 1806  Last data filed at 2022 1601  Gross per 24 hour   Intake 770 ml   Output 2930 ml   Net -2160 ml       Physical Exam:   Physical Exam  Constitutional:       General: He is not in acute distress  Appearance: He is ill-appearing (Chronically)  He is not diaphoretic  HENT:      Head: Normocephalic and atraumatic  Eyes:      General:         Right eye: No discharge  Left eye: No discharge  Cardiovascular:      Rate and Rhythm: Normal rate and regular rhythm  Pulmonary:      Effort: Pulmonary effort is normal  No respiratory distress  Breath sounds: No wheezing or rales        Comments: Right-sided chest tube  Dressing in place  Decreased breath sounds  Abdominal:      General: Bowel sounds are normal  There is no distension  Palpations: Abdomen is soft  Tenderness: There is no abdominal tenderness  Neurological:      Mental Status: He is alert and oriented to person, place, and time  Additional Data:     Labs:  Results from last 7 days   Lab Units 07/17/22  0556 07/16/22  0538 07/13/22  0544   WBC Thousand/uL  --  6 90 7 52   HEMOGLOBIN g/dL 11 5* 11 0* 10 1*   HEMATOCRIT % 35 7* 33 4* 30 2*   PLATELETS Thousands/uL  --  494* 277   NEUTROS PCT %  --   --  64   LYMPHS PCT %  --   --  21   MONOS PCT %  --   --  12   EOS PCT %  --   --  2     Results from last 7 days   Lab Units 07/17/22  0556 07/15/22  0453 07/14/22  0459   SODIUM mmol/L 132*   < > 133*   POTASSIUM mmol/L 4 7   < > 3 7   CHLORIDE mmol/L 98*   < > 98*   CO2 mmol/L 27   < > 30   BUN mg/dL 8   < > 6   CREATININE mg/dL 0 61   < > 0 53*   ANION GAP mmol/L 7   < > 5   CALCIUM mg/dL 8 9   < > 8 2*   ALBUMIN g/dL  --   --  2 7*   TOTAL BILIRUBIN mg/dL  --   --  0 35   ALK PHOS U/L  --   --  43*   ALT U/L  --   --  12   AST U/L  --   --  26   GLUCOSE RANDOM mg/dL 99   < > 95    < > = values in this interval not displayed  Lines/Drains:  Invasive Devices  Report    Peripheral Intravenous Line  Duration           Long-Dwell Peripheral IV (Midline) 94/78/41 Right Basilic 7 days    Peripheral IV 07/13/22 Distal;Left;Ventral (anterior) Forearm 4 days          Drain  Duration           Chest Tube Right Second intercostal space 10 2 Fr  8 days    External Urinary Catheter 5 days                  Imaging: No pertinent imaging reviewed      Recent Cultures (last 7 days):         Last 24 Hours Medication List:   Current Facility-Administered Medications   Medication Dose Route Frequency Provider Last Rate    acetaminophen  650 mg Oral Q6H PRN Olga Spironello V, CRNP      folic acid  1 mg Oral Daily CHEN Melendrez      levalbuterol  0 63 mg Nebulization Q6H PRN CHEN Engel      lidocaine  1 patch Topical Daily Hyun Charles MD      losartan  25 mg Oral Daily Pamela Nolan MD      magnesium oxide  400 mg Oral BID CHEN Dejesus      metoprolol succinate  25 mg Oral HS CHEN Dejesus      nicotine  1 patch Transdermal Daily CHEN Melendrez      ondansetron  4 mg Intravenous Q6H PRN CHEN Melendrez      spironolactone  25 mg Oral Daily CHEN Dejesus      thiamine  100 mg Oral Daily CHEN Melendrez          Today, Patient Was Seen By: Honorio Richards,     ** Please Note: "This note has been constructed using a voice recognition system  Therefore there may be syntax, spelling, and/or grammatical errors   Please call if you have any questions  "**

## 2022-07-17 NOTE — NURSING NOTE
PLEASE NOTIFY SISTER, YON WILBURNJENNIFERSHOSHANA PRIOR TO PATIENT'S TRANSFER TO [facility]

## 2022-07-17 NOTE — PLAN OF CARE
Problem: Nutrition/Hydration-ADULT  Goal: Nutrient/Hydration intake appropriate for improving, restoring or maintaining nutritional needs  Description: Monitor and assess patient's nutrition/hydration status for malnutrition  Collaborate with interdisciplinary team and initiate plan and interventions as ordered  Monitor patient's weight and dietary intake as ordered or per policy  Utilize nutrition screening tool and intervene as necessary  Determine patient's food preferences and provide high-protein, high-caloric foods as appropriate       INTERVENTIONS:  - Monitor oral intake, urinary output, labs, and treatment plans  - Assess nutrition and hydration status and recommend course of action  - Evaluate amount of meals eaten  - Assist patient with eating if necessary   - Allow adequate time for meals  - Recommend/ encourage appropriate diets, oral nutritional supplements, and vitamin/mineral supplements  - Order, calculate, and assess calorie counts as needed  - Recommend, monitor, and adjust tube feedings and TPN/PPN based on assessed needs  - Assess need for intravenous fluids  - Provide specific nutrition/hydration education as appropriate  - Include patient/family/caregiver in decisions related to nutrition  Outcome: Progressing     Problem: MOBILITY - ADULT  Goal: Maintain or return to baseline ADL function  Description: INTERVENTIONS:  -  Assess patient's ability to carry out ADLs; assess patient's baseline for ADL function and identify physical deficits which impact ability to perform ADLs (bathing, care of mouth/teeth, toileting, grooming, dressing, etc )  - Assess/evaluate cause of self-care deficits   - Assess range of motion  - Assess patient's mobility; develop plan if impaired  - Assess patient's need for assistive devices and provide as appropriate  - Encourage maximum independence but intervene and supervise when necessary  - Involve family in performance of ADLs  - Assess for home care needs following discharge   - Consider OT consult to assist with ADL evaluation and planning for discharge  - Provide patient education as appropriate  Outcome: Progressing  Goal: Maintains/Returns to pre admission functional level  Description: INTERVENTIONS:  - Perform BMAT or MOVE assessment daily    - Set and communicate daily mobility goal to care team and patient/family/caregiver  - Collaborate with rehabilitation services on mobility goals if consulted  - Perform Range of Motion 2 times a day  - Reposition patient every 2 hours    - Dangle patient 2 times a day  - Stand patient 2 times a day  - Ambulate patient 2 times a day  - Out of bed to chair 2 times a day   - Out of bed for meals 2 times a day  - Out of bed for toileting  - Record patient progress and toleration of activity level   Outcome: Progressing     Problem: PAIN - ADULT  Goal: Verbalizes/displays adequate comfort level or baseline comfort level  Description: Interventions:  - Encourage patient to monitor pain and request assistance  - Assess pain using appropriate pain scale  - Administer analgesics based on type and severity of pain and evaluate response  - Implement non-pharmacological measures as appropriate and evaluate response  - Consider cultural and social influences on pain and pain management  - Notify physician/advanced practitioner if interventions unsuccessful or patient reports new pain  Outcome: Progressing     Problem: INFECTION - ADULT  Goal: Absence or prevention of progression during hospitalization  Description: INTERVENTIONS:  - Assess and monitor for signs and symptoms of infection  - Monitor lab/diagnostic results  - Monitor all insertion sites, i e  indwelling lines, tubes, and drains  - Monitor endotracheal if appropriate and nasal secretions for changes in amount and color  - [place] appropriate cooling/warming therapies per order  - Administer medications as ordered  - Instruct and encourage patient and family to use good hand hygiene technique  - Identify and instruct in appropriate isolation precautions for identified infection/condition  Outcome: Progressing  Goal: Absence of fever/infection during neutropenic period  Description: INTERVENTIONS:  - Monitor WBC    Outcome: Progressing     Problem: SAFETY ADULT  Goal: Maintain or return to baseline ADL function  Description: INTERVENTIONS:  -  Assess patient's ability to carry out ADLs; assess patient's baseline for ADL function and identify physical deficits which impact ability to perform ADLs (bathing, care of mouth/teeth, toileting, grooming, dressing, etc )  - Assess/evaluate cause of self-care deficits   - Assess range of motion  - Assess patient's mobility; develop plan if impaired  - Assess patient's need for assistive devices and provide as appropriate  - Encourage maximum independence but intervene and supervise when necessary  - Involve family in performance of ADLs  - Assess for home care needs following discharge   - Consider OT consult to assist with ADL evaluation and planning for discharge  - Provide patient education as appropriate  Outcome: Progressing  Goal: Maintains/Returns to pre admission functional level  Description: INTERVENTIONS:  - Perform BMAT or MOVE assessment daily    - Set and communicate daily mobility goal to care team and patient/family/caregiver  - Collaborate with rehabilitation services on mobility goals if consulted  - Perform Range of Motion 2 times a day  - Reposition patient every 2 hours    - Dangle patient 2 times a day  - Stand patient 2 times a day  - Ambulate patient 2 times a day  - Out of bed to chair 2 times a day   - Out of bed for meals 2 times a day  - Out of bed for toileting  - Record patient progress and toleration of activity level   Outcome: Progressing  Goal: Patient will remain free of falls  Description: INTERVENTIONS:  - Educate patient/family on patient safety including physical limitations  - Instruct patient to call for assistance with activity   - Consult OT/PT to assist with strengthening/mobility   - Keep Call bell within reach  - Keep bed low and locked with side rails adjusted as appropriate  - Keep care items and personal belongings within reach  - Initiate and maintain comfort rounds  - Make Fall Risk Sign visible to staff  - Offer Toileting every 2 Hours, in advance of need  - Initiate/Maintain bed alarm  - Obtain necessary fall risk management equipment: call bell   - Apply yellow socks and bracelet for high fall risk patients  - Consider moving patient to room near nurses station  Outcome: Progressing     Problem: DISCHARGE PLANNING  Goal: Discharge to home or other facility with appropriate resources  Description: INTERVENTIONS:  - Identify barriers to discharge w/patient and caregiver  - Arrange for needed discharge resources and transportation as appropriate  - Identify discharge learning needs (meds, wound care, etc )  - Arrange for interpretive services to assist at discharge as needed  - Refer to Case Management Department for coordinating discharge planning if the patient needs post-hospital services based on physician/advanced practitioner order or complex needs related to functional status, cognitive ability, or social support system  Outcome: Progressing     Problem: Knowledge Deficit  Goal: Patient/family/caregiver demonstrates understanding of disease process, treatment plan, medications, and discharge instructions  Description: Complete learning assessment and assess knowledge base    Interventions:  - Provide teaching at level of understanding  - Provide teaching via preferred learning methods  Outcome: Progressing     Problem: Prexisting or High Potential for Compromised Skin Integrity  Goal: Skin integrity is maintained or improved  Description: INTERVENTIONS:  - Identify patients at risk for skin breakdown  - Assess and monitor skin integrity  - Assess and monitor nutrition and hydration status  - Monitor labs   - Assess for incontinence   - Turn and reposition patient  - Assist with mobility/ambulation  - Relieve pressure over bony prominences  - Avoid friction and shearing  - Provide appropriate hygiene as needed including keeping skin clean and dry  - Evaluate need for skin moisturizer/barrier cream  - Collaborate with interdisciplinary team   - Patient/family teaching  - Consider wound care consult   Outcome: Progressing     Problem: NEUROSENSORY - ADULT  Goal: Achieves stable or improved neurological status  Description: INTERVENTIONS  - Monitor and report changes in neurological status  - Monitor vital signs such as temperature, blood pressure, glucose, and any other labs ordered   - Initiate measures to prevent increased intracranial pressure  - Monitor for seizure activity and implement precautions if appropriate      Outcome: Progressing  Goal: Remains free of injury related to seizures activity  Description: INTERVENTIONS  - Maintain airway, patient safety  and administer oxygen as ordered  - Monitor patient for seizure activity, document and report duration and description of seizure to physician/advanced practitioner  - If seizure occurs,  ensure patient safety during seizure  - Reorient patient post seizure  - Seizure pads on all 4 side rails  - Instruct patient/family to notify RN of any seizure activity including if an aura is experienced  - Instruct patient/family to call for assistance with activity based on nursing assessment  - Administer anti-seizure medications if ordered    Outcome: Progressing  Goal: Achieves maximal functionality and self care  Description: INTERVENTIONS  - Monitor swallowing and airway patency with patient fatigue and changes in neurological status  - Encourage and assist patient to increase activity and self care     - Encourage visually impaired, hearing impaired and aphasic patients to use assistive/communication devices  Outcome: Progressing     Problem: RESPIRATORY - ADULT  Goal: Achieves optimal ventilation and oxygenation  Description: INTERVENTIONS:  - Assess for changes in respiratory status  - Assess for changes in mentation and behavior  - Position to facilitate oxygenation and minimize respiratory effort  - Oxygen administered by appropriate delivery if ordered  - Initiate smoking cessation education as indicated  - Encourage broncho-pulmonary hygiene including cough, deep breathe, Incentive Spirometry  - Assess the need for suctioning and aspirate as needed  - Assess and instruct to report SOB or any respiratory difficulty  - Respiratory Therapy support as indicated  Outcome: Progressing     Problem: METABOLIC, FLUID AND ELECTROLYTES - ADULT  Goal: Electrolytes maintained within normal limits  Description: INTERVENTIONS:  - Monitor labs and assess patient for signs and symptoms of electrolyte imbalances  - Administer electrolyte replacement as ordered  - Monitor response to electrolyte replacements, including repeat lab results as appropriate  - Instruct patient on fluid and nutrition as appropriate  Outcome: Progressing  Goal: Fluid balance maintained  Description: INTERVENTIONS:  - Monitor labs   - Monitor I/O and WT  - Instruct patient on fluid and nutrition as appropriate  - Assess for signs & symptoms of volume excess or deficit  Outcome: Progressing  Goal: Glucose maintained within target range  Description: INTERVENTIONS:  - Monitor Blood Glucose as ordered  - Assess for signs and symptoms of hyperglycemia and hypoglycemia  - Administer ordered medications to maintain glucose within target range  - Assess nutritional intake and initiate nutrition service referral as needed  Outcome: Progressing     Problem: Potential for Falls  Goal: Patient will remain free of falls  Description: INTERVENTIONS:  - Educate patient/family on patient safety including physical limitations  - Instruct patient to call for assistance with activity   - Consult OT/PT to assist with strengthening/mobility   - Keep Call bell within reach  - Keep bed low and locked with side rails adjusted as appropriate  - Keep care items and personal belongings within reach  - Initiate and maintain comfort rounds  - Make Fall Risk Sign visible to staff  - Offer Toileting every 2 Hours, in advance of need  - Initiate/Maintain bed alarm  - Obtain necessary fall risk management equipment: call bell   - Apply yellow socks and bracelet for high fall risk patients  - Consider moving patient to room near nurses station  Outcome: Progressing

## 2022-07-17 NOTE — PLAN OF CARE
Problem: Nutrition/Hydration-ADULT  Goal: Nutrient/Hydration intake appropriate for improving, restoring or maintaining nutritional needs  Description: Monitor and assess patient's nutrition/hydration status for malnutrition  Collaborate with interdisciplinary team and initiate plan and interventions as ordered  Monitor patient's weight and dietary intake as ordered or per policy  Utilize nutrition screening tool and intervene as necessary  Determine patient's food preferences and provide high-protein, high-caloric foods as appropriate       INTERVENTIONS:  - Monitor oral intake, urinary output, labs, and treatment plans  - Assess nutrition and hydration status and recommend course of action  - Evaluate amount of meals eaten  - Assist patient with eating if necessary   - Allow adequate time for meals  - Recommend/ encourage appropriate diets, oral nutritional supplements, and vitamin/mineral supplements  - Order, calculate, and assess calorie counts as needed  - Recommend, monitor, and adjust tube feedings and TPN/PPN based on assessed needs  - Assess need for intravenous fluids  - Provide specific nutrition/hydration education as appropriate  - Include patient/family/caregiver in decisions related to nutrition  Outcome: Progressing     Problem: PAIN - ADULT  Goal: Verbalizes/displays adequate comfort level or baseline comfort level  Description: Interventions:  - Encourage patient to monitor pain and request assistance  - Assess pain using appropriate pain scale  - Administer analgesics based on type and severity of pain and evaluate response  - Implement non-pharmacological measures as appropriate and evaluate response  - Consider cultural and social influences on pain and pain management  - Notify physician/advanced practitioner if interventions unsuccessful or patient reports new pain  Outcome: Progressing     Problem: INFECTION - ADULT  Goal: Absence or prevention of progression during hospitalization  Description: INTERVENTIONS:  - Assess and monitor for signs and symptoms of infection  - Monitor lab/diagnostic results  - Monitor all insertion sites, i e  indwelling lines, tubes, and drains  - Monitor endotracheal if appropriate and nasal secretions for changes in amount and color  - [place] appropriate cooling/warming therapies per order  - Administer medications as ordered  - Instruct and encourage patient and family to use good hand hygiene technique  - Identify and instruct in appropriate isolation precautions for identified infection/condition  Outcome: Progressing  Goal: Absence of fever/infection during neutropenic period  Description: INTERVENTIONS:  - Monitor WBC    Outcome: Progressing

## 2022-07-17 NOTE — PROGRESS NOTES
Progress Note - Cardiology   [facility]     Nic Ayala 59 y o  male MRN: [id number]  : 1965  Unit/Bed#: 2 [place] [address] Encounter: [id number]    ASSESSMENT:    Nonischemic cardiomyopathy, possibly secondary to alcohol abuse    EF 25-30%     PAF, currently in sinus rhythm,        [id number] score is 1     S/p loop recorder implantation    EF 25-30%, G1DD     Still waiting for records from patient's primary cardiologist         Spontaneous pneumothorax,     s/p right chest tube insertion         Alcoholic pancreatitis         Tobacco abuse       Hyponatremia, serum sodium 132            RECOMMENDATIONS:    Continue aspirin, Toprol, losartan and spironolactone    Alcohol and tobacco cessation  Management of pancreatitis and chest tube as per primary service and other specialists                   Subjective / Objective:     Review of Systems   Awake and alert, denies any new or acute symptoms  Denies chest pain or acute dyspnea  Vitals: Blood pressure 91/55, pulse 72, temperature 98 3 °F (36 8 °C), temperature source Oral, resp  rate 18, height 5' 8" (1 727 m), weight 65 7 kg (144 lb 13 5 oz), SpO2 95 %  Vitals:    22 0554 22 0749   Weight: 65 7 kg (144 lb 13 5 oz) 65 7 kg (144 lb 13 5 oz)     Body mass index is 22 02 kg/m²  BP Readings from Last 3 Encounters:   22 91/55   22 132/94   21 99/61     Orthostatic Blood Pressures    Flowsheet Row Most Recent Value   Blood Pressure 91/55 filed at 2022 0858   Patient Position - Orthostatic VS Lying filed at 2022 0858        I/O       07/15 0701   0700  0701   0700  0701   0700    P  O  400 500     I V  (mL/kg) 20 (0 3) 20 (0 3)     Total Intake(mL/kg) 420 (6 4) 520 (7 9)     Urine (mL/kg/hr) 3150 (2) 4000 (2 5)     Stool       Chest Tube 10 40     Total Output 3160 4040     Net -6993 -1022                Invasive Devices  Report    Peripheral Intravenous Line  Duration Long-Dwell Peripheral IV (Midline) 57/61/67 Right Basilic 7 days    Peripheral IV 07/13/22 Distal;Left;Ventral (anterior) Forearm 3 days          Drain  Duration           Chest Tube Right Second intercostal space 10 2 Fr  7 days    External Urinary Catheter 4 days                  Intake/Output Summary (Last 24 hours) at 7/17/2022 0941  Last data filed at 7/17/2022 0501  Gross per 24 hour   Intake 520 ml   Output 4040 ml   Net -3520 ml         Physical Exam:   Physical Exam    Neurologic:  Alert & oriented x 3,  no focal deficits noted   Constitutional:  Appears chronically ill  Eyes:  PERRL, conjunctiva normal   HENT:  Atraumatic, external ears normal, nose normal,   NECK: Normal range of motion, no tenderness, neck is supple , No JVP  Respiratory:  Decreased breath sounds on right side  Cardiovascular: Regular S1-S2 with a I/VI ejection systolic murmur  No pericardial rub or gallop audible  GI:  Soft, nondistended, audible bowel sounds, nontender, no hepatosplenomegaly appreciated  Musculoskeletal:  No tenderness, no deformities  Extremities:  No appreciable pitting edema   Distal pulses are present  Psychiatric:  Speech and behavior appropriate              Medications/ Allergies:     Current Facility-Administered Medications   Medication Dose Route Frequency Provider Last Rate    acetaminophen  650 mg Oral Q6H PRN Olga Spironello CHEN YOUNG      folic acid  1 mg Oral Daily Olga SpiroCHEN Valle      levalbuterol  0 63 mg Nebulization Q6H PRN CHEN Melendrez      lidocaine  1 patch Topical Daily Nimo Richards MD      losartan  25 mg Oral Daily Nishant Mack MD      magnesium oxide  400 mg Oral BID CHEN Mccartney      metoprolol succinate  25 mg Oral HS CHEN Mccartney      nicotine  1 patch Transdermal Daily Olga SpiroCHEN Valle      ondansetron  4 mg Intravenous Q6H PRN Olga Spirofranciscao CHEN YOUNG      spironolactone  25 mg Oral Daily CHEN Mccartney      thiamine  100 mg Oral Daily CHEN Melendrez       acetaminophen, 650 mg, Q6H PRN  levalbuterol, 0 63 mg, Q6H PRN  ondansetron, 4 mg, Q6H PRN      No Known Allergies        Labs:   Troponins:      CBC with diff:  Results from last 7 days   Lab Units 07/17/22  0556 07/16/22  0538 07/13/22  0544 07/12/22  0541 07/11/22  0517   WBC Thousand/uL  --  6 90 7 52 7 36 6 22   HEMOGLOBIN g/dL 11 5* 11 0* 10 1* 10 2* 9 7*   HEMATOCRIT % 35 7* 33 4* 30 2* 30 2* 29 9*   MCV fL  --  93 91 90 93   PLATELETS Thousands/uL  --  494* 277 230 187   MCH pg  --  30 6 30 3 30 5 30 1   MCHC g/dL  --  32 9 33 4 33 8 32 4   RDW %  --  15 1 14 5 13 9 14 1   MPV fL  --  9 1 9 7 9 8 10 2   NRBC AUTO /100 WBCs  --   --  1 0 0       CMP:  Results from last 7 days   Lab Units 07/17/22  0556 07/16/22  0538 07/15/22  0453 07/14/22  0459 07/13/22  0544 07/12/22  1356 07/12/22  0541 07/11/22  2108 07/11/22  0517   SODIUM mmol/L 132* 131* 135* 133* 133* 134* 132*   < > 135*   POTASSIUM mmol/L 4 7 4 1 4 1 3 7 2 9* 4 0 2 9*   < > 2 9*   CHLORIDE mmol/L 98* 97* 98* 98* 96* 98* 98*   < > 99*   CO2 mmol/L 27 26 29 30 28 30 28   < > 26   ANION GAP mmol/L 7 8 8 5 9 6 6   < > 10   BUN mg/dL 8 6 6 6 3* 4* 5   < > 4*   CREATININE mg/dL 0 61 0 49* 0 49* 0 53* 0 48* 0 51* 0 58*   < > 0 51*   CALCIUM mg/dL 8 9 8 6 8 4 8 2* 7 9* 8 2* 7 5*   < > 7 6*   AST U/L  --   --   --  26  --   --  26  --  28   ALT U/L  --   --   --  12  --   --  10*  --  7*   ALK PHOS U/L  --   --   --  43*  --   --  46  --  36*   TOTAL PROTEIN g/dL  --   --   --  5 5*  --   --  5 5*  --  5 1*   ALBUMIN g/dL  --   --   --  2 7*  --   --  2 7*  --  2 5*   TOTAL BILIRUBIN mg/dL  --   --   --  0 35  --   --  0 37  --  0 60   EGFR ml/min/1 73sq m 109 119 119 115 120 117 111   < > 117    < > = values in this interval not displayed         Magnesium:  Results from last 7 days   Lab Units 07/17/22  0556 07/16/22  0538 07/15/22  0453 07/14/22  0459 07/13/22  0544 07/12/22  1351 07/12/22  0541   MAGNESIUM mg/dL 1 8 1 5* 1 7 1 4* 1 3* 1 8 1 9     Coags:    TSH:    No components found for: TSH3  Lipid Profile:    Hgb A1c:    NT-proBNP: No results for input(s): NTBNP in the last 72 hours  Imaging & Testing   I have personally reviewed pertinent reports  CT abdomen pelvis wo contrast    Result Date: 7/8/2022  Narrative: CT ABDOMEN AND PELVIS WITHOUT IV CONTRAST INDICATION:   Abdominal pain  COMPARISON:  CT 4/16/2022 TECHNIQUE:  CT examination of the abdomen and pelvis was performed without intravenous contrast  This examination was performed without intravenous contrast in the context of the critical nationwide Omnipaque shortage  Axial, sagittal, and coronal 2D reformatted images were created from the source data and submitted for interpretation  Radiation dose length product (DLP) for this visit:  425 65 mGy-cm   This examination, like all CT scans performed in the [facility], was performed utilizing techniques to minimize radiation dose exposure, including the use of iterative  reconstruction and automated exposure control  Enteric contrast was not administered  FINDINGS: ABDOMEN LOWER CHEST:  Partially visualized large right pneumothorax  Right basilar atelectasis  LIVER/BILIARY TREE:  Liver is diffusely decreased in density consistent with fatty change  No CT evidence of suspicious hepatic mass  Normal hepatic contours  No biliary dilatation  GALLBLADDER:  No calcified gallstones  No pericholecystic inflammatory change  SPLEEN:  Unremarkable  PANCREAS:  Mild peripancreatic stranding suspicious for pancreatitis  No fluid collections  ADRENAL GLANDS:  Unremarkable  KIDNEYS/URETERS:  Unremarkable  No hydronephrosis  STOMACH AND BOWEL:  Unremarkable  APPENDIX:  No findings to suggest appendicitis  ABDOMINOPELVIC CAVITY:  No ascites  No pneumoperitoneum  No lymphadenopathy  VESSELS:  Unremarkable for patient's age  PELVIS REPRODUCTIVE ORGANS:  Prominent prostate  URINARY BLADDER:  Unremarkable  ABDOMINAL WALL/INGUINAL REGIONS:  Unremarkable  OSSEOUS STRUCTURES:  No acute fracture or destructive osseous lesion  Impression: 1  Incompletely visualized large right pneumothorax  Radiographic evaluation recommended  2   Mild peripancreatic stranding most concerning for pancreatitis  No peripancreatic fluid collections  3   Severe hepatic steatosis  I personally discussed this study with Dr Cyndee Gerardo on 7/8/2022 at 7:03 PM  Workstation performed: [id number]     XR chest portable    Result Date: 7/14/2022  Narrative: CHEST INDICATION:   chest tube  COMPARISON:  7/13/2022 EXAM PERFORMED/VIEWS:  XR CHEST PORTABLE FINDINGS:  There is a right-sided chest tube in place  Cardiomediastinal silhouette appears unremarkable  Loop recorder projects over the left chest wall  Stable small right-sided pneumothorax  Stable right lung hazy airspace disease  Left hilar prominence, also unchanged  Sternal wires are present  Visualized osseous structures appear otherwise unremarkable for the patient's age  Impression: Stable small right-sided pneumothorax with a chest tube in place  Workstation performed: [id number]     XR chest portable    Result Date: 7/13/2022  Narrative: CHEST INDICATION:   f/u R pneumothorax  COMPARISON:  07/12/2022 07/11/2022  EXAM PERFORMED/VIEWS:  XR CHEST PORTABLE FINDINGS: Right chest tube change in orientation and location, coiled at the medial superior mediastinum  EKG leads overlie the patient  Left chest loop recorder  Cardiomediastinal silhouette appears stable  Small right apical pneumothorax, similar from 07/11/2022  Patchy opacifications throughout the right lung, similar from 07/12/2022  Left perihilar opacities redemonstrated  Sternotomy wires  Osseous structures appear within normal limits for patient age  Impression: Small right apical pneumothorax, similar from 07/11/2022   The study was marked in [facility] for immediate notification  Workstation performed: [id number]     XR chest portable    Result Date: 7/11/2022  Narrative: CHEST INDICATION:   right sided pneumothorax  COMPARISON:  Chest x-ray 7/9/2022 EXAM PERFORMED/VIEWS:  XR CHEST PORTABLE  Image: 1 FINDINGS:  Interval replacement of pre-existing right chest tube with the right apical pigtail chest tube  Reexpansion of right lung with multiple opacities present  Left lung is clear without pneumothorax  No pleural effusion  Cardiomediastinal silhouette appears unremarkable  Osseous structures appear within normal limits for patient age  Impression: Right-sided chest tube in place without pneumothorax  Multiple pulmonary opacities likely representing airspace disease and pulmonary hemorrhage  Workstation performed: [id number]     XR chest portable    Result Date: 7/9/2022  Narrative: CHEST INDICATION:   right sided pneumothorax  COMPARISON:  Chest x-ray 7/9/2022 at 07:57 EXAM PERFORMED/VIEWS:  XR CHEST PORTABLE  Image: 1 FINDINGS:  Right-sided chest tube remains in stable position with residual large right pneumothorax  Left lung is clear, no left-sided pneumothorax  No pleural effusion  Cardiomediastinal silhouette appears unremarkable  Osseous structures appear within normal limits for patient age  Left chest wall loop recorder in place  Impression: Right-sided chest tube is stable in position with persistent large right pneumothorax  Workstation performed: [id number]     XR chest portable    Result Date: 7/9/2022  Narrative: CHEST INDICATION:   followup ptx  COMPARISON:  Chest radiograph and abdomen CT from 7/8/2022  EXAM PERFORMED/VIEWS:  XR CHEST PORTABLE FINDINGS: Normal heart size, median sternotomy  Loop recorder in the left chest wall  Right chest tube with persistent large right pneumothorax and partial right lung atelectasis  No effusion  Osseous structures appear within normal limits for patient age       Impression: Right chest tube with persistent large right pneumothorax and partial right lung atelectasis  Workstation performed: [id number]     XR chest 1 view portable    Result Date: 7/9/2022  Narrative: CHEST INDICATION:   s/p chest tube  COMPARISON:  Chest radiograph from 6/6/2018, abdomen CT from 7/8/2022  EXAM PERFORMED/VIEWS:  XR CHEST PORTABLE FINDINGS: Normal heart size  Median sternotomy  Loop recorder in the left chest wall  Right chest tube with persistent large right pneumothorax and partial right lung atelectasis  Osseous structures appear within normal limits for patient age  Impression: Right chest tube with persistent large right pneumothorax and partial right lung atelectasis  Workstation performed: [id number]     CT chest wo contrast    Result Date: 7/15/2022  Narrative: CT CHEST WITHOUT IV CONTRAST INDICATION:   Pneumothorax nonhealing pneumothorax: possible bullous lung disease  COMPARISON:  None  TECHNIQUE: CT examination of the chest was performed without intravenous contrast  This examination was performed without intravenous contrast in the context of the critical nationwide Omnipaque shortage  Axial, sagittal, and coronal 2D reformatted images were created from the source data and submitted for interpretation  Radiation dose length product (DLP) for this visit:  328 91 mGy-cm   This examination, like all CT scans performed in the [facility], was performed utilizing techniques to minimize radiation dose exposure, including the use of iterative  reconstruction and automated exposure control  FINDINGS: LUNGS: Posterior right lower lobe atelectasis  Bullous changes in the right apex with multiple subpleural bullae in the right upper lobe  Largest bulla measuring 2 5 cm  Right upper lobe parenchymal groundglass changes  Tubular appearing density extending from the chest wall to the right upper lobe may represent hematoma in the previously placed chest tube     PLEURA:  Moderate right pleural effusion with hyperdensity laterally suggesting hemorrhagic fluid  Right upper anterior approach pigtail chest tube in place  Moderate right pneumothorax  Septations in the pleural space anteriorly  HEART/GREAT VESSELS: Heart is unremarkable for patient's age  No thoracic aortic aneurysm  Enlarged main pulmonary artery measuring 4 cm  Left Main pulmonary artery measures 3 4 cm and right pulmonary artery measures 3 cm  MEDIASTINUM AND FRANCISCO:  Unremarkable  CHEST WALL AND LOWER NECK:  Right chest wall hematoma in series 2 image 29 measuring 5 7 x 2 3 cm in the subcutis fat with pleural deeper hematoma measuring 2 cm  Midline sternotomy  VISUALIZED STRUCTURES IN THE UPPER ABDOMEN:  Unremarkable  OSSEOUS STRUCTURES:  No acute fracture or destructive osseous lesion  Impression: 1  Moderate right pneumothorax with right apical anterior approach pigtail catheter in place  Septations in the anterior pleural space superiorly  2 Bullous changes predominantly in the right upper lobe near the apex  Largest bulla measuring 2 5 cm 3  Groundglass changes in the right upper lobe with a tubular hyperdensity extending to the pleural space laterally suggesting hematoma in the tract of the previous large bore chest tube  Hematoma in the chest wall is seen at the previous entry site  If there is persistent air leak in the Pleuro-evac pleural fistula cannot be excluded  4  Moderate right pleural effusion with small amount of hemorrhagic fluid  Workstation performed: [id number]     XR chest portable ICU    Result Date: 7/13/2022  Narrative: CHEST INDICATION:   eval pneumo  COMPARISON:  07/11/2022  EXAM PERFORMED/VIEWS:  XR CHEST PORTABLE ICU FINDINGS: Right chest tube at the lung apex  EKG leads overlie the patient  Sternotomy wires  Left chest loop recorder  Cardiomediastinal silhouette appears stable  Right pneumothorax decreased in size from prior with likely small amounts of gas trapped at the right minor fissure medially  Shawn Omalley opacification throughout the right lung slightly improved from prior  Left perihilar opacities  No sizable pleural effusion  Osseous structures appear within normal limits for patient age  Impression: Right pneumothorax decreased in size from prior with likely small amounts of gas trapped at the right minor fissure medially  Improved aeration of the right lung  Workstation performed: [id number]     XR chest portable ICU    Result Date: 7/11/2022  Narrative: CHEST INDICATION:   f/u R pneumothorax  COMPARISON:  Chest x-ray 7/10/2022 EXAM PERFORMED/VIEWS:  XR CHEST PORTABLE ICU  Images: 4 FINDINGS:  Right chest tube remains in place  Interval development of small right apical pneumothorax  Diffuse right lung opacity  Left lung is clear without pneumothorax  No pleural effusions  Cardiomediastinal silhouette appears unremarkable  Osseous structures appear within normal limits for patient age  Left chest loop recorder in place  Impression: Interval development of small right apical pneumothorax with diffuse right lung airspace disease  Right-sided chest tube remains in place  Workstation performed: [id number]     IR chest tube placement    Result Date: 7/9/2022  Narrative: PROCEDURE: Right chest tube placement Procedural Personnel Attending physician(s): Dr Magaly Rivers Pre-procedure diagnosis: Pneumothorax Post-procedure diagnosis: Same Indication: Persistent right pneumothorax despite existing right-sided chest tube  PROCEDURE SUMMARY: - Right chest tube placement under CT guidance PROCEDURE DETAILS: Pre-procedure Consent: Informed consent for the procedure including risks, benefits and alternatives was obtained and time-out was performed prior to the procedure  Preparation: The site was prepared and draped using maximal sterile barrier technique including cutaneous antisepsis  Anesthesia/sedation Level of anesthesia/sedation: Local lidocaine Right chest tube placement The patient was positioned supine   Initial imaging was performed  Local anesthesia was administered  A 19-gauge needle was advanced into the right thoracic cavity through the anterior second intercostal space  Amplatz wire was advanced through the needle  to maintain the tract  Tract was dilated and a 10 Greek catheter advanced over the wire with pigtail loop forming in the right apical chest cavity  Catheter was secured to skin using 2-0 Prolene suture and connected to pleural vac suction  Existing right lateral chest tube was removed  Radiation Dose CT dose length product (mGy-cm): 346 68 Additional Details Specimens removed: Existing right lateral chest tube  Estimated blood loss (mL): 1 Complications: No immediate complications  Impression: 1  Right apical chest tube placement using 10 Greek catheter  2  Existing right lateral chest tube appeared to be within the right lung parenchyma and was nonfunctional  This existing chest tube was removed  Plan: - Right chest tube to pleural vac suction  - Monitor for resolution of right pneumothorax  Workstation performed: [id number]     Echo complete w/ contrast if indicated    Result Date: 7/13/2022  Narrative: Corrie Lynn  Left Ventricle: Left ventricular cavity size is normal  Wall thickness is mildly increased  Systolic function is severely reduced  Estimated ejection fraction is 25-30% on a noncontrast study  Wall motion cannot be accurately assessed  Diastolic function is mildly abnormal, consistent with grade I (abnormal) relaxation    Right Ventricle: Systolic function is low normal    Mitral Valve: There is mild regurgitation    Tricuspid Valve: There is mild regurgitation    Pulmonic Valve: There is mild to moderate regurgitation          Cardiac testing:   Results for orders placed during the hospital encounter of 09/22/17    Echo complete with contrast if indicated    Narrative  [address]  [address] [facility]  [address] [address]  [phone]    Transthoracic Echocardiogram  2D, M-mode, Doppler, and Color Doppler    Study date:  23-Sep-2017    Patient: Nigel Barrett  MR number: [id number]  Account number: [de-identified]  : 1965  Age: 45 years  Gender: Male  Status: Routine  Location: Bedside  Height: 66 in  Weight: 136 6 lb  BP: 104/ 68 mmHg    Indications: Atrial Flutter    Diagnoses: 427 32 - ATRIAL FLUTTER    Sonographer:  Graham Madrigal  Referring Physician:  Chan Cherry MD  Group:  Sravan Morin  Interpreting Physician:  Gianni Coronel MD    SUMMARY    LEFT VENTRICLE:  Systolic function was at the lower limits of normal  Ejection fraction was estimated in the range of 50 % to 55 % to be 55 %  Although no diagnostic regional wall motion abnormality was identified, this possibility cannot be completely excluded on the basis of this study  Wall thickness was at the upper limits of normal     LEFT ATRIUM:  The atrium was mildly to moderately dilated  RIGHT ATRIUM:  The atrium was mildly dilated  MITRAL VALVE:  There was mild regurgitation  TRICUSPID VALVE:  There was mild regurgitation  Estimated peak PA pressure was 35 mmHg  PULMONIC VALVE:  There was mild to moderate regurgitation  HISTORY: PRIOR HISTORY: CM, Alcoholic Hepatitis    PROCEDURE: The procedure was performed at the bedside  This was a routine study  The transthoracic approach was used  The study included complete 2D imaging, M-mode, complete spectral Doppler, and color Doppler  The heart rate was 80 bpm,  at the start of the study  Image quality was adequate  LEFT VENTRICLE: Size was normal  Systolic function was at the lower limits of normal  Ejection fraction was estimated in the range of 50 % to 55 % to be 55 %  Although no diagnostic regional wall motion abnormality was identified, this  possibility cannot be completely excluded on the basis of this study   Wall thickness was at the upper limits of normal  DOPPLER: The study was not technically sufficient to allow evaluation of LV diastolic function  RIGHT VENTRICLE: The size was normal  Systolic function was normal     LEFT ATRIUM: The atrium was mildly to moderately dilated  RIGHT ATRIUM: The atrium was mildly dilated  MITRAL VALVE: There was normal leaflet separation  DOPPLER: There was no evidence for stenosis  There was mild regurgitation  AORTIC VALVE: The valve was trileaflet  Leaflets exhibited mildly increased thickness and normal cuspal separation  DOPPLER: Transaortic velocity was within the normal range  There was no evidence for stenosis  There was no regurgitation  TRICUSPID VALVE: DOPPLER: There was mild regurgitation  Estimated peak PA pressure was 35 mmHg  PULMONIC VALVE: DOPPLER: There was mild to moderate regurgitation  PERICARDIUM: There was no thickening or calcification  There was no pericardial effusion  AORTA: The root exhibited normal size  SYSTEMIC VEINS: IVC: The inferior vena cava was normal in size  Respirophasic changes were normal     SYSTEM MEASUREMENT TABLES    2D mode  AoR Diam 2D: 3 cm  LA Diam (2D): 3 4 cm  LA/Ao (2D): 1 13  FS (2D Teich): 20 1 %  IVSd (2D): 1 07 cm  LVDEV: 67 1 cm³  LVESV: 39 1 cm³  LVIDd(2D): 3 93 cm  LVISd (2D): 3 14 cm  LVPWd (2D): 1 15 cm  SV (Teich): 28 cm³    Apical four chamber  LVEF A4C: 42 %    Unspecified Scan Mode  MV Peak E Buddy   Mean: 1080 mm/s  MVA (PHT): 3 14 cm squared  PHT: 70 ms  Max P mm[Hg]  V Max: 2260 mm/s  Vmax: 2390 mm/s  RA Area: 20 6 cm squared  RA Volume: 58 8 cm³  TAPSE: 1 7 cm    [employer] Accredited Echocardiography Laboratory    Prepared and electronically signed by    Segun Walker MD  Signed 23-Sep-2017 16:34:47    Results for orders placed during the hospital encounter of 18    TONYA    Narrative  [address]  [address] [facility], [facility] 6 [phone]    Transesophageal Echocardiogram  2D and Doppler    Study date:  2018    Patient: Charla MONTEZ number: [id number]  Account number: [de-identified]  : 1965  Age: 52 years  Gender: Male  Status: Routine  Location: Cath lab  Height: 66 in  Weight: 136 lb  BP: 116/ 74 mmHg    Indications: Atrial Flutter    Diagnoses: 427 32 - ATRIAL FLUTTER    Sonographer:  TD Mars  Primary Physician:  Mike Gibson MD  Referring Physician:  Keri English MD  Group:  [facility]  Interpreting Physician:  Merlin Abdullahi DO    SUMMARY    LEFT VENTRICLE:  Systolic function was normal by visual assessment  Ejection fraction was estimated in the range of 55 % to 60 %  There were no regional wall motion abnormalities  ATRIAL SEPTUM:  No defect or patent foramen ovale was identified  Contrast injection was performed  There was no right-to-left shunt, with provocative maneuvers to increase right atrial pressure  MITRAL VALVE:  There was trace regurgitation  AORTIC VALVE:  There was trace regurgitation  TRICUSPID VALVE:  There was mild regurgitation  PULMONIC VALVE:  There was moderate regurgitation  HISTORY: PRIOR HISTORY: Atrial Flutter, Cardiomyopathy, Alcohol Abuse, Alcoholic Hepatitis    PROCEDURE: The procedure was performed in the catheterization laboratory  This was a routine study  The risks and alternatives of the procedure were explained to the patient and informed consent was obtained  The transesophageal approach  was used  The study included complete 2D imaging and limited spectral Doppler  The heart rate was 120 bpm, at the start of the study  An adult omniplane probe was inserted by the attending cardiologist  Intubated with ease  One intubation  attempt(s)  There was no blood detected on the probe  LEFT VENTRICLE: Size was normal  Systolic function was normal by visual assessment  Ejection fraction was estimated in the range of 55 % to 60 %  There were no regional wall motion abnormalities   Wall thickness was normal  DOPPLER: The  study was not technically sufficient to allow evaluation of LV diastolic function  RIGHT VENTRICLE: The size was normal  Systolic function was normal  Wall thickness was normal     LEFT ATRIUM: Size was normal  No thrombus was identified  APPENDAGE: The size was normal  No thrombus was identified  DOPPLER: The function was normal (normal emptying velocity)  ATRIAL SEPTUM: No defect or patent foramen ovale was identified  Contrast injection was performed  There was no right-to-left shunt, with provocative maneuvers to increase right atrial pressure  RIGHT ATRIUM: Size was normal     MITRAL VALVE: Valve structure was normal  There was normal leaflet separation  DOPPLER: The transmitral velocity was within the normal range  There was no evidence for stenosis  There was trace regurgitation  AORTIC VALVE: The valve was trileaflet  Leaflets exhibited normal cuspal separation and sclerosis  DOPPLER: There was no evidence for stenosis  There was trace regurgitation  TRICUSPID VALVE: The valve structure was normal  There was normal leaflet separation  DOPPLER: There was mild regurgitation  PULMONIC VALVE: Leaflets exhibited normal thickness, no calcification, and normal cuspal separation  DOPPLER: The transpulmonic velocity was within the normal range  There was moderate regurgitation  PERICARDIUM: There was no pericardial effusion  The pericardium was normal in appearance  AORTA: The root exhibited normal size  There was no atheroma  SYSTEMIC VEINS: IVC: The inferior vena cava was normal in size  [address] Accredited Echocardiography Laboratory    Prepared and electronically signed by    Jacqueline Rincon DO  Signed 08-Jun-2018 14:21:34    Dr Vikas Sosa MD, [facility] [place]      "This note has been constructed using a voice recognition system  Therefore there may be syntax, spelling, and/or grammatical errors   Please call if you have any questions  "

## 2022-07-18 ENCOUNTER — APPOINTMENT (INPATIENT)
Dept: RADIOLOGY | Facility: HOSPITAL | Age: 57
DRG: 199 | End: 2022-07-18
Payer: COMMERCIAL

## 2022-07-18 LAB
ANION GAP SERPL CALCULATED.3IONS-SCNC: 10 MMOL/L (ref 4–13)
BUN SERPL-MCNC: 11 MG/DL (ref 5–25)
CALCIUM SERPL-MCNC: 9.8 MG/DL (ref 8.3–10.1)
CHLORIDE SERPL-SCNC: 96 MMOL/L (ref 100–108)
CO2 SERPL-SCNC: 26 MMOL/L (ref 21–32)
CREAT SERPL-MCNC: 0.74 MG/DL (ref 0.6–1.3)
GFR SERPL CREATININE-BSD FRML MDRD: 100 ML/MIN/1.73SQ M
GLUCOSE SERPL-MCNC: 123 MG/DL (ref 65–140)
HCT VFR BLD AUTO: 37.6 % (ref 36.5–49.3)
HGB BLD-MCNC: 12.3 G/DL (ref 12–17)
MAGNESIUM SERPL-MCNC: 1.7 MG/DL (ref 1.6–2.6)
POTASSIUM SERPL-SCNC: 4.5 MMOL/L (ref 3.5–5.3)
SODIUM SERPL-SCNC: 132 MMOL/L (ref 136–145)

## 2022-07-18 PROCEDURE — 99232 SBSQ HOSP IP/OBS MODERATE 35: CPT | Performed by: INTERNAL MEDICINE

## 2022-07-18 PROCEDURE — 71045 X-RAY EXAM CHEST 1 VIEW: CPT

## 2022-07-18 PROCEDURE — 85014 HEMATOCRIT: CPT | Performed by: FAMILY MEDICINE

## 2022-07-18 PROCEDURE — 80048 BASIC METABOLIC PNL TOTAL CA: CPT | Performed by: FAMILY MEDICINE

## 2022-07-18 PROCEDURE — 83735 ASSAY OF MAGNESIUM: CPT | Performed by: FAMILY MEDICINE

## 2022-07-18 PROCEDURE — 85018 HEMOGLOBIN: CPT | Performed by: FAMILY MEDICINE

## 2022-07-18 PROCEDURE — 99232 SBSQ HOSP IP/OBS MODERATE 35: CPT | Performed by: FAMILY MEDICINE

## 2022-07-18 RX ORDER — ALBUMIN (HUMAN) 12.5 G/50ML
25 SOLUTION INTRAVENOUS ONCE
Status: COMPLETED | OUTPATIENT
Start: 2022-07-18 | End: 2022-07-18

## 2022-07-18 RX ADMIN — THIAMINE HCL TAB 100 MG 100 MG: 100 TAB at 09:09

## 2022-07-18 RX ADMIN — FOLIC ACID 1 MG: 1 TABLET ORAL at 09:09

## 2022-07-18 RX ADMIN — SPIRONOLACTONE 25 MG: 25 TABLET ORAL at 09:09

## 2022-07-18 RX ADMIN — ALBUMIN (HUMAN) 25 G: 0.25 INJECTION, SOLUTION INTRAVENOUS at 18:37

## 2022-07-18 RX ADMIN — MAGNESIUM OXIDE TAB 400 MG (241.3 MG ELEMENTAL MG) 400 MG: 400 (241.3 MG) TAB at 09:09

## 2022-07-18 RX ADMIN — LIDOCAINE 5% 1 PATCH: 700 PATCH TOPICAL at 09:10

## 2022-07-18 RX ADMIN — LOSARTAN POTASSIUM 25 MG: 25 TABLET, FILM COATED ORAL at 09:09

## 2022-07-18 RX ADMIN — MAGNESIUM OXIDE TAB 400 MG (241.3 MG ELEMENTAL MG) 400 MG: 400 (241.3 MG) TAB at 17:00

## 2022-07-18 RX ADMIN — NICOTINE 1 PATCH: 21 PATCH, EXTENDED RELEASE TRANSDERMAL at 09:09

## 2022-07-18 NOTE — PROGRESS NOTES
Progress Note - Cardiology   [facility]     Nic Ayala 59 y o  male MRN: [id number]  : 1965  Unit/Bed#: [address] [place] [address] Encounter: [id number]    Assessment and Plan:   1  Spontaneous right pneumothorax: Followed by Pulmonary    -  still with moderate right pneumothorax on CT, also concern for moderate right pleural effusion with hemorrhagic fluid    -  patient still with pneumothorax and large air leak in chest tube    -  with bolus disease on imaging    -  awaiting transfer to [facility] for thoracic surgery consult     2  Nonischemic cardiomyopathy:  EF 25-30%    -  continue Toprol XL 25 mg daily    -  continue Aldactone 25 mg daily     -  continue Cozaar 25 mg daily    -  titrate meds as patient tolerates    -  monitor electrolytes and replete as needed    -  low-sodium diet    -  monitor I&O daily weights     3  Alcoholic pancreatitis:  Encourage abstinence of alcohol     4  Paroxysmal atrial fibrillation:  Patient currently sinus rhythm    -  has loop recorder for AFib surveillance    -  continue Toprol XL 25 mg daily    -  Betapace discontinue due to low EF    -  CHADS2 Vasc score = 0,  HASBLED = 2, due to significant use of alcohol patient would be at high risk for bleeding, GI or secondary to injury from fall       -  Would hold on Xarelto at this time      5  Tobacco abuse     Patient appears to be stable from a cardiac standpoint  Will sign off at this time  Subjective / Objective:   Patient seen examined  No cardiac complaints offered at this time  He is awaiting transfer to [facility] due to his ongoing respiratory issues  Vitals: Blood pressure 117/75, pulse 86, temperature 99 1 °F (37 3 °C), temperature source Oral, resp  rate 18, height 5' 8" (1 727 m), weight 65 7 kg (144 lb 13 5 oz), SpO2 96 %  Vitals:    22 0554 22 0749   Weight: 65 7 kg (144 lb 13 5 oz) 65 7 kg (144 lb 13 5 oz)     Body mass index is 22 02 kg/m²    BP Readings from Last 3 Encounters:   07/18/22 117/75   04/22/22 132/94   05/04/21 99/61     Orthostatic Blood Pressures    Flowsheet Row Most Recent Value   Blood Pressure 117/75 filed at 07/18/2022 0755   Patient Position - Orthostatic VS Sitting filed at 07/18/2022 0755        I/O       07/16 0701  07/17 0700 07/17 0701  07/18 0700 07/18 0701  07/19 0700    P  O  500 750 300    I V  (mL/kg) 20 (0 3) 20 (0 3) 10 (0 2)    Total Intake(mL/kg) 520 (7 9) 770 (11 7) 310 (4 7)    Urine (mL/kg/hr) 4000 (2 5) 1050 (0 7) 1050 (3 3)    Chest Tube 40 10     Total Output 4040 1060 1050    Net -3520 -290 -740               Invasive Devices  Report    Peripheral Intravenous Line  Duration           Long-Dwell Peripheral IV (Midline) 97/13/47 Right Basilic 8 days    Peripheral IV 07/17/22 Dorsal (posterior); Proximal;Right Forearm <1 day          Drain  Duration           Chest Tube Right Second intercostal space 10 2 Fr  8 days    External Urinary Catheter 6 days                  Intake/Output Summary (Last 24 hours) at 7/18/2022 1151  Last data filed at 7/18/2022 0755  Gross per 24 hour   Intake 1080 ml   Output 2110 ml   Net -1030 ml         Physical Exam:   Physical Exam  Vitals and nursing note reviewed  Constitutional:       Appearance: Normal appearance  He is normal weight  He is ill-appearing (Chronically)  HENT:      Right Ear: External ear normal       Left Ear: External ear normal       Nose: Nose normal    Eyes:      General: No scleral icterus  Right eye: No discharge  Left eye: No discharge  Cardiovascular:      Rate and Rhythm: Normal rate and regular rhythm  Pulses: Normal pulses  Heart sounds: Murmur heard  Pulmonary:      Effort: Pulmonary effort is normal  No accessory muscle usage or respiratory distress  Breath sounds: Examination of the right-middle field reveals decreased breath sounds  Examination of the right-lower field reveals decreased breath sounds   Examination of the left-lower field reveals decreased breath sounds  Decreased breath sounds present  Comments: Chest tube to right side still with large pneumothorax and air leak  Abdominal:      General: Bowel sounds are normal  There is no distension  Palpations: Abdomen is soft  Musculoskeletal:      Right lower leg: No edema  Left lower leg: No edema  Skin:     General: Skin is warm and dry  Capillary Refill: Capillary refill takes less than 2 seconds  Neurological:      General: No focal deficit present  Mental Status: He is alert and oriented to person, place, and time  Mental status is at baseline     Psychiatric:         Mood and Affect: Mood normal                    Medications/ Allergies:     Current Facility-Administered Medications   Medication Dose Route Frequency Provider Last Rate    acetaminophen  650 mg Oral Q6H PRN Olga Spironello V, CRNP      folic acid  1 mg Oral Daily Olga Spironello V, CRNP      levalbuterol  0 63 mg Nebulization Q6H PRN Olga Spironello V, CRNP      lidocaine  1 patch Topical Daily Lidia Ramirez MD      losartan  25 mg Oral Daily Brittanie Castellanos MD      magnesium oxide  400 mg Oral BID Tray Neal, DOYLENP      metoprolol succinate  25 mg Oral HS Tray Neal, CHEN      nicotine  1 patch Transdermal Daily Olga Spironello V, CRNP      ondansetron  4 mg Intravenous Q6H PRN Olga Spironello V, CRNP      spironolactone  25 mg Oral Daily Tray Neal, DOYLENP      thiamine  100 mg Oral Daily Olga Spironello V, CRNP       acetaminophen, 650 mg, Q6H PRN  levalbuterol, 0 63 mg, Q6H PRN  ondansetron, 4 mg, Q6H PRN      No Known Allergies    VTE Pharmacologic Prophylaxis:   Sequential compression device (Venodyne)     Labs:   Troponins:  Results from last 7 days   Lab Units 07/13/22  0544 07/12/22  0803   HS TNI RAND ng/L 15 18     CBC with diff:  Results from last 7 days   Lab Units 07/18/22  0523 07/17/22  0556 07/16/22  9895 07/13/22  0544 07/12/22  0541   WBC Thousand/uL  --   --  6 90 7 52 7 36   HEMOGLOBIN g/dL 12 3 11 5* 11 0* 10 1* 10 2*   HEMATOCRIT % 37 6 35 7* 33 4* 30 2* 30 2*   MCV fL  --   --  93 91 90   PLATELETS Thousands/uL  --   --  494* 277 230   MCH pg  --   --  30 6 30 3 30 5   MCHC g/dL  --   --  32 9 33 4 33 8   RDW %  --   --  15 1 14 5 13 9   MPV fL  --   --  9 1 9 7 9 8   NRBC AUTO /100 WBCs  --   --   --  1 0     CMP:  Results from last 7 days   Lab Units 07/18/22  0523 07/17/22  0556 07/16/22  0538 07/15/22  0453 07/14/22  0459 07/13/22  0544 07/12/22  1356 07/12/22  0541   SODIUM mmol/L 132* 132* 131* 135* 133* 133* 134* 132*   POTASSIUM mmol/L 4 5 4 7 4 1 4 1 3 7 2 9* 4 0 2 9*   CHLORIDE mmol/L 96* 98* 97* 98* 98* 96* 98* 98*   CO2 mmol/L 26 27 26 29 30 28 30 28   ANION GAP mmol/L 10 7 8 8 5 9 6 6   BUN mg/dL 11 8 6 6 6 3* 4* 5   CREATININE mg/dL 0 74 0 61 0 49* 0 49* 0 53* 0 48* 0 51* 0 58*   CALCIUM mg/dL 9 8 8 9 8 6 8 4 8 2* 7 9* 8 2* 7 5*   AST U/L  --   --   --   --  26  --   --  26   ALT U/L  --   --   --   --  12  --   --  10*   ALK PHOS U/L  --   --   --   --  43*  --   --  46   TOTAL PROTEIN g/dL  --   --   --   --  5 5*  --   --  5 5*   ALBUMIN g/dL  --   --   --   --  2 7*  --   --  2 7*   TOTAL BILIRUBIN mg/dL  --   --   --   --  0 35  --   --  0 37   EGFR ml/min/1 73sq m 100 109 119 119 115 120 117 111     Magnesium:  Results from last 7 days   Lab Units 07/18/22  0523 07/17/22  0556 07/16/22  0538 07/15/22  0453 07/14/22  0459 07/13/22  0544 07/12/22  1356   MAGNESIUM mg/dL 1 7 1 8 1 5* 1 7 1 4* 1 3* 1 8       Imaging & Testing   I have personally reviewed pertinent reports  CT abdomen pelvis wo contrast    Result Date: 7/8/2022  Narrative: CT ABDOMEN AND PELVIS WITHOUT IV CONTRAST INDICATION:   Abdominal pain   COMPARISON:  CT 4/16/2022 TECHNIQUE:  CT examination of the abdomen and pelvis was performed without intravenous contrast  This examination was performed without intravenous contrast in the context of the critical nationwide Omnipaque shortage  Axial, sagittal, and coronal 2D reformatted images were created from the source data and submitted for interpretation  Radiation dose length product (DLP) for this visit:  425 65 mGy-cm   This examination, like all CT scans performed in the [facility], was performed utilizing techniques to minimize radiation dose exposure, including the use of iterative  reconstruction and automated exposure control  Enteric contrast was not administered  FINDINGS: ABDOMEN LOWER CHEST:  Partially visualized large right pneumothorax  Right basilar atelectasis  LIVER/BILIARY TREE:  Liver is diffusely decreased in density consistent with fatty change  No CT evidence of suspicious hepatic mass  Normal hepatic contours  No biliary dilatation  GALLBLADDER:  No calcified gallstones  No pericholecystic inflammatory change  SPLEEN:  Unremarkable  PANCREAS:  Mild peripancreatic stranding suspicious for pancreatitis  No fluid collections  ADRENAL GLANDS:  Unremarkable  KIDNEYS/URETERS:  Unremarkable  No hydronephrosis  STOMACH AND BOWEL:  Unremarkable  APPENDIX:  No findings to suggest appendicitis  ABDOMINOPELVIC CAVITY:  No ascites  No pneumoperitoneum  No lymphadenopathy  VESSELS:  Unremarkable for patient's age  PELVIS REPRODUCTIVE ORGANS:  Prominent prostate  URINARY BLADDER:  Unremarkable  ABDOMINAL WALL/INGUINAL REGIONS:  Unremarkable  OSSEOUS STRUCTURES:  No acute fracture or destructive osseous lesion  Impression: 1  Incompletely visualized large right pneumothorax  Radiographic evaluation recommended  2   Mild peripancreatic stranding most concerning for pancreatitis  No peripancreatic fluid collections  3   Severe hepatic steatosis  I personally discussed this study with Dr Sharan Olmstead on 7/8/2022 at 7:03 PM  Workstation performed: [id number]     XR chest portable    Result Date: 7/14/2022  Narrative: CHEST INDICATION:   chest tube  COMPARISON:  7/13/2022 EXAM PERFORMED/VIEWS:  XR CHEST PORTABLE FINDINGS:  There is a right-sided chest tube in place  Cardiomediastinal silhouette appears unremarkable  Loop recorder projects over the left chest wall  Stable small right-sided pneumothorax  Stable right lung hazy airspace disease  Left hilar prominence, also unchanged  Sternal wires are present  Visualized osseous structures appear otherwise unremarkable for the patient's age  Impression: Stable small right-sided pneumothorax with a chest tube in place  Workstation performed: [id number]     XR chest portable    Result Date: 7/13/2022  Narrative: CHEST INDICATION:   f/u R pneumothorax  COMPARISON:  07/12/2022 07/11/2022  EXAM PERFORMED/VIEWS:  XR CHEST PORTABLE FINDINGS: Right chest tube change in orientation and location, coiled at the medial superior mediastinum  EKG leads overlie the patient  Left chest loop recorder  Cardiomediastinal silhouette appears stable  Small right apical pneumothorax, similar from 07/11/2022  Patchy opacifications throughout the right lung, similar from 07/12/2022  Left perihilar opacities redemonstrated  Sternotomy wires  Osseous structures appear within normal limits for patient age  Impression: Small right apical pneumothorax, similar from 07/11/2022  The study was marked in [facility] for immediate notification  Workstation performed: [id number]     XR chest portable    Result Date: 7/11/2022  Narrative: CHEST INDICATION:   right sided pneumothorax  COMPARISON:  Chest x-ray 7/9/2022 EXAM PERFORMED/VIEWS:  XR CHEST PORTABLE  Image: 1 FINDINGS:  Interval replacement of pre-existing right chest tube with the right apical pigtail chest tube  Reexpansion of right lung with multiple opacities present  Left lung is clear without pneumothorax  No pleural effusion  Cardiomediastinal silhouette appears unremarkable  Osseous structures appear within normal limits for patient age       Impression: Right-sided chest tube in place without pneumothorax  Multiple pulmonary opacities likely representing airspace disease and pulmonary hemorrhage  Workstation performed: [id number]     XR chest portable    Result Date: 7/9/2022  Narrative: CHEST INDICATION:   right sided pneumothorax  COMPARISON:  Chest x-ray 7/9/2022 at 07:57 EXAM PERFORMED/VIEWS:  XR CHEST PORTABLE  Image: 1 FINDINGS:  Right-sided chest tube remains in stable position with residual large right pneumothorax  Left lung is clear, no left-sided pneumothorax  No pleural effusion  Cardiomediastinal silhouette appears unremarkable  Osseous structures appear within normal limits for patient age  Left chest wall loop recorder in place  Impression: Right-sided chest tube is stable in position with persistent large right pneumothorax  Workstation performed: [id number]     XR chest portable    Result Date: 7/9/2022  Narrative: CHEST INDICATION:   followup ptx  COMPARISON:  Chest radiograph and abdomen CT from 7/8/2022  EXAM PERFORMED/VIEWS:  XR CHEST PORTABLE FINDINGS: Normal heart size, median sternotomy  Loop recorder in the left chest wall  Right chest tube with persistent large right pneumothorax and partial right lung atelectasis  No effusion  Osseous structures appear within normal limits for patient age  Impression: Right chest tube with persistent large right pneumothorax and partial right lung atelectasis  Workstation performed: [id number]     XR chest 1 view portable    Result Date: 7/9/2022  Narrative: CHEST INDICATION:   s/p chest tube  COMPARISON:  Chest radiograph from 6/6/2018, abdomen CT from 7/8/2022  EXAM PERFORMED/VIEWS:  XR CHEST PORTABLE FINDINGS: Normal heart size  Median sternotomy  Loop recorder in the left chest wall  Right chest tube with persistent large right pneumothorax and partial right lung atelectasis  Osseous structures appear within normal limits for patient age       Impression: Right chest tube with persistent large right pneumothorax and partial right lung atelectasis  Workstation performed: [id number]     CT chest wo contrast    Result Date: 7/15/2022  Narrative: CT CHEST WITHOUT IV CONTRAST INDICATION:   Pneumothorax nonhealing pneumothorax: possible bullous lung disease  COMPARISON:  None  TECHNIQUE: CT examination of the chest was performed without intravenous contrast  This examination was performed without intravenous contrast in the context of the critical nationwide Omnipaque shortage  Axial, sagittal, and coronal 2D reformatted images were created from the source data and submitted for interpretation  Radiation dose length product (DLP) for this visit:  328 91 mGy-cm   This examination, like all CT scans performed in the [place], was performed utilizing techniques to minimize radiation dose exposure, including the use of iterative  reconstruction and automated exposure control  FINDINGS: LUNGS: Posterior right lower lobe atelectasis  Bullous changes in the right apex with multiple subpleural bullae in the right upper lobe  Largest bulla measuring 2 5 cm  Right upper lobe parenchymal groundglass changes  Tubular appearing density extending from the chest wall to the right upper lobe may represent hematoma in the previously placed chest tube  PLEURA:  Moderate right pleural effusion with hyperdensity laterally suggesting hemorrhagic fluid  Right upper anterior approach pigtail chest tube in place  Moderate right pneumothorax  Septations in the pleural space anteriorly  HEART/GREAT VESSELS: Heart is unremarkable for patient's age  No thoracic aortic aneurysm  Enlarged main pulmonary artery measuring 4 cm  Left Main pulmonary artery measures 3 4 cm and right pulmonary artery measures 3 cm  MEDIASTINUM AND FRANCISCO:  Unremarkable  CHEST WALL AND LOWER NECK:  Right chest wall hematoma in series 2 image 29 measuring 5 7 x 2 3 cm in the subcutis fat with pleural deeper hematoma measuring 2 cm  Midline sternotomy  VISUALIZED STRUCTURES IN THE UPPER ABDOMEN:  Unremarkable  OSSEOUS STRUCTURES:  No acute fracture or destructive osseous lesion  Impression: 1  Moderate right pneumothorax with right apical anterior approach pigtail catheter in place  Septations in the anterior pleural space superiorly  2 Bullous changes predominantly in the right upper lobe near the apex  Largest bulla measuring 2 5 cm 3  Groundglass changes in the right upper lobe with a tubular hyperdensity extending to the pleural space laterally suggesting hematoma in the tract of the previous large bore chest tube  Hematoma in the chest wall is seen at the previous entry site  If there is persistent air leak in the Pleuro-evac pleural fistula cannot be excluded  4  Moderate right pleural effusion with small amount of hemorrhagic fluid  Workstation performed: [id number]     XR chest portable ICU    Result Date: 7/13/2022  Narrative: CHEST INDICATION:   eval pneumo  COMPARISON:  07/11/2022  EXAM PERFORMED/VIEWS:  XR CHEST PORTABLE ICU FINDINGS: Right chest tube at the lung apex  EKG leads overlie the patient  Sternotomy wires  Left chest loop recorder  Cardiomediastinal silhouette appears stable  Right pneumothorax decreased in size from prior with likely small amounts of gas trapped at the right minor fissure medially     Hazy opacification throughout the right lung slightly improved from prior  Left perihilar opacities  No sizable pleural effusion  Osseous structures appear within normal limits for patient age  Impression: Right pneumothorax decreased in size from prior with likely small amounts of gas trapped at the right minor fissure medially  Improved aeration of the right lung  Workstation performed: [id number]     XR chest portable ICU    Result Date: 7/11/2022  Narrative: CHEST INDICATION:   f/u R pneumothorax     COMPARISON:  Chest x-ray 7/10/2022 EXAM PERFORMED/VIEWS:  XR CHEST PORTABLE ICU  Images: 4 FINDINGS:  Right chest tube remains in place  Interval development of small right apical pneumothorax  Diffuse right lung opacity  Left lung is clear without pneumothorax  No pleural effusions  Cardiomediastinal silhouette appears unremarkable  Osseous structures appear within normal limits for patient age  Left chest loop recorder in place  Impression: Interval development of small right apical pneumothorax with diffuse right lung airspace disease  Right-sided chest tube remains in place  Workstation performed: [id number]     IR chest tube placement    Result Date: 7/9/2022  Narrative: PROCEDURE: Right chest tube placement Procedural Personnel Attending physician(s): Dr Magaly Rivers Pre-procedure diagnosis: Pneumothorax Post-procedure diagnosis: Same Indication: Persistent right pneumothorax despite existing right-sided chest tube  PROCEDURE SUMMARY: - Right chest tube placement under CT guidance PROCEDURE DETAILS: Pre-procedure Consent: Informed consent for the procedure including risks, benefits and alternatives was obtained and time-out was performed prior to the procedure  Preparation: The site was prepared and draped using maximal sterile barrier technique including cutaneous antisepsis  Anesthesia/sedation Level of anesthesia/sedation: Local lidocaine Right chest tube placement The patient was positioned supine  Initial imaging was performed  Local anesthesia was administered  A 19-gauge needle was advanced into the right thoracic cavity through the anterior second intercostal space  Amplatz wire was advanced through the needle  to maintain the tract  Tract was dilated and a 10 [place] catheter advanced over the wire with pigtail loop forming in the right apical chest cavity  Catheter was secured to skin using 2-0 Prolene suture and connected to pleural vac suction  Existing right lateral chest tube was removed   Radiation Dose CT dose length product (mGy-cm): 346 68 Additional Details Specimens removed: Existing right lateral chest tube  Estimated blood loss (mL): 1 Complications: No immediate complications  Impression: 1  Right apical chest tube placement using 10 Pakistani catheter  2  Existing right lateral chest tube appeared to be within the right lung parenchyma and was nonfunctional  This existing chest tube was removed  Plan: - Right chest tube to pleural vac suction  - Monitor for resolution of right pneumothorax  Workstation performed: [id number]     Echo complete w/ contrast if indicated    Result Date: 7/13/2022  Narrative: [place]  Left Ventricle: Left ventricular cavity size is normal  Wall thickness is mildly increased  Systolic function is severely reduced  Estimated ejection fraction is 25-30% on a noncontrast study  Wall motion cannot be accurately assessed  Diastolic function is mildly abnormal, consistent with grade I (abnormal) relaxation    Right Ventricle: Systolic function is low normal    Mitral Valve: There is mild regurgitation    Tricuspid Valve: There is mild regurgitation    Pulmonic Valve: There is mild to moderate regurgitation  Nellie Hickey, [facility]  Cardiology      "This note has been constructed using a voice recognition system  Therefore there may be syntax, spelling, and/or grammatical errors   Please call if you have any questions  "

## 2022-07-18 NOTE — PLAN OF CARE
Problem: Nutrition/Hydration-ADULT  Goal: Nutrient/Hydration intake appropriate for improving, restoring or maintaining nutritional needs  Description: Monitor and assess patient's nutrition/hydration status for malnutrition  Collaborate with interdisciplinary team and initiate plan and interventions as ordered  Monitor patient's weight and dietary intake as ordered or per policy  Utilize nutrition screening tool and intervene as necessary  Determine patient's food preferences and provide high-protein, high-caloric foods as appropriate       INTERVENTIONS:  - Monitor oral intake, urinary output, labs, and treatment plans  - Assess nutrition and hydration status and recommend course of action  - Evaluate amount of meals eaten  - Assist patient with eating if necessary   - Allow adequate time for meals  - Recommend/ encourage appropriate diets, oral nutritional supplements, and vitamin/mineral supplements  - Order, calculate, and assess calorie counts as needed  - Recommend, monitor, and adjust tube feedings and TPN/PPN based on assessed needs  - Assess need for intravenous fluids  - Provide specific nutrition/hydration education as appropriate  - Include patient/family/caregiver in decisions related to nutrition  Outcome: Progressing     Problem: MOBILITY - ADULT  Goal: Maintain or return to baseline ADL function  Description: INTERVENTIONS:  -  Assess patient's ability to carry out ADLs; assess patient's baseline for ADL function and identify physical deficits which impact ability to perform ADLs (bathing, care of mouth/teeth, toileting, grooming, dressing, etc )  - Assess/evaluate cause of self-care deficits   - Assess range of motion  - Assess patient's mobility; develop plan if impaired  - Assess patient's need for assistive devices and provide as appropriate  - Encourage maximum independence but intervene and supervise when necessary  - Involve family in performance of ADLs  - Assess for home care needs following discharge   - Consider OT consult to assist with ADL evaluation and planning for discharge  - Provide patient education as appropriate  Outcome: Progressing  Goal: Maintains/Returns to pre admission functional level  Description: INTERVENTIONS:  - Perform BMAT or MOVE assessment daily    - Set and communicate daily mobility goal to care team and patient/family/caregiver  - Collaborate with rehabilitation services on mobility goals if consulted  - Perform Range of Motion 3 times a day  - Reposition patient every 2 hours    - Dangle patient 3 times a day  - Stand patient 3 times a day  - Ambulate patient 3 times a day  - Out of bed to chair 3 times a day   - Out of bed for meals 3 times a day  - Out of bed for toileting  - Record patient progress and toleration of activity level   Outcome: Progressing     Problem: PAIN - ADULT  Goal: Verbalizes/displays adequate comfort level or baseline comfort level  Description: Interventions:  - Encourage patient to monitor pain and request assistance  - Assess pain using appropriate pain scale  - Administer analgesics based on type and severity of pain and evaluate response  - Implement non-pharmacological measures as appropriate and evaluate response  - Consider cultural and social influences on pain and pain management  - Notify physician/advanced practitioner if interventions unsuccessful or patient reports new pain  Outcome: Progressing     Problem: INFECTION - ADULT  Goal: Absence or prevention of progression during hospitalization  Description: INTERVENTIONS:  - Assess and monitor for signs and symptoms of infection  - Monitor lab/diagnostic results  - Monitor all insertion sites, i e  indwelling lines, tubes, and drains  - Monitor endotracheal if appropriate and nasal secretions for changes in amount and color  - [place] appropriate cooling/warming therapies per order  - Administer medications as ordered  - Instruct and encourage patient and family to use good hand hygiene technique  - Identify and instruct in appropriate isolation precautions for identified infection/condition  Outcome: Progressing  Goal: Absence of fever/infection during neutropenic period  Description: INTERVENTIONS:  - Monitor WBC    Outcome: Progressing     Problem: INFECTION - ADULT  Goal: Absence of fever/infection during neutropenic period  Description: INTERVENTIONS:  - Monitor WBC    Outcome: Progressing     Problem: SAFETY ADULT  Goal: Maintain or return to baseline ADL function  Description: INTERVENTIONS:  -  Assess patient's ability to carry out ADLs; assess patient's baseline for ADL function and identify physical deficits which impact ability to perform ADLs (bathing, care of mouth/teeth, toileting, grooming, dressing, etc )  - Assess/evaluate cause of self-care deficits   - Assess range of motion  - Assess patient's mobility; develop plan if impaired  - Assess patient's need for assistive devices and provide as appropriate  - Encourage maximum independence but intervene and supervise when necessary  - Involve family in performance of ADLs  - Assess for home care needs following discharge   - Consider OT consult to assist with ADL evaluation and planning for discharge  - Provide patient education as appropriate  Outcome: Progressing  Goal: Maintains/Returns to pre admission functional level  Description: INTERVENTIONS:  - Perform BMAT or MOVE assessment daily    - Set and communicate daily mobility goal to care team and patient/family/caregiver  - Collaborate with rehabilitation services on mobility goals if consulted  - Perform Range of Motion 3 times a day  - Reposition patient every 2 hours    - Dangle patient 3 times a day  - Stand patient 3 times a day  - Ambulate patient 3 times a day  - Out of bed to chair 3 times a day   - Out of bed for meals 3 times a day  - Out of bed for toileting  - Record patient progress and toleration of activity level   Outcome: Progressing  Goal: Patient will remain free of falls  Description: INTERVENTIONS:  - Educate patient/family on patient safety including physical limitations  - Instruct patient to call for assistance with activity   - Consult OT/PT to assist with strengthening/mobility   - Keep Call bell within reach  - Keep bed low and locked with side rails adjusted as appropriate  - Keep care items and personal belongings within reach  - Initiate and maintain comfort rounds  - Make Fall Risk Sign visible to staff  - Offer Toileting every 2 Hours, in advance of need  - Initiate/Maintain bed alarm  - Obtain necessary fall risk management equipment:   - Apply yellow socks and bracelet for high fall risk patients  - Consider moving patient to room near nurses station  Outcome: Progressing     Problem: DISCHARGE PLANNING  Goal: Discharge to home or other facility with appropriate resources  Description: INTERVENTIONS:  - Identify barriers to discharge w/patient and caregiver  - Arrange for needed discharge resources and transportation as appropriate  - Identify discharge learning needs (meds, wound care, etc )  - Arrange for interpretive services to assist at discharge as needed  - Refer to Case Management Department for coordinating discharge planning if the patient needs post-hospital services based on physician/advanced practitioner order or complex needs related to functional status, cognitive ability, or social support system  Outcome: Progressing     Problem: Knowledge Deficit  Goal: Patient/family/caregiver demonstrates understanding of disease process, treatment plan, medications, and discharge instructions  Description: Complete learning assessment and assess knowledge base    Interventions:  - Provide teaching at level of understanding  - Provide teaching via preferred learning methods  Outcome: Progressing     Problem: Prexisting or High Potential for Compromised Skin Integrity  Goal: Skin integrity is maintained or improved  Description: INTERVENTIONS:  - Identify patients at risk for skin breakdown  - Assess and monitor skin integrity  - Assess and monitor nutrition and hydration status  - Monitor labs   - Assess for incontinence   - Turn and reposition patient  - Assist with mobility/ambulation  - Relieve pressure over bony prominences  - Avoid friction and shearing  - Provide appropriate hygiene as needed including keeping skin clean and dry  - Evaluate need for skin moisturizer/barrier cream  - Collaborate with interdisciplinary team   - Patient/family teaching  - Consider wound care consult   Outcome: Progressing     Problem: NEUROSENSORY - ADULT  Goal: Achieves stable or improved neurological status  Description: INTERVENTIONS  - Monitor and report changes in neurological status  - Monitor vital signs such as temperature, blood pressure, glucose, and any other labs ordered   - Initiate measures to prevent increased intracranial pressure  - Monitor for seizure activity and implement precautions if appropriate      Outcome: Progressing  Goal: Remains free of injury related to seizures activity  Description: INTERVENTIONS  - Maintain airway, patient safety  and administer oxygen as ordered  - Monitor patient for seizure activity, document and report duration and description of seizure to physician/advanced practitioner  - If seizure occurs,  ensure patient safety during seizure  - Reorient patient post seizure  - Seizure pads on all 4 side rails  - Instruct patient/family to notify RN of any seizure activity including if an aura is experienced  - Instruct patient/family to call for assistance with activity based on nursing assessment  - Administer anti-seizure medications if ordered    Outcome: Progressing  Goal: Achieves maximal functionality and self care  Description: INTERVENTIONS  - Monitor swallowing and airway patency with patient fatigue and changes in neurological status  - Encourage and assist patient to increase activity and self care  - Encourage visually impaired, hearing impaired and aphasic patients to use assistive/communication devices  Outcome: Progressing     Problem: RESPIRATORY - ADULT  Goal: Achieves optimal ventilation and oxygenation  Description: INTERVENTIONS:  - Assess for changes in respiratory status  - Assess for changes in mentation and behavior  - Position to facilitate oxygenation and minimize respiratory effort  - Oxygen administered by appropriate delivery if ordered  - Initiate smoking cessation education as indicated  - Encourage broncho-pulmonary hygiene including cough, deep breathe, Incentive Spirometry  - Assess the need for suctioning and aspirate as needed  - Assess and instruct to report SOB or any respiratory difficulty  - Respiratory Therapy support as indicated  Outcome: Progressing     Problem: METABOLIC, FLUID AND ELECTROLYTES - ADULT  Goal: Electrolytes maintained within normal limits  Description: INTERVENTIONS:  - Monitor labs and assess patient for signs and symptoms of electrolyte imbalances  - Administer electrolyte replacement as ordered  - Monitor response to electrolyte replacements, including repeat lab results as appropriate  - Instruct patient on fluid and nutrition as appropriate  Outcome: Progressing  Goal: Fluid balance maintained  Description: INTERVENTIONS:  - Monitor labs   - Monitor I/O and WT  - Instruct patient on fluid and nutrition as appropriate  - Assess for signs & symptoms of volume excess or deficit  Outcome: Progressing  Goal: Glucose maintained within target range  Description: INTERVENTIONS:  - Monitor Blood Glucose as ordered  - Assess for signs and symptoms of hyperglycemia and hypoglycemia  - Administer ordered medications to maintain glucose within target range  - Assess nutritional intake and initiate nutrition service referral as needed  Outcome: Progressing     Problem: Potential for Falls  Goal: Patient will remain free of falls  Description: INTERVENTIONS:  - Educate patient/family on patient safety including physical limitations  - Instruct patient to call for assistance with activity   - Consult OT/PT to assist with strengthening/mobility   - Keep Call bell within reach  - Keep bed low and locked with side rails adjusted as appropriate  - Keep care items and personal belongings within reach  - Initiate and maintain comfort rounds  - Make Fall Risk Sign visible to staff  - Offer Toileting every 2 Hours, in advance of need  - Initiate/Maintain bed alarm  - Obtain necessary fall risk management equipment:   - Apply yellow socks and bracelet for high fall risk patients  - Consider moving patient to room near nurses station  Outcome: Progressing

## 2022-07-18 NOTE — PROGRESS NOTES
Progress Note - Pulmonary   Nic Ayala 59 y o  male MRN: [id number]  Unit/Bed#: 2 [place] [address] Encounter: [id number]    Assessment/Plan:  1  Pneumothorax, acute  · Awaiting transfer to [facility]  · Continue chest tube to wall suction, still with large air leak  · No imaging since 07/14, chest chest x-ray today  · CT imaging on 07/14 did not show resolution of the pneumothorax  2  Small right pleural effusion  · Repeat chest x-ray today  3  Bullous lung disease  · Has abnormal imaging with emphysematous changes/bulla    ADDENDUM: CXR from today reviewed and shows clear lung fields but with persistent small right pneumothorax  ----------------------------------------------------------------------------------------------------------------------    HPI/Interval History:   Patient appears comfortable  Has no new symptoms  Denies any chest pain    Vitals:   Blood pressure 117/75, pulse 86, temperature 99 1 °F (37 3 °C), temperature source Oral, resp  rate 18, height 5' 8" (1 727 m), weight 65 7 kg (144 lb 13 5 oz), SpO2 96 %  ,Body mass index is 22 02 kg/m²  SpO2: 96 %  SpO2 Activity: At Rest  O2 Device: None (Room air)    Physical Exam:   Gen:  Appears comfortable on room air  HEENT:  Normal  Neck:  No JVD or adenopathy  Chest:  Symmetric and distant  More diminished on the right side  Chest tube connections intact  Pleur-Evac with large air leak  Cardiac:  Regular  Abdomen:  Soft  Extremities:  No edema      Labs:    CBC:  Results from last 7 days   Lab Units 07/18/22  0523 07/17/22  0556 07/16/22  0538   WBC Thousand/uL  --   --  6 90   HEMOGLOBIN g/dL 12 3   < > 11 0*   HEMATOCRIT % 37 6   < > 33 4*   PLATELETS Thousands/uL  --   --  494*    < > = values in this interval not displayed           BMP:   Lab Results   Component Value Date    SODIUM 132 (L) 07/18/2022    K 4 5 07/18/2022    CO2 26 07/18/2022    CL 96 (L) 07/18/2022    BUN 11 07/18/2022    CREATININE 0 74 07/18/2022    CALCIUM 9 8 07/18/2022 Imaging studies:  7/14 CT scan of the chest was viewed on the [facility] system  Has persistent pneumothorax despite the presence of the chest tube  Small right basilar pleural effusion    Has bullous changes on imaging as well      Romy Rome MD

## 2022-07-18 NOTE — PHYSICAL THERAPY NOTE
PT Cancellation Note       07/18/22 1401   PT Last Visit   PT Visit Date 07/18/22   Note Type   Note Type Cancelled Session   Cancel Reasons Other  (Patient refusing PT today despite verbal encouragement - reports he did not sleep well and not willing to participate in PT today   Will follow-up as tolerated/appropriate )   Licensure   [place] License Number  Sue Funes [id number]

## 2022-07-18 NOTE — PLAN OF CARE
Problem: Nutrition/Hydration-ADULT  Goal: Nutrient/Hydration intake appropriate for improving, restoring or maintaining nutritional needs  Description: Monitor and assess patient's nutrition/hydration status for malnutrition  Collaborate with interdisciplinary team and initiate plan and interventions as ordered  Monitor patient's weight and dietary intake as ordered or per policy  Utilize nutrition screening tool and intervene as necessary  Determine patient's food preferences and provide high-protein, high-caloric foods as appropriate       INTERVENTIONS:  - Monitor oral intake, urinary output, labs, and treatment plans  - Assess nutrition and hydration status and recommend course of action  - Evaluate amount of meals eaten  - Assist patient with eating if necessary   - Allow adequate time for meals  - Recommend/ encourage appropriate diets, oral nutritional supplements, and vitamin/mineral supplements  - Order, calculate, and assess calorie counts as needed  - Recommend, monitor, and adjust tube feedings and TPN/PPN based on assessed needs  - Assess need for intravenous fluids  - Provide specific nutrition/hydration education as appropriate  - Include patient/family/caregiver in decisions related to nutrition  Outcome: Progressing     Problem: MOBILITY - ADULT  Goal: Maintain or return to baseline ADL function  Description: INTERVENTIONS:  -  Assess patient's ability to carry out ADLs; assess patient's baseline for ADL function and identify physical deficits which impact ability to perform ADLs (bathing, care of mouth/teeth, toileting, grooming, dressing, etc )  - Assess/evaluate cause of self-care deficits   - Assess range of motion  - Assess patient's mobility; develop plan if impaired  - Assess patient's need for assistive devices and provide as appropriate  - Encourage maximum independence but intervene and supervise when necessary  - Involve family in performance of ADLs  - Assess for home care needs following discharge   - Consider OT consult to assist with ADL evaluation and planning for discharge  - Provide patient education as appropriate  Outcome: Progressing        Problem: PAIN - ADULT  Goal: Verbalizes/displays adequate comfort level or baseline comfort level  Description: Interventions:  - Encourage patient to monitor pain and request assistance  - Assess pain using appropriate pain scale  - Administer analgesics based on type and severity of pain and evaluate response  - Implement non-pharmacological measures as appropriate and evaluate response  - Consider cultural and social influences on pain and pain management  - Notify physician/advanced practitioner if interventions unsuccessful or patient reports new pain  Outcome: Progressing     Problem: INFECTION - ADULT  Goal: Absence or prevention of progression during hospitalization  Description: INTERVENTIONS:  - Assess and monitor for signs and symptoms of infection  - Monitor lab/diagnostic results  - Monitor all insertion sites, i e  indwelling lines, tubes, and drains  - Monitor endotracheal if appropriate and nasal secretions for changes in amount and color  - [place] appropriate cooling/warming therapies per order  - Administer medications as ordered  - Instruct and encourage patient and family to use good hand hygiene technique  - Identify and instruct in appropriate isolation precautions for identified infection/condition  Outcome: Progressing  Goal: Absence of fever/infection during neutropenic period  Description: INTERVENTIONS:  - Monitor WBC    Outcome: Progressing     Problem: SAFETY ADULT  Goal: Maintain or return to baseline ADL function  Description: INTERVENTIONS:  -  Assess patient's ability to carry out ADLs; assess patient's baseline for ADL function and identify physical deficits which impact ability to perform ADLs (bathing, care of mouth/teeth, toileting, grooming, dressing, etc )  - Assess/evaluate cause of self-care deficits - Assess range of motion  - Assess patient's mobility; develop plan if impaired  - Assess patient's need for assistive devices and provide as appropriate  - Encourage maximum independence but intervene and supervise when necessary  - Involve family in performance of ADLs  - Assess for home care needs following discharge   - Consider OT consult to assist with ADL evaluation and planning for discharge  - Provide patient education as appropriate  Outcome: Progressing  Goal: Maintains/Returns to pre admission functional level  Description: INTERVENTIONS:  - Perform BMAT or MOVE assessment daily    - Set and communicate daily mobility goal to care team and patient/family/caregiver  - Collaborate with rehabilitation services on mobility goals if consulted  - Perform Range of Motion 4 times a day  - Reposition patient every 2 hours    - Dangle patient 1 times a day  - Stand patient 1 times a day  - Ambulate patient 1 times a day     - Record patient progress and toleration of activity level   Outcome: Progressing        Problem: DISCHARGE PLANNING  Goal: Discharge to home or other facility with appropriate resources  Description: INTERVENTIONS:  - Identify barriers to discharge w/patient and caregiver  - Arrange for needed discharge resources and transportation as appropriate  - Identify discharge learning needs (meds, wound care, etc )  - Arrange for interpretive services to assist at discharge as needed  - Refer to Case Management Department for coordinating discharge planning if the patient needs post-hospital services based on physician/advanced practitioner order or complex needs related to functional status, cognitive ability, or social support system  Outcome: Progressing     Problem: Knowledge Deficit  Goal: Patient/family/caregiver demonstrates understanding of disease process, treatment plan, medications, and discharge instructions  Description: Complete learning assessment and assess knowledge base   Interventions:  - Provide teaching at level of understanding  - Provide teaching via preferred learning methods  Outcome: Progressing     Problem: Prexisting or High Potential for Compromised Skin Integrity  Goal: Skin integrity is maintained or improved  Description: INTERVENTIONS:  - Identify patients at risk for skin breakdown  - Assess and monitor skin integrity  - Assess and monitor nutrition and hydration status  - Monitor labs   - Assess for incontinence   - Turn and reposition patient  - Assist with mobility/ambulation  - Relieve pressure over bony prominences  - Avoid friction and shearing  - Provide appropriate hygiene as needed including keeping skin clean and dry  - Evaluate need for skin moisturizer/barrier cream  - Collaborate with interdisciplinary team   - Patient/family teaching  - Consider wound care consult   Outcome: Progressing     Problem: NEUROSENSORY - ADULT  Goal: Achieves stable or improved neurological status  Description: INTERVENTIONS  - Monitor and report changes in neurological status  - Monitor vital signs such as temperature, blood pressure, glucose, and any other labs ordered   - Initiate measures to prevent increased intracranial pressure  - Monitor for seizure activity and implement precautions if appropriate      Outcome: Progressing  Goal: Remains free of injury related to seizures activity  Description: INTERVENTIONS  - Maintain airway, patient safety  and administer oxygen as ordered  - Monitor patient for seizure activity, document and report duration and description of seizure to physician/advanced practitioner  - If seizure occurs,  ensure patient safety during seizure  - Reorient patient post seizure  - Seizure pads on all 4 side rails  - Instruct patient/family to notify RN of any seizure activity including if an aura is experienced  - Instruct patient/family to call for assistance with activity based on nursing assessment  - Administer anti-seizure medications if ordered    Outcome: Progressing  Goal: Achieves maximal functionality and self care  Description: INTERVENTIONS  - Monitor swallowing and airway patency with patient fatigue and changes in neurological status  - Encourage and assist patient to increase activity and self care     - Encourage visually impaired, hearing impaired and aphasic patients to use assistive/communication devices  Outcome: Progressing     Problem: RESPIRATORY - ADULT  Goal: Achieves optimal ventilation and oxygenation  Description: INTERVENTIONS:  - Assess for changes in respiratory status  - Assess for changes in mentation and behavior  - Position to facilitate oxygenation and minimize respiratory effort  - Oxygen administered by appropriate delivery if ordered  - Initiate smoking cessation education as indicated  - Encourage broncho-pulmonary hygiene including cough, deep breathe, Incentive Spirometry  - Assess the need for suctioning and aspirate as needed  - Assess and instruct to report SOB or any respiratory difficulty  - Respiratory Therapy support as indicated  Outcome: Progressing     Problem: METABOLIC, FLUID AND ELECTROLYTES - ADULT  Goal: Electrolytes maintained within normal limits  Description: INTERVENTIONS:  - Monitor labs and assess patient for signs and symptoms of electrolyte imbalances  - Administer electrolyte replacement as ordered  - Monitor response to electrolyte replacements, including repeat lab results as appropriate  - Instruct patient on fluid and nutrition as appropriate  Outcome: Progressing  Goal: Fluid balance maintained  Description: INTERVENTIONS:  - Monitor labs   - Monitor I/O and WT  - Instruct patient on fluid and nutrition as appropriate  - Assess for signs & symptoms of volume excess or deficit  Outcome: Progressing  Goal: Glucose maintained within target range  Description: INTERVENTIONS:  - Monitor Blood Glucose as ordered  - Assess for signs and symptoms of hyperglycemia and hypoglycemia  - Administer ordered medications to maintain glucose within target range  - Assess nutritional intake and initiate nutrition service referral as needed  Outcome: Progressing     Problem: Potential for Falls  Goal: Patient will remain free of falls  Description: INTERVENTIONS:  - Educate patient/family on patient safety including physical limitations  - Instruct patient to call for assistance with activity   - Consult OT/PT to assist with strengthening/mobility   - Keep Call bell within reach  - Keep bed low and locked with side rails adjusted as appropriate  - Keep care items and personal belongings within reach  - Initiate and maintain comfort rounds  - Make Fall Risk Sign visible to staff  - Offer Toileting every 2 Hours, in advance of need  - Initiate/Maintain alarm  - Obtain necessary fall risk management equipment: alarm  - Apply yellow socks and bracelet for high fall risk patients  - Consider moving patient to room near nurses station  Outcome: Progressing

## 2022-07-18 NOTE — PROGRESS NOTES
[facility] Progress Note  Patient: Sue Johnson 59 y o  male   MRN: [id number]  PCP: Chela Mcgill MD  Unit/Bed#: [address] [place] [address] Encounter: [id number]  Date Of Visit: 07/18/22    Problem List:    Principal Problem:    Pneumothorax  Active Problems:    Electrolyte abnormality    Alcohol withdrawal ([facility] )    Cardiomyopathy, 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    Paroxysmal atrial fibrillation ([facility] )    Tobacco abuse      Assessment & Plan:    * Pneumothorax  Assessment & Plan  Incidental finding  Denied SOB, chest pain  Denied fall or trauma  · Large right pneumothorax initially, S/P right chest tube insertion in ED to wall suction on 07/08  · On 07/09 underwent IR chest tube placement due to persistent pneumothorax  · Chest x-ray 7/13 shows small PTX  · CT chest - moderate right PTX, bullous changes with largest bulla 2 5 cm  Hematoma in the tract of prior large bore chest tube, hematoma in chest wall, moderate right pleural effusion with some hemorrhagic fluid  · Case discussed with thoracic surgery and plan is for transfer to [place] when bed available  · Case was discussed with Dr Yamil Shaikh over there  · Risks/benefits of transfer discussed with patient and he is in agreement  Patient's sister also aware of the transfer      Electrolyte abnormality  Assessment & Plan  · Patient with mild hyponatremia  Electrolytes improved status post repletion  · get repeat lab work in a m  Alcohol withdrawal ([facility] )  Assessment & Plan  Patient reports drinking about 6 drinks per day  Last drink was on day of admission  · Complete alcohol cessation discussed with patient  · Initially on CIWA protocol on admission  · Developed alcohol withdrawal symptoms 07/09-7/10 with confusion, tachycardia, agitation, DTs  Received total of 6 mg of IV Ativan without any improvement  · Transferred to step-down unit and received IV phenobarb and also required p r n   Valium doses  · Was also on Precedex drip which was later discontinued  · Patient interested in inpatient alcohol rehab after discharge  · Continue MVI, thiamine, folic acid    Cardiomyopathy, 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  Assessment & Plan  · TONYA in 2018 showed normal EF  · Echo on this admission shows EF of 25-30% with grade 1 diastolic dysfunction  · Continue losartan, Toprol-XL, Aldactone with holding parameter  · Cardiology was consulted for medication optimization as patient on many medications at home that are not ideal    Tobacco abuse  Assessment & Plan  Continue nicotine patch  Smoking cessation discussed  Paroxysmal atrial fibrillation [facility])  Assessment & Plan  Patient states he stopped taking Xarelto about 1 month ago as he ran out of it  Patient was on Cardizem, sotalol, Toprol XL in the past as well  He denies taking any prescription medications except Xarelto which he stopped 1 month ago  History of cardioversion in 2018  · Tachycardic in ED  · Per prior provider, EKG showed sinus tach, rate 106, , V1 to V3 TWI  · Continue Toprol-XL  · 81 mg of aspirin discontinued due to findings of hematoma/hemorrhagic fluid  Per Cardiology ASA can be started at a later time  · Continue to hold anticoagulation as patient reports frequent falls at home        Encephalopathy-resolved as of 7/15/2022  Assessment & Plan  Due to alcohol abuse/withdrawal  Mental status has improved & patient AAO x3    Alcohol-induced acute pancreatitis-resolved as of 7/15/2022  Assessment & Plan  Patient presented with epigastric pain, nausea, vomiting for about 1 week  History of alcohol induced pancreatitis and alcoholic hepatitis  · CT showed - Mild peripancreatic stranding suggestive of pancreatitis  Severe hepatic steatosis was noted  No fluid collections  · Lipase 1700 on admission --> 392 on 7/11  Denies any abdominal pain  Tolerating diet  · Alcohol level on admission 182    · Triglycerides 44  · Was on IV fluids previously which has been discontinued      Chest pain-resolved as of 2022  Assessment & Plan  Per ICU note, patient reported some chest pain around chest tube site  Troponins negative  VTE Pharmacologic Prophylaxis: VTE Score: 1 Low Risk (Score 0-2) - Encourage Ambulation /non due to hematoma/hemorrhagic fluid    Patient Centered Rounds: I performed bedside rounds with nursing staff today  Discussions with Specialists or Other Care Team Provider: yes - cardio    Education and Discussions with Family / Patient: yes    Time Spent for Care: 30 minutes  More than 50% of total time spent on counseling and coordination of care as described above  Current Length of Stay: 10 day(s)  Current Patient Status: Inpatient   Certification Statement: The patient will continue to require additional inpatient hospital stay due to Persistent pneumothorax requiring CT surgery evaluation, awaiting bed  Discharge Plan: [facility] once bed available    Code Status: Level 1 - Full Code    Subjective:     Patient reports patiently waiting to be transferred  Denies any shortness of breath, chest pain, abdominal pain    Objective:     Vitals:   Temp (24hrs), Av 9 °F (37 2 °C), Min:98 6 °F (37 °C), Max:99 1 °F (37 3 °C)    Temp:  [98 6 °F (37 °C)-99 1 °F (37 3 °C)] 98 6 °F (37 °C)  HR:  [77-86] 79  Resp:  [16-18] 18  BP: ()/(50-75) 92/57  SpO2:  [94 %-98 %] 94 %  Body mass index is 22 02 kg/m²  Input and Output Summary (last 24 hours): Intake/Output Summary (Last 24 hours) at 2022 1811  Last data filed at 2022 1601  Gross per 24 hour   Intake 640 ml   Output 1400 ml   Net -760 ml       Physical Exam:   Physical Exam  Constitutional:       General: He is not in acute distress  Appearance: He is well-developed  He is ill-appearing (Chronically)  HENT:      Head: Normocephalic and atraumatic  Eyes:      General: No scleral icterus  Cardiovascular:      Rate and Rhythm: Normal rate and regular rhythm  Pulmonary:      Effort: Pulmonary effort is normal  No respiratory distress  Breath sounds: No wheezing or rales  Comments: Right-sided Chest tube in place  Decreased breath sounds  Abdominal:      General: Bowel sounds are normal  There is no distension  Palpations: Abdomen is soft  Tenderness: There is no abdominal tenderness  Neurological:      Mental Status: He is alert and oriented to person, place, and time  Additional Data:     Labs:  Results from last 7 days   Lab Units 07/18/22  0523 07/17/22  0556 07/16/22  0538 07/13/22  0544   WBC Thousand/uL  --   --  6 90 7 52   HEMOGLOBIN g/dL 12 3   < > 11 0* 10 1*   HEMATOCRIT % 37 6   < > 33 4* 30 2*   PLATELETS Thousands/uL  --   --  494* 277   NEUTROS PCT %  --   --   --  64   LYMPHS PCT %  --   --   --  21   MONOS PCT %  --   --   --  12   EOS PCT %  --   --   --  2    < > = values in this interval not displayed  Results from last 7 days   Lab Units 07/18/22  0523 07/15/22  0453 07/14/22  0459   SODIUM mmol/L 132*   < > 133*   POTASSIUM mmol/L 4 5   < > 3 7   CHLORIDE mmol/L 96*   < > 98*   CO2 mmol/L 26   < > 30   BUN mg/dL 11   < > 6   CREATININE mg/dL 0 74   < > 0 53*   ANION GAP mmol/L 10   < > 5   CALCIUM mg/dL 9 8   < > 8 2*   ALBUMIN g/dL  --   --  2 7*   TOTAL BILIRUBIN mg/dL  --   --  0 35   ALK PHOS U/L  --   --  43*   ALT U/L  --   --  12   AST U/L  --   --  26   GLUCOSE RANDOM mg/dL 123   < > 95    < > = values in this interval not displayed  Lines/Drains:  Invasive Devices  Report    Peripheral Intravenous Line  Duration           Long-Dwell Peripheral IV (Midline) 03/11/80 Right Basilic 8 days    Peripheral IV 07/17/22 Dorsal (posterior); Proximal;Right Forearm <1 day          Drain  Duration           Chest Tube Right Second intercostal space 10 2 Fr  9 days    External Urinary Catheter 6 days                Imaging: No pertinent imaging reviewed      Recent Cultures (last 7 days): Last 24 Hours Medication List:   Current Facility-Administered Medications   Medication Dose Route Frequency Provider Last Rate    acetaminophen  650 mg Oral Q6H PRN CHEN Melendrez      albumin human  25 g Intravenous Once Denita Hammer DO      folic acid  1 mg Oral Daily Olga Spirofranciscao CHEN YOUNG      levalbuterol  0 63 mg Nebulization Q6H PRN Olga Villegaso CHEN YOUNG      lidocaine  1 patch Topical Daily Cecilia House MD      losartan  25 mg Oral Daily Priyank Main MD      magnesium oxide  400 mg Oral BID CHEN Steele      metoprolol succinate  25 mg Oral HS CHEN Steele      nicotine  1 patch Transdermal Daily CHEN Melendrez      ondansetron  4 mg Intravenous Q6H PRN CHEN Melendrez      spironolactone  25 mg Oral Daily CHEN Steele      thiamine  100 mg Oral Daily CHEN Melendrez          Today, Patient Was Seen By: Mark Pastor DO    ** Please Note: "This note has been constructed using a voice recognition system  Therefore there may be syntax, spelling, and/or grammatical errors   Please call if you have any questions  "**

## 2022-07-19 ENCOUNTER — HOSPITAL ENCOUNTER (INPATIENT)
Facility: HOSPITAL | Age: 57
LOS: 8 days | Discharge: HOME HEALTH | DRG: 163 | End: 2022-07-27
Attending: INTERNAL MEDICINE | Admitting: INTERNAL MEDICINE
Payer: COMMERCIAL

## 2022-07-19 VITALS
WEIGHT: 144.84 LBS | DIASTOLIC BLOOD PRESSURE: 56 MMHG | HEIGHT: 68 IN | BODY MASS INDEX: 21.95 KG/M2 | OXYGEN SATURATION: 96 % | SYSTOLIC BLOOD PRESSURE: 99 MMHG | TEMPERATURE: 97.2 F | HEART RATE: 90 BPM | RESPIRATION RATE: 18 BRPM

## 2022-07-19 DIAGNOSIS — I42.9 CARDIOMYOPATHY, SECONDARY (HCC): Primary | ICD-10-CM

## 2022-07-19 DIAGNOSIS — J93.9 PNEUMOTHORAX, UNSPECIFIED TYPE: ICD-10-CM

## 2022-07-19 DIAGNOSIS — I48.0 PAROXYSMAL ATRIAL FIBRILLATION (HCC): ICD-10-CM

## 2022-07-19 DIAGNOSIS — K76.0 HEPATIC STEATOSIS: ICD-10-CM

## 2022-07-19 DIAGNOSIS — F10.10 ALCOHOL ABUSE: ICD-10-CM

## 2022-07-19 DIAGNOSIS — J93.11 PRIMARY SPONTANEOUS PNEUMOTHORAX: ICD-10-CM

## 2022-07-19 DIAGNOSIS — Z72.0 TOBACCO ABUSE: ICD-10-CM

## 2022-07-19 DIAGNOSIS — N17.9 AKI (ACUTE KIDNEY INJURY) (HCC): ICD-10-CM

## 2022-07-19 LAB
ANION GAP SERPL CALCULATED.3IONS-SCNC: 10 MMOL/L (ref 4–13)
BUN SERPL-MCNC: 20 MG/DL (ref 5–25)
CALCIUM SERPL-MCNC: 9.3 MG/DL (ref 8.3–10.1)
CHLORIDE SERPL-SCNC: 97 MMOL/L (ref 96–108)
CO2 SERPL-SCNC: 21 MMOL/L (ref 21–32)
CREAT SERPL-MCNC: 0.73 MG/DL (ref 0.6–1.3)
ERYTHROCYTE [DISTWIDTH] IN BLOOD BY AUTOMATED COUNT: 14.6 % (ref 11.6–15.1)
GFR SERPL CREATININE-BSD FRML MDRD: 101 ML/MIN/1.73SQ M
GLUCOSE SERPL-MCNC: 90 MG/DL (ref 65–140)
HCT VFR BLD AUTO: 37.8 % (ref 36.5–49.3)
HGB BLD-MCNC: 12.4 G/DL (ref 12–17)
MCH RBC QN AUTO: 30.5 PG (ref 26.8–34.3)
MCHC RBC AUTO-ENTMCNC: 32.8 G/DL (ref 31.4–37.4)
MCV RBC AUTO: 93 FL (ref 82–98)
OSMOLALITY UR: 742 MMOL/KG
PLATELET # BLD AUTO: 599 THOUSANDS/UL (ref 149–390)
PMV BLD AUTO: 8.5 FL (ref 8.9–12.7)
POTASSIUM SERPL-SCNC: 4.8 MMOL/L (ref 3.5–5.3)
RBC # BLD AUTO: 4.07 MILLION/UL (ref 3.88–5.62)
SODIUM SERPL-SCNC: 128 MMOL/L (ref 135–147)
WBC # BLD AUTO: 10.11 THOUSAND/UL (ref 4.31–10.16)

## 2022-07-19 PROCEDURE — 97110 THERAPEUTIC EXERCISES: CPT

## 2022-07-19 PROCEDURE — 99232 SBSQ HOSP IP/OBS MODERATE 35: CPT | Performed by: FAMILY MEDICINE

## 2022-07-19 PROCEDURE — 99232 SBSQ HOSP IP/OBS MODERATE 35: CPT | Performed by: INTERNAL MEDICINE

## 2022-07-19 PROCEDURE — 85027 COMPLETE CBC AUTOMATED: CPT | Performed by: FAMILY MEDICINE

## 2022-07-19 PROCEDURE — 97530 THERAPEUTIC ACTIVITIES: CPT

## 2022-07-19 PROCEDURE — 84300 ASSAY OF URINE SODIUM: CPT | Performed by: NURSE PRACTITIONER

## 2022-07-19 PROCEDURE — 99238 HOSP IP/OBS DSCHRG MGMT 30/<: CPT | Performed by: FAMILY MEDICINE

## 2022-07-19 PROCEDURE — 80048 BASIC METABOLIC PNL TOTAL CA: CPT | Performed by: FAMILY MEDICINE

## 2022-07-19 PROCEDURE — 94760 N-INVAS EAR/PLS OXIMETRY 1: CPT

## 2022-07-19 PROCEDURE — 83935 ASSAY OF URINE OSMOLALITY: CPT | Performed by: NURSE PRACTITIONER

## 2022-07-19 RX ORDER — ONDANSETRON 2 MG/ML
4 INJECTION INTRAMUSCULAR; INTRAVENOUS EVERY 6 HOURS PRN
Status: CANCELLED | OUTPATIENT
Start: 2022-07-19

## 2022-07-19 RX ORDER — LIDOCAINE 50 MG/G
1 PATCH TOPICAL DAILY
Status: CANCELLED | OUTPATIENT
Start: 2022-07-20

## 2022-07-19 RX ORDER — ONDANSETRON 2 MG/ML
4 INJECTION INTRAMUSCULAR; INTRAVENOUS EVERY 6 HOURS PRN
Status: DISCONTINUED | OUTPATIENT
Start: 2022-07-19 | End: 2022-07-21

## 2022-07-19 RX ORDER — NICOTINE 21 MG/24HR
1 PATCH, TRANSDERMAL 24 HOURS TRANSDERMAL DAILY
Status: DISCONTINUED | OUTPATIENT
Start: 2022-07-20 | End: 2022-07-27 | Stop reason: HOSPADM

## 2022-07-19 RX ORDER — ACETAMINOPHEN 325 MG/1
650 TABLET ORAL EVERY 6 HOURS PRN
Status: DISCONTINUED | OUTPATIENT
Start: 2022-07-19 | End: 2022-07-21

## 2022-07-19 RX ORDER — METOPROLOL SUCCINATE 25 MG/1
25 TABLET, EXTENDED RELEASE ORAL
Status: DISCONTINUED | OUTPATIENT
Start: 2022-07-19 | End: 2022-07-22

## 2022-07-19 RX ORDER — METOPROLOL SUCCINATE 25 MG/1
25 TABLET, EXTENDED RELEASE ORAL
Status: CANCELLED | OUTPATIENT
Start: 2022-07-19

## 2022-07-19 RX ORDER — LIDOCAINE 50 MG/G
1 PATCH TOPICAL DAILY
Status: DISCONTINUED | OUTPATIENT
Start: 2022-07-20 | End: 2022-07-27 | Stop reason: HOSPADM

## 2022-07-19 RX ORDER — NICOTINE 21 MG/24HR
1 PATCH, TRANSDERMAL 24 HOURS TRANSDERMAL DAILY
Status: CANCELLED | OUTPATIENT
Start: 2022-07-20

## 2022-07-19 RX ORDER — LANOLIN ALCOHOL/MO/W.PET/CERES
100 CREAM (GRAM) TOPICAL DAILY
Status: CANCELLED | OUTPATIENT
Start: 2022-07-20

## 2022-07-19 RX ORDER — FOLIC ACID 1 MG/1
1 TABLET ORAL DAILY
Status: DISCONTINUED | OUTPATIENT
Start: 2022-07-20 | End: 2022-07-27 | Stop reason: HOSPADM

## 2022-07-19 RX ORDER — FOLIC ACID 1 MG/1
1 TABLET ORAL DAILY
Status: CANCELLED | OUTPATIENT
Start: 2022-07-20

## 2022-07-19 RX ORDER — ACETAMINOPHEN 325 MG/1
650 TABLET ORAL EVERY 6 HOURS PRN
Status: CANCELLED | OUTPATIENT
Start: 2022-07-19

## 2022-07-19 RX ORDER — LEVALBUTEROL INHALATION SOLUTION 0.63 MG/3ML
0.63 SOLUTION RESPIRATORY (INHALATION) EVERY 6 HOURS PRN
Status: CANCELLED | OUTPATIENT
Start: 2022-07-19

## 2022-07-19 RX ORDER — LANOLIN ALCOHOL/MO/W.PET/CERES
100 CREAM (GRAM) TOPICAL DAILY
Status: DISCONTINUED | OUTPATIENT
Start: 2022-07-20 | End: 2022-07-27 | Stop reason: HOSPADM

## 2022-07-19 RX ORDER — LEVALBUTEROL INHALATION SOLUTION 0.63 MG/3ML
0.63 SOLUTION RESPIRATORY (INHALATION) EVERY 6 HOURS PRN
Status: DISCONTINUED | OUTPATIENT
Start: 2022-07-19 | End: 2022-07-20

## 2022-07-19 RX ORDER — LOSARTAN POTASSIUM 25 MG/1
25 TABLET ORAL DAILY
Status: DISCONTINUED | OUTPATIENT
Start: 2022-07-20 | End: 2022-07-22

## 2022-07-19 RX ORDER — LOSARTAN POTASSIUM 25 MG/1
25 TABLET ORAL DAILY
Status: CANCELLED | OUTPATIENT
Start: 2022-07-20

## 2022-07-19 RX ADMIN — THIAMINE HCL TAB 100 MG 100 MG: 100 TAB at 09:08

## 2022-07-19 RX ADMIN — MAGNESIUM OXIDE TAB 400 MG (241.3 MG ELEMENTAL MG) 400 MG: 400 (241.3 MG) TAB at 09:09

## 2022-07-19 RX ADMIN — LOSARTAN POTASSIUM 25 MG: 25 TABLET, FILM COATED ORAL at 09:08

## 2022-07-19 RX ADMIN — SPIRONOLACTONE 25 MG: 25 TABLET ORAL at 09:08

## 2022-07-19 RX ADMIN — LIDOCAINE 5% 1 PATCH: 700 PATCH TOPICAL at 09:08

## 2022-07-19 RX ADMIN — MAGNESIUM OXIDE TAB 400 MG (241.3 MG ELEMENTAL MG) 400 MG: 400 (241.3 MG) TAB at 17:02

## 2022-07-19 RX ADMIN — FOLIC ACID 1 MG: 1 TABLET ORAL at 09:08

## 2022-07-19 RX ADMIN — NICOTINE 1 PATCH: 21 PATCH, EXTENDED RELEASE TRANSDERMAL at 09:09

## 2022-07-19 NOTE — PROGRESS NOTES
Progress Note - Pulmonary   Nic Ayala 59 y o  male MRN: [id number]  Unit/Bed#: [address] [address] Encounter: [id number]      Assessment/Plan:    · Right-sided pneumothorax with underlying bullous emphysema - chest tube placed on 7/9/22  Still with significant air leak  Case had been discussed with thoracic surgery with plans to transfer to [place]    · Right pleural effusion - resolved    · Emphysema/COPD without acute exacerbation - Xopenex p r n  He will need outpatient PFTs once acute illness has resolved    · Nicotine dependence on cigarettes, uncomplicated - nicotine patch  Encourage complete smoking cessation    Chief Complaint: "I feel okay"    Subjective:   Still with some shortness of breath on exertion  Occasional tenderness at chest tube site  Objective:     Vitals: Blood pressure 121/94, pulse 88, temperature 99 2 °F (37 3 °C), temperature source Oral, resp  rate 18, height 5' 8" (1 727 m), weight 65 7 kg (144 lb 13 5 oz), SpO2 95 % , room air, Body mass index is 22 02 kg/m²  Intake/Output Summary (Last 24 hours) at 7/19/2022 0903  Last data filed at 7/19/2022 0659  Gross per 24 hour   Intake 320 ml   Output 230 ml   Net 90 ml       Physical Exam:      General:  Awake, alert, no distress   HEENT: No scleral icterus, EOMI, moist mucosa    Heart:  Regular rate and rhythm, no murmur   Lungs: Decreased  Right-sided chest tube in place with persistent air leak  Remains on wall suction   Abdomen: Soft, nontender, normal bowel sounds   Extremities: No clubbing, cyanosis or edema    Labs: I have personally reviewed pertinent lab results      Results from last 7 days   Lab Units 07/19/22  0510 07/18/22  0523 07/17/22  0556 07/16/22  0538 07/13/22  0544   WBC Thousand/uL 10 11  --   --  6 90 7 52   HEMOGLOBIN g/dL 12 4 12 3 11 5* 11 0* 10 1*   HEMATOCRIT % 37 8 37 6 35 7* 33 4* 30 2*   PLATELETS Thousands/uL 599*  --   --  494* 277         Results from last 7 days   Lab Units 07/19/22  0510 07/18/22  4014 07/17/22  0556 07/15/22  0453 07/14/22  0459   POTASSIUM mmol/L 4 8 4 5 4 7   < > 3 7   CHLORIDE mmol/L 97 96* 98*   < > 98*   CO2 mmol/L 21 26 27   < > 30   BUN mg/dL 20 11 8   < > 6   CREATININE mg/dL 0 73 0 74 0 61   < > 0 53*   CALCIUM mg/dL 9 3 9 8 8 9   < > 8 2*   ALK PHOS U/L  --   --   --   --  43*   ALT U/L  --   --   --   --  12   AST U/L  --   --   --   --  26    < > = values in this interval not displayed  Results from last 7 days   Lab Units 07/18/22  0523 07/17/22  0556 07/16/22  0538   MAGNESIUM mg/dL 1 7 1 8 1 5*         Imaging and other studies: I have personally reviewed pertinent reports     and I have personally reviewed pertinent films in PACS chest x-ray performed yesterday shows small right apical pneumothorax with chest tube in place

## 2022-07-19 NOTE — PROGRESS NOTES
[facility] Internal Medicine Progress Note  Patient: Matthieu Thompson 59 y o  male   MRN: [id number]  PCP: Maik Diaz MD  Unit/Bed#: [address] [place] [address] Encounter: [id number]  Date Of Visit: 07/19/22    Problem List:    Principal Problem:    Pneumothorax  Active Problems:    Electrolyte abnormality    Alcohol withdrawal ([facility] )    Cardiomyopathy, 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    Paroxysmal atrial fibrillation ([facility] )    Tobacco abuse      Assessment & Plan:    * Pneumothorax  Assessment & Plan  Incidental finding  Denied SOB, chest pain  Denied fall or trauma  · Large right pneumothorax initially, S/P right chest tube insertion in ED to wall suction on 07/08  · On 07/09 underwent IR chest tube placement due to persistent pneumothorax  · Chest x-ray 7/13 shows small PTX  · CT chest - moderate right PTX, bullous changes with largest bulla 2 5 cm  Hematoma in the tract of prior large bore chest tube, hematoma in chest wall, moderate right pleural effusion with some hemorrhagic fluid noted  · Case discussed with thoracic surgery and plan is for transfer to [place] when bed available  · Case was discussed with Dr Hugo Pearce over there  · Risks/benefits of transfer discussed with patient and he is in agreement  Patient's sister also aware of the transfer  · Repeat chest x-ray 7/18 shows small pneumothorax      Electrolyte abnormality  Assessment & Plan  · Hyponatremia worsening  Hold Aldactone  Check urine sodium, osm, serum osm for further evaluation  · Other Electrolytes improved status post repletion  · get repeat lab work in a m  Alcohol withdrawal ([facility] )  Assessment & Plan  Patient reports drinking about 6 drinks per day  Last drink was on day of admission  · Complete alcohol cessation discussed with patient  · Initially on CIWA protocol on admission  Continue multivitamin, thiamine, folic acid  · Developed alcohol withdrawal symptoms 07/09-7/10 with confusion, tachycardia, agitation, DTs    Received total of 6 mg of IV Ativan without any improvement  · Transferred to step-down unit and received IV phenobarb and also required p r n  Valium doses  · Was also on Precedex drip which was later discontinued  · Patient interested in inpatient alcohol rehab after discharge    Cardiomyopathy, 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  Assessment & Plan  · TONYA in 2018 showed normal EF  · Echo on this admission shows EF of 25-30% with grade 1 diastolic dysfunction  · Continue losartan, Toprol-XL with holding parameter  Holding Aldactone due to hyponatremia  · Cardiology was consulted for medication optimization as patient on many medications at home that are not ideal    Tobacco abuse  Assessment & Plan  Continue nicotine patch  Smoking cessation discussed  Paroxysmal atrial fibrillation [facility])  Assessment & Plan  Patient states he stopped taking Xarelto about 1 month ago as he ran out of it  Patient was on Cardizem, sotalol, Toprol XL in the past as well  He denies taking any prescription medications except Xarelto which he stopped 1 month ago  History of cardioversion in 2018  · Tachycardic in ED  · Per prior provider, EKG showed sinus tach, rate 106, , V1 to V3 TWI  · Continue Toprol-XL  · 81 mg of aspirin discontinued due to findings of hematoma/hemorrhagic fluid  Per Cardiology ASA can be started at a later time  · Continue to hold anticoagulation as patient reports frequent falls at home        Encephalopathy-resolved as of 7/15/2022  Assessment & Plan  Due to alcohol abuse/withdrawal  Mental status has improved & patient AAO x3    Alcohol-induced acute pancreatitis-resolved as of 7/15/2022  Assessment & Plan  Patient presented with epigastric pain, nausea, vomiting for about 1 week  History of alcohol induced pancreatitis and alcoholic hepatitis  · CT showed - Mild peripancreatic stranding suggestive of pancreatitis  Severe hepatic steatosis was noted  No fluid collections  · Lipase 1700 on admission --> 392 on 7/11    Denies any abdominal pain  Tolerating diet  · Alcohol level on admission 182  · Triglycerides 44  · Was on IV fluids previously which has been discontinued      Chest pain-resolved as of 2022  Assessment & Plan  Per ICU note, patient reported some chest pain around chest tube site  Troponins negative  VTE Pharmacologic Prophylaxis: VTE Score: 1 None due to hematoma on imaging    Patient Centered Rounds: I performed bedside rounds with nursing staff today  Discussions with Specialists or Other Care Team Provider: yes    Education and Discussions with Family / Patient: Updated  (sister) via phone  Time Spent for Care: 30 minutes  More than 50% of total time spent on counseling and coordination of care as described above  Current Length of Stay: 11 day(s)  Current Patient Status: Inpatient   Certification Statement: The patient will continue to require additional inpatient hospital stay due to Persistent right-sided pneumothorax with air leak requiring CT surgery evaluation  Discharge Plan: Pending bed availability at [place]    Code Status: Level 1 - Full Code    Subjective:     Patient denies any shortness of breath  Reports only occasional pain over chest tube insertion site    Objective:     Vitals:   Temp (24hrs), Av 6 °F (37 °C), Min:97 2 °F (36 2 °C), Max:99 2 °F (37 3 °C)    Temp:  [97 2 °F (36 2 °C)-99 2 °F (37 3 °C)] 97 2 °F (36 2 °C)  HR:  [71-90] 90  Resp:  [18-19] 18  BP: ()/(56-94) 99/56  SpO2:  [94 %-98 %] 96 %  Body mass index is 22 02 kg/m²  Input and Output Summary (last 24 hours): Intake/Output Summary (Last 24 hours) at 2022 192  Last data filed at 2022 0659  Gross per 24 hour   Intake --   Output 230 ml   Net -230 ml       Physical Exam:   Physical Exam  Constitutional:       General: He is not in acute distress  Appearance: He is well-developed  He is ill-appearing (Chronically)  HENT:      Head: Normocephalic and atraumatic  Eyes:      General: No scleral icterus  Cardiovascular:      Rate and Rhythm: Normal rate and regular rhythm  Pulmonary:      Effort: Pulmonary effort is normal  No respiratory distress  Breath sounds: No wheezing or rales  Comments: Decreased breath sounds bilaterally  Right-sided chest tube in place  Abdominal:      General: Bowel sounds are normal  There is no distension  Palpations: Abdomen is soft  Tenderness: There is no abdominal tenderness  Musculoskeletal:      Right lower leg: No edema  Left lower leg: No edema  Neurological:      Mental Status: He is alert and oriented to person, place, and time  Additional Data:     Labs:  Results from last 7 days   Lab Units 07/19/22  0510 07/16/22  0538 07/13/22  0544   WBC Thousand/uL 10 11   < > 7 52   HEMOGLOBIN g/dL 12 4   < > 10 1*   HEMATOCRIT % 37 8   < > 30 2*   PLATELETS Thousands/uL 599*   < > 277   NEUTROS PCT %  --   --  64   LYMPHS PCT %  --   --  21   MONOS PCT %  --   --  12   EOS PCT %  --   --  2    < > = values in this interval not displayed  Results from last 7 days   Lab Units 07/19/22  0510 07/15/22  0453 07/14/22  0459   SODIUM mmol/L 128*   < > 133*   POTASSIUM mmol/L 4 8   < > 3 7   CHLORIDE mmol/L 97   < > 98*   CO2 mmol/L 21   < > 30   BUN mg/dL 20   < > 6   CREATININE mg/dL 0 73   < > 0 53*   ANION GAP mmol/L 10   < > 5   CALCIUM mg/dL 9 3   < > 8 2*   ALBUMIN g/dL  --   --  2 7*   TOTAL BILIRUBIN mg/dL  --   --  0 35   ALK PHOS U/L  --   --  43*   ALT U/L  --   --  12   AST U/L  --   --  26   GLUCOSE RANDOM mg/dL 90   < > 95    < > = values in this interval not displayed  Lines/Drains:  Invasive Devices  Report    Peripheral Intravenous Line  Duration           Long-Dwell Peripheral IV (Midline) 97/82/51 Right Basilic 9 days    Peripheral IV 07/17/22 Dorsal (posterior); Proximal;Right Forearm 2 days          Drain  Duration           Chest Tube Right Second intercostal space 10 2 Fr  10 days    External Urinary Catheter 7 days                Imaging: Reviewed radiology reports from this admission including: chest xray    Recent Cultures (last 7 days):         Last 24 Hours Medication List:   Current Facility-Administered Medications   Medication Dose Route Frequency Provider Last Rate    acetaminophen  650 mg Oral Q6H PRN Olga Spironello V, CRNP      folic acid  1 mg Oral Daily Olga Spironello V, CHEN      levalbuterol  0 63 mg Nebulization Q6H PRN Olga Spironello V, CHEN      lidocaine  1 patch Topical Daily Aftab Xie MD      losartan  25 mg Oral Daily Latasha Lopez MD      magnesium oxide  400 mg Oral BID CHEN Solorzano      metoprolol succinate  25 mg Oral HS CHEN Solorzano      nicotine  1 patch Transdermal Daily Olga Spironello VCHEN      ondansetron  4 mg Intravenous Q6H PRN Olga Spironello V, CHEN      thiamine  100 mg Oral Daily Olga Spironello CHEN YOUNG          Today, Patient Was Seen By: Teetee Faith DO    ** Please Note: "This note has been constructed using a voice recognition system  Therefore there may be syntax, spelling, and/or grammatical errors   Please call if you have any questions  "**

## 2022-07-19 NOTE — PHYSICAL THERAPY NOTE
PT TREATMENT       07/19/22 1008   PT Last Visit   PT Visit Date 07/19/22   Note Type   Note Type Treatment   Pain Assessment   Pain Assessment Tool 0-10   Pain Score 2   Pain Location/Orientation Location: Abdomen   Pain Onset/Description Onset: Ongoing   Patient's Stated Pain Goal No pain   Hospital Pain Intervention(s) Repositioned   Restrictions/Precautions   Weight Bearing Precautions Per Order No   Other Precautions Fall Risk; Chair Alarm; Bed Alarm;Pain   General   Chart Reviewed Yes   Additional Pertinent History Pt is a 69year-old male who was admitted to the hospital on 7/8/22 due to alcohol withdrawl/abdominal pain   Pt with chest tube in place R sude   Family/Caregiver Present No   Cognition   Overall Cognitive Status WFL   Arousal/Participation Cooperative   Attention Within functional limits   Orientation Level Oriented X4   Following Commands Follows all commands and directions without difficulty   Subjective   Subjective Pt reports he continues to feel pain/fatigue but somewhat better than yesterday   Bed Mobility   Rolling R 7  Independent   Rolling L 7  Independent   Supine to Sit 7  Independent   Additional items Assist x 1   Sit to Supine 7  Independent   Additional items Assist x 1   Transfers   Sit to Stand 5  Supervision   Additional items Assist x 1;Verbal cues   Stand to Sit 5  Supervision   Additional items Assist x 1;Verbal cues   Ambulation/Elevation   Gait pattern   (Pt able to perform marching in place - limited by chest tube)   Gait Assistance 5  Supervision   Additional items Assist x 1;Verbal cues   Assistive Device   (Hand on bed rail)   Distance Marching in place x 1 minute   Stair Management Assistance Not tested   Balance   Static Sitting Good   Dynamic Sitting Good   Static Standing Fair   Dynamic Standing Fair   Ambulatory Fair -   Activity Tolerance   Activity Tolerance Patient tolerated treatment well;Patient limited by fatigue   Nurse Made Aware yes   Exercises   Neuro re-ed Pt able to perform seated/standing exercise including ankle pumps, LAQ, glute sets, seated hip marches, heel slides x 10 reps each bilat   Balance training  Standing marching x 1minute - limited by chest tube   Assessment   Prognosis Good   Problem List Decreased strength;Decreased range of motion;Decreased endurance; Impaired balance;Decreased mobility; Decreased safety awareness;Pain   Assessment Pt agreeable to particiapte in PT session this AM  Pt able to perform all bed mobility IND  Pt limited by chest tube but able to perform marching x 1 minute at bedside with hand hold on bed rail  Pt fatigued and requesting to return to sitting  Able to participate in seated exercise with good response  The patient's AM-PAC Basic Mobility Inpatient Short Form Raw Score is 18  A Raw score of greater than 16 suggests the patient may benefit from discharge to post-acute rehabilitation services  However continues to present with decreased endurance/tolerance too OOB mobility - may require STR  Please also refer to the recommendation of the Physical Therapist for safe discharge planning  Goals   Patient Goals to get better   Plan   Treatment/Interventions ADL retraining;Functional transfer training;LE strengthening/ROM; Therapeutic exercise; Endurance training;Bed mobility;Gait training;Spoke to nursing   Progress Progressing toward goals   PT Frequency   (5x/week)   Recommendation   PT Discharge Recommendation Post acute rehabilitation services   AM-PAC Basic Mobility Inpatient   Turning in Bed Without Bedrails 4   Lying on Back to Sitting on Edge of Flat Bed 4   Moving Bed to Chair 3   Standing Up From Chair 3   Walk in Room 3   Climb 3-5 Stairs 1   Basic Mobility Inpatient Raw Score 18   Basic Mobility Standardized Score 41 05   Highest Level Of Mobility   JH-HLM Goal 6: Walk 10 steps or more   JH-HLM Achieved 6: Walk 10 steps or more   Education   Education Provided Mobility training;Home exercise program   Patient Demonstrates acceptance/verbal understanding   End of Consult   Patient Position at End of Consult Supine;Bed/Chair alarm activated; All needs within reach   [facility] Insurance Number  Sue Marin [id number]

## 2022-07-20 ENCOUNTER — APPOINTMENT (INPATIENT)
Dept: RADIOLOGY | Facility: HOSPITAL | Age: 57
DRG: 163 | End: 2022-07-20
Payer: COMMERCIAL

## 2022-07-20 ENCOUNTER — TRANSITIONAL CARE MANAGEMENT (OUTPATIENT)
Dept: FAMILY MEDICINE CLINIC | Facility: CLINIC | Age: 57
End: 2022-07-20

## 2022-07-20 PROBLEM — K76.0 HEPATIC STEATOSIS: Status: ACTIVE | Noted: 2022-07-20

## 2022-07-20 PROBLEM — Z72.0 TOBACCO ABUSE: Status: ACTIVE | Noted: 2017-09-23

## 2022-07-20 LAB
ABO GROUP BLD: NORMAL
ALBUMIN SERPL BCP-MCNC: 3.7 G/DL (ref 3.5–5)
ALP SERPL-CCNC: 46 U/L (ref 46–116)
ALT SERPL W P-5'-P-CCNC: 18 U/L (ref 12–78)
ANION GAP SERPL CALCULATED.3IONS-SCNC: 10 MMOL/L (ref 4–13)
AST SERPL W P-5'-P-CCNC: 25 U/L (ref 5–45)
BASOPHILS # BLD AUTO: 0.1 THOUSANDS/ΜL (ref 0–0.1)
BASOPHILS NFR BLD AUTO: 1 % (ref 0–1)
BILIRUB DIRECT SERPL-MCNC: 0.12 MG/DL (ref 0–0.2)
BILIRUB SERPL-MCNC: 0.6 MG/DL (ref 0.2–1)
BLD GP AB SCN SERPL QL: NEGATIVE
BUN SERPL-MCNC: 17 MG/DL (ref 5–25)
CALCIUM SERPL-MCNC: 9.9 MG/DL (ref 8.3–10.1)
CHLORIDE SERPL-SCNC: 98 MMOL/L (ref 96–108)
CO2 SERPL-SCNC: 26 MMOL/L (ref 21–32)
CREAT SERPL-MCNC: 0.62 MG/DL (ref 0.6–1.3)
EOSINOPHIL # BLD AUTO: 0.28 THOUSAND/ΜL (ref 0–0.61)
EOSINOPHIL NFR BLD AUTO: 3 % (ref 0–6)
ERYTHROCYTE [DISTWIDTH] IN BLOOD BY AUTOMATED COUNT: 14.6 % (ref 11.6–15.1)
GFR SERPL CREATININE-BSD FRML MDRD: 108 ML/MIN/1.73SQ M
GLUCOSE SERPL-MCNC: 86 MG/DL (ref 65–140)
HCT VFR BLD AUTO: 36.1 % (ref 36.5–49.3)
HGB BLD-MCNC: 11.8 G/DL (ref 12–17)
IMM GRANULOCYTES # BLD AUTO: 0.12 THOUSAND/UL (ref 0–0.2)
IMM GRANULOCYTES NFR BLD AUTO: 1 % (ref 0–2)
LYMPHOCYTES # BLD AUTO: 1.61 THOUSANDS/ΜL (ref 0.6–4.47)
LYMPHOCYTES NFR BLD AUTO: 17 % (ref 14–44)
MCH RBC QN AUTO: 30.6 PG (ref 26.8–34.3)
MCHC RBC AUTO-ENTMCNC: 32.7 G/DL (ref 31.4–37.4)
MCV RBC AUTO: 94 FL (ref 82–98)
MONOCYTES # BLD AUTO: 1.94 THOUSAND/ΜL (ref 0.17–1.22)
MONOCYTES NFR BLD AUTO: 21 % (ref 4–12)
NEUTROPHILS # BLD AUTO: 5.2 THOUSANDS/ΜL (ref 1.85–7.62)
NEUTS SEG NFR BLD AUTO: 57 % (ref 43–75)
NRBC BLD AUTO-RTO: 0 /100 WBCS
OSMOLALITY UR/SERPL-RTO: 281 MMOL/KG (ref 282–298)
PLATELET # BLD AUTO: 656 THOUSANDS/UL (ref 149–390)
PMV BLD AUTO: 8.6 FL (ref 8.9–12.7)
POTASSIUM SERPL-SCNC: 4.3 MMOL/L (ref 3.5–5.3)
PROT SERPL-MCNC: 7.6 G/DL (ref 6.4–8.4)
RBC # BLD AUTO: 3.86 MILLION/UL (ref 3.88–5.62)
RH BLD: POSITIVE
SODIUM 24H UR-SCNC: 176 MOL/L
SODIUM SERPL-SCNC: 134 MMOL/L (ref 135–147)
SPECIMEN EXPIRATION DATE: NORMAL
TSH SERPL DL<=0.05 MIU/L-ACNC: 2.95 UIU/ML (ref 0.45–4.5)
WBC # BLD AUTO: 9.25 THOUSAND/UL (ref 4.31–10.16)

## 2022-07-20 PROCEDURE — 86920 COMPATIBILITY TEST SPIN: CPT

## 2022-07-20 PROCEDURE — 99222 1ST HOSP IP/OBS MODERATE 55: CPT | Performed by: INTERNAL MEDICINE

## 2022-07-20 PROCEDURE — NC001 PR NO CHARGE: Performed by: INTERNAL MEDICINE

## 2022-07-20 PROCEDURE — 85025 COMPLETE CBC W/AUTO DIFF WBC: CPT | Performed by: STUDENT IN AN ORGANIZED HEALTH CARE EDUCATION/TRAINING PROGRAM

## 2022-07-20 PROCEDURE — 86901 BLOOD TYPING SEROLOGIC RH(D): CPT

## 2022-07-20 PROCEDURE — 80048 BASIC METABOLIC PNL TOTAL CA: CPT | Performed by: NURSE PRACTITIONER

## 2022-07-20 PROCEDURE — 84443 ASSAY THYROID STIM HORMONE: CPT | Performed by: STUDENT IN AN ORGANIZED HEALTH CARE EDUCATION/TRAINING PROGRAM

## 2022-07-20 PROCEDURE — 71250 CT THORAX DX C-: CPT

## 2022-07-20 PROCEDURE — 86900 BLOOD TYPING SEROLOGIC ABO: CPT

## 2022-07-20 PROCEDURE — 86850 RBC ANTIBODY SCREEN: CPT

## 2022-07-20 PROCEDURE — 99232 SBSQ HOSP IP/OBS MODERATE 35: CPT | Performed by: THORACIC SURGERY (CARDIOTHORACIC VASCULAR SURGERY)

## 2022-07-20 PROCEDURE — 83930 ASSAY OF BLOOD OSMOLALITY: CPT | Performed by: NURSE PRACTITIONER

## 2022-07-20 PROCEDURE — 80076 HEPATIC FUNCTION PANEL: CPT | Performed by: STUDENT IN AN ORGANIZED HEALTH CARE EDUCATION/TRAINING PROGRAM

## 2022-07-20 RX ORDER — ALBUTEROL SULFATE 2.5 MG/3ML
2.5 SOLUTION RESPIRATORY (INHALATION) EVERY 4 HOURS PRN
Status: DISCONTINUED | OUTPATIENT
Start: 2022-07-20 | End: 2022-07-27 | Stop reason: HOSPADM

## 2022-07-20 RX ADMIN — LIDOCAINE 5% 1 PATCH: 700 PATCH TOPICAL at 09:26

## 2022-07-20 RX ADMIN — FOLIC ACID 1 MG: 1 TABLET ORAL at 09:25

## 2022-07-20 RX ADMIN — NICOTINE 1 PATCH: 21 PATCH, EXTENDED RELEASE TRANSDERMAL at 09:25

## 2022-07-20 RX ADMIN — MAGNESIUM OXIDE TAB 400 MG (241.3 MG ELEMENTAL MG) 400 MG: 400 (241.3 MG) TAB at 09:25

## 2022-07-20 RX ADMIN — LOSARTAN POTASSIUM 25 MG: 25 TABLET, FILM COATED ORAL at 09:25

## 2022-07-20 RX ADMIN — METOPROLOL SUCCINATE 25 MG: 25 TABLET, FILM COATED, EXTENDED RELEASE ORAL at 21:30

## 2022-07-20 RX ADMIN — THIAMINE HCL TAB 100 MG 100 MG: 100 TAB at 09:25

## 2022-07-20 RX ADMIN — MAGNESIUM OXIDE TAB 400 MG (241.3 MG ELEMENTAL MG) 400 MG: 400 (241.3 MG) TAB at 17:12

## 2022-07-20 NOTE — PLAN OF CARE
Problem: Nutrition/Hydration-ADULT  Goal: Nutrient/Hydration intake appropriate for improving, restoring or maintaining nutritional needs  Description: Monitor and assess patient's nutrition/hydration status for malnutrition  Collaborate with interdisciplinary team and initiate plan and interventions as ordered  Monitor patient's weight and dietary intake as ordered or per policy  Utilize nutrition screening tool and intervene as necessary  Determine patient's food preferences and provide high-protein, high-caloric foods as appropriate       INTERVENTIONS:  - Monitor oral intake, urinary output, labs, and treatment plans  - Assess nutrition and hydration status and recommend course of action  - Evaluate amount of meals eaten  - Assist patient with eating if necessary   - Allow adequate time for meals  - Recommend/ encourage appropriate diets, oral nutritional supplements, and vitamin/mineral supplements  - Order, calculate, and assess calorie counts as needed  - Recommend, monitor, and adjust tube feedings and TPN/PPN based on assessed needs  - Assess need for intravenous fluids  - Provide specific nutrition/hydration education as appropriate  - Include patient/family/caregiver in decisions related to nutrition  Outcome: Progressing     Problem: MOBILITY - ADULT  Goal: Maintain or return to baseline ADL function  Description: INTERVENTIONS:  -  Assess patient's ability to carry out ADLs; assess patient's baseline for ADL function and identify physical deficits which impact ability to perform ADLs (bathing, care of mouth/teeth, toileting, grooming, dressing, etc )  - Assess/evaluate cause of self-care deficits   - Assess range of motion  - Assess patient's mobility; develop plan if impaired  - Assess patient's need for assistive devices and provide as appropriate  - Encourage maximum independence but intervene and supervise when necessary  - Involve family in performance of ADLs  - Assess for home care needs following discharge   - Consider OT consult to assist with ADL evaluation and planning for discharge  - Provide patient education as appropriate  Outcome: Progressing  Goal: Maintains/Returns to pre admission functional level  Description: INTERVENTIONS:  - Perform BMAT or MOVE assessment daily    - Set and communicate daily mobility goal to care team and patient/family/caregiver  - Collaborate with rehabilitation services on mobility goals if consulted  - Perform Range of Motion 4 times a day  - Reposition patient every 2 hours    - Dangle patient 3 times a day  - Stand patient 3 times a day  - Ambulate patient 3 times a day  - Out of bed to chair 1 times a day   - Out of bed for meals 1 times a day  - Out of bed for toileting  - Record patient progress and toleration of activity level   Outcome: Progressing     Problem: PAIN - ADULT  Goal: Verbalizes/displays adequate comfort level or baseline comfort level  Description: Interventions:  - Encourage patient to monitor pain and request assistance  - Assess pain using appropriate pain scale  - Administer analgesics based on type and severity of pain and evaluate response  - Implement non-pharmacological measures as appropriate and evaluate response  - Consider cultural and social influences on pain and pain management  - Notify physician/advanced practitioner if interventions unsuccessful or patient reports new pain  Outcome: Progressing     Problem: INFECTION - ADULT  Goal: Absence or prevention of progression during hospitalization  Description: INTERVENTIONS:  - Assess and monitor for signs and symptoms of infection  - Monitor lab/diagnostic results  - Monitor all insertion sites, i e  indwelling lines, tubes, and drains  - Monitor endotracheal if appropriate and nasal secretions for changes in amount and color  - Wittmann appropriate cooling/warming therapies per order  - Administer medications as ordered  - Instruct and encourage patient and family to use good hand hygiene technique  - Identify and instruct in appropriate isolation precautions for identified infection/condition  Outcome: Progressing  Goal: Absence of fever/infection during neutropenic period  Description: INTERVENTIONS:  - Monitor WBC    Outcome: Progressing     Problem: SAFETY ADULT  Goal: Maintain or return to baseline ADL function  Description: INTERVENTIONS:  -  Assess patient's ability to carry out ADLs; assess patient's baseline for ADL function and identify physical deficits which impact ability to perform ADLs (bathing, care of mouth/teeth, toileting, grooming, dressing, etc )  - Assess/evaluate cause of self-care deficits   - Assess range of motion  - Assess patient's mobility; develop plan if impaired  - Assess patient's need for assistive devices and provide as appropriate  - Encourage maximum independence but intervene and supervise when necessary  - Involve family in performance of ADLs  - Assess for home care needs following discharge   - Consider OT consult to assist with ADL evaluation and planning for discharge  - Provide patient education as appropriate  Outcome: Progressing           Problem: DISCHARGE PLANNING  Goal: Discharge to home or other facility with appropriate resources  Description: INTERVENTIONS:  - Identify barriers to discharge w/patient and caregiver  - Arrange for needed discharge resources and transportation as appropriate  - Identify discharge learning needs (meds, wound care, etc )  - Arrange for interpretive services to assist at discharge as needed  - Refer to Case Management Department for coordinating discharge planning if the patient needs post-hospital services based on physician/advanced practitioner order or complex needs related to functional status, cognitive ability, or social support system  Outcome: Progressing     Problem: Knowledge Deficit  Goal: Patient/family/caregiver demonstrates understanding of disease process, treatment plan, medications, and discharge instructions  Description: Complete learning assessment and assess knowledge base    Interventions:  - Provide teaching at level of understanding  - Provide teaching via preferred learning methods  Outcome: Progressing     Problem: Prexisting or High Potential for Compromised Skin Integrity  Goal: Skin integrity is maintained or improved  Description: INTERVENTIONS:  - Identify patients at risk for skin breakdown  - Assess and monitor skin integrity  - Assess and monitor nutrition and hydration status  - Monitor labs   - Assess for incontinence   - Turn and reposition patient  - Assist with mobility/ambulation  - Relieve pressure over bony prominences  - Avoid friction and shearing  - Provide appropriate hygiene as needed including keeping skin clean and dry  - Evaluate need for skin moisturizer/barrier cream  - Collaborate with interdisciplinary team   - Patient/family teaching  - Consider wound care consult   Outcome: Progressing     Problem: NEUROSENSORY - ADULT  Goal: Achieves stable or improved neurological status  Description: INTERVENTIONS  - Monitor and report changes in neurological status  - Monitor vital signs such as temperature, blood pressure, glucose, and any other labs ordered   - Initiate measures to prevent increased intracranial pressure  - Monitor for seizure activity and implement precautions if appropriate      Outcome: Progressing  Goal: Remains free of injury related to seizures activity  Description: INTERVENTIONS  - Maintain airway, patient safety  and administer oxygen as ordered  - Monitor patient for seizure activity, document and report duration and description of seizure to physician/advanced practitioner  - If seizure occurs,  ensure patient safety during seizure  - Reorient patient post seizure  - Seizure pads on all 4 side rails  - Instruct patient/family to notify RN of any seizure activity including if an aura is experienced  - Instruct patient/family to call for assistance with activity based on nursing assessment  - Administer anti-seizure medications if ordered    Outcome: Progressing  Goal: Achieves maximal functionality and self care  Description: INTERVENTIONS  - Monitor swallowing and airway patency with patient fatigue and changes in neurological status  - Encourage and assist patient to increase activity and self care     - Encourage visually impaired, hearing impaired and aphasic patients to use assistive/communication devices  Outcome: Progressing     Problem: RESPIRATORY - ADULT  Goal: Achieves optimal ventilation and oxygenation  Description: INTERVENTIONS:  - Assess for changes in respiratory status  - Assess for changes in mentation and behavior  - Position to facilitate oxygenation and minimize respiratory effort  - Oxygen administered by appropriate delivery if ordered  - Initiate smoking cessation education as indicated  - Encourage broncho-pulmonary hygiene including cough, deep breathe, Incentive Spirometry  - Assess the need for suctioning and aspirate as needed  - Assess and instruct to report SOB or any respiratory difficulty  - Respiratory Therapy support as indicated  Outcome: Progressing     Problem: METABOLIC, FLUID AND ELECTROLYTES - ADULT  Goal: Electrolytes maintained within normal limits  Description: INTERVENTIONS:  - Monitor labs and assess patient for signs and symptoms of electrolyte imbalances  - Administer electrolyte replacement as ordered  - Monitor response to electrolyte replacements, including repeat lab results as appropriate  - Instruct patient on fluid and nutrition as appropriate  Outcome: Progressing  Goal: Fluid balance maintained  Description: INTERVENTIONS:  - Monitor labs   - Monitor I/O and WT  - Instruct patient on fluid and nutrition as appropriate  - Assess for signs & symptoms of volume excess or deficit  Outcome: Progressing  Goal: Glucose maintained within target range  Description: INTERVENTIONS:  - Monitor Blood Glucose as ordered  - Assess for signs and symptoms of hyperglycemia and hypoglycemia  - Administer ordered medications to maintain glucose within target range  - Assess nutritional intake and initiate nutrition service referral as needed  Outcome: Progressing     Problem: Potential for Falls  Goal: Patient will remain free of falls  Description: INTERVENTIONS:  - Educate patient/family on patient safety including physical limitations  - Instruct patient to call for assistance with activity   - Consult OT/PT to assist with strengthening/mobility   - Keep Call bell within reach  - Keep bed low and locked with side rails adjusted as appropriate  - Keep care items and personal belongings within reach  - Initiate and maintain comfort rounds  - Make Fall Risk Sign visible to staff  - Offer Toileting every 2 Hours, in advance of need  - Initiate/Maintain bed alarm  - Obtain necessary fall risk management equipment: walker, commode, alarm  - Apply yellow socks and bracelet for high fall risk patients  - Consider moving patient to room near nurses station  Outcome: Progressing

## 2022-07-20 NOTE — ASSESSMENT & PLAN NOTE
Noted hypoosmolar hyponatremia  Serum osmolarity 279  Urine osmolarity 742, Urine sodium 176  Hypoosmotic hyponatremia  Differential includes includes SIADH, diuretics, endocrinopathies  TSH within normal limits  Hyponatremia noted to improve with water restriction, component of SIADH is likely      Plan:  · Hold Aldactone  · Na 133 7/21  · Continue monitoring BMP, volume status

## 2022-07-20 NOTE — UTILIZATION REVIEW
Initial Clinical Review    Admission: Date/Time/Statement:   Admission Orders (From admission, onward)     Ordered        07/19/22 2156  Inpatient Admission  Once                      Orders Placed This Encounter   Procedures    Inpatient Admission     Standing Status:   Standing     Number of Occurrences:   1     Order Specific Question:   Level of Care     Answer:   Level 2 Stepdown / HOT [14]     Order Specific Question:   Estimated length of stay     Answer:   More than 2 Midnights     Order Specific Question:   Certification     Answer:   I certify that inpatient services are medically necessary for this patient for a duration of greater than two midnights  See H&P and MD Progress Notes for additional information about the patient's course of treatment  Initial Presentation: 59 y o  male with pmh of alc abuse, alc induced pancreatitis, alc hep, cardiomyopathy, afib/aflutter [facility]  Pt initially presented to [facility] 7/08 with epigastric pain, nausea and vomiting x 1 week  Reports drinking 6 alc drinks/day in the past 8 months  Reports intermittent epigastric pain and drinking alc helps w/ the pain  Workup was significant for lipase 1700, ethanol level 182, and CT demonstrating mild peripancreatic stranding  Dx w/ pancreatitis  He went into alc w/drawal the next day and transferred to ICU and loaded w/ phenobarb  In ICU, ECHO showed EF of 25-30% with grade 1 diastolic function, low RV systolic function, and mild mitral valve/tricuspid valve regurgitation  Started on GDMT 1 by cardiology  Also CT abdomen and pelvis revealed incidental finding of a large right pneumothorax on admission and CT was placed in the ED  IR placed apical CT d/t remaining residual PTX  CT chest showed a complex PTX with bullous lung disease, hematoma intractable of previous large bore chest tube, hematoma chest wall, and moderate right pleural effusion  Transferred to [facility] for CT surg eval for potential VATS  On arrival to [facility], pt not in distress, No abdominal tenderness, bowel sounds normal  Normal breath sounds  Dressing cdi on the chest     Plan: Inpatient admission for evaluation and treatment of PTX, alc abuse, alc w/drawal, hyponatremia: CTS consult  [facility] protocol  MVI, thiamine, folic acid  Cont torol XL  Hold anticoagulation  Hold Aldactone  Check urine sodium, osm, serum osm for further evaluation  Rpt labs in am    Date: 07/20  Day 2:   Thoracic Surg Consult; Persistent PTX despite tube and was found to have bullae on CT chest: Maintain CT to sxn for now and monitor for AL and output  For possible op intervention for bleb disease      IM Notes: No acute events OVN  Currently no s/s of alc w/drawal  Pt's CT in place  CT to suction w/ persistent air leak  Cont MVI, thiamine, folic acid  Cont BMP monitoring, vol status  Cont Toprol XL  Aldactone and Xarelto on hold  HF consult  On exam, aaox4  Scleral icterus present  Systolic murmur present  Right chest tube in place draining <5mL serous fluid with large air leak present on suction    HF consult: Chronic HFrEF, re-reduced; LVEF 25-30%; LVIDd 4 9 cm; NYHA II; ACC/AHA Stage B: Plan: Cont BB and ARB  Cont on telemetry  Lifevest prior to dc  Attempted to interrogate loop recorder by [facility], unable to do so; suspect battery dead  IP rehab for ETOH abuse        ED Triage Vitals [07/19/22 2047]   Temperature Pulse Respirations Blood Pressure SpO2   98 3 °F (36 8 °C) 87 20 105/64 97 %      Temp Source Heart Rate Source Patient Position - Orthostatic VS BP Location FiO2 (%)   Oral Monitor Lying Left arm --      Pain Score       No Pain          Wt Readings from Last 1 Encounters:   07/19/22 56 2 kg (123 lb 14 4 oz)     Additional Vital Signs:   Date/Time Temp Pulse Resp BP MAP (mmHg) SpO2 O2 Device Patient Position - Orthostatic VS   07/20/22 11:12:42 98 6 °F (37 °C) 89 -- 120/74 89 94 % None (Room air) Lying 07/20/22 07:34:33 98 6 °F (37 °C) 71 18 110/65 80 96 % -- --   07/20/22 03:05:05 98 2 °F (36 8 °C) 71 18 108/65 79 96 % -- --   07/19/22 2356 -- -- -- -- -- 95 % -- --   07/19/22 2255 -- 78 -- 106/65 79 94 % -- --   07/19/22 22:54:59 -- 78 -- 106/65 79 94 % -- --       Pertinent Labs/Diagnostic Test Results:   CT chest wo contrast   Final Result by Ramon Nieves MD (07/20 1438)      Stable size of the right-sided pneumothorax with resolved right pleural effusion  Stable right anterior pigtail catheter noted  Stable hematoma from previous chest tube within the chest wall and tubular extension into the right upper lobe  Evidence of pulmonary arterial hypertension  Generalized increased bone density  Correlate for possible metabolic or hematological disorders           Workstation performed: [id number]               Results from last 7 days   Lab Units 07/20/22  0455 07/19/22  0510 07/18/22  0523 07/17/22  0556 07/16/22  0538   WBC Thousand/uL 9 25 10 11  --   --  6 90   HEMOGLOBIN g/dL 11 8* 12 4 12 3 11 5* 11 0*   HEMATOCRIT % 36 1* 37 8 37 6 35 7* 33 4*   PLATELETS Thousands/uL 656* 599*  --   --  494*   NEUTROS ABS Thousands/µL 5 20  --   --   --   --          Results from last 7 days   Lab Units 07/20/22  0455 07/19/22  0510 07/18/22  0523 07/17/22  0556 07/16/22  0538 07/15/22  0453 07/14/22  0459   SODIUM mmol/L 134* 128* 132* 132* 131* 135* 133*   POTASSIUM mmol/L 4 3 4 8 4 5 4 7 4 1 4 1 3 7   CHLORIDE mmol/L 98 97 96* 98* 97* 98* 98*   CO2 mmol/L 26 21 26 27 26 29 30   ANION GAP mmol/L 10 10 10 7 8 8 5   BUN mg/dL 17 20 11 8 6 6 6   CREATININE mg/dL 0 62 0 73 0 74 0 61 0 49* 0 49* 0 53*   EGFR ml/min/1 73sq m 108 101 100 109 119 119 115   CALCIUM mg/dL 9 9 9 3 9 8 8 9 8 6 8 4 8 2*   MAGNESIUM mg/dL  --   --  1 7 1 8 1 5* 1 7 1 4*     Results from last 7 days   Lab Units 07/20/22  0455 07/14/22  0459   AST U/L 25 26   ALT U/L 18 12   ALK PHOS U/L 46 43*   TOTAL PROTEIN g/dL 7 6 5 5* ALBUMIN g/dL 3 7 2 7*   TOTAL BILIRUBIN mg/dL 0 60 0 35   BILIRUBIN DIRECT mg/dL 0 12  --          Results from last 7 days   Lab Units 07/20/22  0455 07/19/22  0510 07/18/22  0523 07/17/22  0556 07/16/22  0538 07/15/22  0453 07/14/22  0459   GLUCOSE RANDOM mg/dL 86 90 123 99 104 92 95     Results from last 7 days   Lab Units 07/20/22  0455   OSMOLALITY, SERUM mmol/*       Results from last 7 days   Lab Units 07/20/22  0455   TSH 3RD GENERATON uIU/mL 2 950       Results from last 7 days   Lab Units 07/20/22  0455 07/19/22  2216   OSMOLALITY, SERUM mmol/*  --    OSMO UR mmol/KG  --  742     Results from last 7 days   Lab Units 07/19/22  2216   SODIUM UR  176       Past Medical History:   Diagnosis Date    Alcohol abuse     1 pint of gin daily on weekends    Alcoholic hepatitis     Mild    Atrial flutter ([facility] ) 07/2015    Recurrent and symptomatic, also occurring on 1/7/2015    Cardiomyopathy, nonischemic ([facility] ) 01/07/2015    in setting of rapid atrial flutter    Congenital heart disease     Type unknown and not evident on echocardiography; "had a hole in heart that they closed up" as a teenager at [employer] [facility] [employer] [facility] (Updated 07/20/2022)      COPD (chronic obstructive pulmonary disease) (HCC)     Hypokalemia     Tobacco abuse 1976    One pack per day for 38 years     Present on Admission:   Tobacco abuse   Pneumothorax   Paroxysmal atrial fibrillation ([place] [address] )   Cardiomyopathy, 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   Alcohol abuse   Alcohol withdrawal ([place] [address] )   Electrolyte abnormality      Admitting Diagnosis: Pneumothorax [J93 9]  Age/Sex: 59 y o  male  Admission Orders:  48 hr telemetry monitoring  CT to suction  PT/OT  IS    Scheduled Medications:  folic acid, 1 mg, Oral, Daily  lidocaine, 1 patch, Topical, Daily  losartan, 25 mg, Oral, Daily  magnesium oxide, 400 mg, Oral, BID  metoprolol succinate, 25 mg, Oral, HS  nicotine, 1 patch, Transdermal, Daily  thiamine, 100 mg, Oral, Daily      Continuous IV Infusions: none     PRN Meds:  acetaminophen, 650 mg, Oral, Q6H PRN  albuterol, 2 5 mg, Nebulization, Q4H PRN  ondansetron, 4 mg, Intravenous, Q6H PRN        IP CONSULT TO HEART FAILURE SERVICE  IP CONSULT TO THORACIC SURGERY  IP CONSULT TO CASE MANAGEMENT  CONSULT TO CERTIFIED     Network Utilization Review Department  ATTENTION: Please call with any questions or concerns to [phone] and carefully listen to the prompts so that you are directed to the right person  All voicemails are confidential   Barry Alfredo all requests for admission clinical reviews, approved or denied determinations and any other requests to dedicated fax number below belonging to the campus where the patient is receiving treatment   List of dedicated fax numbers for the Facilities:  [address] DENIALS (Administrative/Medical Necessity) [phone]   [address] (Maternity/NICU/Pediatrics) [phone]   [address]  [phone] [place] [address] [address] [facility] [phone]

## 2022-07-20 NOTE — OCCUPATIONAL THERAPY NOTE
Occupational Therapy cx        Patient Name: Naeem Alexis  Today's Date: 7/20/2022 07/20/22 1400   OT Last Visit   OT Visit Date 07/20/22   Note Type   Note type Cancelled Session   Cancel Reasons Medical status   Additional Comments OT orders received and chart reviewed  Pt pending thoracic intervention, possible VATS procedure  Will hold and addressed as clinical course is further determined         Rosa Maria Donovan, SHANON, OTR/L

## 2022-07-20 NOTE — ASSESSMENT & PLAN NOTE
In the setting of alcohol abuse  Plan   · CT Abd: Liver is diffusely decreased in density consistent with fatty change  No CT evidence of suspicious hepatic mass    Normal hepatic contours  · Encourage alcohol cessation   · Will need to establish with GI outpatient

## 2022-07-20 NOTE — CASE MANAGEMENT
Outpatient HF LCSW present during HF Team rounds with patient  Patient reported that he lives with his sister, Abida Li  He has 3 grown children who live in [place], [place]  Pt confirmed that he drinks anywhere from 1-2 Fifths of gin when he decides to drink  (2 Fifths = 1500 ml or 32 shots)   Pt willing to discuss support with our

## 2022-07-20 NOTE — PROGRESS NOTES
INTERNAL MEDICINE RESIDENCY PROGRESS NOTE     Name: Koren Spatz   Age & Sex: 59 y o  male   MRN: [id number]  Unit/Bed#: [facility] 529-01   Encounter: [id number]  Team: SOD Team C     PATIENT INFORMATION     Name: Koren Spatz   Age & Sex: 59 y o  male   MRN: [id number]  Hospital Stay Days: 1    ASSESSMENT/PLAN     Principal Problem:    Pneumothorax  Active Problems:    Alcohol abuse    Cardiomyopathy, 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    Paroxysmal atrial fibrillation (HCC)    Alcohol withdrawal ([facility] )    Electrolyte abnormality    Tobacco abuse      * Pneumothorax  Assessment & Plan  Incidental finding  Denied SOB, chest pain  Denied fall or trauma  Large right pneumothorax initially, S/P right chest tube insertion in ED to wall suction on 07/08  On 07/09 underwent IR chest tube placement due to persistent pneumothorax  Chest x-ray 7/13 shows small PTX  CT chest - moderate right PTX, bullous changes with largest bulla 2 5 cm  Hematoma in the tract of prior large bore chest tube, hematoma in chest wall, moderate right pleural effusion with some hemorrhagic fluid noted  Transferred to [facility] for further management with thoracic surgery  Repeat chest x-ray 7/18 shows small pneumothorax  7/19 Stable on Room air  Denies any chest pain or respiratory distress    Plan:  · Right apical chest tube in place to suction with persistent large air leak  · Thoracic surgery consulted, appreciate recommendations    Alcohol abuse  Assessment & Plan  Patient reports drinking about 6 drinks per day  Last drink was on day of admission  -Complete Alcohol cessation discussed with patient  -Initially started on CIWA protocol on admission  -Developed alcohol withdrawal symptoms on 07/09-7/10 with tachycardia, confusion, agitation, DTs  Received total of 6 mg of IV Ativan without any improvement  -Transferred to step-down unit and received IV phenobarb and also required p r n   Valium doses  -Was also on Precedex drip which has been discontinued  -Patient states he would be interested in inpatient alcohol rehab if that is what it takes to quit drinking  -MVI, thiamine, folic acid  - Patient currently with no signs or symptoms of alcohol withdrawal, s/p management of DTs in the ICU    Paroxysmal atrial fibrillation [facility])  Assessment & Plan  Reported he stopped taking Xarelto about 1 before admission as he ran out of it  Patient appears to be on Cardizem, sotalol, Toprol XL in the past as well  He denies taking any prescription medications except Xarelto which he stopped  History of cardioversion in 2018  Tachycardic in ED on admission to [facility]  Per prior provider, EKG showed sinus tach, rate 106, , V1 to V3 TWI  CHADS2 Vasc score = 0,  HASBLED = 2, due to significant use of alcohol patient would be at high risk for bleeding, GI or secondary to injury from fall  Patient does report frequent falls at home  Plan:  · Continue Toprol-XL  · 81 mg of aspirin discontinued, holding Xarelto due to findings of hematoma  Cardiomyopathy, 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  Assessment & Plan  TONYA in 2018 showed normal EF  Echo on this admission shows EF of 25-30% with grade 1 diastolic dysfunction  Cardiology consulted for medication optimization as patient on many medications at that are not ideal    Plan:  · Consulted Heart failure service, appreciate recommendations  · Continue losartan,Toprol-XL  · Aldactone held for hyponatremia      Electrolyte abnormality  Assessment & Plan  Noted hypoosmolar hyponatremia  Improved to 134 7/20 from 128 7/19  Serum osmolarity 279  Urine osmolarity 742, Urine sodium 176  Hypoosmotic hyponatremia  Differential includes includes SIADH, diuretics, endocrinopathies  TSH within normal limits  Currently holding diuretics  Hyponatremia noted to improve with water restriction, component of SIADH is likely      Plan:  · Hold Aldactone  · Continue monitoring BMP, volume status      Alcohol withdrawal [facility])  Assessment & Plan  Patient reports drinking about 6 drinks per day  Last drink was on day of admission  Complete Alcohol cessation discussed with patient  Initially started on CIWA protocol on admission  Developed alcohol withdrawal symptoms on -7/10 with tachycardia, confusion, agitation, DTs  Received total of 6 mg of IV Ativan without any improvement  Transferred to step-down unit and received IV phenobarb and also required p r n  Valium doses  Was also on Precedex drip which has been discontinued  Patient states he would be interested in inpatient alcohol rehab if that is what it takes to quit drinking  Plan:  · Continue to encourage alcohol cessation and assess patient interest in rehab  · MVI, thiamine, folic acid      Tobacco abuse  Assessment & Plan  Complete Smoking cessation discussed  Nicotine patch        Disposition: Remains inpatient for CT surg evaluation  SUBJECTIVE     Patient seen and examined  No acute events overnight  Patient resting comfortably in bed with no complaints of pain  Patient has chest tube in place, water sealed  When asked if he would still consider inpatient rehab, patient shrugged his shoulders and said he might consider it  He denies shortness of breath, chest pain, palpitations, abdominal pain, swelling, headache  OBJECTIVE     Vitals:    22 2255 22 2356 22 0305 22 0734   BP: 106/65  108/65 110/65   BP Location:       Pulse: 78  71 71   Resp:   18 18   Temp:   98 2 °F (36 8 °C) 98 6 °F (37 °C)   TempSrc:       SpO2: 94% 95% 96% 96%   Weight:       Height:          Temperature:   Temp (24hrs), Av 1 °F (36 7 °C), Min:97 2 °F (36 2 °C), Max:98 6 °F (37 °C)    Temperature: 98 6 °F (37 °C)  Intake & Output:  I/O        07 07 0701   07 07 07    P  O   0     Total Intake(mL/kg)  0 (0)     Urine (mL/kg/hr)  300 225 (2 7)    Chest Tube  5     Total Output  305 225    Net  -305 -225 Weights:   IBW (Ideal Body Weight): 68 4 kg    Body mass index is 18 84 kg/m²  Weight (last 2 days)     Date/Time Weight    07/19/22 2047 56 2 (123 9)        Physical Exam  Vitals reviewed  Constitutional:       General: He is not in acute distress  HENT:      Head: Normocephalic and atraumatic  Nose: No rhinorrhea  Mouth/Throat:      Dentition: Abnormal dentition  Comments: Dentition is poor  Eyes:      General: Scleral icterus present  Cardiovascular:      Rate and Rhythm: Normal rate and regular rhythm  Pulses: Normal pulses  Heart sounds: Murmur heard  Systolic murmur is present  No friction rub  No gallop  Pulmonary:      Effort: Pulmonary effort is normal  No respiratory distress  Breath sounds: Normal breath sounds  No wheezing, rhonchi or rales  Chest:      Comments: Right chest tube in place draining <5mL serous fluid with large air leak present on suction  Abdominal:      General: Bowel sounds are normal  There is no distension  Palpations: Abdomen is soft  Tenderness: There is no abdominal tenderness  There is no guarding  Musculoskeletal:      Right lower leg: No edema  Left lower leg: No edema  Skin:     General: Skin is warm and dry  Capillary Refill: Capillary refill takes less than 2 seconds  Neurological:      Mental Status: He is alert  Comments: Moves all 4 extremities, no dysarthria   Psychiatric:         Behavior: Behavior normal        LABORATORY DATA     Labs: I have personally reviewed pertinent reports    Results from last 7 days   Lab Units 07/20/22  0455 07/19/22  0510 07/18/22  0523 07/17/22  0556 07/16/22  0538   WBC Thousand/uL 9 25 10 11  --   --  6 90   HEMOGLOBIN g/dL 11 8* 12 4 12 3   < > 11 0*   HEMATOCRIT % 36 1* 37 8 37 6   < > 33 4*   PLATELETS Thousands/uL 656* 599*  --   --  494*   NEUTROS PCT % 57  --   --   --   --    MONOS PCT % 21*  --   --   --   --     < > = values in this interval not displayed  Results from last 7 days   Lab Units 07/20/22  0455 07/19/22  0510 07/18/22  0523 07/15/22  0453 07/14/22  0459   POTASSIUM mmol/L 4 3 4 8 4 5   < > 3 7   CHLORIDE mmol/L 98 97 96*   < > 98*   CO2 mmol/L 26 21 26   < > 30   BUN mg/dL 17 20 11   < > 6   CREATININE mg/dL 0 62 0 73 0 74   < > 0 53*   CALCIUM mg/dL 9 9 9 3 9 8   < > 8 2*   ALK PHOS U/L  --   --   --   --  43*   ALT U/L  --   --   --   --  12   AST U/L  --   --   --   --  26    < > = values in this interval not displayed  Results from last 7 days   Lab Units 07/18/22  0523 07/17/22  0556 07/16/22  0538   MAGNESIUM mg/dL 1 7 1 8 1 5*                        IMAGING & DIAGNOSTIC TESTING     Radiology Results: I have personally reviewed pertinent reports  No results found  Other Diagnostic Testing: I have personally reviewed pertinent reports  ACTIVE MEDICATIONS     Current Facility-Administered Medications   Medication Dose Route Frequency    acetaminophen (TYLENOL) tablet 650 mg  650 mg Oral Q6H PRN    albuterol inhalation solution 2 5 mg  2 5 mg Nebulization F2O PRN    folic acid (FOLVITE) tablet 1 mg  1 mg Oral Daily    lidocaine (LIDODERM) 5 % patch 1 patch  1 patch Topical Daily    losartan (COZAAR) tablet 25 mg  25 mg Oral Daily    magnesium oxide (MAG-OX) tablet 400 mg  400 mg Oral BID    metoprolol succinate (TOPROL-XL) 24 hr tablet 25 mg  25 mg Oral HS    nicotine (NICODERM CQ) 21 mg/24 hr TD 24 hr patch 1 patch  1 patch Transdermal Daily    ondansetron (ZOFRAN) injection 4 mg  4 mg Intravenous Q6H PRN    thiamine tablet 100 mg  100 mg Oral Daily       VTE Pharmacologic Prophylaxis: Heparin  VTE Mechanical Prophylaxis: sequential compression device    Portions of the record may have been created with voice recognition software  Occasional wrong word or "sound a like" substitutions may have occurred due to the inherent limitations of voice recognition software    Read the chart carefully and recognize, using context, where substitutions have occurred   ==  [place]  Internal Medicine MS3    I have read and modified the medical student's note and made changes as appropriate      Jos Andres MD  [address]  Internal Medicine Residency PGY-1

## 2022-07-20 NOTE — DISCHARGE SUMMARY
Sarath 43  Discharge- [address] 1965, 59 y o  male MRN: [id number]  Unit/Bed#: [address] Encounter: [id number]  Primary Care Provider: Lakisha Winchester MD   Date and time admitted to hospital: 7/8/2022  5:50 PM    Alcohol-induced acute pancreatitis-resolved as of 7/15/2022  Assessment & Plan  Patient presented with epigastric pain, nausea, vomiting for about 1 week  History of alcohol induced pancreatitis and alcoholic hepatitis  · CT showed - Mild peripancreatic stranding suggestive of pancreatitis  Severe hepatic steatosis was noted  No fluid collections  · Lipase 1700 on admission --> 392 on 7/11  Denies any abdominal pain  Tolerating diet  · Alcohol level on admission 182  · Triglycerides 44  · Was on IV fluids previously which has been discontinued      * Pneumothorax  Assessment & Plan  Incidental finding  Denied SOB, chest pain  Denied fall or trauma  · Large right pneumothorax initially, S/P right chest tube insertion in ED to wall suction on 07/08  · On 07/09 underwent IR chest tube placement due to persistent pneumothorax  · Chest x-ray 7/13 shows small PTX  · CT chest 7/14 - moderate right PTX, bullous changes with largest bulla 2 5 cm  Hematoma in the tract of prior large bore chest tube, hematoma in chest wall, moderate right pleural effusion with some hemorrhagic fluid noted  · Repeat chest x-ray 7/18 shows small pneumothorax  · Case discussed with thoracic surgery and plan is for transfer to [facility] when bed available  · Case was discussed with Dr Dante Abbott over there  · Risks/benefits of transfer discussed with patient and he is in agreement  Patient's sister also aware of the transfer  · Hemoglobin stable  · Patient is being transferred to [facility] to be evaluated by thoracic surgery  Electrolyte abnormality  Assessment & Plan  · Hyponatremia worsening  Hold Aldactone    Check urine sodium, osm, serum osm for further evaluation  · Other Electrolytes improved status post repletion  · get repeat lab work in a m  Tobacco abuse  Assessment & Plan  Continue nicotine patch  Smoking cessation discussed  Paroxysmal atrial fibrillation [facility])  Assessment & Plan  Patient states he stopped taking Xarelto about 1 month ago as he ran out of it  Patient was on Cardizem, sotalol, Toprol XL in the past as well  He denies taking any prescription medications except Xarelto which he stopped 1 month ago  History of cardioversion in 2018  · Tachycardic in ED  · Per prior provider, EKG showed sinus tach, rate 106, , V1 to V3 TWI  · Continue Toprol-XL  · 81 mg of aspirin discontinued due to findings of hematoma/hemorrhagic fluid  Per Cardiology ASA can be started at a later time  · Continue to hold anticoagulation as patient reports frequent falls at home        Alcohol withdrawal [facility])  Assessment & Plan  Patient reports drinking about 6 drinks per day  Last drink was on day of admission  · Complete alcohol cessation discussed with patient  · Initially on CIWA protocol on admission  Continue multivitamin, thiamine, folic acid  · Developed alcohol withdrawal symptoms 07/09-7/10 with confusion, tachycardia, agitation, DTs  Received total of 6 mg of IV Ativan without any improvement  · Transferred to step-down unit and received IV phenobarb and also required p r n  Valium doses  · Was also on Precedex drip which was later discontinued  · Patient interested in inpatient alcohol rehab after discharge    Cardiomyopathy, 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  Assessment & Plan  · TONYA in 2018 showed normal EF  · Echo on this admission shows EF of 25-30% with grade 1 diastolic dysfunction  · Continue losartan, Toprol-XL with holding parameter    Holding Aldactone due to hyponatremia  · Cardiology was consulted for medication optimization as patient on many medications at home that are not ideal    Encephalopathy-resolved as of 7/15/2022  Assessment & Plan  Due to alcohol abuse/withdrawal  Mental status has improved & patient AAO x3    Chest pain-resolved as of 7/16/2022  Assessment & Plan  Per ICU note, patient reported some chest pain around chest tube site  Troponins negative  Discharging Physician / Practitioner: CHEN Jean  PCP: Millie Cabrera MD  Admission Date: 7/8/2022  Discharge Date: 7/19/2022    Reason for Admission: Abdominal Pain (Abd pain for few days, unable to eat for 3 days  NO nausea, just pain   Daily drinking 6 drinks a day per pt  )        Medical Problems             Resolved Problems  Date Reviewed: 7/20/2022          Resolved    Chest pain 7/16/2022     Resolved by  Cirilo Barry DO    Alcohol-induced acute pancreatitis 7/15/2022     Resolved by  Cirilo Barry DO    Elevated LFTs 7/12/2022     Resolved by  Stanly Dubin, CRNP    Encephalopathy 7/15/2022     Resolved by  Cirilo Barry DO                Consultations During Hospital Stay:  IP CONSULT TO PULMONOLOGY  INPATIENT CONSULT TO IR  IP CONSULT TO PULMONOLOGY  IP CONSULT TO CARDIOLOGY    Procedures Performed:     · Chest tube insertion by IR    Significant Findings / Test Results:     · As below  Results from last 7 days   Lab Units 07/19/22  0510 07/18/22  0523 07/17/22  0556 07/16/22  0538 07/13/22  0544   WBC Thousand/uL 10 11  --   --  6 90 7 52   HEMOGLOBIN g/dL 12 4 12 3 11 5* 11 0* 10 1*   PLATELETS Thousands/uL 599*  --   --  494* 277     Results from last 7 days   Lab Units 07/19/22  0510 07/18/22  0523 07/17/22  0556 07/15/22  0453 07/14/22  0459   SODIUM mmol/L 128* 132* 132*   < > 133*   POTASSIUM mmol/L 4 8 4 5 4 7   < > 3 7   CHLORIDE mmol/L 97 96* 98*   < > 98*   CO2 mmol/L 21 26 27   < > 30   BUN mg/dL 20 11 8   < > 6   CREATININE mg/dL 0 73 0 74 0 61   < > 0 53*   CALCIUM mg/dL 9 3 9 8 8 9   < > 8 2*   TOTAL BILIRUBIN mg/dL  --   --   --   --  0 35   ALK PHOS U/L  --   --   --   --  43*   ALT U/L  --   --   --   --  12   AST U/L  --   --   --   --  26    < > = values in this interval not displayed  Lab Results   Component Value Date/Time    HGBA1C 5 4 09/23/2017 05:35 AM             Blood Culture: No results found for: Druscilla Mayra  Urine Culture: No results found for: URINECX  Sputum Culture: No components found for: SPUTUMCX  Wound Culture: No results found for: WOUNDCULT     Imaging  XR chest portable   Final Result by Jackie Reddy MD (07/18 1226)      Slightly diminished small right apical pneumothorax  Chest tube remains in place                  Workstation performed: [id number]         CT chest wo contrast   Final Result by Amanda Amaya MD (07/15 0757)      1  Moderate right pneumothorax with right apical anterior approach pigtail catheter in place  Septations in the anterior pleural space superiorly  2 Bullous changes predominantly in the right upper lobe near the apex  Largest bulla measuring 2 5 cm      3  Groundglass changes in the right upper lobe with a tubular hyperdensity extending to the pleural space laterally suggesting hematoma in the tract of the previous large bore chest tube  Hematoma in the chest wall is seen at the previous entry site  If there is persistent air leak in the Pleuro-evac pleural fistula cannot be excluded  4  Moderate right pleural effusion with small amount of hemorrhagic fluid  Workstation performed: [id number]         XR chest portable   Final Result by Melissa Dorsey MD (07/14 1111)      Stable small right-sided pneumothorax with a chest tube in place  Workstation performed: [id number]         XR chest portable   Final Result by Michael Nur DO (07/13 4766)      Small right apical pneumothorax, similar from 07/11/2022  The study was marked in [facility] for immediate notification                    Workstation performed: [id number]         XR chest portable ICU   Final Result by Michael Nur DO (07/13 4458)      Right pneumothorax decreased in size from prior with likely small amounts of gas trapped at the right minor fissure medially  Improved aeration of the right lung  Workstation performed: [id number]         XR chest portable ICU   Final Result by Ed Castillo MD (07/11 4860)      Interval development of small right apical pneumothorax with diffuse right lung airspace disease  Right-sided chest tube remains in place  Workstation performed: [id number]         XR chest portable   Final Result by Ed Castillo MD (07/11 0636)      Right-sided chest tube in place without pneumothorax  Multiple pulmonary opacities likely representing airspace disease and pulmonary hemorrhage  Workstation performed: [id number]         IR chest tube placement   Final Result by Ed Castillo MD (07/09 1740)      1  Right apical chest tube placement using 10 Emir[place] catheter  2  Existing right lateral chest tube appeared to be within the right lung parenchyma and was nonfunctional  This existing chest tube was removed  Plan:       - Right chest tube to pleural vac suction    - Monitor for resolution of right pneumothorax  Workstation performed: [id number]         XR chest portable   Final Result by Ed Castillo MD (07/09 1551)      Right-sided chest tube is stable in position with persistent large right pneumothorax  Workstation performed: [id number]         XR chest portable   Final Result by Javier Loaiza MD (07/09 1103)      Right chest tube with persistent large right pneumothorax and partial right lung atelectasis  Workstation performed: [id number]         XR chest 1 view portable   Final Result by Javier Loaiza MD (07/09 1103)      Right chest tube with persistent large right pneumothorax and partial right lung atelectasis                    Workstation performed: [id number]         CT abdomen pelvis wo contrast   Final Result by Jules Chou MD (07/08 1904)   1  Incompletely visualized large right pneumothorax  Radiographic evaluation recommended  2   Mild peripancreatic stranding most concerning for pancreatitis  No peripancreatic fluid collections  3   Severe hepatic steatosis  I personally discussed this study with Dr Jovany Zarate on 7/8/2022 at 7:03 PM                      Workstation performed: [id number]               Incidental Findings:   · None     Test Results Pending at Discharge (will require follow up): · None     Outpatient Tests Requested:  · Not applicable    Complications:  Hematoma chest wall    Reason for Admission:  Alcohol induced acute pancreatitis and pneumothorax    Hospital Course:     PER HPI: Elwyn Phalen is a 59 y o  male patient with a PMH of alcohol abuse, cardiomyopathy, paroxysmal AFib, nicotine abuse who originally presented to the hospital on 7/8/2022 due to alcohol induced acute pancreatitis and right pneumothorax  Chest tube was inserted in ED but was not functioning with persistent large pneumothorax  Patient underwent IR chest tube insertion on hospital day 2  CT chest on 7/14th findings as above  Patient is being transferred to [facility] to be evaluated by thoracic surgery  Chest tube in place with persistent air leak,remains on wall suction  Patient was seen by Cardiology for worsening cardiomyopathy  Patient required ICU stay for DTs  Hospital course:   Please see above list of diagnoses and related plan for additional information  Condition at Discharge: good       Discharge instructions/Information to patient and family:   See after visit summary for information provided to patient and family  Provisions for Follow-Up Care:  See after visit summary for information related to follow-up care and any pertinent home health orders  Disposition:     [address] Transfer to [facility]    Planned Readmission: na     Discharge Statement:  I spent 15 minutes discharging the patient  This time was spent on the day of discharge  I had direct contact with the patient on the day of discharge  Greater than 50% of the total time was spent examining patient, answering all patient questions, arranging and discussing plan of care with patient as well as directly providing post-discharge instructions  Additional time then spent on discharge activities  Discharge Medications:  See after visit summary for reconciled discharge medications provided to patient and family        ** Please Note: This note has been constructed using a voice recognition system **

## 2022-07-20 NOTE — UTILIZATION REVIEW
Notification of Discharge   This is a Notification of Discharge from our facility [address]  Please be advised that this patient has been discharge from our facility  Below you will find the admission and discharge date and time including the patients disposition  UTILIZATION REVIEW CONTACT:  Maurisio Rivas  Utilization   Network Utilization Review Department  Phone: [phone] x carefully listen to the prompts  All voicemails are confidential   Email: [email or web address]     PHYSICIAN ADVISORY SERVICES:  FOR DUPX-RA-TUAP REVIEW - MEDICAL NECESSITY DENIAL  Phone: [phone]  Fax: [phone]  Email: [email or web address]     PRESENTATION DATE: 7/8/2022  5:50 PM  OBERVATION ADMISSION DATE:   INPATIENT ADMISSION DATE: 7/8/22  9:30 PM   DISCHARGE DATE: 7/19/2022  8:08 PM  DISPOSITION: [address] [facility]      IMPORTANT INFORMATION:  Send all requests for admission clinical reviews, approved or denied determinations and any other requests to dedicated fax number below belonging to the campus where the patient is receiving treatment   List of dedicated fax numbers:  [address] DENIALS (Administrative/Medical Necessity) [phone]   [address] (Maternity/NICU/Pediatrics) [phone]   [facility] [phone]   [address] [facility] [address] [phone]   [address] [place] [address]  [phone]   [address] [facility] [address] [place] [address],[address] [phone]   [facility]  [phone]   [address] [facility] [address]  [address] [facility] [address] [facility] [phone]

## 2022-07-20 NOTE — CERTIFIED RECOVERY SPECIALIST
Certified  Note    Patient name: Natasha Foster  Location: [facility]  [place]: [address] [place] [address]  Attending:  Donita Stark DO MRN [id number]  : 1965  Age: 59 y o  Sex: male Date 2022         Substance Use History:     Social History     Substance and Sexual Activity   Alcohol Use Yes    Comment: History of binge drinking for last 15+ years  Currently drinking "sometimes every day, sometimes just on the weekends " Drinking ~2 fifths of gin on days he chooses to "drink all day " (Updated 2022)  Social History     Substance and Sexual Activity   Drug Use Not Currently       CRS attempted contact, patient not in his room    CRS to follow up    Conner Santos

## 2022-07-20 NOTE — UTILIZATION REVIEW
Inpatient Admission Authorization Request   NOTIFICATION OF INPATIENT ADMISSION/INPATIENT AUTHORIZATION REQUEST   SERVICING FACILITY:   [facility]  Address: [address] [place] [address], [address] [place] [address]  Tax ID: [id number]  NPI: [id number]  Place of Service: Inpatient [address] [facility]  [address]  Place of Service Code: 24     ATTENDING PROVIDER:  Attending Name and NPI#: Mina Lim [[id number]]  Address: [address] [place] [address]  Phone: [phone]     UTILIZATION REVIEW CONTACT:  Latasha Blount Utilization   Network Utilization Review Department  Phone: [phone]  Fax: [phone]  Email: [email or web address]     PHYSICIAN ADVISORY SERVICES:  FOR JPHJ-AI-RLYK REVIEW - MEDICAL NECESSITY DENIAL  Phone: [phone]  Fax: [phone]  Email: [email or web address]     TYPE OF REQUEST:  Inpatient Status     ADMISSION INFORMATION:  ADMISSION DATE/TIME: 7/19/22  8:46 PM  PATIENT DIAGNOSIS CODE/DESCRIPTION:  Pneumothorax [J93 9]  DISCHARGE DATE/TIME: No discharge date for patient encounter  IMPORTANT INFORMATION:  Please contact Latasha Blount directly with any questions or concerns regarding this request  Department voicemails are confidential     Send requests for admission clinical reviews, concurrent reviews, approvals, and administrative denials due to lack of clinical to fax [phone]
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
## 2022-07-20 NOTE — NURSING NOTE
Patient left unit via stretcher in stable condition with all belongings accompanied by transport team   Summary of care and full report given to transport team and RN at receiving facility

## 2022-07-20 NOTE — PHYSICAL THERAPY NOTE
Physical Therapy Cancellation Note       07/20/22 0802   PT Last Visit   PT Visit Date 07/20/22   Note Type   Note type Cancelled Session   Cancel Reasons Medical status   Additional Comments PT orders received, chart reviewed  Pt with thoracics consult, pending possible sx intervention  PT to continue to follow and see pt as appropriate pending plan         Kiana Vital, PT, DPT

## 2022-07-20 NOTE — RESPIRATORY THERAPY NOTE
RT Protocol Note  Nic Ayala 59 y o  male MRN: [id number]  Unit/Bed#: [facility] 529-01 Encounter: [id number]    Assessment    Principal Problem:    Pneumothorax  Active Problems:    Cardiomyopathy, 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    Alcohol withdrawal (HCC)    Paroxysmal atrial fibrillation (HCC)    Tobacco abuse    Electrolyte abnormality    Alcohol abuse      Home Pulmonary Medications:  Albuterol udn prn       Past Medical History:   Diagnosis Date    Alcohol abuse     1 pint of gin daily on weekends    Alcoholic hepatitis     Mild    Atrial flutter ([address] ) 08/2015    Recurrent and symptomatic, also occurring on 1/7/2015    Cardiomyopathy, nonischemic (HCC)     Caused by uncontrolled tachycardia    Congenital heart disease     Type unknown and not evident on echocardiography    Hypokalemia     Murmur     Smoking     One pack per day for 38 years     Social History     Socioeconomic History    Marital status: /Civil Union     Spouse name: Not on file    Number of children: Not on file    Years of education: Not on file    Highest education level: Not on file   Occupational History    Not on file   Tobacco Use    Smoking status: Current Every Day Smoker     Packs/day: 1 00     Years: 15 00     Pack years: 15 00    Smokeless tobacco: Never Used   Vaping Use    Vaping Use: Never used   Substance and Sexual Activity    Alcohol use: Yes     Comment: 1 pint of gin every other day     Drug use: Yes    Sexual activity: Not on file   Other Topics Concern    Not on file   Social History Narrative    Not on file     Social Determinants of Health     Financial Resource Strain: Not on file   Food Insecurity: No Food Insecurity    Worried About Running Out of Food in the Last Year: Never true    Ran Out of Food in the Last Year: Never true   Transportation Needs: No Transportation Needs    Lack of Transportation (Medical): No    Lack of Transportation (Non-Medical):  No   Physical Activity: Not on file   Stress: Not on file   Social Connections: Not on file   Intimate Partner Violence: Not on file   Housing Stability: Low Risk     Unable to Pay for Housing in the Last Year: No    Number of Places Lived in the Last Year: 2    Unstable Housing in the Last Year: No       Subjective         Objective    Physical Exam:   Assessment Type: (P) Assess only  General Appearance: (P) Drowsy  Respiratory Pattern: (P) Normal  Chest Assessment: (P) Chest expansion symmetrical  Bilateral Breath Sounds: (P) Clear, Diminished    Vitals:  Blood pressure 106/65, pulse 78, temperature 98 3 °F (36 8 °C), temperature source Oral, resp  rate 20, height 5' 8" (1 727 m), weight 56 2 kg (123 lb 14 4 oz), SpO2 (P) 95 %  Imaging and other studies: I have personally reviewed pertinent reports  Plan    Respiratory Plan: (P) No distress/Pulmonary history        Resp Comments: (P) Pt is 58 y/o male admitted for pneumothorax  Pt evaluated per respiratory protocol  Pt not in any respiratory distress  Breathe sounds are diminished and clear  No distress noted at this time  Pt uses albuterol udn at home prn  Will continue prn udns at this time and continue to follow pt per protocol

## 2022-07-20 NOTE — CONSULTS
Advanced Heart Failure / Pulmonary Hypertension Service Consultation    Tesha Graham 59 y o  male  MRN: [id number]  Unit/Bed#: [facility] 529-01; Encounter: [id number]    Assessment:  Principal Problem:    Pneumothorax  Active Problems:    Cardiomyopathy, 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    Alcohol withdrawal (HCC)    Paroxysmal atrial fibrillation (HCC)    Tobacco abuse    Electrolyte abnormality    Alcohol abuse      HPI:   Nic Ayala 69-year-old man with NICM, history of alcohol abuse, alcoholic pancreatitis, Afib, and tobacco abuse who presented to [facility] on 07/08 with epigastric pain with associated nausea, abdominal bloating, and anorexia  Pain exacerbated by eating and drinking  Blood EtOH of 182 with lipase of 1700  CT abdomen/pelvis concerning for pancreatitis; made NPO and started on IV fluids  Right pneumothorax noted on CXR on 07/08 and chest tube placed on 07/08  Became increasingly agitated on 07/10 and was transferred to ICU for close monitoring for EtOH withdrawal  Transferred out of ICU on 07/12 and repeat TTE ordered  Cardiology consulted on 07/14 for known cardiomyopathy - Xarelto held and BB continued, and MRA added  Transferred to [address] [place] [address] on 07/19/2022 for thoracic surgery evaluation for persistent right pneumothorax and air leak from chest tube      Patient seen and examined  Reviewed hospital course thus far with patient  Primary complaint today of pain surrounding chest tube and hunger (currently NPO)  No current cardiac complaints  Admits to not taking medications prior to this admission  Reports medications are affordable and accessible to him but states that "I don't like taking medications so I don't " Reviewed patient's medical, family, and social history; EMR updated as appropriate  Reports having "hole in his heart" and underwent surgical repair as a teenager at [facility] (prior documentation states surgery completed in 2000/2001 in [place] at [facility])  Patient  with 3 adult children  Intermittently lives with his sister, Fito Pedroza  Currently unemployed  Heart failure service was consulted for "secondary cardiomyopathy " Patient has followed Dr Elizabeth Mcmillan for outpatient cardiology in [place]; last seen in 05/2021  Objective: Intake/ Output: 0 mL / 305 mL  Not accurate  Weight: No weight today  123 lbs on 07/19  Telemetry: NSR, rates in 60-70s     Today's Plan:  · Continue current doses of BB and ARB  · Euvolemic on exam today; no indication to start diuretics at this time  · [employer] rep attempted to interrogate loop recorder today  Unable to do so; suspect battery dead (implanted in 10/2018)  Continue on telemetry  · Plan to discuss LifeVest prior to discharge  · Tentative plan for repeat ischemic evaluation as outpatient  · HF SW reviewed options for inpatient rehab for EtOH abuse; patient expressed interest in getting more information about program    · Chest tube management per thoracic surgery       Plan:  Alcoholic pancreatitis / alcohol abuse               History of binge drinking for last 15+ years  Currently drinking "sometimes every day, sometimes just on the weekends " Drinking ~2 fifths of gin on days he chooses to "drink all day "   Management per primary team       Spontaneous pneumothorax, right    S/p chest tube insertion on 07/08/2022  Management per thoracic surgery and primary teams  Chronic HFrEF, re-reduced; LVEF 25-30%; LVIDd 4 9 cm; NYHA II; ACC/AHA Stage B              Etiology: History of congenital heart disease; reports having "hole in his heart" closed as a teenager at [facility] (prior documentation, reports surgery being completed in 2000 at [facility] in [place], [place]); sternal wires present on imaging  Per prior documentation, had history of tachy-mediated CM in 01/2015 (symptomatic atrial flutter with RVR); recovered in 06/2015 per notes  EtOH abuse likely contributing to drop in LVEF in 2022  TTE 01/07/2015 (per report): LVEF 40%  No RWA  Mild MR and TR  Moderate PI     TONYA 06/10/2016: LVEF 60%  TTE 09/23/2017: LVEF 50-55%  LVIDd 3 93 cm  Normal RV  KIKA  Mild MR and TR  PASP 35 mmHg  Nuclear stress test 09/25/2017: LVEF 51%  Normal study  TONYA 06/08/2018: LVEF 55-60%  Nuclear stress test 06/24/2020 (per report): LVEF 60%  10 METs completed  "Tiny area of infarction involving the apex "   TTE 07/13/2022: LVEF 25-30%  LVIDd 4 9 cm  Grade 1 DD  Normal RV size with low normal RVSF  Mild MR and TR       Neurohormonal Blockade:  --Beta Blocker: metoprolol succinate 25 mg qHS    --ARNi / ACEi / ARB: losartan 25 mg daily  --Aldosterone Antagonist: No    --SGLT2 Inhibitor: No    --Home Diuretic: None  --Inpatient Diuretic: None       Sudden Cardiac Death Risk Reduction:  --LVEF 25-35%  Plan to discuss LifeVest prior to discharge  --Plan to repeat limited TTE to reassess LVEF in mid 10/2022      Electrical Resynchronization:  --Candidacy for BiV device: narrow QRS  History of incomplete RBBB      Advanced Therapies (if appropriate): Will continue to monitor      Atrial flutter, parosysmal              [id number] = 1 (HF)  Anticoagulation: previously on Xarelto  Now on ASA 81 mg daily  S/p DCCV in 2016 and 2018  S/p [employer] loop recorder in 10/2018  Last available interrogation from 06/2021 with 0% AF burden  Attempted interrogation on 07/20/2022; unable to be completed (battery presumed to be dead)  Rate control: BB as above  Rhythm control: None      Hepatic steatosis: likely related to chronic alcohol abuse    Chronic obstructive pulmonary disease  Tobacco abuse  History of GSW to abdomen: patient denies; "I've never been shot "    Past Medical History:   Diagnosis Date    Alcohol abuse     1 pint of gin daily on weekends    Alcoholic hepatitis     Mild    Atrial flutter ([address] ) 07/2015    Recurrent and symptomatic, also occurring on 1/7/2015    Cardiomyopathy, nonischemic ([facility] [address] ) 01/07/2015    in setting of rapid atrial flutter    Congenital heart disease     Type unknown and not evident on echocardiography; "had a hole in heart that they closed up" as a teenager at [address] (Updated 07/20/2022)   COPD (chronic obstructive pulmonary disease) (HCC)     Hypokalemia     Tobacco abuse 1976    One pack per day for 38 years       Review of Systems   Constitutional: Negative for activity change, appetite change, fatigue, fever and unexpected weight change  HENT: Negative for congestion, postnasal drip, rhinorrhea, sneezing, sore throat and trouble swallowing  Eyes: Negative  Respiratory: Negative for cough, chest tightness and shortness of breath  Cardiovascular: Positive for chest pain (surrounding chest tube insertion site)  Negative for palpitations and leg swelling  Gastrointestinal: Negative for abdominal distention, abdominal pain, diarrhea, nausea and vomiting  Endocrine: Negative  Genitourinary: Negative for decreased urine volume, difficulty urinating, dysuria and urgency  Musculoskeletal: Negative  Skin: Negative  Allergic/Immunologic: Negative  Neurological: Negative for dizziness, tremors, syncope, weakness, light-headedness and headaches  Hematological: Negative  Psychiatric/Behavioral: Negative for agitation, confusion and sleep disturbance  The patient is not nervous/anxious  14-point ROS completed and negative except as stated above and/or in the HPI      Current Facility-Administered Medications:     acetaminophen (TYLENOL) tablet 650 mg, 650 mg, Oral, Q6H PRN, CHEN Montemayor    albuterol inhalation solution 2 5 mg, 2 5 mg, Nebulization, Q4H PRN, Gareth Gar MD    folic acid (FOLVITE) tablet 1 mg, 1 mg, Oral, Daily, DOYLE MontemayorNP, 1 mg at 07/20/22 0925    lidocaine (LIDODERM) 5 % patch 1 patch, 1 patch, Topical, Daily, CHEN Montemayor, 1 patch at 07/20/22 7948    losartan (COZAAR) tablet 25 mg, 25 mg, Oral, Daily, Cuiyin Yurik, CRNP, 25 mg at 07/20/22 3794    magnesium oxide (MAG-OX) tablet 400 mg, 400 mg, Oral, BID, Cuiyin Yurik, CRNP, 400 mg at 07/20/22 8223    metoprolol succinate (TOPROL-XL) 24 hr tablet 25 mg, 25 mg, Oral, HS, Cuiyin Yurik, CRNP    nicotine (NICODERM CQ) 21 mg/24 hr TD 24 hr patch 1 patch, 1 patch, Transdermal, Daily, Cuiyin Yurik, CRNP, 1 patch at 07/20/22 0925    ondansetron (ZOFRAN) injection 4 mg, 4 mg, Intravenous, Q6H PRN, Cuiyin Yurik, CRNP    thiamine tablet 100 mg, 100 mg, Oral, Daily, Cuiyin Yurik, CRNP, 100 mg at 07/20/22 7130    No Known Allergies     Social History     Socioeconomic History    Marital status: /Civil Union     Spouse name: Not on file    Number of children: 3    Years of education: Not on file    Highest education level: Not on file   Occupational History    Not on file   Tobacco Use    Smoking status: Current Every Day Smoker     Packs/day: 1 00     Start date: 12    Smokeless tobacco: Never Used    Tobacco comment: Began smoking at age 13  Averaging 1 ppd  Few 2-3 months periods with complete cessation (Updated 07/20/2022)  Vaping Use    Vaping Use: Never used   Substance and Sexual Activity    Alcohol use: Yes     Comment: History of binge drinking for last 15+ years  Currently drinking "sometimes every day, sometimes just on the weekends " Drinking ~2 fifths of gin on days he chooses to "drink all day " (Updated 07/20/2022)   Drug use: Not Currently    Sexual activity: Not on file   Other Topics Concern    Not on file   Social History Narrative    Previously worked in chicken [employer]        Social Determinants of Health     Financial Resource Strain: Not on file   Food Insecurity: No Food Insecurity    Worried About Running Out of Food in the Last Year: Never true    Tello of Food in the Last Year: Never true   Transportation Needs: No Transportation Needs    Lack of Transportation (Medical): No    Lack of Transportation (Non-Medical): No   Physical Activity: Not on file   Stress: Not on file   Social Connections: Not on file   Intimate Partner Violence: Not on file   Housing Stability: Low Risk     Unable to Pay for Housing in the Last Year: No    Number of Places Lived in the Last Year: 2    Unstable Housing in the Last Year: No     Family History   Problem Relation Age of Onset    No Known Problems Mother     Bone cancer Father     Sudden death Neg Hx        Vitals:  Blood pressure 120/74, pulse 89, temperature 98 6 °F (37 °C), temperature source Oral, resp  rate 18, height 5' 8" (1 727 m), weight 56 2 kg (123 lb 14 4 oz), SpO2 94 %  Body mass index is 18 84 kg/m²  I/O last 3 completed shifts: In: 0   Out: 305 [Urine:300; Chest Tube:5]    Wt Readings from Last 10 Encounters:   07/19/22 56 2 kg (123 lb 14 4 oz)   07/13/22 65 7 kg (144 lb 13 5 oz)   04/16/22 59 8 kg (131 lb 12 8 oz)   10/04/20 62 8 kg (138 lb 7 2 oz)   06/06/18 62 kg (136 lb 11 oz)   10/09/17 66 7 kg (147 lb)   09/25/17 66 1 kg (145 lb 11 6 oz)   07/29/16 64 5 kg (142 lb 3 oz)   06/10/16 67 6 kg (149 lb)       Vitals:    07/19/22 2356 07/20/22 0305 07/20/22 0734 07/20/22 1112   BP:  108/65 110/65 120/74   BP Location:    Left arm   Pulse:  71 71 89   Resp:  18 18    Temp:  98 2 °F (36 8 °C) 98 6 °F (37 °C) 98 6 °F (37 °C)   TempSrc:    Oral   SpO2: 95% 96% 96% 94%   Weight:       Height:           Physical Exam  Vitals reviewed  Constitutional:       General: He is awake  He is not in acute distress  Appearance: Normal appearance  He is well-developed and normal weight  He is not toxic-appearing or diaphoretic  HENT:      Head: Normocephalic  Nose: Nose normal       Mouth/Throat:      Mouth: Mucous membranes are moist    Eyes:      General: No scleral icterus  Conjunctiva/sclera: Conjunctivae normal    Neck:      Vascular: No JVD  Trachea: No tracheal deviation  Cardiovascular:      Rate and Rhythm: Normal rate and regular rhythm  No extrasystoles are present  Pulses: Normal pulses  Heart sounds: No murmur heard  Pulmonary:      Effort: Pulmonary effort is normal  No tachypnea, bradypnea or respiratory distress  Breath sounds: Normal air entry  Decreased breath sounds (right-sided) present  No wheezing or rales  Abdominal:      General: Bowel sounds are normal  There is distension (mild)  Palpations: Abdomen is soft  Tenderness: There is no abdominal tenderness  Musculoskeletal:      Cervical back: Neck supple  Right lower leg: No edema  Left lower leg: No edema  Comments: Mild nail clubbing bilaterally   Skin:     General: Skin is dry  Neurological:      General: No focal deficit present  Mental Status: He is alert and oriented to person, place, and time  Psychiatric:         Attention and Perception: Attention normal          Mood and Affect: Mood normal  Affect is blunt  Speech: Speech normal          Behavior: Behavior normal  Behavior is cooperative  Thought Content: Thought content normal      Central Line: No   [place] Catheter: No     Labs & Results:      Results from last 7 days   Lab Units 07/20/22  0455 07/19/22  0510 07/18/22  0523 07/17/22  0556 07/16/22  0538   WBC Thousand/uL 9 25 10 11  --   --  6 90   HEMOGLOBIN g/dL 11 8* 12 4 12 3   < > 11 0*   HEMATOCRIT % 36 1* 37 8 37 6   < > 33 4*   PLATELETS Thousands/uL 656* 599*  --   --  494*    < > = values in this interval not displayed           Results from last 7 days   Lab Units 07/20/22  0455 07/19/22  0510 07/18/22  0523 07/15/22  0453 07/14/22  0459   POTASSIUM mmol/L 4 3 4 8 4 5   < > 3 7   CHLORIDE mmol/L 98 97 96*   < > 98*   CO2 mmol/L 26 21 26   < > 30   BUN mg/dL 17 20 11   < > 6   CREATININE mg/dL 0 62 0 73 0 74   < > 0 53*   CALCIUM mg/dL 9 9 9 3 9 8   < > 8 2*   ALK PHOS U/L 46  --   --   --  43*   ALT U/L 18  --   --   --  12 AST U/L 25  --   --   --  26    < > = values in this interval not displayed           Maira Kauffman PA-C

## 2022-07-21 ENCOUNTER — ANESTHESIA (INPATIENT)
Dept: PERIOP | Facility: HOSPITAL | Age: 57
DRG: 163 | End: 2022-07-21
Payer: COMMERCIAL

## 2022-07-21 ENCOUNTER — ANESTHESIA EVENT (INPATIENT)
Dept: PERIOP | Facility: HOSPITAL | Age: 57
DRG: 163 | End: 2022-07-21
Payer: COMMERCIAL

## 2022-07-21 ENCOUNTER — APPOINTMENT (INPATIENT)
Dept: RADIOLOGY | Facility: HOSPITAL | Age: 57
DRG: 163 | End: 2022-07-21
Payer: COMMERCIAL

## 2022-07-21 LAB
ABO GROUP BLD: NORMAL
ALBUMIN SERPL BCP-MCNC: 3.7 G/DL (ref 3.5–5)
ALP SERPL-CCNC: 48 U/L (ref 46–116)
ALT SERPL W P-5'-P-CCNC: 20 U/L (ref 12–78)
ANION GAP SERPL CALCULATED.3IONS-SCNC: 8 MMOL/L (ref 4–13)
AST SERPL W P-5'-P-CCNC: 23 U/L (ref 5–45)
BASOPHILS # BLD AUTO: 0.1 THOUSANDS/ΜL (ref 0–0.1)
BASOPHILS NFR BLD AUTO: 1 % (ref 0–1)
BILIRUB SERPL-MCNC: 0.46 MG/DL (ref 0.2–1)
BUN SERPL-MCNC: 15 MG/DL (ref 5–25)
CALCIUM SERPL-MCNC: 9.9 MG/DL (ref 8.3–10.1)
CHLORIDE SERPL-SCNC: 98 MMOL/L (ref 96–108)
CO2 SERPL-SCNC: 27 MMOL/L (ref 21–32)
CREAT SERPL-MCNC: 0.68 MG/DL (ref 0.6–1.3)
EOSINOPHIL # BLD AUTO: 0.2 THOUSAND/ΜL (ref 0–0.61)
EOSINOPHIL NFR BLD AUTO: 2 % (ref 0–6)
ERYTHROCYTE [DISTWIDTH] IN BLOOD BY AUTOMATED COUNT: 13.8 % (ref 11.6–15.1)
GFR SERPL CREATININE-BSD FRML MDRD: 104 ML/MIN/1.73SQ M
GLUCOSE SERPL-MCNC: 96 MG/DL (ref 65–140)
HCT VFR BLD AUTO: 37.8 % (ref 36.5–49.3)
HGB BLD-MCNC: 11.9 G/DL (ref 12–17)
IMM GRANULOCYTES # BLD AUTO: 0.06 THOUSAND/UL (ref 0–0.2)
IMM GRANULOCYTES NFR BLD AUTO: 1 % (ref 0–2)
LYMPHOCYTES # BLD AUTO: 1.53 THOUSANDS/ΜL (ref 0.6–4.47)
LYMPHOCYTES NFR BLD AUTO: 17 % (ref 14–44)
MCH RBC QN AUTO: 29.2 PG (ref 26.8–34.3)
MCHC RBC AUTO-ENTMCNC: 31.5 G/DL (ref 31.4–37.4)
MCV RBC AUTO: 93 FL (ref 82–98)
MONOCYTES # BLD AUTO: 1.63 THOUSAND/ΜL (ref 0.17–1.22)
MONOCYTES NFR BLD AUTO: 19 % (ref 4–12)
NEUTROPHILS # BLD AUTO: 5.3 THOUSANDS/ΜL (ref 1.85–7.62)
NEUTS SEG NFR BLD AUTO: 60 % (ref 43–75)
NRBC BLD AUTO-RTO: 0 /100 WBCS
PLATELET # BLD AUTO: 712 THOUSANDS/UL (ref 149–390)
PMV BLD AUTO: 8.6 FL (ref 8.9–12.7)
POTASSIUM SERPL-SCNC: 4.2 MMOL/L (ref 3.5–5.3)
PROT SERPL-MCNC: 8 G/DL (ref 6.4–8.4)
RBC # BLD AUTO: 4.07 MILLION/UL (ref 3.88–5.62)
RH BLD: POSITIVE
SODIUM SERPL-SCNC: 133 MMOL/L (ref 135–147)
WBC # BLD AUTO: 8.82 THOUSAND/UL (ref 4.31–10.16)

## 2022-07-21 PROCEDURE — 0BBN4ZZ EXCISION OF RIGHT PLEURA, PERCUTANEOUS ENDOSCOPIC APPROACH: ICD-10-PCS | Performed by: THORACIC SURGERY (CARDIOTHORACIC VASCULAR SURGERY)

## 2022-07-21 PROCEDURE — 99232 SBSQ HOSP IP/OBS MODERATE 35: CPT | Performed by: INTERNAL MEDICINE

## 2022-07-21 PROCEDURE — 80053 COMPREHEN METABOLIC PANEL: CPT | Performed by: STUDENT IN AN ORGANIZED HEALTH CARE EDUCATION/TRAINING PROGRAM

## 2022-07-21 PROCEDURE — 0BNC4ZZ RELEASE RIGHT UPPER LUNG LOBE, PERCUTANEOUS ENDOSCOPIC APPROACH: ICD-10-PCS | Performed by: THORACIC SURGERY (CARDIOTHORACIC VASCULAR SURGERY)

## 2022-07-21 PROCEDURE — 0BNF4ZZ RELEASE RIGHT LOWER LUNG LOBE, PERCUTANEOUS ENDOSCOPIC APPROACH: ICD-10-PCS | Performed by: THORACIC SURGERY (CARDIOTHORACIC VASCULAR SURGERY)

## 2022-07-21 PROCEDURE — 0W9940Z DRAINAGE OF RIGHT PLEURAL CAVITY WITH DRAINAGE DEVICE, PERCUTANEOUS ENDOSCOPIC APPROACH: ICD-10-PCS | Performed by: THORACIC SURGERY (CARDIOTHORACIC VASCULAR SURGERY)

## 2022-07-21 PROCEDURE — 0BBC4ZZ EXCISION OF RIGHT UPPER LUNG LOBE, PERCUTANEOUS ENDOSCOPIC APPROACH: ICD-10-PCS | Performed by: THORACIC SURGERY (CARDIOTHORACIC VASCULAR SURGERY)

## 2022-07-21 PROCEDURE — C9290 INJ, BUPIVACAINE LIPOSOME: HCPCS | Performed by: THORACIC SURGERY (CARDIOTHORACIC VASCULAR SURGERY)

## 2022-07-21 PROCEDURE — 85025 COMPLETE CBC W/AUTO DIFF WBC: CPT

## 2022-07-21 PROCEDURE — NC001 PR NO CHARGE: Performed by: THORACIC SURGERY (CARDIOTHORACIC VASCULAR SURGERY)

## 2022-07-21 PROCEDURE — 94760 N-INVAS EAR/PLS OXIMETRY 1: CPT

## 2022-07-21 PROCEDURE — 71045 X-RAY EXAM CHEST 1 VIEW: CPT

## 2022-07-21 PROCEDURE — 32652 THORACOSCOPY REM TOTL CORTEX: CPT | Performed by: THORACIC SURGERY (CARDIOTHORACIC VASCULAR SURGERY)

## 2022-07-21 PROCEDURE — 88305 TISSUE EXAM BY PATHOLOGIST: CPT | Performed by: PATHOLOGY

## 2022-07-21 PROCEDURE — 32655 THORACOSCOPY RESECT BULLAE: CPT | Performed by: THORACIC SURGERY (CARDIOTHORACIC VASCULAR SURGERY)

## 2022-07-21 PROCEDURE — 0BND4ZZ RELEASE RIGHT MIDDLE LUNG LOBE, PERCUTANEOUS ENDOSCOPIC APPROACH: ICD-10-PCS | Performed by: THORACIC SURGERY (CARDIOTHORACIC VASCULAR SURGERY)

## 2022-07-21 PROCEDURE — 0BJ08ZZ INSPECTION OF TRACHEOBRONCHIAL TREE, VIA NATURAL OR ARTIFICIAL OPENING ENDOSCOPIC: ICD-10-PCS | Performed by: THORACIC SURGERY (CARDIOTHORACIC VASCULAR SURGERY)

## 2022-07-21 RX ORDER — DEXAMETHASONE SODIUM PHOSPHATE 10 MG/ML
INJECTION, SOLUTION INTRAMUSCULAR; INTRAVENOUS AS NEEDED
Status: DISCONTINUED | OUTPATIENT
Start: 2022-07-21 | End: 2022-07-21

## 2022-07-21 RX ORDER — SODIUM CHLORIDE 9 MG/ML
INJECTION, SOLUTION INTRAVENOUS AS NEEDED
Status: DISCONTINUED | OUTPATIENT
Start: 2022-07-21 | End: 2022-07-21 | Stop reason: HOSPADM

## 2022-07-21 RX ORDER — FENTANYL CITRATE/PF 50 MCG/ML
25 SYRINGE (ML) INJECTION
Status: DISCONTINUED | OUTPATIENT
Start: 2022-07-21 | End: 2022-07-21 | Stop reason: HOSPADM

## 2022-07-21 RX ORDER — SODIUM CHLORIDE, SODIUM LACTATE, POTASSIUM CHLORIDE, CALCIUM CHLORIDE 600; 310; 30; 20 MG/100ML; MG/100ML; MG/100ML; MG/100ML
INJECTION, SOLUTION INTRAVENOUS CONTINUOUS PRN
Status: DISCONTINUED | OUTPATIENT
Start: 2022-07-21 | End: 2022-07-21

## 2022-07-21 RX ORDER — FENTANYL CITRATE 50 UG/ML
INJECTION, SOLUTION INTRAMUSCULAR; INTRAVENOUS AS NEEDED
Status: DISCONTINUED | OUTPATIENT
Start: 2022-07-21 | End: 2022-07-21

## 2022-07-21 RX ORDER — BUPIVACAINE HYDROCHLORIDE 2.5 MG/ML
INJECTION, SOLUTION EPIDURAL; INFILTRATION; INTRACAUDAL AS NEEDED
Status: DISCONTINUED | OUTPATIENT
Start: 2022-07-21 | End: 2022-07-21 | Stop reason: HOSPADM

## 2022-07-21 RX ORDER — OXYCODONE HYDROCHLORIDE 5 MG/1
5 TABLET ORAL EVERY 4 HOURS PRN
Status: DISCONTINUED | OUTPATIENT
Start: 2022-07-21 | End: 2022-07-27 | Stop reason: HOSPADM

## 2022-07-21 RX ORDER — HYDROMORPHONE HCL/PF 1 MG/ML
0.5 SYRINGE (ML) INJECTION
Status: DISCONTINUED | OUTPATIENT
Start: 2022-07-21 | End: 2022-07-21 | Stop reason: HOSPADM

## 2022-07-21 RX ORDER — ONDANSETRON 2 MG/ML
4 INJECTION INTRAMUSCULAR; INTRAVENOUS EVERY 6 HOURS PRN
Status: DISCONTINUED | OUTPATIENT
Start: 2022-07-21 | End: 2022-07-27 | Stop reason: HOSPADM

## 2022-07-21 RX ORDER — PROPOFOL 10 MG/ML
INJECTION, EMULSION INTRAVENOUS AS NEEDED
Status: DISCONTINUED | OUTPATIENT
Start: 2022-07-21 | End: 2022-07-21

## 2022-07-21 RX ORDER — HYDROMORPHONE HCL/PF 1 MG/ML
0.5 SYRINGE (ML) INJECTION
Status: DISCONTINUED | OUTPATIENT
Start: 2022-07-21 | End: 2022-07-27 | Stop reason: HOSPADM

## 2022-07-21 RX ORDER — GABAPENTIN 300 MG/1
300 CAPSULE ORAL 3 TIMES DAILY
Status: DISCONTINUED | OUTPATIENT
Start: 2022-07-21 | End: 2022-07-27 | Stop reason: HOSPADM

## 2022-07-21 RX ORDER — ONDANSETRON 2 MG/ML
INJECTION INTRAMUSCULAR; INTRAVENOUS AS NEEDED
Status: DISCONTINUED | OUTPATIENT
Start: 2022-07-21 | End: 2022-07-21

## 2022-07-21 RX ORDER — MIDAZOLAM HYDROCHLORIDE 2 MG/2ML
INJECTION, SOLUTION INTRAMUSCULAR; INTRAVENOUS AS NEEDED
Status: DISCONTINUED | OUTPATIENT
Start: 2022-07-21 | End: 2022-07-21

## 2022-07-21 RX ORDER — HYDROMORPHONE HCL/PF 1 MG/ML
SYRINGE (ML) INJECTION AS NEEDED
Status: DISCONTINUED | OUTPATIENT
Start: 2022-07-21 | End: 2022-07-21

## 2022-07-21 RX ORDER — ALBUTEROL SULFATE 2.5 MG/3ML
2.5 SOLUTION RESPIRATORY (INHALATION) ONCE AS NEEDED
Status: DISCONTINUED | OUTPATIENT
Start: 2022-07-21 | End: 2022-07-21 | Stop reason: HOSPADM

## 2022-07-21 RX ORDER — PHENYLEPHRINE HYDROCHLORIDE 10 MG/ML
INJECTION INTRAVENOUS AS NEEDED
Status: DISCONTINUED | OUTPATIENT
Start: 2022-07-21 | End: 2022-07-21

## 2022-07-21 RX ORDER — CEFAZOLIN SODIUM 1 G/3ML
INJECTION, POWDER, FOR SOLUTION INTRAMUSCULAR; INTRAVENOUS AS NEEDED
Status: DISCONTINUED | OUTPATIENT
Start: 2022-07-21 | End: 2022-07-21

## 2022-07-21 RX ORDER — SODIUM CHLORIDE 9 MG/ML
INJECTION, SOLUTION INTRAVENOUS CONTINUOUS PRN
Status: DISCONTINUED | OUTPATIENT
Start: 2022-07-21 | End: 2022-07-21

## 2022-07-21 RX ORDER — ONDANSETRON 2 MG/ML
4 INJECTION INTRAMUSCULAR; INTRAVENOUS ONCE AS NEEDED
Status: DISCONTINUED | OUTPATIENT
Start: 2022-07-21 | End: 2022-07-21 | Stop reason: HOSPADM

## 2022-07-21 RX ORDER — ACETAMINOPHEN 325 MG/1
650 TABLET ORAL EVERY 6 HOURS SCHEDULED
Status: DISCONTINUED | OUTPATIENT
Start: 2022-07-21 | End: 2022-07-27 | Stop reason: HOSPADM

## 2022-07-21 RX ORDER — ROCURONIUM BROMIDE 10 MG/ML
INJECTION, SOLUTION INTRAVENOUS AS NEEDED
Status: DISCONTINUED | OUTPATIENT
Start: 2022-07-21 | End: 2022-07-21

## 2022-07-21 RX ORDER — SODIUM CHLORIDE 9 MG/ML
INJECTION INTRAVENOUS AS NEEDED
Status: DISCONTINUED | OUTPATIENT
Start: 2022-07-21 | End: 2022-07-21 | Stop reason: HOSPADM

## 2022-07-21 RX ORDER — GABAPENTIN 100 MG/1
100 CAPSULE ORAL 3 TIMES DAILY
Status: DISCONTINUED | OUTPATIENT
Start: 2022-07-21 | End: 2022-07-21

## 2022-07-21 RX ORDER — ACETAMINOPHEN 325 MG/1
975 TABLET ORAL EVERY 8 HOURS SCHEDULED
Status: DISCONTINUED | OUTPATIENT
Start: 2022-07-21 | End: 2022-07-21

## 2022-07-21 RX ORDER — SODIUM CHLORIDE, SODIUM LACTATE, POTASSIUM CHLORIDE, CALCIUM CHLORIDE 600; 310; 30; 20 MG/100ML; MG/100ML; MG/100ML; MG/100ML
60 INJECTION, SOLUTION INTRAVENOUS CONTINUOUS
Status: DISCONTINUED | OUTPATIENT
Start: 2022-07-21 | End: 2022-07-22

## 2022-07-21 RX ADMIN — METOPROLOL SUCCINATE 25 MG: 25 TABLET, FILM COATED, EXTENDED RELEASE ORAL at 23:15

## 2022-07-21 RX ADMIN — SODIUM CHLORIDE: 9 INJECTION, SOLUTION INTRAVENOUS at 16:00

## 2022-07-21 RX ADMIN — Medication 25 MCG: at 18:30

## 2022-07-21 RX ADMIN — Medication 25 MCG: at 18:33

## 2022-07-21 RX ADMIN — HYDROMORPHONE HYDROCHLORIDE 0.5 MG: 1 INJECTION, SOLUTION INTRAMUSCULAR; INTRAVENOUS; SUBCUTANEOUS at 23:07

## 2022-07-21 RX ADMIN — HYDROMORPHONE HYDROCHLORIDE 0.5 MG: 1 INJECTION, SOLUTION INTRAMUSCULAR; INTRAVENOUS; SUBCUTANEOUS at 16:57

## 2022-07-21 RX ADMIN — PROPOFOL 90 MG: 10 INJECTION, EMULSION INTRAVENOUS at 15:47

## 2022-07-21 RX ADMIN — HYDROMORPHONE HYDROCHLORIDE 0.5 MG: 1 INJECTION, SOLUTION INTRAMUSCULAR; INTRAVENOUS; SUBCUTANEOUS at 18:55

## 2022-07-21 RX ADMIN — PROPOFOL 30 MG: 10 INJECTION, EMULSION INTRAVENOUS at 16:31

## 2022-07-21 RX ADMIN — DEXAMETHASONE SODIUM PHOSPHATE 10 MG: 10 INJECTION, SOLUTION INTRAMUSCULAR; INTRAVENOUS at 15:47

## 2022-07-21 RX ADMIN — PHENYLEPHRINE HYDROCHLORIDE 200 MCG: 10 INJECTION INTRAVENOUS at 15:47

## 2022-07-21 RX ADMIN — NICOTINE 1 PATCH: 21 PATCH, EXTENDED RELEASE TRANSDERMAL at 08:55

## 2022-07-21 RX ADMIN — Medication 25 MCG: at 18:40

## 2022-07-21 RX ADMIN — SUGAMMADEX 200 MG: 100 INJECTION, SOLUTION INTRAVENOUS at 18:04

## 2022-07-21 RX ADMIN — CEFAZOLIN 1000 MG: 1 INJECTION, POWDER, FOR SOLUTION INTRAMUSCULAR; INTRAVENOUS at 16:07

## 2022-07-21 RX ADMIN — LOSARTAN POTASSIUM 25 MG: 25 TABLET, FILM COATED ORAL at 08:59

## 2022-07-21 RX ADMIN — ROCURONIUM BROMIDE 10 MG: 10 INJECTION INTRAVENOUS at 17:01

## 2022-07-21 RX ADMIN — FENTANYL CITRATE 100 MCG: 50 INJECTION INTRAMUSCULAR; INTRAVENOUS at 15:47

## 2022-07-21 RX ADMIN — Medication 25 MCG: at 18:43

## 2022-07-21 RX ADMIN — GABAPENTIN 300 MG: 300 CAPSULE ORAL at 20:32

## 2022-07-21 RX ADMIN — Medication 30 MG: at 15:47

## 2022-07-21 RX ADMIN — MIDAZOLAM 2 MG: 1 INJECTION INTRAMUSCULAR; INTRAVENOUS at 15:37

## 2022-07-21 RX ADMIN — BUPIVACAINE 20 ML: 13.3 INJECTION, SUSPENSION, LIPOSOMAL INFILTRATION at 17:39

## 2022-07-21 RX ADMIN — SODIUM CHLORIDE, SODIUM LACTATE, POTASSIUM CHLORIDE, AND CALCIUM CHLORIDE: .6; .31; .03; .02 INJECTION, SOLUTION INTRAVENOUS at 15:38

## 2022-07-21 RX ADMIN — ACETAMINOPHEN 650 MG: 325 TABLET, FILM COATED ORAL at 20:32

## 2022-07-21 RX ADMIN — ONDANSETRON 4 MG: 2 INJECTION INTRAMUSCULAR; INTRAVENOUS at 15:47

## 2022-07-21 RX ADMIN — HYDROMORPHONE HYDROCHLORIDE 0.5 MG: 1 INJECTION, SOLUTION INTRAMUSCULAR; INTRAVENOUS; SUBCUTANEOUS at 16:22

## 2022-07-21 RX ADMIN — PHENYLEPHRINE HYDROCHLORIDE 20 MCG/MIN: 10 INJECTION INTRAVENOUS at 16:14

## 2022-07-21 RX ADMIN — SODIUM CHLORIDE, SODIUM LACTATE, POTASSIUM CHLORIDE, AND CALCIUM CHLORIDE 60 ML/HR: .6; .31; .03; .02 INJECTION, SOLUTION INTRAVENOUS at 18:58

## 2022-07-21 RX ADMIN — ROCURONIUM BROMIDE 50 MG: 10 INJECTION INTRAVENOUS at 15:48

## 2022-07-21 NOTE — ANESTHESIA PREPROCEDURE EVALUATION
Procedure:  THORACOSCOPY VIDEO ASSISTED SURGERY (VATS) (Right Chest)  DECORTICATION LUNG (Right Chest)    Relevant Problems   CARDIO   (+) Paroxysmal atrial fibrillation (HCC)      GI/HEPATIC   (+) Hepatic steatosis      PULMONARY   (+) Pneumothorax      Cardiovascular and Mediastinum   (+) Cardiomyopathy, 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      Other   (+) Alcohol abuse   (+) Tobacco abuse        Physical Exam    Airway    Mallampati score: II  TM Distance: >3 FB  Neck ROM: full     Dental   Comment: Edentulous upper, all remaining lower dentition severely damaged,     Cardiovascular      Pulmonary      Other Findings      7/13/22 TTE:       Left Ventricle: Left ventricular cavity size is normal  Wall thickness is mildly increased  Systolic function is severely reduced  Estimated ejection fraction is 25-30% on a noncontrast study  Wall motion cannot be accurately assessed  Diastolic function is mildly abnormal, consistent with grade I (abnormal) relaxation    Right Ventricle: Systolic function is low normal     Mitral Valve: There is mild regurgitation    Tricuspid Valve: There is mild regurgitation    Pulmonic Valve: There is mild to moderate regurgitation      2017 Nuclear Stress:  IMPRESSIONS: Normal study after pharmacologic vasodilation without reproduction of symptoms  TV is dilated, can not rule out PHT  Myocardial perfusion imaging was normal at rest and with stress  Left ventricular systolic function was Low  Normal     Anesthesia Plan  ASA Score- 3     Anesthesia Type- general with ASA Monitors  Additional Monitors: arterial line  Airway Plan: ETT  Comment: Discussed increased risk for nikhil-operative cardiac morbidity and mortality secondary to pre-existing cardiomyopathy  Plan Factors-Exercise tolerance (METS): >4 METS  Exercise comment: Able to climb flight of stairs without cardiopulmonary limitation  Chart reviewed  EKG reviewed  Existing labs reviewed   Patient summary reviewed  Patient is a current smoker  Patient did not smoke on day of surgery  Induction- intravenous  Postoperative Plan- Plan for postoperative opioid use  Planned trial extubation    Informed Consent- Anesthetic plan and risks discussed with patient

## 2022-07-21 NOTE — OP NOTE
OPERATIVE REPORT  PATIENT NAME: Ana Madrigal    :  1965  MRN: [id number]  Pt Location: [facility] OR ROOM 08    SURGERY DATE: 2022    Surgeon(s) and Role:     * Jadene Harada, MD - Primary     * Melissa Moore PA-C - Assisting     * Halle Golden MD - Assisting    Preop Diagnosis:  Secondary right spontaneous pneumothorax  Congestive heart failure  Acute pancreatitis    Post-Op Diagnosis Codes:  Secondary right spontaneous pneumothorax  Congestive heart failure  Acute pancreatitis  Trapped lung      Procedure(s) (LRB):  FIBEROPTIC BRONCHOSCOPY  RIGHT THORACOSCOPIC COMPLETE DECORTICATION  RIGHT APICAL BLEB RESECTION  RIGHT APICAL PLEURECTOMY    Specimen(s):  ID Type Source Tests Collected by Time Destination   1 : right upper lobe bleb resection Tissue Lung TISSUE EXAM Jadene Harada, MD 2022 1704    2 :  Tissue Pleura TISSUE EXAM Jadene Harada, MD 2022 1724        Estimated Blood Loss:   Minimal    Drains:  Chest Tube 1 Right Pleural 28 Fr  (Active)   Number of days: 0       Anesthesia Type:   General    Operative Indications:  Secondary right spontaneous pneumothorax  Congestive heart failure  Acute pancreatitis  Mr Therese Escobar is a 49-year-old was admitted 9 days ago with acute pancreatitis and was found to have a right-sided pneumothorax  Tube thoracostomy was performed in IR and he has had a continuous air leak since that time  CT scan demonstrates potential trapped lung and numerous blebs, mainly apical     Operative Findings:  Multiple significant pleural adhesions with large portions of the upper lobe and middle lobe densely adherent to the chest wall and the lower lobe densely adherent to the diaphragm      Complications:   None    Procedure and Technique:  No suspicion or identified risk for TB or other airborne infectious disease; bronchoscopy procedure being performed for diagnostic purposes  The patient is brought to the operating room and placed in the supine position  After institution of adequate general anesthesia, the fiberoptic bronchoscope was inserted  The distal trachea is normal in the hosea is sharp  The airways are examined to a subsegmental level bilaterally  There are no endobronchial lesions, the anatomy appears normal, and secretions are scant  The double-lumen endotracheal tube was then placed and positioned utilizing the bronchoscope  The patient was placed in the left lateral decubitus position  His prior chest tube was removed after lung isolation  His right chest was then prepped and draped into a sterile field  Standard port incisions were utilized with a 5 mm port in the 8th intercostal space in the posterior axillary line as well as a 4 cm incision more anteriorly  It is immediately noted that there are numerous dense adhesions with very thickened adhesive bands likely related to his prior cardiac surgery  The lower lobe was densely adherent to the diaphragm and had to be tedious Mary dissected off of the diaphragm  The middle lobe was dissected off of the anterior mediastinum and was densely adherent to the back of the sternum or it was taken down with a combination of blunt and cautery dissection  The lung was slowly dissected down off of the chest wall using a combination of blunt and electrocautery dissection  Once the lung was down off the chest wall it was noted that there was densely adherent pleural peel on the lung and a plane between the pleural peel and the visceral pleura was created and dissection of the peel off of the middle lobe, and the upper lobe was performed  Attention was then turned to the apex of the upper lobe where there was known significant bleb disease by CT scan as well as visually  The apical blebs were resected with multiple fires of a linear stapler as a wedge resection  The specimen was sent for routine pathology    The lung was then submerged in saline and some air insufflated looking for air leaks which no significant air leaks were detected  In apical pleurectomy was then performed stripping the parietal pleura from approximately the 6th rib superiorly and from a point just anterior to the sympathetic chain to a point just posterior to the internal mammary vessels  A clamp was utilized to create some mechanical pleurodesis inferiorly  The pleural space was carefully checked for hemostasis  Paravertebral blocks with a mixture of 20 mL of Exparel with 20 mL of 0 25% Marcaine with then performed a 28 Tajik chest tube was placed through the camera port to lie posteriorly and up towards the apex  The lung was watched as a completely re-expanded  The anterior access incision was then closed in layers with running absorbable sutures to the pectoral fascia, the subcutaneous and subcuticular layers  Surgical glue was applied  The chest tube was affixed to the chest wall and connected to [place] drainage  Dry sterile dressings were applied  The patient was then turned supine, extubated, and brought to the recovery unit in stable condition having tolerated the procedure well  Sponge and instrument counts were correct     I was present for the entire procedure    Patient Disposition:  PACU       SIGNATURE: Agapito England MD  DATE: July 21, 2022  TIME: 5:59 PM

## 2022-07-21 NOTE — QUICK NOTE
Tried to reach patient's sister Carmita Maurice but could not reach and provide family update  Will try again tomorrow

## 2022-07-21 NOTE — PHYSICAL THERAPY NOTE
Physical Therapy Cancellation Note         07/21/22 0750   PT Last Visit   PT Visit Date 07/21/22   Note Type   Note type Cancelled Session   Cancel Reasons Patient to operating room   Additional Comments Pt with plans for OR today for VATS with thoracics  PT to continue to follow and see post-op       Boneta Arnel, PT, DPT

## 2022-07-21 NOTE — PROGRESS NOTES
Progress Note - General Surgery Thoracic  Pj Jamie 59 y o  male MRN: [id number]  Unit/Bed#: [facility] 529-01 Encounter: [id number]    Assessment:  Pt is a 54yoM with a PMH of cardiomyopathy (EF 25-30%), paroxysmal afib, alcohol induced pancreatitis, alcoholic hepatitis  He presented as a transfer from [facility] on 7/20 for thoracic surgery evaluation of persistent pneumothorax  He initially presented to [facility] ED on 7/8 with a weeklong history of nausea, vomitting, and abdominal pain and was found to have acute pancreatitis with an incidental finding of large right pneumothorax  A CT was placed in the ED and then replaced by IR on 7/9  He was treated for the pancreatitis at [facility] and transfer to [place] was delayed due to alcohol withdrawal      Afebrile, all other VSS  R CT- minimal straw colored output, air leak    7/8 CT abdomen pelvis: Incompletely visualized large right pneumothorax  Mild peripancreatic stranding most concerning for pancreatitis  No peripancreatic fluid collections  Severe hepatic steatosis  7/8 CXR: Right chest tube with persistent large right pneumothorax and partial right lung atelectasis  7/14 CT chest: Moderate right pneumothorax with right apical anterior approach pigtail catheter in place  Septations in the anterior pleural space superiorly  Bullous changes predominantly in the right upper lobe near the apex  Largest bulla measuring 2 5 cm  Groundglass changes in the right upper lobe with a tubular hyperdensity extending to the pleural space laterally suggesting hematoma in the tract of the previous large bore chest tube  Hematoma in the chest wall is seen at the previous entry site  Moderate right pleural effusion with small amount of hemorrhagic fluid  7/20 CT chest: Stable size of the right-sided pneumothorax with resolved right pleural effusion  Stable right anterior pigtail catheter noted   Stable hematoma from previous chest tube within the chest wall and tubular extension into the right upper lobe        Plan:  - VATS and decort in OR today with [place]  - NPO  - CT to suction  - f/u PT/OT recs  - f/u CM consult    Subjective/Objective   Subjective: Pt is a pleasant, but disoriented man who reported "doing just fine" this morning  He is AOx1 (oriented to self, hopsital setting but not town, and year and season but not month or date) and not in acute distress  He denied any pain, reported feeling much better than when he first presented to the hospital  He denied any fever, chills, nausea, vomitting, SOB, or abdominal pain  Objective:    Blood pressure 121/68, pulse 89, temperature 98 3 °F (36 8 °C), resp  rate 18, height 5' 8" (1 727 m), weight 56 2 kg (123 lb 14 4 oz), SpO2 98 %  ,Body mass index is 18 84 kg/m²  I/O last 24 hours: In: 0   Out: 740 [Urine:725; Chest Tube:15]    Invasive Devices  Report    Peripheral Intravenous Line  Duration           Long-Dwell Peripheral IV (Midline) 69/03/31 Right Basilic 10 days          Drain  Duration           Chest Tube Right Second intercostal space 10 2 Fr  11 days                Physical Exam: General appearance: alert, distracted, slowed mentation and AOx1  Head: Normocephalic, without obvious abnormality, atraumatic  Eyes: conjunctivae/corneas clear  PERRL, EOM's intact  Fundi benign    Lungs: clear to auscultation bilaterally  Heart: regular rate and rhythm  Abdomen: soft, non-tender; bowel sounds normal; no masses,  no organomegaly  Extremities: extremities normal, warm and well-perfused; no cyanosis, clubbing, or edema  Pulses: 2+ and symmetric  Skin: Skin color, texture, turgor normal  No rashes or lesions  Neurologic: Mental status: alertness: alert, orientation: person, disoriented to place and time    Lab, Imaging and other studies:  Lab Results   Component Value Date    WBC 9 25 07/20/2022    HGB 11 8 (L) 07/20/2022    HCT 36 1 (L) 07/20/2022    MCV 94 07/20/2022     (H) 07/20/2022      Lab Results   Component Value Date    CALCIUM 9 9 07/20/2022    K 4 3 07/20/2022    CO2 26 07/20/2022    CL 98 07/20/2022    BUN 17 07/20/2022    CREATININE 0 62 07/20/2022       VTE Pharmacologic Prophylaxis: Reason for no pharmacologic prophylaxis held for OR today  VTE Mechanical Prophylaxis: sequential compression device    Jose Gandhi, MS-4  [facility] [address]
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
## 2022-07-21 NOTE — ANESTHESIA POSTPROCEDURE EVALUATION
Post-Op Assessment Note    CV Status:  Stable  Pain scale: TEE  Pain management: adequate     Mental Status:  Alert and awake   Hydration Status:  Euvolemic   PONV Controlled:  Controlled   Airway Patency:  Patent      Post Op Vitals Reviewed: Yes      Staff: CRNA         No complications documented      BP   177/98   Temp   97 1   Pulse  76   Resp   30   SpO2   100 % 6 L

## 2022-07-21 NOTE — PROGRESS NOTES
[address] [facility]  Progress Note - Nic Ayala 1965, 59 y o  male MRN: [id number]  Unit/Bed#: [facility] 529- Encounter: [id number]  Primary Care Provider: Jolynn Howard MD   Date and time admitted to hospital: 7/19/2022  8:46 PM        INTERNAL MEDICINE RESIDENCY PROGRESS NOTE     Name: Radha Alan   Age & Sex: 59 y o  male   MRN: [id number]  Unit/Bed#: [place] 763339-   Encounter: [id number]  Team: SOD Team C     PATIENT INFORMATION     Name: Radha Alan   Age & Sex: 59 y o  male   MRN: [id number]  Hospital Stay Days: 2    ASSESSMENT/PLAN     Principal Problem:    Pneumothorax  Active Problems:    Cardiomyopathy, 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    Paroxysmal atrial fibrillation (HCC)    Tobacco abuse    Electrolyte abnormality    Alcohol abuse    Hepatic steatosis      * Pneumothorax  Assessment & Plan  Incidental finding  Denied SOB, chest pain  Denied fall or trauma  Large right pneumothorax initially, S/P right chest tube insertion in ED to wall suction on 07/08  On 07/09 underwent IR chest tube placement due to persistent pneumothorax  Chest x-ray 7/13 shows small PTX  CT chest - moderate right PTX, bullous changes with largest bulla 2 5 cm  Hematoma in the tract of prior large bore chest tube, hematoma in chest wall, moderate right pleural effusion with some hemorrhagic fluid noted  Transferred to [place] for further management with thoracic surgery  Repeat chest x-ray 7/18 shows small pneumothorax  7/19 Stable on Room air  Denies any chest pain or respiratory distress    Plan:  · Right apical chest tube in place to suction with persistent large air leak  · Thoracic surgery consulted, appreciate recommendations  · Plan for VATS decortication procedure 7/21  · PT/OT eval pending after VATS    Hepatic steatosis  Assessment & Plan  In the setting of alcohol abuse  Plan   · CT Abd: Liver is diffusely decreased in density consistent with fatty change    No CT evidence of suspicious hepatic mass  Normal hepatic contours  · Encourage alcohol cessation   · Will need to establish with GI outpatient     Alcohol abuse  Assessment & Plan  Patient reports drinking about 6 drinks per day  Last drink was on day of admission  Initially started on CIWA protocol on admission  Developed alcohol withdrawal symptoms on 07/09-7/10 with tachycardia, confusion, agitation, DTs  Received total of 6 mg of IV Ativan without any improvement  Transferred to step-down unit and received IV phenobarb and also required p r n  Valium doses    Plan  · MVI, thiamine, folic acid supplementation continue  · Evaluation for rehab on discharge - patient is agreeable    · ETOH cessation education provided     Electrolyte abnormality  Assessment & Plan  Noted hypoosmolar hyponatremia  Serum osmolarity 279  Urine osmolarity 742, Urine sodium 176  Hypoosmotic hyponatremia  Differential includes includes SIADH, diuretics, endocrinopathies  TSH within normal limits  Hyponatremia noted to improve with water restriction, component of SIADH is likely  Plan:  · Hold Aldactone  · Na 133 7/21  · Continue monitoring BMP, volume status      Tobacco abuse  Assessment & Plan  Stable  · Complete Smoking cessation discussed  Nicotine patch      Paroxysmal atrial fibrillation (HCC)  Assessment & Plan  Reported he stopped taking Xarelto about 1 before admission as he ran out of it  Patient appears to be on Cardizem, sotalol, Toprol XL in the past as well  He denies taking any prescription medications except Xarelto which he stopped  History of cardioversion in 2018  CHADS2 Vasc score = 0,  HASBLED = 2, due to significant use of alcohol patient would be at high risk for bleeding    Plan:  · Continue Toprol-XL  · 81 mg of aspirin discontinued, holding Xarelto due to findings of hematoma  Cardiomyopathy, 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  Assessment & Plan  TONYA in 2018 showed normal EF   Echo on this admission shows EF of 25-30% with grade 1 diastolic dysfunction  Cardiology consulted for medication optimization as patient on many medications that are not ideal    Plan:  · Consulted Heart failure team, appreciate recommendations:  · Continue losartan,Toprol-XL  · Aldactone held for hyponatremia      Alcohol withdrawal (HCC)-resolved as of 2022  Assessment & Plan  Patient reports drinking about 6 drinks per day  Last drink was on day of admission  Complete Alcohol cessation discussed with patient  Initially started on CIWA protocol on admission  Developed alcohol withdrawal symptoms on -7/10 with tachycardia, confusion, agitation, DTs  Received total of 6 mg of IV Ativan without any improvement  Transferred to step-down unit and received IV phenobarb and also required p r n  Valium doses  Was also on Precedex drip which has been discontinued  Patient states he would be interested in inpatient alcohol rehab if that is what it takes to quit drinking  Plan:  · Continue to encourage alcohol cessation and assess patient interest in rehab  · MVI, thiamine, folic acid        Disposition: VATS procedure planned today      SUBJECTIVE     Patient seen and examined, lying comfortably in bed  No acute events overnight  Kept NPO for VATS decortication procedure today  Otherwise patient has no new complaints  Denies headaches, fevers, chills, SOB, cough, CP, abdominal pain, N+V, diarrhea, constipation, urinary symptoms       OBJECTIVE     Vitals:    22 0729 22 0859 22 0906 22 1039   BP: (!) 137/113   111/65   BP Location: Right arm      Pulse: 84   82   Resp:       Temp: 98 3 °F (36 8 °C)   98 2 °F (36 8 °C)   TempSrc: Oral      SpO2: 95% 99%  98%   Weight:   57 6 kg (126 lb 15 8 oz)    Height:          Temperature:   Temp (24hrs), Av 2 °F (36 8 °C), Min:97 9 °F (36 6 °C), Max:98 3 °F (36 8 °C)    Temperature: 98 2 °F (36 8 °C)  Intake & Output:  I/O        07 07 07 07 0700    P  O  0 0     Total Intake(mL/kg) 0 (0) 0 (0)     Urine (mL/kg/hr) 300 525 (0 4) 200 (0 8)    Chest Tube 5 10     Total Output 305 358 200    Net -305 -535 -200               Weights:   IBW (Ideal Body Weight): 68 4 kg    Body mass index is 19 31 kg/m²  Weight (last 2 days)     Date/Time Weight    07/21/22 0906 57 6 (126 99)    07/19/22 2047 56 2 (123 9)        Physical Exam  Vitals and nursing note reviewed  Constitutional:       General: He is not in acute distress  Appearance: Normal appearance  HENT:      Head: Normocephalic and atraumatic  Right Ear: External ear normal       Left Ear: External ear normal       Nose: Nose normal       Mouth/Throat:      Comments: Poor dentition   Eyes:      Extraocular Movements: Extraocular movements intact  Conjunctiva/sclera: Conjunctivae normal       Pupils: Pupils are equal, round, and reactive to light  Cardiovascular:      Rate and Rhythm: Normal rate and regular rhythm  Pulses: Normal pulses  Heart sounds: Normal heart sounds  No murmur heard  No friction rub  Pulmonary:      Effort: Pulmonary effort is normal  No respiratory distress  Breath sounds: No wheezing or rales  Comments: Decreased breath sounds right side  Has right chest tube in place, air leak present  Abdominal:      General: Bowel sounds are normal  There is no distension  Palpations: Abdomen is soft  Tenderness: There is no abdominal tenderness  There is no guarding  Musculoskeletal:         General: No swelling  Normal range of motion  Cervical back: Normal range of motion and neck supple  Right lower leg: No edema  Left lower leg: No edema  Skin:     General: Skin is warm and dry  Capillary Refill: Capillary refill takes less than 2 seconds  Neurological:      Mental Status: He is alert and oriented to person, place, and time  Sensory: No sensory deficit  Motor: No weakness     Psychiatric:         Mood and Affect: Mood normal          Behavior: Behavior normal        LABORATORY DATA     Labs: I have personally reviewed pertinent reports  Results from last 7 days   Lab Units 07/21/22  0507 07/20/22  0455 07/19/22  0510   WBC Thousand/uL 8 82 9 25 10 11   HEMOGLOBIN g/dL 11 9* 11 8* 12 4   HEMATOCRIT % 37 8 36 1* 37 8   PLATELETS Thousands/uL 712* 656* 599*   NEUTROS PCT % 60 57  --    MONOS PCT % 19* 21*  --       Results from last 7 days   Lab Units 07/21/22  0507 07/20/22  0455 07/19/22  0510   POTASSIUM mmol/L 4 2 4 3 4 8   CHLORIDE mmol/L 98 98 97   CO2 mmol/L 27 26 21   BUN mg/dL 15 17 20   CREATININE mg/dL 0 68 0 62 0 73   CALCIUM mg/dL 9 9 9 9 9 3   ALK PHOS U/L 48 46  --    ALT U/L 20 18  --    AST U/L 23 25  --      Results from last 7 days   Lab Units 07/18/22  0523 07/17/22  0556 07/16/22  0538   MAGNESIUM mg/dL 1 7 1 8 1 5*                        IMAGING & DIAGNOSTIC TESTING     Radiology Results: I have personally reviewed pertinent reports  CT chest wo contrast    Result Date: 7/20/2022  Impression: Stable size of the right-sided pneumothorax with resolved right pleural effusion  Stable right anterior pigtail catheter noted  Stable hematoma from previous chest tube within the chest wall and tubular extension into the right upper lobe  Evidence of pulmonary arterial hypertension  Generalized increased bone density  Correlate for possible metabolic or hematological disorders  Workstation performed: [id number]     Other Diagnostic Testing: I have personally reviewed pertinent reports      ACTIVE MEDICATIONS     Current Facility-Administered Medications   Medication Dose Route Frequency    acetaminophen (TYLENOL) tablet 650 mg  650 mg Oral Q6H PRN    albuterol inhalation solution 2 5 mg  2 5 mg Nebulization C9Z PRN    folic acid (FOLVITE) tablet 1 mg  1 mg Oral Daily    lidocaine (LIDODERM) 5 % patch 1 patch  1 patch Topical Daily    losartan (COZAAR) tablet 25 mg  25 mg Oral Daily    magnesium oxide (MAG-OX) tablet 400 mg  400 mg Oral BID    metoprolol succinate (TOPROL-XL) 24 hr tablet 25 mg  25 mg Oral HS    nicotine (NICODERM CQ) 21 mg/24 hr TD 24 hr patch 1 patch  1 patch Transdermal Daily    ondansetron (ZOFRAN) injection 4 mg  4 mg Intravenous Q6H PRN    thiamine tablet 100 mg  100 mg Oral Daily       VTE Pharmacologic Prophylaxis: Heparin  VTE Mechanical Prophylaxis: sequential compression device    Portions of the record may have been created with voice recognition software  Occasional wrong word or "sound a like" substitutions may have occurred due to the inherent limitations of voice recognition software    Read the chart carefully and recognize, using context, where substitutions have occurred   ==  Tanna Mcfadden MD  [address]  Internal Medicine Residency PGY-1

## 2022-07-21 NOTE — CASE MANAGEMENT
Collaborated with , Jesus Manuel Conway and Inpt , Yojana Rolon and Beth Naylor  Spoke with pt again today about supportive resources and he was receptive to HOST referral  Pt signed consent  Placed original consent in medical record bin to be scanned onto chart  Called in HOST referral and spoke with Gianna who provided HOST fax# [phone]  Printed face sheet, H&P and med list and provided to Inpt CM, Yojana Rolon who will fax to HOST  Outpatient HF LCSW will return on Monday to check on pt's status

## 2022-07-21 NOTE — OCCUPATIONAL THERAPY NOTE
Occupational Therapy cx        Patient Name: Meghan Hogan  Today's Date: 7/21/2022 07/21/22 0741   OT Last Visit   OT Visit Date 07/21/22   Note Type   Note type Evaluation   Cancel Reasons Patient to operating room   Additional Comments Pt to OR today for VATS  Will hold and address post-op  Thank you         SHANON Longoria, OTR/L

## 2022-07-22 ENCOUNTER — APPOINTMENT (INPATIENT)
Dept: RADIOLOGY | Facility: HOSPITAL | Age: 57
DRG: 163 | End: 2022-07-22
Payer: COMMERCIAL

## 2022-07-22 LAB
ABO GROUP BLD BPU: NORMAL
ABO GROUP BLD BPU: NORMAL
ALBUMIN SERPL BCP-MCNC: 3.4 G/DL (ref 3.5–5)
ALP SERPL-CCNC: 43 U/L (ref 46–116)
ALT SERPL W P-5'-P-CCNC: 16 U/L (ref 12–78)
ANION GAP SERPL CALCULATED.3IONS-SCNC: 10 MMOL/L (ref 4–13)
AST SERPL W P-5'-P-CCNC: 20 U/L (ref 5–45)
BACTERIA UR QL AUTO: ABNORMAL /HPF
BASOPHILS # BLD AUTO: 0.04 THOUSANDS/ΜL (ref 0–0.1)
BASOPHILS NFR BLD AUTO: 0 % (ref 0–1)
BILIRUB SERPL-MCNC: 0.38 MG/DL (ref 0.2–1)
BILIRUB UR QL STRIP: NEGATIVE
BPU ID: NORMAL
BPU ID: NORMAL
BUN SERPL-MCNC: 15 MG/DL (ref 5–25)
CALCIUM ALBUM COR SERPL-MCNC: 9.9 MG/DL (ref 8.3–10.1)
CALCIUM SERPL-MCNC: 9.4 MG/DL (ref 8.3–10.1)
CHLORIDE SERPL-SCNC: 95 MMOL/L (ref 96–108)
CLARITY UR: CLEAR
CO2 SERPL-SCNC: 25 MMOL/L (ref 21–32)
COLOR UR: ABNORMAL
CREAT SERPL-MCNC: 0.7 MG/DL (ref 0.6–1.3)
CROSSMATCH: NORMAL
CROSSMATCH: NORMAL
EOSINOPHIL # BLD AUTO: 0 THOUSAND/ΜL (ref 0–0.61)
EOSINOPHIL NFR BLD AUTO: 0 % (ref 0–6)
ERYTHROCYTE [DISTWIDTH] IN BLOOD BY AUTOMATED COUNT: 13.8 % (ref 11.6–15.1)
GFR SERPL CREATININE-BSD FRML MDRD: 103 ML/MIN/1.73SQ M
GLUCOSE SERPL-MCNC: 128 MG/DL (ref 65–140)
GLUCOSE UR STRIP-MCNC: ABNORMAL MG/DL
HCT VFR BLD AUTO: 37.7 % (ref 36.5–49.3)
HGB BLD-MCNC: 12.2 G/DL (ref 12–17)
HGB UR QL STRIP.AUTO: NEGATIVE
IMM GRANULOCYTES # BLD AUTO: 0.08 THOUSAND/UL (ref 0–0.2)
IMM GRANULOCYTES NFR BLD AUTO: 0 % (ref 0–2)
KETONES UR STRIP-MCNC: NEGATIVE MG/DL
LEUKOCYTE ESTERASE UR QL STRIP: NEGATIVE
LYMPHOCYTES # BLD AUTO: 0.95 THOUSANDS/ΜL (ref 0.6–4.47)
LYMPHOCYTES NFR BLD AUTO: 5 % (ref 14–44)
MCH RBC QN AUTO: 30 PG (ref 26.8–34.3)
MCHC RBC AUTO-ENTMCNC: 32.4 G/DL (ref 31.4–37.4)
MCV RBC AUTO: 93 FL (ref 82–98)
MONOCYTES # BLD AUTO: 2.66 THOUSAND/ΜL (ref 0.17–1.22)
MONOCYTES NFR BLD AUTO: 14 % (ref 4–12)
MUCOUS THREADS UR QL AUTO: ABNORMAL
NEUTROPHILS # BLD AUTO: 15.67 THOUSANDS/ΜL (ref 1.85–7.62)
NEUTS SEG NFR BLD AUTO: 81 % (ref 43–75)
NITRITE UR QL STRIP: NEGATIVE
NON-SQ EPI CELLS URNS QL MICRO: ABNORMAL /HPF
NRBC BLD AUTO-RTO: 0 /100 WBCS
PH UR STRIP.AUTO: 5 [PH]
PLATELET # BLD AUTO: 690 THOUSANDS/UL (ref 149–390)
PMV BLD AUTO: 8.3 FL (ref 8.9–12.7)
POTASSIUM SERPL-SCNC: 4.1 MMOL/L (ref 3.5–5.3)
PROT SERPL-MCNC: 7.4 G/DL (ref 6.4–8.4)
PROT UR STRIP-MCNC: NEGATIVE MG/DL
RBC # BLD AUTO: 4.06 MILLION/UL (ref 3.88–5.62)
RBC #/AREA URNS AUTO: ABNORMAL /HPF
SODIUM SERPL-SCNC: 130 MMOL/L (ref 135–147)
SP GR UR STRIP.AUTO: 1.02 (ref 1–1.03)
UNIT DISPENSE STATUS: NORMAL
UNIT DISPENSE STATUS: NORMAL
UNIT PRODUCT CODE: NORMAL
UNIT PRODUCT CODE: NORMAL
UNIT PRODUCT VOLUME: 300 ML
UNIT PRODUCT VOLUME: 300 ML
UNIT RH: NORMAL
UNIT RH: NORMAL
UROBILINOGEN UR STRIP-ACNC: <2 MG/DL
WBC # BLD AUTO: 19.4 THOUSAND/UL (ref 4.31–10.16)
WBC #/AREA URNS AUTO: ABNORMAL /HPF

## 2022-07-22 PROCEDURE — 97167 OT EVAL HIGH COMPLEX 60 MIN: CPT

## 2022-07-22 PROCEDURE — 81001 URINALYSIS AUTO W/SCOPE: CPT

## 2022-07-22 PROCEDURE — 71045 X-RAY EXAM CHEST 1 VIEW: CPT

## 2022-07-22 PROCEDURE — 99232 SBSQ HOSP IP/OBS MODERATE 35: CPT | Performed by: INTERNAL MEDICINE

## 2022-07-22 PROCEDURE — 85025 COMPLETE CBC W/AUTO DIFF WBC: CPT | Performed by: SURGERY

## 2022-07-22 PROCEDURE — 80053 COMPREHEN METABOLIC PANEL: CPT | Performed by: SURGERY

## 2022-07-22 PROCEDURE — 99024 POSTOP FOLLOW-UP VISIT: CPT | Performed by: THORACIC SURGERY (CARDIOTHORACIC VASCULAR SURGERY)

## 2022-07-22 RX ORDER — LOSARTAN POTASSIUM 25 MG/1
25 TABLET ORAL 2 TIMES DAILY
Status: DISCONTINUED | OUTPATIENT
Start: 2022-07-22 | End: 2022-07-23

## 2022-07-22 RX ORDER — ENOXAPARIN SODIUM 100 MG/ML
40 INJECTION SUBCUTANEOUS
Status: DISCONTINUED | OUTPATIENT
Start: 2022-07-22 | End: 2022-07-23

## 2022-07-22 RX ORDER — METOPROLOL SUCCINATE 25 MG/1
25 TABLET, EXTENDED RELEASE ORAL
Status: DISCONTINUED | OUTPATIENT
Start: 2022-07-22 | End: 2022-07-23

## 2022-07-22 RX ORDER — METHOCARBAMOL 750 MG/1
750 TABLET, FILM COATED ORAL EVERY 8 HOURS SCHEDULED
Status: DISCONTINUED | OUTPATIENT
Start: 2022-07-22 | End: 2022-07-23

## 2022-07-22 RX ADMIN — THIAMINE HCL TAB 100 MG 100 MG: 100 TAB at 08:25

## 2022-07-22 RX ADMIN — DICLOFENAC SODIUM 2 G: 10 GEL TOPICAL at 18:16

## 2022-07-22 RX ADMIN — GABAPENTIN 300 MG: 300 CAPSULE ORAL at 20:19

## 2022-07-22 RX ADMIN — HYDROMORPHONE HYDROCHLORIDE 0.5 MG: 1 INJECTION, SOLUTION INTRAMUSCULAR; INTRAVENOUS; SUBCUTANEOUS at 04:26

## 2022-07-22 RX ADMIN — MAGNESIUM OXIDE TAB 400 MG (241.3 MG ELEMENTAL MG) 400 MG: 400 (241.3 MG) TAB at 08:25

## 2022-07-22 RX ADMIN — LIDOCAINE 5% 1 PATCH: 700 PATCH TOPICAL at 08:34

## 2022-07-22 RX ADMIN — OXYCODONE HYDROCHLORIDE 5 MG: 5 TABLET ORAL at 18:17

## 2022-07-22 RX ADMIN — ACETAMINOPHEN 650 MG: 325 TABLET, FILM COATED ORAL at 18:15

## 2022-07-22 RX ADMIN — OXYCODONE HYDROCHLORIDE 5 MG: 5 TABLET ORAL at 11:20

## 2022-07-22 RX ADMIN — OXYCODONE HYDROCHLORIDE 5 MG: 5 TABLET ORAL at 02:22

## 2022-07-22 RX ADMIN — FOLIC ACID 1 MG: 1 TABLET ORAL at 08:25

## 2022-07-22 RX ADMIN — GABAPENTIN 300 MG: 300 CAPSULE ORAL at 08:25

## 2022-07-22 RX ADMIN — ACETAMINOPHEN 650 MG: 325 TABLET, FILM COATED ORAL at 11:20

## 2022-07-22 RX ADMIN — METHOCARBAMOL TABLETS 750 MG: 750 TABLET, COATED ORAL at 21:21

## 2022-07-22 RX ADMIN — ACETAMINOPHEN 650 MG: 325 TABLET, FILM COATED ORAL at 05:04

## 2022-07-22 RX ADMIN — METHOCARBAMOL TABLETS 750 MG: 750 TABLET, COATED ORAL at 15:04

## 2022-07-22 RX ADMIN — DICLOFENAC SODIUM 2 G: 10 GEL TOPICAL at 08:33

## 2022-07-22 RX ADMIN — ENOXAPARIN SODIUM 40 MG: 40 INJECTION SUBCUTANEOUS at 16:46

## 2022-07-22 RX ADMIN — MAGNESIUM OXIDE TAB 400 MG (241.3 MG ELEMENTAL MG) 400 MG: 400 (241.3 MG) TAB at 18:14

## 2022-07-22 RX ADMIN — DICLOFENAC SODIUM 2 G: 10 GEL TOPICAL at 21:21

## 2022-07-22 RX ADMIN — NICOTINE 1 PATCH: 21 PATCH, EXTENDED RELEASE TRANSDERMAL at 08:31

## 2022-07-22 RX ADMIN — DICLOFENAC SODIUM 2 G: 10 GEL TOPICAL at 11:21

## 2022-07-22 RX ADMIN — METHOCARBAMOL TABLETS 750 MG: 750 TABLET, COATED ORAL at 05:04

## 2022-07-22 RX ADMIN — HYDROMORPHONE HYDROCHLORIDE 0.5 MG: 1 INJECTION, SOLUTION INTRAMUSCULAR; INTRAVENOUS; SUBCUTANEOUS at 08:26

## 2022-07-22 RX ADMIN — GABAPENTIN 300 MG: 300 CAPSULE ORAL at 15:04

## 2022-07-22 RX ADMIN — LOSARTAN POTASSIUM 25 MG: 25 TABLET, FILM COATED ORAL at 08:25

## 2022-07-22 NOTE — ASSESSMENT & PLAN NOTE
Noted hypoosmolar hyponatremia  Serum osmolarity 279  Urine osmolarity 742, Urine sodium 176  Hypoosmotic hyponatremia  Differential includes includes SIADH, diuretics, endocrinopathies  TSH within normal limits  Hyponatremia noted to improve with water restriction, component of SIADH is likely      Plan:  · Hold Aldactone  · Na 130 7/22  · Continue monitoring BMP, volume status
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
## 2022-07-22 NOTE — CASE MANAGEMENT
Case Management Assessment & Discharge Planning Note    Patient name Viridiana Starr  Location [facility] 529/[facility] 529-01 MRN [id number]  : 1965 Date 2022       Current Admission Date: 2022  Current Admission Diagnosis:Pneumothorax   Patient Active Problem List    Diagnosis Date Noted    Hepatic steatosis 2022    Alcohol abuse     Pneumothorax 2022    Electrolyte abnormality 2018    Paroxysmal atrial fibrillation ([address] ) 2017    Tobacco abuse 2017    Cardiomyopathy, 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 06/10/2016      LOS (days): 3  Geometric Mean LOS (GMLOS) (days): 4 40  Days to GMLOS:1 6     OBJECTIVE:  PATIENT READMITTED TO HOSPITAL  Risk of Unplanned Readmission Score: 18 44         Current admission status: Inpatient       Preferred Pharmacy:   [place], [address] [facility] ([address])  [address] [facility] [address] ([address])  [place] [address]  Phone: [phone] Fax: [phone]    Primary Care Provider: Magalie Brand MD    Primary Insurance: [facility]  Secondary Insurance: [address] [facility]    ASSESSMENT:  B[address] 26 Proxies    There are no active Health Care Proxies on file  Advance Directives  Does patient have a [address]?: No  Was patient offered paperwork?: Yes (offered and i nterested)  Does patient have Advance Directives?: No  Was patient offered paperwork?: Yes (offered and interested)  Primary Contact: sister Shaunna Yoon         Readmission Root Cause  30 Day Readmission: No    Patient Information  Admitted from[de-identified] Home  Mental Status: Alert  During Assessment patient was accompanied by: Not accompanied during assessment  Assessment information provided by[de-identified] Patient  Primary Caregiver: Self  Support Systems: Self, Family members (pt lives with his sister sarah)  [place] of Residence: [address] [place] [address] do you live in?: [address] [place] [address] access options   Select all that apply : Stairs  Number of steps to enter home : One Flight (pt states he has 3 flights of stairs to get to 3rd floor apartment)  Do the steps have railings?: Yes  Type of Current Residence: Apartment  Floor Level: 3  Upon entering residence, is there a bedroom on the main floor (no further steps)?: No  A bedroom is located on the following floor levels of residence (select all that apply):: 3rd Floor  Upon entering residence, is there a bathroom on the main floor (no further steps)?: No  Indicate which floors of current residence have a bathroom (select all the apply):: 3rd Floor  Number of steps to 2nd floor from main floor: One Flight  Number of steps to 3rd floor from main floor: One Flight  In the last 12 months, was there a time when you were not able to pay the mortgage or rent on time?: No  In the last 12 months, how many places have you lived?: 2  In the last 12 months, was there a time when you did not have a steady place to sleep or slept in a shelter (including now)?: No  Homeless/housing insecurity resource given?: N/A  Living Arrangements: Lives w/ Extended Family  Is patient a ?: No    Activities of Daily Living Prior to Admission  Functional Status: Independent  Completes ADLs independently?: Yes  Ambulates independently?: Yes  Does patient use assisted devices?: No  Does patient currently own DME?: No  Does patient have a history of Outpatient Therapy (PT/OT)?: No  Does the patient have a history of Short-Term Rehab?: Yes (CC at [facility])  Does patient have a history of HHC?: No  [place] [address] No         Patient Information Continued  Income Source: SSI/SSD  Does patient have prescription coverage?: Yes  Within the past 12 months, you worried that your food would run out before you got the money to buy more : Never true  Within the past 12 months, the food you bought just didn't last and you didn't have money to get more : Never true  Food insecurity resource given?: N/A  Does patient receive dialysis treatments?: No  Historical substance use preference: Alcohol/ETOH  Is patient currently in treatment for substance abuse?: No  Treatment options provided (Pt has met with CRS Becki REIS  [facility] referral has been made)  Does patient have a history of Mental Health Diagnosis?: No         Means of Transportation  Means of Transport to Appts[de-identified] Family transport (sister Layo Lebron provides transport)  In the past 12 months, has lack of transportation kept you from medical appointments or from getting medications?: No  In the past 12 months, has lack of transportation kept you from meetings, work, or from getting things needed for daily living?: No  Was application for public transport provided?: N/A        DISCHARGE DETAILS:    Discharge planning discussed with[de-identified] patient  Freedom of Choice: Yes     CM contacted family/caregiver?: No- see comments (pt is alert and oriented and did not want sister call ed at this time)  Were Treatment Team discharge recommendations reviewed with patient/caregiver?: No (pending recs)  Did patient/caregiver verbalize understanding of patient care needs?: Yes  Were patient/caregiver advised of the risks associated with not following Treatment Team discharge recommendations?: Yes              DME Referral Provided  Referral made for DME?: No    Other Referral/Resources/Interventions Provided:  Interventions: LifeVest  Referral Comments: Given Shadia Rodriguez order from from cardiology Bala CROSS  All clinical info and LifeVest order form faxed to Carolee Givens at [phone]  Called and left a M for [employer], [employer] rep as well regarding Lifevest order  pt likely no dc over the weekend per thoracic surgery note pt will remain on chest tube until sunday  Additional Comments: met with pt states he lives with sister Layo Lebron , 3rd floor apartment no elevator  IPTA, no DME  Agreeable to Life vest  pt currently has Chest tube   has not had VNA in the past  Noted previsou referral placed while at [facility] for CC at [address] [facility]

## 2022-07-22 NOTE — PROGRESS NOTES
Progress Note - General Surgery Thoracic  Nic Ayala 59 y o  male MRN: [id number]  Unit/Bed#: [facility] 529-01 Encounter: [id number]    Assessment:  Pt is a 54yoM with a PMH of cardiomyopathy (EF 25-30%), paroxysmal afib, alcohol induced pancreatitis, alcoholic hepatitis who presented with persistent right pneumothorax  POD 1 s/p right apical bleb resection and pleurectomy  Pt tolerated the procedure well with no complcations  EBL: minimal  CT dressings and incision site were c/d/i    CT: -20, no airleak, output: 225    Pt is afebrile and all other vital signs stable  His pain is not very well controlled  Plan:  - maintain chest tube to -20, plan to trial WS on POD 3  - pain control with lidocaine patch and scheduled tylenol and PRN dilaudid 0 5 Q1 and oxycodone 5 Q4  - d/c fluids  - diet per primary team  - DVT ppx: lovenox  - encourage use of IS  - encourage ambulation  - PT/OT   - case management consulted, appreciate recs    Subjective/Objective   Subjective: Pt was sleeping on exam, but awoke to voice  He is endorsing a lot of pain this morning on exam  He is receiving all his PRN medications  He denied fever, chills, SOB, abdominal pain, nausea or vomitting    Objective:    Blood pressure 112/70, pulse 80, temperature 98 1 °F (36 7 °C), temperature source Oral, resp  rate 18, height 5' 8" (1 727 m), weight 57 6 kg (126 lb 15 8 oz), SpO2 99 %  ,Body mass index is 19 31 kg/m²  I/O last 24 hours: In: 800 [I V :800]  Out: 700 [Urine:500; Chest Tube:200]    Invasive Devices  Report    Peripheral Intravenous Line  Duration           Long-Dwell Peripheral IV (Midline) 48/61/70 Right Basilic 11 days    Peripheral IV 07/21/22 Left Forearm <1 day          Drain  Duration           Chest Tube 1 Right Pleural 28 Fr  <1 day                Physical Exam: General appearance: alert, distracted and slowed mentation  Head: Normocephalic, without obvious abnormality, atraumatic  Eyes: conjunctivae/corneas clear   PERRL, EOM's intact  Fundi benign    Lungs: clear to auscultation bilaterally  Heart: regular rate and rhythm  Abdomen: soft, non-tender; bowel sounds normal; no masses,  no organomegaly  Pulses: 2+ and symmetric  Skin: Skin color, texture, turgor normal  No rashes or lesions or or reddness around incision site  Neurologic: Grossly normal    Lab, Imaging and other studies:  Lab Results   Component Value Date    WBC 8 82 07/21/2022    HGB 11 9 (L) 07/21/2022    HCT 37 8 07/21/2022    MCV 93 07/21/2022     (H) 07/21/2022      Lab Results   Component Value Date    CALCIUM 9 9 07/21/2022    K 4 2 07/21/2022    CO2 27 07/21/2022    CL 98 07/21/2022    BUN 15 07/21/2022    CREATININE 0 68 07/21/2022       VTE Pharmacologic Prophylaxis: Enoxaparin (Lovenox)  VTE Mechanical Prophylaxis: sequential compression device    Luciana Screen (MS-4)  Isaak/[facility] W[facility] BAHENA [address]

## 2022-07-22 NOTE — CASE MANAGEMENT
Case Management Discharge Planning Note    Patient name Alcon Fernandez  Location [facility] 529/[facility] 529-01 MRN [id number]  : 1965 Date 2022       Current Admission Date: 2022  Current Admission Diagnosis:Pneumothorax   Patient Active Problem List    Diagnosis Date Noted    Hepatic steatosis 2022    Alcohol abuse     Pneumothorax 2022    Electrolyte abnormality 2018    Paroxysmal atrial fibrillation ([address] ) 2017    Tobacco abuse 2017    Cardiomyopathy, 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 06/10/2016      LOS (days): 3  Geometric Mean LOS (GMLOS) (days): 4 40  Days to GMLOS:1 6     OBJECTIVE:  Risk of Unplanned Readmission Score: 18 44         Current admission status: Inpatient   Preferred Pharmacy:   [address] ([address])  [address] [address] [place] [address] ([address])  [place] [address]  Phone: [phone] Fax: [phone]    Primary Care Provider: Alethea Clay MD    Primary Insurance: [facility]  Secondary Insurance: [address] [facility]    DISCHARGE DETAILS:    Discharge planning discussed with[de-identified] patient  Freedom of Choice: Yes     CM contacted family/caregiver?: No- see comments (pt is alert and oriented and did not want sister call ed at this time)  Were Treatment Team discharge recommendations reviewed with patient/caregiver?: No (pending recs)  Did patient/caregiver verbalize understanding of patient care needs?: Yes  Were patient/caregiver advised of the risks associated with not following Treatment Team discharge recommendations?: Yes              DME Referral Provided  Referral made for DME?: No    Other Referral/Resources/Interventions Provided:  Interventions: LifeVest  Referral Comments: Pt agreeable to ETOH rehab, [facility] referral was placed and spoke with Hattie [phone], at [facility] regarding unknow DC date at this time and likely no dc this weekend   She asks that [facility] be kept updated with pt's dc as she is trying to set pt up with outpt rehab services  Additional Comments: met with pt states he lives with sister Earl Webb , 3rd floor apartment no elevator  IPTA, no DME  Agreeable to Life vest  pt currently has Chest tube  has not had VNA in the past  Noted previsou referral placed while at [facility] for CC at [address] [place] [address]

## 2022-07-22 NOTE — PROGRESS NOTES
Post op check:  Thoracic    Assessment   Patient is a 59 y o  male who presented with right pneumothorax with underlying bullous emphysema, now status post right apical bleb resection and pleurectomy  Chest tube:  -20, [email or web address], 130 cc serum sanguinous output    Plan:  -clear liquid diet  -pain control as needed  -chest tube to -20 on Topaz; will trial water-seal on postop day 3  -incentive spirometer    Subjective: Pt feeling well  Pain is well controlled  Denies fevers/chills, chest pain, sob  Objective:  Vitals:    07/21/22 1900 07/21/22 1915 07/21/22 1939 07/21/22 2005   BP: 119/72 111/77 119/73 117/72   BP Location:       Pulse: 84 88 88 87   Resp: 20 20 18 18   Temp:  (!) 97 3 °F (36 3 °C) (!) 97 2 °F (36 2 °C)    TempSrc:   Oral    SpO2: 93% 95% 94% 94%   Weight:       Height:           No intake/output data recorded      Scheduled Meds:  Current Facility-Administered Medications   Medication Dose Route Frequency Provider Last Rate    acetaminophen  650 mg Oral Q6H Jhonatan Jensen MD      [facility] Hold] albuterol  2 5 mg Nebulization Q4H PRN Juli Silva MD      [facility] Hold] folic acid  1 mg Oral Daily CHEN Montemayor      gabapentin  300 mg Oral TID Enrique Mckeon MD      HYDROmorphone  0 5 mg Intravenous Q1H PRN Enrique Mckeon MD      lactated ringers  60 mL/hr Intravenous Continuous Enrique Mckeon MD 60 mL/hr (07/21/22 1858)    [KNQ Hold] lidocaine  1 patch Topical Daily CHEN Montemayor      [facility] Hold] losartan  25 mg Oral Daily CHEN Montemayor      [facility] Hold] magnesium oxide  400 mg Oral BID CHEN Montemayor      [facility] Hold] metoprolol succinate  25 mg Oral HS CHEN Montemayor      [facility] Hold] nicotine  1 patch Transdermal Daily CHEN Montemayor      ondansetron  4 mg Intravenous Q6H PRN Enrique Mckeon MD      oxyCODONE  5 mg Oral Q4H PRN MD Nikita [facility] Hold] thiamine  100 mg Oral Daily CHEN Montemayor       Continuous Infusions:lactated ringers, 60 mL/hr, Last Rate: 60 mL/hr (07/21/22 4038)      PRN Meds:   [MAR Hold] albuterol    HYDROmorphone    ondansetron    oxyCODONE      Physical Exam   Gen:  Well-developed, male in NAD  HEENT: EOMI  Chest: Incisions clean dry and intact; chest tube in place as above  CV: RRR,   Lungs: Normal respiratory effort  Abd: soft, nontender, nondistended, Incisions clean dry and intact   Neuro:  AxO x3      Floyce MD Narda

## 2022-07-22 NOTE — ASSESSMENT & PLAN NOTE
Large right pneumothorax initially on admission as an incidental finding and asymptomatic    Plan:  · Thoracic surgery consulted, appreciate recommendations  · VATS decortication procedure completed 7/21   · Right chest tube in place postoperatively 7/21  · Postoperative CXR showed:  trace amount of postoperative gas trapped in the right minor fissure, otherwise No significant pneumothorax or pleural effusion is visible  · Repeat CXR 7/22 showed: Small volume right-sided pneumothorax  · PTeval pending   · OT eval, recommendations appreciated   Home with home health rehabilitation recommended when medically stable for discharge

## 2022-07-22 NOTE — ASSESSMENT & PLAN NOTE
Patient reports drinking about 6 drinks per day  Last drink was on day of admission  Initially started on CIWA protocol on admission  Developed alcohol withdrawal symptoms on 07/09-7/10 with tachycardia, confusion, agitation, DTs  Received total of 6 mg of IV Ativan without any improvement  Transferred to step-down unit and received IV phenobarb and also required p r n   Valium doses    Plan  Stable at this time  · MVI, thiamine, folic acid supplementation continue  · Evaluation for rehab on discharge - patient is agreeable    · ETOH cessation education provided

## 2022-07-22 NOTE — PLAN OF CARE
Problem: OCCUPATIONAL THERAPY ADULT  Goal: Performs self-care activities at highest level of function for planned discharge setting  See evaluation for individualized goals  Description: Treatment Interventions: ADL retraining, Functional transfer training, Endurance training, Patient/family training, Compensatory technique education, Continued evaluation, Energy conservation          See flowsheet documentation for full assessment, interventions and recommendations  Note: Limitation: Decreased ADL status, Decreased Safe judgement during ADL, Decreased endurance, Decreased self-care trans, Decreased high-level ADLs  Prognosis: Good  Assessment: Pt is a 59 y o  YO  male admitted to [facility] on 2022 w/ pneumothorax s/p "right thoracoscopic decortication, bleb resection, apical pleurectomy "  Pt  has a past medical history of Alcohol abuse, Alcoholic hepatitis, Atrial flutter ([facility] [address] ), Cardiomyopathy, nonischemic ([facility] [address] ), Congenital heart disease, COPD (chronic obstructive pulmonary disease) ([facility] [address] ), Hypokalemia, and Tobacco abuse  Pt with active OT orders and ambulate  orders    Pt resides in a house with sister  Pt was I w/  ADLS and IADLS, , & required no use of DME PTA  Currently pt is min a for LB ADls and functional mobility  Pt is supervision for Ub ADLs  Pt is limited at this time 2*: endurance, activity tolerance, decreased safety awareness and decreased insight into deficits  The following Occupational Performance Areas to address include: bathing/shower, toilet hygiene, dressing and functional mobility  Based on the aforementioned OT evaluation, functional performance deficits, and assessments, pt has been identified as a high complexity evaluation  From OT standpoint, anticipate d/c home with family support pending continued progress and support at home  Pt to continue to benefit from acute immediate OT services to address the following goals 3-5x/week to  w/in 10-14 days:   The patient's raw score on the AM-PAC Daily Activity inpatient short form is 21, standardized score is 44 27, greater than 39 4  Patients at this level are likely to benefit from discharge to home  Please refer to the recommendation of the Occupational Therapist for safe discharge planning          OT Discharge Recommendation: Home with home health rehabilitation (home w/ inc support vs rehab if support is not available)

## 2022-07-22 NOTE — PROGRESS NOTES
[facility]  Progress Note - Nic Ayala 1965, 59 y o  male MRN: [id number]  Unit/Bed#: [facility] 529-01 Encounter: [id number]  Primary Care Provider: Migdalia Smalls MD   Date and time admitted to hospital: 7/19/2022  8:46 PM        INTERNAL MEDICINE RESIDENCY PROGRESS NOTE     Name: Abby Rivers   Age & Sex: 59 y o  male   MRN: [id number]  Unit/Bed#: [facility] 529-01   Encounter: [id number]  Team: SOD Team C     PATIENT INFORMATION     Name: Abby Rivers   Age & Sex: 59 y o  male   MRN: [id number]  Hospital Stay Days: 3    ASSESSMENT/PLAN     Principal Problem:    Pneumothorax  Active Problems:    Cardiomyopathy, 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    Paroxysmal atrial fibrillation (HCC)    Tobacco abuse    Electrolyte abnormality    Alcohol abuse    Hepatic steatosis      * Pneumothorax  Assessment & Plan  Large right pneumothorax initially on admission as an incidental finding and asymptomatic    Plan:  · Thoracic surgery consulted, appreciate recommendations  · VATS decortication procedure completed 7/21   · Right chest tube in place postoperatively 7/21  · Postoperative CXR showed:  trace amount of postoperative gas trapped in the right minor fissure, otherwise No significant pneumothorax or pleural effusion is visible  · Repeat CXR 7/22 showed: Small volume right-sided pneumothorax  · PTeval pending   · OT eval, recommendations appreciated  Home with home health rehabilitation recommended when medically stable for discharge    Hepatic steatosis  Assessment & Plan  In the setting of alcohol abuse  Plan   · CT Abd: Liver is diffusely decreased in density consistent with fatty change  No CT evidence of suspicious hepatic mass  Normal hepatic contours  · Encourage alcohol cessation   · Will need to establish with GI outpatient     Alcohol abuse  Assessment & Plan  Patient reports drinking about 6 drinks per day  Last drink was on day of admission   Initially started on CIWA protocol on admission  Developed alcohol withdrawal symptoms on 07/09-7/10 with tachycardia, confusion, agitation, DTs  Received total of 6 mg of IV Ativan without any improvement  Transferred to step-down unit and received IV phenobarb and also required p r n  Valium doses    Plan  Stable at this time  · MVI, thiamine, folic acid supplementation continue  · Evaluation for rehab on discharge - patient is agreeable    · ETOH cessation education provided     Electrolyte abnormality  Assessment & Plan  Noted hypoosmolar hyponatremia  Serum osmolarity 279  Urine osmolarity 742, Urine sodium 176  Hypoosmotic hyponatremia  Differential includes includes SIADH, diuretics, endocrinopathies  TSH within normal limits  Hyponatremia noted to improve with water restriction, component of SIADH is likely  Plan:  · Hold Aldactone  · Na 130 7/22  · Continue monitoring BMP, volume status      Tobacco abuse  Assessment & Plan  Currently Stable  · Complete Smoking cessation discussed  Nicotine patch      Paroxysmal atrial fibrillation (HCC)  Assessment & Plan  Reported he stopped taking Xarelto about 1 before admission as he ran out of it  Patient appears to be on Cardizem, sotalol, Toprol XL in the past as well  He denies taking any prescription medications except Xarelto which he stopped  History of cardioversion in 2018  CHADS2 Vasc score = 0,  HASBLED = 2, due to significant use of alcohol patient would be at high risk for bleeding    Plan:  · On telemetry   · Continue Toprol-XL  · 81 mg of aspirin discontinued, holding Xarelto  · Cardiology consulted, appreciate recs  · S/p [facility] loop recorder in 10/2018  Last available interrogation from 06/2021 with 0% AF burden  Attempted interrogation on 07/20/2022; unable to be completed (battery presumed to be dead) per cardiology    Cardiomyopathy, 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  Assessment & Plan  TONYA in 2018 showed normal EF   Echo on this admission shows EF of 25-30% with grade 1 diastolic dysfunction  Cardiology consulted for medication optimization as patient on many medications that are not ideal    Plan  · Consulted Heart failure team, appreciate recommendations  · Heart failure team discussed LifeVest with patient and pt agreeable  Will receive prior to discharge   · Recommend and Plan to repeat limited TTE to reassess LVEF in mid 10/2022  · Continue losartan,Toprol-XL  · Aldactone held for hyponatremia          Disposition:  Status post VATS decortication #1    SUBJECTIVE     Patient seen and examined, lying in bed  Had a VATS decortication procedure done yesterday by CT surgery  Overnight, patient had right-sided chest pain after procedure  Was saturating appropriately, VSS  Postoperative X-ray showed no pneumothorax or pleural effusion, Overnight ordered repeat chest x-ray which showed a small volume right-sided pneumothorax with right chest tube in place draining appropriately  Patient also was complaining of dysuria and UA showed only trace glucose  Patient has some chest pain exacerbated by breathing or any movement but this is due to postoperative changes  The patient denies any fever, chills, headaches, abdominal pain, nausea or vomiting, bowel or urinary symptoms  OBJECTIVE     Vitals:    22 0000 22 0209 22 0500 22 0725   BP:  112/70  120/74   BP Location:       Pulse: 86 80  94   Resp:  18  18   Temp:  98 1 °F (36 7 °C)  98 4 °F (36 9 °C)   TempSrc:  Oral     SpO2:  99%  94%   Weight:   58 1 kg (128 lb 1 4 oz)    Height:          Temperature:   Temp (24hrs), Av 6 °F (36 4 °C), Min:97 1 °F (36 2 °C), Max:98 4 °F (36 9 °C)    Temperature: 98 4 °F (36 9 °C)  Intake & Output:  I/O        07 07 07 07 07 0700    P  O  0 480     I V  (mL/kg)  1476 (25 4)     Total Intake(mL/kg) 0 (0) 1956 (33 7)     Urine (mL/kg/hr) 525 (0 4) 550 (0 4)     Chest Tube 10 215     Total Output 535 765     Net -535 +1191 Weights:   IBW (Ideal Body Weight): 68 4 kg    Body mass index is 19 48 kg/m²  Weight (last 2 days)     Date/Time Weight    07/22/22 0500 58 1 (128 09)    07/21/22 0906 57 6 (126 99)        Physical Exam  Vitals and nursing note reviewed  Constitutional:       General: He is not in acute distress  Appearance: Normal appearance  HENT:      Head: Normocephalic and atraumatic  Right Ear: External ear normal       Left Ear: External ear normal       Nose: Nose normal       Mouth/Throat:      Mouth: Mucous membranes are moist       Pharynx: Oropharynx is clear  Eyes:      Extraocular Movements: Extraocular movements intact  Conjunctiva/sclera: Conjunctivae normal       Pupils: Pupils are equal, round, and reactive to light  Cardiovascular:      Rate and Rhythm: Normal rate and regular rhythm  Pulses: Normal pulses  Heart sounds: Normal heart sounds  No murmur heard  No friction rub  Pulmonary:      Effort: Pulmonary effort is normal  No respiratory distress  Breath sounds: Normal breath sounds  No wheezing or rales  Comments: Pain when breathing 2/2 procedure    Right chest tube in place, no air leak present   Abdominal:      General: Bowel sounds are normal  There is no distension  Palpations: Abdomen is soft  Tenderness: There is no abdominal tenderness  There is no guarding  Musculoskeletal:         General: No swelling  Normal range of motion  Cervical back: Normal range of motion and neck supple  Right lower leg: No edema  Left lower leg: No edema  Skin:     General: Skin is warm and dry  Capillary Refill: Capillary refill takes less than 2 seconds  Neurological:      Mental Status: He is alert and oriented to person, place, and time  Mental status is at baseline  Psychiatric:         Mood and Affect: Mood normal          Behavior: Behavior normal        LABORATORY DATA     Labs:  I have personally reviewed pertinent reports  Results from last 7 days   Lab Units 07/22/22  0522 07/21/22  0507 07/20/22  0455   WBC Thousand/uL 19 40* 8 82 9 25   HEMOGLOBIN g/dL 12 2 11 9* 11 8*   HEMATOCRIT % 37 7 37 8 36 1*   PLATELETS Thousands/uL 690* 712* 656*   NEUTROS PCT % 81* 60 57   MONOS PCT % 14* 19* 21*      Results from last 7 days   Lab Units 07/22/22  0522 07/21/22  0507 07/20/22  0455   POTASSIUM mmol/L 4 1 4 2 4 3   CHLORIDE mmol/L 95* 98 98   CO2 mmol/L 25 27 26   BUN mg/dL 15 15 17   CREATININE mg/dL 0 70 0 68 0 62   CALCIUM mg/dL 9 4 9 9 9 9   ALK PHOS U/L 43* 48 46   ALT U/L 16 20 18   AST U/L 20 23 25     Results from last 7 days   Lab Units 07/18/22  0523 07/17/22  0556 07/16/22  0538   MAGNESIUM mg/dL 1 7 1 8 1 5*                        IMAGING & DIAGNOSTIC TESTING     Radiology Results: I have personally reviewed pertinent reports  XR chest portable    Result Date: 7/22/2022  Impression: Small volume right-sided pneumothorax  Right-sided chest tube in place  Workstation performed: [id number]     XR chest portable    Result Date: 7/21/2022  Impression: Expected postoperative changes  There may be a trace amount of postoperative gas trapped in the right minor fissure, otherwise No significant pneumothorax or pleural effusion is visible  No large pleural effusion visible  No acute cardiopulmonary disease  The study was marked in [facility] for immediate notification  Workstation performed: [id number]     CT chest wo contrast    Result Date: 7/20/2022  Impression: Stable size of the right-sided pneumothorax with resolved right pleural effusion  Stable right anterior pigtail catheter noted  Stable hematoma from previous chest tube within the chest wall and tubular extension into the right upper lobe  Evidence of pulmonary arterial hypertension  Generalized increased bone density  Correlate for possible metabolic or hematological disorders   Workstation performed: [id number]     Other Diagnostic Testing: I have personally reviewed pertinent reports  ACTIVE MEDICATIONS     Current Facility-Administered Medications   Medication Dose Route Frequency    acetaminophen (TYLENOL) tablet 650 mg  650 mg Oral Q6H [address]    albuterol inhalation solution 2 5 mg  2 5 mg Nebulization Q4H PRN    Diclofenac Sodium (VOLTAREN) 1 % topical gel 2 g  2 g Topical 4x Daily    folic acid (FOLVITE) tablet 1 mg  1 mg Oral Daily    gabapentin (NEURONTIN) capsule 300 mg  300 mg Oral TID    HYDROmorphone (DILAUDID) injection 0 5 mg  0 5 mg Intravenous Q1H PRN    lidocaine (LIDODERM) 5 % patch 1 patch  1 patch Topical Daily    losartan (COZAAR) tablet 25 mg  25 mg Oral BID    magnesium oxide (MAG-OX) tablet 400 mg  400 mg Oral BID    methocarbamol (ROBAXIN) tablet 750 mg  750 mg Oral Q8H [address]    metoprolol succinate (TOPROL-XL) 24 hr tablet 25 mg  25 mg Oral HS    nicotine (NICODERM CQ) 21 mg/24 hr TD 24 hr patch 1 patch  1 patch Transdermal Daily    ondansetron (ZOFRAN) injection 4 mg  4 mg Intravenous Q6H PRN    oxyCODONE (ROXICODONE) IR tablet 5 mg  5 mg Oral Q4H PRN    thiamine tablet 100 mg  100 mg Oral Daily        VTE Pharmacologic Prophylaxis: Reason for no pharmacologic prophylaxis s/p VATS  VTE Mechanical Prophylaxis: sequential compression device    Portions of the record may have been created with voice recognition software  Occasional wrong word or "sound a like" substitutions may have occurred due to the inherent limitations of voice recognition software    Read the chart carefully and recognize, using context, where substitutions have occurred   ==  Lon Vital MD  [facility] [place], [place]  Internal Medicine Residency PGY-1

## 2022-07-22 NOTE — ASSESSMENT & PLAN NOTE
Reported he stopped taking Xarelto about 1 before admission as he ran out of it  Patient appears to be on Cardizem, sotalol, Toprol XL in the past as well  He denies taking any prescription medications except Xarelto which he stopped  History of cardioversion in 2018  CHADS2 Vasc score = 0,  HASBLED = 2, due to significant use of alcohol patient would be at high risk for bleeding    Plan:  · On telemetry   · Continue Toprol-XL  · 81 mg of aspirin discontinued, holding Xarelto  · Cardiology consulted, appreciate recs  · S/p [facility] loop recorder in 10/2018   Last available interrogation from 06/2021 with 0% AF burden  Attempted interrogation on 07/20/2022; unable to be completed (battery presumed to be dead) per cardiology

## 2022-07-22 NOTE — ASSESSMENT & PLAN NOTE
TONYA in 2018 showed normal EF  Echo on this admission shows EF of 25-30% with grade 1 diastolic dysfunction  Cardiology consulted for medication optimization as patient on many medications that are not ideal    Plan  · Consulted Heart failure team, appreciate recommendations  · Heart failure team discussed LifeVest with patient and pt agreeable   Will receive prior to discharge   · Recommend and Plan to repeat limited TTE to reassess LVEF in mid 10/2022  · Continue losartan,Toprol-XL  · Aldactone held for hyponatremia

## 2022-07-22 NOTE — CERTIFIED RECOVERY SPECIALIST
Certified  Note    Patient name: Sara Frank  Location: [facility]-01  [place]: [address] [place] [address]  Attending:  Kem Tatum DO MRN [id number]  : 1965  Age: 59 y o  Sex: male Date 2022         Substance Use History:     Social History     Substance and Sexual Activity   Alcohol Use Yes    Comment: History of binge drinking for last 15+ years  Currently drinking "sometimes every day, sometimes just on the weekends " Drinking ~2 fifths of gin on days he chooses to "drink all day " (Updated 2022)  Social History     Substance and Sexual Activity   Drug Use Not Currently       Admission Information  Substances Used at This Admission[de-identified] Alcohol  Readmission in Last 30 Days?: Yes  Encounter Type[de-identified] Patient Face-to-Face    Recovery Support Plan  Declined All Services?: No  Medication Assisted Treatment[de-identified] No  Agreeable to Warm Handoff?: Yes  Plan Discussed With Treatment Team[de-identified] Yes  Plan Discussed With[de-identified]     Referral to Recovery Supports:  Resource Guide Given?: Yes  Follow Up With Patient[de-identified] Yes (Ongoing until discharge)'    CRS met with patient to engage in discussion on his hospitalization  Patient states he has never been to treatment but understands why he needs to go  Patient wants to go to outpatient near his home    CRS will continue to engage with patient and work with Nia Shafer

## 2022-07-23 PROBLEM — N17.9 AKI (ACUTE KIDNEY INJURY) (HCC): Status: ACTIVE | Noted: 2022-07-23

## 2022-07-23 LAB
ALBUMIN SERPL BCP-MCNC: 2.6 G/DL (ref 3.5–5)
ALP SERPL-CCNC: 41 U/L (ref 46–116)
ALT SERPL W P-5'-P-CCNC: 10 U/L (ref 12–78)
ANION GAP SERPL CALCULATED.3IONS-SCNC: 6 MMOL/L (ref 4–13)
ANION GAP SERPL CALCULATED.3IONS-SCNC: 8 MMOL/L (ref 4–13)
AST SERPL W P-5'-P-CCNC: 11 U/L (ref 5–45)
BASOPHILS # BLD AUTO: 0.1 THOUSANDS/ΜL (ref 0–0.1)
BASOPHILS NFR BLD AUTO: 1 % (ref 0–1)
BILIRUB SERPL-MCNC: 0.32 MG/DL (ref 0.2–1)
BUN SERPL-MCNC: 22 MG/DL (ref 5–25)
BUN SERPL-MCNC: 28 MG/DL (ref 5–25)
CALCIUM ALBUM COR SERPL-MCNC: 10.3 MG/DL (ref 8.3–10.1)
CALCIUM SERPL-MCNC: 8.9 MG/DL (ref 8.3–10.1)
CALCIUM SERPL-MCNC: 9.2 MG/DL (ref 8.3–10.1)
CHLORIDE SERPL-SCNC: 96 MMOL/L (ref 96–108)
CHLORIDE SERPL-SCNC: 99 MMOL/L (ref 96–108)
CO2 SERPL-SCNC: 26 MMOL/L (ref 21–32)
CO2 SERPL-SCNC: 27 MMOL/L (ref 21–32)
CREAT SERPL-MCNC: 0.88 MG/DL (ref 0.6–1.3)
CREAT SERPL-MCNC: 1.33 MG/DL (ref 0.6–1.3)
EOSINOPHIL # BLD AUTO: 0.13 THOUSAND/ΜL (ref 0–0.61)
EOSINOPHIL NFR BLD AUTO: 1 % (ref 0–6)
ERYTHROCYTE [DISTWIDTH] IN BLOOD BY AUTOMATED COUNT: 14.1 % (ref 11.6–15.1)
GFR SERPL CREATININE-BSD FRML MDRD: 58 ML/MIN/1.73SQ M
GFR SERPL CREATININE-BSD FRML MDRD: 94 ML/MIN/1.73SQ M
GLUCOSE SERPL-MCNC: 141 MG/DL (ref 65–140)
GLUCOSE SERPL-MCNC: 195 MG/DL (ref 65–140)
HCT VFR BLD AUTO: 31.7 % (ref 36.5–49.3)
HGB BLD-MCNC: 10.5 G/DL (ref 12–17)
IMM GRANULOCYTES # BLD AUTO: 0.13 THOUSAND/UL (ref 0–0.2)
IMM GRANULOCYTES NFR BLD AUTO: 1 % (ref 0–2)
LYMPHOCYTES # BLD AUTO: 1.03 THOUSANDS/ΜL (ref 0.6–4.47)
LYMPHOCYTES NFR BLD AUTO: 5 % (ref 14–44)
MAGNESIUM SERPL-MCNC: 1.9 MG/DL (ref 1.6–2.6)
MCH RBC QN AUTO: 30.2 PG (ref 26.8–34.3)
MCHC RBC AUTO-ENTMCNC: 33.1 G/DL (ref 31.4–37.4)
MCV RBC AUTO: 91 FL (ref 82–98)
MONOCYTES # BLD AUTO: 1.9 THOUSAND/ΜL (ref 0.17–1.22)
MONOCYTES NFR BLD AUTO: 10 % (ref 4–12)
NEUTROPHILS # BLD AUTO: 16.06 THOUSANDS/ΜL (ref 1.85–7.62)
NEUTS SEG NFR BLD AUTO: 82 % (ref 43–75)
NRBC BLD AUTO-RTO: 0 /100 WBCS
PLATELET # BLD AUTO: 527 THOUSANDS/UL (ref 149–390)
PMV BLD AUTO: 8.6 FL (ref 8.9–12.7)
POTASSIUM SERPL-SCNC: 3.6 MMOL/L (ref 3.5–5.3)
POTASSIUM SERPL-SCNC: 4.2 MMOL/L (ref 3.5–5.3)
PROT SERPL-MCNC: 6.2 G/DL (ref 6.4–8.4)
RBC # BLD AUTO: 3.48 MILLION/UL (ref 3.88–5.62)
SODIUM SERPL-SCNC: 130 MMOL/L (ref 135–147)
SODIUM SERPL-SCNC: 132 MMOL/L (ref 135–147)
WBC # BLD AUTO: 19.35 THOUSAND/UL (ref 4.31–10.16)

## 2022-07-23 PROCEDURE — 83735 ASSAY OF MAGNESIUM: CPT | Performed by: STUDENT IN AN ORGANIZED HEALTH CARE EDUCATION/TRAINING PROGRAM

## 2022-07-23 PROCEDURE — 97163 PT EVAL HIGH COMPLEX 45 MIN: CPT

## 2022-07-23 PROCEDURE — 80053 COMPREHEN METABOLIC PANEL: CPT

## 2022-07-23 PROCEDURE — 99233 SBSQ HOSP IP/OBS HIGH 50: CPT | Performed by: PHYSICIAN ASSISTANT

## 2022-07-23 PROCEDURE — 99024 POSTOP FOLLOW-UP VISIT: CPT | Performed by: THORACIC SURGERY (CARDIOTHORACIC VASCULAR SURGERY)

## 2022-07-23 PROCEDURE — 80048 BASIC METABOLIC PNL TOTAL CA: CPT | Performed by: STUDENT IN AN ORGANIZED HEALTH CARE EDUCATION/TRAINING PROGRAM

## 2022-07-23 PROCEDURE — 85025 COMPLETE CBC W/AUTO DIFF WBC: CPT

## 2022-07-23 PROCEDURE — 99232 SBSQ HOSP IP/OBS MODERATE 35: CPT | Performed by: INTERNAL MEDICINE

## 2022-07-23 RX ORDER — METHOCARBAMOL 500 MG/1
500 TABLET, FILM COATED ORAL EVERY 6 HOURS SCHEDULED
Status: DISCONTINUED | OUTPATIENT
Start: 2022-07-23 | End: 2022-07-27 | Stop reason: HOSPADM

## 2022-07-23 RX ORDER — POTASSIUM CHLORIDE 20 MEQ/1
40 TABLET, EXTENDED RELEASE ORAL ONCE
Status: COMPLETED | OUTPATIENT
Start: 2022-07-23 | End: 2022-07-23

## 2022-07-23 RX ORDER — SODIUM CHLORIDE, SODIUM GLUCONATE, SODIUM ACETATE, POTASSIUM CHLORIDE, MAGNESIUM CHLORIDE, SODIUM PHOSPHATE, DIBASIC, AND POTASSIUM PHOSPHATE .53; .5; .37; .037; .03; .012; .00082 G/100ML; G/100ML; G/100ML; G/100ML; G/100ML; G/100ML; G/100ML
250 INJECTION, SOLUTION INTRAVENOUS ONCE
Status: COMPLETED | OUTPATIENT
Start: 2022-07-23 | End: 2022-07-23

## 2022-07-23 RX ORDER — METHOCARBAMOL 500 MG/1
500 TABLET, FILM COATED ORAL 2 TIMES DAILY
Status: DISCONTINUED | OUTPATIENT
Start: 2022-07-23 | End: 2022-07-23

## 2022-07-23 RX ORDER — HEPARIN SODIUM 5000 [USP'U]/ML
5000 INJECTION, SOLUTION INTRAVENOUS; SUBCUTANEOUS EVERY 8 HOURS SCHEDULED
Status: DISCONTINUED | OUTPATIENT
Start: 2022-07-23 | End: 2022-07-26

## 2022-07-23 RX ORDER — ALBUMIN (HUMAN) 12.5 G/50ML
50 SOLUTION INTRAVENOUS ONCE
Status: COMPLETED | OUTPATIENT
Start: 2022-07-23 | End: 2022-07-24

## 2022-07-23 RX ADMIN — ACETAMINOPHEN 650 MG: 325 TABLET, FILM COATED ORAL at 23:29

## 2022-07-23 RX ADMIN — DICLOFENAC SODIUM 2 G: 10 GEL TOPICAL at 08:55

## 2022-07-23 RX ADMIN — OXYCODONE HYDROCHLORIDE 5 MG: 5 TABLET ORAL at 17:13

## 2022-07-23 RX ADMIN — NICOTINE 1 PATCH: 21 PATCH, EXTENDED RELEASE TRANSDERMAL at 08:54

## 2022-07-23 RX ADMIN — SODIUM CHLORIDE, SODIUM GLUCONATE, SODIUM ACETATE, POTASSIUM CHLORIDE, MAGNESIUM CHLORIDE, SODIUM PHOSPHATE, DIBASIC, AND POTASSIUM PHOSPHATE 250 ML: .53; .5; .37; .037; .03; .012; .00082 INJECTION, SOLUTION INTRAVENOUS at 08:44

## 2022-07-23 RX ADMIN — GABAPENTIN 300 MG: 300 CAPSULE ORAL at 21:35

## 2022-07-23 RX ADMIN — LIDOCAINE 5% 1 PATCH: 700 PATCH TOPICAL at 08:54

## 2022-07-23 RX ADMIN — METHOCARBAMOL 500 MG: 500 TABLET, FILM COATED ORAL at 17:09

## 2022-07-23 RX ADMIN — GABAPENTIN 300 MG: 300 CAPSULE ORAL at 08:50

## 2022-07-23 RX ADMIN — DICLOFENAC SODIUM 2 G: 10 GEL TOPICAL at 21:36

## 2022-07-23 RX ADMIN — ACETAMINOPHEN 650 MG: 325 TABLET, FILM COATED ORAL at 11:13

## 2022-07-23 RX ADMIN — OXYCODONE HYDROCHLORIDE 5 MG: 5 TABLET ORAL at 11:13

## 2022-07-23 RX ADMIN — GABAPENTIN 300 MG: 300 CAPSULE ORAL at 17:12

## 2022-07-23 RX ADMIN — ACETAMINOPHEN 650 MG: 325 TABLET, FILM COATED ORAL at 05:39

## 2022-07-23 RX ADMIN — ALBUMIN (HUMAN) 50 G: 0.25 INJECTION, SOLUTION INTRAVENOUS at 05:39

## 2022-07-23 RX ADMIN — METHOCARBAMOL 500 MG: 500 TABLET, FILM COATED ORAL at 23:29

## 2022-07-23 RX ADMIN — METHOCARBAMOL 500 MG: 500 TABLET, FILM COATED ORAL at 08:50

## 2022-07-23 RX ADMIN — HYDROMORPHONE HYDROCHLORIDE 0.5 MG: 1 INJECTION, SOLUTION INTRAMUSCULAR; INTRAVENOUS; SUBCUTANEOUS at 19:28

## 2022-07-23 RX ADMIN — MAGNESIUM OXIDE TAB 400 MG (241.3 MG ELEMENTAL MG) 400 MG: 400 (241.3 MG) TAB at 08:50

## 2022-07-23 RX ADMIN — POTASSIUM CHLORIDE 40 MEQ: 1500 TABLET, EXTENDED RELEASE ORAL at 08:50

## 2022-07-23 RX ADMIN — HEPARIN SODIUM 5000 UNITS: 5000 INJECTION INTRAVENOUS; SUBCUTANEOUS at 21:35

## 2022-07-23 RX ADMIN — MAGNESIUM OXIDE TAB 400 MG (241.3 MG ELEMENTAL MG) 400 MG: 400 (241.3 MG) TAB at 17:09

## 2022-07-23 RX ADMIN — ACETAMINOPHEN 650 MG: 325 TABLET, FILM COATED ORAL at 17:09

## 2022-07-23 RX ADMIN — FOLIC ACID 1 MG: 1 TABLET ORAL at 08:50

## 2022-07-23 RX ADMIN — HEPARIN SODIUM 5000 UNITS: 5000 INJECTION INTRAVENOUS; SUBCUTANEOUS at 17:10

## 2022-07-23 RX ADMIN — THIAMINE HCL TAB 100 MG 100 MG: 100 TAB at 08:50

## 2022-07-23 RX ADMIN — METHOCARBAMOL 500 MG: 500 TABLET, FILM COATED ORAL at 12:18

## 2022-07-23 NOTE — ASSESSMENT & PLAN NOTE
Reported he stopped taking Xarelto about 1 before admission as he ran out of it  Patient appears to be on Cardizem, sotalol, Toprol XL in the past as well  He denies taking any prescription medications except Xarelto which he stopped  History of cardioversion in 2018  CHADS2 Vasc score = 0,  HASBLED = 2, due to significant use of alcohol patient would be at high risk for bleeding    Plan:  · Cont telemetry  · Continue Toprol-XL  · 81 mg of aspirin discontinued, holding Xarelto  · Cardiology consulted, appreciate recs  · S/p [facility] loop recorder in 10/2018   Last available interrogation from 06/2021 with 0% AF burden  Attempted interrogation on 07/20/2022; unable to be completed (battery presumed to be dead) per cardiology

## 2022-07-23 NOTE — ASSESSMENT & PLAN NOTE
Noted hypoosmolar hyponatremia  Serum osmolarity 279  Urine osmolarity 742, Urine sodium 176  Hypoosmotic hyponatremia  Differential includes includes SIADH, diuretics, endocrinopathies  TSH within normal limits  Hyponatremia noted to improve with water restriction, component of SIADH is likely    Sodium stable at baseline   Plan:  · Continue Hold Aldactone  · Continue monitoring BMP, volume status  · Na 133, continue to monitor
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
## 2022-07-23 NOTE — ASSESSMENT & PLAN NOTE
Large right pneumothorax initially on admission as an incidental finding and asymptomatic    Plan:  · Thoracic surgery consulted, appreciate recommendations  · VATS decortication POD 3  · Right chest tube in place postoperatively, draining 150 ccs today  · Postoperative CXR showed:  trace amount of postoperative gas trapped in the right minor fissure, otherwise No significant pneumothorax or pleural effusion is visible  · Repeat CXR 7/22 showed: Small volume right-sided pneumothorax  · Repeat CXR today 7/24 pending   · Plan for waterseal 7/24  · PTeval, recommendations appreciated   Recommend post acute rehab  · OT eval, recommendations appreciated

## 2022-07-23 NOTE — PLAN OF CARE
Problem: PHYSICAL THERAPY ADULT  Goal: Performs mobility at highest level of function for planned discharge setting  See evaluation for individualized goals  Description: Treatment/Interventions: Functional transfer training, LE strengthening/ROM, Elevations, Therapeutic exercise, Endurance training, Patient/family training, Equipment eval/education, Bed mobility, Gait training, Compensatory technique education, Spoke to nursing, Spoke to case management (balance training)  Equipment Recommended: Tram Socks       See flowsheet documentation for full assessment, interventions and recommendations  Note: Prognosis: Good  Problem List: Decreased strength, Decreased endurance, Impaired balance, Decreased mobility, Decreased safety awareness, Pain  Assessment: Pt is 59 y o  male seen for a high complexity PT evaluation s/p admit to [facility] on 7/19/2022  Pt presenting w/ pneumothorax, s/p VATS decortication 7/21/22  Please see above for other active problem list / PMH  PT now consulted to assess functional mobility and needs for safe d/c planning  Prior to admission, pt was I w/ mobility w/o AD, lives w/ sister in a 3rd floor apt  Currently pt requires S for bed skills; S for functional transfers; MinAx1 for ambulation w/ RW; MinAx1 for stair negotiation  Pt presents functioning below baseline and w/ overall mobility deficits 2* to: decreased LE strength; generalized weakness/deconditioning; decreased endurance; impaired balance; gait deviations; SOB/CANTRELL; pain; fatigue; multiple lines  Pt will continue to benefit from skilled PT interventions to address stated impairments; to maximize functional potential; for ongoing pt/ family training; and DME needs  PT is currently recommending rehab  Pt agreeable to plan and goals as stated on evaluation    Barriers to Discharge: Inaccessible home environment     PT Discharge Recommendation: Post acute rehabilitation services    See flowsheet documentation for full assessment

## 2022-07-23 NOTE — ASSESSMENT & PLAN NOTE
In the setting of alcohol abuse  Plan   · CT Abd: Liver is diffusely decreased in density consistent with fatty change  No CT evidence of suspicious hepatic mass    Normal hepatic contours  · Encourage alcohol cessation   · need to establish with GI outpatient

## 2022-07-23 NOTE — ASSESSMENT & PLAN NOTE
TONYA in 2018 showed normal EF  Echo on this admission shows EF of 25-30% with grade 1 diastolic dysfunction  Cardiology consulted for medication optimization as patient on many medications that are not ideal    Plan  · Consulted Heart failure team, appreciate recommendations  · Heart failure team discussed LifeVest with patient and pt agreeable   Received life vest with instructions   · Recommend and Plan to repeat limited TTE to reassess LVEF in mid 10/2022  · Holding losartan and Toprol in the setting of hypotension  · Aldactone held for hyponatremia

## 2022-07-23 NOTE — ASSESSMENT & PLAN NOTE
Patient reports drinking about 6 drinks per day  Last drink was on day of admission  Initially started on CIWA protocol on admission  Developed alcohol withdrawal symptoms on 07/09-7/10 with tachycardia, confusion, agitation, DTs  Received total of 6 mg of IV Ativan without any improvement  Transferred to step-down unit and received IV phenobarb and also required p r n   Valium doses    Plan  Stable  · MVI, thiamine, folic acid supplementation continue  · Evaluation for rehab on discharge - patient is agreeable    · ETOH cessation education provided
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
## 2022-07-23 NOTE — PROGRESS NOTES
Thoracic Surgery  Progress Note   Nic Ayala 59 y o  male MRN: [id number]  Unit/Bed#: [facility] 529-01 Encounter: [id number]    Assessment:  Pt is a 54yoM with a PMH of cardiomyopathy (EF 25-30%), paroxysmal afib, alcohol induced pancreatitis, alcoholic hepatitis who presented with persistent right pneumothorax      POD 2 s/p right apical bleb resection and pleurectomy  Pt tolerated the procedure well with no complcations  EBL: minimal  CT dressings and incision site were c/d/i     CT: -20, no airleak, output: 475     Pt is afebrile and all other vital signs stable  His pain is better controlled      Plan:  -maintain chest tube to -20, plan for water seal on postop day 3  -pain controlled lidocaine patch scheduled Tylenol and p r n  Dilaudid and oxycodone  DC fluids   -diet per primary team  -DVT prophylaxis   -encourage use of IS   -encourage ambulation  -PT/OT   -dispo planning    Subjective/Objective     Subjective: Patient seen and examined at bedside, in no acute distress  No acute events overnight  Patient continues to complain of chest pain of the right hemithorax  He denies nausea or vomiting  Endorses passing gas and stool  Objective:     Vitals:Blood pressure 95/51, pulse 78, temperature 98 °F (36 7 °C), resp  rate 19, height 5' 8" (1 727 m), weight 59 kg (130 lb 1 1 oz), SpO2 95 %  ,Body mass index is 19 78 kg/m²       Temp (24hrs), Av °F (36 7 °C), Min:97 6 °F (36 4 °C), Max:98 4 °F (36 9 °C)  Current: Temperature: 98 °F (36 7 °C)      Intake/Output Summary (Last 24 hours) at 2022 0658  Last data filed at 2022 0400  Gross per 24 hour   Intake 960 ml   Output 810 ml   Net 150 ml       Invasive Devices  Report    Peripheral Intravenous Line  Duration           Long-Dwell Peripheral IV (Midline)  Right Basilic 12 days    Peripheral IV 22 Left Forearm 1 day          Drain  Duration           Chest Tube 1 Right Pleural 28 Fr  1 day                Physical Exam:  General: No acute distress, alert and oriented  Chest:  incision and chest tube site clean dry and intact as above  CV: Well perfused, regular rate and rhythm  Lungs: Normal work of breathing, no increased respiratory effort  Abdomen: Soft, non-tender, non-distended  Incision(s) clean, dry and intact    Extremities: No edema, clubbing or cyanosis  Skin: Warm, dry    Lab Results: Results: I have personally reviewed all pertinent laboratory/tests results  VTE Prophylaxis: Sequential compression device (Venodyne)  Lovenox    Jayme Villarreal MD  7/23/2022

## 2022-07-23 NOTE — PROGRESS NOTES
INTERNAL MEDICINE RESIDENCY PROGRESS NOTE     Name: Tamiko Alexander   Age & Sex: 59 y o  male   MRN: [id number]  Unit/Bed#: [facility] 529-01   Encounter: [id number]  Team: SOD Team C     PATIENT INFORMATION     Name: Tamiko Alexander   Age & Sex: 59 y o  male   MRN: [id number]  Hospital Stay Days: 4    ASSESSMENT/PLAN     Principal Problem:    Pneumothorax  Active Problems:    FANNIE (acute kidney injury) ([address] )    Cardiomyopathy, 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    Paroxysmal atrial fibrillation (HCC)    Tobacco abuse    Electrolyte abnormality    Alcohol abuse    Hepatic steatosis      * Pneumothorax  Assessment & Plan  Large right pneumothorax initially on admission as an incidental finding and asymptomatic    Plan:  · Thoracic surgery consulted, appreciate recommendations  · VATS decortication procedure completed 7/21   · Right chest tube in place postoperatively 7/21  · Postoperative CXR showed:  trace amount of postoperative gas trapped in the right minor fissure, otherwise No significant pneumothorax or pleural effusion is visible  · Repeat CXR 7/22 showed: Small volume right-sided pneumothorax  · PTeval pending   · OT eval, recommendations appreciated  Home with home health rehabilitation recommended when medically stable for discharge    FANNIE (acute kidney injury) [facility])  Assessment & Plan  Noted today 7/23 with a rise in creatinine at 1 33 from his baseline around 0 6-0 7  Notably patient was NPO the day prior, had increase in his losartan dosing to 25 b i d , and also had soft blood pressures likely in the setting of decreased p o  Intake  FANNIE likely prerenal in etiology secondary to poor p o  Intake the day prior correlating with his episodes of hypotension as well as some component of ARB  · Maintain off losartan for now  · Will give gentle fluids, 250 bolus isolyte - caution given EF of 20%  · Avoid nephrotoxins  · Avoid relative hypotension  · Trend BMP - recheck 12:00 p m      Hepatic steatosis  Assessment & Plan  In the setting of alcohol abuse  Plan   · CT Abd: Liver is diffusely decreased in density consistent with fatty change  No CT evidence of suspicious hepatic mass  Normal hepatic contours  · Encourage alcohol cessation   · Will need to establish with GI outpatient     Alcohol abuse  Assessment & Plan  Patient reports drinking about 6 drinks per day  Last drink was on day of admission  Initially started on CIWA protocol on admission  Developed alcohol withdrawal symptoms on 07/09-7/10 with tachycardia, confusion, agitation, DTs  Received total of 6 mg of IV Ativan without any improvement  Transferred to step-down unit and received IV phenobarb and also required p r n  Valium doses    Plan  Stable at this time  · MVI, thiamine, folic acid supplementation continue  · Evaluation for rehab on discharge - patient is agreeable    · ETOH cessation education provided     Electrolyte abnormality  Assessment & Plan  Noted hypoosmolar hyponatremia  Serum osmolarity 279  Urine osmolarity 742, Urine sodium 176  Hypoosmotic hyponatremia  Differential includes includes SIADH, diuretics, endocrinopathies  TSH within normal limits  Hyponatremia noted to improve with water restriction, component of SIADH is likely  Sodium stable at baseline today  Plan:  · Continue Hold Aldactone  · Continue monitoring BMP, volume status      Tobacco abuse  Assessment & Plan  Currently Stable  · Complete Smoking cessation discussed  Nicotine patch      Paroxysmal atrial fibrillation (HCC)  Assessment & Plan  Reported he stopped taking Xarelto about 1 before admission as he ran out of it  Patient appears to be on Cardizem, sotalol, Toprol XL in the past as well  He denies taking any prescription medications except Xarelto which he stopped  History of cardioversion in 2018   CHADS2 Vasc score = 0,  HASBLED = 2, due to significant use of alcohol patient would be at high risk for bleeding    Plan:  · On telemetry   · Continue Toprol-XL  · 81 mg of aspirin discontinued, holding Xarelto  · Cardiology consulted, appreciate recs  · S/p [facility] loop recorder in 10/2018  Last available interrogation from 06/2021 with 0% AF burden  Attempted interrogation on 07/20/2022; unable to be completed (battery presumed to be dead) per cardiology    Cardiomyopathy, 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  Assessment & Plan  TONYA in 2018 showed normal EF  Echo on this admission shows EF of 25-30% with grade 1 diastolic dysfunction  Cardiology consulted for medication optimization as patient on many medications that are not ideal    Plan  · Consulted Heart failure team, appreciate recommendations  · Heart failure team discussed LifeVest with patient and pt agreeable  Will receive prior to discharge   · Recommend and Plan to repeat limited TTE to reassess LVEF in mid 10/2022  · Holding losartan and Toprol in the setting of hypotension  · Aldactone held for hyponatremia      Alcohol withdrawal (HCC)-resolved as of 7/18/2022  Assessment & Plan  Patient reports drinking about 6 drinks per day  Last drink was on day of admission  Complete Alcohol cessation discussed with patient  Initially started on CIWA protocol on admission  Developed alcohol withdrawal symptoms on 07/09-7/10 with tachycardia, confusion, agitation, DTs  Received total of 6 mg of IV Ativan without any improvement  Transferred to step-down unit and received IV phenobarb and also required p r n  Valium doses  Was also on Precedex drip which has been discontinued  Patient states he would be interested in inpatient alcohol rehab if that is what it takes to quit drinking  Plan:  · Continue to encourage alcohol cessation and assess patient interest in rehab  · MVI, thiamine, folic acid      Disposition:  Continue inpatient level of care, chest tube in place     SUBJECTIVE     Patient seen and examined  Overnight night team paged by RN due to systolic blood pressure in the 70s  Repeat blood pressure 81/47    Discontinued losartan which was started yesterday at b i d  Dosing per Cardiology as well as Toprol  Was given albumin 25% 50 g  Subsequent blood pressure 95/51  Will continue to hold off on losartan 25 b i d  As well as Toprol    At this time still complaining of some chest pain associated with chest tube site  Denies shortness of breath  Denies headache, abdominal pain, weakness  Good p o  Intake  OBJECTIVE     Vitals:    22 0227 22 0430 22 0624 22 0656   BP: (!) 86/49 (!) 81/47 98/52 95/51   BP Location:    Right arm   Pulse: 79 84 79 78   Resp:    19   Temp:    98 °F (36 7 °C)   TempSrc:    Oral   SpO2: 95%  93% 95%   Weight:  59 kg (130 lb 1 1 oz)     Height:          Temperature:   Temp (24hrs), Av °F (36 7 °C), Min:97 6 °F (36 4 °C), Max:98 2 °F (36 8 °C)    Temperature: 98 °F (36 7 °C)  Intake & Output:  I/O        0701   0700  0701   0700  0701   0700    P  O  480 960     I V  (mL/kg) 1476 (25 4)      Total Intake(mL/kg) 1956 (33 7) 960 (16 3)     Urine (mL/kg/hr) 550 (0 4) 500 (0 4)     Chest Tube 215 310     Total Output 765 810     Net +1191 +150                Weights:   IBW (Ideal Body Weight): 68 4 kg    Body mass index is 19 78 kg/m²  Weight (last 2 days)     Date/Time Weight    22 0430 59 (130 07)    22 0500 58 1 (128 09)    22 0906 57 6 (126 99)        Physical Exam  Vitals and nursing note reviewed  Constitutional:       Appearance: He is underweight  HENT:      Head: Normocephalic and atraumatic  Mouth/Throat:      Dentition: Dental caries present  Eyes:      Conjunctiva/sclera: Conjunctivae normal    Cardiovascular:      Rate and Rhythm: Normal rate and regular rhythm  Heart sounds: No murmur heard  Pulmonary:      Effort: Pulmonary effort is normal  No respiratory distress  Breath sounds: Normal breath sounds        Comments: Tender to palpation chest tube site  Abdominal:      Palpations: Abdomen is soft       Tenderness: There is no abdominal tenderness  Musculoskeletal:      Cervical back: Neck supple  Right lower leg: No edema  Left lower leg: No edema  Skin:     General: Skin is warm and dry  Coloration: Skin is jaundiced  Neurological:      General: No focal deficit present  Mental Status: He is alert  LABORATORY DATA     Labs: I have personally reviewed pertinent reports  Results from last 7 days   Lab Units 07/23/22  0433 07/22/22  0522 07/21/22  0507   WBC Thousand/uL 19 35* 19 40* 8 82   HEMOGLOBIN g/dL 10 5* 12 2 11 9*   HEMATOCRIT % 31 7* 37 7 37 8   PLATELETS Thousands/uL 527* 690* 712*   NEUTROS PCT % 82* 81* 60   MONOS PCT % 10 14* 19*      Results from last 7 days   Lab Units 07/23/22  0433 07/22/22  0522 07/21/22  0507   POTASSIUM mmol/L 3 6 4 1 4 2   CHLORIDE mmol/L 96 95* 98   CO2 mmol/L 26 25 27   BUN mg/dL 28* 15 15   CREATININE mg/dL 1 33* 0 70 0 68   CALCIUM mg/dL 9 2 9 4 9 9   ALK PHOS U/L 41* 43* 48   ALT U/L 10* 16 20   AST U/L 11 20 23     Results from last 7 days   Lab Units 07/18/22  0523 07/17/22  0556   MAGNESIUM mg/dL 1 7 1 8                        IMAGING & DIAGNOSTIC TESTING     Radiology Results: I have personally reviewed pertinent reports  XR chest portable    Result Date: 7/22/2022  Impression: Small volume right-sided pneumothorax  Right-sided chest tube in place  Workstation performed: [id number]     XR chest portable    Result Date: 7/21/2022  Impression: Expected postoperative changes  There may be a trace amount of postoperative gas trapped in the right minor fissure, otherwise No significant pneumothorax or pleural effusion is visible  No large pleural effusion visible  No acute cardiopulmonary disease  The study was marked in [facility] for immediate notification   Workstation performed: [id number]     CT chest wo contrast    Result Date: 7/20/2022  Impression: Stable size of the right-sided pneumothorax with resolved right pleural effusion  Stable right anterior pigtail catheter noted  Stable hematoma from previous chest tube within the chest wall and tubular extension into the right upper lobe  Evidence of pulmonary arterial hypertension  Generalized increased bone density  Correlate for possible metabolic or hematological disorders  Workstation performed: [id number]     Other Diagnostic Testing: I have personally reviewed pertinent reports  ACTIVE MEDICATIONS     Current Facility-Administered Medications   Medication Dose Route Frequency    acetaminophen (TYLENOL) tablet 650 mg  650 mg Oral Q6H [facility]    albuterol inhalation solution 2 5 mg  2 5 mg Nebulization Q4H PRN    Diclofenac Sodium (VOLTAREN) 1 % topical gel 2 g  2 g Topical 4x Daily    folic acid (FOLVITE) tablet 1 mg  1 mg Oral Daily    gabapentin (NEURONTIN) capsule 300 mg  300 mg Oral TID    heparin (porcine) subcutaneous injection 5,000 Units  5,000 Units Subcutaneous Q8H [facility]    HYDROmorphone (DILAUDID) injection 0 5 mg  0 5 mg Intravenous Q1H PRN    lidocaine (LIDODERM) 5 % patch 1 patch  1 patch Topical Daily    magnesium oxide (MAG-OX) tablet 400 mg  400 mg Oral BID    methocarbamol (ROBAXIN) tablet 500 mg  500 mg Oral Q6H [facility]    multi-electrolyte (ISOLYTE-S PH 7 4) bolus 250 mL  250 mL Intravenous Once    nicotine (NICODERM CQ) 21 mg/24 hr TD 24 hr patch 1 patch  1 patch Transdermal Daily    ondansetron (ZOFRAN) injection 4 mg  4 mg Intravenous Q6H PRN    oxyCODONE (ROXICODONE) IR tablet 5 mg  5 mg Oral Q4H PRN    thiamine tablet 100 mg  100 mg Oral Daily       VTE Pharmacologic Prophylaxis: Enoxaparin (Lovenox)  VTE Mechanical Prophylaxis: sequential compression device    Portions of the record may have been created with voice recognition software  Occasional wrong word or "sound a like" substitutions may have occurred due to the inherent limitations of voice recognition software    Read the chart carefully and recognize, using context, where substitutions have occurred   ==  Rakesh Ayala, [address] [facility]  Internal Medicine Residency PGY-2

## 2022-07-23 NOTE — ASSESSMENT & PLAN NOTE
7/23 with a rise in creatinine at 1 33 from his baseline around 0 6-0 7  Notably patient was NPO the day prior, had increase in his losartan dosing to 25 b i d , and also had soft blood pressures likely in the setting of decreased p o  Intake  FANNIE likely prerenal in etiology secondary to poor p o   Intake the day prior correlating with his episodes of hypotension as well as some component of ARB  · Maintain off losartan for now  · Will give gentle fluids, 250 bolus isolyte - caution given EF of 20%  · Avoid nephrotoxins  · Avoid relative hypotension  · Trend BMP   · Cr downtrended 1 33 --> 0 88  --> 0 63 7/24 back to baseline

## 2022-07-24 ENCOUNTER — APPOINTMENT (INPATIENT)
Dept: RADIOLOGY | Facility: HOSPITAL | Age: 57
DRG: 163 | End: 2022-07-24
Payer: COMMERCIAL

## 2022-07-24 LAB
ANION GAP SERPL CALCULATED.3IONS-SCNC: 7 MMOL/L (ref 4–13)
BUN SERPL-MCNC: 13 MG/DL (ref 5–25)
CALCIUM SERPL-MCNC: 9.5 MG/DL (ref 8.3–10.1)
CHLORIDE SERPL-SCNC: 100 MMOL/L (ref 96–108)
CO2 SERPL-SCNC: 26 MMOL/L (ref 21–32)
CREAT SERPL-MCNC: 0.63 MG/DL (ref 0.6–1.3)
GFR SERPL CREATININE-BSD FRML MDRD: 107 ML/MIN/1.73SQ M
GLUCOSE SERPL-MCNC: 98 MG/DL (ref 65–140)
POTASSIUM SERPL-SCNC: 3.8 MMOL/L (ref 3.5–5.3)
SODIUM SERPL-SCNC: 133 MMOL/L (ref 135–147)

## 2022-07-24 PROCEDURE — 80048 BASIC METABOLIC PNL TOTAL CA: CPT | Performed by: STUDENT IN AN ORGANIZED HEALTH CARE EDUCATION/TRAINING PROGRAM

## 2022-07-24 PROCEDURE — 99232 SBSQ HOSP IP/OBS MODERATE 35: CPT | Performed by: INTERNAL MEDICINE

## 2022-07-24 PROCEDURE — 99024 POSTOP FOLLOW-UP VISIT: CPT | Performed by: THORACIC SURGERY (CARDIOTHORACIC VASCULAR SURGERY)

## 2022-07-24 PROCEDURE — 71046 X-RAY EXAM CHEST 2 VIEWS: CPT

## 2022-07-24 RX ORDER — BENZONATATE 100 MG/1
100 CAPSULE ORAL 3 TIMES DAILY PRN
Status: DISCONTINUED | OUTPATIENT
Start: 2022-07-24 | End: 2022-07-27 | Stop reason: HOSPADM

## 2022-07-24 RX ORDER — AMOXICILLIN 250 MG
1 CAPSULE ORAL 2 TIMES DAILY
Status: DISCONTINUED | OUTPATIENT
Start: 2022-07-24 | End: 2022-07-27 | Stop reason: HOSPADM

## 2022-07-24 RX ORDER — POLYETHYLENE GLYCOL 3350 17 G/17G
17 POWDER, FOR SOLUTION ORAL DAILY
Status: DISCONTINUED | OUTPATIENT
Start: 2022-07-24 | End: 2022-07-27 | Stop reason: HOSPADM

## 2022-07-24 RX ORDER — METOPROLOL SUCCINATE 25 MG/1
12.5 TABLET, EXTENDED RELEASE ORAL DAILY
Status: DISCONTINUED | OUTPATIENT
Start: 2022-07-24 | End: 2022-07-26

## 2022-07-24 RX ADMIN — GABAPENTIN 300 MG: 300 CAPSULE ORAL at 21:17

## 2022-07-24 RX ADMIN — LIDOCAINE 5% 1 PATCH: 700 PATCH TOPICAL at 08:18

## 2022-07-24 RX ADMIN — NICOTINE 1 PATCH: 21 PATCH, EXTENDED RELEASE TRANSDERMAL at 08:18

## 2022-07-24 RX ADMIN — SENNOSIDES AND DOCUSATE SODIUM 1 TABLET: 50; 8.6 TABLET ORAL at 17:13

## 2022-07-24 RX ADMIN — DICLOFENAC SODIUM 2 G: 10 GEL TOPICAL at 08:19

## 2022-07-24 RX ADMIN — MAGNESIUM OXIDE TAB 400 MG (241.3 MG ELEMENTAL MG) 400 MG: 400 (241.3 MG) TAB at 17:13

## 2022-07-24 RX ADMIN — METOPROLOL SUCCINATE 12.5 MG: 25 TABLET, EXTENDED RELEASE ORAL at 10:07

## 2022-07-24 RX ADMIN — HEPARIN SODIUM 5000 UNITS: 5000 INJECTION INTRAVENOUS; SUBCUTANEOUS at 06:06

## 2022-07-24 RX ADMIN — ACETAMINOPHEN 650 MG: 325 TABLET, FILM COATED ORAL at 11:30

## 2022-07-24 RX ADMIN — MAGNESIUM OXIDE TAB 400 MG (241.3 MG ELEMENTAL MG) 400 MG: 400 (241.3 MG) TAB at 08:18

## 2022-07-24 RX ADMIN — GABAPENTIN 300 MG: 300 CAPSULE ORAL at 08:18

## 2022-07-24 RX ADMIN — ACETAMINOPHEN 650 MG: 325 TABLET, FILM COATED ORAL at 06:06

## 2022-07-24 RX ADMIN — METHOCARBAMOL 500 MG: 500 TABLET, FILM COATED ORAL at 17:13

## 2022-07-24 RX ADMIN — THIAMINE HCL TAB 100 MG 100 MG: 100 TAB at 08:18

## 2022-07-24 RX ADMIN — HEPARIN SODIUM 5000 UNITS: 5000 INJECTION INTRAVENOUS; SUBCUTANEOUS at 21:17

## 2022-07-24 RX ADMIN — METHOCARBAMOL 500 MG: 500 TABLET, FILM COATED ORAL at 06:06

## 2022-07-24 RX ADMIN — OXYCODONE HYDROCHLORIDE 5 MG: 5 TABLET ORAL at 14:10

## 2022-07-24 RX ADMIN — GABAPENTIN 300 MG: 300 CAPSULE ORAL at 17:13

## 2022-07-24 RX ADMIN — ACETAMINOPHEN 650 MG: 325 TABLET, FILM COATED ORAL at 17:13

## 2022-07-24 RX ADMIN — OXYCODONE HYDROCHLORIDE 5 MG: 5 TABLET ORAL at 10:07

## 2022-07-24 RX ADMIN — METHOCARBAMOL 500 MG: 500 TABLET, FILM COATED ORAL at 11:30

## 2022-07-24 RX ADMIN — FOLIC ACID 1 MG: 1 TABLET ORAL at 08:18

## 2022-07-24 RX ADMIN — DICLOFENAC SODIUM 2 G: 10 GEL TOPICAL at 21:17

## 2022-07-24 RX ADMIN — POLYETHYLENE GLYCOL 3350 17 G: 17 POWDER, FOR SOLUTION ORAL at 11:30

## 2022-07-24 RX ADMIN — SENNOSIDES AND DOCUSATE SODIUM 1 TABLET: 50; 8.6 TABLET ORAL at 11:30

## 2022-07-24 RX ADMIN — DICLOFENAC SODIUM 2 G: 10 GEL TOPICAL at 11:32

## 2022-07-24 RX ADMIN — HEPARIN SODIUM 5000 UNITS: 5000 INJECTION INTRAVENOUS; SUBCUTANEOUS at 14:10

## 2022-07-24 NOTE — PROGRESS NOTES
Progress Note - Thoracic Surgery   Nic Ayala 59 y o  male MRN: [id number]  [facility] Encounter: [id number]    Assessment:  59 M p/w R ultiloculated hydropneumothorax and trapped lung s/p tube thoracostomy c/b perissitent air leak, now s/p R VATS decortication, bleb resection, and apical pleurectomy  Pancreatitis present on admission improving  VS: AVSS, room air     UOP: 1 5 L     R CT: 210cc recorded in chart, only 30cc 24 hr output reported on topaz unit itself     Plan:  -diet as tolerated   -consider removal of R CT  -post-pull PA/LAteral CXR  -out of bed, ambulate  -pain control  -pulmonary toilet, IS  -DVT PPX  -additional care as per primary  -disposition planning     Subjective/Objective       Subjective: pain well-controlled, normal inspiratory effort    Objective:     Blood pressure 107/60, pulse 72, temperature 98 7 °F (37 1 °C), temperature source Oral, resp  rate 18, height 5' 8" (1 727 m), weight 60 2 kg (132 lb 11 5 oz), SpO2 96 %  ,Body mass index is 20 18 kg/m²  Intake/Output Summary (Last 24 hours) at 7/25/2022 8204  Last data filed at 7/25/2022 7637  Gross per 24 hour   Intake 1360 ml   Output 1710 ml   Net -350 ml       Invasive Devices  Report    Peripheral Intravenous Line  Duration           Long-Dwell Peripheral IV (Midline) 52/79/54 Right Basilic 14 days          Drain  Duration           Chest Tube 1 Right Pleural 28 Fr  3 days                Physical Exam:     General: NAD  HEENT: normocephalic, atraumatic   Cardiovascular: RRR  Respiratory: no distress  Chest: incisions c/d/i, R CT to water seal   Abdomen: soft, nontender  Extremities: no c/c/e   Neuro: AAOx3  Skin: warm, dry     Lab, Imaging and other studies:  I have personally reviewed pertinent lab results    , CBC:   Lab Results   Component Value Date    WBC 9 34 07/25/2022    HGB 9 8 (L) 07/25/2022    HCT 30 4 (L) 07/25/2022    MCV 94 07/25/2022     (H) 07/25/2022    MCH 30 2 07/25/2022    MCHC 32 2 07/25/2022    RDW 14 1 07/25/2022    MPV 8 8 (L) 07/25/2022    NRBC 0 07/25/2022   , CMP:   Lab Results   Component Value Date    SODIUM 133 (L) 07/24/2022    K 3 8 07/24/2022     07/24/2022    CO2 26 07/24/2022    BUN 13 07/24/2022    CREATININE 0 63 07/24/2022    CALCIUM 9 5 07/24/2022    EGFR 107 07/24/2022     VTE Pharmacologic Prophylaxis: Heparin  VTE Mechanical Prophylaxis: sequential compression device

## 2022-07-24 NOTE — PROGRESS NOTES
Thoracic Surgery  Progress Note   Nic Ayala 59 y o  male MRN: [id number]  Unit/Bed#: [facility] 529-01 Encounter: [id number]    Assessment:  Pt is a 54yoM with a PMH of cardiomyopathy (EF 25-30%), paroxysmal afib, alcohol induced pancreatitis, alcoholic hepatitis who presented with persistent right pneumothorax      POD 2 s/p right apical bleb resection and pleurectomy  Pt tolerated the procedure well with no complcations  EBL: minimal  CT dressings and incision site were c/d/i     CT: -20, no airleak, output: 150     Pt is afebrile and all other vital signs stable  His pain is better controlled      Plan:  -Chest tube to -20; plan for water seal today  -pain control with lidocaine patch, Tylenol, Dilaudid and oxycodone   Dash diet per primary team  -DVT prophylaxis   -encourage use of IS   -encourage ambulation   -PT/OT   -dispo planning    Subjective/Objective     Subjective:  Patient seen and examined at bedside, in no acute distress, no acute events overnight  Chest pain of the right hemithorax is improving  Denies nausea or vomiting  Endorses passing gas and stool  Pulling 750 on IS  Objective:     Vitals:Blood pressure 136/87, pulse 94, temperature 98 7 °F (37 1 °C), resp  rate 18, height 5' 8" (1 727 m), weight 59 kg (130 lb 1 1 oz), SpO2 96 %  ,Body mass index is 19 78 kg/m²       Temp (24hrs), Av 6 °F (37 °C), Min:98 °F (36 7 °C), Max:99 2 °F (37 3 °C)  Current: Temperature: 98 7 °F (37 1 °C)      Intake/Output Summary (Last 24 hours) at 2022 0004  Last data filed at 2022  Gross per 24 hour   Intake 1080 ml   Output 1700 ml   Net -620 ml       Invasive Devices  Report    Peripheral Intravenous Line  Duration           Long-Dwell Peripheral IV (Midline) 34/38/64 Right Basilic 13 days          Drain  Duration           Chest Tube 1 Right Pleural 28 Fr  2 days              Physical Exam:  General: No acute distress, alert and oriented  CV: Well perfused, regular rate and rhythm  Chest: Incision and chest tube site clean dry and intact; chest tube in place as above  Lungs: Normal work of breathing, no increased respiratory effort  Abdomen: Soft, non-tender, non-distended  Incision(s) clean, dry and intact    Extremities: No edema, clubbing or cyanosis  Skin: Warm, dry    Lab Results: Results: I have personally reviewed all pertinent laboratory/tests results  VTE Prophylaxis: Sequential compression device (Venodyne)  Lovenox    Sadi Reyes MD  7/24/2022

## 2022-07-24 NOTE — PROGRESS NOTES
[address] [facility]  Progress Note - Nic Ayala 1965, 59 y o  male MRN: [id number]  Unit/Bed#: [facility] 529-01 Encounter: [id number]  Primary Care Provider: Howie Crowley MD   Date and time admitted to hospital: 7/19/2022  8:46 PM         INTERNAL MEDICINE RESIDENCY PROGRESS NOTE     Name: Lenora Garcia   Age & Sex: 59 y o  male   MRN: [id number]  Unit/Bed#: [facility] 529-01   Encounter: [id number]  Team: SOD Team C     PATIENT INFORMATION     Name: Lenora Garcia   Age & Sex: 59 y o  male   MRN: [id number]  Hospital Stay Days: 5    ASSESSMENT/PLAN     Principal Problem:    Pneumothorax  Active Problems:    Cardiomyopathy, 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    Paroxysmal atrial fibrillation (HCC)    Tobacco abuse    Electrolyte abnormality    Alcohol abuse    Hepatic steatosis    FANNIE (acute kidney injury) ([facility] [address] )      * Pneumothorax  Assessment & Plan  Large right pneumothorax initially on admission as an incidental finding and asymptomatic    Plan:  · Thoracic surgery consulted, appreciate recommendations  · VATS decortication POD 3  · Right chest tube in place postoperatively, draining 150 ccs today  · Postoperative CXR showed:  trace amount of postoperative gas trapped in the right minor fissure, otherwise No significant pneumothorax or pleural effusion is visible  · Repeat CXR 7/22 showed: Small volume right-sided pneumothorax  · Repeat CXR today 7/24 pending   · Plan for waterseal 7/24  · PTeval, recommendations appreciated  Recommend post acute rehab  · OT eval, recommendations appreciated    FANNIE (acute kidney injury) [facility])  Assessment & Plan   7/23 with a rise in creatinine at 1 33 from his baseline around 0 6-0 7  Notably patient was NPO the day prior, had increase in his losartan dosing to 25 b i d , and also had soft blood pressures likely in the setting of decreased p o  Intake  FANNIE likely prerenal in etiology secondary to poor p o   Intake the day prior correlating with his episodes of hypotension as well as some component of ARB  · Maintain off losartan for now  · Will give gentle fluids, 250 bolus isolyte - caution given EF of 20%  · Avoid nephrotoxins  · Avoid relative hypotension  · Trend BMP   · Cr downtrended 1 33 --> 0 88  --> 0 63 7/24 back to baseline     Hepatic steatosis  Assessment & Plan  In the setting of alcohol abuse  Plan   · CT Abd: Liver is diffusely decreased in density consistent with fatty change  No CT evidence of suspicious hepatic mass  Normal hepatic contours  · Encourage alcohol cessation   · need to establish with GI outpatient     Alcohol abuse  Assessment & Plan  Patient reports drinking about 6 drinks per day  Last drink was on day of admission  Initially started on CIWA protocol on admission  Developed alcohol withdrawal symptoms on 07/09-7/10 with tachycardia, confusion, agitation, DTs  Received total of 6 mg of IV Ativan without any improvement  Transferred to step-down unit and received IV phenobarb and also required p r n  Valium doses    Plan  Stable  · MVI, thiamine, folic acid supplementation continue  · Evaluation for rehab on discharge - patient is agreeable    · ETOH cessation education provided     Electrolyte abnormality  Assessment & Plan  Noted hypoosmolar hyponatremia  Serum osmolarity 279  Urine osmolarity 742, Urine sodium 176  Hypoosmotic hyponatremia  Differential includes includes SIADH, diuretics, endocrinopathies  TSH within normal limits  Hyponatremia noted to improve with water restriction, component of SIADH is likely  Sodium stable at baseline   Plan:  · Continue Hold Aldactone  · Continue monitoring BMP, volume status  · Na 133, continue to monitor       Tobacco abuse  Assessment & Plan  Stable  ·  Smoking cessation discussed  Nicotine patch      Paroxysmal atrial fibrillation (HCC)  Assessment & Plan  Reported he stopped taking Xarelto about 1 before admission as he ran out of it    Patient appears to be on Cardizem, sotalol, Toprol XL in the past as well  He denies taking any prescription medications except Xarelto which he stopped  History of cardioversion in 2018  CHADS2 Vasc score = 0,  HASBLED = 2, due to significant use of alcohol patient would be at high risk for bleeding    Plan:  · Cont telemetry  · Continue Toprol-XL  · 81 mg of aspirin discontinued, holding Xarelto  · Cardiology consulted, appreciate recs  · S/p [facility] loop recorder in 10/2018  Last available interrogation from 06/2021 with 0% AF burden  Attempted interrogation on 07/20/2022; unable to be completed (battery presumed to be dead) per cardiology    Cardiomyopathy, 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  Assessment & Plan  TONYA in 2018 showed normal EF  Echo on this admission shows EF of 25-30% with grade 1 diastolic dysfunction  Cardiology consulted for medication optimization as patient on many medications that are not ideal    Plan  · Consulted Heart failure team, appreciate recommendations  · Heart failure team discussed LifeVest with patient and pt agreeable  Received life vest with instructions   · Recommend and Plan to repeat limited TTE to reassess LVEF in mid 10/2022  · Holding losartan and Toprol in the setting of hypotension  · Aldactone held for hyponatremia      Alcohol withdrawal (HCC)-resolved as of 7/18/2022  Assessment & Plan  Patient reports drinking about 6 drinks per day  Last drink was on day of admission  Complete Alcohol cessation discussed with patient  Initially started on CIWA protocol on admission  Developed alcohol withdrawal symptoms on 07/09-7/10 with tachycardia, confusion, agitation, DTs  Received total of 6 mg of IV Ativan without any improvement  Transferred to step-down unit and received IV phenobarb and also required p r n  Valium doses  Was also on Precedex drip which has been discontinued  Patient states he would be interested in inpatient alcohol rehab if that is what it takes to quit drinking      Plan:  · Continue to encourage alcohol cessation and assess patient interest in rehab  · MVI, thiamine, folic acid          Disposition: POD#3 VATS decortication      SUBJECTIVE     Patient seen and examined  Lying in bed  No acute events overnight  Patient received life vest today with instructions  Patient still has chest pain when breathing or with any movement but it has improved since the surgery  Encouraged for him to continued using incentive spirometry during TV commericals  Patient reports not having a bowel movement in the last 5 days since he came here from another hospital   He denies any fevers, chills, nausea vomiting, abdominal pain, dizziness, lightheadedness  Surgery plans for water seal today  OBJECTIVE     Vitals:    22 2229 22 0600 22 0707 22 1123   BP: 136/87  133/78 110/62   BP Location:   Right arm    Pulse: 94  84 89   Resp: 18  19    Temp: 98 7 °F (37 1 °C)  99 2 °F (37 3 °C) 98 4 °F (36 9 °C)   TempSrc:   Oral    SpO2: 96%  95% 96%   Weight:  60 2 kg (132 lb 11 5 oz)     Height:          Temperature:   Temp (24hrs), Av 8 °F (37 1 °C), Min:98 4 °F (36 9 °C), Max:99 2 °F (37 3 °C)    Temperature: 98 4 °F (36 9 °C)  Intake & Output:  I/O        0701   0700  0701   07 0701   0700    P  O  960 600 240    I V  (mL/kg)       Total Intake(mL/kg) 960 (16 3) 600 (10) 240 (4)    Urine (mL/kg/hr) 500 (0 4) 2075 (1 4)     Chest Tube 310 150 110    Total Output 810 2225 110    Net +150 -1625 +130               Weights:   IBW (Ideal Body Weight): 68 4 kg    Body mass index is 20 18 kg/m²  Weight (last 2 days)     Date/Time Weight    22 0600 60 2 (132 72)    22 0430 59 (130 07)    22 0500 58 1 (128 09)        Physical Exam  Vitals and nursing note reviewed  Constitutional:       General: He is not in acute distress  Appearance: Normal appearance  HENT:      Head: Normocephalic and atraumatic        Right Ear: External ear normal       Left Ear: External ear normal       Nose: Nose normal       Mouth/Throat:      Mouth: Mucous membranes are moist       Pharynx: Oropharynx is clear  Eyes:      Extraocular Movements: Extraocular movements intact  Conjunctiva/sclera: Conjunctivae normal       Pupils: Pupils are equal, round, and reactive to light  Cardiovascular:      Rate and Rhythm: Normal rate and regular rhythm  Pulses: Normal pulses  Heart sounds: Normal heart sounds  No murmur heard  No friction rub  Pulmonary:      Effort: Pulmonary effort is normal  No respiratory distress  Breath sounds: Normal breath sounds  No wheezing or rales  Comments: Pain when breathing, right chest tube present and draining adequately   Abdominal:      General: Bowel sounds are normal  There is no distension  Palpations: Abdomen is soft  Tenderness: There is no abdominal tenderness  There is no guarding  Musculoskeletal:         General: No swelling  Normal range of motion  Cervical back: Normal range of motion and neck supple  Right lower leg: No edema  Left lower leg: No edema  Skin:     General: Skin is warm and dry  Capillary Refill: Capillary refill takes less than 2 seconds  Neurological:      Mental Status: He is alert and oriented to person, place, and time  Sensory: No sensory deficit  Motor: No weakness  Psychiatric:         Mood and Affect: Mood normal          Behavior: Behavior normal        LABORATORY DATA     Labs: I have personally reviewed pertinent reports    Results from last 7 days   Lab Units 07/23/22  0433 07/22/22  0522 07/21/22  0507   WBC Thousand/uL 19 35* 19 40* 8 82   HEMOGLOBIN g/dL 10 5* 12 2 11 9*   HEMATOCRIT % 31 7* 37 7 37 8   PLATELETS Thousands/uL 527* 690* 712*   NEUTROS PCT % 82* 81* 60   MONOS PCT % 10 14* 19*      Results from last 7 days   Lab Units 07/24/22  0613 07/23/22  1158 07/23/22  0433 07/22/22  0522 07/21/22  0507   POTASSIUM mmol/L 3 8 4 2 3 6 4 1 4  2   CHLORIDE mmol/L 100 99 96 95* 98   CO2 mmol/L 26 27 26 25 27   BUN mg/dL 13 22 28* 15 15   CREATININE mg/dL 0 63 0 88 1 33* 0 70 0 68   CALCIUM mg/dL 9 5 8 9 9 2 9 4 9 9   ALK PHOS U/L  --   --  41* 43* 48   ALT U/L  --   --  10* 16 20   AST U/L  --   --  11 20 23     Results from last 7 days   Lab Units 07/23/22  0433 07/18/22  0523   MAGNESIUM mg/dL 1 9 1 7                        IMAGING & DIAGNOSTIC TESTING     Radiology Results: I have personally reviewed pertinent reports  XR chest portable    Result Date: 7/22/2022  Impression: Small volume right-sided pneumothorax  Right-sided chest tube in place  Workstation performed: [id number]     XR chest portable    Result Date: 7/21/2022  Impression: Expected postoperative changes  There may be a trace amount of postoperative gas trapped in the right minor fissure, otherwise No significant pneumothorax or pleural effusion is visible  No large pleural effusion visible  No acute cardiopulmonary disease  The study was marked in [facility] for immediate notification  Workstation performed: [id number]     XR chest pa & lateral    Result Date: 7/24/2022  Impression: No definite evidence for residual right pneumothorax  Stable patchy opacities in the right mid and lower lung associated with mild diffuse right pleural thickening  Stable right-sided chest tube  Workstation performed: [id number]     CT chest wo contrast    Result Date: 7/20/2022  Impression: Stable size of the right-sided pneumothorax with resolved right pleural effusion  Stable right anterior pigtail catheter noted  Stable hematoma from previous chest tube within the chest wall and tubular extension into the right upper lobe  Evidence of pulmonary arterial hypertension  Generalized increased bone density  Correlate for possible metabolic or hematological disorders  Workstation performed: [id number]     Other Diagnostic Testing: I have personally reviewed pertinent reports      ACTIVE MEDICATIONS Current Facility-Administered Medications   Medication Dose Route Frequency    acetaminophen (TYLENOL) tablet 650 mg  650 mg Oral Q6H [address]    albuterol inhalation solution 2 5 mg  2 5 mg Nebulization Q4H PRN    benzonatate (TESSALON PERLES) capsule 100 mg  100 mg Oral TID PRN    Diclofenac Sodium (VOLTAREN) 1 % topical gel 2 g  2 g Topical 4x Daily    folic acid (FOLVITE) tablet 1 mg  1 mg Oral Daily    gabapentin (NEURONTIN) capsule 300 mg  300 mg Oral TID    heparin (porcine) subcutaneous injection 5,000 Units  5,000 Units Subcutaneous Q8H [address]    HYDROmorphone (DILAUDID) injection 0 5 mg  0 5 mg Intravenous Q1H PRN    lidocaine (LIDODERM) 5 % patch 1 patch  1 patch Topical Daily    magnesium oxide (MAG-OX) tablet 400 mg  400 mg Oral BID    methocarbamol (ROBAXIN) tablet 500 mg  500 mg Oral Q6H [address]    metoprolol succinate (TOPROL-XL) 24 hr tablet 12 5 mg  12 5 mg Oral Daily    nicotine (NICODERM CQ) 21 mg/24 hr TD 24 hr patch 1 patch  1 patch Transdermal Daily    ondansetron (ZOFRAN) injection 4 mg  4 mg Intravenous Q6H PRN    oxyCODONE (ROXICODONE) IR tablet 5 mg  5 mg Oral Q4H PRN    polyethylene glycol (MIRALAX) packet 17 g  17 g Oral Daily    senna-docusate sodium (SENOKOT S) 8 6-50 mg per tablet 1 tablet  1 tablet Oral BID    thiamine tablet 100 mg  100 mg Oral Daily       VTE Pharmacologic Prophylaxis: Heparin  VTE Mechanical Prophylaxis: sequential compression device    Portions of the record may have been created with voice recognition software  Occasional wrong word or "sound a like" substitutions may have occurred due to the inherent limitations of voice recognition software    Read the chart carefully and recognize, using context, where substitutions have occurred   ==  Tanna Mcfadden MD  [address] [place] [address]  Internal Medicine Residency PGY-1

## 2022-07-25 LAB
ANION GAP SERPL CALCULATED.3IONS-SCNC: 6 MMOL/L (ref 4–13)
BASOPHILS # BLD AUTO: 0.09 THOUSANDS/ΜL (ref 0–0.1)
BASOPHILS NFR BLD AUTO: 1 % (ref 0–1)
BUN SERPL-MCNC: 10 MG/DL (ref 5–25)
CALCIUM SERPL-MCNC: 9.6 MG/DL (ref 8.3–10.1)
CHLORIDE SERPL-SCNC: 102 MMOL/L (ref 96–108)
CO2 SERPL-SCNC: 27 MMOL/L (ref 21–32)
CREAT SERPL-MCNC: 0.54 MG/DL (ref 0.6–1.3)
EOSINOPHIL # BLD AUTO: 0.31 THOUSAND/ΜL (ref 0–0.61)
EOSINOPHIL NFR BLD AUTO: 3 % (ref 0–6)
ERYTHROCYTE [DISTWIDTH] IN BLOOD BY AUTOMATED COUNT: 14.1 % (ref 11.6–15.1)
FERRITIN SERPL-MCNC: 374 NG/ML (ref 8–388)
GFR SERPL CREATININE-BSD FRML MDRD: 114 ML/MIN/1.73SQ M
GLUCOSE SERPL-MCNC: 95 MG/DL (ref 65–140)
HBV SURFACE AG SER QL: NORMAL
HCT VFR BLD AUTO: 30.4 % (ref 36.5–49.3)
HCV AB SER QL: NORMAL
HGB BLD-MCNC: 9.8 G/DL (ref 12–17)
HIV 1+2 AB+HIV1 P24 AG SERPL QL IA: NORMAL
HIV1 P24 AG SER QL: NORMAL
IMM GRANULOCYTES # BLD AUTO: 0.07 THOUSAND/UL (ref 0–0.2)
IMM GRANULOCYTES NFR BLD AUTO: 1 % (ref 0–2)
IRON SATN MFR SERPL: 28 % (ref 20–50)
IRON SERPL-MCNC: 47 UG/DL (ref 65–175)
LYMPHOCYTES # BLD AUTO: 1.51 THOUSANDS/ΜL (ref 0.6–4.47)
LYMPHOCYTES NFR BLD AUTO: 16 % (ref 14–44)
MCH RBC QN AUTO: 30.2 PG (ref 26.8–34.3)
MCHC RBC AUTO-ENTMCNC: 32.2 G/DL (ref 31.4–37.4)
MCV RBC AUTO: 94 FL (ref 82–98)
MONOCYTES # BLD AUTO: 0.86 THOUSAND/ΜL (ref 0.17–1.22)
MONOCYTES NFR BLD AUTO: 9 % (ref 4–12)
NEUTROPHILS # BLD AUTO: 6.5 THOUSANDS/ΜL (ref 1.85–7.62)
NEUTS SEG NFR BLD AUTO: 70 % (ref 43–75)
NRBC BLD AUTO-RTO: 0 /100 WBCS
PLATELET # BLD AUTO: 467 THOUSANDS/UL (ref 149–390)
PMV BLD AUTO: 8.8 FL (ref 8.9–12.7)
POTASSIUM SERPL-SCNC: 4 MMOL/L (ref 3.5–5.3)
RBC # BLD AUTO: 3.25 MILLION/UL (ref 3.88–5.62)
SODIUM SERPL-SCNC: 135 MMOL/L (ref 135–147)
TIBC SERPL-MCNC: 165 UG/DL (ref 250–450)
WBC # BLD AUTO: 9.34 THOUSAND/UL (ref 4.31–10.16)

## 2022-07-25 PROCEDURE — 85025 COMPLETE CBC W/AUTO DIFF WBC: CPT

## 2022-07-25 PROCEDURE — 87340 HEPATITIS B SURFACE AG IA: CPT | Performed by: STUDENT IN AN ORGANIZED HEALTH CARE EDUCATION/TRAINING PROGRAM

## 2022-07-25 PROCEDURE — 83550 IRON BINDING TEST: CPT | Performed by: STUDENT IN AN ORGANIZED HEALTH CARE EDUCATION/TRAINING PROGRAM

## 2022-07-25 PROCEDURE — 83540 ASSAY OF IRON: CPT | Performed by: STUDENT IN AN ORGANIZED HEALTH CARE EDUCATION/TRAINING PROGRAM

## 2022-07-25 PROCEDURE — 86803 HEPATITIS C AB TEST: CPT | Performed by: STUDENT IN AN ORGANIZED HEALTH CARE EDUCATION/TRAINING PROGRAM

## 2022-07-25 PROCEDURE — 99232 SBSQ HOSP IP/OBS MODERATE 35: CPT | Performed by: INTERNAL MEDICINE

## 2022-07-25 PROCEDURE — 99024 POSTOP FOLLOW-UP VISIT: CPT | Performed by: THORACIC SURGERY (CARDIOTHORACIC VASCULAR SURGERY)

## 2022-07-25 PROCEDURE — 80048 BASIC METABOLIC PNL TOTAL CA: CPT

## 2022-07-25 PROCEDURE — 87806 HIV AG W/HIV1&2 ANTB W/OPTIC: CPT | Performed by: STUDENT IN AN ORGANIZED HEALTH CARE EDUCATION/TRAINING PROGRAM

## 2022-07-25 PROCEDURE — 94760 N-INVAS EAR/PLS OXIMETRY 1: CPT

## 2022-07-25 PROCEDURE — 82728 ASSAY OF FERRITIN: CPT | Performed by: STUDENT IN AN ORGANIZED HEALTH CARE EDUCATION/TRAINING PROGRAM

## 2022-07-25 RX ORDER — LOSARTAN POTASSIUM 25 MG/1
25 TABLET ORAL DAILY
Status: DISCONTINUED | OUTPATIENT
Start: 2022-07-25 | End: 2022-07-25

## 2022-07-25 RX ORDER — LOSARTAN POTASSIUM 25 MG/1
25 TABLET ORAL DAILY
Status: DISCONTINUED | OUTPATIENT
Start: 2022-07-25 | End: 2022-07-27

## 2022-07-25 RX ADMIN — SENNOSIDES AND DOCUSATE SODIUM 1 TABLET: 50; 8.6 TABLET ORAL at 08:15

## 2022-07-25 RX ADMIN — MAGNESIUM OXIDE TAB 400 MG (241.3 MG ELEMENTAL MG) 400 MG: 400 (241.3 MG) TAB at 17:41

## 2022-07-25 RX ADMIN — GABAPENTIN 300 MG: 300 CAPSULE ORAL at 22:03

## 2022-07-25 RX ADMIN — ACETAMINOPHEN 650 MG: 325 TABLET, FILM COATED ORAL at 23:01

## 2022-07-25 RX ADMIN — POLYETHYLENE GLYCOL 3350 17 G: 17 POWDER, FOR SOLUTION ORAL at 08:14

## 2022-07-25 RX ADMIN — HEPARIN SODIUM 5000 UNITS: 5000 INJECTION INTRAVENOUS; SUBCUTANEOUS at 06:14

## 2022-07-25 RX ADMIN — HEPARIN SODIUM 5000 UNITS: 5000 INJECTION INTRAVENOUS; SUBCUTANEOUS at 22:03

## 2022-07-25 RX ADMIN — DICLOFENAC SODIUM 2 G: 10 GEL TOPICAL at 22:04

## 2022-07-25 RX ADMIN — METHOCARBAMOL 500 MG: 500 TABLET, FILM COATED ORAL at 23:01

## 2022-07-25 RX ADMIN — METHOCARBAMOL 500 MG: 500 TABLET, FILM COATED ORAL at 17:41

## 2022-07-25 RX ADMIN — THIAMINE HCL TAB 100 MG 100 MG: 100 TAB at 08:16

## 2022-07-25 RX ADMIN — ACETAMINOPHEN 650 MG: 325 TABLET, FILM COATED ORAL at 00:41

## 2022-07-25 RX ADMIN — HEPARIN SODIUM 5000 UNITS: 5000 INJECTION INTRAVENOUS; SUBCUTANEOUS at 13:06

## 2022-07-25 RX ADMIN — NICOTINE 1 PATCH: 21 PATCH, EXTENDED RELEASE TRANSDERMAL at 08:17

## 2022-07-25 RX ADMIN — GABAPENTIN 300 MG: 300 CAPSULE ORAL at 08:15

## 2022-07-25 RX ADMIN — METHOCARBAMOL 500 MG: 500 TABLET, FILM COATED ORAL at 00:41

## 2022-07-25 RX ADMIN — ACETAMINOPHEN 650 MG: 325 TABLET, FILM COATED ORAL at 17:41

## 2022-07-25 RX ADMIN — MAGNESIUM OXIDE TAB 400 MG (241.3 MG ELEMENTAL MG) 400 MG: 400 (241.3 MG) TAB at 08:15

## 2022-07-25 RX ADMIN — LOSARTAN POTASSIUM 25 MG: 25 TABLET, FILM COATED ORAL at 09:39

## 2022-07-25 RX ADMIN — METHOCARBAMOL 500 MG: 500 TABLET, FILM COATED ORAL at 11:51

## 2022-07-25 RX ADMIN — METHOCARBAMOL 500 MG: 500 TABLET, FILM COATED ORAL at 06:14

## 2022-07-25 RX ADMIN — ACETAMINOPHEN 650 MG: 325 TABLET, FILM COATED ORAL at 11:51

## 2022-07-25 RX ADMIN — METOPROLOL SUCCINATE 12.5 MG: 25 TABLET, EXTENDED RELEASE ORAL at 08:15

## 2022-07-25 RX ADMIN — ACETAMINOPHEN 650 MG: 325 TABLET, FILM COATED ORAL at 06:13

## 2022-07-25 RX ADMIN — GABAPENTIN 300 MG: 300 CAPSULE ORAL at 17:41

## 2022-07-25 RX ADMIN — FOLIC ACID 1 MG: 1 TABLET ORAL at 08:15

## 2022-07-25 RX ADMIN — SENNOSIDES AND DOCUSATE SODIUM 1 TABLET: 50; 8.6 TABLET ORAL at 17:41

## 2022-07-25 NOTE — RESPIRATORY THERAPY NOTE
07/25/22 0734   Respiratory Assessment   Assessment Type Assess only   General Appearance Alert; Awake   Respiratory Pattern Normal   Chest Assessment Chest expansion symmetrical   Bilateral Breath Sounds Clear   Resp Comments PRN not indicated  No distress noted  No complaints of SOB  Bilateral BS clear  Achieved 1250 on IS     O2 Device ra   Additional Assessments   Pulse 76   Respirations 16   SpO2 97 %

## 2022-07-25 NOTE — PROGRESS NOTES
Progress Note - Cardiology   Nic Ayala 59 y o  male MRN: [id number]  Unit/Bed#: [facility] 529-01 Encounter: [id number]    Assessment/Plan    Alcoholic/Tachycardia mediated cardiomyopathy    Polysubstance use    Acute pancreatitis    Paroxysmal atrial fibrillation/flutter    Right sided pneumothorax    59year old male with remote history of open heart surgery in [facility] and heavy alcohol/tobacco use presented with acute pancreatitis, complicated by pneumothorax, and noted to have decline in his LV ejection fraction from 55-60% in 2018 to 25-30%  He does not report baseline symptoms of heart failure, and is currently euvolemic off any diuretic therapy  He was recently hypotensive over the weekend to 80/50 and losartan was subsequently held  Will resume Losartan 25mg daily today and monitor  Will continue Metoprolol succinate 12 5 mg QD  Telemetry showing normal sinus rhythm in 70-80 range  Had recent nuclear perfusion study showing no clear evidence of ischemia  Patient has LifeVest and bedside and will wear on discharge, with revaluation of EF to follow after 3 months of GDMT  Counseled to avoid alcohol and tobacco, and will have referral as outpatient to substance program     Can resume oral anticoagulation for atrial fibrillation when stable from surgical standpoint  Subjective/Objective   Chief Complaint: abdominal pain    Subjective: No complaints today, ambulating around without chest pain, shortness of breath, or palpitations       Objective:    Vitals: /66   Pulse 93   Temp 98 2 °F (36 8 °C) (Oral)   Resp 16   Ht 5' 8" (1 727 m)   Wt 59 8 kg (131 lb 12 8 oz)   SpO2 97%   BMI 20 04 kg/m²   Vitals:    07/24/22 0600 07/25/22 0613   Weight: 60 2 kg (132 lb 11 5 oz) 59 8 kg (131 lb 12 8 oz)     Orthostatic Blood Pressures    Flowsheet Row Most Recent Value   Blood Pressure 124/66 filed at 07/25/2022 1059   Patient Position - Orthostatic VS Lying filed at 07/24/2022 4549 Intake/Output Summary (Last 24 hours) at 7/25/2022 1536  Last data filed at 7/25/2022 1322  Gross per 24 hour   Intake 714 ml   Output 1000 ml   Net -286 ml       Invasive Devices  Report    Peripheral Intravenous Line  Duration           Long-Dwell Peripheral IV (Midline) 75/67/24 Right Basilic 15 days          Drain  Duration           Chest Tube 1 Right Pleural 28 Fr  3 days              Physical Exam  Constitutional:       Appearance: Normal appearance  Cardiovascular:      Rate and Rhythm: Normal rate and regular rhythm  Pulses: Normal pulses  Heart sounds: No murmur heard  Pulmonary:      Effort: Pulmonary effort is normal       Breath sounds: No rales  Musculoskeletal:      Right lower leg: No edema  Left lower leg: No edema  Neurological:      Mental Status: He is alert

## 2022-07-25 NOTE — CASE MANAGEMENT
Case Management Discharge Planning Note    Patient name Ana Madrigal  Location [facility] 529/[facility] 529-01 MRN [id number]  : 1965 Date 2022       Current Admission Date: 2022  Current Admission Diagnosis:Pneumothorax   Patient Active Problem List    Diagnosis Date Noted    [facility] 2022    Hepatic steatosis 2022    Alcohol abuse     Pneumothorax 2022    Electrolyte abnormality 2018    [facility]ox[facility]mal [facility] 2017    Tobacco abuse 2017    Cardiomyopathy, 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 06/10/2016      LOS (days): 6  Geometric Mean LOS (GMLOS) (days): 4 40  Days to GMLOS:-1 4     OBJECTIVE:  Risk of Unplanned Readmission Score: 21 62         Current admission status: Inpatient   Preferred Pharmacy:   [id number] [place], [address] [facility] ([address] [place] [address])  [address] [facility] [address] ([address])  [place] [address]  Phone: [phone] Fax: [phone]    Primary Care Provider: Danielle Colon MD    Primary Insurance: [facility]  Secondary Insurance: [address] [place] [employer]    DISCHARGE DETAILS:      Other Referral/Resources/Interventions Provided:  Interventions: Other (Specify)  Referral Comments: Received VMM from Tiffanie Johansen at [address] [place] [address] for the pt  Called [facility] [phone] and spoke with Gloria Lim as Tiffanie Johansen was no longer there today  Linda Sweet update as to DC dispo, pt remains with chest tube and is being rec for STR following hospital stay  Made Gloria Lim aware of this CM contact with Dianelys from [address] [place] [address] program   and would like to refer pt to  [facility] in [place] that is going to reach out to pt regarding services  met with pt and obtained his consent to make STR referral  Completed referral form received from Lachelle Montoya from  [address] [place] [address] and emailed back to  Keven at [email or web address]            Treatment Team Recommendation: Short Term Rehab  Discharge Destination Plan[de-identified] Short Term Rehab  Transport at Discharge : S Ambulance

## 2022-07-25 NOTE — CASE MANAGEMENT
Outpatient HF LCSW present during rounds with HF Team and patient  Spoke with Makenzie Alas at [place] [address] who reported that pt is agreeable to Outpatient Recovery Services  Makenzie Alas asked for anticipated discharge date  Explained that PT recommended pt for post acute rehab prior to returning home  [facility] will need to be updated when pt is closer to discharge so they can coordinate OP Recovery

## 2022-07-25 NOTE — ASSESSMENT & PLAN NOTE
Large right pneumothorax initially on admission as an incidental finding and asymptomatic    Plan:  · Thoracic surgery consulted, appreciate recommendations  · VATS decortication POD 4  · Right chest tube in place postoperatively, draining 30 ccs on topaz unit   · Postoperative CXR showed:  trace amount of postoperative gas trapped in the right minor fissure, otherwise No significant pneumothorax or pleural effusion is visible  · Repeat CXR 7/22 showed: Small volume right-sided pneumothorax  · Repeat CXR 7/24 showed no definite evidence for residual right pneumothorax  · Plan for right chest tube removal 7/26 and repeat CXR   · PTeval, recommendations appreciated   Recommend post acute rehab  · OT eval, recommendations appreciated

## 2022-07-25 NOTE — ASSESSMENT & PLAN NOTE
7/23 with a rise in creatinine at 1 33 from his baseline around 0 6-0 7  Notably patient was NPO the day prior, had increase in his losartan dosing to 25 b i d , and also had soft blood pressures likely in the setting of decreased p o  Intake  FANNIE likely prerenal in etiology secondary to poor p o   Intake the day prior correlating with his episodes of hypotension as well as some component of ARB  · Maintain off losartan for now  · Avoid nephrotoxins  · Avoid relative hypotension  · Trend BMP   · Cr downtrended back to baseline, 0 54 on 7/25

## 2022-07-25 NOTE — PROGRESS NOTES
[address] [facility]  Progress Note - Nic Ayala 1965, 59 y o  male MRN: [id number]  Unit/Bed#: [facility] 529-01 Encounter: [id number]  Primary Care Provider: Millie Cabrera MD   Date and time admitted to hospital: 7/19/2022  8:46 PM        INTERNAL MEDICINE RESIDENCY PROGRESS NOTE     Name: Brandon Fiscal   Age & Sex: 59 y o  male   MRN: [id number]  Unit/Bed#: [facility] 529-01   Encounter: [id number]  Team: SOD Team C     PATIENT INFORMATION     Name: Brandon Fiscal   Age & Sex: 59 y o  male   MRN: [id number]  Hospital Stay Days: 6    ASSESSMENT/PLAN     Principal Problem:    Pneumothorax  Active Problems:    Cardiomyopathy, 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    Paroxysmal atrial fibrillation (HCC)    Tobacco abuse    Electrolyte abnormality    Alcohol abuse    Hepatic steatosis    FANNIE (acute kidney injury) ([facility] [address] )      * Pneumothorax  Assessment & Plan  Large right pneumothorax initially on admission as an incidental finding and asymptomatic    Plan:  · Thoracic surgery consulted, appreciate recommendations  · VATS decortication POD 4  · Right chest tube in place postoperatively, draining 30 ccs on topaz unit   · Postoperative CXR showed:  trace amount of postoperative gas trapped in the right minor fissure, otherwise No significant pneumothorax or pleural effusion is visible  · Repeat CXR 7/22 showed: Small volume right-sided pneumothorax  · Repeat CXR 7/24 showed no definite evidence for residual right pneumothorax  · Plan for right chest tube removal 7/26 and repeat CXR   · PTeval, recommendations appreciated  Recommend post acute rehab  · OT eval, recommendations appreciated    FANNIE (acute kidney injury) [facility])  Assessment & Plan   7/23 with a rise in creatinine at 1 33 from his baseline around 0 6-0 7  Notably patient was NPO the day prior, had increase in his losartan dosing to 25 b i d , and also had soft blood pressures likely in the setting of decreased p o  Intake     FANNIE likely prerenal in etiology secondary to poor p o  Intake the day prior correlating with his episodes of hypotension as well as some component of ARB  · Maintain off losartan for now  · Avoid nephrotoxins  · Avoid relative hypotension  · Trend BMP   · Cr downtrended back to baseline, 0 54 on 7/25    Hepatic steatosis  Assessment & Plan  In the setting of alcohol abuse  Plan   · CT Abd: Liver is diffusely decreased in density consistent with fatty change  No CT evidence of suspicious hepatic mass  Normal hepatic contours  · Encourage alcohol cessation   · need to establish with GI outpatient      Alcohol abuse  Assessment & Plan  Patient reports drinking about 6 drinks per day  Last drink was on day of admission  Initially started on CIWA protocol on admission  Developed alcohol withdrawal symptoms on 07/09-7/10 with tachycardia, confusion, agitation, DTs  Received total of 6 mg of IV Ativan without any improvement  Transferred to step-down unit and received IV phenobarb and also required p r n  Valium doses    Plan  Stable currently   · MVI, thiamine, folic acid supplementation continue  · Evaluation for rehab on discharge - patient is agreeable    · ETOH cessation education provided     Electrolyte abnormality  Assessment & Plan  Noted hypoosmolar hyponatremia  Serum osmolarity 279  Urine osmolarity 742, Urine sodium 176  Hypoosmotic hyponatremia  Differential includes includes SIADH, diuretics, endocrinopathies  TSH within normal limits  Hyponatremia noted to improve with water restriction, component of SIADH is likely  Sodium stable at baseline   Plan:  Currently stable   · Continue Hold Aldactone  · Continue monitoring BMP, volume status  · Na 135, continue to monitor       Tobacco abuse  Assessment & Plan  Currently Stable  ·  Smoking cessation discussed  Nicotine patch      Paroxysmal atrial fibrillation (HCC)  Assessment & Plan  Reported he stopped taking Xarelto about 1 before admission as he ran out of it    Patient appears to be on Cardizem, sotalol, Toprol XL in the past as well  He denies taking any prescription medications except Xarelto which he stopped  History of cardioversion in 2018  CHADS2 Vasc score = 0,  HASBLED = 2, due to significant use of alcohol patient would be at high risk for bleeding    Plan:  · Cont telemetry  · Continue Toprol-XL  · 81 mg of aspirin discontinued, holding Xarelto  · Cardiology consulted, appreciate recs  · S/p [facility] loop recorder in 10/2018  Last available interrogation from 06/2021 with 0% AF burden  Attempted interrogation on 07/20/2022; unable to be completed (battery presumed to be dead) per cardiology    Cardiomyopathy, 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  Assessment & Plan  TONYA in 2018 showed normal EF  Echo on this admission shows EF of 25-30% with grade 1 diastolic dysfunction  Cardiology consulted for medication optimization as patient on many medications that are not ideal    Plan  · Consulted Heart failure team, appreciate recommendations  · Heart failure team discussed LifeVest with patient and pt agreeable  Received life vest with instructions   · Recommend and Plan to repeat limited TTE to reassess LVEF in mid 10/2022  · Holding losartan and Toprol in the setting of hypotension   · Aldactone held for hyponatremia          Disposition: Plan for right chest tube removal tomorrow 7/26     SUBJECTIVE     Patient seen and examined, resting in bed  No acute events overnight  Pt reports chest pain is more manageable and improving since surgery  Denies fevers, chills, nausea or vomiting, abdominal pain, chest pain, SOB, palpitations  Was able to have bowel movement with bowel regimen scheduled  Will have right chest tube removed by surgery tomorrow       OBJECTIVE     Vitals:    07/25/22 0734 07/25/22 1055 07/25/22 1103 07/25/22 1550   BP: 128/73 124/66  121/69   BP Location:       Pulse: 76 93  91   Resp: 16   17   Temp: 98 1 °F (36 7 °C)  98 2 °F (36 8 °C) 98 3 °F (36 8 °C)   TempSrc:   Oral SpO2: 97% 97%  95%   Weight:       Height:          Temperature:   Temp (24hrs), Av 3 °F (36 8 °C), Min:98 1 °F (36 7 °C), Max:98 7 °F (37 1 °C)    Temperature: 98 3 °F (36 8 °C)  Intake & Output:  I/O        0701   0700  0701   07 0701   0700    P  O  600 1360 594    Total Intake(mL/kg) 600 (10) 1360 (22 7) 594 (9 9)    Urine (mL/kg/hr) 2075 (1 4) 1500 (1) 250 (0 5)    Stool   0    Chest Tube 150 260 0    Total Output 2225 1760 250    Net -1625 -400 +344           Unmeasured Urine Occurrence   2 x    Unmeasured Stool Occurrence   1 x        Weights:   IBW (Ideal Body Weight): 68 4 kg    Body mass index is 20 04 kg/m²  Weight (last 2 days)     Date/Time Weight    22 0613 59 8 (131 8)    22 0600 60 2 (132 72)    22 0430 59 (130 07)        Physical Exam  Vitals and nursing note reviewed  Constitutional:       General: He is not in acute distress  Appearance: Normal appearance  HENT:      Head: Normocephalic and atraumatic  Right Ear: External ear normal       Left Ear: External ear normal       Nose: Nose normal       Mouth/Throat:      Mouth: Mucous membranes are moist       Pharynx: Oropharynx is clear  Eyes:      Extraocular Movements: Extraocular movements intact  Conjunctiva/sclera: Conjunctivae normal       Pupils: Pupils are equal, round, and reactive to light  Cardiovascular:      Rate and Rhythm: Normal rate and regular rhythm  Pulses: Normal pulses  Heart sounds: Normal heart sounds  No murmur heard  No friction rub  Pulmonary:      Effort: Pulmonary effort is normal  No respiratory distress  Breath sounds: Normal breath sounds  No wheezing or rales  Comments: right chest tube present and draining adequately   Abdominal:      General: Bowel sounds are normal  There is no distension  Palpations: Abdomen is soft  Tenderness: There is no abdominal tenderness  There is no guarding     Musculoskeletal: General: No swelling  Normal range of motion  Cervical back: Normal range of motion and neck supple  Right lower leg: No edema  Left lower leg: No edema  Skin:     General: Skin is warm and dry  Capillary Refill: Capillary refill takes less than 2 seconds  Neurological:      Mental Status: He is alert and oriented to person, place, and time  Sensory: No sensory deficit  Motor: No weakness  Psychiatric:         Mood and Affect: Mood normal          Behavior: Behavior normal        LABORATORY DATA     Labs: I have personally reviewed pertinent reports  Results from last 7 days   Lab Units 07/25/22  0507 07/23/22  0433 07/22/22  0522   WBC Thousand/uL 9 34 19 35* 19 40*   HEMOGLOBIN g/dL 9 8* 10 5* 12 2   HEMATOCRIT % 30 4* 31 7* 37 7   PLATELETS Thousands/uL 467* 527* 690*   NEUTROS PCT % 70 82* 81*   MONOS PCT % 9 10 14*      Results from last 7 days   Lab Units 07/25/22  0507 07/24/22  0613 07/23/22  1158 07/23/22  0433 07/22/22  0522 07/21/22  0507   POTASSIUM mmol/L 4 0 3 8 4 2 3 6 4 1 4 2   CHLORIDE mmol/L 102 100 99 96 95* 98   CO2 mmol/L 27 26 27 26 25 27   BUN mg/dL 10 13 22 28* 15 15   CREATININE mg/dL 0 54* 0 63 0 88 1 33* 0 70 0 68   CALCIUM mg/dL 9 6 9 5 8 9 9 2 9 4 9 9   ALK PHOS U/L  --   --   --  41* 43* 48   ALT U/L  --   --   --  10* 16 20   AST U/L  --   --   --  11 20 23     Results from last 7 days   Lab Units 07/23/22  0433   MAGNESIUM mg/dL 1 9                        IMAGING & DIAGNOSTIC TESTING     Radiology Results: I have personally reviewed pertinent reports  XR chest portable    Result Date: 7/22/2022  Impression: Small volume right-sided pneumothorax  Right-sided chest tube in place  Workstation performed: [id number]     XR chest portable    Result Date: 7/21/2022  Impression: Expected postoperative changes   There may be a trace amount of postoperative gas trapped in the right minor fissure, otherwise No significant pneumothorax or pleural effusion is visible  No large pleural effusion visible  No acute cardiopulmonary disease  The study was marked in [facility] for immediate notification  Workstation performed: [id number]     XR chest pa & lateral    Result Date: 7/24/2022  Impression: No definite evidence for residual right pneumothorax  Stable patchy opacities in the right mid and lower lung associated with mild diffuse right pleural thickening  Stable right-sided chest tube  Workstation performed: [id number]     CT chest wo contrast    Result Date: 7/20/2022  Impression: Stable size of the right-sided pneumothorax with resolved right pleural effusion  Stable right anterior pigtail catheter noted  Stable hematoma from previous chest tube within the chest wall and tubular extension into the right upper lobe  Evidence of pulmonary arterial hypertension  Generalized increased bone density  Correlate for possible metabolic or hematological disorders  Workstation performed: [id number]     Other Diagnostic Testing: I have personally reviewed pertinent reports      ACTIVE MEDICATIONS     Current Facility-Administered Medications   Medication Dose Route Frequency    acetaminophen (TYLENOL) tablet 650 mg  650 mg Oral Q6H [address]    albuterol inhalation solution 2 5 mg  2 5 mg Nebulization Q4H PRN    benzonatate (TESSALON PERLES) capsule 100 mg  100 mg Oral TID PRN    Diclofenac Sodium (VOLTAREN) 1 % topical gel 2 g  2 g Topical 4x Daily    folic acid (FOLVITE) tablet 1 mg  1 mg Oral Daily    gabapentin (NEURONTIN) capsule 300 mg  300 mg Oral TID    heparin (porcine) subcutaneous injection 5,000 Units  5,000 Units Subcutaneous Q8H [address]    HYDROmorphone (DILAUDID) injection 0 5 mg  0 5 mg Intravenous Q1H PRN    lidocaine (LIDODERM) 5 % patch 1 patch  1 patch Topical Daily    losartan (COZAAR) tablet 25 mg  25 mg Oral Daily    magnesium oxide (MAG-OX) tablet 400 mg  400 mg Oral BID    methocarbamol (ROBAXIN) tablet 500 mg  500 mg Oral Q6H [address]    metoprolol succinate (TOPROL-XL) 24 hr tablet 12 5 mg  12 5 mg Oral Daily    nicotine (NICODERM CQ) 21 mg/24 hr TD 24 hr patch 1 patch  1 patch Transdermal Daily    ondansetron (ZOFRAN) injection 4 mg  4 mg Intravenous Q6H PRN    oxyCODONE (ROXICODONE) IR tablet 5 mg  5 mg Oral Q4H PRN    polyethylene glycol (MIRALAX) packet 17 g  17 g Oral Daily    senna-docusate sodium (SENOKOT S) 8 6-50 mg per tablet 1 tablet  1 tablet Oral BID    thiamine tablet 100 mg  100 mg Oral Daily       VTE Pharmacologic Prophylaxis: Heparin  VTE Mechanical Prophylaxis: sequential compression device    Portions of the record may have been created with voice recognition software  Occasional wrong word or "sound a like" substitutions may have occurred due to the inherent limitations of voice recognition software    Read the chart carefully and recognize, using context, where substitutions have occurred   ==  Sebastian Montesinos MD  [address]  Internal Medicine Residency PGY-1

## 2022-07-25 NOTE — PROGRESS NOTES
Progress Note - Thoracic Surgery   Nic Ayala 59 y o  male MRN: [id number]  Unit/Bed#: [facility] 529-01 Encounter: [id number]    Assessment:  54yoM p/w R multiloculated hydropneumothorax and trapped R lung s/p tube thoracostomy c/b perissitent air leak, now POD5 s/p R VATS decortication, bleb resection, and apical pleurectomy on 7/21    Vitals stable, afebrile  Labs pending    R CT to waterseal, no air leak present, 30 cc serosanguineous output recorded on [place]  [facility] 1425  Stool x1    Plan:  - reg diet  - R CT to water seal, monitor output and character, possible removal today  - SQH  - incentive spirometry  - encourage ambulation  - pain control    Subjective/Objective   Subjective:   Patient reports some episodic coughing and a sensation like he is choking, otherwise denies any pain, is tolerating a diet without nausea or vomiting, denies any shortness of breath or chest pain, having bowel function, voiding without issues, using incentive spirometry  Objective:     Blood pressure 124/81, pulse 82, temperature 98 8 °F (37 1 °C), resp  rate 16, height 5' 8" (1 727 m), weight 59 8 kg (131 lb 12 8 oz), SpO2 96 %  ,Body mass index is 20 04 kg/m²  Intake/Output Summary (Last 24 hours) at 7/26/2022 0540  Last data filed at 7/26/2022 0522  Gross per 24 hour   Intake 1074 ml   Output 1475 ml   Net -401 ml       Invasive Devices  Report    Peripheral Intravenous Line  Duration           Long-Dwell Peripheral IV (Midline) 49/91/61 Right Basilic 15 days          Drain  Duration           Chest Tube 1 Right Pleural 28 Fr  4 days                Physical Exam:   General: NAD  Skin: Warm, dry, anicteric  HEENT: Normocephalic, atraumatic  CV: RRR, no m/r/g  Pulm: CTA b/l, no inc WOB, right chest tube as above  Abd: Soft, ND/NT  MSK: Symmetric, no edema, no tenderness, no deformity  Neuro: AOx3, GCS 15    Lab, Imaging and other studies:I have personally reviewed pertinent lab results    , CBC: No results found for: WBC, HGB, HCT, MCV, PLT, ADJUSTEDWBC, MCH, MCHC, RDW, MPV, NRBC, CMP: No results found for: SODIUM, K, CL, CO2, ANIONGAP, BUN, CREATININE, GLUCOSE, CALCIUM, AST, ALT, ALKPHOS, PROT, BILITOT, EGFR  VTE Pharmacologic Prophylaxis: Sequential compression device (Venodyne)  and Heparin  VTE Mechanical Prophylaxis: sequential compression device

## 2022-07-25 NOTE — CASE MANAGEMENT
Case Management Discharge Planning Note    Patient name Walker Wilkes  Location [facility] 529/[facility] 529-01 MRN [id number]  : 1965 Date 2022       Current Admission Date: 2022  Current Admission Diagnosis:Pneumothorax   Patient Active Problem List    Diagnosis Date Noted    FANNIE (acute kidney injury) ([facility] ) 2022    Hepatic steatosis 2022    Alcohol abuse     Pneumothorax 2022    Electrolyte abnormality 2018    Paroxysmal atrial fibrillation ([facility] ) 2017    Tobacco abuse 2017    Cardiomyopathy, 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 06/10/2016      LOS (days): 6  Geometric Mean LOS (GMLOS) (days): 4 40  Days to GMLOS:-1 4     OBJECTIVE:  Risk of Unplanned Readmission Score: 21 62         Current admission status: Inpatient   Preferred Pharmacy:   [address] [place] [address] [id number] [facility], [facility] ([address] [place] [address])  [address] ([address])  [address] [facility]  Phone: [phone] Fax: [phone]    Primary Care Provider: Tao Houston MD    Primary Insurance: [facility] [employer]  Secondary Insurance: [address] [facility]    DISCHARGE DETAILS:       Other Referral/Resources/Interventions Provided:  Interventions: Short Term Rehab  Referral Comments: pt is agreeable with [facility] and would like a facilit in the [facility] area  pt has Lifevest at bedside as well           Treatment Team Recommendation: Short Term Rehab  Discharge Destination Plan[de-identified] Short Term Rehab  Transport at Discharge : BLS Ambulance

## 2022-07-25 NOTE — ASSESSMENT & PLAN NOTE
Noted hypoosmolar hyponatremia  Serum osmolarity 279  Urine osmolarity 742, Urine sodium 176  Hypoosmotic hyponatremia  Differential includes includes SIADH, diuretics, endocrinopathies  TSH within normal limits  Hyponatremia noted to improve with water restriction, component of SIADH is likely    Sodium stable at baseline   Plan:  Currently stable   · Continue Hold Aldactone  · Continue monitoring BMP, volume status  · Na 135, continue to monitor No Vaccines Administered.

## 2022-07-25 NOTE — QUICK NOTE
49-year-old male with past medical history of alcohol abuse and alcohol-induced pancreatitis, alcoholic hepatitis, cardiomyopathy with ejection fraction of 25%, paroxysmal atrial fibrillation, nicotine dependence was admitted with a right-sided multiloculated hydropneumothorax with trapped lung and on ongoing air leak   He is day 4 status post decortication, bleb resection, apical pleurectomy , chest tube placement which continues to drain without air leak 260 cc of thin serosanguineous fluid  Patient was seen and examined along with medical resident  T-max 99 2°, heart rate 76, blood pressure 128/73, 97% saturated on room air  Other medical issues additionally include acute pancreatitis, congestive heart failure, opioid dependence  FANNIE continues to improve    History of paroxysmal atrial fibrillation, rate controlled will continue will metoprolol  Continue hold anticoagulation and the presence of active bleeding

## 2022-07-25 NOTE — ASSESSMENT & PLAN NOTE
Patient reports drinking about 6 drinks per day  Last drink was on day of admission  Initially started on CIWA protocol on admission  Developed alcohol withdrawal symptoms on 07/09-7/10 with tachycardia, confusion, agitation, DTs  Received total of 6 mg of IV Ativan without any improvement  Transferred to step-down unit and received IV phenobarb and also required p r n   Valium doses    Plan  Stable currently   · MVI, thiamine, folic acid supplementation continue  · Evaluation for rehab on discharge - patient is agreeable    · ETOH cessation education provided

## 2022-07-26 ENCOUNTER — APPOINTMENT (INPATIENT)
Dept: RADIOLOGY | Facility: HOSPITAL | Age: 57
DRG: 163 | End: 2022-07-26
Payer: COMMERCIAL

## 2022-07-26 LAB
ANION GAP SERPL CALCULATED.3IONS-SCNC: 9 MMOL/L (ref 4–13)
BASOPHILS # BLD AUTO: 0.09 THOUSANDS/ΜL (ref 0–0.1)
BASOPHILS NFR BLD AUTO: 1 % (ref 0–1)
BUN SERPL-MCNC: 9 MG/DL (ref 5–25)
CALCIUM SERPL-MCNC: 9.9 MG/DL (ref 8.3–10.1)
CHLORIDE SERPL-SCNC: 100 MMOL/L (ref 96–108)
CO2 SERPL-SCNC: 26 MMOL/L (ref 21–32)
CREAT SERPL-MCNC: 0.64 MG/DL (ref 0.6–1.3)
EOSINOPHIL # BLD AUTO: 0.4 THOUSAND/ΜL (ref 0–0.61)
EOSINOPHIL NFR BLD AUTO: 4 % (ref 0–6)
ERYTHROCYTE [DISTWIDTH] IN BLOOD BY AUTOMATED COUNT: 14 % (ref 11.6–15.1)
GFR SERPL CREATININE-BSD FRML MDRD: 107 ML/MIN/1.73SQ M
GLUCOSE SERPL-MCNC: 118 MG/DL (ref 65–140)
HCT VFR BLD AUTO: 30.5 % (ref 36.5–49.3)
HGB BLD-MCNC: 9.9 G/DL (ref 12–17)
IMM GRANULOCYTES # BLD AUTO: 0.06 THOUSAND/UL (ref 0–0.2)
IMM GRANULOCYTES NFR BLD AUTO: 1 % (ref 0–2)
LYMPHOCYTES # BLD AUTO: 1.51 THOUSANDS/ΜL (ref 0.6–4.47)
LYMPHOCYTES NFR BLD AUTO: 15 % (ref 14–44)
MCH RBC QN AUTO: 29.6 PG (ref 26.8–34.3)
MCHC RBC AUTO-ENTMCNC: 32.5 G/DL (ref 31.4–37.4)
MCV RBC AUTO: 91 FL (ref 82–98)
MONOCYTES # BLD AUTO: 0.83 THOUSAND/ΜL (ref 0.17–1.22)
MONOCYTES NFR BLD AUTO: 9 % (ref 4–12)
NEUTROPHILS # BLD AUTO: 6.9 THOUSANDS/ΜL (ref 1.85–7.62)
NEUTS SEG NFR BLD AUTO: 70 % (ref 43–75)
NRBC BLD AUTO-RTO: 0 /100 WBCS
PLATELET # BLD AUTO: 482 THOUSANDS/UL (ref 149–390)
PMV BLD AUTO: 8.3 FL (ref 8.9–12.7)
POTASSIUM SERPL-SCNC: 3.7 MMOL/L (ref 3.5–5.3)
RBC # BLD AUTO: 3.34 MILLION/UL (ref 3.88–5.62)
SODIUM SERPL-SCNC: 135 MMOL/L (ref 135–147)
WBC # BLD AUTO: 9.79 THOUSAND/UL (ref 4.31–10.16)

## 2022-07-26 PROCEDURE — 99024 POSTOP FOLLOW-UP VISIT: CPT | Performed by: THORACIC SURGERY (CARDIOTHORACIC VASCULAR SURGERY)

## 2022-07-26 PROCEDURE — 99232 SBSQ HOSP IP/OBS MODERATE 35: CPT | Performed by: INTERNAL MEDICINE

## 2022-07-26 PROCEDURE — 80048 BASIC METABOLIC PNL TOTAL CA: CPT

## 2022-07-26 PROCEDURE — 97110 THERAPEUTIC EXERCISES: CPT

## 2022-07-26 PROCEDURE — 97116 GAIT TRAINING THERAPY: CPT

## 2022-07-26 PROCEDURE — 71046 X-RAY EXAM CHEST 2 VIEWS: CPT

## 2022-07-26 PROCEDURE — 85025 COMPLETE CBC W/AUTO DIFF WBC: CPT

## 2022-07-26 RX ORDER — FERROUS SULFATE 325(65) MG
325 TABLET ORAL EVERY OTHER DAY
Status: DISCONTINUED | OUTPATIENT
Start: 2022-07-26 | End: 2022-07-27 | Stop reason: HOSPADM

## 2022-07-26 RX ORDER — METOPROLOL SUCCINATE 25 MG/1
25 TABLET, EXTENDED RELEASE ORAL DAILY
Status: DISCONTINUED | OUTPATIENT
Start: 2022-07-27 | End: 2022-07-27 | Stop reason: HOSPADM

## 2022-07-26 RX ORDER — POTASSIUM CHLORIDE 20 MEQ/1
40 TABLET, EXTENDED RELEASE ORAL ONCE
Status: COMPLETED | OUTPATIENT
Start: 2022-07-26 | End: 2022-07-26

## 2022-07-26 RX ADMIN — ACETAMINOPHEN 650 MG: 325 TABLET, FILM COATED ORAL at 11:34

## 2022-07-26 RX ADMIN — SENNOSIDES AND DOCUSATE SODIUM 1 TABLET: 50; 8.6 TABLET ORAL at 08:27

## 2022-07-26 RX ADMIN — DICLOFENAC SODIUM 2 G: 10 GEL TOPICAL at 08:45

## 2022-07-26 RX ADMIN — DICLOFENAC SODIUM 2 G: 10 GEL TOPICAL at 17:25

## 2022-07-26 RX ADMIN — MAGNESIUM OXIDE TAB 400 MG (241.3 MG ELEMENTAL MG) 400 MG: 400 (241.3 MG) TAB at 08:27

## 2022-07-26 RX ADMIN — METHOCARBAMOL 500 MG: 500 TABLET, FILM COATED ORAL at 06:25

## 2022-07-26 RX ADMIN — POTASSIUM CHLORIDE 40 MEQ: 1500 TABLET, EXTENDED RELEASE ORAL at 08:51

## 2022-07-26 RX ADMIN — HYDROMORPHONE HYDROCHLORIDE 0.5 MG: 1 INJECTION, SOLUTION INTRAMUSCULAR; INTRAVENOUS; SUBCUTANEOUS at 08:22

## 2022-07-26 RX ADMIN — ACETAMINOPHEN 650 MG: 325 TABLET, FILM COATED ORAL at 06:25

## 2022-07-26 RX ADMIN — GABAPENTIN 300 MG: 300 CAPSULE ORAL at 08:27

## 2022-07-26 RX ADMIN — METHOCARBAMOL 500 MG: 500 TABLET, FILM COATED ORAL at 23:23

## 2022-07-26 RX ADMIN — NICOTINE 1 PATCH: 21 PATCH, EXTENDED RELEASE TRANSDERMAL at 08:28

## 2022-07-26 RX ADMIN — GABAPENTIN 300 MG: 300 CAPSULE ORAL at 17:23

## 2022-07-26 RX ADMIN — MAGNESIUM OXIDE TAB 400 MG (241.3 MG ELEMENTAL MG) 400 MG: 400 (241.3 MG) TAB at 17:22

## 2022-07-26 RX ADMIN — ACETAMINOPHEN 650 MG: 325 TABLET, FILM COATED ORAL at 23:23

## 2022-07-26 RX ADMIN — RIVAROXABAN 20 MG: 20 TABLET, FILM COATED ORAL at 13:28

## 2022-07-26 RX ADMIN — DICLOFENAC SODIUM 2 G: 10 GEL TOPICAL at 11:35

## 2022-07-26 RX ADMIN — ACETAMINOPHEN 650 MG: 325 TABLET, FILM COATED ORAL at 17:22

## 2022-07-26 RX ADMIN — METHOCARBAMOL 500 MG: 500 TABLET, FILM COATED ORAL at 17:22

## 2022-07-26 RX ADMIN — METHOCARBAMOL 500 MG: 500 TABLET, FILM COATED ORAL at 11:34

## 2022-07-26 RX ADMIN — THIAMINE HCL TAB 100 MG 100 MG: 100 TAB at 08:27

## 2022-07-26 RX ADMIN — HEPARIN SODIUM 5000 UNITS: 5000 INJECTION INTRAVENOUS; SUBCUTANEOUS at 06:25

## 2022-07-26 RX ADMIN — SENNOSIDES AND DOCUSATE SODIUM 1 TABLET: 50; 8.6 TABLET ORAL at 17:22

## 2022-07-26 RX ADMIN — METOPROLOL SUCCINATE 12.5 MG: 25 TABLET, EXTENDED RELEASE ORAL at 08:27

## 2022-07-26 RX ADMIN — FOLIC ACID 1 MG: 1 TABLET ORAL at 08:27

## 2022-07-26 RX ADMIN — LOSARTAN POTASSIUM 25 MG: 25 TABLET, FILM COATED ORAL at 08:27

## 2022-07-26 RX ADMIN — FERROUS SULFATE TAB 325 MG (65 MG ELEMENTAL FE) 325 MG: 325 (65 FE) TAB at 11:34

## 2022-07-26 RX ADMIN — GABAPENTIN 300 MG: 300 CAPSULE ORAL at 21:02

## 2022-07-26 NOTE — ASSESSMENT & PLAN NOTE
TONYA in 2018 showed normal EF  Echo on this admission shows EF of 25-30% with grade 1 diastolic dysfunction  Cardiology consulted for medication optimization as patient on many medications that are not ideal    Plan  · Consulted Heart failure team, appreciate recommendations  · Heart failure team discussed LifeVest with patient and pt agreed   Received life vest with instructions   · Recommend and Plan to repeat limited TTE to reassess LVEF in mid 10/2022  · Holding losartan and Toprol in the setting of hypotension   · Aldactone held for hyponatremia

## 2022-07-26 NOTE — ASSESSMENT & PLAN NOTE
7/23 with a rise in creatinine at 1 33 from his baseline around 0 6-0 7  Notably patient was NPO the day prior, had increase in his losartan dosing to 25 b i d , and also had soft blood pressures likely in the setting of decreased p o  Intake  FANNIE likely prerenal in etiology secondary to poor p o   Intake the day prior correlating with his episodes of hypotension as well as some component of ARB  · Maintain off losartan for now  · Avoid nephrotoxins  · Avoid relative hypotension  · Trend BMP   · Cr downtrended back to baseline, continue to monitor

## 2022-07-26 NOTE — PLAN OF CARE
Problem: PHYSICAL THERAPY ADULT  Goal: Performs mobility at highest level of function for planned discharge setting  See evaluation for individualized goals  Description: Treatment/Interventions: Functional transfer training, LE strengthening/ROM, Elevations, Therapeutic exercise, Endurance training, Patient/family training, Equipment eval/education, Bed mobility, Gait training, Compensatory technique education, Spoke to nursing, Spoke to case management (balance training)  Equipment Recommended: Diya Alvarez       See flowsheet documentation for full assessment, interventions and recommendations  Outcome: Adequate for Discharge  Note: Prognosis: Good  Problem List: Decreased strength, Decreased endurance, Impaired balance, Decreased mobility, Decreased safety awareness, Pain  Assessment: Pt presents for PT treatment session consisting of gait training and therapeutic exercise  Pt is making good progress toward their goals  Pt demonstrates improvements in LE strength and endurance exhibited by increased ambulation distance and number of stairs completed with no need for hands on assistance and minimal rest breaks required throughout  Pt also demonstrates good dynamic standing balance during forward, backward, and sideways walking  Due to pt functional improvements since the initial evaluation, he will be discharged from PT caseload with recommended discharge to home  The patient's AM-PAC Basic Mobility Inpatient Short Form Raw Score is 19  A Raw score of greater than 16 suggests the patient may benefit from discharge to home  Please also refer to the recommendation of the Physical Therapist for safe discharge planning  Barriers to Discharge: None     PT Discharge Recommendation: No rehabilitation needs    See flowsheet documentation for full assessment

## 2022-07-26 NOTE — QUICK NOTE
07/26/22    Procedure: R Chest tube removal    R Chest tube removed in routine fashion without incident  The patient tolerated the procedure well  A dry, sterile dressing was placed  Will check a pa/lat chest x-ray         Nicol Valadez MD  8:38 AM  07/26/22

## 2022-07-26 NOTE — ASSESSMENT & PLAN NOTE
Patient reports drinking about 6 drinks per day  Last drink was on day of admission  Initially started on CIWA protocol on admission  Developed alcohol withdrawal symptoms on 07/09-7/10 with tachycardia, confusion, agitation, DTs  Received total of 6 mg of IV Ativan without any improvement  Transferred to step-down unit and received IV phenobarb and also required p r n  Valium doses    Plan  Stable currently   · MVI, thiamine, folic acid supplementation continue  · Evaluation for rehab on discharge - patient is agreeable    CM coordinating   · ETOH cessation education provided

## 2022-07-26 NOTE — RESTORATIVE TECHNICIAN NOTE
Restorative Technician Note      Patient Name: Ana Madrigal     Note Type: Mobility  Patient Position Upon Consult: Supine  Activity Performed: Ambulated

## 2022-07-26 NOTE — ASSESSMENT & PLAN NOTE
Noted hypoosmolar hyponatremia  Serum osmolarity 279  Urine osmolarity 742, Urine sodium 176  Hypoosmotic hyponatremia  Differential includes includes SIADH, diuretics, endocrinopathies  TSH within normal limits  Hyponatremia noted to improve with water restriction, component of SIADH is likely    Sodium stable at baseline   Plan:  Currently stable   · Continue Hold Aldactone  · Continue monitoring BMP, volume status  · Na 135  7/26, continue to monitor

## 2022-07-26 NOTE — PROGRESS NOTES
[facility]  Progress Note - Nic Ayala 1965, 59 y o  male MRN: [id number]  Unit/Bed#: [facility] 529-01 Encounter: [id number]  Primary Care Provider: Chela Mcgill MD   Date and time admitted to hospital: 7/19/2022  8:46 PM        INTERNAL MEDICINE RESIDENCY PROGRESS NOTE     Name: Sue Johnson   Age & Sex: 59 y o  male   MRN: [id number]  Unit/Bed#: [facility] 529-01   Encounter: [id number]  Team: SOD Team C     PATIENT INFORMATION     Name: Sue Johnson   Age & Sex: 59 y o  male   MRN: [id number]  Hospital Stay Days: 7    ASSESSMENT/PLAN     Principal Problem:    Pneumothorax  Active Problems:    Cardiomyopathy, 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    Paroxysmal atrial fibrillation (HCC)    Tobacco abuse    Electrolyte abnormality    Alcohol abuse    Hepatic steatosis    FANNIE (acute kidney injury) ([facility] [address] )      * Pneumothorax  Assessment & Plan  Large right pneumothorax initially on admission as an incidental finding and asymptomatic    Plan:  · Thoracic surgery consulted, appreciate recommendations  · VATS decortication POD 5  · Right chest tube removed 7/26  · Postoperative CXR showed:  trace amount of postoperative gas trapped in the right minor fissure, otherwise No significant pneumothorax or pleural effusion is visible  · Repeat CXR 7/22 showed: Small volume right-sided pneumothorax  · Repeat CXR 7/24 showed no definite evidence for residual right pneumothorax  · PTeval, recommendations appreciated  Recommend post acute rehab  · OT eval, recommendations appreciated    FANNIE (acute kidney injury) [facility])  Assessment & Plan  7/23 with a rise in creatinine at 1 33 from his baseline around 0 6-0 7  Notably patient was NPO the day prior, had increase in his losartan dosing to 25 b i d , and also had soft blood pressures likely in the setting of decreased p o  Intake  FANNIE likely prerenal in etiology secondary to poor p o   Intake the day prior correlating with his episodes of hypotension as well as some component of ARB  · Maintain off losartan for now  · Avoid nephrotoxins  · Avoid relative hypotension  · Trend BMP   · Cr downtrended back to baseline, continue to monitor    Hepatic steatosis  Assessment & Plan  In the setting of alcohol abuse  Plan   · CT Abd: Liver is diffusely decreased in density consistent with fatty change  No CT evidence of suspicious hepatic mass  Normal hepatic contours  · Encourage alcohol cessation   · need to establish with GI outpatient , ambulatory referral     Alcohol abuse  Assessment & Plan  Patient reports drinking about 6 drinks per day  Last drink was on day of admission  Initially started on CIWA protocol on admission  Developed alcohol withdrawal symptoms on 07/09-7/10 with tachycardia, confusion, agitation, DTs  Received total of 6 mg of IV Ativan without any improvement  Transferred to step-down unit and received IV phenobarb and also required p r n  Valium doses    Plan  Stable currently   · MVI, thiamine, folic acid supplementation continue  · Evaluation for rehab on discharge - patient is agreeable   CM coordinating   · ETOH cessation education provided     Electrolyte abnormality  Assessment & Plan  Noted hypoosmolar hyponatremia  Serum osmolarity 279  Urine osmolarity 742, Urine sodium 176  Hypoosmotic hyponatremia  Differential includes includes SIADH, diuretics, endocrinopathies  TSH within normal limits  Hyponatremia noted to improve with water restriction, component of SIADH is likely  Sodium stable at baseline   Plan:  Currently stable   · Continue Hold Aldactone  · Continue monitoring BMP, volume status  · Na 135  7/26, continue to monitor       Tobacco abuse  Assessment & Plan  Currently Stable  ·  Smoking cessation discussed  Nicotine patch      Paroxysmal atrial fibrillation (HCC)  Assessment & Plan  Reported he stopped taking Xarelto about 1 before admission as he ran out of it    Patient appears to be on Cardizem, sotalol, Toprol XL in the past as well  He denies taking any prescription medications except Xarelto which he stopped  History of cardioversion in 2018  CHADS2 Vasc score = 0,  HASBLED = 2, due to significant use of alcohol patient would be at high risk for bleeding    Plan:  · On telemetry  · Continue Toprol-XL  · 81 mg of aspirin discontinued, holding Xarelto  · Cardiology consulted, appreciate recs  · S/p [facility] loop recorder in 10/2018  Last available interrogation from 2021 with 0% AF burden  Attempted interrogation on 2022; unable to be completed (battery presumed to be dead) per cardiology    Cardiomyopathy, 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  Assessment & Plan  TONYA in 2018 showed normal EF  Echo on this admission shows EF of 25-30% with grade 1 diastolic dysfunction  Cardiology consulted for medication optimization as patient on many medications that are not ideal    Plan  · Consulted Heart failure team, appreciate recommendations  · Heart failure team discussed LifeVest with patient and pt agreed  Received life vest with instructions   · Recommend and Plan to repeat limited TTE to reassess LVEF in mid 10/2022  · Holding losartan and Toprol in the setting of hypotension   · Aldactone held for hyponatremia          Disposition:  Status post vats decortication day 5    SUBJECTIVE     Patient seen and examined  No acute events overnight  This morning right chest tube was removed and chest x-ray showed no acute changes  Restarted on home Xarelto today  Chest pain is manageable on current pain regimen  Patient denies any fevers, chills, palpitations, SOB, nausea or vomiting, abdominal pain, diarrhea, or constipation       OBJECTIVE     Vitals:    22 2300 22 0600 22 0719 22 0822   BP:   112/62    Pulse:   77 88   Resp: 16      Temp:   98 5 °F (36 9 °C)    TempSrc:   Oral    SpO2:   96%    Weight:  58 8 kg (129 lb 10 1 oz)     Height:          Temperature:   Temp (24hrs), Av 5 °F (36 9 °C), Min:98 2 °F (36 8 °C), Max:98 8 °F (37 1 °C)    Temperature: 98 5 °F (36 9 °C)  Intake & Output:  I/O       07/24 0701 07/25 0700 07/25 0701 07/26 0700 07/26 0701  07/27 0700    P  O  1360 1074 236    Total Intake(mL/kg) 1360 (22 7) 1074 (18 3) 236 (4)    Urine (mL/kg/hr) 1500 (1) 1425 (1)     Stool  0     Chest Tube 260 0     Total Output 1760 1425     Net -400 -351 +236           Unmeasured Urine Occurrence  3 x     Unmeasured Stool Occurrence  1 x         Weights:   IBW (Ideal Body Weight): 68 4 kg    Body mass index is 19 71 kg/m²  Weight (last 2 days)     Date/Time Weight    07/26/22 0600 58 8 (129 63)    07/25/22 0613 59 8 (131 8)    07/24/22 0600 60 2 (132 72)        Physical Exam  Vitals and nursing note reviewed  Constitutional:       General: He is not in acute distress  Appearance: Normal appearance  HENT:      Head: Normocephalic and atraumatic  Right Ear: External ear normal       Left Ear: External ear normal       Nose: Nose normal       Mouth/Throat:      Mouth: Mucous membranes are moist       Pharynx: Oropharynx is clear  Eyes:      Extraocular Movements: Extraocular movements intact  Conjunctiva/sclera: Conjunctivae normal       Pupils: Pupils are equal, round, and reactive to light  Cardiovascular:      Rate and Rhythm: Normal rate and regular rhythm  Pulses: Normal pulses  Heart sounds: Normal heart sounds  No murmur heard  No friction rub  Pulmonary:      Effort: Pulmonary effort is normal  No respiratory distress  Breath sounds: Normal breath sounds  No wheezing or rales  Abdominal:      General: Bowel sounds are normal  There is no distension  Palpations: Abdomen is soft  Tenderness: There is no abdominal tenderness  There is no guarding  Musculoskeletal:         General: No swelling  Normal range of motion  Cervical back: Normal range of motion and neck supple  Right lower leg: No edema  Left lower leg: No edema     Skin:     General: Skin is warm and dry  Capillary Refill: Capillary refill takes less than 2 seconds  Neurological:      Mental Status: He is alert and oriented to person, place, and time  Psychiatric:         Mood and Affect: Mood normal          Behavior: Behavior normal        LABORATORY DATA     Labs: I have personally reviewed pertinent reports  Results from last 7 days   Lab Units 07/26/22  0633 07/25/22  0507 07/23/22  0433   WBC Thousand/uL 9 79 9 34 19 35*   HEMOGLOBIN g/dL 9 9* 9 8* 10 5*   HEMATOCRIT % 30 5* 30 4* 31 7*   PLATELETS Thousands/uL 482* 467* 527*   NEUTROS PCT % 70 70 82*   MONOS PCT % 9 9 10      Results from last 7 days   Lab Units 07/26/22  0633 07/25/22  0507 07/24/22  0613 07/23/22  1158 07/23/22  0433 07/22/22  0522 07/21/22  0507   POTASSIUM mmol/L 3 7 4 0 3 8   < > 3 6 4 1 4 2   CHLORIDE mmol/L 100 102 100   < > 96 95* 98   CO2 mmol/L 26 27 26   < > 26 25 27   BUN mg/dL 9 10 13   < > 28* 15 15   CREATININE mg/dL 0 64 0 54* 0 63   < > 1 33* 0 70 0 68   CALCIUM mg/dL 9 9 9 6 9 5   < > 9 2 9 4 9 9   ALK PHOS U/L  --   --   --   --  41* 43* 48   ALT U/L  --   --   --   --  10* 16 20   AST U/L  --   --   --   --  11 20 23    < > = values in this interval not displayed  Results from last 7 days   Lab Units 07/23/22  0433   MAGNESIUM mg/dL 1 9                        IMAGING & DIAGNOSTIC TESTING     Radiology Results: I have personally reviewed pertinent reports  XR chest portable    Result Date: 7/22/2022  Impression: Small volume right-sided pneumothorax  Right-sided chest tube in place  Workstation performed: [id number]     XR chest portable    Result Date: 7/21/2022  Impression: Expected postoperative changes  There may be a trace amount of postoperative gas trapped in the right minor fissure, otherwise No significant pneumothorax or pleural effusion is visible  No large pleural effusion visible  No acute cardiopulmonary disease  The study was marked in [facility] for immediate notification  Workstation performed: [id number]     XR chest pa & lateral    Result Date: 7/24/2022  Impression: No definite evidence for residual right pneumothorax  Stable patchy opacities in the right mid and lower lung associated with mild diffuse right pleural thickening  Stable right-sided chest tube  Workstation performed: [id number]     CT chest wo contrast    Result Date: 7/20/2022  Impression: Stable size of the right-sided pneumothorax with resolved right pleural effusion  Stable right anterior pigtail catheter noted  Stable hematoma from previous chest tube within the chest wall and tubular extension into the right upper lobe  Evidence of pulmonary arterial hypertension  Generalized increased bone density  Correlate for possible metabolic or hematological disorders  Workstation performed: [id number]     Other Diagnostic Testing: I have personally reviewed pertinent reports      ACTIVE MEDICATIONS     Current Facility-Administered Medications   Medication Dose Route Frequency    acetaminophen (TYLENOL) tablet 650 mg  650 mg Oral Q6H [address]    albuterol inhalation solution 2 5 mg  2 5 mg Nebulization Q4H PRN    benzonatate (TESSALON PERLES) capsule 100 mg  100 mg Oral TID PRN    Diclofenac Sodium (VOLTAREN) 1 % topical gel 2 g  2 g Topical 4x Daily    ferrous sulfate tablet 325 mg  325 mg Oral Every Other Day    folic acid (FOLVITE) tablet 1 mg  1 mg Oral Daily    gabapentin (NEURONTIN) capsule 300 mg  300 mg Oral TID    HYDROmorphone (DILAUDID) injection 0 5 mg  0 5 mg Intravenous Q1H PRN    lidocaine (LIDODERM) 5 % patch 1 patch  1 patch Topical Daily    losartan (COZAAR) tablet 25 mg  25 mg Oral Daily    magnesium oxide (MAG-OX) tablet 400 mg  400 mg Oral BID    methocarbamol (ROBAXIN) tablet 500 mg  500 mg Oral Q6H [address]    [START ON 7/27/2022] metoprolol succinate (TOPROL-XL) 24 hr tablet 25 mg  25 mg Oral Daily    nicotine (NICODERM CQ) 21 mg/24 hr TD 24 hr patch 1 patch  1 patch Transdermal Daily    ondansetron (ZOFRAN) injection 4 mg  4 mg Intravenous Q6H PRN    oxyCODONE (ROXICODONE) IR tablet 5 mg  5 mg Oral Q4H PRN    polyethylene glycol (MIRALAX) packet 17 g  17 g Oral Daily    rivaroxaban (XARELTO) tablet 20 mg  20 mg Oral Daily With Breakfast    senna-docusate sodium (SENOKOT S) 8 6-50 mg per tablet 1 tablet  1 tablet Oral BID    thiamine tablet 100 mg  100 mg Oral Daily        VTE Pharmacologic Prophylaxis: Reason for no pharmacologic prophylaxis Xarelto  VTE Mechanical Prophylaxis: sequential compression device    Portions of the record may have been created with voice recognition software  Occasional wrong word or "sound a like" substitutions may have occurred due to the inherent limitations of voice recognition software    Read the chart carefully and recognize, using context, where substitutions have occurred   ==  Tanna Mcfadden MD  [facility]  Internal Medicine Residency PGY-1

## 2022-07-26 NOTE — ASSESSMENT & PLAN NOTE
Large right pneumothorax initially on admission as an incidental finding and asymptomatic    Plan:  · Thoracic surgery consulted, appreciate recommendations  · VATS decortication POD 5  · Right chest tube removed 7/26  · Postoperative CXR showed:  trace amount of postoperative gas trapped in the right minor fissure, otherwise No significant pneumothorax or pleural effusion is visible  · Repeat CXR 7/22 showed: Small volume right-sided pneumothorax  · Repeat CXR 7/24 showed no definite evidence for residual right pneumothorax  · PTeval, recommendations appreciated   Recommend post acute rehab  · OT eval, recommendations appreciated

## 2022-07-26 NOTE — PROGRESS NOTES
Progress Note - Cardiology   Nic Ayala 59 y o  male MRN: [id number]  Unit/Bed#: [facility] 529-01 Encounter: [id number]    Assessment/Plan    Alcoholic/Tachycardia mediated cardiomyopathy    Polysubstance use    Acute pancreatitis    Paroxysmal atrial fibrillation/flutter    Right sided pneumothorax    59year old male with remote history of open heart surgery in [facility] and heavy alcohol/tobacco use presented with acute pancreatitis, complicated by pneumothorax, and noted to have decline in his LV ejection fraction from 55-60% in 2018 to 25-30%  He does not report baseline symptoms of heart failure, and is currently euvolemic off any diuretic therapy  He was recently hypotensive over the weekend to 80/50 and losartan was subsequently held  However, blood pressures have been improved since  Continue Losartan 25mg QD  Increased Metoprolol Succinate to 25mg QD    Had recent nuclear perfusion study showing no clear evidence of ischemia  No further ischemic evaluation planned  Patient has LifeVest and bedside and will wear on discharge, with revaluation of EF to follow after 3 months of GDMT  Counseled to avoid alcohol and tobacco, and will have referral as outpatient to substance program     Can resume oral anticoagulation for atrial fibrillation when stable from surgical standpoint  Subjective/Objective   Chief Complaint: abdominal pain    Subjective: No complaints today, ambulating around without chest pain, shortness of breath, or palpitations       Objective:    Vitals: /62   Pulse 88   Temp 98 5 °F (36 9 °C) (Oral)   Resp 16   Ht 5' 8" (1 727 m)   Wt 58 8 kg (129 lb 10 1 oz)   SpO2 96%   BMI 19 71 kg/m²   Vitals:    07/25/22 0613 07/26/22 0600   Weight: 59 8 kg (131 lb 12 8 oz) 58 8 kg (129 lb 10 1 oz)     Orthostatic Blood Pressures    Flowsheet Row Most Recent Value   Blood Pressure 112/62 filed at 07/26/2022 8054   Patient Position - Orthostatic VS Lying filed at 07/24/2022 0181 Intake/Output Summary (Last 24 hours) at 7/26/2022 1430  Last data filed at 7/26/2022 1401  Gross per 24 hour   Intake 836 ml   Output 1425 ml   Net -589 ml       Invasive Devices  Report    Peripheral Intravenous Line  Duration           Long-Dwell Peripheral IV (Midline) 99/61/90 Right Basilic 16 days          Drain  Duration           Chest Tube 1 Right Pleural 28 Fr  4 days              Physical Exam  Constitutional:       Appearance: Normal appearance  Cardiovascular:      Rate and Rhythm: Normal rate and regular rhythm  Pulses: Normal pulses  Heart sounds: No murmur heard  Pulmonary:      Effort: Pulmonary effort is normal       Breath sounds: No rales  Musculoskeletal:      Right lower leg: No edema  Left lower leg: No edema  Neurological:      Mental Status: He is alert

## 2022-07-26 NOTE — PHYSICAL THERAPY NOTE
PHYSICAL THERAPY NOTE          Patient Name: Madi Carr  UVWTJ'V Date: 7/26/2022 07/26/22 1454   PT Last Visit   PT Visit Date 07/26/22   Pain Assessment   Pain Assessment Tool 0-10   Pain Score No Pain   Restrictions/Precautions   Weight Bearing Precautions Per Order No   Other Precautions Chair Alarm; Bed Alarm;Limb alert; Fall Risk   General   Family/Caregiver Present No   Cognition   Overall Cognitive Status WFL   Attention Attends with cues to redirect   Orientation Level Oriented X4   Memory Decreased recall of recent events  (Difficulty explaining home setup)   Following Commands Follows one step commands with increased time or repetition   Comments Pt able to follow commands with some need for repetition  Pt with jokes throughout PT session  Subjective   Subjective Pt pleasant and agreeable to participate in therapy  Bed Mobility   Additional Comments Pt seated in bedside chair upon PT consult  Pt left seated in beside chair with chair alarm intact and all needs within reach  Transfers   Sit to Stand 5  Supervision   Additional items Verbal cues   Stand to Sit 5  Supervision   Additional items Verbal cues   Additional Comments Pt transfers w/ RW  Verbal cues for hand placement  Ambulation/Elevation   Gait pattern Forward Flexion; Step through pattern  (Bilateral hip ER)   Gait Assistance 5  Supervision   Additional items Verbal cues   Assistive Device Rolling walker   Distance 360 ft lap around floor, 50 ft x 2 to stairs and back   Stair Management Assistance 5  Supervision   Additional items Verbal cues   Stair Management Technique One rail R;Foreward; Other (Comment)  (Alternating pattern on the way up, step to pattern on the way down)   Number of Stairs 28  (14 up/ 14 down)   Ambulation/Elevation Additional Comments Pt ambulates w/ RW  Verbal cues for hand placement and direction of RW   Pt negotiates stairs w/ use of one railing  Pt also performed 2x10 steps of forward walking along the handrail, 2x10 steps of backward walking along the handrail, and 3x20 side steps along the handrail w/o RW and supervision  Balance   Static Sitting Fair +   Dynamic Sitting Fair +   Static Standing Fair   Dynamic Standing Fair -   Ambulatory Fair -   Endurance Deficit   Endurance Deficit No   Activity Tolerance   Activity Tolerance Patient tolerated treatment well   Nurse Made Aware RN cleared pt to be seen by PT  Assessment   Prognosis Good   Assessment Pt presents for PT treatment session consisting of gait training and therapeutic exercise  Pt is making good progress toward their goals  Pt demonstrates improvements in LE strength and endurance exhibited by increased ambulation distance and number of stairs completed with no need for hands on assistance and minimal rest breaks required throughout  Pt also demonstrates good dynamic standing balance during forward, backward, and sideways walking  Due to pt functional improvements since the initial evaluation, he will be discharged from PT caseload with recommended discharge to home  The patient's AM-PAC Basic Mobility Inpatient Short Form Raw Score is 19  A Raw score of greater than 16 suggests the patient may benefit from discharge to home  Please also refer to the recommendation of the Physical Therapist for safe discharge planning  Barriers to Discharge None   Goals   Patient Goals to move around   Recommendation   PT Discharge Recommendation No rehabilitation needs   Equipment Recommended [facility] Recommended Wheeled walker   Change/add to [place]?  No   AM-PAC Basic Mobility Inpatient   Turning in Bed Without Bedrails 4   Lying on Back to Sitting on Edge of Flat Bed 3   Moving Bed to Chair 3   Standing Up From Chair 3   Walk in Room 3   Climb 3-5 Stairs 3   Basic Mobility Inpatient Raw Score 19   Basic Mobility Standardized Score 42 48   Highest Level Of Mobility   JH-HLM Goal 6: Walk 10 steps or more   JH-HLM Achieved 8: Walk 250 feet ot more   Exercises   Hip Abduction Sitting;10 reps;Bilateral  (against manual resistance)   Hip Adduction Sitting;10 reps;Bilateral  (against manual resistance)   Knee AROM Short Arc Quad Sitting;20 reps;Bilateral  (2x10 against manual resistance)   Knee AROM Long Arc Quad Sitting;20 reps;Bilateral  (2x10 against manual resistance)   Ankle Pumps Sitting;20 reps;Bilateral  (x20 toe raises and heel raises)   Marching Sitting;20 reps;Bilateral  (2x10 against manual resistance)   End of Consult   Patient Position at End of Consult Bedside chair;Bed/Chair alarm activated; All needs within reach     Palma Stan, SPT

## 2022-07-26 NOTE — ASSESSMENT & PLAN NOTE
In the setting of alcohol abuse  Plan   · CT Abd: Liver is diffusely decreased in density consistent with fatty change  No CT evidence of suspicious hepatic mass    Normal hepatic contours  · Encourage alcohol cessation   · need to establish with GI outpatient , ambulatory referral

## 2022-07-27 VITALS
HEIGHT: 68 IN | WEIGHT: 127.87 LBS | OXYGEN SATURATION: 97 % | HEART RATE: 75 BPM | DIASTOLIC BLOOD PRESSURE: 71 MMHG | RESPIRATION RATE: 18 BRPM | BODY MASS INDEX: 19.38 KG/M2 | SYSTOLIC BLOOD PRESSURE: 139 MMHG | TEMPERATURE: 98.1 F

## 2022-07-27 PROBLEM — J93.9 PNEUMOTHORAX: Status: RESOLVED | Noted: 2022-07-08 | Resolved: 2022-07-27

## 2022-07-27 PROBLEM — N17.9 AKI (ACUTE KIDNEY INJURY) (HCC): Status: RESOLVED | Noted: 2022-07-23 | Resolved: 2022-07-27

## 2022-07-27 PROBLEM — E87.8 ELECTROLYTE ABNORMALITY: Status: RESOLVED | Noted: 2018-06-07 | Resolved: 2022-07-27

## 2022-07-27 LAB
ANION GAP SERPL CALCULATED.3IONS-SCNC: 4 MMOL/L (ref 4–13)
BASOPHILS # BLD AUTO: 0.12 THOUSANDS/ΜL (ref 0–0.1)
BASOPHILS NFR BLD AUTO: 1 % (ref 0–1)
BUN SERPL-MCNC: 14 MG/DL (ref 5–25)
CALCIUM SERPL-MCNC: 9.3 MG/DL (ref 8.3–10.1)
CHLORIDE SERPL-SCNC: 103 MMOL/L (ref 96–108)
CO2 SERPL-SCNC: 26 MMOL/L (ref 21–32)
CREAT SERPL-MCNC: 0.58 MG/DL (ref 0.6–1.3)
EOSINOPHIL # BLD AUTO: 0.56 THOUSAND/ΜL (ref 0–0.61)
EOSINOPHIL NFR BLD AUTO: 5 % (ref 0–6)
ERYTHROCYTE [DISTWIDTH] IN BLOOD BY AUTOMATED COUNT: 14.2 % (ref 11.6–15.1)
GFR SERPL CREATININE-BSD FRML MDRD: 111 ML/MIN/1.73SQ M
GLUCOSE SERPL-MCNC: 98 MG/DL (ref 65–140)
HCT VFR BLD AUTO: 30.7 % (ref 36.5–49.3)
HGB BLD-MCNC: 9.7 G/DL (ref 12–17)
IMM GRANULOCYTES # BLD AUTO: 0.18 THOUSAND/UL (ref 0–0.2)
IMM GRANULOCYTES NFR BLD AUTO: 2 % (ref 0–2)
LYMPHOCYTES # BLD AUTO: 1.51 THOUSANDS/ΜL (ref 0.6–4.47)
LYMPHOCYTES NFR BLD AUTO: 13 % (ref 14–44)
MCH RBC QN AUTO: 29.7 PG (ref 26.8–34.3)
MCHC RBC AUTO-ENTMCNC: 31.6 G/DL (ref 31.4–37.4)
MCV RBC AUTO: 94 FL (ref 82–98)
MONOCYTES # BLD AUTO: 1.29 THOUSAND/ΜL (ref 0.17–1.22)
MONOCYTES NFR BLD AUTO: 12 % (ref 4–12)
NEUTROPHILS # BLD AUTO: 7.57 THOUSANDS/ΜL (ref 1.85–7.62)
NEUTS SEG NFR BLD AUTO: 67 % (ref 43–75)
NRBC BLD AUTO-RTO: 0 /100 WBCS
PLATELET # BLD AUTO: 367 THOUSANDS/UL (ref 149–390)
PMV BLD AUTO: 8.4 FL (ref 8.9–12.7)
POTASSIUM SERPL-SCNC: 4.5 MMOL/L (ref 3.5–5.3)
RBC # BLD AUTO: 3.27 MILLION/UL (ref 3.88–5.62)
SODIUM SERPL-SCNC: 133 MMOL/L (ref 135–147)
WBC # BLD AUTO: 11.23 THOUSAND/UL (ref 4.31–10.16)

## 2022-07-27 PROCEDURE — 85025 COMPLETE CBC W/AUTO DIFF WBC: CPT | Performed by: INTERNAL MEDICINE

## 2022-07-27 PROCEDURE — 99232 SBSQ HOSP IP/OBS MODERATE 35: CPT | Performed by: INTERNAL MEDICINE

## 2022-07-27 PROCEDURE — 99238 HOSP IP/OBS DSCHRG MGMT 30/<: CPT | Performed by: INTERNAL MEDICINE

## 2022-07-27 PROCEDURE — 94760 N-INVAS EAR/PLS OXIMETRY 1: CPT

## 2022-07-27 PROCEDURE — 80048 BASIC METABOLIC PNL TOTAL CA: CPT | Performed by: INTERNAL MEDICINE

## 2022-07-27 RX ORDER — METHOCARBAMOL 500 MG/1
500 TABLET, FILM COATED ORAL EVERY 6 HOURS SCHEDULED
Qty: 56 TABLET | Refills: 0 | Status: SHIPPED | OUTPATIENT
Start: 2022-07-27 | End: 2022-08-17 | Stop reason: SDUPTHER

## 2022-07-27 RX ORDER — BENZONATATE 100 MG/1
100 CAPSULE ORAL 3 TIMES DAILY PRN
Qty: 20 CAPSULE | Refills: 0 | Status: CANCELLED | OUTPATIENT
Start: 2022-07-27

## 2022-07-27 RX ORDER — GABAPENTIN 300 MG/1
300 CAPSULE ORAL 3 TIMES DAILY
Qty: 270 CAPSULE | Refills: 0 | Status: SHIPPED | OUTPATIENT
Start: 2022-07-27 | End: 2022-08-17 | Stop reason: SDUPTHER

## 2022-07-27 RX ORDER — METOPROLOL SUCCINATE 25 MG/1
25 TABLET, EXTENDED RELEASE ORAL DAILY
Qty: 90 TABLET | Refills: 0 | Status: SHIPPED | OUTPATIENT
Start: 2022-07-28 | End: 2022-08-17 | Stop reason: SDUPTHER

## 2022-07-27 RX ORDER — LOSARTAN POTASSIUM 50 MG/1
50 TABLET ORAL DAILY
Status: DISCONTINUED | OUTPATIENT
Start: 2022-07-28 | End: 2022-07-27

## 2022-07-27 RX ORDER — NICOTINE 21 MG/24HR
1 PATCH, TRANSDERMAL 24 HOURS TRANSDERMAL DAILY
Qty: 28 PATCH | Refills: 0 | Status: SHIPPED | OUTPATIENT
Start: 2022-07-28 | End: 2022-08-17 | Stop reason: SDUPTHER

## 2022-07-27 RX ORDER — LOSARTAN POTASSIUM 25 MG/1
25 TABLET ORAL DAILY
Status: DISCONTINUED | OUTPATIENT
Start: 2022-07-28 | End: 2022-07-27 | Stop reason: HOSPADM

## 2022-07-27 RX ORDER — LOSARTAN POTASSIUM 25 MG/1
25 TABLET ORAL DAILY
Qty: 90 TABLET | Refills: 0 | Status: SHIPPED | OUTPATIENT
Start: 2022-07-28 | End: 2022-08-17 | Stop reason: SDUPTHER

## 2022-07-27 RX ADMIN — MAGNESIUM OXIDE TAB 400 MG (241.3 MG ELEMENTAL MG) 400 MG: 400 (241.3 MG) TAB at 17:32

## 2022-07-27 RX ADMIN — LOSARTAN POTASSIUM 25 MG: 25 TABLET, FILM COATED ORAL at 08:54

## 2022-07-27 RX ADMIN — METHOCARBAMOL 500 MG: 500 TABLET, FILM COATED ORAL at 17:32

## 2022-07-27 RX ADMIN — FOLIC ACID 1 MG: 1 TABLET ORAL at 08:54

## 2022-07-27 RX ADMIN — NICOTINE 1 PATCH: 21 PATCH, EXTENDED RELEASE TRANSDERMAL at 08:55

## 2022-07-27 RX ADMIN — ACETAMINOPHEN 650 MG: 325 TABLET, FILM COATED ORAL at 11:31

## 2022-07-27 RX ADMIN — METHOCARBAMOL 500 MG: 500 TABLET, FILM COATED ORAL at 11:30

## 2022-07-27 RX ADMIN — GABAPENTIN 300 MG: 300 CAPSULE ORAL at 17:33

## 2022-07-27 RX ADMIN — RIVAROXABAN 20 MG: 20 TABLET, FILM COATED ORAL at 08:54

## 2022-07-27 RX ADMIN — ACETAMINOPHEN 650 MG: 325 TABLET, FILM COATED ORAL at 05:04

## 2022-07-27 RX ADMIN — THIAMINE HCL TAB 100 MG 100 MG: 100 TAB at 08:54

## 2022-07-27 RX ADMIN — MAGNESIUM OXIDE TAB 400 MG (241.3 MG ELEMENTAL MG) 400 MG: 400 (241.3 MG) TAB at 08:54

## 2022-07-27 RX ADMIN — METHOCARBAMOL 500 MG: 500 TABLET, FILM COATED ORAL at 05:04

## 2022-07-27 RX ADMIN — METOPROLOL SUCCINATE 25 MG: 25 TABLET, FILM COATED, EXTENDED RELEASE ORAL at 08:54

## 2022-07-27 RX ADMIN — GABAPENTIN 300 MG: 300 CAPSULE ORAL at 08:54

## 2022-07-27 RX ADMIN — ACETAMINOPHEN 650 MG: 325 TABLET, FILM COATED ORAL at 17:32

## 2022-07-27 NOTE — CASE MANAGEMENT
Case Management Discharge Planning Note    Patient name Chidi Jo  Location [facility] 529/[facility] 529-01 MRN [id number]  : 1965 Date 2022       Current Admission Date: 2022  Current Admission Diagnosis:Tobacco abuse   Patient Active Problem List    Diagnosis Date Noted    Hepatic steatosis 2022    Alcohol abuse     Paroxysmal atrial fibrillation ([address] ) 2017    Tobacco abuse 2017    Cardiomyopathy, 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 06/10/2016      LOS (days): 8  Geometric Mean LOS (GMLOS) (days): 4 40  Days to GMLOS:-3 4     OBJECTIVE:  Risk of Unplanned Readmission Score: 21 78         Current admission status: Inpatient   Preferred Pharmacy:   [address] [place] [address] #[id number] [address] ([address])  [address] [address] [place] [address] ([address])  [place] [address]  Phone: [phone] Fax: [phone]    Primary Care Provider: Grady Alvarez MD    Primary Insurance: [facility] [employer]  Secondary Insurance: [address] [facility] [employer]    DISCHARGE DETAILS:           Other Referral/Resources/Interventions Provided:  Interventions: DME  Referral Comments: RW delivered to pt's room, pt signed receipt, yellow reciept given to the pt, pink copy labeled and sent to medical records and white copy sent to [facility] folder on unit for rep  Pt also confirm [employer] technician called and will be out at 1700 today and his neice Carolyn John will pick him up afterward and transport to home

## 2022-07-27 NOTE — DISCHARGE INSTRUCTIONS
Please schedule follow up with your family medicine physician within 1 week after discharge   Please follow up with thoracic surgery as scheduled (8/8/22)  Please follow up with cardiology as scheduled (8/16/22)  Please schedule follow up with gastroenterology within 1 month   Heart failure diet instructions as outlined at the end of this after visit summary   Tobacco and alcohol cessation as counseled   Discharged on life vest (wearable cardioverter defibrillator) with instructions provided in after visit summary [facility] Hematology and Oncology Progress Note    Patient: Varun Chan  MRN: [id number]  Date of Service: 05/05/2021        Reason for Visit    Chief Complaint   Patient presents with     HE Cancer     Malignant neoplasm of upper lobe of left lung       Assessment and Plan  Cancer Staging  Malignant neoplasm of upper lobe of left lung (H)  Staging form: Lung, AJCC 8th Edition  - Pathologic stage from 4/9/2020: Stage IIIA (pT2a, pN2, cM0) - Signed by Carmelo Patel MD on 4/27/2020  - Clinical: No stage assigned - Unsigned    1. Lung cancer, stage IIIA, Q2oQ2T8: pt had surgery at the [facility] in April 2020 and then started adjuvant chemo with cisplatin and Gemzar. He received 4 cycles, last one in July 2020. On observation since then. CT now does not show any recurrence. Will continue to monitor. Return in 3 months for repeat CT.     2. Lump under chin: has had extensive dental issues. All teeth have been pulled. Had bone chips removed. A lot of pain. Will continue to follow up with dentist and oral surgeon. If no improvement, could do CT.           ECOG Performance   ECOG Performance Status: 0     Distress Assessment  Distress Assessment Score: No distress    Pain  Currently in Pain: Other (Comment)(related to dental issues)      Problem List    1. Malignant neoplasm of upper lobe of left lung (H)  CT Chest Without Contrast    HM1(CBC and Differential)    Comprehensive Metabolic Panel        ______________________________________________________________________________    History of Present Illness    Varun Chan is a very pleasant 68 y.o. with a history of lung cancer.  This lung cancer is located in the left upper lobe.  This measures approximately 4 cm in size.  He presented in December 2019 with pneumonia/bronchitis type of symptoms.  He had a chest x-ray done which showed slight collapse of the left upper lobe.  CT scan confirmed presence of postobstructive type of pneumonia but also very high risk  of malignancy due to the presence of malignant-looking lymphadenopathy.  He was then referred to Dr. Troy Garcia.  Dr. Garcia performed EBUS and biopsy.  During the procedure it was noted that he had a complete obstruction of the bronchus to the left upper lobe.  Initially there was a plan to put a stent in but it was not done.  The biopsy was done and the biopsy is positive for malignancy.  There were also lymph nodes which were sampled.  Pathology was suggestive of squamous cell carcinoma.  PD-L1 expression was 70%.  CD 40+.     He had a PET scan and then was seen by Dr. Thompson at [facility].  He had no evidence of any metastatic disease.  MRI brain was negative.  He underwent surgery in 9 April 2020 in the form of left upper lobectomy and teagan dissection.     Final tumor size was about 4 cm in size T2a tumor with N2 lymph nodes positive.  He also had some empyema there.  He is recovered well from the surgery.     He saw Dr. Patel who recommended adjuvant chemo. He was agreeable to start that. He did pretty well with that. Completed 4 cycles of cisplatin and Gemzar in July 2020. Observation since then.     Here to review CT scan. Feels fine. Has had many dental issues for the last 6 months.       Past Treatment: adjuvant chemo with cisplatin and Gemzar from May 2020 until July 2020.       Review of Systems    Oncology Nurse Assessment/CMA Intake: Constitutional  Constitutional (WDL): Exceptions to WDL  Fatigue: Concerns(related to dental issues)  Neurosensory  Neurosensory (WDL): All neurosensory elements are within defined limits  Eye   Eye Disorder (WDL): Exceptions to WDL(glasses)  Blurred Vision: Concerns  Ear  Ear Disorder (WDL): Exceptions to WDL(related to dental issues)  Tinnitus: Concerns  Cardiovascular  Cardiovascular (WDL): All cardiovascular elements are within defined limits  Pulmonary  Respiratory (WDL): Within Defined Limits  Gastrointestinal  Gastrointestinal (WDL): Exceptions to  "WDL  Anorexia: Concerns(soft foods due to dental issues)  Constipation: Concerns  Genitourinary  Genitourinary (WDL): All genitourinary elements are within defined limits  Lymphatic  Lymph (WDL): Assessment not pertinent to visit  Musculoskeletal and Connective Tissue  Musculoskeletal and Connetive Tissue Disorders (WDL): All Musculoskeletal and Connetive Tissue Disorder elements are within defined limits  Integumentary  Integumentary (WDL): All integumentary elements are within defined limits  Patient Coping  Patient Coping: Accepting;Open/discussion  Accompanied by  Accompanied by: Alone  Oral Chemo Adherence         Past History  Past Medical History:   Diagnosis Date     Carotid stenosis, bilateral      Coronary artery disease      Hyperlipidemia      Hypertension      Lung cancer (H)        PHYSICAL EXAM  BP (!) 171/91   Pulse 78   Ht 5' 10\" (1.778 m)   Wt 181 lb 11.2 oz (82.4 kg)   SpO2 97%   BMI 26.07 kg/m      GENERAL: no acute distress. Cooperative in conversation. Here alone due to visitor restrictions. Mask on  RESP: Regular respiratory rate. No expiratory wheezes   MUSCULOSKELETAL: no bilateral leg swelling  NEURO: non focal. Alert and oriented x3.   PSYCH: within normal limits. No depression or anxiety.  SKIN: exposed skin is dry intact. Hard, non mobile lump under chin. Midline. Some pain with palpation. No adenopathy in cervical chains.       Lab Results    Recent Results (from the past 168 hour(s))   Comprehensive Metabolic Panel   Result Value Ref Range    Sodium 137 136 - 145 mmol/L    Potassium 3.4 (L) 3.5 - 5.0 mmol/L    Chloride 99 98 - 107 mmol/L    CO2 27 22 - 31 mmol/L    Anion Gap, Calculation 11 5 - 18 mmol/L    Glucose 110 70 - 125 mg/dL    BUN 17 8 - 28 mg/dL    Creatinine 1.25 0.70 - 1.30 mg/dL    GFR MDRD Af Amer >60 >60 mL/min/1.73m2    GFR MDRD Non Af Amer 57 (L) >60 mL/min/1.73m2    Bilirubin, Total 0.6 0.0 - 1.0 mg/dL    Calcium 9.2 8.5 - 10.5 mg/dL    Protein, Total 7.3 6.0 - " 8.0 g/dL    Albumin 3.5 3.5 - 5.0 g/dL    Alkaline Phosphatase 95 45 - 120 U/L    AST 16 0 - 40 U/L    ALT 16 0 - 45 U/L       Imaging    Ct Chest Without Contrast    Result Date: 5/4/2021  EXAM: CT CHEST WO CONTRAST LOCATION: [facility] DATE/TIME: 5/4/2021 8:20 AM INDICATION: Malignant neoplasm of upper lobe of the left lung. COMPARISON: 2/2/2021 TECHNIQUE: CT chest without IV contrast. Multiplanar reformats were obtained. Dose reduction techniques were used. CONTRAST: None. FINDINGS: LUNGS AND PLEURA: Left upper lobectomy. 2 mm right upper lobe granuloma is unchanged. Moderate emphysema. Mild linear scarring or atelectasis in the right lung base. MEDIASTINUM/AXILLAE: No lymphadenopathy. No thoracic aortic aneurysm. CORONARY ARTERY CALCIFICATION: Severe. UPPER ABDOMEN: No significant finding. MUSCULOSKELETAL: Unremarkable.     1.  No evidence of recurrent or metastatic disease in the chest. No change from previous.         Signed by: Janeth Lynch, CNP

## 2022-07-27 NOTE — ASSESSMENT & PLAN NOTE
Reported he stopped taking Xarelto about 1 before admission as he ran out of it  Patient appears to be on Cardizem, sotalol, Toprol XL in the past as well  He denies taking any prescription medications except Xarelto which he stopped  History of cardioversion in 2018  CHADS2 Vasc score = 0,  HASBLED = 2, due to significant use of alcohol patient would be at high risk for bleeding    Plan:  · Cardiology consulted, appreciate recs  · S/p St  [place] loop recorder in 10/2018  Last available interrogation from 06/2021 with 0% AF burden  Attempted interrogation on 07/20/2022; unable to be completed (battery presumed to be dead) per cardiology  · Medically stable for discharge  Can continue home xarelto and metoprolol    Outpatient cardiology follow-up after discharge

## 2022-07-27 NOTE — CASE MANAGEMENT
Case Management Discharge Planning Note    Patient name Chidi Jo  Location [facility] 529/[facility] 529-01 MRN [id number]  : 1965 Date 2022       Current Admission Date: 2022  Current Admission Diagnosis:Pneumothorax   Patient Active Problem List    Diagnosis Date Noted    FANNIE (acute kidney injury) ([facility] ) 2022    Hepatic steatosis 2022    Alcohol abuse     Pneumothorax 2022    Electrolyte abnormality 2018    Paroxysmal atrial fibrillation ([facility] ) 2017    Tobacco abuse 2017    Cardiomyopathy, 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 06/10/2016      LOS (days): 8  Geometric Mean LOS (GMLOS) (days): 4 40  Days to GMLOS:-3 2     OBJECTIVE:  Risk of Unplanned Readmission Score: 21 73         Current admission status: Inpatient   Preferred Pharmacy:   [facility] #[id number], [facility] ([address] [place] [address])  [address] [facility] [address] ([address])  [place] [address]  Phone: [phone] Fax: [phone]    Primary Care Provider: Grady Alvarez MD    Primary Insurance: [facility]  Secondary Insurance: [address] [place] [address] [facility]    DISCHARGE DETAILS:                                [address]         Is the patient interested in [facility] at discharge?: Yes  [address] requested[de-identified] [address] Name[de-identified] [address] Provider[de-identified] PCP  [address] Needed[de-identified] Heart Failure Management, Other (comment) (pt will dischareg on a Lifevest and will need cardiac education/medication education)  Homebound Criteria Met[de-identified] Uses an Assist Device (i e  cane, walker, etc)  Supporting Clincal Findings[de-identified] Limited Endurance    DME Referral Provided  Referral made for DME?: Yes  DME referral completed for the following items[de-identified] Elvi Rosen  DME Supplier Name[de-identified] [employer]    Other Referral/Resources/Interventions Provided:  Interventions: HHA  Referral Comments: Pt accepted by [address]  for SN services   RW delivered to pt's room by this CM, pt sigbned delivery ticket and copy sent to [facility] health and medical records and pt received yellow receipt  Pt made aware of VNA acceptance  Would you like to participate in our [address] service program?  : No - Declined    Treatment Team Recommendation: Home with [address] ([address] care)  Discharge Destination Plan[de-identified] [address] care)  Transport at Discharge : Family (Sister Oliver Lares)                             IMM Given (Date):: 07/27/22  IMM Given to[de-identified] Patient  Family notified[de-identified] Explained the IMM with pt, all questions answered, copy of IMM given to the pt and original sent to Medical records  Pt agreeble with discharge to home today  Additional Comments: Staff nurse expressed concern that pt is unsure of how to use the [address] and he will put in a work order to [employer] for a retrain  Call received from [address] [place] [address]  depart "Q" [phone] and they will call to set up a retrain for today  Received call from [address] that someone will be out today before 2 pm to retrain the pt  Met with pt and sister Oliver Lares at bedside and made aware, both verbalzied understanding and are agreeable

## 2022-07-27 NOTE — CASE MANAGEMENT
Case Management Discharge Planning Note    Patient name Chidi Jo  Location [facility] 529/[facility] 529-01 MRN [id number]  : 1965 Date 2022       Current Admission Date: 2022  Current Admission Diagnosis:Pneumothorax   Patient Active Problem List    Diagnosis Date Noted    [facility] 2022    Hepatic steatosis 2022    Alcohol abuse     Pneumothorax 2022    Electrolyte abnormality 2018    [facility]oxysmal [facility] fibrillation [facility] ) 2017    Tobacco abuse 2017    Cardiomyopathy, 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 06/10/2016      LOS (days): 8  Geometric Mean LOS (GMLOS) (days): 4 40  Days to GMLOS:-3 2     OBJECTIVE:  Risk of Unplanned Readmission Score: 21 73         Current admission status: Inpatient   Preferred Pharmacy:   [address] [place] [address] [address][id number] [facility], [address] [place] [address] ([address] [place] [address])  [address] [facility] [address] ([address])  [place] [address]  Phone: [phone] Fax: [phone]    Primary Care Provider: Grady Alvarez MD    Primary Insurance: [facility]  Secondary Insurance: [address] [facility]    DISCHARGE DETAILS:         Other Referral/Resources/Interventions Provided:  Interventions: HHA  Referral Comments: PT rec for pt has changed to home no PT needs  Pt is dc with a lifevest and is agreeable to VNA for skilled nursing for teaching and education and pt does not have a preference as to an agency  [place] referrals to VNA placed on AIDIN for skilled nursing  pt requested his sister sarah be called [facility] home  Called sister and made aware pt is being dc to home today and she will be up this morning to pick him up  Also made her aware of requesting VNA services as outpt ETOH rehab for the pt to follo up with [facility] in [facility] verbalzied understanding           Treatment Team Recommendation: Acute Rehab ( [facility])  Discharge Destination Plan[de-identified] Acute Rehab  Transport at Discharge : Family                             IMM Given (Date):: 07/27/22  IMM Given to[de-identified] Patient  Family notified[de-identified] Explained the IMM with pt, all questions answered, copy of IMM given to the pt and original sent to Medical records  Pt agreeble with discharge to home today  Additional Comments: Staff nurse expressed concern that pt is unsure of how to use the [address] and he will put in a work order to [employer] for a retrain  Call received from [address] [place] [address] Dr suarez "Q" [phone] and they will call to set up a retrain for today  Received call from [address] that someone will be out today before 2 pm to retrain the pt  Met with pt and sister Gill Adorno at bedside and made aware, both verbalzied understanding and are agreeable

## 2022-07-27 NOTE — CASE MANAGEMENT
Received message from [facility] to ascertain pt's discharge date and destination  Spoke with Monique Nunez at [facility] and informed her that pt will dc home today with [facility] and Sadie Wiley going to f/u with pt about arranging OP Recovery Appt

## 2022-07-27 NOTE — ASSESSMENT & PLAN NOTE
In the setting of alcohol abuse  Plan   · CT Abd: Liver is diffusely decreased in density consistent with fatty change  No CT evidence of suspicious hepatic mass    Normal hepatic contours  · Encourage alcohol cessation   · Follow-up with PCP within 1 week discharge, outpatient GI for further evaluation

## 2022-07-27 NOTE — ASSESSMENT & PLAN NOTE
Large right pneumothorax initially on admission as an incidental finding and asymptomatic    Plan:  · Thoracic surgery consulted, appreciate recommendations  · VATS decortication POD 6  · Right chest tube removed 7/26, CXR stable showing surgical changes of right hemithorax  · Postoperative CXR showed:  trace amount of postoperative gas trapped in the right minor fissure, otherwise No significant pneumothorax or pleural effusion is visible  · Repeat CXR 7/22 showed: Small volume right-sided pneumothorax  · Repeat CXR 7/24 showed no definite evidence for residual right pneumothorax  · PTeval, recommendations appreciated   Recommend post acute rehab  · OT eval, recommendations appreciated

## 2022-07-27 NOTE — CASE MANAGEMENT
Case Management Discharge Planning Note    Patient name Mao Dumas  Location [facility] 529/[facility] 529-01 MRN [id number]  : 1965 Date 2022       Current Admission Date: 2022  Current Admission Diagnosis:Pneumothorax   Patient Active Problem List    Diagnosis Date Noted    [facility]    Hepatic steatosis 2022    Alcohol abuse     Pneumothorax 2022    Electrolyte abnormality 2018    [facility]ox[facility] ) 2017    Tobacco abuse 2017    Cardiomyopathy, 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 06/10/2016      LOS (days): 8  Geometric Mean LOS (GMLOS) (days): 4 40  Days to GMLOS:-3 2     OBJECTIVE:  Risk of Unplanned Readmission Score: 21 73         Current admission status: Inpatient   Preferred Pharmacy:   [address] [place] [address] #[id number] Yuli Joanna, [facility] ([address] [place] [address])  [address] ([address])  [place] [address]  Phone: [phone] Fax: [phone]    Primary Care Provider: Kelechi Balderas MD    Primary Insurance: [facility]  Secondary Insurance: [address] [facility]    DISCHARGE DETAILS:           Other Referral/Resources/Interventions Provided:  Interventions: HHA  Referral Comments: PT rec for pt has changed to home no PT needs  Pt is dc with a lifevest and is agreeable to VNA for skilled nursing for teaching and education and pt does not have a preference as to an agency  [place] referrals to VNA placed on AIDIN for skilled nursing  pt requested his sister sarah be called [facility] home  Called sister and made aware pt is being dc to home today and she will be up this morning to pick him up  Also made her aware of requesting VNA services as outpt ETOH rehab for the pt to follo up with [facility] in [facility] verbalzied understanding           Treatment Team Recommendation: Acute Rehab ( [facility])  Discharge Destination Plan[de-identified] Acute Rehab  Transport at Discharge : Family IMM Given (Date):: 07/27/22  IMM Given to[de-identified] Patient  Family notified[de-identified] Explained the IMM with pt, all questions answered, copy of IMM given to the pt and original sent to Medical records  Pt agreeble with discharge to home today

## 2022-07-27 NOTE — ASSESSMENT & PLAN NOTE
Patient reports drinking about 6 drinks per day  Last drink was on day of admission  Initially started on CIWA protocol on admission  Developed alcohol withdrawal symptoms on 07/09-7/10 with tachycardia, confusion, agitation, DTs  Received total of 6 mg of IV Ativan without any improvement  Transferred to step-down unit and received IV phenobarb and also required p r n  Valium doses    Plan  Stable for discharge  · MVI, thiamine, folic acid supplementation continue  · Evaluation for rehab on discharge - patient is agreeable  Will have ETOH rehab after discharge     · ETOH cessation education provided

## 2022-07-27 NOTE — ASSESSMENT & PLAN NOTE
Large right pneumothorax initially on admission as an incidental finding and asymptomatic    Plan:  · Thoracic surgery consulted, appreciate recommendations  · VATS decortication POD 5  · Right chest tube removed 7/26, CXR stable   · Postoperative CXR showed:  trace amount of postoperative gas trapped in the right minor fissure, otherwise No significant pneumothorax or pleural effusion is visible  · Repeat CXR 7/22 showed: Small volume right-sided pneumothorax  · Repeat CXR 7/24 showed no definite evidence for residual right pneumothorax  · PTeval, recommendations appreciated   Recommend post acute rehab  · OT eval, recommendations appreciated

## 2022-07-27 NOTE — CERTIFIED RECOVERY SPECIALIST
Certified  Note    Patient name: Meghan Hogan  Location: [facility] 529/[facility] 529-01  Rame[place]: [address] [place] [address]  Attending:  Rudolph Marin MD MRN [id number]  : 1965  Age: 59 y o  Sex: male Date 2022         Substance Use History:     Social History     Substance and Sexual Activity   Alcohol Use Yes    Comment: History of binge drinking for last 15+ years  Currently drinking "sometimes every day, sometimes just on the weekends " Drinking ~2 fifths of gin on days he chooses to "drink all day " (Updated 2022)  Social History     Substance and Sexual Activity   Drug Use Not Currently       Admission Information  Substances Used at This Admission[de-identified] Alcohol  Readmission in Last 30 Days?: Yes  Encounter Type[de-identified] Patient Face-to-Face    Recovery Support Plan  Declined All Services?: No  Medication Assisted Treatment[de-identified] No  Agreeable to Warm Handoff?: Yes  Is Patient Accepting [facility] Treatment Services?: Yes  Was Referral Made to [facility]?: No  Was Narcan Provided at Discharge?: No  Plan Discussed With Treatment Team[de-identified] Yes  Plan Discussed With[de-identified]     Referral to Recovery Supports:  Paramjit Juarez[de-identified] No  Community Based CRS[de-identified] No  Case Management[de-identified] No  Direct Access to [facility] Treatment?: Yes  Resource Guide Given?: Yes  Follow Up With Patient[de-identified] No  Referral for Community Physical Health[de-identified] No  Referral for Community Mental Health[de-identified] No'    CRS met with patient prior to discharge  CRS provided patient with information pertaining to [facility] and explained programming available including recovery support  CRS reviewed discharge  Patient engaged CRS in conversation regarding alcohol use and staying busy  He is motivated to remain sober  CRS provided contact information if he had any questions or concerns as well as additional support        Alla Silverman

## 2022-07-27 NOTE — ASSESSMENT & PLAN NOTE
Patient reports drinking about 6 drinks per day  Last drink was on day of admission  Complete Alcohol cessation discussed with patient  Initially started on CIWA protocol on admission  Developed alcohol withdrawal symptoms on 07/09-7/10 with tachycardia, confusion, agitation, DTs  Received total of 6 mg of IV Ativan without any improvement  Transferred to step-down unit and received IV phenobarb and also required p r n  Valium doses  Was also on Precedex drip which has been discontinued  Patient states he would be interested in inpatient alcohol rehab if that is what it takes to quit drinking      Plan:  · Continue to encourage alcohol cessation, patient will have ETOH rehab outpatient on discharge  · MVI, thiamine, folic acid

## 2022-07-27 NOTE — PLAN OF CARE
Problem: Prexisting or High Potential for Compromised Skin Integrity  Goal: Skin integrity is maintained or improved  Description: INTERVENTIONS:  - Identify patients at risk for skin breakdown  - Assess and monitor skin integrity  - Assess and monitor nutrition and hydration status  - Monitor labs   - Assess for incontinence   - Turn and reposition patient  - Assist with mobility/ambulation  - Relieve pressure over bony prominences  - Avoid friction and shearing  - Provide appropriate hygiene as needed including keeping skin clean and dry  - Evaluate need for skin moisturizer/barrier cream  - Collaborate with interdisciplinary team   - Patient/family teaching  - Consider wound care consult   Outcome: Progressing     Problem: MOBILITY - ADULT  Goal: Maintain or return to baseline ADL function  Description: INTERVENTIONS:  -  Assess patient's ability to carry out ADLs; assess patient's baseline for ADL function and identify physical deficits which impact ability to perform ADLs (bathing, care of mouth/teeth, toileting, grooming, dressing, etc )  - Assess/evaluate cause of self-care deficits   - Assess range of motion  - Assess patient's mobility; develop plan if impaired  - Assess patient's need for assistive devices and provide as appropriate  - Encourage maximum independence but intervene and supervise when necessary  - Involve family in performance of ADLs  - Assess for home care needs following discharge   - Consider OT consult to assist with ADL evaluation and planning for discharge  - Provide patient education as appropriate  Outcome: Progressing  Goal: Maintains/Returns to pre admission functional level  Description: INTERVENTIONS:  - Perform BMAT or MOVE assessment daily    - Set and communicate daily mobility goal to care team and patient/family/caregiver  - Collaborate with rehabilitation services on mobility goals if consulted  - Perform Range of Motion  times a day    - Reposition patient every hours   - Dangle patient  times a day  - Stand patient  times a day  - Ambulate patient  times a day  - Out of bed to chair  times a day   - Out of bed for meals  times a day  - Out of bed for toileting  - Record patient progress and toleration of activity level   Outcome: Progressing     Problem: Potential for Falls  Goal: Patient will remain free of falls  Description: INTERVENTIONS:  - Educate patient/family on patient safety including physical limitations  - Instruct patient to call for assistance with activity   - Consult OT/PT to assist with strengthening/mobility   - Keep Call bell within reach  - Keep bed low and locked with side rails adjusted as appropriate  - Keep care items and personal belongings within reach  - Initiate and maintain comfort rounds  - Make Fall Risk Sign visible to staff  - Offer Toileting every  Hours, in advance of need  - Initiate/Maintain alarm  - Obtain necessary fall risk management equipment  - Apply yellow socks and bracelet for high fall risk patients  - Consider moving patient to room near nurses station  Outcome: Progressing     Problem: CARDIOVASCULAR - ADULT  Goal: Maintains optimal cardiac output and hemodynamic stability  Description: INTERVENTIONS:  - Monitor I/O, vital signs and rhythm  - Monitor for S/S and trends of decreased cardiac output  - Administer and titrate ordered vasoactive medications to optimize hemodynamic stability  - Assess quality of pulses, skin color and temperature  - Assess for signs of decreased coronary artery perfusion  - Instruct patient to report change in severity of symptoms  Outcome: Progressing  Goal: Absence of cardiac dysrhythmias or at baseline rhythm  Description: INTERVENTIONS:  - Continuous cardiac monitoring, vital signs, obtain 12 lead EKG if ordered  - Administer antiarrhythmic and heart rate control medications as ordered  - Monitor electrolytes and administer replacement therapy as ordered  Outcome: Progressing     Problem: GASTROINTESTINAL - ADULT  Goal: Minimal or absence of nausea and/or vomiting  Description: INTERVENTIONS:  - Administer IV fluids if ordered to ensure adequate hydration  - Maintain NPO status until nausea and vomiting are resolved  - Nasogastric tube if ordered  - Administer ordered antiemetic medications as needed  - Provide nonpharmacologic comfort measures as appropriate  - Advance diet as tolerated, if ordered  - Consider nutrition services referral to assist patient with adequate nutrition and appropriate food choices  Outcome: Progressing  Goal: Maintains or returns to baseline bowel function  Description: INTERVENTIONS:  - Assess bowel function  - Encourage oral fluids to ensure adequate hydration  - Administer IV fluids if ordered to ensure adequate hydration  - Administer ordered medications as needed  - Encourage mobilization and activity  - Consider nutritional services referral to assist patient with adequate nutrition and appropriate food choices  Outcome: Progressing  Goal: Maintains adequate nutritional intake  Description: INTERVENTIONS:  - Monitor percentage of each meal consumed  - Identify factors contributing to decreased intake, treat as appropriate  - Assist with meals as needed  - Monitor I&O, weight, and lab values if indicated  - Obtain nutrition services referral as needed  Outcome: Progressing  Goal: Establish and maintain optimal ostomy function  Description: INTERVENTIONS:  - Assess bowel function  - Encourage oral fluids to ensure adequate hydration  - Administer IV fluids if ordered to ensure adequate hydration   - Administer ordered medications as needed  - Encourage mobilization and activity  - Nutrition services referral to assist patient with appropriate food choices  - Assess stoma site  - Consider wound care consult   Outcome: Progressing  Goal: Oral mucous membranes remain intact  Description: INTERVENTIONS  - Assess oral mucosa and hygiene practices  - Implement preventative oral hygiene regimen  - Implement oral medicated treatments as ordered  - Initiate Nutrition services referral as needed  Outcome: Progressing

## 2022-07-27 NOTE — ASSESSMENT & PLAN NOTE
TONYA in 2018 showed normal EF  Echo on this admission shows EF of 25-30% with grade 1 diastolic dysfunction  Cardiology consulted for medication optimization as patient on many medications that are not ideal    Plan  · Consulted Heart failure team, appreciate recommendations  · Heart failure team discussed LifeVest with patient and pt agreed  Received life vest with instructions  · Recommend and Plan to repeat limited TTE to reassess LVEF in mid 10/2022  · Holding losartan and Toprol in the setting of hypotension   · Aldactone held for hyponatremia    Medically stable for discharge  Patient already scheduled for PCP, nephrologist and cardiologist follow-up after discharge as instructed  Patient should follow-up with thoracic surgery for postoperative eval as recommended CT surgery  Patient received his LifeVest with instructions on this admission  Patient should start taking these medications: gabapentin 300 mg TID, losartan 25 mg QD, Robaxin 500 mg Q6H for 14 days, and nicotine patch  Patient also should have repeat blood work done after within 1-2 weeks of discharge

## 2022-07-27 NOTE — PROGRESS NOTES
Progress Note - Cardiology   Nic Ayala 59 y o  male MRN: [id number]  Unit/Bed#: [facility] 529-01 Encounter: [id number]    Assessment/Plan    Alcoholic/Tachycardia mediated cardiomyopathy    Polysubstance use    Acute pancreatitis    Paroxysmal atrial fibrillation/flutter    Right sided pneumothorax    Anemia    59year old male with remote history of open heart surgery in [facility] and heavy alcohol/tobacco use presented with acute pancreatitis, complicated by pneumothorax, and noted to have decline in his LV ejection fraction from 55-60% in 2018 to 25-30%  He does not report baseline symptoms of heart failure, and is currently euvolemic off any diuretic therapy  He was recently hypotensive over the weekend to 80/50 and losartan was subsequently held  However, blood pressures have been improved since, and he is mildly hypertensive today  Recommend increasing to Losartan 50mg QD  Increased Metoprolol Succinate to 25mg QD  Resumed Rivaroxaban 20mg QD for stroke prevention  Had recent nuclear perfusion study showing no clear evidence of ischemia  No further ischemic evaluation planned  Patient has LifeVest and bedside and will wear on discharge, with revaluation of EF to follow after 3 months of GDMT  Counseled to avoid alcohol and tobacco, and will have referral as outpatient to substance program     Stable for discharge from cardiac standpoint, with heart failure follow-up visit scheduled  Subjective/Objective   Chief Complaint: abdominal pain    Subjective: No complaints today, ambulating around without chest pain, shortness of breath, or palpitations       Objective:    Vitals: /71   Pulse 75   Temp 98 1 °F (36 7 °C)   Resp 18   Ht 5' 8" (1 727 m)   Wt 58 kg (127 lb 13 9 oz)   SpO2 97%   BMI 19 44 kg/m²   Vitals:    07/26/22 0600 07/27/22 0534   Weight: 58 8 kg (129 lb 10 1 oz) 58 kg (127 lb 13 9 oz)     Orthostatic Blood Pressures    Flowsheet Row Most Recent Value   Blood Pressure 139/71 filed at 07/27/2022 8696   Patient Position - Orthostatic VS Lying filed at 07/26/2022 2159            Intake/Output Summary (Last 24 hours) at 7/27/2022 1325  Last data filed at 7/27/2022 1245  Gross per 24 hour   Intake 960 ml   Output 1350 ml   Net -390 ml       Invasive Devices  Report    Peripheral Intravenous Line  Duration           Long-Dwell Peripheral IV (Midline) 64/36/20 Right Basilic 17 days          Drain  Duration           Chest Tube 1 Right Pleural 28 Fr  5 days              Physical Exam  Constitutional:       Appearance: Normal appearance  Cardiovascular:      Rate and Rhythm: Normal rate and regular rhythm  Pulses: Normal pulses  Heart sounds: No murmur heard  Pulmonary:      Effort: Pulmonary effort is normal       Breath sounds: No rales  Musculoskeletal:      Right lower leg: No edema  Left lower leg: No edema  Neurological:      Mental Status: He is alert

## 2022-07-27 NOTE — DISCHARGE SUMMARY
INTERNAL MEDICINE RESIDENCY DISCHARGE SUMMARY     Nic Ayala   59 y o  male  MRN: [id number]  Room/Bed: [facility] 52/[facility] 529-01     [facility] 5 MED SURG/SD   Encounter: [id number]    Active Problems:    Cardiomyopathy, 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    Paroxysmal atrial fibrillation ([address] )    Tobacco abuse    Alcohol abuse    Hepatic steatosis      * Pneumothorax-resolved as of 7/27/2022  Assessment & Plan  Large right pneumothorax initially on admission as an incidental finding and asymptomatic    Plan:  Thoracic surgery consulted, appreciate recommendations  VATS decortication POD 6  Right chest tube removed 7/26, CXR stable showing surgical changes of right hemithorax  Postoperative CXR showed:  trace amount of postoperative gas trapped in the right minor fissure, otherwise No significant pneumothorax or pleural effusion is visible  Repeat CXR 7/22 showed: Small volume right-sided pneumothorax  Repeat CXR 7/24 showed no definite evidence for residual right pneumothorax  PTeval, recommendations appreciated  Recommend post acute rehab  OT eval, recommendations appreciated    Hepatic steatosis  Assessment & Plan  In the setting of alcohol abuse  Plan   CT Abd: Liver is diffusely decreased in density consistent with fatty change  No CT evidence of suspicious hepatic mass  Normal hepatic contours  Encourage alcohol cessation   Follow-up with PCP within 1 week discharge, outpatient GI for further evaluation    Alcohol abuse  Assessment & Plan  Patient reports drinking about 6 drinks per day  Last drink was on day of admission  Initially started on CIWA protocol on admission  Developed alcohol withdrawal symptoms on 07/09-7/10 with tachycardia, confusion, agitation, DTs  Received total of 6 mg of IV Ativan without any improvement  Transferred to step-down unit and received IV phenobarb and also required p r n   Valium doses    Plan  Stable for discharge  MVI, thiamine, folic acid supplementation continue  Evaluation for rehab on discharge - patient is agreeable  Will have ETOH rehab after discharge  ETOH cessation education provided     Tobacco abuse  Assessment & Plan  Currently Stable   Smoking cessation discussed  Nicotine patch provided on discharge      Paroxysmal atrial fibrillation [facility])  Assessment & Plan  Reported he stopped taking Xarelto about 1 before admission as he ran out of it  Patient appears to be on Cardizem, sotalol, Toprol XL in the past as well  He denies taking any prescription medications except Xarelto which he stopped  History of cardioversion in 2018  CHADS2 Vasc score = 0,  HASBLED = 2, due to significant use of alcohol patient would be at high risk for bleeding    Plan:  Cardiology consulted, appreciate recs  S/p [facility] loop recorder in 10/2018  Last available interrogation from 06/2021 with 0% AF burden  Attempted interrogation on 07/20/2022; unable to be completed (battery presumed to be dead) per cardiology  Medically stable for discharge  Can continue home xarelto and metoprolol  Outpatient cardiology follow-up after discharge    Cardiomyopathy, 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  Assessment & Plan  TONYA in 2018 showed normal EF  Echo on this admission shows EF of 25-30% with grade 1 diastolic dysfunction  Cardiology consulted for medication optimization as patient on many medications that are not ideal    Plan  Consulted Heart failure team, appreciate recommendations  Heart failure team discussed LifeVest with patient and pt agreed  Received life vest with instructions  Recommend and Plan to repeat limited TTE to reassess LVEF in mid 10/2022  Holding losartan and Toprol in the setting of hypotension   Aldactone held for hyponatremia    Medically stable for discharge  Patient already scheduled for PCP, nephrologist and cardiologist follow-up after discharge as instructed    Patient should follow-up with thoracic surgery for postoperative eval as recommended CT surgery  Patient received his LifeVest with instructions on this admission  Patient should start taking these medications: gabapentin 300 mg TID, losartan 25 mg QD, Robaxin 500 mg Q6H for 14 days, and nicotine patch  Patient also should have repeat blood work done after within 1-2 weeks of discharge  FANNIE (acute kidney injury) (HCC)-resolved as of 7/27/2022  Assessment & Plan  7/23 with a rise in creatinine at 1 33 from his baseline around 0 6-0 7  Notably patient was NPO the day prior, had increase in his losartan dosing to 25 b i d , and also had soft blood pressures likely in the setting of decreased p o  Intake  FANNIE likely prerenal in etiology secondary to poor p o  Intake the day prior correlating with his episodes of hypotension as well as some component of ARB  Maintain off losartan for now  Avoid nephrotoxins  Avoid relative hypotension  Trend BMP   Cr downtrended back to baseline, continue to monitor    Electrolyte abnormality-resolved as of 7/27/2022  Assessment & Plan  Noted hypoosmolar hyponatremia  Serum osmolarity 279  Urine osmolarity 742, Urine sodium 176  Hypoosmotic hyponatremia  Differential includes includes SIADH, diuretics, endocrinopathies  TSH within normal limits  Hyponatremia noted to improve with water restriction, component of SIADH is likely  Sodium stable at baseline   Plan:  Currently stable   Continue Hold Aldactone  Continue monitoring BMP, volume status  Na 135  7/26, continue to monitor       Alcohol withdrawal (HCC)-resolved as of 7/18/2022  Assessment & Plan  Patient reports drinking about 6 drinks per day  Last drink was on day of admission  Complete Alcohol cessation discussed with patient  Initially started on CIWA protocol on admission  Developed alcohol withdrawal symptoms on 07/09-7/10 with tachycardia, confusion, agitation, DTs  Received total of 6 mg of IV Ativan without any improvement   Transferred to step-down unit and received IV phenobarb and also required p r n  Valium doses  Was also on Precedex drip which has been discontinued  Patient states he would be interested in inpatient alcohol rehab if that is what it takes to quit drinking  Plan:  Continue to encourage alcohol cessation, patient will have ETOH rehab outpatient on discharge  MVI, thiamine, folic acid        [address] COURSE     53-year-old male with past medical history ETOH abuse, nonischemic cardiomyopathy with an EF of 25%, paroxysmal AFib on Xarelto  He was originally admitted to [address] [place] [address] for alcohol-induced pancreatitis which has since resolved  On imaging there was an incidental large right pneumothorax and CT showed persistent bronchopleural fistula and hemopneumothorax  IR placed a right apical chest tube  He was then transferred to [facility] on 07/19/2022 for CT surgery evaluation  Hemoglobin been stable between 9-11  He underwent a VATS with decortication plan resection, apical pleurectomy on 07/21  Right chest tube was placed and draining appropriately  At that time, Xarelto was placed on hold on Lovenox was continued for DVT prophylaxis  Chest tube was then removed on 07/26 and repeat CXR showed surgical changes in right hemithorax  Home xarelto was restarted on 7/26  Heart failure team was also following the patient throughout hospital course  He was continued on metoprolol and xarelto for afib  During hospital course, after VATS, patient had FANNIE with creatinine at 1 33 elevated from his baseline of around 0 6 2 7  This has since resolved  On discharge, he was prescribed losartan 25 mg QD  Due to his low EF, he was given a LifeVest with instructions on use prior to discharge  During this hospital admission, CT abdomen showed diffusely decreased density consistent with fatty change of liver  No CT evidence of suspicious hepatic mass  Normal hepatic contours  This may be related to EtOH use      This morning, patient offers no new complaints  This is POD 6 from VATS decoritcation  Chest pain has significantly improved since procedure  Patient denies any fevers, chills, CP, SOB, palpitations, nausea vomiting, abdominal pain, diarrhea constipation  Patient is medically stable for discharge  Patient was advised to follow up with PCP, nephrologist, and cardiologist as instructed after discharge  Patient should follow-up with thoracic surgery for postoperative evaluation as recommended  Repeat chest x-ray advised prior to follow-up with thoracic surgery outpatient  Patient was started on gabapentin 300 mg TID, losartan 25 daily, Robaxin 500 mg q 6h for 14 days, and nicotine patch  Patient should also have repeat blood work done within 1-2 weeks of discharge  Patient was also counseled on EtOH cessation and was recommended for rehab, which patient is agreeable to  ETOH rehab was coordinated for patient to be done outpatient after discharge  Vitals:    07/27/22 0804   BP:    Pulse:    Resp:    Temp:    SpO2: 97%           Physical Exam  Vitals and nursing note reviewed  Constitutional:       General: He is not in acute distress  Appearance: Normal appearance  HENT:      Head: Normocephalic and atraumatic  Right Ear: External ear normal       Left Ear: External ear normal       Nose: Nose normal       Mouth/Throat:      Mouth: Mucous membranes are moist       Pharynx: Oropharynx is clear  Eyes:      Extraocular Movements: Extraocular movements intact  Conjunctiva/sclera: Conjunctivae normal       Pupils: Pupils are equal, round, and reactive to light  Cardiovascular:      Rate and Rhythm: Normal rate and regular rhythm  Pulses: Normal pulses  Heart sounds: Normal heart sounds  No murmur heard  No friction rub  Pulmonary:      Effort: Pulmonary effort is normal  No respiratory distress  Breath sounds: Normal breath sounds  No wheezing or rales     Abdominal: General: Bowel sounds are normal  There is no distension  Palpations: Abdomen is soft  Tenderness: There is no abdominal tenderness  There is no guarding  Musculoskeletal:         General: No swelling  Normal range of motion  Cervical back: Normal range of motion and neck supple  Right lower leg: No edema  Left lower leg: No edema  Skin:     General: Skin is warm and dry  Capillary Refill: Capillary refill takes less than 2 seconds  Neurological:      Mental Status: He is alert and oriented to person, place, and time  Psychiatric:         Mood and Affect: Mood normal          Behavior: Behavior normal            DISCHARGE INFORMATION     PCP at Discharge: Leonila Bolden MD    Admitting Provider: Kesha Givens DO  Admission Date: 7/19/2022    Discharge Provider: Delmar Caro MD  Discharge Date: 7/27/2022    [facility]  Discharge Condition: stable  Discharge with Lines: no    Discharge Diet: regular diet with fluid restriction  Activity Restrictions: as tolerated  Test Results Pending at Discharge: none    Discharge Diagnoses:  Principal Problem (Resolved):    Pneumothorax  Active Problems:    Cardiomyopathy, 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    Paroxysmal atrial fibrillation (HCC)    Tobacco abuse    Alcohol abuse    Hepatic steatosis  Resolved Problems:    Alcohol withdrawal (HCC)    Electrolyte abnormality    FANNIE (acute kidney injury) ([facility] )      Consulting Providers:      Diagnostic & Therapeutic Procedures Performed:  XR chest portable    Result Date: 7/22/2022  Impression: Small volume right-sided pneumothorax  Right-sided chest tube in place  Workstation performed: [id number]     XR chest portable    Result Date: 7/21/2022  Impression: Expected postoperative changes  There may be a trace amount of postoperative gas trapped in the right minor fissure, otherwise No significant pneumothorax or pleural effusion is visible   No large pleural effusion visible  No acute cardiopulmonary disease  The study was marked in [facility] for immediate notification  Workstation performed: [id number]     XR chest pa & lateral    Result Date: 7/24/2022  Impression: No definite evidence for residual right pneumothorax  Stable patchy opacities in the right mid and lower lung associated with mild diffuse right pleural thickening  Stable right-sided chest tube  Workstation performed: [id number]     CT chest wo contrast    Result Date: 7/20/2022  Impression: Stable size of the right-sided pneumothorax with resolved right pleural effusion  Stable right anterior pigtail catheter noted  Stable hematoma from previous chest tube within the chest wall and tubular extension into the right upper lobe  Evidence of pulmonary arterial hypertension  Generalized increased bone density  Correlate for possible metabolic or hematological disorders   Workstation performed: [id number]       Code Status: Level 1 - Full Code  Advance Directive & Living Will: <no information>  Power of :    POLST:      Medications:  Current Discharge Medication List        Current Discharge Medication List      START taking these medications    Details   gabapentin (NEURONTIN) 300 mg capsule Take 1 capsule (300 mg total) by mouth 3 (three) times a day  Qty: 270 capsule, Refills: 0    Associated Diagnoses: Primary spontaneous pneumothorax      losartan (COZAAR) 25 mg tablet Take 1 tablet (25 mg total) by mouth daily  Qty: 90 tablet, Refills: 0    Associated Diagnoses: Paroxysmal atrial fibrillation (HCC)      methocarbamol (ROBAXIN) 500 mg tablet Take 1 tablet (500 mg total) by mouth every 6 (six) hours for 14 days  Qty: 56 tablet, Refills: 0    Associated Diagnoses: Primary spontaneous pneumothorax; Pneumothorax, unspecified type      nicotine (NICODERM CQ) 21 mg/24 hr TD 24 hr patch Place 1 patch on the skin daily  Qty: 28 patch, Refills: 0    Associated Diagnoses: Tobacco abuse           Current Discharge Medication List      CONTINUE these medications which have NOT CHANGED    Details   Aspirin Buf,CaCarb-MgCarb-MgO, 81 MG TABS Take by mouth      folic acid (FOLVITE) 1 mg tablet Take 1 tablet (1 mg total) by mouth daily  Refills: 0    Associated Diagnoses: Alcohol intoxication (HCC)      ipratropium-albuterol (DuoNeb) 0 5-2 5 mg/3 mL nebulizer solution Inhale      ketotifen (ZADITOR) 0 025 % ophthalmic solution Administer 1 drop to both eyes 2 (two) times a day  Qty: 5 mL, Refills: 0    Associated Diagnoses: Eye drainage      levalbuterol (XOPENEX) 1 25 mg/0 5 mL nebulizer solution Take 0 5 mL (1 25 mg total) by nebulization every 6 (six) hours as needed for wheezing or shortness of breath  Refills: 0    Associated Diagnoses: Wheezing      lidocaine (LIDODERM) 5 % Apply 1 patch topically daily To the back    Remove & Discard patch within 12 hours or as directed by MD  Refills: 0    Associated Diagnoses: Back pain      magnesium oxide (MAG-OX) 400 mg Take 1 tablet (400 mg total) by mouth 2 (two) times a day  Refills: 0    Associated Diagnoses: Hypomagnesemia      Multiple Vitamin (multivitamin) capsule Take 1 capsule by mouth daily  Refills: 0    Associated Diagnoses: Alcohol intoxication (HCC)      neomycin-bacitracin-polymyxin b (NEOSPORIN) ointment Apply topically 2 (two) times a day To right elbow tear  Qty: 15 g, Refills: 0    Associated Diagnoses: Skin tear of elbow without complication      rivaroxaban (Xarelto) 20 mg tablet Take by mouth      thiamine 100 MG tablet Take 1 tablet (100 mg total) by mouth daily  Refills: 0    Associated Diagnoses: Alcohol intoxication (HCC)             Allergies:  No Known Allergies    FOLLOW-UP     PCP Outpatient Follow-up:  yes      Follow up with Dr Thomas Lebron within 1 week of discharge    Consulting Providers Follow-up:  yes      Thoracic surgery as instructed   Nephrologist referral   HF/Cardiology as instructed     Active Issues Requiring Follow-up:   yes     Issue: Postoperative VATS/Decortication CXR prior to Thoracic Surgery outpatient follow up       Discharge Statement:   I spent 45 minutes minutes discharging the patient  This time was spent on the day of discharge  I had direct contact with the patient on the day of discharge  Additional documentation is required if more than 30 minutes were spent on discharge  Portions of the record may have been created with voice recognition software  Occasional wrong word or "sound a like" substitutions may have occurred due to the inherent limitations of voice recognition software    Read the chart carefully and recognize, using context, where substitutions have occurred     ==  Tanna [address], [address]  Internal Medicine Resident PGY-1

## 2022-07-27 NOTE — CASE MANAGEMENT
Case Management Discharge Planning Note    Patient name Meghan Hogan  Location [facility] 529/[facility] 529-01 MRN [id number]  : 1965 Date 2022       Current Admission Date: 2022  Current Admission Diagnosis:Tobacco abuse   Patient Active Problem List    Diagnosis Date Noted    Hepatic steatosis 2022    Alcohol abuse     Paroxysmal atrial fibrillation ([address] ) 2017    Tobacco abuse 2017    Cardiomyopathy, 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 06/10/2016      LOS (days): 8  Geometric Mean LOS (GMLOS) (days): 4 40  Days to GMLOS:-3 4     OBJECTIVE:  Risk of Unplanned Readmission Score: 20 59         Current admission status: Inpatient   Preferred Pharmacy:   [address] [place] [address] [id number] Tilman Hamman, [address] ([address] [place] [address])  [address] [address] [place] [address] ([address])  [place] [address]  Phone: [phone] Fax: [phone]    Primary Care Provider: Brien Kennedy MD    Primary Insurance: [facility]  Secondary Insurance: [address] [facility]    DISCHARGE DETAILS:          Other Referral/Resources/Interventions Provided:  Interventions: HHA  Referral Comments: Faxed AVS/KORIN to [facility] at [phone]

## 2022-07-28 ENCOUNTER — TELEPHONE (OUTPATIENT)
Dept: NEPHROLOGY | Facility: CLINIC | Age: 57
End: 2022-07-28

## 2022-07-28 NOTE — TELEPHONE ENCOUNTER
New Patient Intake Form   Patient Details   Nic Ayala     1965     [id number]     Appointment Information   Who is calling to schedule? If not patient, what is callers name? Patient    Referring Provider  Bobby Blackburn   Referring Provider Number [phone]   Reason for Appt (Diagnosis) FANNIE   Is patient aware of why they are being referred? yes   Does Patient have labs done at [place] [address]? If not, where do they go? yes    Has patient had labs / urine work done? List date of most recent lab / urine work yes   7/26/2022   Has patient had a BMP & CBC done in the past 2 years? If so, list the date yes   7/26/2022   Has patient been hospitalized recently? If yes, list name and location of hospital they were in yes   7/19/2022   Has patient been seen by a Nephrologist before? If yes, list name, location and phone number  no   Has patient been see by another Specialty before (ex  Neurology, urology, cardiology)? If yes, please list name, and specialty  N/A   Has the patient had imaging done? If so, list the most recent date and type of imaging yes   XR chest pa & lateral  7/26/2022   Does the patient has a stone analysis report if history of kidney stones? no   Appointment Details   Is there a referral on file?  yes    Appointment Date  10/14/2022   Location [facility]

## 2022-07-29 ENCOUNTER — TRANSITIONAL CARE MANAGEMENT (OUTPATIENT)
Dept: FAMILY MEDICINE CLINIC | Facility: CLINIC | Age: 57
End: 2022-07-29

## 2022-07-29 ENCOUNTER — TELEPHONE (OUTPATIENT)
Dept: FAMILY MEDICINE CLINIC | Facility: CLINIC | Age: 57
End: 2022-07-29

## 2022-07-29 NOTE — TELEPHONE ENCOUNTER
apointment made for TCM on 8/2  Patient has never been seen here before but intends to establish care

## 2022-07-29 NOTE — TELEPHONE ENCOUNTER
Caroline Kauffman, DO Esperanza Mack; Nahomy Morin, CM  Hello! Pt has not been seen in clinic over 3 years  Last office visit in 2017  Please check for attribution  Juanita Woods, is this a pt Dr José Luis Dumas sees for home visits or anything? I don't see any notes from him  Thank you!

## 2022-08-01 ENCOUNTER — TELEPHONE (OUTPATIENT)
Dept: FAMILY MEDICINE CLINIC | Facility: CLINIC | Age: 57
End: 2022-08-01

## 2022-08-02 ENCOUNTER — TELEPHONE (OUTPATIENT)
Dept: FAMILY MEDICINE CLINIC | Facility: CLINIC | Age: 57
End: 2022-08-02

## 2022-08-05 ENCOUNTER — TELEPHONE (OUTPATIENT)
Dept: FAMILY MEDICINE CLINIC | Facility: CLINIC | Age: 57
End: 2022-08-05

## 2022-08-05 NOTE — TELEPHONE ENCOUNTER
Completed form has been faxed to the number listed below; and placed in "TO BE SCANNED BIN"      [phone]

## 2022-08-05 NOTE — TELEPHONE ENCOUNTER
Completed form has been faxed to the number listed below; and placed in "TO BE SCANNED [place] [address]"      [phone]

## 2022-08-09 ENCOUNTER — TELEPHONE (OUTPATIENT)
Dept: FAMILY MEDICINE CLINIC | Facility: CLINIC | Age: 57
End: 2022-08-09

## 2022-08-09 NOTE — TELEPHONE ENCOUNTER
Form removed from PCPs folder and given back to white team  Patient has not been seen in office since July 2016  Please have patient make an appointment to re-establish care  Paperwork will be signed during the visit, not with scheduling   Thanks

## 2022-08-10 ENCOUNTER — HOSPITAL ENCOUNTER (INPATIENT)
Facility: HOSPITAL | Age: 57
LOS: 5 days | Discharge: HOME HEALTH | DRG: 315 | End: 2022-08-15
Attending: EMERGENCY MEDICINE | Admitting: FAMILY MEDICINE
Payer: COMMERCIAL

## 2022-08-10 ENCOUNTER — TELEPHONE (OUTPATIENT)
Dept: CARDIOLOGY CLINIC | Facility: CLINIC | Age: 57
End: 2022-08-10

## 2022-08-10 ENCOUNTER — APPOINTMENT (EMERGENCY)
Dept: RADIOLOGY | Facility: HOSPITAL | Age: 57
DRG: 315 | End: 2022-08-10
Payer: COMMERCIAL

## 2022-08-10 DIAGNOSIS — I72.9 ANEURYSM (HCC): ICD-10-CM

## 2022-08-10 DIAGNOSIS — I48.0 PAROXYSMAL ATRIAL FIBRILLATION (HCC): ICD-10-CM

## 2022-08-10 DIAGNOSIS — R55 SYNCOPE: ICD-10-CM

## 2022-08-10 DIAGNOSIS — F10.10 ALCOHOL ABUSE: ICD-10-CM

## 2022-08-10 DIAGNOSIS — Z45.02 AICD DISCHARGE: ICD-10-CM

## 2022-08-10 DIAGNOSIS — I48.92 ATRIAL FLUTTER (HCC): Primary | ICD-10-CM

## 2022-08-10 DIAGNOSIS — E87.8 ELECTROLYTE IMBALANCE: ICD-10-CM

## 2022-08-10 DIAGNOSIS — I42.9 CARDIOMYOPATHY, SECONDARY (HCC): ICD-10-CM

## 2022-08-10 DIAGNOSIS — I42.9 CARDIOMYOPATHY, SECONDARY (HCC): Primary | ICD-10-CM

## 2022-08-10 DIAGNOSIS — F10.20 ALCOHOL DEPENDENCE (HCC): ICD-10-CM

## 2022-08-10 PROBLEM — J44.9 COPD (CHRONIC OBSTRUCTIVE PULMONARY DISEASE) (HCC): Status: ACTIVE | Noted: 2022-08-10

## 2022-08-10 LAB
2HR DELTA HS TROPONIN: 0 NG/L
4HR DELTA HS TROPONIN: 0 NG/L
ALBUMIN SERPL BCP-MCNC: 3.5 G/DL (ref 3.5–5)
ALP SERPL-CCNC: 65 U/L (ref 46–116)
ALT SERPL W P-5'-P-CCNC: 14 U/L (ref 12–78)
ANION GAP SERPL CALCULATED.3IONS-SCNC: 12 MMOL/L (ref 4–13)
AST SERPL W P-5'-P-CCNC: 23 U/L (ref 5–45)
BASOPHILS # BLD AUTO: 0.05 THOUSANDS/ΜL (ref 0–0.1)
BASOPHILS NFR BLD AUTO: 1 % (ref 0–1)
BILIRUB SERPL-MCNC: 0.31 MG/DL (ref 0.2–1)
BUN SERPL-MCNC: 9 MG/DL (ref 5–25)
CALCIUM SERPL-MCNC: 8.4 MG/DL (ref 8.3–10.1)
CARDIAC TROPONIN I PNL SERPL HS: 4 NG/L
CHLORIDE SERPL-SCNC: 101 MMOL/L (ref 96–108)
CO2 SERPL-SCNC: 26 MMOL/L (ref 21–32)
CREAT SERPL-MCNC: 0.67 MG/DL (ref 0.6–1.3)
EOSINOPHIL # BLD AUTO: 0.2 THOUSAND/ΜL (ref 0–0.61)
EOSINOPHIL NFR BLD AUTO: 3 % (ref 0–6)
ERYTHROCYTE [DISTWIDTH] IN BLOOD BY AUTOMATED COUNT: 15.1 % (ref 11.6–15.1)
ETHANOL SERPL-MCNC: 318 MG/DL (ref 0–3)
GFR SERPL CREATININE-BSD FRML MDRD: 105 ML/MIN/1.73SQ M
GLUCOSE SERPL-MCNC: 74 MG/DL (ref 65–140)
HCT VFR BLD AUTO: 33.8 % (ref 36.5–49.3)
HGB BLD-MCNC: 10.9 G/DL (ref 12–17)
IMM GRANULOCYTES # BLD AUTO: 0.01 THOUSAND/UL (ref 0–0.2)
IMM GRANULOCYTES NFR BLD AUTO: 0 % (ref 0–2)
LYMPHOCYTES # BLD AUTO: 2.21 THOUSANDS/ΜL (ref 0.6–4.47)
LYMPHOCYTES NFR BLD AUTO: 29 % (ref 14–44)
MAGNESIUM SERPL-MCNC: 1.1 MG/DL (ref 1.6–2.6)
MCH RBC QN AUTO: 29.6 PG (ref 26.8–34.3)
MCHC RBC AUTO-ENTMCNC: 32.2 G/DL (ref 31.4–37.4)
MCV RBC AUTO: 92 FL (ref 82–98)
MONOCYTES # BLD AUTO: 0.71 THOUSAND/ΜL (ref 0.17–1.22)
MONOCYTES NFR BLD AUTO: 9 % (ref 4–12)
NEUTROPHILS # BLD AUTO: 4.45 THOUSANDS/ΜL (ref 1.85–7.62)
NEUTS SEG NFR BLD AUTO: 58 % (ref 43–75)
NRBC BLD AUTO-RTO: 0 /100 WBCS
NT-PROBNP SERPL-MCNC: 83 PG/ML
PLATELET # BLD AUTO: 348 THOUSANDS/UL (ref 149–390)
PMV BLD AUTO: 8.2 FL (ref 8.9–12.7)
POTASSIUM SERPL-SCNC: 2.9 MMOL/L (ref 3.5–5.3)
PROT SERPL-MCNC: 7.1 G/DL (ref 6.4–8.4)
RBC # BLD AUTO: 3.68 MILLION/UL (ref 3.88–5.62)
SODIUM SERPL-SCNC: 139 MMOL/L (ref 135–147)
TSH SERPL DL<=0.05 MIU/L-ACNC: 0.94 UIU/ML (ref 0.45–4.5)
WBC # BLD AUTO: 7.63 THOUSAND/UL (ref 4.31–10.16)

## 2022-08-10 PROCEDURE — 84484 ASSAY OF TROPONIN QUANT: CPT | Performed by: EMERGENCY MEDICINE

## 2022-08-10 PROCEDURE — 82077 ASSAY SPEC XCP UR&BREATH IA: CPT | Performed by: EMERGENCY MEDICINE

## 2022-08-10 PROCEDURE — 84443 ASSAY THYROID STIM HORMONE: CPT | Performed by: STUDENT IN AN ORGANIZED HEALTH CARE EDUCATION/TRAINING PROGRAM

## 2022-08-10 PROCEDURE — 83735 ASSAY OF MAGNESIUM: CPT | Performed by: STUDENT IN AN ORGANIZED HEALTH CARE EDUCATION/TRAINING PROGRAM

## 2022-08-10 PROCEDURE — 36415 COLL VENOUS BLD VENIPUNCTURE: CPT | Performed by: EMERGENCY MEDICINE

## 2022-08-10 PROCEDURE — 71045 X-RAY EXAM CHEST 1 VIEW: CPT

## 2022-08-10 PROCEDURE — 80053 COMPREHEN METABOLIC PANEL: CPT | Performed by: EMERGENCY MEDICINE

## 2022-08-10 PROCEDURE — 99285 EMERGENCY DEPT VISIT HI MDM: CPT | Performed by: EMERGENCY MEDICINE

## 2022-08-10 PROCEDURE — 83880 ASSAY OF NATRIURETIC PEPTIDE: CPT | Performed by: STUDENT IN AN ORGANIZED HEALTH CARE EDUCATION/TRAINING PROGRAM

## 2022-08-10 PROCEDURE — 99285 EMERGENCY DEPT VISIT HI MDM: CPT

## 2022-08-10 PROCEDURE — 93005 ELECTROCARDIOGRAM TRACING: CPT

## 2022-08-10 PROCEDURE — 85025 COMPLETE CBC W/AUTO DIFF WBC: CPT | Performed by: EMERGENCY MEDICINE

## 2022-08-10 RX ORDER — CHLORDIAZEPOXIDE HYDROCHLORIDE 25 MG/1
50 CAPSULE, GELATIN COATED ORAL EVERY 6 HOURS SCHEDULED
Status: DISCONTINUED | OUTPATIENT
Start: 2022-08-11 | End: 2022-08-12

## 2022-08-10 RX ORDER — POTASSIUM CHLORIDE 14.9 MG/ML
20 INJECTION INTRAVENOUS ONCE
Status: COMPLETED | OUTPATIENT
Start: 2022-08-10 | End: 2022-08-11

## 2022-08-10 RX ORDER — CHLORDIAZEPOXIDE HYDROCHLORIDE 25 MG/1
50 CAPSULE, GELATIN COATED ORAL ONCE
Status: COMPLETED | OUTPATIENT
Start: 2022-08-10 | End: 2022-08-10

## 2022-08-10 RX ORDER — GABAPENTIN 300 MG/1
300 CAPSULE ORAL 3 TIMES DAILY
Status: DISCONTINUED | OUTPATIENT
Start: 2022-08-10 | End: 2022-08-15 | Stop reason: HOSPADM

## 2022-08-10 RX ORDER — LOSARTAN POTASSIUM 25 MG/1
25 TABLET ORAL DAILY
Status: DISCONTINUED | OUTPATIENT
Start: 2022-08-11 | End: 2022-08-15 | Stop reason: HOSPADM

## 2022-08-10 RX ORDER — MAGNESIUM SULFATE HEPTAHYDRATE 40 MG/ML
2 INJECTION, SOLUTION INTRAVENOUS ONCE
Status: COMPLETED | OUTPATIENT
Start: 2022-08-11 | End: 2022-08-11

## 2022-08-10 RX ORDER — METHOCARBAMOL 500 MG/1
500 TABLET, FILM COATED ORAL EVERY 6 HOURS SCHEDULED
Status: DISCONTINUED | OUTPATIENT
Start: 2022-08-11 | End: 2022-08-15 | Stop reason: HOSPADM

## 2022-08-10 RX ORDER — METOPROLOL SUCCINATE 25 MG/1
25 TABLET, EXTENDED RELEASE ORAL EVERY 8 HOURS
Status: DISCONTINUED | OUTPATIENT
Start: 2022-08-10 | End: 2022-08-10

## 2022-08-10 RX ORDER — LANOLIN ALCOHOL/MO/W.PET/CERES
100 CREAM (GRAM) TOPICAL DAILY
Status: DISCONTINUED | OUTPATIENT
Start: 2022-08-11 | End: 2022-08-15 | Stop reason: HOSPADM

## 2022-08-10 RX ORDER — LEVALBUTEROL 1.25 MG/.5ML
1.25 SOLUTION, CONCENTRATE RESPIRATORY (INHALATION) EVERY 6 HOURS PRN
Status: DISCONTINUED | OUTPATIENT
Start: 2022-08-10 | End: 2022-08-15 | Stop reason: HOSPADM

## 2022-08-10 RX ORDER — ASPIRIN 81 MG/1
81 TABLET, CHEWABLE ORAL DAILY
Status: DISCONTINUED | OUTPATIENT
Start: 2022-08-11 | End: 2022-08-15 | Stop reason: HOSPADM

## 2022-08-10 RX ORDER — FOLIC ACID 1 MG/1
1 TABLET ORAL DAILY
Status: DISCONTINUED | OUTPATIENT
Start: 2022-08-11 | End: 2022-08-15 | Stop reason: HOSPADM

## 2022-08-10 RX ORDER — POTASSIUM CHLORIDE 14.9 MG/ML
20 INJECTION INTRAVENOUS ONCE
Status: COMPLETED | OUTPATIENT
Start: 2022-08-11 | End: 2022-08-11

## 2022-08-10 RX ORDER — NICOTINE 21 MG/24HR
1 PATCH, TRANSDERMAL 24 HOURS TRANSDERMAL DAILY
Status: DISCONTINUED | OUTPATIENT
Start: 2022-08-11 | End: 2022-08-15 | Stop reason: HOSPADM

## 2022-08-10 RX ORDER — MAGNESIUM SULFATE HEPTAHYDRATE 40 MG/ML
2 INJECTION, SOLUTION INTRAVENOUS ONCE
Status: COMPLETED | OUTPATIENT
Start: 2022-08-10 | End: 2022-08-11

## 2022-08-10 RX ORDER — METOPROLOL SUCCINATE 25 MG/1
25 TABLET, EXTENDED RELEASE ORAL DAILY
Status: DISCONTINUED | OUTPATIENT
Start: 2022-08-10 | End: 2022-08-15 | Stop reason: HOSPADM

## 2022-08-10 RX ORDER — POTASSIUM CHLORIDE 20 MEQ/1
40 TABLET, EXTENDED RELEASE ORAL ONCE
Status: COMPLETED | OUTPATIENT
Start: 2022-08-10 | End: 2022-08-10

## 2022-08-10 RX ORDER — ACETAMINOPHEN 325 MG/1
650 TABLET ORAL EVERY 6 HOURS PRN
Status: DISCONTINUED | OUTPATIENT
Start: 2022-08-10 | End: 2022-08-15 | Stop reason: HOSPADM

## 2022-08-10 RX ADMIN — POTASSIUM CHLORIDE 40 MEQ: 1500 TABLET, EXTENDED RELEASE ORAL at 23:04

## 2022-08-10 RX ADMIN — METHOCARBAMOL 500 MG: 500 TABLET ORAL at 23:04

## 2022-08-10 RX ADMIN — POTASSIUM CHLORIDE 20 MEQ: 14.9 INJECTION, SOLUTION INTRAVENOUS at 23:02

## 2022-08-10 RX ADMIN — CHLORDIAZEPOXIDE HYDROCHLORIDE 50 MG: 25 CAPSULE ORAL at 20:55

## 2022-08-10 RX ADMIN — METOPROLOL SUCCINATE 25 MG: 25 TABLET, EXTENDED RELEASE ORAL at 23:04

## 2022-08-10 RX ADMIN — GABAPENTIN 300 MG: 300 CAPSULE ORAL at 23:04

## 2022-08-10 NOTE — TELEPHONE ENCOUNTER
Outpatient HF LCSW spoke with [place] at hospitals to check on pt's follow up with OP ETOH rehab  Vermillion stated that HOST tried calling patient post discharge but were unable to reach him  Vermillion provided contact info for recovery center in [facility], [facility] [phone]  LCSW called pt and he answered the phone  He stated that he was on his way to the store this morning and his Lifevest shocked him  Then he fell and was able to get back up again  Pt said this occurred around 10am  He stated that he didn't like wearing his Lifevest so stopped wearing it for a little while  Then home health care came out and educated pt about importance of LifeVest  Pt said he thinks that he's supposed to call the doctor if he gets shocked  LCSW informed pt of his next OP CARD visit and he gave phone to his wife to remember appt  Spoke with pt's wife, Marilyn Person and she wrote down pt's next OP CARD appt with Kyra Rocha on 8/16/22  Also educated wife how to create [facility] My Chart Account for patient and benefits of viewing My Chart on-line  While on phone, LCSW informed HF Parish BAPTISTE about pt's shock this am  Parish Andrade reviewing [employer] data  Wife stated that pt drinks about 50ml of Vodka daily  Pt was heard in the background saying that he drinks 'until the sun goes down '  While HF Crystal collaborating with Dr Abimbola Devine, SAFIA informed wife that pt was supposed to attend OP recovery for alcohol after his hospital discharge but HOST could not reach him by phone  Provided OP recovery info for [facility] in [facility]  Wife would like pt to have Inpatient rehab for alcohol and this CTW agrees however cannot force pt to choose inpatient rehab  HF [facility] BAPTISTE reported that Dr Abimbola Devine would like pt to go to the hospital due to daily alcohol use because labs could be abnormal  Wife stated that she's taking pt to [facility]  HF Parish BAPTISTE aware and notifying [employer]

## 2022-08-10 NOTE — ED NOTES
[address] meeting list given to family at bedside per their request      Rony Sal, EMILE  08/10/22 7341
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
## 2022-08-10 NOTE — ED NOTES
Pt states "I would like to be admitted to detox from alcohol because I drink too much"     Kevin Riggs, EMILE  08/10/22 9391
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
## 2022-08-10 NOTE — TELEPHONE ENCOUNTER
Appt on 9/15 is with Dr Awais Spann  Form placed in Dr Remi Lantigua folder  Patient is currently in ED

## 2022-08-10 NOTE — TELEPHONE ENCOUNTER
S/w [facility] at [place], last download on their end if 8/9 PM  She will see if rep can go to ED to have download completed if unable to have patient download  I advised patient going to ED-- do not interfere w/ patient going to ED

## 2022-08-11 ENCOUNTER — APPOINTMENT (INPATIENT)
Dept: RADIOLOGY | Facility: HOSPITAL | Age: 57
DRG: 315 | End: 2022-08-11
Payer: COMMERCIAL

## 2022-08-11 PROBLEM — I67.1 BRAIN ANEURYSM: Status: ACTIVE | Noted: 2022-08-11

## 2022-08-11 LAB
AMPHETAMINES SERPL QL SCN: NEGATIVE
ANION GAP SERPL CALCULATED.3IONS-SCNC: 9 MMOL/L (ref 4–13)
BARBITURATES UR QL: NEGATIVE
BASOPHILS # BLD AUTO: 0.02 THOUSANDS/ΜL (ref 0–0.1)
BASOPHILS NFR BLD AUTO: 0 % (ref 0–1)
BENZODIAZ UR QL: POSITIVE
BUN SERPL-MCNC: 8 MG/DL (ref 5–25)
CALCIUM SERPL-MCNC: 8.1 MG/DL (ref 8.3–10.1)
CHLORIDE SERPL-SCNC: 103 MMOL/L (ref 96–108)
CO2 SERPL-SCNC: 26 MMOL/L (ref 21–32)
COCAINE UR QL: NEGATIVE
CREAT SERPL-MCNC: 0.7 MG/DL (ref 0.6–1.3)
EOSINOPHIL # BLD AUTO: 0.29 THOUSAND/ΜL (ref 0–0.61)
EOSINOPHIL NFR BLD AUTO: 5 % (ref 0–6)
ERYTHROCYTE [DISTWIDTH] IN BLOOD BY AUTOMATED COUNT: 14.9 % (ref 11.6–15.1)
GFR SERPL CREATININE-BSD FRML MDRD: 103 ML/MIN/1.73SQ M
GLUCOSE SERPL-MCNC: 116 MG/DL (ref 65–140)
HCT VFR BLD AUTO: 32.7 % (ref 36.5–49.3)
HGB BLD-MCNC: 11.1 G/DL (ref 12–17)
IMM GRANULOCYTES # BLD AUTO: 0.01 THOUSAND/UL (ref 0–0.2)
IMM GRANULOCYTES NFR BLD AUTO: 0 % (ref 0–2)
LIPASE SERPL-CCNC: 90 U/L (ref 73–393)
LYMPHOCYTES # BLD AUTO: 1.08 THOUSANDS/ΜL (ref 0.6–4.47)
LYMPHOCYTES NFR BLD AUTO: 19 % (ref 14–44)
MAGNESIUM SERPL-MCNC: 2.6 MG/DL (ref 1.6–2.6)
MCH RBC QN AUTO: 30.8 PG (ref 26.8–34.3)
MCHC RBC AUTO-ENTMCNC: 33.9 G/DL (ref 31.4–37.4)
MCV RBC AUTO: 91 FL (ref 82–98)
METHADONE UR QL: NEGATIVE
MONOCYTES # BLD AUTO: 0.59 THOUSAND/ΜL (ref 0.17–1.22)
MONOCYTES NFR BLD AUTO: 11 % (ref 4–12)
NEUTROPHILS # BLD AUTO: 3.62 THOUSANDS/ΜL (ref 1.85–7.62)
NEUTS SEG NFR BLD AUTO: 65 % (ref 43–75)
NRBC BLD AUTO-RTO: 0 /100 WBCS
OPIATES UR QL SCN: NEGATIVE
OXYCODONE+OXYMORPHONE UR QL SCN: NEGATIVE
PCP UR QL: NEGATIVE
PHOSPHATE SERPL-MCNC: 2.6 MG/DL (ref 2.7–4.5)
PLATELET # BLD AUTO: 258 THOUSANDS/UL (ref 149–390)
PMV BLD AUTO: 7.8 FL (ref 8.9–12.7)
POTASSIUM SERPL-SCNC: 4.1 MMOL/L (ref 3.5–5.3)
RBC # BLD AUTO: 3.6 MILLION/UL (ref 3.88–5.62)
SODIUM SERPL-SCNC: 138 MMOL/L (ref 135–147)
THC UR QL: NEGATIVE
WBC # BLD AUTO: 5.61 THOUSAND/UL (ref 4.31–10.16)

## 2022-08-11 PROCEDURE — 83735 ASSAY OF MAGNESIUM: CPT

## 2022-08-11 PROCEDURE — 97163 PT EVAL HIGH COMPLEX 45 MIN: CPT

## 2022-08-11 PROCEDURE — 85025 COMPLETE CBC W/AUTO DIFF WBC: CPT

## 2022-08-11 PROCEDURE — 99222 1ST HOSP IP/OBS MODERATE 55: CPT | Performed by: STUDENT IN AN ORGANIZED HEALTH CARE EDUCATION/TRAINING PROGRAM

## 2022-08-11 PROCEDURE — 70496 CT ANGIOGRAPHY HEAD: CPT

## 2022-08-11 PROCEDURE — 84100 ASSAY OF PHOSPHORUS: CPT

## 2022-08-11 PROCEDURE — NC001 PR NO CHARGE: Performed by: STUDENT IN AN ORGANIZED HEALTH CARE EDUCATION/TRAINING PROGRAM

## 2022-08-11 PROCEDURE — 70498 CT ANGIOGRAPHY NECK: CPT

## 2022-08-11 PROCEDURE — 99223 1ST HOSP IP/OBS HIGH 75: CPT | Performed by: PSYCHIATRY & NEUROLOGY

## 2022-08-11 PROCEDURE — G1004 CDSM NDSC: HCPCS

## 2022-08-11 PROCEDURE — 99223 1ST HOSP IP/OBS HIGH 75: CPT | Performed by: INTERNAL MEDICINE

## 2022-08-11 PROCEDURE — 80048 BASIC METABOLIC PNL TOTAL CA: CPT

## 2022-08-11 PROCEDURE — 83690 ASSAY OF LIPASE: CPT | Performed by: STUDENT IN AN ORGANIZED HEALTH CARE EDUCATION/TRAINING PROGRAM

## 2022-08-11 PROCEDURE — 94664 DEMO&/EVAL PT USE INHALER: CPT

## 2022-08-11 PROCEDURE — 97110 THERAPEUTIC EXERCISES: CPT

## 2022-08-11 PROCEDURE — 80307 DRUG TEST PRSMV CHEM ANLYZR: CPT

## 2022-08-11 RX ORDER — AMOXICILLIN 250 MG
1 CAPSULE ORAL
Status: DISCONTINUED | OUTPATIENT
Start: 2022-08-11 | End: 2022-08-11

## 2022-08-11 RX ORDER — AMOXICILLIN 250 MG
1 CAPSULE ORAL
Status: DISCONTINUED | OUTPATIENT
Start: 2022-08-11 | End: 2022-08-15 | Stop reason: HOSPADM

## 2022-08-11 RX ORDER — POLYETHYLENE GLYCOL 3350 17 G/17G
17 POWDER, FOR SOLUTION ORAL DAILY PRN
Status: DISCONTINUED | OUTPATIENT
Start: 2022-08-11 | End: 2022-08-15 | Stop reason: HOSPADM

## 2022-08-11 RX ADMIN — B-COMPLEX W/ C & FOLIC ACID TAB 1 TABLET: TAB at 09:50

## 2022-08-11 RX ADMIN — POTASSIUM CHLORIDE 20 MEQ: 14.9 INJECTION, SOLUTION INTRAVENOUS at 01:03

## 2022-08-11 RX ADMIN — CHLORDIAZEPOXIDE HYDROCHLORIDE 50 MG: 25 CAPSULE ORAL at 18:39

## 2022-08-11 RX ADMIN — MAGNESIUM SULFATE HEPTAHYDRATE 2 G: 40 INJECTION, SOLUTION INTRAVENOUS at 00:28

## 2022-08-11 RX ADMIN — METHOCARBAMOL 500 MG: 500 TABLET ORAL at 12:20

## 2022-08-11 RX ADMIN — GABAPENTIN 300 MG: 300 CAPSULE ORAL at 20:01

## 2022-08-11 RX ADMIN — METHOCARBAMOL 500 MG: 500 TABLET ORAL at 18:39

## 2022-08-11 RX ADMIN — IOHEXOL 70 ML: 350 INJECTION, SOLUTION INTRAVENOUS at 07:58

## 2022-08-11 RX ADMIN — THIAMINE HCL TAB 100 MG 100 MG: 100 TAB at 09:50

## 2022-08-11 RX ADMIN — RIVAROXABAN 20 MG: 20 TABLET, FILM COATED ORAL at 08:32

## 2022-08-11 RX ADMIN — ACETAMINOPHEN 650 MG: 325 TABLET, FILM COATED ORAL at 06:25

## 2022-08-11 RX ADMIN — CHLORDIAZEPOXIDE HYDROCHLORIDE 50 MG: 25 CAPSULE ORAL at 05:30

## 2022-08-11 RX ADMIN — METOPROLOL SUCCINATE 25 MG: 25 TABLET, EXTENDED RELEASE ORAL at 06:25

## 2022-08-11 RX ADMIN — ACETAMINOPHEN 650 MG: 325 TABLET, FILM COATED ORAL at 20:02

## 2022-08-11 RX ADMIN — GABAPENTIN 300 MG: 300 CAPSULE ORAL at 16:33

## 2022-08-11 RX ADMIN — METHOCARBAMOL 500 MG: 500 TABLET ORAL at 05:31

## 2022-08-11 RX ADMIN — MAGNESIUM OXIDE TAB 400 MG (241.3 MG ELEMENTAL MG) 400 MG: 400 (241.3 MG) TAB at 09:50

## 2022-08-11 RX ADMIN — FOLIC ACID 1 MG: 1 TABLET ORAL at 09:50

## 2022-08-11 RX ADMIN — NICOTINE 1 PATCH: 21 PATCH, EXTENDED RELEASE TRANSDERMAL at 09:51

## 2022-08-11 RX ADMIN — GABAPENTIN 300 MG: 300 CAPSULE ORAL at 09:50

## 2022-08-11 RX ADMIN — CHLORDIAZEPOXIDE HYDROCHLORIDE 50 MG: 25 CAPSULE ORAL at 12:20

## 2022-08-11 RX ADMIN — POTASSIUM CHLORIDE 20 MEQ: 14.9 INJECTION, SOLUTION INTRAVENOUS at 03:15

## 2022-08-11 RX ADMIN — SENNOSIDES AND DOCUSATE SODIUM 1 TABLET: 50; 8.6 TABLET ORAL at 20:31

## 2022-08-11 RX ADMIN — MAGNESIUM OXIDE TAB 400 MG (241.3 MG ELEMENTAL MG) 400 MG: 400 (241.3 MG) TAB at 18:39

## 2022-08-11 RX ADMIN — MAGNESIUM SULFATE HEPTAHYDRATE 2 G: 40 INJECTION, SOLUTION INTRAVENOUS at 02:28

## 2022-08-11 RX ADMIN — ASPIRIN 81 MG CHEWABLE TABLET 81 MG: 81 TABLET CHEWABLE at 09:49

## 2022-08-11 NOTE — ASSESSMENT & PLAN NOTE
Echo from 07/2022: EF 25-30%  Life Vest received in 07/2022 with plans to repeat limited TTE in 10/2022 as per previous note  - BNP 83  Does not appear fluid overloaded    - Continue Metoprolol succinate 25 mg daily
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
## 2022-08-11 NOTE — PHYSICAL THERAPY NOTE
PHYSICAL THERAPY EVALUATION/TREATMENT     08/11/22 5850   Note Type   Note type Evaluation   Pain Assessment   Pain Assessment Tool Obando-Baker FACES   Obando-Baker FACES Pain Rating 4  (Abdominal area)   Restrictions/Precautions   Other Precautions Chair Alarm; Bed Alarm; Fall Risk  (Cardiac LifeVest present)   Home Living   Type of [facility] Two level; Able to live on main level with bedroom/bathroom;Stairs to enter with rails  (Ten stairs to enter)   Home Equipment   (none)   Additional Comments patient states ambulating without assistive device prior to admission   Prior Function   Level of [place] Independent with ADLs and functional mobility   Lives With Family  (Sister)   Receives Help From Family   ADL Assistance Independent   IADLs Needs assistance   Comments Patient states ability to walk to the store and his sister assisting at times as needed  Patient with ETOH daily as per documentation   General   Additional Pertinent History chart reviewed, patient admitted with syncope  As per documentation patient was with a witnessed fall with life vest involvement as well as EtOH    Patient now presents as generally weak and unsteady on his feet requiring a roller walker for gait   Family/Caregiver Present No   Cognition   Overall Cognitive Status WFL   Arousal/Participation Cooperative   Orientation Level Oriented X4   Following Commands Follows multistep commands with increased time or repetition   Subjective   Subjective Patient states looking at going to inpatient alcohol rehab   RLE Assessment   RLE Assessment   (ROM WFL, strength 3+ /4-)   LLE Assessment   LLE Assessment   (ROM WFL, strength 3+ /4-)   Coordination   Movements are Fluid and Coordinated 0   Bed Mobility   Supine to Sit 5  Supervision   Sit to Supine 5  Supervision   Transfers   Sit to Stand 4  Minimal assistance   Additional items Assist x 1   Stand to Sit 4  Minimal assistance   Additional items Assist x 1 Ambulation/Elevation   Gait Assistance 3  Moderate assist   Additional items Assist x 1;Verbal cues; Tactile cues   Assistive Device   (None/handhold)   Distance 10 ft with unsteady gait, narrow base of support shortened stride length and knees buckling at times   Balance   Static Sitting Fair   Dynamic Sitting Fair   Static Standing Fair -   Dynamic Standing Poor +   Ambulatory Poor +   Activity Tolerance   Activity Tolerance Patient limited by fatigue   Nurse Made Aware Yes   Assessment   Prognosis Good   Problem List Decreased strength;Decreased range of motion;Decreased endurance; Impaired balance;Decreased mobility; Decreased coordination;Decreased safety awareness   Goals   Patient Goals To go to inpatient alcohol rehab   STG Expiration Date 08/18/22   Short Term Goal #1 Transfers and gait with roller walker independently   Short Term Goal #2 Gait endurance to functional household distances   LTG Expiration Date 08/25/22   Long Term Goal #1 Return to independent gait without assistive device for functional community distances   Plan   Treatment/Interventions ADL retraining;Functional transfer training;LE strengthening/ROM; Elevations; Therapeutic exercise; Endurance training;Patient/family training;Equipment eval/education;Gait training; Compensatory technique education   PT Frequency Other (Comment)  (5 times per week)   Recommendation   PT Discharge Recommendation Home with home health rehabilitation   [place] walker   [facility] Basic Mobility Inpatient   Turning in Bed Without Bedrails 4   Lying on Back to Sitting on Edge of Flat Bed 4   Moving Bed to Chair 2   Standing Up From Chair 3   Walk in Room 2   Climb 3-5 Stairs 2   Basic Mobility Inpatient Raw Score 17   Basic Mobility Standardized Score 39 67   Highest Level Of Mobility   JH-HLM Goal 5: Stand one or more mins   JH-HLM Achieved 6: Walk 10 steps or more   Barthel Index   Feeding 10   Bathing 0 Grooming Score 0   Dressing Score 5   Bladder Score 10   Bowels Score 10   Toilet Use Score 5   Transfers (Bed/Chair) Score 10   Mobility (Level Surface) Score 0   Stairs Score 5   Barthel Index Score 55   Additional Treatment Session   Start Time 1525   End Time 1540   Treatment Assessment S:  Patient states feeling weak and unsteady O: Gait training with roller walker with Min assist of 1, endurance 20 feet, BLE exercise completed as listed below A:  Patient will benefit from continued physical therapy with progression as tolerated   Exercises   Hip Flexion Supine;10 reps;Bilateral   Hip Abduction Supine;10 reps;Bilateral   Knee AROM Short Arc Quad Supine;10 reps;Bilateral   Knee AROM Long Arc Quad Supine;10 reps;Bilateral   Ankle Pumps Supine;10 reps;Bilateral   Heel Cord Stretch Sitting;10 reps;Bilateral   Balance training  Sidestepping and backward walking for balance   Licensure   [place] License Number  Morenita Lopez PT [id number]

## 2022-08-11 NOTE — ASSESSMENT & PLAN NOTE
Smokes 1 pack every day     - Will place on nicotine patch 21mg daily    - Tobacco cessation counseling on discharge

## 2022-08-11 NOTE — PLAN OF CARE
Problem: Potential for Falls  Goal: Patient will remain free of falls  Description: INTERVENTIONS:  - Educate patient/family on patient safety including physical limitations  - Instruct patient to call for assistance with activity   - Consult OT/PT to assist with strengthening/mobility   - Keep Call bell within reach  - Keep bed low and locked with side rails adjusted as appropriate  - Keep care items and personal belongings within reach  - Initiate and maintain comfort rounds  - Make Fall Risk Sign visible to staff  - Offer Toileting every 2 Hours, in advance of need  - Initiate/Maintain bed alarm  - Obtain necessary fall risk management equipment: yes  - Apply yellow socks and bracelet for high fall risk patients  - Consider moving patient to room near nurses station  Outcome: Progressing     Problem: PAIN - ADULT  Goal: Verbalizes/displays adequate comfort level or baseline comfort level  Description: Interventions:  - Encourage patient to monitor pain and request assistance  - Assess pain using appropriate pain scale  - Administer analgesics based on type and severity of pain and evaluate response  - Implement non-pharmacological measures as appropriate and evaluate response  - Consider cultural and social influences on pain and pain management  - Notify physician/advanced practitioner if interventions unsuccessful or patient reports new pain  Outcome: Progressing     Goal: Maintain or return to baseline ADL function  Description: INTERVENTIONS:  -  Assess patient's ability to carry out ADLs; assess patient's baseline for ADL function and identify physical deficits which impact ability to perform ADLs (bathing, care of mouth/teeth, toileting, grooming, dressing, etc )  - Assess/evaluate cause of self-care deficits   - Assess range of motion  - Assess patient's mobility; develop plan if impaired  - Assess patient's need for assistive devices and provide as appropriate  - Encourage maximum independence but intervene and supervise when necessary  - Involve family in performance of ADLs  - Assess for home care needs following discharge   - Consider OT consult to assist with ADL evaluation and planning for discharge  - Provide patient education as appropriate  Outcome: Progressing  Goal: Maintains/Returns to pre admission functional level  Description: INTERVENTIONS:  - Perform BMAT or MOVE assessment daily    - Set and communicate daily mobility goal to care team and patient/family/caregiver  - Collaborate with rehabilitation services on mobility goals if consulted  - Perform Range of Motion 3 times a day  - Reposition patient every 2 hours  - Dangle patient 3 times a day  - Stand patient 3 times a day  - Ambulate patient 3 times a day  - Out of bed to chair 3 times a day   - Out of bed for meals 3 times a day  - Out of bed for toileting  - Record patient progress and toleration of activity level   Outcome: Progressing     Problem: DISCHARGE PLANNING  Goal: Discharge to home or other facility with appropriate resources  Description: INTERVENTIONS:  - Identify barriers to discharge w/patient and caregiver  - Arrange for needed discharge resources and transportation as appropriate  - Identify discharge learning needs (meds, wound care, etc )  - Arrange for interpretive services to assist at discharge as needed  - Refer to Case Management Department for coordinating discharge planning if the patient needs post-hospital services based on physician/advanced practitioner order or complex needs related to functional status, cognitive ability, or social support system  Outcome: Progressing     Problem: Knowledge Deficit  Goal: Patient/family/caregiver demonstrates understanding of disease process, treatment plan, medications, and discharge instructions  Description: Complete learning assessment and assess knowledge base    Interventions:  - Provide teaching at level of understanding  - Provide teaching via preferred learning methods  Outcome: Progressing     Problem: CARDIOVASCULAR - ADULT  Goal: Maintains optimal cardiac output and hemodynamic stability  Description: INTERVENTIONS:  - Monitor I/O, vital signs and rhythm  - Monitor for S/S and trends of decreased cardiac output  - Administer and titrate ordered vasoactive medications to optimize hemodynamic stability  - Assess quality of pulses, skin color and temperature  - Assess for signs of decreased coronary artery perfusion  - Instruct patient to report change in severity of symptoms  Outcome: Progressing  Goal: Absence of cardiac dysrhythmias or at baseline rhythm  Description: INTERVENTIONS:  - Continuous cardiac monitoring, vital signs, obtain 12 lead EKG if ordered  - Administer antiarrhythmic and heart rate control medications as ordered  - Monitor electrolytes and administer replacement therapy as ordered  Outcome: Progressing     Problem: METABOLIC, FLUID AND ELECTROLYTES - ADULT  Goal: Electrolytes maintained within normal limits  Description: INTERVENTIONS:  - Monitor labs and assess patient for signs and symptoms of electrolyte imbalances  - Administer electrolyte replacement as ordered  - Monitor response to electrolyte replacements, including repeat lab results as appropriate  - Instruct patient on fluid and nutrition as appropriate  Outcome: Progressing  Goal: Fluid balance maintained  Description: INTERVENTIONS:  - Monitor labs   - Monitor I/O and WT  - Instruct patient on fluid and nutrition as appropriate  - Assess for signs & symptoms of volume excess or deficit  Outcome: Progressing  Goal: Glucose maintained within target range  Description: INTERVENTIONS:  - Monitor Blood Glucose as ordered  - Assess for signs and symptoms of hyperglycemia and hypoglycemia  - Administer ordered medications to maintain glucose within target range  - Assess nutritional intake and initiate nutrition service referral as needed  Outcome: Progressing     Problem: HEMATOLOGIC - ADULT  Goal: Maintains hematologic stability  Description: INTERVENTIONS  - Assess for signs and symptoms of bleeding or hemorrhage  - Monitor labs  - Administer supportive blood products/factors as ordered and appropriate  Outcome: Progressing

## 2022-08-11 NOTE — ASSESSMENT & PLAN NOTE
Rate controlled      - Continue home metoprolol 25mg QD and xarelto 20mg daily   - If HR > 100, give IV Metoprolol PRN, IV Amiodarone if needed as per cardio

## 2022-08-11 NOTE — ASSESSMENT & PLAN NOTE
Resolved  - K 2 9 on admission  Repleted      - Most recent K on 8/13 is 4 3  - Monitor on telemetry

## 2022-08-11 NOTE — ASSESSMENT & PLAN NOTE
57M, hx of non ischemic cardiomyopathy, alcohol abuse, atrial flutter, presents after syncopal episode this morning and AICD discharge  Likely secondary to arrhythmia which caused discharge of AICD but will complete workup  Echo from 07/2022: EF 25-30%  - EKG NSR @ 100 BPM  - Trop 4-4-4, TSH 0 9  CXR-No acute cardiopulmonary disease  Trace loculated right apical pleural effusion     - CTA head neck No cervical carotid stenosis, 3mm R clinoid segment aneurysm  1mm R P-comm aneurysm from prominent infundibulum    - f/u with Dr Corrie Leyva neurosurgery at [facility] outpatient or Dr Itzel Quezada neurovascular surgery   - PT/OT eval

## 2022-08-11 NOTE — DISCHARGE INSTR - OTHER ORDERS
Enrrique Alcantar - [phone] - transportation to medical appointments provided through [facility]

## 2022-08-11 NOTE — ASSESSMENT & PLAN NOTE
[facility] report received via fax received shows AICD discharge at 10:40AM  ED physician discussed with [employer] representative Bernice Goldberg at [phone] option 1) and stated it showed a single episode of atrial flutter, though difficult to discern  - Patient admits to non-compliance with life vest over the past week  Will need counseling and new life vest  - Monitor on Telemetry  - Cardiology recs: Shock was secondary to alcohol intoxication, electrolyte abnormalities, and medication non-compliance  Patient is not a candidate for antiarrhythmia due to poor compliance  Permanent ICD is not a good idea due to recurrence  Recommends inpatient alcohol rehab and nicotine cessation
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
## 2022-08-11 NOTE — ASSESSMENT & PLAN NOTE
Drinks about 6-7 shots of vodka daily  Had 2 shots day of admission 8/10  · Etoh elevated 318  · Will place on CIWA  · Librium protocol 50mg q6h  · Continue thiamine, folate, multivitamin, multimineral  · Crisis consulted for alcohol cessation resources  · 8/13 - CIWA score 0  · Continue librium taper until 8/15    · Patient interested in inpatient alcohol rehab, needs to call rehabs himself

## 2022-08-11 NOTE — PLAN OF CARE
Problem: Potential for Falls  Goal: Patient will remain free of falls  Description: INTERVENTIONS:  - Educate patient/family on patient safety including physical limitations  - Instruct patient to call for assistance with activity   - Consult OT/PT to assist with strengthening/mobility   - Keep Call bell within reach  - Keep bed low and locked with side rails adjusted as appropriate  - Keep care items and personal belongings within reach  - Initiate and maintain comfort rounds  - Make Fall Risk Sign visible to staff  - Initiate/Maintain bed alarm  - Apply yellow socks and bracelet for high fall risk patients  - Consider moving patient to room near nurses station  Outcome: Progressing     Problem: PAIN - ADULT  Goal: Verbalizes/displays adequate comfort level or baseline comfort level  Description: Interventions:  - Encourage patient to monitor pain and request assistance  - Assess pain using appropriate pain scale  - Administer analgesics based on type and severity of pain and evaluate response  - Implement non-pharmacological measures as appropriate and evaluate response  - Consider cultural and social influences on pain and pain management  - Notify physician/advanced practitioner if interventions unsuccessful or patient reports new pain  Outcome: Progressing     Problem: SAFETY ADULT  Goal: Patient will remain free of falls  Description: INTERVENTIONS:  - Educate patient/family on patient safety including physical limitations  - Instruct patient to call for assistance with activity   - Consult OT/PT to assist with strengthening/mobility   - Keep Call bell within reach  - Keep bed low and locked with side rails adjusted as appropriate  - Keep care items and personal belongings within reach  - Initiate and maintain comfort rounds  - Make Fall Risk Sign visible to staff  - Initiate/Maintain bed alarm  - Apply yellow socks and bracelet for high fall risk patients  - Consider moving patient to room near nurses station  Outcome: Progressing  Goal: Maintain or return to baseline ADL function  Description: INTERVENTIONS:  -  Assess patient's ability to carry out ADLs; assess patient's baseline for ADL function and identify physical deficits which impact ability to perform ADLs (bathing, care of mouth/teeth, toileting, grooming, dressing, etc )  - Assess/evaluate cause of self-care deficits   - Assess range of motion  - Assess patient's mobility; develop plan if impaired  - Assess patient's need for assistive devices and provide as appropriate  - Encourage maximum independence but intervene and supervise when necessary  - Involve family in performance of ADLs  - Assess for home care needs following discharge   - Consider OT consult to assist with ADL evaluation and planning for discharge  - Provide patient education as appropriate  Outcome: Progressing  Goal: Maintains/Returns to pre admission functional level  Description: INTERVENTIONS:  - Perform BMAT or MOVE assessment daily    - Set and communicate daily mobility goal to care team and patient/family/caregiver     - Collaborate with rehabilitation services on mobility goals if consulted  - Record patient progress and toleration of activity level   Outcome: Progressing     Problem: DISCHARGE PLANNING  Goal: Discharge to home or other facility with appropriate resources  Description: INTERVENTIONS:  - Identify barriers to discharge w/patient and caregiver  - Arrange for needed discharge resources and transportation as appropriate  - Identify discharge learning needs (meds, wound care, etc )  - Arrange for interpretive services to assist at discharge as needed  - Refer to Case Management Department for coordinating discharge planning if the patient needs post-hospital services based on physician/advanced practitioner order or complex needs related to functional status, cognitive ability, or social support system  Outcome: Progressing     Problem: Knowledge Deficit  Goal: Patient/family/caregiver demonstrates understanding of disease process, treatment plan, medications, and discharge instructions  Description: Complete learning assessment and assess knowledge base    Interventions:  - Provide teaching at level of understanding  - Provide teaching via preferred learning methods  Outcome: Progressing     Problem: CARDIOVASCULAR - ADULT  Goal: Maintains optimal cardiac output and hemodynamic stability  Description: INTERVENTIONS:  - Monitor I/O, vital signs and rhythm  - Monitor for S/S and trends of decreased cardiac output  - Administer and titrate ordered vasoactive medications to optimize hemodynamic stability  - Assess quality of pulses, skin color and temperature  - Assess for signs of decreased coronary artery perfusion  - Instruct patient to report change in severity of symptoms  Outcome: Progressing  Goal: Absence of cardiac dysrhythmias or at baseline rhythm  Description: INTERVENTIONS:  - Continuous cardiac monitoring, vital signs, obtain 12 lead EKG if ordered  - Administer antiarrhythmic and heart rate control medications as ordered  - Monitor electrolytes and administer replacement therapy as ordered  Outcome: Progressing     Problem: METABOLIC, FLUID AND ELECTROLYTES - ADULT  Goal: Electrolytes maintained within normal limits  Description: INTERVENTIONS:  - Monitor labs and assess patient for signs and symptoms of electrolyte imbalances  - Administer electrolyte replacement as ordered  - Monitor response to electrolyte replacements, including repeat lab results as appropriate  - Instruct patient on fluid and nutrition as appropriate  Outcome: Progressing  Goal: Fluid balance maintained  Description: INTERVENTIONS:  - Monitor labs   - Monitor I/O and WT  - Instruct patient on fluid and nutrition as appropriate  - Assess for signs & symptoms of volume excess or deficit  Outcome: Progressing  Goal: Glucose maintained within target range  Description: INTERVENTIONS:  - Monitor Blood Glucose as ordered  - Assess for signs and symptoms of hyperglycemia and hypoglycemia  - Administer ordered medications to maintain glucose within target range  - Assess nutritional intake and initiate nutrition service referral as needed  Outcome: Progressing     Problem: HEMATOLOGIC - ADULT  Goal: Maintains hematologic stability  Description: INTERVENTIONS  - Assess for signs and symptoms of bleeding or hemorrhage  - Monitor labs  - Administer supportive blood products/factors as ordered and appropriate  Outcome: Progressing     Problem: RESPIRATORY - ADULT  Goal: Achieves optimal ventilation and oxygenation  Description: INTERVENTIONS:  - Assess for changes in respiratory status  - Assess for changes in mentation and behavior  - Position to facilitate oxygenation and minimize respiratory effort  - Oxygen administered by appropriate delivery if ordered  - Initiate smoking cessation education as indicated  - Encourage broncho-pulmonary hygiene including cough, deep breathe, Incentive Spirometry  - Assess the need for suctioning and aspirate as needed  - Assess and instruct to report SOB or any respiratory difficulty  - Respiratory Therapy support as indicated  Outcome: Progressing
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
## 2022-08-11 NOTE — CASE MANAGEMENT
Case Management Assessment & Discharge Planning Note    Patient name Sara Frank  Location [address] [facility] [address] [facility] [address] MRN [id number]  : 1965 Date 2022       Current Admission Date: 8/10/2022  Current Admission Diagnosis:Syncope   Patient Active Problem List    Diagnosis Date Noted    Brain aneurysm 2022    COPD (chronic obstructive pulmonary disease) ([facility] ) 08/10/2022    Syncope 08/10/2022    AICD discharge 08/10/2022    Hepatic steatosis 2022    [facility]     Paroxysmal atrial fibrillation ([facility] ) 2017    Tobacco abuse 2017    Hypokalemia 2017    Atrial flutter ([facility] ) 06/10/2016    Cardiomyopathy, 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 06/10/2016      LOS (days): 1  Geometric Mean LOS (GMLOS) (days): 2 80  Days to GMLOS:2     OBJECTIVE:  [facility]  of Unplanned Readmission Score: 28 88     Current admission status: Inpatient    Preferred Pharmacy:   [address]  Phone: [phone] Fax: [phone]    Primary Care Provider: Era Patel MD    Primary Insurance: [facility]  Secondary Insurance: [address] [place] [address]    ASSESSMENT:  [facility] Representative - Sister   Primary Phone: [phone] (Mobile)               Advance Directives  Does patient have a [address]?: No  Does patient currently have a Health Care decision maker?: Yes, please see Health Care Proxy section  Does patient have Advance Directives?: No  Primary Contact: Cecil Back    Readmission Root Cause  30 Day Readmission: Yes  Who directed you to return to the hospital?: Self  Did you understand whom to contact if you had questions or problems?: Yes  Did you get your prescriptions before you left the hospital?: No  Reason[de-identified] Preference for own pharmacy  Were you able to get your prescriptions filled when you left the hospital?: No  Reason[de-identified] Unable to get to pharmacy  Did you take your medications as prescribed?: No  Were you able to get to your follow-up appointments?: No  Reason[de-identified] Transportation  During previous admission, was a post-acute recommendation made?: Yes  What post-acute resources were offered?: [facility] ([facility])  Patient was readmitted due to: syncope, AICD discharge  Action Plan: medical management, community resource information for transportation, pt requesting inpatient alcohol rehab    Patient Information  Admitted from[de-identified] Home  Mental Status: Alert  During Assessment patient was accompanied by: Not accompanied during assessment  Assessment information provided by[de-identified] Patient  Primary Caregiver: Self  Support Systems: Self, Family members  [place] of Residence: [address] [place] [address] do you live in?: [place]  Type of Current Residence: Other (Comment), Apartment (has been living between his sister and his cousin)  Floor Level: 2  Upon entering residence, is there a bedroom on the main floor (no further steps)?: Yes  Upon entering residence, is there a bathroom on the main floor (no further steps)?: Yes  In the last 12 months, was there a time when you were not able to pay the mortgage or rent on time?: No  In the last 12 months, how many places have you lived?: 2  In the last 12 months, was there a time when you did not have a steady place to sleep or slept in a shelter (including now)?: No  Homeless/housing insecurity resource given?: N/A  Living Arrangements: Lives w/ Extended Family    Activities of Daily Living Prior to Admission  Functional Status: Independent  Completes ADLs independently?: Yes  Ambulates independently?: Yes  Does patient use assisted devices?: Yes  Assisted Devices (DME) used:  Other (Comment) (Kiara Lamar)  Does patient currently own DME?: Yes  What DME does the patient currently own?: Other (Comment) (Kiara Lamar)  Does patient have a history of Outpatient Therapy (PT/OT)?: No  Does the patient have a history of Short-Term Rehab?: No  Does patient [facility] a [facility]  Does patient currently have [facility]?: Yes    Current Home Health Care  Type of Current Home Care Services: Nurse visit  Current Home Health Agency[de-identified] [address] Provider[de-identified] PCP    Patient Information Continued  Income Source: SSI/SSD  Does patient have prescription coverage?: Yes ([facility])  Within the past 12 months, you worried that your food would run out before you got the money to buy more : Never true  Within the past 12 months, the food you bought just didn't last and you didn't have money to get more : Never true  Food insecurity resource given?: N/A  Does patient receive dialysis treatments?: No  Does patient have a history of substance abuse?: Currently using  Current substance use preference: Alcohol/ETOH (2 pints of vodka per day)  Is patient currently in treatment for substance abuse?: No  Treatment options provided (Pt is requesting inpatient alcohol rehab-PES notified)    Means of Transportation  Means of Transport to Appts[de-identified] Family transport (can use Orly Backer for medical appointments as well)  In the past 12 months, has lack of transportation kept you from medical appointments or from getting medications?: Yes  In the past 12 months, has lack of transportation kept you from meetings, work, or from getting things needed for daily living?: Yes  Was application for public transport provided?: Yes ([facility] Back[facility] phone number placed on AVS for future reference)      DISCHARGE DETAILS:    Discharge planning discussed with[de-identified] Patient  Freedom of Choice: Yes  Comments - Freedom of Choice: SW met with pt to assess needs and discuss plans  Pt is requesting inpatient alcohol treatment  SW offered to look into options for treatment    Discussed option of outpatient services and continued home care services if inpatient treatment is not an option  Pt said that without treatment he will continue having an issue  Pt is open to home care if needed but not sure how they can help him  CM contacted family/caregiver?: Yes (message left for pt's sister, Tania Nieto)  Were Treatment Team discharge recommendations reviewed with patient/caregiver?: Yes  Did patient/caregiver verbalize understanding of patient care needs?: Yes  Were patient/caregiver advised of the risks associated with not following Treatment Team discharge recommendations?: Yes    Contacts  Patient Contacts: Smiley De La Garza  Relationship to Patient[de-identified] Family (sister)  Contact Method: Phone (left message)  Phone Number: [phone]         Is the patient interested in [facility] at discharge?: Yes  [address] requested[de-identified] [address] Name[de-identified] [address] Provider[de-identified] PCP  Home Health Services Needed[de-identified] Evaluate Functional Status and Safety  Homebound Criteria Met[de-identified] Requires the Assistance of Another Person for Safe Ambulation or to Leave the Home  Supporting Clincal Findings[de-identified] Limited Endurance    DME Referral Provided  Referral made for DME?: No    Other Referral/Resources/Interventions Provided:  Interventions: Substance Abuse Treatment  Referral Comments: Call placed to PES to discuss pt's request for inpatient alcohol treatment  PES will explore options  Referral will be made to [facility] to determine acceptance for ABRAM      Treatment Team Recommendation:  (pending)  Discharge Destination Plan[de-identified]  (pt requesting inpatient alcohol rehab)

## 2022-08-11 NOTE — ED NOTES
2S , Fifi Brown; called PES about 16:25 - the patient is not yet medically clear but is going to be interested in etoh rehab  PES asked [facility] to call the on-call [facility] staff and have them call PES to discuss  Will is on-call for [address] [place] [address] Marcell called him directly @ 16:55 - emailed him the face sheet to start working on it

## 2022-08-12 PROBLEM — Z91.199 NONCOMPLIANCE: Status: ACTIVE | Noted: 2022-08-12

## 2022-08-12 PROBLEM — Z91.89 POOR DENTAL HYGIENE: Status: ACTIVE | Noted: 2022-08-12

## 2022-08-12 PROBLEM — Z91.19 NONCOMPLIANCE: Status: ACTIVE | Noted: 2022-08-12

## 2022-08-12 LAB
ATRIAL RATE: 100 BPM
P AXIS: 51 DEGREES
PR INTERVAL: 174 MS
QRS AXIS: -25 DEGREES
QRSD INTERVAL: 94 MS
QT INTERVAL: 368 MS
QTC INTERVAL: 474 MS
T WAVE AXIS: 79 DEGREES
VENTRICULAR RATE: 100 BPM

## 2022-08-12 PROCEDURE — 99232 SBSQ HOSP IP/OBS MODERATE 35: CPT | Performed by: STUDENT IN AN ORGANIZED HEALTH CARE EDUCATION/TRAINING PROGRAM

## 2022-08-12 PROCEDURE — 99232 SBSQ HOSP IP/OBS MODERATE 35: CPT | Performed by: INTERNAL MEDICINE

## 2022-08-12 PROCEDURE — 93010 ELECTROCARDIOGRAM REPORT: CPT | Performed by: INTERNAL MEDICINE

## 2022-08-12 RX ORDER — CHLORDIAZEPOXIDE HYDROCHLORIDE 25 MG/1
25 CAPSULE, GELATIN COATED ORAL EVERY 6 HOURS SCHEDULED
Status: COMPLETED | OUTPATIENT
Start: 2022-08-13 | End: 2022-08-13

## 2022-08-12 RX ORDER — CHLORDIAZEPOXIDE HYDROCHLORIDE 25 MG/1
25 CAPSULE, GELATIN COATED ORAL EVERY 12 HOURS
Status: DISPENSED | OUTPATIENT
Start: 2022-08-14 | End: 2022-08-15

## 2022-08-12 RX ORDER — CHLORDIAZEPOXIDE HYDROCHLORIDE 25 MG/1
25 CAPSULE, GELATIN COATED ORAL
Status: DISCONTINUED | OUTPATIENT
Start: 2022-08-15 | End: 2022-08-15

## 2022-08-12 RX ADMIN — GABAPENTIN 300 MG: 300 CAPSULE ORAL at 09:24

## 2022-08-12 RX ADMIN — METHOCARBAMOL 500 MG: 500 TABLET ORAL at 17:49

## 2022-08-12 RX ADMIN — CHLORDIAZEPOXIDE HYDROCHLORIDE 50 MG: 25 CAPSULE ORAL at 12:09

## 2022-08-12 RX ADMIN — METHOCARBAMOL 500 MG: 500 TABLET ORAL at 00:09

## 2022-08-12 RX ADMIN — RIVAROXABAN 20 MG: 20 TABLET, FILM COATED ORAL at 09:24

## 2022-08-12 RX ADMIN — MAGNESIUM OXIDE TAB 400 MG (241.3 MG ELEMENTAL MG) 400 MG: 400 (241.3 MG) TAB at 09:24

## 2022-08-12 RX ADMIN — MAGNESIUM OXIDE TAB 400 MG (241.3 MG ELEMENTAL MG) 400 MG: 400 (241.3 MG) TAB at 17:49

## 2022-08-12 RX ADMIN — THIAMINE HCL TAB 100 MG 100 MG: 100 TAB at 09:24

## 2022-08-12 RX ADMIN — GABAPENTIN 300 MG: 300 CAPSULE ORAL at 16:22

## 2022-08-12 RX ADMIN — B-COMPLEX W/ C & FOLIC ACID TAB 1 TABLET: TAB at 09:24

## 2022-08-12 RX ADMIN — GABAPENTIN 300 MG: 300 CAPSULE ORAL at 21:14

## 2022-08-12 RX ADMIN — LOSARTAN POTASSIUM 25 MG: 25 TABLET, FILM COATED ORAL at 09:24

## 2022-08-12 RX ADMIN — ASPIRIN 81 MG CHEWABLE TABLET 81 MG: 81 TABLET CHEWABLE at 09:24

## 2022-08-12 RX ADMIN — METOPROLOL SUCCINATE 25 MG: 25 TABLET, EXTENDED RELEASE ORAL at 07:00

## 2022-08-12 RX ADMIN — CHLORDIAZEPOXIDE HYDROCHLORIDE 50 MG: 25 CAPSULE ORAL at 05:41

## 2022-08-12 RX ADMIN — SENNOSIDES AND DOCUSATE SODIUM 1 TABLET: 50; 8.6 TABLET ORAL at 21:14

## 2022-08-12 RX ADMIN — FOLIC ACID 1 MG: 1 TABLET ORAL at 09:24

## 2022-08-12 RX ADMIN — METHOCARBAMOL 500 MG: 500 TABLET ORAL at 12:09

## 2022-08-12 RX ADMIN — NICOTINE 1 PATCH: 21 PATCH, EXTENDED RELEASE TRANSDERMAL at 09:26

## 2022-08-12 RX ADMIN — METHOCARBAMOL 500 MG: 500 TABLET ORAL at 05:41

## 2022-08-12 RX ADMIN — CHLORDIAZEPOXIDE HYDROCHLORIDE 50 MG: 25 CAPSULE ORAL at 00:09

## 2022-08-12 NOTE — PLAN OF CARE
Problem: Potential for Falls  Goal: Patient will remain free of falls  Description: INTERVENTIONS:  - Educate patient/family on patient safety including physical limitations  - Instruct patient to call for assistance with activity   - Consult OT/PT to assist with strengthening/mobility   - Keep Call bell within reach  - Keep bed low and locked with side rails adjusted as appropriate  - Keep care items and personal belongings within reach  - Initiate and maintain comfort rounds  - Make Fall Risk Sign visible to staff  - Offer Toileting every 2 Hours, in advance of need  - Initiate/Maintain bed alarm  - Obtain necessary fall risk management equipment: socks  - Apply yellow socks and bracelet for high fall risk patients  - Consider moving patient to room near nurses station  Outcome: Progressing     Problem: PAIN - ADULT  Goal: Verbalizes/displays adequate comfort level or baseline comfort level  Description: Interventions:  - Encourage patient to monitor pain and request assistance  - Assess pain using appropriate pain scale  - Administer analgesics based on type and severity of pain and evaluate response  - Implement non-pharmacological measures as appropriate and evaluate response  - Consider cultural and social influences on pain and pain management  - Notify physician/advanced practitioner if interventions unsuccessful or patient reports new pain  Outcome: Progressing     Problem: SAFETY ADULT  Goal: Patient will remain free of falls  Description: INTERVENTIONS:  - Educate patient/family on patient safety including physical limitations  - Instruct patient to call for assistance with activity   - Consult OT/PT to assist with strengthening/mobility   - Keep Call bell within reach  - Keep bed low and locked with side rails adjusted as appropriate  - Keep care items and personal belongings within reach  - Initiate and maintain comfort rounds  - Make Fall Risk Sign visible to staff  - Offer Toileting every 2 Hours, in advance of need  - Initiate/Maintain bed alarm  - Obtain necessary fall risk management equipment: socks  - Apply yellow socks and bracelet for high fall risk patients  - Consider moving patient to room near nurses station  Outcome: Progressing  Goal: Maintain or return to baseline ADL function  Description: INTERVENTIONS:  -  Assess patient's ability to carry out ADLs; assess patient's baseline for ADL function and identify physical deficits which impact ability to perform ADLs (bathing, care of mouth/teeth, toileting, grooming, dressing, etc )  - Assess/evaluate cause of self-care deficits   - Assess range of motion  - Assess patient's mobility; develop plan if impaired  - Assess patient's need for assistive devices and provide as appropriate  - Encourage maximum independence but intervene and supervise when necessary  - Involve family in performance of ADLs  - Assess for home care needs following discharge   - Consider OT consult to assist with ADL evaluation and planning for discharge  - Provide patient education as appropriate  Outcome: Progressing  Goal: Maintains/Returns to pre admission functional level  Description: INTERVENTIONS:  - Perform BMAT or MOVE assessment daily    - Set and communicate daily mobility goal to care team and patient/family/caregiver  - Collaborate with rehabilitation services on mobility goals if consulted  - Perform Range of Motion 3 times a day  - Reposition patient every 2 hours    - Dangle patient 3 times a day  - Stand patient 3 times a day  - Ambulate patient 3 times a day  - Out of bed to chair 3 times a day   - Out of bed for meals 3 times a day  - Out of bed for toileting  - Record patient progress and toleration of activity level   Outcome: Progressing     Problem: DISCHARGE PLANNING  Goal: Discharge to home or other facility with appropriate resources  Description: INTERVENTIONS:  - Identify barriers to discharge w/patient and caregiver  - Arrange for needed discharge resources and transportation as appropriate  - Identify discharge learning needs (meds, wound care, etc )  - Arrange for interpretive services to assist at discharge as needed  - Refer to Case Management Department for coordinating discharge planning if the patient needs post-hospital services based on physician/advanced practitioner order or complex needs related to functional status, cognitive ability, or social support system  Outcome: Progressing     Problem: Knowledge Deficit  Goal: Patient/family/caregiver demonstrates understanding of disease process, treatment plan, medications, and discharge instructions  Description: Complete learning assessment and assess knowledge base    Interventions:  - Provide teaching at level of understanding  - Provide teaching via preferred learning methods  Outcome: Progressing     Problem: CARDIOVASCULAR - ADULT  Goal: Maintains optimal cardiac output and hemodynamic stability  Description: INTERVENTIONS:  - Monitor I/O, vital signs and rhythm  - Monitor for S/S and trends of decreased cardiac output  - Administer and titrate ordered vasoactive medications to optimize hemodynamic stability  - Assess quality of pulses, skin color and temperature  - Assess for signs of decreased coronary artery perfusion  - Instruct patient to report change in severity of symptoms  Outcome: Progressing  Goal: Absence of cardiac dysrhythmias or at baseline rhythm  Description: INTERVENTIONS:  - Continuous cardiac monitoring, vital signs, obtain 12 lead EKG if ordered  - Administer antiarrhythmic and heart rate control medications as ordered  - Monitor electrolytes and administer replacement therapy as ordered  Outcome: Progressing     Problem: METABOLIC, FLUID AND ELECTROLYTES - ADULT  Goal: Electrolytes maintained within normal limits  Description: INTERVENTIONS:  - Monitor labs and assess patient for signs and symptoms of electrolyte imbalances  - Administer electrolyte replacement as ordered  - Monitor response to electrolyte replacements, including repeat lab results as appropriate  - Instruct patient on fluid and nutrition as appropriate  Outcome: Progressing  Goal: Fluid balance maintained  Description: INTERVENTIONS:  - Monitor labs   - Monitor I/O and WT  - Instruct patient on fluid and nutrition as appropriate  - Assess for signs & symptoms of volume excess or deficit  Outcome: Progressing  Goal: Glucose maintained within target range  Description: INTERVENTIONS:  - Monitor Blood Glucose as ordered  - Assess for signs and symptoms of hyperglycemia and hypoglycemia  - Administer ordered medications to maintain glucose within target range  - Assess nutritional intake and initiate nutrition service referral as needed  Outcome: Progressing     Problem: HEMATOLOGIC - ADULT  Goal: Maintains hematologic stability  Description: INTERVENTIONS  - Assess for signs and symptoms of bleeding or hemorrhage  - Monitor labs  - Administer supportive blood products/factors as ordered and appropriate  Outcome: Progressing     Problem: RESPIRATORY - ADULT  Goal: Achieves optimal ventilation and oxygenation  Description: INTERVENTIONS:  - Assess for changes in respiratory status  - Assess for changes in mentation and behavior  - Position to facilitate oxygenation and minimize respiratory effort  - Oxygen administered by appropriate delivery if ordered  - Initiate smoking cessation education as indicated  - Encourage broncho-pulmonary hygiene including cough, deep breathe, Incentive Spirometry  - Assess the need for suctioning and aspirate as needed  - Assess and instruct to report SOB or any respiratory difficulty  - Respiratory Therapy support as indicated  Outcome: Progressing

## 2022-08-12 NOTE — ED NOTES
Rik called for an update about 15:30 - she is awaiting a call back from [facility] - patient was referred to [facility] and PES is waiting to hear if he was accepted - will then advise ALEXA     16:10 - called [facility] - spoke with [facility]'s Rn about [facility] - she will speak with the Attending and then let PES know the outcome  Tiger-text received about 16:25 - not accepted @ [place]  Left a voice mail for Rik @ 16:30  Holliejason Kvng called right back [facility] does not have a bed and his insurance limits where he may go - Steve Pinto suggests the patient be d/c'd home and they will follow up  PES tiger-texted this information to his tx team     16:40 - called [facility] [address] [place] [address] for assistance - they do not accept his insurance - could be a private pay - $10K for a 30 day stay; Vanessa Rosado has a contact @ [address] in [place] - she will inquire if they take his insurance and advise PES  Vanessa Rosado called back - they do not accept [employer]  Vanessa Rosado suggested PES ask patient about the private pay option - they would take installments  17:15 - PES spoke with the patient in his room - did not show any interest in doing the self-pay @ [facility] - explained there are many outpt/IOP programs he could attend - ALEXA can assist with that  PES updated the patient's Rn  Patient reported his family would be arriving shortly  Resident Attending texted PES they plan on keeping the patient on a librium taper until 8/15  Rik was advised

## 2022-08-12 NOTE — PROGRESS NOTES
08/12/22 1330 08/12/22 1333 08/12/22 1336   Vitals   Blood Pressure 102/64 110/77 115/85   Patient Position - Orthostatic VS Lying - Orthostatic VS Sitting - Orthostatic VS Standing - Orthostatic VS

## 2022-08-12 NOTE — ASSESSMENT & PLAN NOTE
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

## 2022-08-12 NOTE — PROGRESS NOTES
Progress Note - Cardiology   [facility]     Pj [address] 59 y o  male MRN: [id number]  : 1965  Unit/Bed#: [address] [place] [address] Encounter: [id number]    Assessment and Plan:   1  AICD discharge:  Inappropriate for SVT  Device fired for narrow complex tachycardia at with appear to be 180 beats per minute  P-wave was noted prior to QRS complex    -  magnesium was 1 1 and potassium was 2 9 on admission, both were repleted    -  patient restarted on beta-blocker, not a candidate for antiarrhythmic therapy due to his poor compliance and heavy alcohol use    2  Syncope secondary to AICD discharge:  Unclear regarding this episode is patient is a poor historian    3  Alcohol abuse:  Blood alcohol in ED was 318, patient notes he will drink up to 6-7 shots of vodka daily    -  requesting inpatient alcohol rehab    4  Nonischemic cardiomyopathy:  EF estimated 25-30%, most likely alcoholic     -  resumed on Cozaar 25 mg daily    -  Resumed on Toprol XL 25 mg daily     -  patient noncompliant with life vest per his own admission    -  case discussed with advanced heart failure in [place] and permanent ICD is not a good idea at this time as he has high likelihood for recurrent shocks if he does not be compliant with his medications  5  Electrolyte abnormalities:  Low potassium and magnesium, repleted    6  Paroxysmal atrial fibrillation:  Maintaining sinus rhythm    7  Tobacco abuse/COPD    8  Healthcare noncompliance     Patient appears to be stable from a cardiac standpoint  May be discharged when cleared by primary team    Subjective / Objective:   Patient seen and examined  No further episodes of tachycardia, sinus or ventricular tachycardia noted on monitor  Electrolytes have been repleted are currently stable  Patient has expressed desire to go to inpatient alcohol rehab    Cleared from cardiac standpoint at this time    Vitals: Blood pressure 111/72, pulse 75, temperature 98 °F (36 7 °C), resp  rate 17, height 5' 6" (1 676 m), weight 59 3 kg (130 lb 11 7 oz), SpO2 94 %  Vitals:    08/10/22 1746 08/10/22 2300   Weight: 58 5 kg (129 lb) 59 3 kg (130 lb 11 7 oz)     Body mass index is 21 1 kg/m²  BP Readings from Last 3 Encounters:   08/12/22 111/72   07/27/22 139/71   07/19/22 99/56     Orthostatic Blood Pressures    Flowsheet Row Most Recent Value   Blood Pressure 111/72 filed at 08/12/2022 0754   Patient Position - Orthostatic VS Lying filed at 08/11/2022 2300        I/O       08/10 0701  08/11 0700 08/11 0701  08/12 0700 08/12 0701  08/13 0700    P  O  240 620     Total Intake(mL/kg) 240 (4) 620 (10 5)     Urine (mL/kg/hr) 450 1800 (1 3) 200 (1 1)    Total Output 450 1800 200    Net -210 -1180 -200           Unmeasured Urine Occurrence  1 x         Invasive Devices  Report    Peripheral Intravenous Line  Duration           Peripheral IV 08/10/22 Left Antecubital 1 day                  Intake/Output Summary (Last 24 hours) at 8/12/2022 1009  Last data filed at 8/12/2022 1002  Gross per 24 hour   Intake 620 ml   Output 1850 ml   Net -1230 ml         Physical Exam:   Physical Exam  Vitals and nursing note reviewed  Constitutional:       Appearance: Normal appearance  He is normal weight  He is ill-appearing (Chronically)  HENT:      Right Ear: External ear normal       Left Ear: External ear normal    Eyes:      General: No scleral icterus  Right eye: No discharge  Left eye: No discharge  Cardiovascular:      Rate and Rhythm: Normal rate and regular rhythm  Pulses: Normal pulses  Heart sounds: Normal heart sounds  Pulmonary:      Effort: Pulmonary effort is normal  No respiratory distress  Breath sounds: Normal breath sounds  No wheezing, rhonchi or rales  Abdominal:      General: Bowel sounds are normal  There is no distension  Palpations: Abdomen is soft  Musculoskeletal:      Right lower leg: No edema  Left lower leg: No edema     Skin: General: Skin is warm and dry  Capillary Refill: Capillary refill takes less than 2 seconds  Neurological:      General: No focal deficit present  Mental Status: He is alert and oriented to person, place, and time  Mental status is at baseline     Psychiatric:         Mood and Affect: Mood normal             Medications/ Allergies:     Current Facility-Administered Medications   Medication Dose Route Frequency Provider Last Rate    acetaminophen  650 mg Oral Q6H PRN Aiden Hernandez MD      aspirin  81 mg Oral Daily Aiden Hernandez MD      chlordiazePOXIDE  50 mg Oral Q6H [facility] Aiden Hernandez MD      folic acid  1 mg Oral Daily Aiden Hernandez MD      gabapentin  300 mg Oral TID Aiden Hernandez MD      levalbuterol  1 25 mg Nebulization Q6H PRN Aiden Hernandez MD      losartan  25 mg Oral Daily Aiden Hernandez MD      magnesium oxide  400 mg Oral BID Aiden Hernandez MD      methocarbamol  500 mg Oral Q6H [facility] Aiden Hernandez MD      metoprolol succinate  25 mg Oral Daily Rubina Fuentes DO      multivitamin stress formula  1 tablet Oral Daily Aiden Hernandez MD      nicotine  1 patch Transdermal Daily Aiden Hernandez MD      polyethylene glycol  17 g Oral Daily PRN Rubina Fuentes DO      rivaroxaban  20 mg Oral Daily With Breakfast Aiden Hernandez MD      senna-docusate sodium  1 tablet Oral HS Rubina Fuentes DO      thiamine  100 mg Oral Daily Aiden Hernandez MD       acetaminophen, 650 mg, Q6H PRN  levalbuterol, 1 25 mg, Q6H PRN  polyethylene glycol, 17 g, Daily PRN      No Known Allergies    VTE Pharmacologic Prophylaxis:   Sequential compression device (Venodyne)     Labs:   Troponins:  Results from last 7 days   Lab Units 08/10/22  2156 08/10/22  1952   HSTNI D2 ng/L  --  0   HSTNI D4 ng/L 0  --      CBC with diff:  Results from last 7 days   Lab Units 08/11/22  0543 08/10/22  1754   WBC Thousand/uL 5 61 7 63   HEMOGLOBIN g/dL 11 1* 10 9*   HEMATOCRIT % 32 7* 33 8*   MCV fL 91 92   PLATELETS Thousands/uL 258 348   MCH pg 30 8 29 6 MCHC g/dL 33 9 32 2   RDW % 14 9 15 1   MPV fL 7 8* 8 2*   NRBC AUTO /100 WBCs 0 0     CMP:  Results from last 7 days   Lab Units 08/11/22  0543 08/10/22  1754   SODIUM mmol/L 138 139   POTASSIUM mmol/L 4 1 2 9*   CHLORIDE mmol/L 103 101   CO2 mmol/L 26 26   ANION GAP mmol/L 9 12   BUN mg/dL 8 9   CREATININE mg/dL 0 70 0 67   CALCIUM mg/dL 8 1* 8 4   AST U/L  --  23   ALT U/L  --  14   ALK PHOS U/L  --  65   TOTAL PROTEIN g/dL  --  7 1   ALBUMIN g/dL  --  3 5   TOTAL BILIRUBIN mg/dL  --  0 31   EGFR ml/min/1 73sq m 103 105     Magnesium:  Results from last 7 days   Lab Units 08/11/22  0543 08/10/22  1754   MAGNESIUM mg/dL 2 6 1 1*     TSH:  Results from last 7 days   Lab Units 08/10/22  1754   TSH 3RD GENERATON uIU/mL 0 941     NT-proBNP:   Recent Labs     08/10/22  1754   NTBNP 83        Imaging & Testing   I have personally reviewed pertinent reports  CTA head and neck w wo contrast    Result Date: 8/11/2022  Narrative: CTA NECK AND BRAIN WITH AND WITHOUT CONTRAST INDICATION: Syncope, simple, normal neuro exam Syncope COMPARISON:   CT dated 4/22/2022  TECHNIQUE:  Routine CT imaging of the Brain without contrast   Post contrast imaging was performed after administration of iodinated contrast through the neck and brain  Post contrast axial 0 625 mm images timed to opacify the arterial system  3D rendering was performed on an independent workstation  MIP reconstructions performed  Coronal reconstructions were performed of the noncontrast portion of the brain  Radiation dose length product (DLP) for this visit:  1307 mGy-cm   This examination, like all CT scans performed in the [facility], was performed utilizing techniques to minimize radiation dose exposure, including the use of iterative reconstruction and automated exposure control     IV Contrast:  70 mL of iohexol (OMNIPAQUE)  IMAGE QUALITY:   Diagnostic FINDINGS: NONCONTRAST BRAIN PARENCHYMA: Decreased attenuation is noted in periventricular and subcortical white matter demonstrating an appearance that is statistically most likely to represent mild microangiopathic change  No CT signs of acute infarction  No intracranial mass, mass effect or midline shift  No acute parenchymal hemorrhage  VENTRICLES AND EXTRA-AXIAL SPACES:  Normal for the patient's age  VISUALIZED ORBITS AND PARANASAL SINUSES:  Unremarkable  CERVICAL VASCULATURE AORTIC ARCH AND GREAT VESSELS:  Mild atherosclerotic disease of the arch, proximal great vessels and visualized subclavian vessels  No significant stenosis  RIGHT VERTEBRAL ARTERY CERVICAL SEGMENT:  Normal origin  The vessel is normal in caliber throughout the neck  LEFT VERTEBRAL ARTERY CERVICAL SEGMENT:  Normal origin  The vessel is normal in caliber throughout the neck  RIGHT EXTRACRANIAL CAROTID SEGMENT:  Normal caliber common carotid artery  Normal bifurcation  Mild calcified plaque in the distal cervical segment  No stenosis or dissection  Retropharyngeal course of the internal carotid artery  LEFT EXTRACRANIAL CAROTID SEGMENT:  Normal caliber common carotid artery  Normal bifurcation and cervical internal carotid artery  No stenosis or dissection  Retropharyngeal course of the internal carotid artery  NASCET criteria was used to determine the degree of internal carotid artery diameter stenosis  INTRACRANIAL VASCULATURE INTERNAL CAROTID ARTERIES: No significant stenosis  There is a 3 mm aneurysm arising from the clinoid segment of the right internal carotid artery on image 182 of series 5  There is a 1 mm focal outpouching projecting posteriorly from the prominent right  posterior communicating artery infundibulum on image 184 of series 5 that is suspicious for tiny P-comm aneurysm  ANTERIOR CIRCULATION:  Left A1 segment is hypoplastic  Bulbous appearance of the anterior communicating artery due to infundibular widening without saccular aneurysm   MIDDLE CEREBRAL ARTERY CIRCULATION:  M1 segment and middle cerebral artery branches demonstrate normal enhancement bilaterally  DISTAL VERTEBRAL ARTERIES:  Normal distal vertebral arteries  Posterior inferior cerebellar artery origins are normal  Normal vertebral basilar junction  BASILAR ARTERY:  Basilar artery is normal in caliber  Normal superior cerebellar arteries  POSTERIOR CEREBRAL ARTERIES: The right posterior cerebral artery arises from the basilar tip  There is fetal origin of the left posterior cerebral artery  Both demonstrate no focal stenosis  Posterior communicating arteries are patent  VENOUS STRUCTURES:  Normal  NON VASCULAR ANATOMY BONY STRUCTURES:  No acute osseous abnormality  SOFT TISSUES OF THE NECK:  Unremarkable  THORACIC INLET: Postsurgical changes in the right upper lobe from prior wedge resection  Status post sternotomy  Dilated central pulmonary arteries suggesting pulmonary hypertension  Impression: No significant stenosis of the cervical carotid or vertebral arteries  3 mm right clinoid segment aneurysm  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  Recommend follow-up with neurovascular service  Workstation performed: [id number]     XR chest 1 view portable    Result Date: 8/11/2022  Narrative: CHEST INDICATION:   shocked by life vest  COMPARISON:  Chest radiograph July 26, 2022  CT chest without contrast July 20, 2022  EXAM PERFORMED/VIEWS:  XR CHEST PORTABLE Images: 2 FINDINGS: Stable cardiomediastinal silhouette  Dilated main pulmonary artery  Poststernotomy  Loop recorder  Scarring in medial aspect of right upper lobe  No focal consolidation  No pneumothorax  Trace loculated right apical pleural effusion  Degenerative changes of right acromioclavicular joint  No fractures  Impression: No acute cardiopulmonary disease  Trace loculated right apical pleural effusion  Workstation performed: [id number]       EKG / Monitor: Personally reviewed      Sinus rhythm, no events noted overnight on monitor  Stacey Gerber, [place]  Cardiology      "This note has been constructed using a voice recognition system  Therefore there may be syntax, spelling, and/or grammatical errors   Please call if you have any questions  "

## 2022-08-12 NOTE — ED NOTES
8/12/22 @ 1109:  PES notified by Dr Primo Zavala that patient was medically cleared  PES notes that patient wants alcohol detox and has already begun process with [facility]  [facility] for [facility]  [address] [facility] [address] [facility]: PES spoke to Ngoc Gautam at [facility], and was informed that Will is not on schedule this month and shouldn't have been contacted directly, but Nena Curran is working with this patient  Ngoc Gautam reports that patient was scheduled for [facility] ([place], [place] on Sunday, but didn't make it  Ngoc Gautam says that patient needs to contact [facility] ([place], [place]) @ [phone], to complete another intake  Ngoc Gautam says that [facility] ([place], [place]) still has a bed for him  [address]: PES met with patient, provided number, and he will call immediately  PES provided update to Dr Primo Zavala; PES will follow up  [address] [facility] [place], [address] [facility]: PES notified that patient was declined at [facility] ([place], [place]) due to insurance issues  PES spoke to Ngoc Gautam from [facility], who will make some additional inquiries  In the meantime, PES suggested that attending contact [facility], as patient has Medicare/Medicaid that they may accept  PES explained how attending could make referral   Marko Balderrama will continue to monitor  [address], [address]: PES notified by Dr Lyle Villegas that attending sent a tiger text to [facility]  PES will await their decision    [address], [place]

## 2022-08-12 NOTE — PROGRESS NOTES
Daily Progress Note - [address] [facility]  Pj [address] 59 y o  male MRN: [id number]  Unit/Bed#: [address] [place] [address] Encounter: [id number]  Admitting Physician: Gaston Cassidy MD  PCP: Lakisha Winchester MD  Date of Admission:  8/10/2022  5:37 PM    Assessment and Plan    * Syncope  Assessment & Plan  57M, hx of non ischemic cardiomyopathy, alcohol abuse, atrial flutter, presents after syncopal episode this morning and AICD discharge  Likely secondary to arrhythmia which caused discharge of AICD but will complete workup  Echo from 07/2022: EF 25-30%  - EKG NSR @ 100 BPM  - Trop 4-4-4, TSH 0 9  CXR-No acute cardiopulmonary disease  Trace loculated right apical pleural effusion     - CTA head neck No cervical carotid stenosis, 3mm R clinoid segment aneurysm  1mm R P-comm aneurysm from prominent infundibulum   - Orthostatics neg  - PT/OT eval    AICD discharge  [address]  report received via fax received shows AICD discharge at 10:40AM  ED physician discussed with Ford Henderson at [phone] option 1) and stated it showed a single episode of atrial flutter, though difficult to discern  - Patient admits to non-compliance with life vest over the past week  Will need counseling and new life vest  - Monitor on Telemetry  - Cardiology recs: Shock was secondary to alcohol intoxication, electrolyte abnormalities, and medication non-compliance  Patient is not a candidate for antiarrhythmia due to poor compliance  Permanent ICD is not a good idea due to recurrence  Recommends inpatient alcohol rehab and nicotine cessation  Brain aneurysm  Assessment & Plan  CTA Head Neck: No significant stenosis of the cervical carotid or vertebral arteries  3 mm right clinoid segment aneurysm  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     - Neurology consulted, recommend outpatient neurovascular referral to Dr Zion Montemayor dependence [facility])  Assessment & Plan  Drinks about 6-7 shots of vodka daily  Had 2 shots today  · Etoh elevated 318  · Will place on CIWA  · Librium protocol 50mg q6h  · Continue thiamine, folate, multivitamin, multimineral  · Crisis consulted for alcohol cessation resources    Tobacco abuse  Assessment & Plan  Smokes 1 pack every day  - Will place on nicotine patch 21mg daily    - Tobacco cessation counseling on discharge    Paroxysmal atrial fibrillation ([address] )  Assessment & Plan  Rate controlled  - Continue home metoprolol 25mg QD and xarelto 20mg daily   - If HR > 100, give IV Metoprolol PRN, IV Amiodarone if needed as per cardio    Hypokalemia  Assessment & Plan  Resolved    - K 2 9 on admission  repleted most recent K above 4  - Monitor on telemetry    Cardiomyopathy, 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  Assessment & Plan  Echo from 07/2022: EF 25-30%  Life Vest received in 07/2022 with plans to repeat limited TTE in 10/2022 as per previous note  - BNP 83  Does not appear fluid overloaded  - Continue Metoprolol succinate 25 mg daily    Atrial flutter (HCC)  Assessment & Plan  - Continue Metoprolol 25 mg, Xarelto 20 mg    VTE Pharmacologic Prophylaxis:   Pharmacologic: Rivaroxaban (Xarelto)  Mechanical VTE Prophylaxis in Place: Yes    Patient Centered Rounds: I have performed bedside rounds with nursing staff today  Discussions with Specialists or Other Care Team Provider: Cardiology, Neurology    Time Spent for Care: 20 minutes  More than 50% of total time spent on counseling and coordination of care as described above  Current Length of Stay: 2 day(s)    Current Patient Status: Inpatient   Certification Statement: The patient will continue to require additional inpatient hospital stay due to syncope    Code Status: Level 1 - Full Code    Subjective:   Patient seen examined at bedside this morning  Patient was resting comfortably    Patient has not experienced any headaches, chest pain, shortness a breath  He has not experienced any additional shocks from his AICD  This morning, patient's nurse noted that his CIWA score was 0  Review of Systems   Respiratory: Negative for cough and shortness of breath  Cardiovascular: Negative for chest pain and leg swelling  Gastrointestinal: Positive for abdominal pain  Negative for constipation, diarrhea, nausea and vomiting  Neurological: Negative for tremors  Psychiatric/Behavioral: Negative for agitation and confusion  The patient is not nervous/anxious  Objective     Objective:   Vitals:   Temp (24hrs), Av 4 °F (36 9 °C), Min:97 8 °F (36 6 °C), Max:99 °F (37 2 °C)    Temp:  [97 8 °F (36 6 °C)-99 °F (37 2 °C)] 97 9 °F (36 6 °C)  HR:  [76-89] 84  Resp:  [16-19] 17  BP: (104-122)/(59-74) 122/74  SpO2:  [93 %-97 %] 93 %  Body mass index is 21 1 kg/m²  Input and Output Summary (last 24 hours): Intake/Output Summary (Last 24 hours) at 2022 0751  Last data filed at 2022 0601  Gross per 24 hour   Intake 620 ml   Output 1800 ml   Net -1180 ml       Physical Exam:   Physical Exam  Cardiovascular:      Rate and Rhythm: Normal rate and regular rhythm  Pulses: Normal pulses  Heart sounds: Normal heart sounds  Pulmonary:      Effort: Pulmonary effort is normal       Breath sounds: Normal breath sounds  Abdominal:      General: Abdomen is flat  Bowel sounds are normal       Palpations: Abdomen is soft  Skin:     General: Skin is warm and dry  Neurological:      General: No focal deficit present  Mental Status: He is alert and oriented to person, place, and time         Additional Data:     Labs:  Results from last 7 days   Lab Units 22  0543   WBC Thousand/uL 5 61   HEMOGLOBIN g/dL 11 1*   HEMATOCRIT % 32 7*   PLATELETS Thousands/uL 258   NEUTROS PCT % 65   LYMPHS PCT % 19   MONOS PCT % 11   EOS PCT % 5     Results from last 7 days   Lab Units 22  0543 08/10/22  1754   POTASSIUM mmol/L 4 1 2 9*   CHLORIDE mmol/L 103 101   CO2 mmol/L 26 26   BUN mg/dL 8 9   CREATININE mg/dL 0 70 0 67   CALCIUM mg/dL 8 1* 8 4   ALK PHOS U/L  --  65   ALT U/L  --  14   AST U/L  --  23               * I Have Reviewed All Lab Data Listed Above  * Additional Pertinent Lab Tests Reviewed: All Labs Within Last 24 Hours Reviewed    Imaging:    Imaging Reports Reviewed Today Include: CTA head, CXR  Imaging Personally Reviewed by Myself Includes:  n/a    Recent Cultures (last 7 days):         Last 24 Hours Medication List:   Current Facility-Administered Medications   Medication Dose Route Frequency Provider Last Rate    acetaminophen  650 mg Oral Q6H PRN Gabby Falk MD      aspirin  81 mg Oral Daily Gabby Falk MD      chlordiazePOXIDE  50 mg Oral Q6H [facility] Gabby Falk MD      folic acid  1 mg Oral Daily Gabby Falk MD      gabapentin  300 mg Oral TID Gabby Falk MD      levalbuterol  1 25 mg Nebulization Q6H PRN Gabby Falk MD      losartan  25 mg Oral Daily Gabby Falk MD      magnesium oxide  400 mg Oral BID Gabby Falk MD      methocarbamol  500 mg Oral Q6H [facility] Gabby Falk MD      metoprolol succinate  25 mg Oral Daily Richad Fees, DO      multivitamin stress formula  1 tablet Oral Daily Gabby Falk MD      nicotine  1 patch Transdermal Daily Gabby Falk MD      polyethylene glycol  17 g Oral Daily PRN Richad Fees, DO      rivaroxaban  20 mg Oral Daily With Breakfast Gabby Falk MD      senna-docusate sodium  1 tablet Oral HS Richad Fees, DO      thiamine  100 mg Oral Daily Gabby Falk MD        ** Please Note: Dictation voice to text software may have been used in the creation of this document   **    Slava Yarbrough MD  08/12/22  7:51 AM

## 2022-08-12 NOTE — PLAN OF CARE
Problem: Potential for Falls  Goal: Patient will remain free of falls  Description: INTERVENTIONS:  - Educate patient/family on patient safety including physical limitations  - Instruct patient to call for assistance with activity   - Consult OT/PT to assist with strengthening/mobility   - Keep Call bell within reach  - Keep bed low and locked with side rails adjusted as appropriate  - Keep care items and personal belongings within reach  - Initiate and maintain comfort rounds  - Make Fall Risk Sign visible to staff  - Offer Toileting every 2 Hours, in advance of need  - Initiate/Maintain bed alarm  - Obtain necessary fall risk management equipment: yes  - Apply yellow socks and bracelet for high fall risk patients  - Consider moving patient to room near nurses station  Outcome: Progressing     Problem: PAIN - ADULT  Goal: Verbalizes/displays adequate comfort level or baseline comfort level  Description: Interventions:  - Encourage patient to monitor pain and request assistance  - Assess pain using appropriate pain scale  - Administer analgesics based on type and severity of pain and evaluate response  - Implement non-pharmacological measures as appropriate and evaluate response  - Consider cultural and social influences on pain and pain management  - Notify physician/advanced practitioner if interventions unsuccessful or patient reports new pain  Outcome: Progressing     Problem: SAFETY ADULT  Goal: Patient will remain free of falls  Description: INTERVENTIONS:  - Educate patient/family on patient safety including physical limitations  - Instruct patient to call for assistance with activity   - Consult OT/PT to assist with strengthening/mobility   - Keep Call bell within reach  - Keep bed low and locked with side rails adjusted as appropriate  - Keep care items and personal belongings within reach  - Initiate and maintain comfort rounds  - Make Fall Risk Sign visible to staff  - Offer Toileting every 2 Hours, in advance of need  - Initiate/Maintain bed alarm  - Obtain necessary fall risk management equipment: yes  - Apply yellow socks and bracelet for high fall risk patients  - Consider moving patient to room near nurses station  Outcome: Progressing  Goal: Maintain or return to baseline ADL function  Description: INTERVENTIONS:  -  Assess patient's ability to carry out ADLs; assess patient's baseline for ADL function and identify physical deficits which impact ability to perform ADLs (bathing, care of mouth/teeth, toileting, grooming, dressing, etc )  - Assess/evaluate cause of self-care deficits   - Assess range of motion  - Assess patient's mobility; develop plan if impaired  - Assess patient's need for assistive devices and provide as appropriate  - Encourage maximum independence but intervene and supervise when necessary  - Involve family in performance of ADLs  - Assess for home care needs following discharge   - Consider OT consult to assist with ADL evaluation and planning for discharge  - Provide patient education as appropriate  Outcome: Progressing  Goal: Maintains/Returns to pre admission functional level  Description: INTERVENTIONS:  - Perform BMAT or MOVE assessment daily    - Set and communicate daily mobility goal to care team and patient/family/caregiver  - Collaborate with rehabilitation services on mobility goals if consulted  - Perform Range of Motion 3 times a day  - Reposition patient every 2 hours    - Dangle patient 3 times a day  - Stand patient 3 times a day  - Ambulate patient 3 times a day  - Out of bed to chair 3 times a day   - Out of bed for meals 3 times a day  - Out of bed for toileting  - Record patient progress and toleration of activity level   Outcome: Progressing     Problem: DISCHARGE PLANNING  Goal: Discharge to home or other facility with appropriate resources  Description: INTERVENTIONS:  - Identify barriers to discharge w/patient and caregiver  - Arrange for needed discharge resources and transportation as appropriate  - Identify discharge learning needs (meds, wound care, etc )  - Arrange for interpretive services to assist at discharge as needed  - Refer to Case Management Department for coordinating discharge planning if the patient needs post-hospital services based on physician/advanced practitioner order or complex needs related to functional status, cognitive ability, or social support system  Outcome: Progressing     Problem: Knowledge Deficit  Goal: Patient/family/caregiver demonstrates understanding of disease process, treatment plan, medications, and discharge instructions  Description: Complete learning assessment and assess knowledge base    Interventions:  - Provide teaching at level of understanding  - Provide teaching via preferred learning methods  Outcome: Progressing     Problem: CARDIOVASCULAR - ADULT  Goal: Maintains optimal cardiac output and hemodynamic stability  Description: INTERVENTIONS:  - Monitor I/O, vital signs and rhythm  - Monitor for S/S and trends of decreased cardiac output  - Administer and titrate ordered vasoactive medications to optimize hemodynamic stability  - Assess quality of pulses, skin color and temperature  - Assess for signs of decreased coronary artery perfusion  - Instruct patient to report change in severity of symptoms  Outcome: Progressing  Goal: Absence of cardiac dysrhythmias or at baseline rhythm  Description: INTERVENTIONS:  - Continuous cardiac monitoring, vital signs, obtain 12 lead EKG if ordered  - Administer antiarrhythmic and heart rate control medications as ordered  - Monitor electrolytes and administer replacement therapy as ordered  Outcome: Progressing     Problem: METABOLIC, FLUID AND ELECTROLYTES - ADULT  Goal: Electrolytes maintained within normal limits  Description: INTERVENTIONS:  - Monitor labs and assess patient for signs and symptoms of electrolyte imbalances  - Administer electrolyte replacement as ordered  - Monitor response to electrolyte replacements, including repeat lab results as appropriate  - Instruct patient on fluid and nutrition as appropriate  Outcome: Progressing  Goal: Fluid balance maintained  Description: INTERVENTIONS:  - Monitor labs   - Monitor I/O and WT  - Instruct patient on fluid and nutrition as appropriate  - Assess for signs & symptoms of volume excess or deficit  Outcome: Progressing  Goal: Glucose maintained within target range  Description: INTERVENTIONS:  - Monitor Blood Glucose as ordered  - Assess for signs and symptoms of hyperglycemia and hypoglycemia  - Administer ordered medications to maintain glucose within target range  - Assess nutritional intake and initiate nutrition service referral as needed  Outcome: Progressing     Problem: HEMATOLOGIC - ADULT  Goal: Maintains hematologic stability  Description: INTERVENTIONS  - Assess for signs and symptoms of bleeding or hemorrhage  - Monitor labs  - Administer supportive blood products/factors as ordered and appropriate  Outcome: Progressing     Problem: RESPIRATORY - ADULT  Goal: Achieves optimal ventilation and oxygenation  Description: INTERVENTIONS:  - Assess for changes in respiratory status  - Assess for changes in mentation and behavior  - Position to facilitate oxygenation and minimize respiratory effort  - Oxygen administered by appropriate delivery if ordered  - Initiate smoking cessation education as indicated  - Encourage broncho-pulmonary hygiene including cough, deep breathe, Incentive Spirometry  - Assess the need for suctioning and aspirate as needed  - Assess and instruct to report SOB or any respiratory difficulty  - Respiratory Therapy support as indicated  Outcome: Progressing

## 2022-08-13 LAB
ANION GAP SERPL CALCULATED.3IONS-SCNC: 8 MMOL/L (ref 4–13)
BASOPHILS # BLD AUTO: 0.03 THOUSANDS/ΜL (ref 0–0.1)
BASOPHILS NFR BLD AUTO: 0 % (ref 0–1)
BUN SERPL-MCNC: 10 MG/DL (ref 5–25)
CALCIUM SERPL-MCNC: 8.6 MG/DL (ref 8.3–10.1)
CHLORIDE SERPL-SCNC: 104 MMOL/L (ref 96–108)
CO2 SERPL-SCNC: 25 MMOL/L (ref 21–32)
CREAT SERPL-MCNC: 0.54 MG/DL (ref 0.6–1.3)
EOSINOPHIL # BLD AUTO: 0.33 THOUSAND/ΜL (ref 0–0.61)
EOSINOPHIL NFR BLD AUTO: 5 % (ref 0–6)
ERYTHROCYTE [DISTWIDTH] IN BLOOD BY AUTOMATED COUNT: 15.4 % (ref 11.6–15.1)
GFR SERPL CREATININE-BSD FRML MDRD: 114 ML/MIN/1.73SQ M
GLUCOSE SERPL-MCNC: 102 MG/DL (ref 65–140)
HCT VFR BLD AUTO: 34.7 % (ref 36.5–49.3)
HGB BLD-MCNC: 11.3 G/DL (ref 12–17)
IMM GRANULOCYTES # BLD AUTO: 0.04 THOUSAND/UL (ref 0–0.2)
IMM GRANULOCYTES NFR BLD AUTO: 1 % (ref 0–2)
LYMPHOCYTES # BLD AUTO: 1.41 THOUSANDS/ΜL (ref 0.6–4.47)
LYMPHOCYTES NFR BLD AUTO: 21 % (ref 14–44)
MAGNESIUM SERPL-MCNC: 1.5 MG/DL (ref 1.6–2.6)
MCH RBC QN AUTO: 30.2 PG (ref 26.8–34.3)
MCHC RBC AUTO-ENTMCNC: 32.6 G/DL (ref 31.4–37.4)
MCV RBC AUTO: 93 FL (ref 82–98)
MONOCYTES # BLD AUTO: 0.5 THOUSAND/ΜL (ref 0.17–1.22)
MONOCYTES NFR BLD AUTO: 7 % (ref 4–12)
NEUTROPHILS # BLD AUTO: 4.49 THOUSANDS/ΜL (ref 1.85–7.62)
NEUTS SEG NFR BLD AUTO: 66 % (ref 43–75)
NRBC BLD AUTO-RTO: 0 /100 WBCS
PLATELET # BLD AUTO: 208 THOUSANDS/UL (ref 149–390)
PMV BLD AUTO: 8.8 FL (ref 8.9–12.7)
POTASSIUM SERPL-SCNC: 4.3 MMOL/L (ref 3.5–5.3)
RBC # BLD AUTO: 3.74 MILLION/UL (ref 3.88–5.62)
SODIUM SERPL-SCNC: 137 MMOL/L (ref 135–147)
WBC # BLD AUTO: 6.8 THOUSAND/UL (ref 4.31–10.16)

## 2022-08-13 PROCEDURE — 83735 ASSAY OF MAGNESIUM: CPT | Performed by: STUDENT IN AN ORGANIZED HEALTH CARE EDUCATION/TRAINING PROGRAM

## 2022-08-13 PROCEDURE — 99232 SBSQ HOSP IP/OBS MODERATE 35: CPT | Performed by: STUDENT IN AN ORGANIZED HEALTH CARE EDUCATION/TRAINING PROGRAM

## 2022-08-13 PROCEDURE — 85025 COMPLETE CBC W/AUTO DIFF WBC: CPT | Performed by: STUDENT IN AN ORGANIZED HEALTH CARE EDUCATION/TRAINING PROGRAM

## 2022-08-13 PROCEDURE — 99232 SBSQ HOSP IP/OBS MODERATE 35: CPT | Performed by: INTERNAL MEDICINE

## 2022-08-13 PROCEDURE — 80048 BASIC METABOLIC PNL TOTAL CA: CPT | Performed by: STUDENT IN AN ORGANIZED HEALTH CARE EDUCATION/TRAINING PROGRAM

## 2022-08-13 RX ADMIN — FOLIC ACID 1 MG: 1 TABLET ORAL at 08:34

## 2022-08-13 RX ADMIN — METHOCARBAMOL 500 MG: 500 TABLET ORAL at 00:24

## 2022-08-13 RX ADMIN — METHOCARBAMOL 500 MG: 500 TABLET ORAL at 05:21

## 2022-08-13 RX ADMIN — METHOCARBAMOL 500 MG: 500 TABLET ORAL at 23:50

## 2022-08-13 RX ADMIN — GABAPENTIN 300 MG: 300 CAPSULE ORAL at 21:20

## 2022-08-13 RX ADMIN — RIVAROXABAN 20 MG: 20 TABLET, FILM COATED ORAL at 08:35

## 2022-08-13 RX ADMIN — CHLORDIAZEPOXIDE HYDROCHLORIDE 25 MG: 25 CAPSULE ORAL at 00:24

## 2022-08-13 RX ADMIN — METHOCARBAMOL 500 MG: 500 TABLET ORAL at 17:22

## 2022-08-13 RX ADMIN — MAGNESIUM OXIDE TAB 400 MG (241.3 MG ELEMENTAL MG) 400 MG: 400 (241.3 MG) TAB at 08:35

## 2022-08-13 RX ADMIN — CHLORDIAZEPOXIDE HYDROCHLORIDE 25 MG: 25 CAPSULE ORAL at 17:22

## 2022-08-13 RX ADMIN — GABAPENTIN 300 MG: 300 CAPSULE ORAL at 15:45

## 2022-08-13 RX ADMIN — B-COMPLEX W/ C & FOLIC ACID TAB 1 TABLET: TAB at 08:34

## 2022-08-13 RX ADMIN — GABAPENTIN 300 MG: 300 CAPSULE ORAL at 08:34

## 2022-08-13 RX ADMIN — SENNOSIDES AND DOCUSATE SODIUM 1 TABLET: 50; 8.6 TABLET ORAL at 21:19

## 2022-08-13 RX ADMIN — NICOTINE 1 PATCH: 21 PATCH, EXTENDED RELEASE TRANSDERMAL at 08:36

## 2022-08-13 RX ADMIN — LOSARTAN POTASSIUM 25 MG: 25 TABLET, FILM COATED ORAL at 08:35

## 2022-08-13 RX ADMIN — THIAMINE HCL TAB 100 MG 100 MG: 100 TAB at 08:35

## 2022-08-13 RX ADMIN — METHOCARBAMOL 500 MG: 500 TABLET ORAL at 12:04

## 2022-08-13 RX ADMIN — ASPIRIN 81 MG CHEWABLE TABLET 81 MG: 81 TABLET CHEWABLE at 08:34

## 2022-08-13 RX ADMIN — MAGNESIUM OXIDE TAB 400 MG (241.3 MG ELEMENTAL MG) 400 MG: 400 (241.3 MG) TAB at 17:22

## 2022-08-13 RX ADMIN — CHLORDIAZEPOXIDE HYDROCHLORIDE 25 MG: 25 CAPSULE ORAL at 12:04

## 2022-08-13 RX ADMIN — CHLORDIAZEPOXIDE HYDROCHLORIDE 25 MG: 25 CAPSULE ORAL at 05:21

## 2022-08-13 NOTE — PROGRESS NOTES
Daily Progress Note - [facility]  Family Medicine Residency  [address] 59 y o  male MRN: [id number]  Unit/Bed#: [address] [place] [address] Encounter: [id number]  Admitting Physician: Carina Velázquez DO  PCP: Migdalia Smalls MD  Date of Admission:  8/10/2022  5:37 PM    Assessment and Plan    Brain aneurysm  Assessment & Plan  CTA Head Neck: No significant stenosis of the cervical carotid or vertebral arteries  3 mm right clinoid segment aneurysm  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  - Neurology consulted, recommend outpatient neurovascular referral to Dr Dileep Alvarado    AICD discharge  [address]  report received via fax received shows AICD discharge at 10:40AM  ED physician discussed with [employer] representative Bernice Goldberg at [phone] option 1) and stated it showed a single episode of atrial flutter, though difficult to discern  - Patient admits to non-compliance with life vest over the past week  Will need counseling and new life vest  - Monitor on Telemetry  - Cardiology recs: Shock was secondary to alcohol intoxication, electrolyte abnormalities, and medication non-compliance  Patient is not a candidate for antiarrhythmia due to poor compliance  Permanent ICD is not a good idea due to recurrence  Recommends inpatient alcohol rehab and nicotine cessation  [facility] )  Assessment & Plan  Drinks about 6-7 shots of vodka daily  Had 2 shots day of admission 8/10  · Etoh elevated 318  · Will place on CIWA  · Librium protocol 50mg q6h  · Continue thiamine, folate, multivitamin, multimineral  · Crisis consulted for alcohol cessation resources  · 8/13 - CIWA score 0    Tobacco abuse  Assessment & Plan  Smokes 1 pack every day  - Will place on nicotine patch 21mg daily    - Tobacco cessation counseling on discharge    Paroxysmal atrial fibrillation ([facility] )  Assessment & Plan  Rate controlled      - Continue home metoprolol 25mg QD and xarelto 20mg daily   - If HR > 100, give IV Metoprolol PRN, IV Amiodarone if needed as per cardio    Hypokalemia  Assessment & Plan  Resolved  - K 2 9 on admission  Repleted  - Most recent K on 8/13 is 4 3  - Monitor on telemetry    Cardiomyopathy, 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  Assessment & Plan  Echo from 07/2022: EF 25-30%  Life Vest received in 07/2022 with plans to repeat limited TTE in 10/2022 as per previous note  - BNP 83  Does not appear fluid overloaded  - Continue Metoprolol succinate 25 mg daily    Atrial flutter (HCC)  Assessment & Plan  - Continue Metoprolol 25 mg, Xarelto 20 mg    * Syncope  Assessment & Plan  57M, hx of non ischemic cardiomyopathy, alcohol abuse, atrial flutter, presents after syncopal episode this morning and AICD discharge  Likely secondary to arrhythmia which caused discharge of AICD but will complete workup  Echo from 07/2022: EF 25-30%  - EKG NSR @ 100 BPM  - Trop 4-4-4, TSH 0 9  CXR-No acute cardiopulmonary disease  Trace loculated right apical pleural effusion     - CTA head neck No cervical carotid stenosis, 3mm R clinoid segment aneurysm  1mm R P-comm aneurysm from prominent infundibulum   - Orthostatics neg  - PT/OT eval      VTE Pharmacologic Prophylaxis:   Pharmacologic: Rivaroxaban (Xarelto)  Mechanical VTE Prophylaxis in Place: Yes    Patient Centered Rounds: I have performed bedside rounds with nursing staff today  Discussions with Specialists or Other Care Team Provider: Cardiology, Neurology     Time Spent for Care: 20 minutes  More than 50% of total time spent on counseling and coordination of care as described above  Current Length of Stay: 3 day(s)    Current Patient Status: Inpatient   Certification Statement: The patient will continue to require additional inpatient hospital stay due to syncope, alcohol withdrawal taper and monitoring     Code Status: Level 1 - Full Code    Subjective: The patient was examined at bedside this morning   He was resting comfortably  Pt is not agitated or confused  He has not experienced any additional shocks from his AICD  Reports right sided abdominal pain, describes as sharp  Pt is asking about inpatient alcohol detox/rehab  Pt states he does not want to go to outpatient rehab because he does not trust himself to stay regimented with the program and is worried of relapsing due to his external environment  Nurse reports CIWA score of 0 today  Review of Systems   Constitutional: Negative for chills and fever  Cardiovascular: Negative for chest pain and leg swelling  Respiratory: Negative for cough and shortness of breath  Gastrointestinal: Positive for abdominal pain  Negative for constipation, diarrhea, nausea and vomiting  Neurological: Negative for dizziness, headaches and tremors  Psychiatric/Behavioral: The patient is not nervous/anxious  Objective     Objective:   Vitals:   Temp (24hrs), Av 3 °F (36 8 °C), Min:97 2 °F (36 2 °C), Max:99 3 °F (37 4 °C)    Temp:  [97 2 °F (36 2 °C)-99 3 °F (37 4 °C)] 97 6 °F (36 4 °C)  HR:  [74-88] 85  Resp:  [17-19] 17  BP: ()/(64-85) 113/75  SpO2:  [96 %-98 %] 98 %  Body mass index is 21 1 kg/m²  Input and Output Summary (last 24 hours): Intake/Output Summary (Last 24 hours) at 2022 1125  Last data filed at 2022 0900  Gross per 24 hour   Intake 710 ml   Output 850 ml   Net -140 ml       Physical Exam:   Physical Exam  Cardiovascular:      Rate and Rhythm: Normal rate and regular rhythm  Pulses: Normal pulses  Heart sounds: Normal heart sounds  Pulmonary:      Effort: Pulmonary effort is normal       Breath sounds: Normal breath sounds  Abdominal:      General: Abdomen is flat  Bowel sounds are normal       Palpations: Abdomen is soft  Tenderness: There is abdominal tenderness (right sided)  Skin:     General: Skin is warm and dry  Neurological:      General: No focal deficit present        Mental Status: He is oriented to person, place, and time  Psychiatric:         Mood and Affect: Mood normal          Additional Data:     Labs:  Results from last 7 days   Lab Units 08/13/22  0531   WBC Thousand/uL 6 80   HEMOGLOBIN g/dL 11 3*   HEMATOCRIT % 34 7*   PLATELETS Thousands/uL 208   NEUTROS PCT % 66   LYMPHS PCT % 21   MONOS PCT % 7   EOS PCT % 5     Results from last 7 days   Lab Units 08/13/22  0531 08/11/22  0543 08/10/22  1754   POTASSIUM mmol/L 4 3   < > 2 9*   CHLORIDE mmol/L 104   < > 101   CO2 mmol/L 25   < > 26   BUN mg/dL 10   < > 9   CREATININE mg/dL 0 54*   < > 0 67   CALCIUM mg/dL 8 6   < > 8 4   ALK PHOS U/L  --   --  65   ALT U/L  --   --  14   AST U/L  --   --  23    < > = values in this interval not displayed  * I Have Reviewed All Lab Data Listed Above  * Additional Pertinent Lab Tests Reviewed:  All Labs Within Last 24 Hours Reviewed        Last 24 Hours Medication List:   Current Facility-Administered Medications   Medication Dose Route Frequency Provider Last Rate    acetaminophen  650 mg Oral Q6H PRN Carol Lopez MD      aspirin  81 mg Oral Daily Carol Lopez MD      chlordiazePOXIDE  25 mg Oral Q6H [place]      [START ON 8/14/2022] chlordiazePOXIDE  25 mg Oral Q12H [place]      [START ON 8/15/2022] chlordiazePOXIDE  25 mg Oral Early Morning [place]      folic acid  1 mg Oral Daily Carol Lopez MD      gabapentin  300 mg Oral TID Carol Lopez MD      levalbuterol  1 25 mg Nebulization Q6H PRN Carol Lopez MD      losartan  25 mg Oral Daily Carol Lopez MD      magnesium oxide  400 mg Oral BID Carol Lopez MD      methocarbamol  500 mg Oral Q6H [address] Carol Lopez MD      metoprolol succinate  25 mg Oral Daily Patti Goins DO      multivitamin stress formula  1 tablet Oral Daily Carol Lopez MD      nicotine  1 patch Transdermal Daily Carol Lopez MD      polyethylene glycol  17 g Oral Daily PRN Patti Goins DO      rivaroxaban  20 mg Oral Daily With Breakfast Aiden Hernandez MD      senna-docusate sodium  1 tablet Oral HS Rubina Fuentes DO      thiamine  100 mg Oral Daily Aiden Hernandez MD               ** Please Note: Dictation voice to text software may have been used in the creation of this document   Segun Frederick DO  08/13/22  11:25 AM

## 2022-08-13 NOTE — PROGRESS NOTES
Progress Note - Cardiology   [facility]     Nic Ayala 59 y o  male MRN: [id number]  : 1965  Unit/Bed#: [address] [place] [address] Encounter: [id number]    ASSESSMENT:  Alcoholic cardiomyopathy  EF 14-29%, grade 1 diastolic dysfunction,     Admitted with lifevest discharge in response to rapid SVT with elevated alcohol level of 318, serum magnesium of 1 1 and potassium of 2 9  Not a good candidate for ICD implantation due to noncompliance with medications and continued alcohol consumption and likelihood of electrolyte imbalance and frequent ICD shocks    PAF, currently in sinus rhythm    History of tobacco and alcohol abuse    COPD    Brain aneurysm  CTA head and neck: 3 mm right clean night segment aneurysm, 1 mm right posterior communicating aneurysm        RECOMMENDATIONS:  Continue aspirin, losartan, Toprol  Continue Xarelto if okay with Neurology  Awaiting placement to inpatient alcohol rehab program    Cardiac status is stable          Subjective / Objective:     Review of Systems   Awake and alert, denies chest pain, dyspnea or syncope  Vitals: Blood pressure 113/75, pulse 85, temperature 97 6 °F (36 4 °C), temperature source Oral, resp  rate 17, height 5' 6" (1 676 m), weight 59 3 kg (130 lb 11 7 oz), SpO2 98 %  Vitals:    08/10/22 1746 08/10/22 2300   Weight: 58 5 kg (129 lb) 59 3 kg (130 lb 11 7 oz)     Body mass index is 21 1 kg/m²  BP Readings from Last 3 Encounters:   22 113/75   22 139/71   22 99/56     Orthostatic Blood Pressures    Flowsheet Row Most Recent Value   Blood Pressure 113/75 filed at 2022 0811   Patient Position - Orthostatic VS Lying filed at 2022 0811        I/O        0701   0700  0701   0700  0701   0700    P  O  620 720 100    I V  (mL/kg)  10 (0 2)     Total Intake(mL/kg) 620 (10 5) 730 (12 3) 100 (1 7)    Urine (mL/kg/hr) 1800 (1 3) 550 (0 4) 500 (2 6)    Total Output 1800 550 500    Net -1180 +180 -400           Unmeasured Urine Occurrence 1 x 2 x         Invasive Devices  Report    Peripheral Intravenous Line  Duration           Peripheral IV 08/10/22 Left Antecubital 2 days                  Intake/Output Summary (Last 24 hours) at 8/13/2022 1012  Last data filed at 8/13/2022 0900  Gross per 24 hour   Intake 710 ml   Output 850 ml   Net -140 ml         Physical Exam:     Neurologic:  Alert & oriented x 3,  no focal deficits noted   Constitutional:  Appears chronically ill  Eyes:  PERRL, conjunctiva normal   HENT:  Atraumatic, external ears normal, nose normal,   NECK: Normal range of motion, no tenderness, neck is supple , No JVP  Respiratory:  Bilateral air entry, mostly clear to auscultation  Cardiovascular: Regular S1-S2 with a I/VI ejection systolic murmur  No pericardial rub or gallop audible  GI:  Soft, nondistended, audible bowel sounds, nontender, no hepatosplenomegaly appreciated  Musculoskeletal:  No tenderness, no deformities  Extremities:  No appreciable pitting edema   Distal pulses are present  Psychiatric:  Speech and behavior appropriate             Medications/ Allergies:     Current Facility-Administered Medications   Medication Dose Route Frequency Provider Last Rate    acetaminophen  650 mg Oral Q6H PRN Flynn Rangel MD      aspirin  81 mg Oral Daily Flynn Rangel MD      chlordiazePOXIDE  25 mg Oral Q6H [place], [place]      [START ON 8/14/2022] chlordiazePOXIDE  25 mg Oral Q12H [place]      [START ON 8/15/2022] chlordiazePOXIDE  25 mg Oral [facility]      folic acid  1 mg Oral Daily Flynn Rangel MD      gabapentin  300 mg Oral TID Flynn Rangel MD      levalbuterol  1 25 mg Nebulization Q6H PRN Flynn Rangel MD      losartan  25 mg Oral Daily Flynn Rangel MD      magnesium oxide  400 mg Oral BID Flynn Rangel MD      methocarbamol  500 mg Oral Q6H [address] Flynn Rangel MD      metoprolol succinate  25 mg Oral Daily Abimbola Nguyen DO      multivitamin stress formula  1 tablet Oral Daily Aiden Hernandez MD      nicotine  1 patch Transdermal Daily Aiden Hernandez MD      polyethylene glycol  17 g Oral Daily PRN Rubina Fuentes DO      rivaroxaban  20 mg Oral Daily With Breakfast Aiden Hernandez MD      senna-docusate sodium  1 tablet Oral HS Rubina Fuentes,       thiamine  100 mg Oral Daily Aiden Hernandez MD       acetaminophen, 650 mg, Q6H PRN  levalbuterol, 1 25 mg, Q6H PRN  polyethylene glycol, 17 g, Daily PRN      No Known Allergies        Labs:   Troponins:      CBC with diff:  Results from last 7 days   Lab Units 08/13/22  0531 08/11/22  0543 08/10/22  1754   WBC Thousand/uL 6 80 5 61 7 63   HEMOGLOBIN g/dL 11 3* 11 1* 10 9*   HEMATOCRIT % 34 7* 32 7* 33 8*   MCV fL 93 91 92   PLATELETS Thousands/uL 208 258 348   MCH pg 30 2 30 8 29 6   MCHC g/dL 32 6 33 9 32 2   RDW % 15 4* 14 9 15 1   MPV fL 8 8* 7 8* 8 2*   NRBC AUTO /100 WBCs 0 0 0       CMP:  Results from last 7 days   Lab Units 08/13/22  0531 08/11/22  0543 08/10/22  1754   SODIUM mmol/L 137 138 139   POTASSIUM mmol/L 4 3 4 1 2 9*   CHLORIDE mmol/L 104 103 101   CO2 mmol/L 25 26 26   ANION GAP mmol/L 8 9 12   BUN mg/dL 10 8 9   CREATININE mg/dL 0 54* 0 70 0 67   CALCIUM mg/dL 8 6 8 1* 8 4   AST U/L  --   --  23   ALT U/L  --   --  14   ALK PHOS U/L  --   --  65   TOTAL PROTEIN g/dL  --   --  7 1   ALBUMIN g/dL  --   --  3 5   TOTAL BILIRUBIN mg/dL  --   --  0 31   EGFR ml/min/1 73sq m 114 103 105       Magnesium:  Results from last 7 days   Lab Units 08/13/22  0531 08/11/22  0543 08/10/22  1754   MAGNESIUM mg/dL 1 5* 2 6 1 1*     Coags:    TSH:  Results from last 7 days   Lab Units 08/10/22  1754   TSH 3RD GENERATON uIU/mL 0 941     No components found for: TSH3  Lipid Profile:    Hgb A1c:    NT-proBNP:   Recent Labs     08/10/22  1754   NTBNP 83        Imaging & Testing   I have personally reviewed pertinent reports      CTA head and neck w wo contrast    Result Date: 8/11/2022  Narrative: CTA NECK AND BRAIN WITH AND WITHOUT CONTRAST INDICATION: Syncope, simple, normal neuro exam Syncope COMPARISON:   CT dated 4/22/2022  TECHNIQUE:  Routine CT imaging of the Brain without contrast   Post contrast imaging was performed after administration of iodinated contrast through the neck and brain  Post contrast axial 0 625 mm images timed to opacify the arterial system  3D rendering was performed on an independent workstation  MIP reconstructions performed  Coronal reconstructions were performed of the noncontrast portion of the brain  Radiation dose length product (DLP) for this visit:  1307 mGy-cm   This examination, like all CT scans performed in the [facility], was performed utilizing techniques to minimize radiation dose exposure, including the use of iterative reconstruction and automated exposure control  IV Contrast:  70 mL of iohexol (OMNIPAQUE)  IMAGE QUALITY:   Diagnostic FINDINGS: NONCONTRAST BRAIN PARENCHYMA: Decreased attenuation is noted in periventricular and subcortical white matter demonstrating an appearance that is statistically most likely to represent mild microangiopathic change  No CT signs of acute infarction  No intracranial mass, mass effect or midline shift  No acute parenchymal hemorrhage  VENTRICLES AND EXTRA-AXIAL SPACES:  Normal for the patient's age  VISUALIZED ORBITS AND PARANASAL SINUSES:  Unremarkable  CERVICAL VASCULATURE AORTIC ARCH AND GREAT VESSELS:  Mild atherosclerotic disease of the arch, proximal great vessels and visualized subclavian vessels  No significant stenosis  RIGHT VERTEBRAL ARTERY CERVICAL SEGMENT:  Normal origin  The vessel is normal in caliber throughout the neck  LEFT VERTEBRAL ARTERY CERVICAL SEGMENT:  Normal origin  The vessel is normal in caliber throughout the neck  RIGHT EXTRACRANIAL CAROTID SEGMENT:  Normal caliber common carotid artery  Normal bifurcation  Mild calcified plaque in the distal cervical segment    No stenosis or dissection  Retropharyngeal course of the internal carotid artery  LEFT EXTRACRANIAL CAROTID SEGMENT:  Normal caliber common carotid artery  Normal bifurcation and cervical internal carotid artery  No stenosis or dissection  Retropharyngeal course of the internal carotid artery  NASCET criteria was used to determine the degree of internal carotid artery diameter stenosis  INTRACRANIAL VASCULATURE INTERNAL CAROTID ARTERIES: No significant stenosis  There is a 3 mm aneurysm arising from the clinoid segment of the right internal carotid artery on image 182 of series 5  There is a 1 mm focal outpouching projecting posteriorly from the prominent right  posterior communicating artery infundibulum on image 184 of series 5 that is suspicious for tiny P-comm aneurysm  ANTERIOR CIRCULATION:  Left A1 segment is hypoplastic  Bulbous appearance of the anterior communicating artery due to infundibular widening without saccular aneurysm  MIDDLE CEREBRAL ARTERY CIRCULATION:  M1 segment and middle cerebral artery branches demonstrate normal enhancement bilaterally  DISTAL VERTEBRAL ARTERIES:  Normal distal vertebral arteries  Posterior inferior cerebellar artery origins are normal  Normal vertebral basilar junction  BASILAR ARTERY:  Basilar artery is normal in caliber  Normal superior cerebellar arteries  POSTERIOR CEREBRAL ARTERIES: The right posterior cerebral artery arises from the basilar tip  There is fetal origin of the left posterior cerebral artery  Both demonstrate no focal stenosis  Posterior communicating arteries are patent  VENOUS STRUCTURES:  Normal  NON VASCULAR ANATOMY BONY STRUCTURES:  No acute osseous abnormality  SOFT TISSUES OF THE NECK:  Unremarkable  THORACIC INLET: Postsurgical changes in the right upper lobe from prior wedge resection  Status post sternotomy  Dilated central pulmonary arteries suggesting pulmonary hypertension       Impression: No significant stenosis of the cervical carotid or vertebral arteries  3 mm right clinoid segment aneurysm  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  Recommend follow-up with neurovascular service  Workstation performed: [id number]     XR chest 1 view portable    Result Date: 8/11/2022  Narrative: CHEST INDICATION:   shocked by life vest  COMPARISON:  Chest radiograph July 26, 2022  CT chest without contrast July 20, 2022  EXAM PERFORMED/VIEWS:  XR CHEST PORTABLE Images: 2 FINDINGS: Stable cardiomediastinal silhouette  Dilated main pulmonary artery  Poststernotomy  Loop recorder  Scarring in medial aspect of right upper lobe  No focal consolidation  No pneumothorax  Trace loculated right apical pleural effusion  Degenerative changes of right acromioclavicular joint  No fractures  Impression: No acute cardiopulmonary disease  Trace loculated right apical pleural effusion  Workstation performed: [id number]     XR chest portable    Result Date: 7/22/2022  Narrative: CHEST INDICATION:   R sided chest pain  COMPARISON:  July 21, 2022 at 6:26 PM  Maxwell Lee PERFORMED/VIEWS:  XR CHEST PORTABLE FINDINGS: Cardiomediastinal silhouette appears unremarkable  Median sternotomy  Right side chest tube remains in place with tip in the upper hemithorax  There is a small right-sided pneumothorax  Low lung volumes with mild bibasilar atelectasis  Osseous structures appear within normal limits for patient age  Impression: Small volume right-sided pneumothorax  Right-sided chest tube in place  Workstation performed: [id number]     XR chest portable    Result Date: 7/21/2022  Narrative: CHEST INDICATION:   s/p vats bleb resection  COMPARISON:  July 18, 2022  CT dated July 20, 2022 EXAM PERFORMED/VIEWS:  XR CHEST PORTABLE Portable semierect chest   Single image  FINDINGS:  Lungs appear adequately aerated  Previous small caliber cope loop chest tube has been replaced by a larger chest tube with tip projecting over the apex   There is some mild diffuse atelectasis right upper lung and left lung base  There may be a trace amount of pleural gas trapped in the right minor fissure postoperatively, otherwise No significant pneumothorax or pleural effusion is visible  Cardiac size within normal limits  No vascular congestion or peribronchial thickening  Prior median sternotomy  Left loop recorder device noted  Osseous structures appear within normal limits for patient age  Degenerative changes right acromioclavicular joint  Impression: Expected postoperative changes  There may be a trace amount of postoperative gas trapped in the right minor fissure, otherwise No significant pneumothorax or pleural effusion is visible  No large pleural effusion visible  No acute cardiopulmonary disease  The study was marked in [facility] for immediate notification  Workstation performed: [id number]     XR chest portable    Result Date: 7/18/2022  Narrative: CHEST INDICATION:   Pneumothorax  COMPARISON:  7/14/2022 EXAM PERFORMED/VIEWS:  XR CHEST PORTABLE Images: 3 FINDINGS: Small right apical pneumothorax is again evident, slightly diminished compared with prior study  Right-sided pigtail catheter is again evident terminating in the upper third of the hemithorax Sternal wires are again evident  Left-sided loop recorder again noted  Cardiomediastinal silhouette appears unremarkable  The lungs are clear  No pleural effusion  Osseous structures appear within normal limits for patient age  Impression: Slightly diminished small right apical pneumothorax  Chest tube remains in place Workstation performed: [id number]     XR chest pa & lateral    Result Date: 7/26/2022  Narrative: CHEST INDICATION:   s/p Dc R Chest tube; evaluate for pneumothorax  Right upper lobe bleb resection and apical pleurectomy on 7/19/2022  COMPARISON:  Chest radiograph from 7/24/2022, chest CT from 7/20/2022  EXAM PERFORMED/VIEWS:  XR CHEST PA & LATERAL  DUAL ENERGY SUBTRACTION   FINDINGS: Normal heart size, median sternotomy  Loop recorder in the left chest wall  Marked enlargement of the left pulmonary artery  Right chest tube removed  Staple lines in the right apex  Small right hydropneumothorax  The fluid component is most conspicuous on the lateral projection along the posterior chest wall  Osseous structures appear within normal limits for patient age  Impression: Surgical changes in the right hemithorax with small loculated right hydropneumothorax  Left pulmonary artery enlargement suggestive of pulmonary hypertension  Workstation performed: [id number]     XR chest pa & lateral    Result Date: 7/24/2022  Narrative: CHEST INDICATION:   s/p transition of chest tube to water seal  COMPARISON:  7/22/2022 EXAM PERFORMED/VIEWS:  XR CHEST PA & LATERAL  The frontal view was performed utilizing dual energy radiographic technique  Images: 4 FINDINGS:  There are median sternotomy wires indicating prior cardiac surgery  Right-sided chest tube is again noted, the tip at the apex  Loop recorder overlying the left lower chest wall  Cardiomediastinal silhouette appears stable  Diffuse minimal pleural thickening on the right  No evidence for residual pneumothorax  Patchy opacities are again noted in the right mid and lower lung, stable since previous  Minimal linear atelectasis or scarring at the left base, stable  No acute osseous abnormalities  Impression: No definite evidence for residual right pneumothorax  Stable patchy opacities in the right mid and lower lung associated with mild diffuse right pleural thickening  Stable right-sided chest tube  Workstation performed: [id number]     CT chest wo contrast    Result Date: 7/20/2022  Narrative: CT CHEST WITHOUT IV CONTRAST INDICATION:   S/p chest tube with persistent leak    COMPARISON:  CT scan 7/14/2022   TECHNIQUE: CT examination of the chest was performed without intravenous contrast  This examination was performed without intravenous contrast in the context of the critical nationwide Omnipaque shortage  Axial, sagittal, and coronal 2D reformatted images were created from the source data and submitted for interpretation  Radiation dose length product (DLP) for this visit:  180 64 mGy-cm   This examination, like all CT scans performed in the [facility], was performed utilizing techniques to minimize radiation dose exposure, including the use of iterative  reconstruction and automated exposure control  FINDINGS: LUNGS:  Background bullous emphysema  Stable hyperdense tract in the right upper lobe extending to the chest wall consistent with hematoma related to previous chest tube insertion  Minimal bibasilar dependent atelectasis  No endotracheal or endobronchial lesion identified  PLEURA:  Stable pneumothorax on the right with pigtail catheter anteriorly in the pleural space  Resolved right pleural effusion  HEART/GREAT VESSELS: Heart is unremarkable for patient's age  No thoracic aortic aneurysm  Dilated main pulmonary artery measuring 3 6 cm consistent with pulmonary arterial hypertension  MEDIASTINUM AND FRANCISCO:  Unremarkable  CHEST WALL AND LOWER NECK:  Decreased size right chest wall hematoma with extrapleural extension  VISUALIZED STRUCTURES IN THE UPPER ABDOMEN:  Unremarkable  OSSEOUS STRUCTURES:  No acute fracture or destructive osseous lesion  Bones appear hyperdense  Impression: Stable size of the right-sided pneumothorax with resolved right pleural effusion  Stable right anterior pigtail catheter noted  Stable hematoma from previous chest tube within the chest wall and tubular extension into the right upper lobe  Evidence of pulmonary arterial hypertension  Generalized increased bone density  Correlate for possible metabolic or hematological disorders   Workstation performed: [id number]     CT chest wo contrast    Result Date: 7/15/2022  Narrative: CT CHEST WITHOUT IV CONTRAST INDICATION:   Pneumothorax nonhealing pneumothorax: possible bullous lung disease  COMPARISON:  None  TECHNIQUE: CT examination of the chest was performed without intravenous contrast  This examination was performed without intravenous contrast in the context of the critical nationwide Omnipaque shortage  Axial, sagittal, and coronal 2D reformatted images were created from the source data and submitted for interpretation  Radiation dose length product (DLP) for this visit:  328 91 mGy-cm   This examination, like all CT scans performed in the [facility], was performed utilizing techniques to minimize radiation dose exposure, including the use of iterative  reconstruction and automated exposure control  FINDINGS: LUNGS: Posterior right lower lobe atelectasis  Bullous changes in the right apex with multiple subpleural bullae in the right upper lobe  Largest bulla measuring 2 5 cm  Right upper lobe parenchymal groundglass changes  Tubular appearing density extending from the chest wall to the right upper lobe may represent hematoma in the previously placed chest tube  PLEURA:  Moderate right pleural effusion with hyperdensity laterally suggesting hemorrhagic fluid  Right upper anterior approach pigtail chest tube in place  Moderate right pneumothorax  Septations in the pleural space anteriorly  HEART/GREAT VESSELS: Heart is unremarkable for patient's age  No thoracic aortic aneurysm  Enlarged main pulmonary artery measuring 4 cm  Left Main pulmonary artery measures 3 4 cm and right pulmonary artery measures 3 cm  MEDIASTINUM AND FRANCISCO:  Unremarkable  CHEST WALL AND LOWER NECK:  Right chest wall hematoma in series 2 image 29 measuring 5 7 x 2 3 cm in the subcutis fat with pleural deeper hematoma measuring 2 cm  Midline sternotomy  VISUALIZED STRUCTURES IN THE UPPER ABDOMEN:  Unremarkable  OSSEOUS STRUCTURES:  No acute fracture or destructive osseous lesion  Impression: 1  Moderate right pneumothorax with right apical anterior approach pigtail catheter in place  Septations in the anterior pleural space superiorly  2 Bullous changes predominantly in the right upper lobe near the apex  Largest bulla measuring 2 5 cm 3  Groundglass changes in the right upper lobe with a tubular hyperdensity extending to the pleural space laterally suggesting hematoma in the tract of the previous large bore chest tube  Hematoma in the chest wall is seen at the previous entry site  If there is persistent air leak in the Pleuro-evac pleural fistula cannot be excluded  4  Moderate right pleural effusion with small amount of hemorrhagic fluid  Workstation performed: [id number]        EKG / Monitor: Personally reviewed  Sinus rhythm at 85 per minute  No Darleene Pauling or tachyarrhythmia in last 24 hours on tele    Cardiac testing:   Results for orders placed during the hospital encounter of 17    Echo complete with contrast if indicated    Arline Liao [address]  [address] [facility] [place] [address]  [phone]    Transthoracic Echocardiogram  2D, M-mode, Doppler, and Color Doppler    Study date:  23-Sep-2017    Patient: Solange Muñoz  MR number: [id number]  Account number: [de-identified]  : 1965  Age: 45 years  Gender: Male  Status: Routine  Location: Bedside  Height: 66 in  Weight: 136 6 lb  BP: 104/ 68 mmHg    Indications: Atrial Flutter    Diagnoses: 427 32 - ATRIAL FLUTTER    Sonographer:  Graham Perdomo  Referring Physician:  Brandon Sandoval MD  Group:  Lory Hutchinson  Interpreting Physician:  Maynor Watson MD    SUMMARY    LEFT VENTRICLE:  Systolic function was at the lower limits of normal  Ejection fraction was estimated in the range of 50 % to 55 % to be 55 %  Although no diagnostic regional wall motion abnormality was identified, this possibility cannot be completely excluded on the basis of this study  Wall thickness was at the upper limits of normal     LEFT ATRIUM:  The atrium was mildly to moderately dilated      RIGHT ATRIUM:  The atrium was mildly dilated  MITRAL VALVE:  There was mild regurgitation  TRICUSPID VALVE:  There was mild regurgitation  Estimated peak PA pressure was 35 mmHg  PULMONIC VALVE:  There was mild to moderate regurgitation  HISTORY: PRIOR HISTORY: CM, Alcoholic Hepatitis    PROCEDURE: The procedure was performed at the bedside  This was a routine study  The transthoracic approach was used  The study included complete 2D imaging, M-mode, complete spectral Doppler, and color Doppler  The heart rate was 80 bpm,  at the start of the study  Image quality was adequate  LEFT VENTRICLE: Size was normal  Systolic function was at the lower limits of normal  Ejection fraction was estimated in the range of 50 % to 55 % to be 55 %  Although no diagnostic regional wall motion abnormality was identified, this  possibility cannot be completely excluded on the basis of this study  Wall thickness was at the upper limits of normal  DOPPLER: The study was not technically sufficient to allow evaluation of LV diastolic function  RIGHT VENTRICLE: The size was normal  Systolic function was normal     LEFT ATRIUM: The atrium was mildly to moderately dilated  RIGHT ATRIUM: The atrium was mildly dilated  MITRAL VALVE: There was normal leaflet separation  DOPPLER: There was no evidence for stenosis  There was mild regurgitation  AORTIC VALVE: The valve was trileaflet  Leaflets exhibited mildly increased thickness and normal cuspal separation  DOPPLER: Transaortic velocity was within the normal range  There was no evidence for stenosis  There was no regurgitation  TRICUSPID VALVE: DOPPLER: There was mild regurgitation  Estimated peak PA pressure was 35 mmHg  PULMONIC VALVE: DOPPLER: There was mild to moderate regurgitation  PERICARDIUM: There was no thickening or calcification  There was no pericardial effusion  AORTA: The root exhibited normal size      SYSTEMIC VEINS: IVC: The inferior vena cava was normal in size  Respirophasic changes were normal     SYSTEM MEASUREMENT TABLES    2D mode  AoR Diam 2D: 3 cm  LA Diam (2D): 3 4 cm  LA/Ao (2D): 1 13  FS (2D Teich): 20 1 %  IVSd (2D): 1 07 cm  LVDEV: 67 1 cm³  LVESV: 39 1 cm³  LVIDd(2D): 3 93 cm  LVISd (2D): 3 14 cm  LVPWd (2D): 1 15 cm  SV (Teich): 28 cm³    Apical four chamber  LVEF A4C: 42 %    Unspecified Scan Mode  MV Peak E Buddy  Mean: 1080 mm/s  MVA (PHT): 3 14 cm squared  PHT: 70 ms  Max P mm[Hg]  V Max: 2260 mm/s  Vmax: 2390 mm/s  RA Area: 20 6 cm squared  RA Volume: 58 8 cm³  TAPSE: 1 7 cm    [facility]    Prepared and electronically signed by    Lakisha Moser MD  Signed 23-Sep-2017 16:34:47    Results for orders placed during the hospital encounter of 18    TONYA    Narrative  [address]  [address]  [place]  [phone]    Transesophageal Echocardiogram  2D and Doppler    Study date:  2018    Patient: Iveth Rosen  MR number: [id number]  Account number: [de-identified]  : 1965  Age: 52 years  Gender: Male  Status: Routine  Location: Cath lab  Height: 66 in  Weight: 136 lb  BP: 116/ 74 mmHg    Indications: Atrial Flutter    Diagnoses: 427 32 - ATRIAL FLUTTER    Sonographer:  TD Head  Primary Physician:  Millie Cabrera MD  Referring Physician:  Lakisha Moser MD  Group:  [facility]  Interpreting Physician:  Hong Niño, DO    SUMMARY    LEFT VENTRICLE:  Systolic function was normal by visual assessment  Ejection fraction was estimated in the range of 55 % to 60 %  There were no regional wall motion abnormalities  ATRIAL SEPTUM:  No defect or patent foramen ovale was identified  Contrast injection was performed  There was no right-to-left shunt, with provocative maneuvers to increase right atrial pressure  MITRAL VALVE:  There was trace regurgitation      AORTIC VALVE:  There was trace regurgitation  TRICUSPID VALVE:  There was mild regurgitation  PULMONIC VALVE:  There was moderate regurgitation  HISTORY: PRIOR HISTORY: Atrial Flutter, Cardiomyopathy, Alcohol Abuse, Alcoholic Hepatitis    PROCEDURE: The procedure was performed in the catheterization laboratory  This was a routine study  The risks and alternatives of the procedure were explained to the patient and informed consent was obtained  The transesophageal approach  was used  The study included complete 2D imaging and limited spectral Doppler  The heart rate was 120 bpm, at the start of the study  An adult omniplane probe was inserted by the attending cardiologist  Intubated with ease  One intubation  attempt(s)  There was no blood detected on the probe  LEFT VENTRICLE: Size was normal  Systolic function was normal by visual assessment  Ejection fraction was estimated in the range of 55 % to 60 %  There were no regional wall motion abnormalities  Wall thickness was normal  DOPPLER: The  study was not technically sufficient to allow evaluation of LV diastolic function  RIGHT VENTRICLE: The size was normal  Systolic function was normal  Wall thickness was normal     LEFT ATRIUM: Size was normal  No thrombus was identified  APPENDAGE: The size was normal  No thrombus was identified  DOPPLER: The function was normal (normal emptying velocity)  ATRIAL SEPTUM: No defect or patent foramen ovale was identified  Contrast injection was performed  There was no right-to-left shunt, with provocative maneuvers to increase right atrial pressure  RIGHT ATRIUM: Size was normal     MITRAL VALVE: Valve structure was normal  There was normal leaflet separation  DOPPLER: The transmitral velocity was within the normal range  There was no evidence for stenosis  There was trace regurgitation  AORTIC VALVE: The valve was trileaflet  Leaflets exhibited normal cuspal separation and sclerosis  DOPPLER: There was no evidence for stenosis   There was trace regurgitation  TRICUSPID VALVE: The valve structure was normal  There was normal leaflet separation  DOPPLER: There was mild regurgitation  PULMONIC VALVE: Leaflets exhibited normal thickness, no calcification, and normal cuspal separation  DOPPLER: The transpulmonic velocity was within the normal range  There was moderate regurgitation  PERICARDIUM: There was no pericardial effusion  The pericardium was normal in appearance  AORTA: The root exhibited normal size  There was no atheroma  SYSTEMIC VEINS: IVC: The inferior vena cava was normal in size  [address] [facility]redited Ech[facility]ography [facility]    Prepared and electronically signed by    Rubio Banda DO  Signed 08-Jun-2018 14:21:34          Dr Geno Ya MD, [facility]      "This note has been constructed using a voice recognition system  Therefore there may be syntax, spelling, and/or grammatical errors   Please call if you have any questions  "

## 2022-08-13 NOTE — PLAN OF CARE
Problem: Potential for Falls  Goal: Patient will remain free of falls  Description: INTERVENTIONS:  - Educate patient/family on patient safety including physical limitations  - Instruct patient to call for assistance with activity   - Consult OT/PT to assist with strengthening/mobility   - Keep Call bell within reach  - Keep bed low and locked with side rails adjusted as appropriate  - Keep care items and personal belongings within reach  - Initiate and maintain comfort rounds  - Make Fall Risk Sign visible to staff  - Offer Toileting every 2 Hours, in advance of need  - Initiate/Maintain bed alarm  - Obtain necessary fall risk management equipment: walker  - Apply yellow socks and bracelet for high fall risk patients  - Consider moving patient to room near nurses station  Outcome: Progressing     Problem: PAIN - ADULT  Goal: Verbalizes/displays adequate comfort level or baseline comfort level  Description: Interventions:  - Encourage patient to monitor pain and request assistance  - Assess pain using appropriate pain scale  - Administer analgesics based on type and severity of pain and evaluate response  - Implement non-pharmacological measures as appropriate and evaluate response  - Consider cultural and social influences on pain and pain management  - Notify physician/advanced practitioner if interventions unsuccessful or patient reports new pain  Outcome: Progressing     Problem: SAFETY ADULT  Goal: Patient will remain free of falls  Description: INTERVENTIONS:  - Educate patient/family on patient safety including physical limitations  - Instruct patient to call for assistance with activity   - Consult OT/PT to assist with strengthening/mobility   - Keep Call bell within reach  - Keep bed low and locked with side rails adjusted as appropriate  - Keep care items and personal belongings within reach  - Initiate and maintain comfort rounds  - Make Fall Risk Sign visible to staff  - Offer Toileting every 2 Hours, in advance of need  - Initiate/Maintain bed alarm  - Obtain necessary fall risk management equipment: walker  - Apply yellow socks and bracelet for high fall risk patients  - Consider moving patient to room near nurses station  Outcome: Progressing  Goal: Maintain or return to baseline ADL function  Description: INTERVENTIONS:  -  Assess patient's ability to carry out ADLs; assess patient's baseline for ADL function and identify physical deficits which impact ability to perform ADLs (bathing, care of mouth/teeth, toileting, grooming, dressing, etc )  - Assess/evaluate cause of self-care deficits   - Assess range of motion  - Assess patient's mobility; develop plan if impaired  - Assess patient's need for assistive devices and provide as appropriate  - Encourage maximum independence but intervene and supervise when necessary  - Involve family in performance of ADLs  - Assess for home care needs following discharge   - Consider OT consult to assist with ADL evaluation and planning for discharge  - Provide patient education as appropriate  Outcome: Progressing  Goal: Maintains/Returns to pre admission functional level  Description: INTERVENTIONS:  - Perform BMAT or MOVE assessment daily    - Set and communicate daily mobility goal to care team and patient/family/caregiver  - Collaborate with rehabilitation services on mobility goals if consulted  - Perform Range of Motion 3 times a day  - Reposition patient every 2 hours    - Dangle patient 3 times a day  - Stand patient 3 times a day  - Ambulate patient 3 times a day  - Out of bed to chair 3 times a day   - Out of bed for meals 3 times a day  - Out of bed for toileting  - Record patient progress and toleration of activity level   Outcome: Progressing     Problem: DISCHARGE PLANNING  Goal: Discharge to home or other facility with appropriate resources  Description: INTERVENTIONS:  - Identify barriers to discharge w/patient and caregiver  - Arrange for needed discharge resources and transportation as appropriate  - Identify discharge learning needs (meds, wound care, etc )  - Arrange for interpretive services to assist at discharge as needed  - Refer to Case Management Department for coordinating discharge planning if the patient needs post-hospital services based on physician/advanced practitioner order or complex needs related to functional status, cognitive ability, or social support system  Outcome: Progressing     Problem: Knowledge Deficit  Goal: Patient/family/caregiver demonstrates understanding of disease process, treatment plan, medications, and discharge instructions  Description: Complete learning assessment and assess knowledge base    Interventions:  - Provide teaching at level of understanding  - Provide teaching via preferred learning methods  Outcome: Progressing     Problem: CARDIOVASCULAR - ADULT  Goal: Maintains optimal cardiac output and hemodynamic stability  Description: INTERVENTIONS:  - Monitor I/O, vital signs and rhythm  - Monitor for S/S and trends of decreased cardiac output  - Administer and titrate ordered vasoactive medications to optimize hemodynamic stability  - Assess quality of pulses, skin color and temperature  - Assess for signs of decreased coronary artery perfusion  - Instruct patient to report change in severity of symptoms  Outcome: Progressing  Goal: Absence of cardiac dysrhythmias or at baseline rhythm  Description: INTERVENTIONS:  - Continuous cardiac monitoring, vital signs, obtain 12 lead EKG if ordered  - Administer antiarrhythmic and heart rate control medications as ordered  - Monitor electrolytes and administer replacement therapy as ordered  Outcome: Progressing     Problem: METABOLIC, FLUID AND ELECTROLYTES - ADULT  Goal: Electrolytes maintained within normal limits  Description: INTERVENTIONS:  - Monitor labs and assess patient for signs and symptoms of electrolyte imbalances  - Administer electrolyte replacement as ordered  - Monitor response to electrolyte replacements, including repeat lab results as appropriate  - Instruct patient on fluid and nutrition as appropriate  Outcome: Progressing  Goal: Fluid balance maintained  Description: INTERVENTIONS:  - Monitor labs   - Monitor I/O and WT  - Instruct patient on fluid and nutrition as appropriate  - Assess for signs & symptoms of volume excess or deficit  Outcome: Progressing  Goal: Glucose maintained within target range  Description: INTERVENTIONS:  - Monitor Blood Glucose as ordered  - Assess for signs and symptoms of hyperglycemia and hypoglycemia  - Administer ordered medications to maintain glucose within target range  - Assess nutritional intake and initiate nutrition service referral as needed  Outcome: Progressing     Problem: HEMATOLOGIC - ADULT  Goal: Maintains hematologic stability  Description: INTERVENTIONS  - Assess for signs and symptoms of bleeding or hemorrhage  - Monitor labs  - Administer supportive blood products/factors as ordered and appropriate  Outcome: Progressing     Problem: RESPIRATORY - ADULT  Goal: Achieves optimal ventilation and oxygenation  Description: INTERVENTIONS:  - Assess for changes in respiratory status  - Assess for changes in mentation and behavior  - Position to facilitate oxygenation and minimize respiratory effort  - Oxygen administered by appropriate delivery if ordered  - Initiate smoking cessation education as indicated  - Encourage broncho-pulmonary hygiene including cough, deep breathe, Incentive Spirometry  - Assess the need for suctioning and aspirate as needed  - Assess and instruct to report SOB or any respiratory difficulty  - Respiratory Therapy support as indicated  Outcome: Progressing

## 2022-08-13 NOTE — PLAN OF CARE
Problem: Potential for Falls  Goal: Patient will remain free of falls  Description: INTERVENTIONS:  - Educate patient/family on patient safety including physical limitations  - Instruct patient to call for assistance with activity   - Consult OT/PT to assist with strengthening/mobility   - Keep Call bell within reach  - Keep bed low and locked with side rails adjusted as appropriate  - Keep care items and personal belongings within reach  - Initiate and maintain comfort rounds  - Make Fall Risk Sign visible to staff  - Offer Toileting every 2 Hours, in advance of need  - Initiate/Maintain bed alarm  - Obtain necessary fall risk management equipment: yellow socks  - Apply yellow socks and bracelet for high fall risk patients  - Consider moving patient to room near nurses station  Outcome: Progressing     Problem: PAIN - ADULT  Goal: Verbalizes/displays adequate comfort level or baseline comfort level  Description: Interventions:  - Encourage patient to monitor pain and request assistance  - Assess pain using appropriate pain scale  - Administer analgesics based on type and severity of pain and evaluate response  - Implement non-pharmacological measures as appropriate and evaluate response  - Consider cultural and social influences on pain and pain management  - Notify physician/advanced practitioner if interventions unsuccessful or patient reports new pain  Outcome: Progressing     Problem: SAFETY ADULT  Goal: Patient will remain free of falls  Description: INTERVENTIONS:  - Educate patient/family on patient safety including physical limitations  - Instruct patient to call for assistance with activity   - Consult OT/PT to assist with strengthening/mobility   - Keep Call bell within reach  - Keep bed low and locked with side rails adjusted as appropriate  - Keep care items and personal belongings within reach  - Initiate and maintain comfort rounds  - Make Fall Risk Sign visible to staff  - Offer Toileting every 2 Hours, in advance of need  - Initiate/Maintain bed alarm  - Obtain necessary fall risk management equipment: yellow socks  - Apply yellow socks and bracelet for high fall risk patients  - Consider moving patient to room near nurses station  Outcome: Progressing  Goal: Maintain or return to baseline ADL function  Description: INTERVENTIONS:  -  Assess patient's ability to carry out ADLs; assess patient's baseline for ADL function and identify physical deficits which impact ability to perform ADLs (bathing, care of mouth/teeth, toileting, grooming, dressing, etc )  - Assess/evaluate cause of self-care deficits   - Assess range of motion  - Assess patient's mobility; develop plan if impaired  - Assess patient's need for assistive devices and provide as appropriate  - Encourage maximum independence but intervene and supervise when necessary  - Involve family in performance of ADLs  - Assess for home care needs following discharge   - Consider OT consult to assist with ADL evaluation and planning for discharge  - Provide patient education as appropriate  Outcome: Progressing  Goal: Maintains/Returns to pre admission functional level  Description: INTERVENTIONS:  - Perform BMAT or MOVE assessment daily    - Set and communicate daily mobility goal to care team and patient/family/caregiver  - Collaborate with rehabilitation services on mobility goals if consulted  - Perform Range of Motion 2 times a day  - Reposition patient every 2 hours    - Dangle patient 2 times a day  - Stand patient 2 times a day  - Ambulate patient 2 times a day  - Out of bed to chair 2 times a day   - Out of bed for meals 2 times a day  - Out of bed for toileting  - Record patient progress and toleration of activity level   Outcome: Progressing     Problem: DISCHARGE PLANNING  Goal: Discharge to home or other facility with appropriate resources  Description: INTERVENTIONS:  - Identify barriers to discharge w/patient and caregiver  - Arrange for needed discharge resources and transportation as appropriate  - Identify discharge learning needs (meds, wound care, etc )  - Arrange for interpretive services to assist at discharge as needed  - Refer to Case Management Department for coordinating discharge planning if the patient needs post-hospital services based on physician/advanced practitioner order or complex needs related to functional status, cognitive ability, or social support system  Outcome: Progressing     Problem: Knowledge Deficit  Goal: Patient/family/caregiver demonstrates understanding of disease process, treatment plan, medications, and discharge instructions  Description: Complete learning assessment and assess knowledge base    Interventions:  - Provide teaching at level of understanding  - Provide teaching via preferred learning methods  Outcome: Progressing     Problem: CARDIOVASCULAR - ADULT  Goal: Maintains optimal cardiac output and hemodynamic stability  Description: INTERVENTIONS:  - Monitor I/O, vital signs and rhythm  - Monitor for S/S and trends of decreased cardiac output  - Administer and titrate ordered vasoactive medications to optimize hemodynamic stability  - Assess quality of pulses, skin color and temperature  - Assess for signs of decreased coronary artery perfusion  - Instruct patient to report change in severity of symptoms  Outcome: Progressing  Goal: Absence of cardiac dysrhythmias or at baseline rhythm  Description: INTERVENTIONS:  - Continuous cardiac monitoring, vital signs, obtain 12 lead EKG if ordered  - Administer antiarrhythmic and heart rate control medications as ordered  - Monitor electrolytes and administer replacement therapy as ordered  Outcome: Progressing     Problem: METABOLIC, FLUID AND ELECTROLYTES - ADULT  Goal: Electrolytes maintained within normal limits  Description: INTERVENTIONS:  - Monitor labs and assess patient for signs and symptoms of electrolyte imbalances  - Administer electrolyte replacement as ordered  - Monitor response to electrolyte replacements, including repeat lab results as appropriate  - Instruct patient on fluid and nutrition as appropriate  Outcome: Progressing  Goal: Fluid balance maintained  Description: INTERVENTIONS:  - Monitor labs   - Monitor I/O and WT  - Instruct patient on fluid and nutrition as appropriate  - Assess for signs & symptoms of volume excess or deficit  Outcome: Progressing  Goal: Glucose maintained within target range  Description: INTERVENTIONS:  - Monitor Blood Glucose as ordered  - Assess for signs and symptoms of hyperglycemia and hypoglycemia  - Administer ordered medications to maintain glucose within target range  - Assess nutritional intake and initiate nutrition service referral as needed  Outcome: Progressing     Problem: HEMATOLOGIC - ADULT  Goal: Maintains hematologic stability  Description: INTERVENTIONS  - Assess for signs and symptoms of bleeding or hemorrhage  - Monitor labs  - Administer supportive blood products/factors as ordered and appropriate  Outcome: Progressing     Problem: RESPIRATORY - ADULT  Goal: Achieves optimal ventilation and oxygenation  Description: INTERVENTIONS:  - Assess for changes in respiratory status  - Assess for changes in mentation and behavior  - Position to facilitate oxygenation and minimize respiratory effort  - Oxygen administered by appropriate delivery if ordered  - Initiate smoking cessation education as indicated  - Encourage broncho-pulmonary hygiene including cough, deep breathe, Incentive Spirometry  - Assess the need for suctioning and aspirate as needed  - Assess and instruct to report SOB or any respiratory difficulty  - Respiratory Therapy support as indicated  Outcome: Progressing

## 2022-08-13 NOTE — PLAN OF CARE
Problem: Potential for Falls  Goal: Patient will remain free of falls  Description: INTERVENTIONS:  - Educate patient/family on patient safety including physical limitations  - Instruct patient to call for assistance with activity   - Consult OT/PT to assist with strengthening/mobility   - Keep Call bell within reach  - Keep bed low and locked with side rails adjusted as appropriate  - Keep care items and personal belongings within reach  - Initiate and maintain comfort rounds  - Make Fall Risk Sign visible to staff  - Offer Toileting every 2  Hours, in advance of need  - Initiate/Maintain bed alarm  - Obtain necessary fall risk management equipment:   - Apply yellow socks and bracelet for high fall risk patients  - Consider moving patient to room near nurses station  Outcome: Progressing     Problem: PAIN - ADULT  Goal: Verbalizes/displays adequate comfort level or baseline comfort level  Description: Interventions:  - Encourage patient to monitor pain and request assistance  - Assess pain using appropriate pain scale  - Administer analgesics based on type and severity of pain and evaluate response  - Implement non-pharmacological measures as appropriate and evaluate response  - Consider cultural and social influences on pain and pain management  - Notify physician/advanced practitioner if interventions unsuccessful or patient reports new pain  Outcome: Progressing     Problem: SAFETY ADULT  Goal: Maintains/Returns to pre admission functional level  Description: INTERVENTIONS:  - Perform BMAT or MOVE assessment daily    - Set and communicate daily mobility goal to care team and patient/family/caregiver  - Collaborate with rehabilitation services on mobility goals if consulted  - Perform Range of Motion 3  times a day  - Reposition patient every 2  hours    - Dangle patient 3  times a day  - Stand patient 3  times a day  - Ambulate patient 3  times a day  - Out of bed to chair 3  times a day   - Out of bed for meals 3  times a day  -  Uses urinal at bedside    - Record patient progress and toleration of activity level   Outcome: Progressing     Problem: DISCHARGE PLANNING  Goal: Discharge to home or other facility with appropriate resources  Description: INTERVENTIONS:  - Identify barriers to discharge w/patient and caregiver  - Arrange for needed discharge resources and transportation as appropriate  - Identify discharge learning needs (meds, wound care, etc )  - Arrange for interpretive services to assist at discharge as needed  - Refer to Case Management Department for coordinating discharge planning if the patient needs post-hospital services based on physician/advanced practitioner order or complex needs related to functional status, cognitive ability, or social support system  Outcome: Progressing     Problem: Knowledge Deficit  Goal: Patient/family/caregiver demonstrates understanding of disease process, treatment plan, medications, and discharge instructions  Description: Complete learning assessment and assess knowledge base    Interventions:  - Provide teaching at level of understanding  - Provide teaching via preferred learning methods  Outcome: Progressing     Problem: CARDIOVASCULAR - ADULT  Goal: Maintains optimal cardiac output and hemodynamic stability  Description: INTERVENTIONS:  - Monitor I/O, vital signs and rhythm  - Monitor for S/S and trends of decreased cardiac output  - Administer and titrate ordered vasoactive medications to optimize hemodynamic stability  - Assess quality of pulses, skin color and temperature  - Assess for signs of decreased coronary artery perfusion  - Instruct patient to report change in severity of symptoms  Outcome: Progressing     Problem: CARDIOVASCULAR - ADULT  Goal: Absence of cardiac dysrhythmias or at baseline rhythm  Description: INTERVENTIONS:  - Continuous cardiac monitoring, vital signs, obtain 12 lead EKG if ordered  - Administer antiarrhythmic and heart rate control medications as ordered  - Monitor electrolytes and administer replacement therapy as ordered  - Patient has life vest  Ensure that it's fitted, and functioning properly  Extra battery needs to charge    Outcome: Progressing     Problem: METABOLIC, FLUID AND ELECTROLYTES - ADULT  Goal: Electrolytes maintained within normal limits  Description: INTERVENTIONS:  - Monitor labs and assess patient for signs and symptoms of electrolyte imbalances  - Administer electrolyte replacement as ordered  - Monitor response to electrolyte replacements, including repeat lab results as appropriate  - Instruct patient on fluid and nutrition as appropriate  Outcome: Progressing     Problem: METABOLIC, FLUID AND ELECTROLYTES - ADULT  Goal: Fluid balance maintained  Description: INTERVENTIONS:  - Monitor labs   - Monitor I/O and WT  - Instruct patient on fluid and nutrition as appropriate  - Assess for signs & symptoms of volume excess or deficit  Outcome: Progressing     Problem: HEMATOLOGIC - ADULT  Goal: Maintains hematologic stability  Description: INTERVENTIONS  - Assess for signs and symptoms of bleeding or hemorrhage  - Monitor labs  - Administer supportive blood products/factors as ordered and appropriate  Outcome: Progressing     Problem: RESPIRATORY - ADULT  Goal: Achieves optimal ventilation and oxygenation  Description: INTERVENTIONS:  - Assess for changes in respiratory status  - Assess for changes in mentation and behavior  - Position to facilitate oxygenation and minimize respiratory effort  - Oxygen administered by appropriate delivery if ordered  - Initiate smoking cessation education as indicated  - Encourage broncho-pulmonary hygiene including cough, deep breathe, Incentive Spirometry  - Assess the need for suctioning and aspirate as needed  - Assess and instruct to report SOB or any respiratory difficulty  - Respiratory Therapy support as indicated  Outcome: Progressing

## 2022-08-14 LAB
BASOPHILS # BLD AUTO: 0.04 THOUSANDS/ΜL (ref 0–0.1)
BASOPHILS NFR BLD AUTO: 1 % (ref 0–1)
EOSINOPHIL # BLD AUTO: 0.33 THOUSAND/ΜL (ref 0–0.61)
EOSINOPHIL NFR BLD AUTO: 5 % (ref 0–6)
ERYTHROCYTE [DISTWIDTH] IN BLOOD BY AUTOMATED COUNT: 15.5 % (ref 11.6–15.1)
HCT VFR BLD AUTO: 34.9 % (ref 36.5–49.3)
HGB BLD-MCNC: 11.1 G/DL (ref 12–17)
IMM GRANULOCYTES # BLD AUTO: 0.04 THOUSAND/UL (ref 0–0.2)
IMM GRANULOCYTES NFR BLD AUTO: 1 % (ref 0–2)
LYMPHOCYTES # BLD AUTO: 1.41 THOUSANDS/ΜL (ref 0.6–4.47)
LYMPHOCYTES NFR BLD AUTO: 21 % (ref 14–44)
MCH RBC QN AUTO: 29.9 PG (ref 26.8–34.3)
MCHC RBC AUTO-ENTMCNC: 31.8 G/DL (ref 31.4–37.4)
MCV RBC AUTO: 94 FL (ref 82–98)
MONOCYTES # BLD AUTO: 0.59 THOUSAND/ΜL (ref 0.17–1.22)
MONOCYTES NFR BLD AUTO: 9 % (ref 4–12)
NEUTROPHILS # BLD AUTO: 4.35 THOUSANDS/ΜL (ref 1.85–7.62)
NEUTS SEG NFR BLD AUTO: 63 % (ref 43–75)
NRBC BLD AUTO-RTO: 0 /100 WBCS
PLATELET # BLD AUTO: 175 THOUSANDS/UL (ref 149–390)
PMV BLD AUTO: 8.8 FL (ref 8.9–12.7)
RBC # BLD AUTO: 3.71 MILLION/UL (ref 3.88–5.62)
WBC # BLD AUTO: 6.76 THOUSAND/UL (ref 4.31–10.16)

## 2022-08-14 PROCEDURE — 94760 N-INVAS EAR/PLS OXIMETRY 1: CPT

## 2022-08-14 PROCEDURE — 85025 COMPLETE CBC W/AUTO DIFF WBC: CPT | Performed by: STUDENT IN AN ORGANIZED HEALTH CARE EDUCATION/TRAINING PROGRAM

## 2022-08-14 PROCEDURE — 99232 SBSQ HOSP IP/OBS MODERATE 35: CPT | Performed by: STUDENT IN AN ORGANIZED HEALTH CARE EDUCATION/TRAINING PROGRAM

## 2022-08-14 RX ADMIN — METHOCARBAMOL 500 MG: 500 TABLET ORAL at 17:05

## 2022-08-14 RX ADMIN — RIVAROXABAN 20 MG: 20 TABLET, FILM COATED ORAL at 08:56

## 2022-08-14 RX ADMIN — MAGNESIUM OXIDE TAB 400 MG (241.3 MG ELEMENTAL MG) 400 MG: 400 (241.3 MG) TAB at 08:56

## 2022-08-14 RX ADMIN — THIAMINE HCL TAB 100 MG 100 MG: 100 TAB at 08:56

## 2022-08-14 RX ADMIN — SENNOSIDES AND DOCUSATE SODIUM 1 TABLET: 50; 8.6 TABLET ORAL at 21:12

## 2022-08-14 RX ADMIN — GABAPENTIN 300 MG: 300 CAPSULE ORAL at 15:56

## 2022-08-14 RX ADMIN — NICOTINE 1 PATCH: 21 PATCH, EXTENDED RELEASE TRANSDERMAL at 08:57

## 2022-08-14 RX ADMIN — CHLORDIAZEPOXIDE HYDROCHLORIDE 25 MG: 25 CAPSULE ORAL at 17:05

## 2022-08-14 RX ADMIN — METHOCARBAMOL 500 MG: 500 TABLET ORAL at 12:16

## 2022-08-14 RX ADMIN — FOLIC ACID 1 MG: 1 TABLET ORAL at 08:56

## 2022-08-14 RX ADMIN — MAGNESIUM OXIDE TAB 400 MG (241.3 MG ELEMENTAL MG) 400 MG: 400 (241.3 MG) TAB at 17:05

## 2022-08-14 RX ADMIN — LOSARTAN POTASSIUM 25 MG: 25 TABLET, FILM COATED ORAL at 08:56

## 2022-08-14 RX ADMIN — B-COMPLEX W/ C & FOLIC ACID TAB 1 TABLET: TAB at 08:56

## 2022-08-14 RX ADMIN — ASPIRIN 81 MG CHEWABLE TABLET 81 MG: 81 TABLET CHEWABLE at 08:56

## 2022-08-14 RX ADMIN — GABAPENTIN 300 MG: 300 CAPSULE ORAL at 08:56

## 2022-08-14 RX ADMIN — METHOCARBAMOL 500 MG: 500 TABLET ORAL at 23:01

## 2022-08-14 RX ADMIN — GABAPENTIN 300 MG: 300 CAPSULE ORAL at 21:12

## 2022-08-14 NOTE — PROGRESS NOTES
Daily Progress Note - [facility]  Family Medicine Residency  [address] 59 y o  male MRN: [id number]  Unit/Bed#: [address] [facility][note 3] Encounter: [id number]  Admitting Physician: Sue Castaneda MD  PCP: Nick Horne MD  Date of Admission:  8/10/2022  5:37 PM    Assessment and Plan  Patient has been advised that he needs to call inpatient alcohol rehab centers if he would like to go there on discharge  * Syncope  Assessment & Plan  57M, hx of non ischemic cardiomyopathy, alcohol abuse, atrial flutter, presents after syncopal episode this morning and AICD discharge  Likely secondary to arrhythmia which caused discharge of AICD but will complete workup  Echo from 07/2022: EF 25-30%  - EKG NSR @ 100 BPM  - Trop 4-4-4, TSH 0 9  CXR-No acute cardiopulmonary disease  Trace loculated right apical pleural effusion     - CTA head neck No cervical carotid stenosis, 3mm R clinoid segment aneurysm  1mm R P-comm aneurysm from prominent infundibulum  - f/u with Dr Aaron Winter neurosurgery at [facility] outpatient  - PT/OT eval    AICD discharge  [address] report received via fax received shows AICD discharge at 10:40AM  ED physician discussed with Ford Yeboah at [phone] option 1) and stated it showed a single episode of atrial flutter, though difficult to discern  - Patient admits to non-compliance with life vest over the past week  Will need counseling and new life vest  - Monitor on Telemetry  - Cardiology recs: Shock was secondary to alcohol intoxication, electrolyte abnormalities, and medication non-compliance  Patient is not a candidate for antiarrhythmia due to poor compliance  Permanent ICD is not a good idea due to recurrence  Recommends inpatient alcohol rehab and nicotine cessation  Brain aneurysm  Assessment & Plan  CTA Head Neck: No significant stenosis of the cervical carotid or vertebral arteries  3 mm right clinoid segment aneurysm   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  -  f/u with Dr Corrie Leyva neurosurgery at [facility] outpatient    Alcohol dependence [facility])  Assessment & Plan  Drinks about 6-7 shots of vodka daily  Had 2 shots day of admission 8/10  · Etoh elevated 318  · Will place on CIWA  · Librium protocol 50mg q6h  · Continue thiamine, folate, multivitamin, multimineral  · Crisis consulted for alcohol cessation resources  · 8/13 - CIWA score 0  · Continue librium taper until 8/15  · Patient interested in inpatient alcohol rehab, needs to call rehabs himself    Tobacco abuse  Assessment & Plan  Smokes 1 pack every day  - Will place on nicotine patch 21mg daily    - Tobacco cessation counseling on discharge    Paroxysmal atrial fibrillation ([address] )  Assessment & Plan  Rate controlled  - Continue home metoprolol 25mg QD and xarelto 20mg daily   - If HR > 100, give IV Metoprolol PRN, IV Amiodarone if needed as per cardio    Hypokalemia  Assessment & Plan  Resolved  - K 2 9 on admission  Repleted  - Most recent K on 8/13 is 4 3  - Monitor on telemetry    Cardiomyopathy, 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  Assessment & Plan  Echo from 07/2022: EF 25-30%  Life Vest received in 07/2022 with plans to repeat limited TTE in 10/2022 as per previous note  - BNP 83  Does not appear fluid overloaded  - Continue Metoprolol succinate 25 mg daily    Atrial flutter (HCC)  Assessment & Plan  - Continue Metoprolol 25 mg, Xarelto 20 mg    VTE Pharmacologic Prophylaxis:   Pharmacologic: Rivaroxaban (Xarelto)  Mechanical VTE Prophylaxis in Place: Yes    Patient Centered Rounds: I have performed bedside rounds with nursing staff today  Discussions with Specialists or Other Care Team Provider: Cardiology, Neurology  Time Spent for Care: 30 minutes  More than 50% of total time spent on counseling and coordination of care as described above      Current Length of Stay: 4 day(s)    Current Patient Status: Inpatient   Certification Statement: The patient will continue to require additional inpatient hospital stay due to alcohol taper    Code Status: Level 1 - Full Code    Subjective:   Patient seen and examined at bedside this morning  Patient has no pain complaints today  He understands and still would like to go to inpatient rehab  However, he cannot find the phone numbers for the inpatient rehab  Review of Systems   Respiratory: Negative for cough and shortness of breath  Gastrointestinal: Positive for abdominal pain  Negative for constipation, diarrhea, nausea and vomiting  Objective     Objective:   Vitals:   Temp (24hrs), Av 1 °F (36 7 °C), Min:98 °F (36 7 °C), Max:98 2 °F (36 8 °C)    Temp:  [98 °F (36 7 °C)-98 2 °F (36 8 °C)] 98 1 °F (36 7 °C)  HR:  [] 99  Resp:  [18] 18  BP: ()/(52-61) 110/61  SpO2:  [93 %-100 %] 98 %  Body mass index is 21 1 kg/m²  Input and Output Summary (last 24 hours): Intake/Output Summary (Last 24 hours) at 2022 1017  Last data filed at 2022 1339  Gross per 24 hour   Intake --   Output 200 ml   Net -200 ml       Physical Exam:   Physical Exam  Cardiovascular:      Rate and Rhythm: Normal rate and regular rhythm  Pulses: Normal pulses  Heart sounds: Normal heart sounds  Pulmonary:      Effort: Pulmonary effort is normal       Breath sounds: Normal breath sounds  Abdominal:      General: Abdomen is flat  Bowel sounds are normal       Palpations: Abdomen is soft  Tenderness: There is abdominal tenderness  Neurological:      Mental Status: He is alert and oriented to person, place, and time         Additional Data:     Labs:  Results from last 7 days   Lab Units 22  0736   WBC Thousand/uL 6 76   HEMOGLOBIN g/dL 11 1*   HEMATOCRIT % 34 9*   PLATELETS Thousands/uL 175   NEUTROS PCT % 63   LYMPHS PCT % 21   MONOS PCT % 9   EOS PCT % 5     Results from last 7 days   Lab Units 22  0531 22  0543 08/10/22  1754   POTASSIUM mmol/L 4 3 < > 2 9*   CHLORIDE mmol/L 104   < > 101   CO2 mmol/L 25   < > 26   BUN mg/dL 10   < > 9   CREATININE mg/dL 0 54*   < > 0 67   CALCIUM mg/dL 8 6   < > 8 4   ALK PHOS U/L  --   --  65   ALT U/L  --   --  14   AST U/L  --   --  23    < > = values in this interval not displayed  * I Have Reviewed All Lab Data Listed Above  * Additional Pertinent Lab Tests Reviewed: All Labs Within Last 24 Hours Reviewed    Recent Cultures (last 7 days):           Last 24 Hours Medication List:   Current Facility-Administered Medications   Medication Dose Route Frequency Provider Last Rate    acetaminophen  650 mg Oral Q6H PRN Zina Spatz, MD      aspirin  81 mg Oral Daily Zina Spatz, MD      chlordiazePOXIDE  25 mg Oral Q12H [place], [place]      [START ON 8/15/2022] chlordiazePOXIDE  25 mg Oral Early Morning [facility]      folic acid  1 mg Oral Daily Zina Spatz, MD      gabapentin  300 mg Oral TID Zina Spatz, MD      levalbuterol  1 25 mg Nebulization Q6H PRN Zina Spatz, MD      losartan  25 mg Oral Daily Zina Spatz, MD      magnesium oxide  400 mg Oral BID Zina Spatz, MD      methocarbamol  500 mg Oral Q6H [facility] Zina Spatz, MD      metoprolol succinate  25 mg Oral Daily Gino Rudd DO      multivitamin stress formula  1 tablet Oral Daily Zina Spatz, MD      nicotine  1 patch Transdermal Daily Zina Spatz, MD      polyethylene glycol  17 g Oral Daily PRN Gino Rudd DO      rivaroxaban  20 mg Oral Daily With Breakfast Zina Spatz, MD      senna-docusate sodium  1 tablet Oral HS Gino Rudd DO      thiamine  100 mg Oral Daily Zina Spatz, MD        ** Please Note: Dictation voice to text software may have been used in the creation of this document   **    Monique Mendez MD  08/14/22  10:17 AM

## 2022-08-15 ENCOUNTER — TELEPHONE (OUTPATIENT)
Dept: FAMILY MEDICINE CLINIC | Facility: CLINIC | Age: 57
End: 2022-08-15

## 2022-08-15 ENCOUNTER — APPOINTMENT (INPATIENT)
Dept: RADIOLOGY | Facility: HOSPITAL | Age: 57
DRG: 315 | End: 2022-08-15
Payer: COMMERCIAL

## 2022-08-15 VITALS
HEART RATE: 90 BPM | DIASTOLIC BLOOD PRESSURE: 69 MMHG | TEMPERATURE: 98.2 F | HEIGHT: 66 IN | BODY MASS INDEX: 21.01 KG/M2 | OXYGEN SATURATION: 96 % | WEIGHT: 130.73 LBS | RESPIRATION RATE: 18 BRPM | SYSTOLIC BLOOD PRESSURE: 114 MMHG

## 2022-08-15 PROBLEM — R55 SYNCOPE: Status: RESOLVED | Noted: 2022-08-10 | Resolved: 2022-08-15

## 2022-08-15 PROBLEM — E87.6 HYPOKALEMIA: Status: RESOLVED | Noted: 2017-09-22 | Resolved: 2022-08-15

## 2022-08-15 LAB
ANION GAP SERPL CALCULATED.3IONS-SCNC: 9 MMOL/L (ref 4–13)
BASOPHILS # BLD AUTO: 0.03 THOUSANDS/ΜL (ref 0–0.1)
BASOPHILS NFR BLD AUTO: 0 % (ref 0–1)
BUN SERPL-MCNC: 11 MG/DL (ref 5–25)
CALCIUM SERPL-MCNC: 9.2 MG/DL (ref 8.3–10.1)
CHLORIDE SERPL-SCNC: 103 MMOL/L (ref 96–108)
CO2 SERPL-SCNC: 26 MMOL/L (ref 21–32)
CREAT SERPL-MCNC: 0.58 MG/DL (ref 0.6–1.3)
EOSINOPHIL # BLD AUTO: 0.29 THOUSAND/ΜL (ref 0–0.61)
EOSINOPHIL NFR BLD AUTO: 4 % (ref 0–6)
ERYTHROCYTE [DISTWIDTH] IN BLOOD BY AUTOMATED COUNT: 15.9 % (ref 11.6–15.1)
GFR SERPL CREATININE-BSD FRML MDRD: 111 ML/MIN/1.73SQ M
GLUCOSE SERPL-MCNC: 106 MG/DL (ref 65–140)
HCT VFR BLD AUTO: 36.9 % (ref 36.5–49.3)
HGB BLD-MCNC: 11.8 G/DL (ref 12–17)
IMM GRANULOCYTES # BLD AUTO: 0.05 THOUSAND/UL (ref 0–0.2)
IMM GRANULOCYTES NFR BLD AUTO: 1 % (ref 0–2)
LYMPHOCYTES # BLD AUTO: 1.56 THOUSANDS/ΜL (ref 0.6–4.47)
LYMPHOCYTES NFR BLD AUTO: 23 % (ref 14–44)
MAGNESIUM SERPL-MCNC: 1.4 MG/DL (ref 1.6–2.6)
MCH RBC QN AUTO: 30.2 PG (ref 26.8–34.3)
MCHC RBC AUTO-ENTMCNC: 32 G/DL (ref 31.4–37.4)
MCV RBC AUTO: 94 FL (ref 82–98)
MONOCYTES # BLD AUTO: 0.74 THOUSAND/ΜL (ref 0.17–1.22)
MONOCYTES NFR BLD AUTO: 11 % (ref 4–12)
NEUTROPHILS # BLD AUTO: 4.13 THOUSANDS/ΜL (ref 1.85–7.62)
NEUTS SEG NFR BLD AUTO: 61 % (ref 43–75)
NRBC BLD AUTO-RTO: 0 /100 WBCS
PHOSPHATE SERPL-MCNC: 3.2 MG/DL (ref 2.7–4.5)
PLATELET # BLD AUTO: 187 THOUSANDS/UL (ref 149–390)
PMV BLD AUTO: 8.9 FL (ref 8.9–12.7)
POTASSIUM SERPL-SCNC: 3.8 MMOL/L (ref 3.5–5.3)
RBC # BLD AUTO: 3.91 MILLION/UL (ref 3.88–5.62)
SODIUM SERPL-SCNC: 138 MMOL/L (ref 135–147)
WBC # BLD AUTO: 6.8 THOUSAND/UL (ref 4.31–10.16)

## 2022-08-15 PROCEDURE — 84100 ASSAY OF PHOSPHORUS: CPT

## 2022-08-15 PROCEDURE — 76705 ECHO EXAM OF ABDOMEN: CPT

## 2022-08-15 PROCEDURE — 99238 HOSP IP/OBS DSCHRG MGMT 30/<: CPT | Performed by: FAMILY MEDICINE

## 2022-08-15 PROCEDURE — 80048 BASIC METABOLIC PNL TOTAL CA: CPT

## 2022-08-15 PROCEDURE — 85025 COMPLETE CBC W/AUTO DIFF WBC: CPT | Performed by: STUDENT IN AN ORGANIZED HEALTH CARE EDUCATION/TRAINING PROGRAM

## 2022-08-15 PROCEDURE — 97167 OT EVAL HIGH COMPLEX 60 MIN: CPT

## 2022-08-15 PROCEDURE — 83735 ASSAY OF MAGNESIUM: CPT

## 2022-08-15 RX ORDER — MAGNESIUM SULFATE HEPTAHYDRATE 40 MG/ML
2 INJECTION, SOLUTION INTRAVENOUS ONCE
Status: COMPLETED | OUTPATIENT
Start: 2022-08-15 | End: 2022-08-15

## 2022-08-15 RX ORDER — POTASSIUM CHLORIDE 20 MEQ/1
40 TABLET, EXTENDED RELEASE ORAL ONCE
Status: DISCONTINUED | OUTPATIENT
Start: 2022-08-15 | End: 2022-08-15

## 2022-08-15 RX ORDER — POTASSIUM CHLORIDE 14.9 MG/ML
20 INJECTION INTRAVENOUS ONCE
Status: COMPLETED | OUTPATIENT
Start: 2022-08-15 | End: 2022-08-15

## 2022-08-15 RX ORDER — CHLORDIAZEPOXIDE HYDROCHLORIDE 25 MG/1
25 CAPSULE, GELATIN COATED ORAL
Status: COMPLETED | OUTPATIENT
Start: 2022-08-15 | End: 2022-08-15

## 2022-08-15 RX ORDER — MAGNESIUM SULFATE HEPTAHYDRATE 40 MG/ML
2 INJECTION, SOLUTION INTRAVENOUS ONCE
Status: DISCONTINUED | OUTPATIENT
Start: 2022-08-15 | End: 2022-08-15

## 2022-08-15 RX ORDER — NALTREXONE HYDROCHLORIDE 50 MG/1
50 TABLET, FILM COATED ORAL DAILY
Qty: 30 TABLET | Refills: 0 | Status: SHIPPED | OUTPATIENT
Start: 2022-08-15 | End: 2022-10-05 | Stop reason: SDUPTHER

## 2022-08-15 RX ORDER — POTASSIUM CHLORIDE 20 MEQ/1
20 TABLET, EXTENDED RELEASE ORAL DAILY
Qty: 30 TABLET | Refills: 0 | Status: SHIPPED | OUTPATIENT
Start: 2022-08-15 | End: 2022-10-05 | Stop reason: SDUPTHER

## 2022-08-15 RX ADMIN — METHOCARBAMOL 500 MG: 500 TABLET ORAL at 05:23

## 2022-08-15 RX ADMIN — MAGNESIUM SULFATE HEPTAHYDRATE 2 G: 40 INJECTION, SOLUTION INTRAVENOUS at 05:05

## 2022-08-15 RX ADMIN — MAGNESIUM OXIDE TAB 400 MG (241.3 MG ELEMENTAL MG) 400 MG: 400 (241.3 MG) TAB at 08:15

## 2022-08-15 RX ADMIN — NICOTINE 1 PATCH: 21 PATCH, EXTENDED RELEASE TRANSDERMAL at 08:15

## 2022-08-15 RX ADMIN — POTASSIUM CHLORIDE 20 MEQ: 14.9 INJECTION, SOLUTION INTRAVENOUS at 10:38

## 2022-08-15 RX ADMIN — METHOCARBAMOL 500 MG: 500 TABLET ORAL at 12:32

## 2022-08-15 RX ADMIN — CHLORDIAZEPOXIDE HYDROCHLORIDE 25 MG: 25 CAPSULE ORAL at 08:42

## 2022-08-15 RX ADMIN — METHOCARBAMOL 500 MG: 500 TABLET ORAL at 17:08

## 2022-08-15 RX ADMIN — GABAPENTIN 300 MG: 300 CAPSULE ORAL at 15:39

## 2022-08-15 RX ADMIN — MAGNESIUM OXIDE TAB 400 MG (241.3 MG ELEMENTAL MG) 400 MG: 400 (241.3 MG) TAB at 17:08

## 2022-08-15 RX ADMIN — ASPIRIN 81 MG CHEWABLE TABLET 81 MG: 81 TABLET CHEWABLE at 08:15

## 2022-08-15 RX ADMIN — THIAMINE HCL TAB 100 MG 100 MG: 100 TAB at 08:15

## 2022-08-15 RX ADMIN — FOLIC ACID 1 MG: 1 TABLET ORAL at 08:15

## 2022-08-15 RX ADMIN — MAGNESIUM SULFATE HEPTAHYDRATE 2 G: 40 INJECTION, SOLUTION INTRAVENOUS at 10:42

## 2022-08-15 RX ADMIN — RIVAROXABAN 20 MG: 20 TABLET, FILM COATED ORAL at 08:15

## 2022-08-15 RX ADMIN — B-COMPLEX W/ C & FOLIC ACID TAB 1 TABLET: TAB at 08:15

## 2022-08-15 RX ADMIN — GABAPENTIN 300 MG: 300 CAPSULE ORAL at 08:15

## 2022-08-15 NOTE — OCCUPATIONAL THERAPY NOTE
OT EVALUATION       08/15/22 1022   Note Type   Note type Evaluation   Restrictions/Precautions   Other Precautions Chair Alarm; Bed Alarm; Fall Risk  (LIFEVEST)   Pain Assessment   Pain Assessment Tool 0-10   Pain Score No Pain   Home Living   Type of Home House   Home Layout Two level; Able to live on main level with bedroom/bathroom  ([address])   Additional Comments pt is a poor historian, lethargic   Prior Function   Level of [place] Independent with ADLs and functional mobility; Needs assistance with IADLs   Lives With Family  (sister)   Receives Help From Family   ADL Assistance Independent   IADLs Needs assistance   Comments Patient admitted after syncopal episode this morning and AICD discharge   ADL   Eating Assistance 4  Minimal Assistance   Grooming Assistance 4  Minimal Assistance   UB Bathing Assistance 4  Minimal Assistance   LB [address] 3  Moderate Assistance   [address] 4  Minimal Assistance    [facility] [address] 3  Moderate [address]  3  Moderate Assistance   Bed Mobility   Supine to Sit 4  Minimal assistance   Sit to Supine 4  Minimal assistance   Transfers   Sit to Stand 4  Minimal assistance   Stand to Sit 4  Minimal assistance   Functional Mobility   Functional Mobility 3  Moderate assistance   Additional Comments few steps to head of bed, limited by lethargy   Additional items Rolling walker   Balance   Static Sitting Fair   Dynamic Sitting Fair -   Static Standing Fair -   Dynamic Standing Poor +   Activity Tolerance   Activity Tolerance Patient limited by fatigue  (lethargic)   RUE Assessment   RUE Assessment WFL   LUE Assessment   LUE Assessment WFL   Cognition   Overall Cognitive Status Impaired   Arousal/Participation Cooperative   Attention Attends with cues to redirect   Orientation Level Oriented X4   Following Commands Follows multistep commands with increased time or repetition   Comments pt lethargic during session   Assessment Limitation Decreased ADL status; Decreased UE strength;Decreased Safe judgement during ADL;Decreased endurance;Decreased cognition;Decreased high-level ADLs; Decreased self-care trans  (decreased balance and mobility)   Prognosis Good   Assessment Patient evaluated by Occupational Therapy  Patient admitted with Syncope  The patients occupational profile, medical and therapy history includes a extensive additional review of physical, cognitive, or psychosocial history related to current functional performance  Comorbidities affecting functional mobility and ADLS include: congenital heart disease, alcohol abuse, atrial flutter, COPD  Prior to admission, patient was independent with functional mobility without assistive device, independent with ADLS and requiring assist for IADLS  The evaluation identifies the following performance deficits: weakness, impaired balance, decreased endurance, increased fall risk, new onset of impairment of functional mobility, decreased ADLS, decreased IADLS, decreased activity tolerance, decreased safety awareness, impaired judgement, decreased cognition and decreased strength, that result in activity limitations and/or participation restrictions  This evaluation requires clinical decision making of high complexity, because the patient presents with comorbidites that affect occupational performance and required significant modification of tasks or assistance with consideration of multiple treatment options  The Barthel Index was used as a functional outcome tool presenting with a score of Barthel Index Score: 50, indicating marked limitations of functional mobility and ADLS  The patient's raw score on the AM-PAC Daily Activity inpatient short form is 16, standardized score is 35 96, less than 39 4  Patients at this level are likely to benefit from DC to post-acute rehabilitation services, however anticipate once pt more alert can return home with services   Please refer to the recommendation of the Occupational Therapist for safe DC planning  Patient will benefit from skilled Occupational Therapy services to address above deficits and facilitate a safe return to prior level of function  Goals   Patient Goals to go home   STG Time Frame   (1-7 days)   Short Term Goal  Goals established to promote Patient Goals: to go home:  Patient will increase standing tolerance to 3 minutes during ADL task to decrease assistance level and decrease fall risk; Patient will increase bed mobility to supervision in preparation for ADLS and transfers; Patient will increase functional mobility to and from bathroom with rolling walker with min assist to increase performance with ADLS and to use a toilet; Patient will tolerate 10 minutes of UE ROM/strengthening to increase general activity tolerance and performance in ADLS/IADLS; Patient will improve functional activity tolerance to 10 minutes of sustained functional tasks to increase participation in basic self-care and decrease assistance level; Patient will increase dynamic sitting balance to fair to improve the ability to sit at edge of bed or on a chair for ADLS;  Patient will increase dynamic standing balance to fair- to improve postural stability and decrease fall risk during standing ADLS and transfers  LTG Time Frame   (8-14 days)   Long Term Goal Patient will increase standing tolerance to 6 minutes during ADL task to decrease assistance level and decrease fall risk; Patient will increase bed mobility to independent in preparation for ADLS and transfers;  Patient will increase functional mobility to and from bathroom with rolling walker with supervision to increase performance with ADLS and to use a toilet; Patient will tolerate 20 minutes of UE ROM/strengthening to increase general activity tolerance and performance in ADLS/IADLS; Patient will improve functional activity tolerance to 20 minutes of sustained functional tasks to increase participation in basic self-care and decrease assistance level; Patient will increase dynamic sitting balance to fair+ to improve the ability to sit at edge of bed or on a chair for ADLS;  Patient will increase dynamic standing balance to fair to improve postural stability and decrease fall risk during standing ADLS and transfers  Pt will score >/= 20/24 on AM-PAC Daily Activity Inpatient scale to promote safe independence with ADLs and functional mobility; Pt will score >/= 80/100 on Barthel Index in order to decrease caregiver assistance needed and increase ability to perform ADLs and functional mobility  Functional Transfer Goals   Pt Will Perform All Functional Transfers   (STG min assist LTG supervision)   ADL Goals   Pt Will Perform Grooming Standing at sink  (STG min assist LTG supervision)   Pt Will Perform Bathing   (STG min assist LTG supervision)   Pt Will Perform UE Dressing   (STG supervision LTG Independent)   Pt Will Perform LE Dressing   (STG min assist LTG supervision)   Pt Will Perform Toileting   (STG min assist LTG supervision)   Plan   Treatment Interventions ADL retraining;UE strengthening/ROM; Functional transfer training; Endurance training;Patient/family training;Equipment evaluation/education; Activityengagement; Compensatory technique education   Goal Expiration Date 08/29/22   OT Frequency 3-5x/wk   Recommendation   OT Discharge Recommendation Home with home health rehabilitation  (pending pt more alert, lethargic during session)   AM-PAC Daily Activity Inpatient   Lower Body Dressing 2   Bathing 2   Toileting 2   Upper Body Dressing 3   Grooming 3   Eating 4   Daily Activity Raw Score 16   Daily Activity Standardized Score (Calc for Raw Score >=11) 35 96   AM-PAC Applied Cognition Inpatient   Following a Speech/Presentation 3   Understanding Ordinary Conversation 3   Taking Medications 3   Remembering Where Things Are Placed or Put Away 3   Remembering List of 4-5 Errands 3   Taking Care of Complicated Tasks 3   Applied Cognition Raw Score 18   Applied Cognition Standardized Score 38 07   Barthel Index   Feeding 10   Bathing 0   Grooming Score 0   Dressing Score 5   Bladder Score 10   Bowels Score 10   Toilet Use Score 5   Transfers (Bed/Chair) Score 10   Mobility (Level Surface) Score 0   Stairs Score 0   Barthel Index Score 50   Licensure   [place] License Number  Chito Stephens Eduardalily Agustin [address] OTR/L [id number]

## 2022-08-15 NOTE — DISCHARGE SUMMARY
Discharge Summary - [facility]     Patient Information: Rickey Billingsley 59 y o  male MRN: [id number]  Unit/Bed#: [address] [place] [address] Encounter: [id number]     Admitting Physician: Tomas David MD  Discharging Physician/Practitioner: Tomas David MD   PCP: Trip Thayer MD  Discharge Date:  8/15/2022     Reason for Admission: Atrial flutter ([address] ) [I48 92]  Syncope [R55]  Cardiomyopathy, secondary ([address] ) [I42 9]  Cardiac abnormality [Q24 9]  Paroxysmal atrial fibrillation ([address] ) [I48 0]  AICD discharge [Z45 02]     Discharge Diagnoses:      Principal Problem (Resolved):    Syncope  Active Problems:    AICD discharge    Brain aneurysm    Atrial flutter ([address] )    Cardiomyopathy, 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    Paroxysmal atrial fibrillation ([address] )    Tobacco abuse    Alcohol dependence ([facility] )    Poor dental hygiene    Noncompliance  Resolved Problems:    Hypokalemia     Consultations During Hospital Stay:  Cardiology  Neurology     Procedures Performed:   ·       N/A     Significant Findings / Test Results:   Labs  8/15: HgB 11 8  8/14: HgB 11 1  8/13: Hgb 11 3  8/11 Phos 2 6, lipase 90, UDS benzo (on CIWA)  8/10: Hgb 10 9, K 2 9, ETOH 318, trop 4-4-4, TSH 0 9, BNP 83, mag 1 1    Imaging  8/11: CTA Head Neck: No significant stenosis of the cervical carotid or vertebral arteries  3 mm right clinoid segment aneurysm  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  8/10: CXR: Trace loculated right apical pleural effusion  Cardiology  Echo 07/13/22: EF 25-30%, G1DD  EKG  BPM    Complications:  2 Small <3 mm aneurysms found incidentally on CT head    Test Results Pending at Discharge (will require follow up):   ·       N/A     Things to address at first visit after hospitalization   Have you made a follow-up appointment with Cardiology? Have you made a follow-up appointment with neurosurgery?   Have contacted crisis for outpatient alcoholic rehabilitation services? Hospital Course:      Koren Spatz is a 59 y o  male patient with a PMH of non ischemic cardiomyopathy, alcohol abuse, atrial flutter who originally presented to the hospital on 8/10/2022 due to a syncopal episode following his AICD discharge  The fall was witnessed by a neighbor but it was unclear if he hit his head  Patient had been drinking in the hours prior to admission  In the ED, patient received Librium 1 time  Cardiology evaluated the patient and noted he is not a candidate for any anti rhythmic medications or permanent ICD due to noncompliance and risk of recurrent shock  It was recommended that the patient continue his home regimen of aspirin, losartan, Toprol, Xarelto  CT head/neck showed a 3 mm right clinoid segment aneurysm and 1 mm right PCOM aneurysm arising from the prominent infundibulum  Neurology consult noted that his syncope was not neurologically related  They recommended outpatient follow-up with neurovascular surgery in regards to the aneurysms with no immediate need for intervention  Patient expressed an interest in inpatient alcohol rehab and was provided with the numbers of multiple facilities  Patient was placed on a Librium taper during the course of his hospital stay  A patient was instructed to follow-up outpatient with Cardiology, neurovascular surgery, and his PCP within the week  Discharge Day Visit / Exam:      Vitals: Blood Pressure: 109/70 (08/15/22 1500)  Pulse: 91 (08/15/22 1500)  Temperature: (!) 97 4 °F (36 3 °C) (08/15/22 1448)  Temp Source: Oral (08/13/22 2200)  Respirations: 18 (08/15/22 1500)  Height: 5' 6" (167 6 cm) (08/10/22 2300)  Weight - Scale: 59 3 kg (130 lb 11 7 oz) (08/10/22 2300)  SpO2: 98 % (08/15/22 1500)  Exam:   Physical Exam  HENT:      Head: Normocephalic and atraumatic  Cardiovascular:      Rate and Rhythm: Normal rate and regular rhythm  Pulses: Normal pulses  Heart sounds: Normal heart sounds     Pulmonary: Effort: Pulmonary effort is normal       Breath sounds: Normal breath sounds  Abdominal:      General: Abdomen is flat  Bowel sounds are normal       Palpations: Abdomen is soft  Neurological:      Mental Status: He is alert and oriented to person, place, and time  Psychiatric:         Mood and Affect: Mood normal          Behavior: Behavior normal       Discussion with Family: Patient's condition at discharge was discussed with his sister  Condition at Discharge: stable        Discharge Medications:  Current Discharge Medication List      CONTINUE these medications which have CHANGED    Details   rivaroxaban (XARELTO) 20 mg tablet Take 1 tablet (20 mg total) by mouth daily with breakfast  Qty: 30 tablet, Refills: 0    Associated Diagnoses: AICD discharge; Paroxysmal atrial fibrillation ([facility] ); Cardiomyopathy, secondary ([facility] )             Current Discharge Medication List      CONTINUE these medications which have NOT CHANGED    Details   Aspirin Buf,CaCarb-MgCarb-MgO, 81 MG TABS Take by mouth      losartan (COZAAR) 25 mg tablet Take 1 tablet (25 mg total) by mouth daily  Qty: 90 tablet, Refills: 0    Associated Diagnoses: Paroxysmal atrial fibrillation (HCC)      magnesium oxide (MAG-OX) 400 mg Take 1 tablet (400 mg total) by mouth 2 (two) times a day  Refills: 0    Associated Diagnoses: Hypomagnesemia      metoprolol succinate (TOPROL-XL) 25 mg 24 hr tablet Take 1 tablet (25 mg total) by mouth daily  Qty: 90 tablet, Refills: 0    Associated Diagnoses: Cardiomyopathy, secondary ([facility] );  Paroxysmal atrial fibrillation (HCC)      folic acid (FOLVITE) 1 mg tablet Take 1 tablet (1 mg total) by mouth daily  Refills: 0    Associated Diagnoses: Alcohol intoxication (HCC)      gabapentin (NEURONTIN) 300 mg capsule Take 1 capsule (300 mg total) by mouth 3 (three) times a day  Qty: 270 capsule, Refills: 0    Associated Diagnoses: Primary spontaneous pneumothorax      ipratropium-albuterol (DUO-NEB) 0 5-2 5 mg/3 mL nebulizer solution Inhale      levalbuterol (XOPENEX) 1 25 mg/0 5 mL nebulizer solution Take 0 5 mL (1 25 mg total) by nebulization every 6 (six) hours as needed for wheezing or shortness of breath  Refills: 0    Associated Diagnoses: Wheezing      methocarbamol (ROBAXIN) 500 mg tablet Take 1 tablet (500 mg total) by mouth every 6 (six) hours for 14 days  Qty: 56 tablet, Refills: 0    Associated Diagnoses: Primary spontaneous pneumothorax; Pneumothorax, unspecified type      Multiple Vitamin (multivitamin) capsule Take 1 capsule by mouth daily  Refills: 0    Associated Diagnoses: Alcohol intoxication ([facility] )      neomycin-bacitracin-polymyxin b (NEOSPORIN) ointment Apply topically 2 (two) times a day To right elbow tear  Qty: 15 g, Refills: 0    Associated Diagnoses: Skin tear of elbow without complication      nicotine (NICODERM CQ) 21 mg/24 hr TD 24 hr patch Place 1 patch on the skin daily  Qty: 28 patch, Refills: 0    Associated Diagnoses: Tobacco abuse      thiamine 100 MG tablet Take 1 tablet (100 mg total) by mouth daily  Refills: 0    Associated Diagnoses: Alcohol intoxication ([facility] )            Current Discharge Medication List      START taking these medications    Details   naltrexone (REVIA) 50 mg tablet Take 1 tablet (50 mg total) by mouth daily  Qty: 30 tablet, Refills: 0    Associated Diagnoses: Alcohol dependence (HCC)      potassium chloride (K-DUR,KLOR-CON) 20 mEq tablet Take 1 tablet (20 mEq total) by mouth daily  Qty: 30 tablet, Refills: 0    Associated Diagnoses: Electrolyte imbalance            Current Discharge Medication List      STOP taking these medications       lidocaine (LIDODERM) 5 % Comments:   Reason for Stopping:              Disposition:   Home with VNA Services (Reminder: Complete face to face encounter)     For Discharges to [address] [place] [address] SNF:   ·       Not Applicable to this Patient - Not Applicable to this Patient     Discharge Statement:  I spent 30 minutes discharging the patient  This time was spent on the day of discharge  I had direct contact with the patient on the day of discharge  Greater than 50% of the total time was spent examining patient, answering all patient questions, arranging and discussing plan of care with patient as well as directly providing post-discharge instructions  Additional time then spent on discharge activities       ** Please Note: This note has been constructed using a voice recognition system **     Shad Roach MD  08/15/22  4:33 PM

## 2022-08-15 NOTE — PLAN OF CARE
Problem: Potential for Falls  Goal: Patient will remain free of falls  Description: INTERVENTIONS:  - Educate patient/family on patient safety including physical limitations  - Instruct patient to call for assistance with activity   - Consult OT/PT to assist with strengthening/mobility   - Keep Call bell within reach  - Keep bed low and locked with side rails adjusted as appropriate  - Keep care items and personal belongings within reach  - Initiate and maintain comfort rounds  - Make Fall Risk Sign visible to staff  - Offer Offer Toileting every 2 Hours, in advance of need  - Initiate/Maintain Bed alarm  - Obtain necessary fall risk management equipment: as required   - Apply yellow socks and bracelet for high fall risk patients  - Consider moving patient to room near nurses station  8/15/2022 1316 by Paola Chandra RN  Outcome: Adequate for Discharge  8/15/2022 0923 by Paola Chandra RN  Outcome: Progressing     Problem: PAIN - ADULT  Goal: Verbalizes/displays adequate comfort level or baseline comfort level  Description: Interventions:  - Encourage patient to monitor pain and request assistance  - Assess pain using appropriate pain scale  - Administer analgesics based on type and severity of pain and evaluate response  - Implement non-pharmacological measures as appropriate and evaluate response  - Consider cultural and social influences on pain and pain management  - Notify physician/advanced practitioner if interventions unsuccessful or patient reports new pain  8/15/2022 1316 by Paola Chandra RN  Outcome: Adequate for Discharge  8/15/2022 0923 by Paola Chandra RN  Outcome: Progressing     Problem: SAFETY ADULT  Goal: Patient will remain free of falls  Description: INTERVENTIONS:  - Educate patient/family on patient safety including physical limitations  - Instruct patient to call for assistance with activity   - Consult OT/PT to assist with strengthening/mobility   - Keep Call bell within reach  - Keep bed low and locked with side rails adjusted as appropriate  - Keep care items and personal belongings within reach  - Initiate and maintain comfort rounds  - Make Fall Risk Sign visible to staff  - Offer Toileting every 2 Hours, in advance of need  - Initiate/Maintain Bed alarm  - Obtain necessary fall risk management equipment: as required   - Apply yellow socks and bracelet for high fall risk patients  - Consider moving patient to room near nurses station  8/15/2022 1316 by Luciana Chu RN  Outcome: Adequate for Discharge  8/15/2022 0923 by Luciana Chu RN  Outcome: Progressing  Goal: Maintain or return to baseline ADL function  Description: INTERVENTIONS:  -  Assess patient's ability to carry out ADLs; assess patient's baseline for ADL function and identify physical deficits which impact ability to perform ADLs (bathing, care of mouth/teeth, toileting, grooming, dressing, etc )  - Assess/evaluate cause of self-care deficits   - Assess range of motion  - Assess patient's mobility; develop plan if impaired  - Assess patient's need for assistive devices and provide as appropriate  - Encourage maximum independence but intervene and supervise when necessary  - Involve family in performance of ADLs  - Assess for home care needs following discharge   - Consider OT consult to assist with ADL evaluation and planning for discharge  - Provide patient education as appropriate  8/15/2022 1316 by Luciana Chu RN  Outcome: Adequate for Discharge  8/15/2022 0923 by Luciana Chu RN  Outcome: Progressing  Goal: Maintains/Returns to pre admission functional level  Description: INTERVENTIONS:  - Perform BMAT or MOVE assessment daily    - Set and communicate daily mobility goal to care team and patient/family/caregiver  - Collaborate with rehabilitation services on mobility goals if consulted  - Perform Range of Motion 3 times a day  - Reposition patient every 2 hours    - Dangle patient 3 times a day  - Stand patient 3 times a day  - Ambulate patient 3 times a day  - Out of bed to chair 3 times a day   - Out of bed for meals 3 times a day  - Out of bed for toileting  - Record patient progress and toleration of activity level   8/15/2022 1316 by Brodie Colon RN  Outcome: Adequate for Discharge  8/15/2022 0923 by Brodie Colon RN  Outcome: Progressing     Problem: DISCHARGE PLANNING  Goal: Discharge to home or other facility with appropriate resources  Description: INTERVENTIONS:  - Identify barriers to discharge w/patient and caregiver  - Arrange for needed discharge resources and transportation as appropriate  - Identify discharge learning needs (meds, wound care, etc )  - Arrange for interpretive services to assist at discharge as needed  - Refer to Case Management Department for coordinating discharge planning if the patient needs post-hospital services based on physician/advanced practitioner order or complex needs related to functional status, cognitive ability, or social support system  8/15/2022 1316 by Brodie Colon RN  Outcome: Adequate for Discharge  8/15/2022 0923 by Brodie Colon RN  Outcome: Progressing     Problem: Knowledge Deficit  Goal: Patient/family/caregiver demonstrates understanding of disease process, treatment plan, medications, and discharge instructions  Description: Complete learning assessment and assess knowledge base    Interventions:  - Provide teaching at level of understanding  - Provide teaching via preferred learning methods  8/15/2022 1316 by Brodie Colon RN  Outcome: Adequate for Discharge  8/15/2022 0923 by Brodie Colon RN  Outcome: Progressing     Problem: CARDIOVASCULAR - ADULT  Goal: Maintains optimal cardiac output and hemodynamic stability  Description: INTERVENTIONS:  - Monitor I/O, vital signs and rhythm  - Monitor for S/S and trends of decreased cardiac output  - Administer and titrate ordered vasoactive medications to optimize hemodynamic stability  - Assess quality of pulses, skin color and temperature  - Assess for signs of decreased coronary artery perfusion  - Instruct patient to report change in severity of symptoms  8/15/2022 1316 by Jim Odom RN  Outcome: Adequate for Discharge  8/15/2022 [id number] by Jim Odom RN  Outcome: Progressing  Goal: Absence of cardiac dysrhythmias or at baseline rhythm  Description: INTERVENTIONS:  - Continuous cardiac monitoring, vital signs, obtain 12 lead EKG if ordered  - Administer antiarrhythmic and heart rate control medications as ordered  - Monitor electrolytes and administer replacement therapy as ordered  8/15/2022 1316 by Jmi Odom RN  Outcome: Adequate for Discharge  8/15/2022 0923 by Jim Odom RN  Outcome: Progressing     Problem: METABOLIC, FLUID AND ELECTROLYTES - ADULT  Goal: Electrolytes maintained within normal limits  Description: INTERVENTIONS:  - Monitor labs and assess patient for signs and symptoms of electrolyte imbalances  - Administer electrolyte replacement as ordered  - Monitor response to electrolyte replacements, including repeat lab results as appropriate  - Instruct patient on fluid and nutrition as appropriate  8/15/2022 1316 by Jim Odom RN  Outcome: Adequate for Discharge  8/15/2022 0923 by Jim Odom RN  Outcome: Progressing  Goal: Fluid balance maintained  Description: INTERVENTIONS:  - Monitor labs   - Monitor I/O and WT  - Instruct patient on fluid and nutrition as appropriate  - Assess for signs & symptoms of volume excess or deficit  8/15/2022 1316 by Jim Odom RN  Outcome: Adequate for Discharge  8/15/2022 [id number] by Jim Odom RN  Outcome: Progressing  Goal: Glucose maintained within target range  Description: INTERVENTIONS:  - Monitor Blood Glucose as ordered  - Assess for signs and symptoms of hyperglycemia and hypoglycemia  - Administer ordered medications to maintain glucose within target range  - Assess nutritional intake and initiate nutrition service referral as needed  8/15/2022 1316 by Tam Pichardo RN  Outcome: Adequate for Discharge  8/15/2022 [id number] by Tam Pichardo RN  Outcome: Progressing     Problem: HEMATOLOGIC - ADULT  Goal: Maintains hematologic stability  Description: INTERVENTIONS  - Assess for signs and symptoms of bleeding or hemorrhage  - Monitor labs  - Administer supportive blood products/factors as ordered and appropriate  8/15/2022 1316 by Tam Pichardo RN  Outcome: Adequate for Discharge  8/15/2022 0923 by Tam Pichardo RN  Outcome: Progressing     Problem: RESPIRATORY - ADULT  Goal: Achieves optimal ventilation and oxygenation  Description: INTERVENTIONS:  - Assess for changes in respiratory status  - Assess for changes in mentation and behavior  - Position to facilitate oxygenation and minimize respiratory effort  - Oxygen administered by appropriate delivery if ordered  - Initiate smoking cessation education as indicated  - Encourage broncho-pulmonary hygiene including cough, deep breathe, Incentive Spirometry  - Assess the need for suctioning and aspirate as needed  - Assess and instruct to report SOB or any respiratory difficulty  - Respiratory Therapy support as indicated  8/15/2022 1316 by Tam Pichardo RN  Outcome: Adequate for Discharge  8/15/2022 0923 by Tam Pichardo RN  Outcome: Progressing

## 2022-08-15 NOTE — CASE MANAGEMENT
Case Management Discharge Planning Note    Patient name Mao Dumas  Location [address] [facility] [address] MRN [id number]  : 1965 Date 8/15/2022       Current Admission Date: 8/10/2022  Current Admission Diagnosis:Syncope   Patient Active Problem List    Diagnosis Date Noted    Poor dental hygiene 2022    Noncompliance 2022    Brain aneurysm 2022    COPD (chronic obstructive pulmonary disease) ([facility] ) 08/10/2022    Syncope 08/10/2022    AICD discharge 08/10/2022    Hepatic steatosis 2022    Alcohol dependence ([facility] )     [facility]ox[facility] ) 2017    Tobacco abuse 2017    Hypokalemia 2017    Atrial flutter ([facility] ) 06/10/2016    Cardiomyopathy, 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 06/10/2016      LOS (days): 5  Geometric Mean LOS (GMLOS) (days): 2 80  Days to GMLOS:-1 9     OBJECTIVE:  Risk of Unplanned Readmission Score: 27 14       Current admission status: Inpatient   Preferred Pharmacy:   [facility] [address] [place] [address]  Phone: [phone] Fax: [phone]    Primary Care Provider: Kelechi Balderas MD    Primary Insurance: [facility] [employer]  Secondary Insurance: [address] [place] [address]    DISCHARGE DETAILS:     Other Referral/Resources/Interventions Provided:  Interventions: [facility], Substance Abuse Treatment    Would you like to participate in our [address] service program?  : No - Declined    Treatment Team Recommendation: Home with  [facility] [address] [facility] [address], Substance Abuse Treatment  Discharge Destination Plan[de-identified] Home with  [facility] [address], Substance Abuse Treatment  Transport at Discharge : Family      IMM Given (Date):: 08/15/22  IMM Given to[de-identified] Patient (IMM reviewed with and signed by patient  Patient is in agreement with discharge determination    Copy given to patient and copy placed in scan bin for chart )

## 2022-08-15 NOTE — PLAN OF CARE
Problem: Potential for Falls  Goal: Patient will remain free of falls  Description: INTERVENTIONS:  - Educate patient/family on patient safety including physical limitations  - Instruct patient to call for assistance with activity   - Consult OT/PT to assist with strengthening/mobility   - Keep Call bell within reach  - Keep bed low and locked with side rails adjusted as appropriate  - Keep care items and personal belongings within reach  - Initiate and maintain comfort rounds  - Make Fall Risk Sign visible to staff    - Apply yellow socks and bracelet for high fall risk patients  - Consider moving patient to room near nurses station  Outcome: Progressing     Problem: PAIN - ADULT  Goal: Verbalizes/displays adequate comfort level or baseline comfort level  Description: Interventions:  - Encourage patient to monitor pain and request assistance  - Assess pain using appropriate pain scale  - Administer analgesics based on type and severity of pain and evaluate response  - Implement non-pharmacological measures as appropriate and evaluate response  - Consider cultural and social influences on pain and pain management  - Notify physician/advanced practitioner if interventions unsuccessful or patient reports new pain  Outcome: Progressing     Problem: SAFETY ADULT  Goal: Patient will remain free of falls  Description: INTERVENTIONS:  - Educate patient/family on patient safety including physical limitations  - Instruct patient to call for assistance with activity   - Consult OT/PT to assist with strengthening/mobility   - Keep Call bell within reach  - Keep bed low and locked with side rails adjusted as appropriate  - Keep care items and personal belongings within reach  - Initiate and maintain comfort rounds  - Make Fall Risk Sign visible to staff    - Apply yellow socks and bracelet for high fall risk patients  - Consider moving patient to room near nurses station  Outcome: Progressing  Goal: Maintain or return to baseline ADL function  Description: INTERVENTIONS:  -  Assess patient's ability to carry out ADLs; assess patient's baseline for ADL function and identify physical deficits which impact ability to perform ADLs (bathing, care of mouth/teeth, toileting, grooming, dressing, etc )  - Assess/evaluate cause of self-care deficits   - Assess range of motion  - Assess patient's mobility; develop plan if impaired  - Assess patient's need for assistive devices and provide as appropriate  - Encourage maximum independence but intervene and supervise when necessary  - Involve family in performance of ADLs  - Assess for home care needs following discharge   - Consider OT consult to assist with ADL evaluation and planning for discharge  - Provide patient education as appropriate  Outcome: Progressing  Goal: Maintains/Returns to pre admission functional level  Description: INTERVENTIONS:  - Perform BMAT or MOVE assessment daily    - Set and communicate daily mobility goal to care team and patient/family/caregiver     - Collaborate with rehabilitation services on mobility goals if consulted    - Out of bed for toileting  - Record patient progress and toleration of activity level   Outcome: Progressing     Problem: DISCHARGE PLANNING  Goal: Discharge to home or other facility with appropriate resources  Description: INTERVENTIONS:  - Identify barriers to discharge w/patient and caregiver  - Arrange for needed discharge resources and transportation as appropriate  - Identify discharge learning needs (meds, wound care, etc )  - Arrange for interpretive services to assist at discharge as needed  - Refer to Case Management Department for coordinating discharge planning if the patient needs post-hospital services based on physician/advanced practitioner order or complex needs related to functional status, cognitive ability, or social support system  Outcome: Progressing     Problem: CARDIOVASCULAR - ADULT  Goal: Maintains optimal cardiac output and hemodynamic stability  Description: INTERVENTIONS:  - Monitor I/O, vital signs and rhythm  - Monitor for S/S and trends of decreased cardiac output  - Administer and titrate ordered vasoactive medications to optimize hemodynamic stability  - Assess quality of pulses, skin color and temperature  - Assess for signs of decreased coronary artery perfusion  - Instruct patient to report change in severity of symptoms  Outcome: Progressing  Goal: Absence of cardiac dysrhythmias or at baseline rhythm  Description: INTERVENTIONS:  - Continuous cardiac monitoring, vital signs, obtain 12 lead EKG if ordered  - Administer antiarrhythmic and heart rate control medications as ordered  - Monitor electrolytes and administer replacement therapy as ordered  Outcome: Progressing     Problem: METABOLIC, FLUID AND ELECTROLYTES - ADULT  Goal: Electrolytes maintained within normal limits  Description: INTERVENTIONS:  - Monitor labs and assess patient for signs and symptoms of electrolyte imbalances  - Administer electrolyte replacement as ordered  - Monitor response to electrolyte replacements, including repeat lab results as appropriate  - Instruct patient on fluid and nutrition as appropriate  Outcome: Progressing  Goal: Fluid balance maintained  Description: INTERVENTIONS:  - Monitor labs   - Monitor I/O and WT  - Instruct patient on fluid and nutrition as appropriate  - Assess for signs & symptoms of volume excess or deficit  Outcome: Progressing  Goal: Glucose maintained within target range  Description: INTERVENTIONS:  - Monitor Blood Glucose as ordered  - Assess for signs and symptoms of hyperglycemia and hypoglycemia  - Administer ordered medications to maintain glucose within target range  - Assess nutritional intake and initiate nutrition service referral as needed  Outcome: Progressing     Problem: HEMATOLOGIC - ADULT  Goal: Maintains hematologic stability  Description: INTERVENTIONS  - Assess for signs and symptoms of bleeding or hemorrhage  - Monitor labs  - Administer supportive blood products/factors as ordered and appropriate  Outcome: Progressing     Problem: RESPIRATORY - ADULT  Goal: Achieves optimal ventilation and oxygenation  Description: INTERVENTIONS:  - Assess for changes in respiratory status  - Assess for changes in mentation and behavior  - Position to facilitate oxygenation and minimize respiratory effort  - Oxygen administered by appropriate delivery if ordered  - Initiate smoking cessation education as indicated  - Encourage broncho-pulmonary hygiene including cough, deep breathe, Incentive Spirometry  - Assess the need for suctioning and aspirate as needed  - Assess and instruct to report SOB or any respiratory difficulty  - Respiratory Therapy support as indicated  Outcome: Progressing

## 2022-08-15 NOTE — PHYSICAL THERAPY NOTE
Attempted PT treatment however pt declined even after encouragement to participate  Will follow    Michal Barthel PT  [id number]     08/15/22 1544   Note Type   Note Type Cancelled Session   Cancel Reasons Other  (pt declined)

## 2022-08-15 NOTE — NURSING NOTE
Pt went home with lee  All discharge procedures and paper works  completed by day shift RN  Discharge instructions understood as per pt

## 2022-08-15 NOTE — DISCHARGE INSTRUCTIONS
Syncope   WHAT YOU NEED TO KNOW:   Syncope is also called fainting or passing out  Syncope is a sudden, temporary loss of consciousness, followed by a fall from a standing or sitting position  Syncope ranges from not serious to a sign of a more serious condition that needs to be treated  You can control some health conditions that cause syncope  Your healthcare providers can help you create a plan to manage syncope and prevent episodes  DISCHARGE INSTRUCTIONS:   Seek care immediately if:   You are bleeding because you hit your head when you fainted  You suddenly have double vision, difficulty speaking, numbness, and cannot move your arms or legs  You have chest pain and trouble breathing  You vomit blood or material that looks like coffee grounds  You see blood in your bowel movement  Contact your healthcare provider if:   You have new or worsening symptoms  You have another syncope episode  You have a headache, fast heartbeat, or feel too dizzy to stand up  You have questions or concerns about your condition or care  Medicines:   Medicines  may be needed to help your heart pump strongly and regularly  Your healthcare provider may also make changes to any medicines that are causing syncope  Take your medicine as directed  Contact your healthcare provider if you think your medicine is not helping or if you have side effects  Tell him or her if you are allergic to any medicine  Keep a list of the medicines, vitamins, and herbs you take  Include the amounts, and when and why you take them  Bring the list or the pill bottles to follow-up visits  Carry your medicine list with you in case of an emergency  Follow up with your doctor as directed:  Write down your questions so you remember to ask them during your visits  Manage syncope:   Keep a record of your syncope episodes  Include your symptoms and your activity before and after the episode   The record can help your healthcare provider find the cause of your syncope and help you manage episodes  Sit or lie down when needed  This includes when you feel dizzy, your throat is getting tight, and your vision changes  Raise your legs above the level of your heart  Take slow, deep breaths if you start to breathe faster with anxiety or fear  This can help decrease dizziness and the feeling that you might faint  Check your blood pressure often  This is important if you take medicine to lower your blood pressure  Check your blood pressure when you are lying down and when you are standing  Ask how often to check during the day  Keep a record of your blood pressure numbers  Your healthcare provider may use the record to help plan your treatment  Prevent a syncope episode: Move slowly and let yourself get used to one position before you move to another position  This is very important when you change from a lying or sitting position to a standing position  Take some deep breaths before you stand up from a lying position  Stand up slowly  Sudden movements may cause a fainting spell  Sit on the side of the bed or couch for a few minutes before you stand up  If you are on bedrest, try to be upright for about 2 hours each day, or as directed  Do not lock your legs if you are standing for a long period of time  Move your legs and bend your knees to keep blood flowing  Follow your healthcare provider's recommendations  Your provider may  recommend that you drink more liquids to prevent dehydration  You may also need to have more salt to keep your blood pressure from dropping too low and causing syncope  Your provider will tell you how much liquid and sodium to have each day  He or she will also tell you how much physical activity is safe for you  This will depend on what is causing your syncope  Watch for signs of low blood sugar  These include hunger, nervousness, sweating, and fast or fluttery heartbeats   Talk with your healthcare provider about ways to keep your blood sugar level steady  Do not strain if you are constipated  You may faint if you strain to have a bowel movement  Walking is the best way to get your bowels moving  Eat foods high in fiber to make it easier to have a bowel movement  Good examples are high-fiber cereals, beans, vegetables, and whole-grain breads  Prune juice may help make bowel movements softer  Be careful in hot weather  Heat can cause a syncope episode  Limit activity done outside on hot days  Physical activity in hot weather can lead to dehydration  This can cause an episode  © Copyright [address] 2022 Information is for End User's use only and may not be sold, redistributed or otherwise used for commercial purposes  All illustrations and images included in CareNotes® are the copyrighted property of [employer]  or Paul Mckeon   The above information is an  only  It is not intended as medical advice for individual conditions or treatments  Talk to your doctor, nurse or pharmacist before following any medical regimen to see if it is safe and effective for you  A-fib (Atrial Fibrillation)   AMBULATORY CARE:   Atrial fibrillation (A-fib)  is an irregular heartbeat  It reduces your heart's ability to pump blood through your body  A-fib may come and go, or it may be a long-term condition  A-fib can cause life-threatening blood clots, stroke, or heart failure  It is important to treat and manage A-fib to help prevent these problems         Common signs and symptoms include the following:   A heartbeat that races, pounds, or flutters    Weakness, severe tiredness, or confusion    Feeling lightheaded, sweaty, dizzy, or faint    Shortness of breath or anxiety    Chest pain or pressure    Call your local emergency number (911 in the [address]) or have someone call if:   You have any of the following signs of a heart attack:      Squeezing, pressure, or pain in your chest    You may  also have any of the following:     Discomfort or pain in your back, neck, jaw, stomach, or arm    Shortness of breath    Nausea or vomiting    Lightheadedness or a sudden cold sweat    You have any of the following signs of a stroke:      Numbness or drooping on one side of your face     Weakness in an arm or leg    Confusion or difficulty speaking    Dizziness, a severe headache, or vision loss    Call your doctor or cardiologist if:   Your arm or leg feels warm, tender, and painful  It may look swollen and red  Your heart rate is more than 110 beats per minute  You have new or worsening swelling in your legs, feet, ankles, or abdomen  You are short of breath, even at rest     You have questions or concerns about your condition or care  Treatment for A-fib:  Conditions that cause A-fib, such as thyroid disease, will be treated  You may also need any of the following:  Heart medicines  help control your heart rate or rhythm  You may need more than one medicine to treat your symptoms  Antiplatelet or blood thinner medicines  help prevent blood clots and stroke  Cardioversion  is a procedure to return your heart rate and rhythm to normal  It can be done using medicines or electric shock  A-fib ablation  is a procedure that uses energy to burn a small area of heart tissue  This creates scar tissue and prevents electrical signals that cause A-fib  You may need this procedure more than once  Ask for more information on A-fib ablation  A pacemaker  may be inserted into your heart  A pacemaker is a device that controls your heartbeat  A pacemaker may be inserted during an ablation procedure or surgery  Ask your healthcare provider for more information on pacemakers  Surgery  may be needed if other procedures do not work  During surgery your healthcare provider will make cuts in the upper part of your heart  The provider will stitch the cuts together to create scar tissue  The scar tissue will prevent electrical signals that cause A-fib  Manage A-fib:   Know your target heart rate  Learn how to check your pulse and monitor your heart rate  Know the risks if you choose to drink alcohol  Alcohol can increase your risk for A-fib or make A-fib harder to manage  Ask your healthcare provider if it is okay for you to drink any alcohol  He or she can help you set limits for the number of drinks you have in 24 hours and in a week  A drink of alcohol is 12 ounces of beer, 5 ounces of wine, or 1½ ounces of liquor  Do not smoke  Nicotine can cause heart damage and make it more difficult to manage your A-fib  Do not use e-cigarettes or smokeless tobacco in place of cigarettes or to help you quit  They still contain nicotine  Ask your healthcare provider for information if you currently smoke and need help quitting  Eat heart-healthy foods  Heart healthy foods will help keep your cholesterol low  These include fruits, vegetables, whole-grain breads, low-fat dairy products, beans, lean meats, and fish  Replace butter and margarine with heart-healthy oils such as olive oil and canola oil  Maintain a healthy weight  Ask your healthcare provider what a healthy weight is for you  Ask him or her to help you create a safe weight loss plan if you are overweight  Even a small goal of a 10% weight loss can improve your heart health  Get regular physical activity  Physical activity helps improve your heart health  Get at least 150 minutes of moderate aerobic physical activity each week  Your healthcare provider can help you create an activity plan  Manage other health conditions  This includes high blood pressure or cholesterol, sleep apnea, diabetes, and other heart conditions  Take medicine as directed and follow your treatment plan  Your healthcare provider may need to change a medicine you are taking if it is causing your A-fib   Do not  stop taking any medicine unless directed by your provider  Follow up with your doctor or cardiologist as directed: You will need regular blood tests and monitoring  Write down your questions so you remember to ask them during your visits  © Copyright [employer] 2022 Information is for End User's use only and may not be sold, redistributed or otherwise used for commercial purposes  All illustrations and images included in CareNotes® are the copyrighted property of A D A M , Inc  or Paul Mckeon   The above information is an  only  It is not intended as medical advice for individual conditions or treatments  Talk to your doctor, nurse or pharmacist before following any medical regimen to see if it is safe and effective for you  Alcohol Intoxication   WHAT YOU NEED TO KNOW:   Alcohol intoxication is a harmful physical condition caused when you drink more alcohol than your body can handle  It is also called ethanol poisoning, or being drunk  DISCHARGE INSTRUCTIONS:   Call your local emergency number (911 in the [address] [facility] [address] [facility]) if:   You have sudden trouble breathing or chest pain  You have a seizure  You feel sad enough to harm yourself or others  Call your doctor if:   You have hallucinations (you see or hear things that are not real)  You cannot stop vomiting  You have questions or concerns about your condition or care  Recommended alcohol limits:   Men 21 to 64 years  should limit alcohol to 2 drinks a day  Do not have more than 4 drinks in 1 day or more than 14 in 1 week  All women, and men 72 or older  should limit alcohol to 1 drink in a day  Do not have more than 3 drinks in 1 day or more than 7 in 1 week  No amount of alcohol is okay during pregnancy  Manage alcohol use:   Decrease the amount you drink  This can help prevent health problems such as brain, heart, and liver damage, high blood pressure, diabetes, and cancer   If you cannot stop completely, healthcare providers can help you set goals to decrease the amount you drink  Plan weekly alcohol use  You will be less likely to drink more than the recommended limit if you plan ahead  Have food when you drink alcohol  Food will prevent alcohol from getting into your system too quickly  Eat before you have your first alcohol drink  Time your drinks carefully  Have no more than 1 drink in an hour  Have a liquid such as water, coffee, or a soft drink between alcohol drinks  Do not drive if you have had alcohol  Make sure someone who has not been drinking can help you get home  Do not drink alcohol if you are taking medicine  Alcohol is dangerous when you combine it with certain medicines, such as acetaminophen or blood pressure medicine  Talk to your healthcare provider about all the medicines you currently take  For more information:   Alcoholics Anonymous  Web Address: http://St[employer] lzeama Madmagz/    Substance Abuse and [address] , [address]  Web Address: https://[employer]/    Follow up with your doctor as directed:  Write down your questions so you remember to ask them during your visits  © Copyright [employer] 2022 Information is for End User's use only and may not be sold, redistributed or otherwise used for commercial purposes  All illustrations and images included in CareNotes® are the copyrighted property of A D A M , Inc  or [facility]   The above information is an  only  It is not intended as medical advice for individual conditions or treatments  Talk to your doctor, nurse or pharmacist before following any medical regimen to see if it is safe and effective for you

## 2022-08-15 NOTE — DISCHARGE INSTR - AVS FIRST PAGE
Follow up with Dr Angelo Melendez ( neurosurgery ) as soon as possible for aneurysm   Follow up with Cardiology for appt on 8/16/22  Start Taking Revia for alcohol cessation ( 50mg daily )   Follow up with PCP   Call rehab places provided for alcohol cessation

## 2022-08-16 ENCOUNTER — TRANSITIONAL CARE MANAGEMENT (OUTPATIENT)
Dept: FAMILY MEDICINE CLINIC | Facility: CLINIC | Age: 57
End: 2022-08-16

## 2022-08-17 ENCOUNTER — PATIENT OUTREACH (OUTPATIENT)
Dept: FAMILY MEDICINE CLINIC | Facility: CLINIC | Age: 57
End: 2022-08-17

## 2022-08-17 DIAGNOSIS — F10.929 ALCOHOL INTOXICATION (HCC): ICD-10-CM

## 2022-08-17 DIAGNOSIS — E83.42 HYPOMAGNESEMIA: ICD-10-CM

## 2022-08-17 DIAGNOSIS — I42.9 CARDIOMYOPATHY, SECONDARY (HCC): ICD-10-CM

## 2022-08-17 DIAGNOSIS — R06.2 WHEEZING: ICD-10-CM

## 2022-08-17 DIAGNOSIS — J93.11 PRIMARY SPONTANEOUS PNEUMOTHORAX: ICD-10-CM

## 2022-08-17 DIAGNOSIS — I48.0 PAROXYSMAL ATRIAL FIBRILLATION (HCC): ICD-10-CM

## 2022-08-17 DIAGNOSIS — Z72.0 TOBACCO ABUSE: ICD-10-CM

## 2022-08-17 DIAGNOSIS — S51.019A SKIN TEAR OF ELBOW WITHOUT COMPLICATION: ICD-10-CM

## 2022-08-17 DIAGNOSIS — J93.9 PNEUMOTHORAX, UNSPECIFIED TYPE: ICD-10-CM

## 2022-08-17 RX ORDER — NICOTINE 21 MG/24HR
1 PATCH, TRANSDERMAL 24 HOURS TRANSDERMAL DAILY
Qty: 28 PATCH | Refills: 0 | Status: SHIPPED | OUTPATIENT
Start: 2022-08-17 | End: 2022-10-05 | Stop reason: SDUPTHER

## 2022-08-17 RX ORDER — LOSARTAN POTASSIUM 25 MG/1
25 TABLET ORAL DAILY
Qty: 90 TABLET | Refills: 0 | Status: SHIPPED | OUTPATIENT
Start: 2022-08-17 | End: 2022-08-22 | Stop reason: SDUPTHER

## 2022-08-17 RX ORDER — GABAPENTIN 300 MG/1
300 CAPSULE ORAL 3 TIMES DAILY
Qty: 270 CAPSULE | Refills: 0 | Status: SHIPPED | OUTPATIENT
Start: 2022-08-17 | End: 2022-09-22

## 2022-08-17 RX ORDER — IPRATROPIUM BROMIDE AND ALBUTEROL SULFATE 2.5; .5 MG/3ML; MG/3ML
3 SOLUTION RESPIRATORY (INHALATION) EVERY 6 HOURS PRN
Qty: 120 ML | Refills: 0 | Status: ON HOLD | OUTPATIENT
Start: 2022-08-17 | End: 2022-09-22

## 2022-08-17 RX ORDER — LANOLIN ALCOHOL/MO/W.PET/CERES
100 CREAM (GRAM) TOPICAL DAILY
Qty: 30 TABLET | Refills: 1 | Status: SHIPPED | OUTPATIENT
Start: 2022-08-17

## 2022-08-17 RX ORDER — METHOCARBAMOL 500 MG/1
500 TABLET, FILM COATED ORAL EVERY 6 HOURS SCHEDULED
Qty: 56 TABLET | Refills: 0 | Status: SHIPPED | OUTPATIENT
Start: 2022-08-17 | End: 2022-10-05 | Stop reason: ALTCHOICE

## 2022-08-17 RX ORDER — LEVALBUTEROL 1.25 MG/.5ML
1.25 SOLUTION, CONCENTRATE RESPIRATORY (INHALATION) EVERY 8 HOURS PRN
Qty: 30 EACH | Refills: 0 | Status: SHIPPED | OUTPATIENT
Start: 2022-08-17 | End: 2022-10-05 | Stop reason: SDUPTHER

## 2022-08-17 RX ORDER — METOPROLOL SUCCINATE 25 MG/1
25 TABLET, EXTENDED RELEASE ORAL DAILY
Qty: 90 TABLET | Refills: 0 | Status: SHIPPED | OUTPATIENT
Start: 2022-08-17 | End: 2022-08-22 | Stop reason: SDUPTHER

## 2022-08-17 RX ORDER — FOLIC ACID 1 MG/1
1 TABLET ORAL DAILY
Qty: 30 TABLET | Refills: 0 | Status: SHIPPED | OUTPATIENT
Start: 2022-08-17 | End: 2022-10-05 | Stop reason: SDUPTHER

## 2022-08-17 RX ORDER — MULTIVITAMIN
1 CAPSULE ORAL DAILY
Qty: 30 CAPSULE | Refills: 0 | Status: SHIPPED | OUTPATIENT
Start: 2022-08-17

## 2022-08-17 RX ORDER — BACITRACIN, NEOMYCIN, POLYMYXIN B 400; 3.5; 5 [USP'U]/G; MG/G; [USP'U]/G
OINTMENT TOPICAL 2 TIMES DAILY
Qty: 15 G | Refills: 0 | Status: SHIPPED | OUTPATIENT
Start: 2022-08-17 | End: 2022-09-22

## 2022-08-17 NOTE — PROGRESS NOTES
Received call from Vilma Shelby, [address]  Vilma Shelby is calling to report that patient only has 3 of his medications listed on his AVS  Pt has xarelto, revio and potassium  All other [facility] to [facility]  TT to Dr Deepti Baum  Advised of above  Requested that she reorder patients medications as listed on the AVS  Dr Deepti Baum agreed  Medications reordered  Outreach to Vilma Shelby  Advised of above  She will reach out to patients sister to advise

## 2022-08-19 ENCOUNTER — TELEPHONE (OUTPATIENT)
Dept: FAMILY MEDICINE CLINIC | Facility: CLINIC | Age: 57
End: 2022-08-19

## 2022-08-19 ENCOUNTER — APPOINTMENT (OUTPATIENT)
Dept: LAB | Facility: HOSPITAL | Age: 57
End: 2022-08-19
Payer: COMMERCIAL

## 2022-08-19 DIAGNOSIS — E87.8 ELECTROLYTE IMBALANCE: ICD-10-CM

## 2022-08-19 LAB
ANION GAP SERPL CALCULATED.3IONS-SCNC: 10 MMOL/L (ref 4–13)
BUN SERPL-MCNC: 11 MG/DL (ref 5–25)
CALCIUM SERPL-MCNC: 9.4 MG/DL (ref 8.3–10.1)
CHLORIDE SERPL-SCNC: 100 MMOL/L (ref 96–108)
CO2 SERPL-SCNC: 28 MMOL/L (ref 21–32)
CREAT SERPL-MCNC: 0.67 MG/DL (ref 0.6–1.3)
GFR SERPL CREATININE-BSD FRML MDRD: 105 ML/MIN/1.73SQ M
GLUCOSE P FAST SERPL-MCNC: 90 MG/DL (ref 65–99)
MAGNESIUM SERPL-MCNC: 1.5 MG/DL (ref 1.6–2.6)
POTASSIUM SERPL-SCNC: 4.1 MMOL/L (ref 3.5–5.3)
SODIUM SERPL-SCNC: 138 MMOL/L (ref 135–147)

## 2022-08-19 PROCEDURE — 80048 BASIC METABOLIC PNL TOTAL CA: CPT

## 2022-08-19 PROCEDURE — 83735 ASSAY OF MAGNESIUM: CPT

## 2022-08-19 PROCEDURE — 36415 COLL VENOUS BLD VENIPUNCTURE: CPT

## 2022-08-22 ENCOUNTER — OFFICE VISIT (OUTPATIENT)
Dept: CARDIOLOGY CLINIC | Facility: CLINIC | Age: 57
End: 2022-08-22
Payer: COMMERCIAL

## 2022-08-22 ENCOUNTER — TELEPHONE (OUTPATIENT)
Dept: FAMILY MEDICINE CLINIC | Facility: CLINIC | Age: 57
End: 2022-08-22

## 2022-08-22 VITALS
HEIGHT: 66 IN | HEART RATE: 96 BPM | BODY MASS INDEX: 22.02 KG/M2 | SYSTOLIC BLOOD PRESSURE: 130 MMHG | WEIGHT: 137 LBS | OXYGEN SATURATION: 99 % | TEMPERATURE: 97 F | DIASTOLIC BLOOD PRESSURE: 80 MMHG

## 2022-08-22 DIAGNOSIS — I48.0 PAROXYSMAL ATRIAL FIBRILLATION (HCC): ICD-10-CM

## 2022-08-22 DIAGNOSIS — I42.9 CARDIOMYOPATHY, SECONDARY (HCC): ICD-10-CM

## 2022-08-22 DIAGNOSIS — Z91.199 NONCOMPLIANCE: ICD-10-CM

## 2022-08-22 DIAGNOSIS — J44.9 CHRONIC OBSTRUCTIVE PULMONARY DISEASE, UNSPECIFIED COPD TYPE (HCC): ICD-10-CM

## 2022-08-22 DIAGNOSIS — J93.9 PNEUMOTHORAX, UNSPECIFIED TYPE: ICD-10-CM

## 2022-08-22 DIAGNOSIS — F10.10 ALCOHOL ABUSE: ICD-10-CM

## 2022-08-22 PROCEDURE — 1111F DSCHRG MED/CURRENT MED MERGE: CPT | Performed by: INTERNAL MEDICINE

## 2022-08-22 PROCEDURE — 99215 OFFICE O/P EST HI 40 MIN: CPT | Performed by: INTERNAL MEDICINE

## 2022-08-22 PROCEDURE — 93000 ELECTROCARDIOGRAM COMPLETE: CPT | Performed by: INTERNAL MEDICINE

## 2022-08-22 RX ORDER — LOSARTAN POTASSIUM 50 MG/1
50 TABLET ORAL DAILY
Qty: 90 TABLET | Refills: 0 | Status: SHIPPED | OUTPATIENT
Start: 2022-08-22 | End: 2022-10-05

## 2022-08-22 RX ORDER — METOPROLOL SUCCINATE 50 MG/1
50 TABLET, EXTENDED RELEASE ORAL DAILY
Qty: 90 TABLET | Refills: 0 | Status: SHIPPED | OUTPATIENT
Start: 2022-08-22 | End: 2022-10-05 | Stop reason: SDUPTHER

## 2022-08-22 NOTE — PATIENT INSTRUCTIONS
Increase losartan to 50 mg daily       Increase Toprol XL to 50 mg daily     Continue Xarelto 20 mg as you taking     Quit smoking and drinking     Compliant with follow-up and using your defibrillator vest     Keep following with Advanced Heart failure Team

## 2022-08-22 NOTE — PROGRESS NOTES
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 PERFORMED/VIEWS:  XR CHEST PORTABLE Images: 2 FINDINGS: Stable cardiomediastinal silhouette  Dilated main pulmonary artery  Poststernotomy  Loop recorder  Scarring in medial aspect of right upper lobe  No focal consolidation  No pneumothorax  Trace loculated right apical pleural effusion  Degenerative changes of right acromioclavicular joint  No fractures  Impression: No acute cardiopulmonary disease  Trace loculated right apical pleural effusion   Workstation performed: [id number]       Lab Review   Lab Results   Component Value Date    WBC 6 80 08/15/2022    HGB 11 8 (L) 08/15/2022    HCT 36 9 08/15/2022    MCV 94 08/15/2022    RDW 15 9 (H) 08/15/2022     08/15/2022     BMP:  Lab Results   Component Value Date    SODIUM 138 08/19/2022    K 4 1 08/19/2022     08/19/2022    CO2 28 08/19/2022    BUN 11 08/19/2022    CREATININE 0 67 08/19/2022    GLUC 106 08/15/2022    GLUF 90 08/19/2022    CALCIUM 9 4 08/19/2022    CORRECTEDCA 10 3 (H) 07/23/2022    EGFR 105 08/19/2022    MG 1 5 (L) 08/19/2022     LFT:  Lab Results   Component Value Date    AST 23 08/10/2022    ALT 14 08/10/2022    ALKPHOS 65 08/10/2022    TP 7 1 08/10/2022    ALB 3 5 08/10/2022      No components found for: [employer] [facility]  Lab Results   Component Value Date    UQE6JRCZRQMO 0 941 08/10/2022     Lab Results   Component Value Date    HGBA1C 5 4 09/23/2017     Lipid Profile:   Lab Results   Component Value Date    CHOLESTEROL 153 07/10/2022    HDL 95 07/10/2022    LDLCALC 49 07/10/2022    TRIG 44 07/10/2022     Lab Results   Component Value Date    CHOLESTEROL 153 07/10/2022    CHOLESTEROL 211 (H) 04/16/2022     Lab Results   Component Value Date    TROPONINI <0 02 06/06/2018     Lab Results   Component Value Date    NTBNP 83 08/10/2022            Dr Rosaline Sawyer MD [facility] [place]      "This note has been constructed using a voice recognition system  Therefore there may be syntax, spelling, and/or grammatical errors   Please call if you have any questions  "

## 2022-08-23 ENCOUNTER — TELEPHONE (OUTPATIENT)
Dept: NEUROLOGY | Facility: CLINIC | Age: 57
End: 2022-08-23

## 2022-08-23 NOTE — TELEPHONE ENCOUNTER
SLW/SYNCOPE, CEREBRAL ANEURYSM        NOTES: TO BE DETERMINED  SENT STAFF MESSAGE TO FAIZAN HAAS:        Jadiel Raza,     Can you please provide hfu instructions? Thank you kindly!        Tamia Leon          AWAITING HER RESPONSE

## 2022-08-23 NOTE — TELEPHONE ENCOUNTER
CHEN [place]; Ted L[place], [place]  Dr Jen Boyle agreed, no needs for Neurology     Thanks   Luis Caldera
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
## 2022-08-25 ENCOUNTER — TELEPHONE (OUTPATIENT)
Dept: FAMILY MEDICINE CLINIC | Facility: CLINIC | Age: 57
End: 2022-08-25

## 2022-08-26 NOTE — TELEPHONE ENCOUNTER
Completed form has been faxed to the number listed below; and placed in "TO BE SCANNED [place] [address]"      [phone]

## 2022-08-31 ENCOUNTER — TELEPHONE (OUTPATIENT)
Dept: FAMILY MEDICINE CLINIC | Facility: CLINIC | Age: 57
End: 2022-08-31

## 2022-08-31 NOTE — TELEPHONE ENCOUNTER
We got an order to be signed form [facility]    Copy attached/media    Original left in white bin

## 2022-09-08 ENCOUNTER — CONSULT (OUTPATIENT)
Dept: GASTROENTEROLOGY | Facility: CLINIC | Age: 57
End: 2022-09-08
Payer: COMMERCIAL

## 2022-09-08 ENCOUNTER — TELEPHONE (OUTPATIENT)
Dept: OTHER | Facility: OTHER | Age: 57
End: 2022-09-08

## 2022-09-08 ENCOUNTER — TELEPHONE (OUTPATIENT)
Dept: GASTROENTEROLOGY | Facility: CLINIC | Age: 57
End: 2022-09-08

## 2022-09-08 ENCOUNTER — TELEPHONE (OUTPATIENT)
Dept: CARDIOLOGY CLINIC | Facility: CLINIC | Age: 57
End: 2022-09-08

## 2022-09-08 VITALS
BODY MASS INDEX: 22.24 KG/M2 | HEART RATE: 91 BPM | TEMPERATURE: 96.2 F | HEIGHT: 66 IN | WEIGHT: 138.4 LBS | DIASTOLIC BLOOD PRESSURE: 77 MMHG | SYSTOLIC BLOOD PRESSURE: 131 MMHG

## 2022-09-08 DIAGNOSIS — K76.0 HEPATIC STEATOSIS: ICD-10-CM

## 2022-09-08 DIAGNOSIS — Z12.11 SCREEN FOR COLON CANCER: Primary | ICD-10-CM

## 2022-09-08 DIAGNOSIS — Z79.01 ANTICOAGULANT LONG-TERM USE: ICD-10-CM

## 2022-09-08 DIAGNOSIS — F10.10 ALCOHOL ABUSE: ICD-10-CM

## 2022-09-08 PROCEDURE — 99204 OFFICE O/P NEW MOD 45 MIN: CPT | Performed by: INTERNAL MEDICINE
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
## 2022-09-08 NOTE — ASSESSMENT & PLAN NOTE
Patient with history of heavy alcohol abuse  Had an ultrasound done which showed mildly enlarged about to 15 2 cm without any signs of cirrhosis  -advised patient to stop drinking alcohol completely    -discussed about the long-term liver complications including cirrhosis

## 2022-09-08 NOTE — PATIENT INSTRUCTIONS
Scheduled date of colonoscopy (as of today): 11/08/22  Physician performing colonoscopy: Jose F Mack  Location of colonoscopy: [facility]  Bowel prep reviewed with patient: GOLYTELY  Instructions reviewed with patient by: VIRGINIA  Clearances: Amado Delgado

## 2022-09-08 NOTE — TELEPHONE ENCOUNTER
Pts wife called and they are all confused about his medications  What to take, what not to take  Some are to expensive  Please give her a call back to help her straighten it out

## 2022-09-08 NOTE — ASSESSMENT & PLAN NOTE
Patient is at increased risks because of anticoagulation therapy  Advised to check with his cardiologist about holding Xarelto prior to the procedures

## 2022-09-08 NOTE — TELEPHONE ENCOUNTER
[place] routed conversation to Cardiology Cardiac Clearance [place] 2 minutes ago (2:39 PM)     [place] 2 minutes ago (2:39 PM)     VB       Our mutual patient is scheduled for procedure: colonoscopy     On:  November 8 , 2022      With: Dr Matty Pickett MD     He/She is taking the following blood thinner: Xarelto (Rivaroxaban)     Can this be stopped  2 days prior to the procedure

## 2022-09-08 NOTE — TELEPHONE ENCOUNTER
Our mutual patient is scheduled for procedure: colonoscopy    On: November 8 , 2022     With: Dr Shakira Woodruff MD    He/She is taking the following blood thinner: Xarelto (Rivaroxaban)    Can this be stopped  2 days prior to the procedure    Physician Approving clearance:

## 2022-09-09 ENCOUNTER — PATIENT OUTREACH (OUTPATIENT)
Dept: FAMILY MEDICINE CLINIC | Facility: CLINIC | Age: 57
End: 2022-09-09

## 2022-09-09 NOTE — PROGRESS NOTES
Received phone call from Vilma Shelby, [address] RAYMOND Shelby calling to report that patient has never picked up his medications from pharmacy  Pt is being discharged from [facility]  Outreach to [facility] pharmacy  There are 4 medications ready for   Total cost 4 24 for all four medications  Outreach to [place], patients sister  Advised of above  She states she will  medications  Vilma Shelby notified of above

## 2022-09-12 ENCOUNTER — CONSULT (OUTPATIENT)
Dept: NEPHROLOGY | Facility: CLINIC | Age: 57
End: 2022-09-12
Payer: COMMERCIAL

## 2022-09-12 VITALS
BODY MASS INDEX: 21.86 KG/M2 | HEIGHT: 66 IN | DIASTOLIC BLOOD PRESSURE: 60 MMHG | SYSTOLIC BLOOD PRESSURE: 104 MMHG | WEIGHT: 136 LBS

## 2022-09-12 DIAGNOSIS — Z45.02 AICD DISCHARGE: ICD-10-CM

## 2022-09-12 DIAGNOSIS — N17.9 AKI (ACUTE KIDNEY INJURY) (HCC): ICD-10-CM

## 2022-09-12 DIAGNOSIS — E83.42 HYPOMAGNESEMIA: ICD-10-CM

## 2022-09-12 DIAGNOSIS — I48.0 PAROXYSMAL ATRIAL FIBRILLATION (HCC): ICD-10-CM

## 2022-09-12 DIAGNOSIS — I42.9 CARDIOMYOPATHY, SECONDARY (HCC): Primary | ICD-10-CM

## 2022-09-12 DIAGNOSIS — I42.9 CARDIOMYOPATHY, SECONDARY (HCC): ICD-10-CM

## 2022-09-12 DIAGNOSIS — I10 ESSENTIAL HYPERTENSION: ICD-10-CM

## 2022-09-12 PROCEDURE — 99204 OFFICE O/P NEW MOD 45 MIN: CPT | Performed by: INTERNAL MEDICINE

## 2022-09-12 RX ORDER — RIVAROXABAN 20 MG/1
TABLET, FILM COATED ORAL
Qty: 30 TABLET | Refills: 0 | Status: SHIPPED | OUTPATIENT
Start: 2022-09-12 | End: 2022-10-05 | Stop reason: SDUPTHER
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
## 2022-09-12 NOTE — PATIENT INSTRUCTIONS
Her most recent blood work on August 19th, showed her kidney numbers were good  Your electrolytes were stable      Avoid smoking, avoid excessive use of NSAIDs    Hold Losartan, speak to Cardiologist    Continue your current prescription medications    Monitor blood pressure and take care so that it is less than 130/80    Can follow-up in the future if needed

## 2022-09-17 ENCOUNTER — APPOINTMENT (OUTPATIENT)
Dept: RADIOLOGY | Facility: HOSPITAL | Age: 57
DRG: 309 | End: 2022-09-17
Payer: COMMERCIAL

## 2022-09-17 ENCOUNTER — HOSPITAL ENCOUNTER (INPATIENT)
Facility: HOSPITAL | Age: 57
LOS: 4 days | Discharge: HOME | DRG: 309 | End: 2022-09-22
Attending: EMERGENCY MEDICINE | Admitting: FAMILY MEDICINE
Payer: COMMERCIAL

## 2022-09-17 ENCOUNTER — APPOINTMENT (EMERGENCY)
Dept: RADIOLOGY | Facility: HOSPITAL | Age: 57
DRG: 309 | End: 2022-09-17
Payer: COMMERCIAL

## 2022-09-17 DIAGNOSIS — F10.929 ALCOHOL INTOXICATION (HCC): ICD-10-CM

## 2022-09-17 DIAGNOSIS — R06.2 WHEEZING: ICD-10-CM

## 2022-09-17 DIAGNOSIS — F10.10 ALCOHOL ABUSE: ICD-10-CM

## 2022-09-17 DIAGNOSIS — E83.42 HYPOMAGNESEMIA: ICD-10-CM

## 2022-09-17 DIAGNOSIS — Z91.199 NONCOMPLIANCE: ICD-10-CM

## 2022-09-17 DIAGNOSIS — I51.89 COMBINED SYSTOLIC AND DIASTOLIC CARDIAC DYSFUNCTION: ICD-10-CM

## 2022-09-17 DIAGNOSIS — Z45.02 AICD DISCHARGE: Primary | ICD-10-CM
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
## 2022-09-17 LAB
2HR DELTA HS TROPONIN: <0 NG/L
ALBUMIN SERPL BCP-MCNC: 4.2 G/DL (ref 3.5–5)
ALP SERPL-CCNC: 67 U/L (ref 46–116)
ALT SERPL W P-5'-P-CCNC: 20 U/L (ref 12–78)
ANION GAP SERPL CALCULATED.3IONS-SCNC: 19 MMOL/L (ref 4–13)
APTT PPP: 23 SECONDS (ref 23–37)
AST SERPL W P-5'-P-CCNC: 38 U/L (ref 5–45)
BASOPHILS # BLD AUTO: 0.07 THOUSANDS/ΜL (ref 0–0.1)
BASOPHILS NFR BLD AUTO: 1 % (ref 0–1)
BILIRUB SERPL-MCNC: 0.36 MG/DL (ref 0.2–1)
BUN SERPL-MCNC: 13 MG/DL (ref 5–25)
CALCIUM SERPL-MCNC: 8.8 MG/DL (ref 8.3–10.1)
CARDIAC TROPONIN I PNL SERPL HS: 2 NG/L
CARDIAC TROPONIN I PNL SERPL HS: <2 NG/L
CHLORIDE SERPL-SCNC: 101 MMOL/L (ref 96–108)
CO2 SERPL-SCNC: 24 MMOL/L (ref 21–32)
CREAT SERPL-MCNC: 1.09 MG/DL (ref 0.6–1.3)
EOSINOPHIL # BLD AUTO: 0.01 THOUSAND/ΜL (ref 0–0.61)
EOSINOPHIL NFR BLD AUTO: 0 % (ref 0–6)
ERYTHROCYTE [DISTWIDTH] IN BLOOD BY AUTOMATED COUNT: 14.6 % (ref 11.6–15.1)
ETHANOL SERPL-MCNC: 345 MG/DL (ref 0–3)
GFR SERPL CREATININE-BSD FRML MDRD: 73 ML/MIN/1.73SQ M
GLUCOSE SERPL-MCNC: 69 MG/DL (ref 65–140)
HCT VFR BLD AUTO: 48.4 % (ref 36.5–49.3)
HGB BLD-MCNC: 15.6 G/DL (ref 12–17)
IMM GRANULOCYTES # BLD AUTO: 0.03 THOUSAND/UL (ref 0–0.2)
IMM GRANULOCYTES NFR BLD AUTO: 0 % (ref 0–2)
INR PPP: 0.99 (ref 0.84–1.19)
LYMPHOCYTES # BLD AUTO: 1.42 THOUSANDS/ΜL (ref 0.6–4.47)
LYMPHOCYTES NFR BLD AUTO: 20 % (ref 14–44)
MAGNESIUM SERPL-MCNC: 1.5 MG/DL (ref 1.6–2.6)
MCH RBC QN AUTO: 30.6 PG (ref 26.8–34.3)
MCHC RBC AUTO-ENTMCNC: 32.2 G/DL (ref 31.4–37.4)
MCV RBC AUTO: 95 FL (ref 82–98)
MONOCYTES # BLD AUTO: 0.29 THOUSAND/ΜL (ref 0.17–1.22)
MONOCYTES NFR BLD AUTO: 4 % (ref 4–12)
NEUTROPHILS # BLD AUTO: 5.2 THOUSANDS/ΜL (ref 1.85–7.62)
NEUTS SEG NFR BLD AUTO: 75 % (ref 43–75)
NRBC BLD AUTO-RTO: 0 /100 WBCS
PLATELET # BLD AUTO: 284 THOUSANDS/UL (ref 149–390)
PMV BLD AUTO: 8.6 FL (ref 8.9–12.7)
POTASSIUM SERPL-SCNC: 4.1 MMOL/L (ref 3.5–5.3)
PROT SERPL-MCNC: 7.9 G/DL (ref 6.4–8.4)
PROTHROMBIN TIME: 13.2 SECONDS (ref 11.6–14.5)
RBC # BLD AUTO: 5.09 MILLION/UL (ref 3.88–5.62)
SODIUM SERPL-SCNC: 144 MMOL/L (ref 135–147)
WBC # BLD AUTO: 7.02 THOUSAND/UL (ref 4.31–10.16)

## 2022-09-17 PROCEDURE — 80053 COMPREHEN METABOLIC PANEL: CPT | Performed by: EMERGENCY MEDICINE

## 2022-09-17 PROCEDURE — 70450 CT HEAD/BRAIN W/O DYE: CPT

## 2022-09-17 PROCEDURE — 71045 X-RAY EXAM CHEST 1 VIEW: CPT

## 2022-09-17 PROCEDURE — 99285 EMERGENCY DEPT VISIT HI MDM: CPT

## 2022-09-17 PROCEDURE — 85730 THROMBOPLASTIN TIME PARTIAL: CPT | Performed by: EMERGENCY MEDICINE

## 2022-09-17 PROCEDURE — 36415 COLL VENOUS BLD VENIPUNCTURE: CPT | Performed by: EMERGENCY MEDICINE

## 2022-09-17 PROCEDURE — 99285 EMERGENCY DEPT VISIT HI MDM: CPT | Performed by: EMERGENCY MEDICINE

## 2022-09-17 PROCEDURE — 82077 ASSAY SPEC XCP UR&BREATH IA: CPT | Performed by: EMERGENCY MEDICINE

## 2022-09-17 PROCEDURE — 84484 ASSAY OF TROPONIN QUANT: CPT | Performed by: EMERGENCY MEDICINE

## 2022-09-17 PROCEDURE — 85610 PROTHROMBIN TIME: CPT | Performed by: EMERGENCY MEDICINE

## 2022-09-17 PROCEDURE — 96365 THER/PROPH/DIAG IV INF INIT: CPT

## 2022-09-17 PROCEDURE — 85025 COMPLETE CBC W/AUTO DIFF WBC: CPT | Performed by: EMERGENCY MEDICINE

## 2022-09-17 PROCEDURE — G1004 CDSM NDSC: HCPCS

## 2022-09-17 PROCEDURE — 83735 ASSAY OF MAGNESIUM: CPT | Performed by: EMERGENCY MEDICINE

## 2022-09-17 RX ORDER — MAGNESIUM SULFATE HEPTAHYDRATE 40 MG/ML
2 INJECTION, SOLUTION INTRAVENOUS ONCE
Status: COMPLETED | OUTPATIENT
Start: 2022-09-17 | End: 2022-09-17

## 2022-09-17 RX ADMIN — MAGNESIUM SULFATE HEPTAHYDRATE 2 G: 40 INJECTION, SOLUTION INTRAVENOUS at 21:54

## 2022-09-18 PROBLEM — E87.2 METABOLIC ACIDOSIS, INCREASED ANION GAP: Status: ACTIVE | Noted: 2022-09-18

## 2022-09-18 PROBLEM — E87.29 METABOLIC ACIDOSIS, INCREASED ANION GAP: Status: ACTIVE | Noted: 2022-09-18

## 2022-09-18 LAB
ALBUMIN SERPL BCP-MCNC: 3.1 G/DL (ref 3.5–5)
ALP SERPL-CCNC: 52 U/L (ref 46–116)
ALT SERPL W P-5'-P-CCNC: 14 U/L (ref 12–78)
ANION GAP SERPL CALCULATED.3IONS-SCNC: 9 MMOL/L (ref 4–13)
AST SERPL W P-5'-P-CCNC: 26 U/L (ref 5–45)
BASOPHILS # BLD AUTO: 0.07 THOUSANDS/ΜL (ref 0–0.1)
BASOPHILS NFR BLD AUTO: 1 % (ref 0–1)
BILIRUB SERPL-MCNC: 0.33 MG/DL (ref 0.2–1)
BUN SERPL-MCNC: 10 MG/DL (ref 5–25)
CALCIUM ALBUM COR SERPL-MCNC: 8.4 MG/DL (ref 8.3–10.1)
CALCIUM SERPL-MCNC: 7.7 MG/DL (ref 8.3–10.1)
CHLORIDE SERPL-SCNC: 99 MMOL/L (ref 96–108)
CO2 SERPL-SCNC: 26 MMOL/L (ref 21–32)
CREAT SERPL-MCNC: 0.72 MG/DL (ref 0.6–1.3)
EOSINOPHIL # BLD AUTO: 0.07 THOUSAND/ΜL (ref 0–0.61)
EOSINOPHIL NFR BLD AUTO: 1 % (ref 0–6)
ERYTHROCYTE [DISTWIDTH] IN BLOOD BY AUTOMATED COUNT: 14.4 % (ref 11.6–15.1)
GFR SERPL CREATININE-BSD FRML MDRD: 102 ML/MIN/1.73SQ M
GLUCOSE SERPL-MCNC: 135 MG/DL (ref 65–140)
HCT VFR BLD AUTO: 37.7 % (ref 36.5–49.3)
HGB BLD-MCNC: 12.7 G/DL (ref 12–17)
IMM GRANULOCYTES # BLD AUTO: 0.03 THOUSAND/UL (ref 0–0.2)
IMM GRANULOCYTES NFR BLD AUTO: 1 % (ref 0–2)
LYMPHOCYTES # BLD AUTO: 1.64 THOUSANDS/ΜL (ref 0.6–4.47)
LYMPHOCYTES NFR BLD AUTO: 25 % (ref 14–44)
MAGNESIUM SERPL-MCNC: 2.1 MG/DL (ref 1.6–2.6)
MCH RBC QN AUTO: 31 PG (ref 26.8–34.3)
MCHC RBC AUTO-ENTMCNC: 33.7 G/DL (ref 31.4–37.4)
MCV RBC AUTO: 92 FL (ref 82–98)
MONOCYTES # BLD AUTO: 1.14 THOUSAND/ΜL (ref 0.17–1.22)
MONOCYTES NFR BLD AUTO: 17 % (ref 4–12)
NEUTROPHILS # BLD AUTO: 3.65 THOUSANDS/ΜL (ref 1.85–7.62)
NEUTS SEG NFR BLD AUTO: 55 % (ref 43–75)
NRBC BLD AUTO-RTO: 0 /100 WBCS
PHOSPHATE SERPL-MCNC: 3.1 MG/DL (ref 2.7–4.5)
PLATELET # BLD AUTO: 237 THOUSANDS/UL (ref 149–390)
PMV BLD AUTO: 8.3 FL (ref 8.9–12.7)
POTASSIUM SERPL-SCNC: 3.5 MMOL/L (ref 3.5–5.3)
PROT SERPL-MCNC: 6.1 G/DL (ref 6.4–8.4)
RBC # BLD AUTO: 4.1 MILLION/UL (ref 3.88–5.62)
SODIUM SERPL-SCNC: 134 MMOL/L (ref 135–147)
WBC # BLD AUTO: 6.6 THOUSAND/UL (ref 4.31–10.16)

## 2022-09-18 PROCEDURE — 94760 N-INVAS EAR/PLS OXIMETRY 1: CPT

## 2022-09-18 PROCEDURE — 99223 1ST HOSP IP/OBS HIGH 75: CPT | Performed by: INTERNAL MEDICINE

## 2022-09-18 PROCEDURE — 80053 COMPREHEN METABOLIC PANEL: CPT

## 2022-09-18 PROCEDURE — 83735 ASSAY OF MAGNESIUM: CPT

## 2022-09-18 PROCEDURE — 85025 COMPLETE CBC W/AUTO DIFF WBC: CPT

## 2022-09-18 PROCEDURE — 99222 1ST HOSP IP/OBS MODERATE 55: CPT | Performed by: FAMILY MEDICINE

## 2022-09-18 PROCEDURE — 84100 ASSAY OF PHOSPHORUS: CPT

## 2022-09-18 RX ORDER — ACETAMINOPHEN 325 MG/1
650 TABLET ORAL EVERY 6 HOURS PRN
Status: DISCONTINUED | OUTPATIENT
Start: 2022-09-18 | End: 2022-09-22 | Stop reason: HOSPADM

## 2022-09-18 RX ORDER — POTASSIUM CHLORIDE 29.8 MG/ML
40 INJECTION INTRAVENOUS ONCE
Status: DISCONTINUED | OUTPATIENT
Start: 2022-09-18 | End: 2022-09-18

## 2022-09-18 RX ORDER — LOSARTAN POTASSIUM 50 MG/1
50 TABLET ORAL DAILY
Status: DISCONTINUED | OUTPATIENT
Start: 2022-09-18 | End: 2022-09-22 | Stop reason: HOSPADM

## 2022-09-18 RX ORDER — LANOLIN ALCOHOL/MO/W.PET/CERES
100 CREAM (GRAM) TOPICAL DAILY
Status: DISCONTINUED | OUTPATIENT
Start: 2022-09-18 | End: 2022-09-22 | Stop reason: HOSPADM

## 2022-09-18 RX ORDER — LORAZEPAM 1 MG/1
2 TABLET ORAL ONCE
Status: COMPLETED | OUTPATIENT
Start: 2022-09-18 | End: 2022-09-18

## 2022-09-18 RX ORDER — IPRATROPIUM BROMIDE AND ALBUTEROL SULFATE 2.5; .5 MG/3ML; MG/3ML
3 SOLUTION RESPIRATORY (INHALATION) EVERY 6 HOURS PRN
Status: DISCONTINUED | OUTPATIENT
Start: 2022-09-18 | End: 2022-09-22 | Stop reason: HOSPADM

## 2022-09-18 RX ORDER — MAGNESIUM SULFATE HEPTAHYDRATE 40 MG/ML
2 INJECTION, SOLUTION INTRAVENOUS ONCE
Status: COMPLETED | OUTPATIENT
Start: 2022-09-18 | End: 2022-09-18

## 2022-09-18 RX ORDER — POTASSIUM CHLORIDE 14.9 MG/ML
20 INJECTION INTRAVENOUS ONCE
Status: DISCONTINUED | OUTPATIENT
Start: 2022-09-18 | End: 2022-09-18

## 2022-09-18 RX ORDER — NICOTINE 21 MG/24HR
1 PATCH, TRANSDERMAL 24 HOURS TRANSDERMAL DAILY
Status: DISCONTINUED | OUTPATIENT
Start: 2022-09-18 | End: 2022-09-22 | Stop reason: HOSPADM

## 2022-09-18 RX ORDER — POTASSIUM CHLORIDE 20 MEQ/1
40 TABLET, EXTENDED RELEASE ORAL ONCE
Status: COMPLETED | OUTPATIENT
Start: 2022-09-18 | End: 2022-09-18

## 2022-09-18 RX ORDER — FOLIC ACID 1 MG/1
1 TABLET ORAL DAILY
Status: DISCONTINUED | OUTPATIENT
Start: 2022-09-18 | End: 2022-09-22 | Stop reason: HOSPADM

## 2022-09-18 RX ORDER — METOPROLOL SUCCINATE 50 MG/1
50 TABLET, EXTENDED RELEASE ORAL DAILY
Status: DISCONTINUED | OUTPATIENT
Start: 2022-09-18 | End: 2022-09-22 | Stop reason: HOSPADM

## 2022-09-18 RX ADMIN — TRIMETHOBENZAMIDE HYDROCHLORIDE 200 MG: 100 INJECTION INTRAMUSCULAR at 13:48

## 2022-09-18 RX ADMIN — LORAZEPAM 2 MG: 1 TABLET ORAL at 11:58

## 2022-09-18 RX ADMIN — RIVAROXABAN 20 MG: 20 TABLET, FILM COATED ORAL at 08:21

## 2022-09-18 RX ADMIN — FOLIC ACID 1 MG: 1 TABLET ORAL at 08:21

## 2022-09-18 RX ADMIN — POTASSIUM CHLORIDE 40 MEQ: 1500 TABLET, EXTENDED RELEASE ORAL at 08:20

## 2022-09-18 RX ADMIN — MAGNESIUM SULFATE HEPTAHYDRATE 2 G: 40 INJECTION, SOLUTION INTRAVENOUS at 00:32

## 2022-09-18 RX ADMIN — METOPROLOL SUCCINATE 50 MG: 50 TABLET, EXTENDED RELEASE ORAL at 08:21

## 2022-09-18 RX ADMIN — NICOTINE 1 PATCH: 21 PATCH, EXTENDED RELEASE TRANSDERMAL at 08:21

## 2022-09-18 RX ADMIN — THIAMINE HCL TAB 100 MG 100 MG: 100 TAB at 08:21

## 2022-09-18 RX ADMIN — LOSARTAN POTASSIUM 50 MG: 50 TABLET, FILM COATED ORAL at 08:21

## 2022-09-18 RX ADMIN — Medication 1 TABLET: at 08:21
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
## 2022-09-18 NOTE — ED PROCEDURE NOTE
PROCEDURE  ECG 12 Lead Documentation Only    Date/Time: 9/17/2022 8:54 PM  Performed by: Bonita Wilhelm DO  Authorized by: Bonita Wilhelm DO     ECG reviewed by me, the ED Provider: yes    Patient location:  ED  Interpretation:     Interpretation: abnormal    Rate:     ECG rate:  108    ECG rate assessment: tachycardic    Rhythm:     Rhythm: sinus rhythm    Ectopy:     Ectopy: none    QRS:     QRS axis:  Left  Q waves:     Q waves:  II, aVF and III  Other findings:     Other findings: 2316 John Julian DO  09/17/22 2055

## 2022-09-18 NOTE — PLAN OF CARE
Problem: MOBILITY - ADULT  Goal: Maintain or return to baseline ADL function  Description: INTERVENTIONS:  -  Assess patient's ability to carry out ADLs; assess patient's baseline for ADL function and identify physical deficits which impact ability to perform ADLs (bathing, care of mouth/teeth, toileting, grooming, dressing, etc )  - Assess/evaluate cause of self-care deficits   - Assess range of motion  - Assess patient's mobility; develop plan if impaired  - Assess patient's need for assistive devices and provide as appropriate  - Encourage maximum independence but intervene and supervise when necessary  - Involve family in performance of ADLs  - Assess for home care needs following discharge   - Consider OT consult to assist with ADL evaluation and planning for discharge  - Provide patient education as appropriate  Outcome: Progressing  Goal: Maintains/Returns to pre admission functional level  Description: INTERVENTIONS:  - Perform BMAT or MOVE assessment daily    - Set and communicate daily mobility goal to care team and patient/family/caregiver  - Collaborate with rehabilitation services on mobility goals if consulted  - Perform Range of Motion 2 times a day  - Reposition patient every 2 hours    - Dangle patient 2 times a day  - Stand patient 2 times a day  - Ambulate patient 2 times a day  - Out of bed to chair 2 times a day   - Out of bed for meals 2 times a day  - Out of bed for toileting  - Record patient progress and toleration of activity level   Outcome: Progressing     Problem: CARDIOVASCULAR - ADULT  Goal: Maintains optimal cardiac output and hemodynamic stability  Description: INTERVENTIONS:  - Monitor I/O, vital signs and rhythm  - Monitor for S/S and trends of decreased cardiac output  - Administer and titrate ordered vasoactive medications to optimize hemodynamic stability  - Assess quality of pulses, skin color and temperature  - Assess for signs of decreased coronary artery perfusion  - Instruct patient to report change in severity of symptoms  Outcome: Progressing  Goal: Absence of cardiac dysrhythmias or at baseline rhythm  Description: INTERVENTIONS:  - Continuous cardiac monitoring, vital signs, obtain 12 lead EKG if ordered  - Administer antiarrhythmic and heart rate control medications as ordered  - Monitor electrolytes and administer replacement therapy as ordered  Outcome: Progressing     Problem: METABOLIC, FLUID AND ELECTROLYTES - ADULT  Goal: Electrolytes maintained within normal limits  Description: INTERVENTIONS:  - Monitor labs and assess patient for signs and symptoms of electrolyte imbalances  - Administer electrolyte replacement as ordered  - Monitor response to electrolyte replacements, including repeat lab results as appropriate  - Instruct patient on fluid and nutrition as appropriate  Outcome: Progressing     Problem: MUSCULOSKELETAL - ADULT  Goal: Maintain or return mobility to safest level of function  Description: INTERVENTIONS:  - Assess patient's ability to carry out ADLs; assess patient's baseline for ADL function and identify physical deficits which impact ability to perform ADLs (bathing, care of mouth/teeth, toileting, grooming, dressing, etc )  - Assess/evaluate cause of self-care deficits   - Assess range of motion  - Assess patient's mobility  - Assess patient's need for assistive devices and provide as appropriate  - Encourage maximum independence but intervene and supervise when necessary  - Involve family in performance of ADLs  - Assess for home care needs following discharge   - Consider OT consult to assist with ADL evaluation and planning for discharge  - Provide patient education as appropriate  Outcome: Progressing

## 2022-09-18 NOTE — ASSESSMENT & PLAN NOTE
· Mildly enlarged liver on ultrasound without any signs of cirrhosis    · Likely 2/2 heavy alcohol abuse  · Encourage alcohol cessation and in patient alcohol rehab
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
## 2022-09-18 NOTE — ASSESSMENT & PLAN NOTE
· Per patient, his AICD went off 3 times today  Patient denies any palpitation, chest pain, or SOB  Likely 2/2 alcohol use  · Monitor on telemetry  · Cardiology recs: Life vest interrogation reviewed prelim likely secondary to SVT, cannot r/o underlying flutter given a lot of artifact  Maintain K 4 and Mg 2  [place] [facility] [place] [facility] due [place] rpt echo in Oct  If EF not improved, may need ICD  · Rpt echo EF 72%, reduced systolic fxn, P2NQ   Cardio recs not candidate for advanced therapy due to alcohol use and noncompliance

## 2022-09-18 NOTE — ED NOTES
Staff attempted to interrogate his external device, but ER medtronic device does not interrogate external devices  Battery for patient's device was brought in by family member and is charging at bedside       Cyrilla Blizzard, RN  09/17/22 1172

## 2022-09-18 NOTE — NURSING NOTE
When asked about medications, patient states " I don't know what I take but I take my medications every day "

## 2022-09-18 NOTE — ASSESSMENT & PLAN NOTE
· Sinus tachy on admission  Patient denies any chest pain or palpitation    · Continue metoprolol 50 mg daily, losartan 50 mg daily  · Continue Xarelto 20 mg daily  · On Telemetry
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
## 2022-09-18 NOTE — ASSESSMENT & PLAN NOTE
Cardiomyopathy likely 2/2 alcohol   Echo done on 07/22 shows EF 25-30% and G1 DD  · Monitor I&O and daily weight  · Continue on metoprolol

## 2022-09-18 NOTE — UTILIZATION REVIEW
Initial Clinical Review    Admission: Date/Time/Statement:     OBSERVATION 9-17-22 CHANGED TO INPATIENT 9-18-22  FOR ETOH WITHDRAWAL AND CARDIAC MONITORING  Admission Orders (From admission, onward)     Ordered        09/18/22 1140  Inpatient Admission  Once            09/17/22 2251  Place in Observation  Once                      Orders Placed This Encounter   Procedures    Place in Observation     Standing Status:   Standing     Number of Occurrences:   1     Order Specific Question:   Level of Care     Answer:   Med Surg [16]    Inpatient Admission     Standing Status:   Standing     Number of Occurrences:   1     Order Specific Question:   Level of Care     Answer:   Med Surg [16]     Order Specific Question:   Estimated length of stay     Answer:   More than 2 Midnights     Order Specific Question:   Certification     Answer:   I certify that inpatient services are medically necessary for this patient for a duration of greater than two midnights  See H&P and MD Progress Notes for additional information about the patient's course of treatment  ED Arrival Information     Expected   -    Arrival   9/17/2022 20:43    Acuity   Urgent            Means of arrival   Ambulance    Escorted by   [address]    Admission type   Urgent            Arrival complaint   Fall           Chief Complaint   Patient presents with   Lauren Stinson AICD Problem     Patient comes via EMS after falling on sidewalk on his buttocks, is intoxicated and his external AICD shot off twice according to patient       Initial Presentation: 59 y o  male presents to ed from community via ems for evaluation and treatment of intoxication, possible injury from fall and AICD firing  Patient wearing his life vest  Clinical assessment significant for ETOH 345, MAG 1 5  Imaging without acute finding  Ekg sinus tach  Initially treated with iv mag x1  Family brought in battery    Admit to observation for AICD discharge  Date: 9-18-22   Day 2: observation to inpatient   Monitor on telemetry for potential arrhythmias  Start serial Ciwa assessments   consulted for [employer]  Cardiology consulted  Cannot rule out underlying flutter  supplement potassium to goal 4  He has had similar episodes when he is binge drinking  Resume home medications  CIWA rising today to 9 ( TREMOR, ANXIETY)   Treated with po ativan x1  Received im tigan x1 for nausea       ED Triage Vitals   09/17/22 2108 09/17/22 2051 09/17/22 2051 09/17/22 2051 09/17/22 2051   97 9 °F (36 6 °C) 104 16 122/69 95 %      Oral Monitor         No Pain          09/18/22 59 1 kg (130 lb 3 2 oz)     Additional Vital Signs:     Date/Time Temp Pulse Resp BP MAP (mmHg) SpO2 O2 Device   09/18/22 15:18:38 97 5 °F (36 4 °C) 88 19 122/72 89 94 % --   09/18/22 11:55:11 -- 95 -- 119/65 83 94 % --   09/18/22 1149 -- 105 -- -- -- -- --   09/18/22 0900 -- -- -- -- -- -- None (Room air)   09/18/22 07:32:58 99 4 °F (37 4 °C) 96 18 111/63 79 94 % --   09/18/22 04:05:10 99 °F (37 2 °C) 120 Abnormal  18 107/62 77 94 % --   09/18/22 0013 -- -- -- -- -- 96 % None (Room air)   09/17/22 23:55:45 97 9 °F (36 6 °C) 91 18 106/65 79 94 % --   09/17/22 2200 -- 99 16 111/68 84 94 % --   09/17/22 2145 -- 101 18 -- -- 92 % --   09/17/22 2108 97 9 °F (36 6 °C) -- -- -- -- -- --   09/17/22 2103 -- -- -- -- -- -- None (Room air)   09/17/22 2051 -- 104 16 122/69 -- 95 % None (Room air)         Pertinent Labs/Diagnostic Test Results:   Date/Time: 9/17/2022 8:54 PM      ECG reviewed by me, the ED Provider: yes    Patient location:  ED  Interpretation:     Interpretation: abnormal    Rate:     ECG rate:  108    ECG rate assessment: tachycardic    Rhythm:     Rhythm: sinus rhythm    Ectopy:     Ectopy: none    QRS:     QRS axis:  Left  Q waves:     Q waves:  II, aVF and III  Other findings:     Other findings: LAE        CT head without contrast   Final  (09/17 2207)      No acute intracranial abnormality  XR chest 1 view portable   Final  (09/18 1436)      No acute cardiopulmonary disease  Redemonstration of pulmonary artery enlargement suggestive of pulmonary hypertension                 Results from last 7 days   Lab Units 09/18/22  0613 09/17/22  2102   WBC Thousand/uL 6 60 7 02   HEMOGLOBIN g/dL 12 7 15 6   HEMATOCRIT % 37 7 48 4   PLATELETS Thousands/uL 237 284   NEUTROS ABS Thousands/µL 3 65 5 20         Results from last 7 days   Lab Units 09/18/22  0613 09/17/22  2102   SODIUM mmol/L 134* 144   POTASSIUM mmol/L 3 5 4 1   CHLORIDE mmol/L 99 101   CO2 mmol/L 26 24   ANION GAP mmol/L 9 19*   BUN mg/dL 10 13   CREATININE mg/dL 0 72 1 09   EGFR ml/min/1 73sq m 102 73   CALCIUM mg/dL 7 7* 8 8   MAGNESIUM mg/dL 2 1 1 5*   PHOSPHORUS mg/dL 3 1  --      Results from last 7 days   Lab Units 09/18/22  0613 09/17/22  2102   AST U/L 26 38   ALT U/L 14 20   ALK PHOS U/L 52 67   TOTAL PROTEIN g/dL 6 1* 7 9   ALBUMIN g/dL 3 1* 4 2   TOTAL BILIRUBIN mg/dL 0 33 0 36         Results from last 7 days   Lab Units 09/18/22  0613 09/17/22  2102   GLUCOSE RANDOM mg/dL 135 69       Results from last 7 days   Lab Units 09/17/22  2314 09/17/22  2102   HS TNI 0HR ng/L  --  2   HS TNI 2HR ng/L <2  --    HSTNI D2 ng/L <0  --          Results from last 7 days   Lab Units 09/17/22  2102   PROTIME seconds 13 2   INR  0 99   PTT seconds 23       Results from last 7 days   Lab Units 09/17/22  2102   ETHANOL LVL mg/dL 345*       ED Treatment:   Medication Administration from 09/17/2022 2043 to 09/17/2022 2324       Date/Time Order Dose Route Action     09/17/2022 2154 magnesium sulfate 2 g/50 mL IVPB (premix) 2 g 2 g Intravenous New Bag        Past Medical History:   Diagnosis Date    Alcohol abuse     1 pint of gin daily on weekends    Alcoholic hepatitis     Mild    Atrial flutter ([facility] [address] ) 07/2015    Recurrent and symptomatic, also occurring on 1/7/2015    [facility] 01/07/2015 in setting of rapid atrial flutter    Congenital heart disease     Type unknown and not evident on echocardiography; "had a hole in heart that they closed up" as a teenager at [facility] (Updated 07/21/2022); also had unspecified open heart surgery as infant at [facility] in [address]   COPD (chronic obstructive pulmonary disease) (HCC)     Hypokalemia     Tobacco abuse 1976    One pack per day for 38 years     Present on Admission:   Essential hypertension   Hepatic steatosis   Paroxysmal atrial fibrillation ([facility] [facility] )   Alcohol dependence ([facility] )   Brain aneurysm      Admitting Diagnosis:     Hypomagnesemia [E83 42]  Al[facility] ) [F10 929]  Back pain [M54 9]  AICD discharge [Z45 02]  Fall, initial encounter [W19  XXXA]    Age/Sex: 59 y o  male    Scheduled Medications:  folic acid, 1 mg, Oral, Daily  losartan, 50 mg, Oral, Daily  metoprolol succinate, 50 mg, Oral, Daily  multivitamin-minerals, 1 tablet, Oral, Daily  nicotine, 1 patch, Transdermal, Daily  rivaroxaban, 20 mg, Oral, Daily With Breakfast  thiamine, 100 mg, Oral, Daily      Continuous IV Infusions:     PRN Meds:  acetaminophen, 650 mg, Oral, Q6H PRN  ipratropium-albuterol, 3 mL, Nebulization, Q6H PRN  pneumococcal 23-valent polysaccharide vaccine, 0 5 mL, Subcutaneous, Prior to discharge  trimethobenzamide, 200 mg, Intramuscular, Q6H PRN        IP CONSULT TO CARDIOLOGY  IP CONSULT TO CASE MANAGEMENT    Network Utilization Review Department  ATTENTION: Please call with any questions or concerns to [phone] and carefully listen to the prompts so that you are directed to the right person  All voicemails are confidential   Jeffy Epps all requests for admission clinical reviews, approved or denied determinations and any other requests to dedicated fax number below belonging to the campus where the patient is receiving treatment   List of dedicated fax numbers for the Facilities:  FACILITY NAME UR FAX NUMBER   ADMISSION DENIALS (Administrative/Medical Necessity) [phone]   [address] (Maternity/NICU/Pediatrics) [address] [facility] 40 125 [facility]  [phone]   [address] [facility] [address] [address] [place] [address] [address] [facility] [address] [employer] [phone]
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
## 2022-09-18 NOTE — ED NOTES
Pt resting comfortably, call light within reach, will continue to monitor       Izzy Horne, EMILE  09/17/22 1310

## 2022-09-19 ENCOUNTER — APPOINTMENT (INPATIENT)
Dept: NON INVASIVE DIAGNOSTICS | Facility: HOSPITAL | Age: 57
DRG: 309 | End: 2022-09-19
Payer: COMMERCIAL

## 2022-09-19 LAB
ANION GAP SERPL CALCULATED.3IONS-SCNC: 6 MMOL/L (ref 4–13)
AORTIC ROOT: 3.3 CM
APICAL FOUR CHAMBER EJECTION FRACTION: 69 %
BASOPHILS # BLD AUTO: 0.06 THOUSANDS/ΜL (ref 0–0.1)
BASOPHILS NFR BLD AUTO: 1 % (ref 0–1)
BUN SERPL-MCNC: 8 MG/DL (ref 5–25)
CALCIUM SERPL-MCNC: 8.7 MG/DL (ref 8.3–10.1)
CHLORIDE SERPL-SCNC: 100 MMOL/L (ref 96–108)
CO2 SERPL-SCNC: 29 MMOL/L (ref 21–32)
CREAT SERPL-MCNC: 0.62 MG/DL (ref 0.6–1.3)
E WAVE DECELERATION TIME: 263 MS
EOSINOPHIL # BLD AUTO: 0.12 THOUSAND/ΜL (ref 0–0.61)
EOSINOPHIL NFR BLD AUTO: 1 % (ref 0–6)
ERYTHROCYTE [DISTWIDTH] IN BLOOD BY AUTOMATED COUNT: 14.4 % (ref 11.6–15.1)
FRACTIONAL SHORTENING: 30 % (ref 28–44)
GFR SERPL CREATININE-BSD FRML MDRD: 108 ML/MIN/1.73SQ M
GLUCOSE SERPL-MCNC: 96 MG/DL (ref 65–140)
HCT VFR BLD AUTO: 40.4 % (ref 36.5–49.3)
HGB BLD-MCNC: 13.3 G/DL (ref 12–17)
IMM GRANULOCYTES # BLD AUTO: 0.02 THOUSAND/UL (ref 0–0.2)
IMM GRANULOCYTES NFR BLD AUTO: 0 % (ref 0–2)
INTERVENTRICULAR SEPTUM IN DIASTOLE (PARASTERNAL SHORT AXIS VIEW): 1.2 CM
INTERVENTRICULAR SEPTUM: 1.2 CM (ref 0.6–1.1)
LAAS-AP2: 17.4 CM2
LAAS-AP4: 16.3 CM2
LEFT ATRIUM SIZE: 3.4 CM
LEFT INTERNAL DIMENSION IN SYSTOLE: 3.3 CM (ref 2.1–4)
LEFT VENTRICULAR INTERNAL DIMENSION IN DIASTOLE: 4.7 CM (ref 3.5–6)
LEFT VENTRICULAR POSTERIOR WALL IN END DIASTOLE: 1 CM
LEFT VENTRICULAR STROKE VOLUME: 60 ML
LVSV (TEICH): 60 ML
LYMPHOCYTES # BLD AUTO: 1.69 THOUSANDS/ΜL (ref 0.6–4.47)
LYMPHOCYTES NFR BLD AUTO: 20 % (ref 14–44)
MAGNESIUM SERPL-MCNC: 1.5 MG/DL (ref 1.6–2.6)
MCH RBC QN AUTO: 30.9 PG (ref 26.8–34.3)
MCHC RBC AUTO-ENTMCNC: 32.9 G/DL (ref 31.4–37.4)
MCV RBC AUTO: 94 FL (ref 82–98)
MONOCYTES # BLD AUTO: 1.08 THOUSAND/ΜL (ref 0.17–1.22)
MONOCYTES NFR BLD AUTO: 13 % (ref 4–12)
MV E'TISSUE VEL-SEP: 7 CM/S
MV PEAK A VEL: 0.7 M/S
MV PEAK E VEL: 74 CM/S
MV STENOSIS PRESSURE HALF TIME: 76 MS
MV VALVE AREA P 1/2 METHOD: 2.89 CM2
NEUTROPHILS # BLD AUTO: 5.62 THOUSANDS/ΜL (ref 1.85–7.62)
NEUTS SEG NFR BLD AUTO: 65 % (ref 43–75)
NRBC BLD AUTO-RTO: 0 /100 WBCS
PLATELET # BLD AUTO: 237 THOUSANDS/UL (ref 149–390)
PMV BLD AUTO: 8.3 FL (ref 8.9–12.7)
POTASSIUM SERPL-SCNC: 3.5 MMOL/L (ref 3.5–5.3)
RBC # BLD AUTO: 4.31 MILLION/UL (ref 3.88–5.62)
RIGHT ATRIUM AREA SYSTOLE A4C: 18.7 CM2
RIGHT VENTRICLE ID DIMENSION: 4.4 CM
SL CV LEFT ATRIUM LENGTH A2C: 4.6 CM
SL CV LV EF: 35
SL CV PED ECHO LEFT VENTRICLE DIASTOLIC VOLUME (MOD BIPLANE) 2D: 105 ML
SL CV PED ECHO LEFT VENTRICLE SYSTOLIC VOLUME (MOD BIPLANE) 2D: 45 ML
SODIUM SERPL-SCNC: 135 MMOL/L (ref 135–147)
TR MAX PG: 22 MMHG
TR PEAK VELOCITY: 2.4 M/S
TRICUSPID VALVE PEAK REGURGITATION VELOCITY: 2.37 M/S
WBC # BLD AUTO: 8.59 THOUSAND/UL (ref 4.31–10.16)

## 2022-09-19 PROCEDURE — 93321 DOPPLER ECHO F-UP/LMTD STD: CPT | Performed by: INTERNAL MEDICINE

## 2022-09-19 PROCEDURE — 97167 OT EVAL HIGH COMPLEX 60 MIN: CPT

## 2022-09-19 PROCEDURE — 93325 DOPPLER ECHO COLOR FLOW MAPG: CPT

## 2022-09-19 PROCEDURE — 85025 COMPLETE CBC W/AUTO DIFF WBC: CPT

## 2022-09-19 PROCEDURE — 83735 ASSAY OF MAGNESIUM: CPT

## 2022-09-19 PROCEDURE — 97110 THERAPEUTIC EXERCISES: CPT

## 2022-09-19 PROCEDURE — 93325 DOPPLER ECHO COLOR FLOW MAPG: CPT | Performed by: INTERNAL MEDICINE

## 2022-09-19 PROCEDURE — 93308 TTE F-UP OR LMTD: CPT

## 2022-09-19 PROCEDURE — 93321 DOPPLER ECHO F-UP/LMTD STD: CPT

## 2022-09-19 PROCEDURE — 80048 BASIC METABOLIC PNL TOTAL CA: CPT

## 2022-09-19 PROCEDURE — 99232 SBSQ HOSP IP/OBS MODERATE 35: CPT | Performed by: INTERNAL MEDICINE

## 2022-09-19 PROCEDURE — 93308 TTE F-UP OR LMTD: CPT | Performed by: INTERNAL MEDICINE

## 2022-09-19 PROCEDURE — 97535 SELF CARE MNGMENT TRAINING: CPT

## 2022-09-19 PROCEDURE — 97163 PT EVAL HIGH COMPLEX 45 MIN: CPT

## 2022-09-19 PROCEDURE — 99232 SBSQ HOSP IP/OBS MODERATE 35: CPT | Performed by: FAMILY MEDICINE

## 2022-09-19 RX ORDER — POTASSIUM CHLORIDE 20 MEQ/1
40 TABLET, EXTENDED RELEASE ORAL ONCE
Status: COMPLETED | OUTPATIENT
Start: 2022-09-19 | End: 2022-09-19

## 2022-09-19 RX ORDER — MAGNESIUM SULFATE HEPTAHYDRATE 40 MG/ML
2 INJECTION, SOLUTION INTRAVENOUS ONCE
Status: COMPLETED | OUTPATIENT
Start: 2022-09-19 | End: 2022-09-19

## 2022-09-19 RX ORDER — PANTOPRAZOLE SODIUM 40 MG/1
40 TABLET, DELAYED RELEASE ORAL
Status: DISCONTINUED | OUTPATIENT
Start: 2022-09-19 | End: 2022-09-22 | Stop reason: HOSPADM

## 2022-09-19 RX ADMIN — MAGNESIUM SULFATE HEPTAHYDRATE 2 G: 40 INJECTION, SOLUTION INTRAVENOUS at 09:22

## 2022-09-19 RX ADMIN — PANTOPRAZOLE SODIUM 40 MG: 40 TABLET, DELAYED RELEASE ORAL at 11:17

## 2022-09-19 RX ADMIN — THIAMINE HCL TAB 100 MG 100 MG: 100 TAB at 09:22

## 2022-09-19 RX ADMIN — FOLIC ACID 1 MG: 1 TABLET ORAL at 09:22

## 2022-09-19 RX ADMIN — Medication 1 TABLET: at 09:22

## 2022-09-19 RX ADMIN — POTASSIUM CHLORIDE 40 MEQ: 1500 TABLET, EXTENDED RELEASE ORAL at 09:22

## 2022-09-19 RX ADMIN — NICOTINE 1 PATCH: 21 PATCH, EXTENDED RELEASE TRANSDERMAL at 09:23

## 2022-09-19 RX ADMIN — METOPROLOL SUCCINATE 50 MG: 50 TABLET, EXTENDED RELEASE ORAL at 09:22

## 2022-09-19 RX ADMIN — POTASSIUM CHLORIDE 40 MEQ: 1500 TABLET, EXTENDED RELEASE ORAL at 13:40

## 2022-09-19 RX ADMIN — RIVAROXABAN 20 MG: 20 TABLET, FILM COATED ORAL at 09:24

## 2022-09-19 RX ADMIN — LOSARTAN POTASSIUM 50 MG: 50 TABLET, FILM COATED ORAL at 09:22

## 2022-09-19 NOTE — PROGRESS NOTES
Daily Progress Note - [facility]  Family Medicine Residency  Pj Jamie 59 y o  male MRN: [id number]  Unit/Bed#: [address] [facility] Encounter: [id number]  Admitting Physician: Chika Smith DO   PCP: Kelechi Balderas MD  Date of Admission:  9/17/2022  8:44 PM    Assessment and Plan    * AICD discharge  Assessment & Plan  · Per patient, his AICD went off 3 times today  Patient denies any palpitation, chest pain, or SOB  Likely 2/2 alcohol use  · Monitor on telemetry  · Cardiology recs: Life vest interrogation reviewed prelim likely secondary to SVT, cannot r/o underlying flutter given a lot of artifact  Maintain K 4 and Mg 2  Pt followed by [facility] group in [place], due for rpt echo in Oct  If EF not improved, may need ICD  · Repeat echo ordered by cardio today, will follow up cardio recs    Paroxysmal atrial fibrillation (HCC)  Assessment & Plan  · Sinus tachy on admission  Patient denies any chest pain or palpitation  · Continue metoprolol 50 mg daily, losartan 50 mg daily  · Continue Xarelto 20 mg daily  · On Telemetry    Alcohol dependence ([facility] [address] )  Assessment & Plan  · Had about 10 shots of liquor on day of admission  · Ethanol 345  · Received Ativan 2 mg, none given overnight  · Continue on thiamine, folate, multivitamin  · Patient agreeable to inpatient alcohol rehab  · P[facility] Crisis consulted  · Protonix added for epigastric pain reported by patient today, will not be continued on discharge  No nausea or vomiting overnight  Metabolic acidosis, increased anion gap  Assessment & Plan  Anion gap of 19  Likely due to alcohol intoxication  Monitor BMP    Combined systolic and diastolic cardiac dysfunction  Assessment & Plan  Cardiomyopathy likely 2/2 alcohol  Echo done on 07/22 shows EF 25-30% and G1 DD  · Monitor I&O and daily weight  · Continue on metoprolol    Essential hypertension  Assessment & Plan  Patient is stable on losartan and metoprolol at home    Continue home medications  Noncompliance  Assessment & Plan  Hx of noncompliant with the medication and life vest use  Brain aneurysm  Assessment & Plan  · Patient denies any headache or blurry vision on admission  · CT head on previous admission showed a 3 mm of right clinoid segment aneurysm and 1 mm right P comm aneurysm  · Repeat CT head pending  CT head ordered due to possible fall after drinking 10 shots of liquor today  · Patient to follow-up with Dr Shahzad Richards neurosurgery at [facility] outpatient or Dr Lorna Mckinnon at [facility]    Hepatic steatosis  [address] [place] [address]  · Mildly enlarged liver on ultrasound without any signs of cirrhosis  · Likely 2/2 heavy alcohol abuse  · Encourage alcohol cessation and in patient alcohol rehab    [address]  Presented via EMS after falling on the sidewalk on his buttock  He had about 10 shots on the day of fall  He denies any loss of consciousness or injury to the head  · CT head showed no acute intracranial abnormality  · On fall precaution      VTE Pharmacologic Prophylaxis:   Pharmacologic: Rivaroxaban (Xarelto)  Mechanical VTE Prophylaxis in Place: Yes    Patient Centered Rounds: I have performed bedside rounds with nursing staff today  Education and Discussions with Family / Patient:   Patient Information Sharing: With the consent of Tesha Graham , their loved ones Carmita Maurice, sister were notified today by inpatient team of the patients condition and current plan  All questions answered  Time Spent for Care: 30 minutes  More than 50% of total time spent on counseling and coordination of care as described above      Current Length of Stay: 1 day(s)    Current Patient Status: Inpatient   Certification Statement: The patient will continue to require additional inpatient hospital stay due to monitor alcohol withdrawal, f/u cardio recs    Discharge Plan: 24-48 hours, pending inpatient alcohol rehab placement     Code Status: Level 1 - Full Code    Subjective: Patient seen and examined at bedside  He is reporting some epigastric discomfort that feels like "something is sticking me " Patient was nauseous yesterday but reports it resolved  No vomiting overnight  Denies any chest pain, shortness a breath, urinary changes, or calf tenderness  Objective     Objective:   Vitals:   Temp (24hrs), Av 5 °F (36 9 °C), Min:97 5 °F (36 4 °C), Max:99 4 °F (37 4 °C)    Temp:  [97 5 °F (36 4 °C)-99 4 °F (37 4 °C)] 98 6 °F (37 °C)  HR:  [] 70  Resp:  [16-19] 17  BP: (119-132)/(65-79) 128/79  SpO2:  [94 %-97 %] 97 %  Body mass index is 21 66 kg/m²  Input and Output Summary (last 24 hours): Intake/Output Summary (Last 24 hours) at 2022 0939  Last data filed at 2022 0742  Gross per 24 hour   Intake --   Output 300 ml   Net -300 ml       Physical Exam:   Physical Exam  Vitals reviewed  Constitutional:       General: He is not in acute distress  Appearance: Normal appearance  HENT:      Head: Normocephalic  Mouth/Throat:      Mouth: Mucous membranes are moist       Pharynx: No oropharyngeal exudate or posterior oropharyngeal erythema  Eyes:      Extraocular Movements: Extraocular movements intact  Cardiovascular:      Rate and Rhythm: Normal rate and regular rhythm  Pulses: Normal pulses  Heart sounds: Normal heart sounds  No murmur heard  Pulmonary:      Effort: Pulmonary effort is normal  No respiratory distress  Breath sounds: Normal breath sounds  No wheezing  Abdominal:      General: Abdomen is flat  There is no distension  Palpations: Abdomen is soft  Tenderness: There is no guarding  Comments: Mild epigastric tenderness   Musculoskeletal:         General: Normal range of motion  Cervical back: Normal range of motion  Right lower leg: No edema  Left lower leg: No edema  Skin:     General: Skin is warm and dry  Neurological:      General: No focal deficit present        Mental Status: He is alert  Motor: No weakness  Psychiatric:         Mood and Affect: Mood normal        Additional Data:     Labs:  Results from last 7 days   Lab Units 09/19/22  0559   WBC Thousand/uL 8 59   HEMOGLOBIN g/dL 13 3   HEMATOCRIT % 40 4   PLATELETS Thousands/uL 237   NEUTROS PCT % 65   LYMPHS PCT % 20   MONOS PCT % 13*   EOS PCT % 1     Results from last 7 days   Lab Units 09/19/22  0559 09/18/22  0613   POTASSIUM mmol/L 3 5 3 5   CHLORIDE mmol/L 100 99   CO2 mmol/L 29 26   BUN mg/dL 8 10   CREATININE mg/dL 0 62 0 72   CALCIUM mg/dL 8 7 7 7*   ALK PHOS U/L  --  52   ALT U/L  --  14   AST U/L  --  26     Results from last 7 days   Lab Units 09/17/22  2102   INR  0 99               * I Have Reviewed All Lab Data Listed Above  * Additional Pertinent Lab Tests Reviewed: All Labs Within Last 24 Hours Reviewed    Recent Cultures (last 7 days):           Last 24 Hours Medication List:   Current Facility-Administered Medications   Medication Dose Route Frequency Provider Last Rate    acetaminophen  650 mg Oral Q6H PRN Jacqueline Baker, DO      folic acid  1 mg Oral Daily [facility], DO      ipratropium-albuterol  3 mL Nebulization Q6H PRN Jacqueline Baker, DO      losartan  50 mg Oral Daily [facility], DO      magnesium sulfate  2 g Intravenous Once Mechele Apo, CRNP      metoprolol succinate  50 mg Oral Daily [facility], DO      multivitamin-minerals  1 tablet Oral Daily Jacqueline Baker, DO      nicotine  1 patch Transdermal Daily [facility], DO      pneumococcal 23-valent polysaccharide vaccine  0 5 mL Subcutaneous Prior to discharge Lin Cordero, DO      potassium chloride  40 mEq Oral Once Mechele Apo, CRNP      rivaroxaban  20 mg Oral Daily With Breakfast Jacqueline Baker, DO      thiamine  100 mg Oral Daily Jacqueline Baker, DO      trimethobenzamide  200 mg Intramuscular Q6H PRN Flynn Rnagel MD               ** Please Note: Dictation voice to text software may have been used in the creation of this document  Prasad Cadet MD  09/19/22  9:39 AM

## 2022-09-19 NOTE — PLAN OF CARE
Problem: MOBILITY - ADULT  Goal: Maintain or return to baseline ADL function  Description: INTERVENTIONS:  -  Assess patient's ability to carry out ADLs; assess patient's baseline for ADL function and identify physical deficits which impact ability to perform ADLs (bathing, care of mouth/teeth, toileting, grooming, dressing, etc )  - Assess/evaluate cause of self-care deficits   - Assess range of motion  - Assess patient's mobility; develop plan if impaired  - Assess patient's need for assistive devices and provide as appropriate  - Encourage maximum independence but intervene and supervise when necessary  - Involve family in performance of ADLs  - Assess for home care needs following discharge   - Consider OT consult to assist with ADL evaluation and planning for discharge  - Provide patient education as appropriate  Outcome: Progressing  Goal: Maintains/Returns to pre admission functional level  Description: INTERVENTIONS:  - Perform BMAT or MOVE assessment daily    - Set and communicate daily mobility goal to care team and patient/family/caregiver  - Collaborate with rehabilitation services on mobility goals if consulted  - Perform Range of Motion 2 times a day  - Reposition patient every 2 hours    - Dangle patient 2 times a day  - Stand patient 2 times a day  - Ambulate patient 2 times a day  - Out of bed to chair 2 times a day   - Out of bed for meals 2 times a day  - Out of bed for toileting  - Record patient progress and toleration of activity level   Outcome: Progressing     Problem: CARDIOVASCULAR - ADULT  Goal: Maintains optimal cardiac output and hemodynamic stability  Description: INTERVENTIONS:  - Monitor I/O, vital signs and rhythm  - Monitor for S/S and trends of decreased cardiac output  - Administer and titrate ordered vasoactive medications to optimize hemodynamic stability  - Assess quality of pulses, skin color and temperature  - Assess for signs of decreased coronary artery perfusion  - Instruct patient to report change in severity of symptoms  Outcome: Progressing  Goal: Absence of cardiac dysrhythmias or at baseline rhythm  Description: INTERVENTIONS:  - Continuous cardiac monitoring, vital signs, obtain 12 lead EKG if ordered  - Administer antiarrhythmic and heart rate control medications as ordered  - Monitor electrolytes and administer replacement therapy as ordered  Outcome: Progressing     Problem: METABOLIC, FLUID AND ELECTROLYTES - ADULT  Goal: Electrolytes maintained within normal limits  Description: INTERVENTIONS:  - Monitor labs and assess patient for signs and symptoms of electrolyte imbalances  - Administer electrolyte replacement as ordered  - Monitor response to electrolyte replacements, including repeat lab results as appropriate  - Instruct patient on fluid and nutrition as appropriate  Outcome: Progressing     Problem: MUSCULOSKELETAL - ADULT  Goal: Maintain or return mobility to safest level of function  Description: INTERVENTIONS:  - Assess patient's ability to carry out ADLs; assess patient's baseline for ADL function and identify physical deficits which impact ability to perform ADLs (bathing, care of mouth/teeth, toileting, grooming, dressing, etc )  - Assess/evaluate cause of self-care deficits   - Assess range of motion  - Assess patient's mobility  - Assess patient's need for assistive devices and provide as appropriate  - Encourage maximum independence but intervene and supervise when necessary  - Involve family in performance of ADLs  - Assess for home care needs following discharge   - Consider OT consult to assist with ADL evaluation and planning for discharge  - Provide patient education as appropriate  Outcome: Progressing     Problem: Potential for Falls  Goal: Patient will remain free of falls  Description: INTERVENTIONS:  - Educate patient/family on patient safety including physical limitations  - Instruct patient to call for assistance with activity   - Consult OT/PT to assist with strengthening/mobility   - Keep Call bell within reach  - Keep bed low and locked with side rails adjusted as appropriate  - Keep care items and personal belongings within reach  - Initiate and maintain comfort rounds  - Make Fall Risk Sign visible to staff  - Offer Toileting every 2 Hours, in advance of need  - Initiate/Maintain bed alarm  - Obtain necessary fall risk management equipment: walker  - Apply yellow socks and bracelet for high fall risk patients  - Consider moving patient to room near nurses station  Outcome: Progressing     Problem: RESPIRATORY - ADULT  Goal: Achieves optimal ventilation and oxygenation  Description: INTERVENTIONS:  - Assess for changes in respiratory status  - Assess for changes in mentation and behavior  - Position to facilitate oxygenation and minimize respiratory effort  - Oxygen administered by appropriate delivery if ordered  - Initiate smoking cessation education as indicated  - Encourage broncho-pulmonary hygiene including cough, deep breathe, Incentive Spirometry  - Assess the need for suctioning and aspirate as needed  - Assess and instruct to report SOB or any respiratory difficulty  - Respiratory Therapy support as indicated  Outcome: Progressing

## 2022-09-19 NOTE — UTILIZATION REVIEW
Inpatient Admission Authorization Request   NOTIFICATION OF INPATIENT ADMISSION/INPATIENT AUTHORIZATION REQUEST   SERVICING FACILITY:   [place] [address]   [address] [place] [address]  Tax ID: [id number]  NPI: [id number]  Place of Service: Inpatient [address] [facility]  [address]  Place of Service Code: 24     ATTENDING PROVIDER:  Attending Name and NPI#: Mateo Phillips [[id number]]  Address: [address] [place] [address]  Phone: [phone]     UTILIZATION REVIEW CONTACT:  Alejandra Houston, Utilization   Network Utilization Review Department  Phone: [phone]  Fax [phone]  Email: [email or web address]     PHYSICIAN ADVISORY SERVICES:  FOR [id number] REVIEW - MEDICAL NECESSITY DENIAL  Phone: [phone]  Fax: [phone]  Email: [email or web address]     TYPE OF REQUEST:  Inpatient Status     ADMISSION INFORMATION:  ADMISSION DATE/TIME: 9/18/22 11:40 AM  PATIENT DIAGNOSIS CODE/DESCRIPTION:  Hypomagnesemia [E83 42]  Alcohol intoxication ([facility] [address] ) [F10 929]  Back pain [M54 9]  AICD discharge [Z45 02]  DISCHARGE DATE/TIME: No discharge date for patient encounter  IMPORTANT INFORMATION:  Please contact Veronica Ceron directly with any questions or concerns regarding this request  Department voicemails are confidential     Send requests for admission clinical reviews, concurrent reviews, approvals, and administrative denials due to lack of clinical to fax [phone]  36.9

## 2022-09-19 NOTE — PLAN OF CARE
Problem: CARDIOVASCULAR - ADULT  Goal: Maintains optimal cardiac output and hemodynamic stability  Description: INTERVENTIONS:  - Monitor I/O, vital signs and rhythm  - Monitor for S/S and trends of decreased cardiac output  - Administer and titrate ordered vasoactive medications to optimize hemodynamic stability  - Assess quality of pulses, skin color and temperature  - Assess for signs of decreased coronary artery perfusion  - Instruct patient to report change in severity of symptoms  9/19/2022 1047 by Anne-Marie Maria RN  Outcome: Progressing  9/19/2022 1047 by Anne-Marie Maria RN  Outcome: Progressing  Goal: Absence of cardiac dysrhythmias or at baseline rhythm  Description: INTERVENTIONS:  - Continuous cardiac monitoring, vital signs, obtain 12 lead EKG if ordered  - Administer antiarrhythmic and heart rate control medications as ordered  - Monitor electrolytes and administer replacement therapy as ordered  9/19/2022 1047 by Anne-Marie Maria RN  Outcome: Progressing  9/19/2022 1047 by Anne-Marie Maria RN  Outcome: Progressing     Problem: MOBILITY - ADULT  Goal: Maintain or return to baseline ADL function  Description: INTERVENTIONS:  -  Assess patient's ability to carry out ADLs; assess patient's baseline for ADL function and identify physical deficits which impact ability to perform ADLs (bathing, care of mouth/teeth, toileting, grooming, dressing, etc )  - Assess/evaluate cause of self-care deficits   - Assess range of motion  - Assess patient's mobility; develop plan if impaired  - Assess patient's need for assistive devices and provide as appropriate  - Encourage maximum independence but intervene and supervise when necessary  - Involve family in performance of ADLs  - Assess for home care needs following discharge   - Consider OT consult to assist with ADL evaluation and planning for discharge  - Provide patient education as appropriate  9/19/2022 1047 by Anne-Marie Maria RN  Outcome: Progressing  9/19/2022 1047 by Darcus Ren, RN  Outcome: Progressing  Goal: Maintains/Returns to pre admission functional level  Description: INTERVENTIONS:  - Perform BMAT or MOVE assessment daily    - Set and communicate daily mobility goal to care team and patient/family/caregiver  - Collaborate with rehabilitation services on mobility goals if consulted  - Perform Range of Motion 2 times a day  - Reposition patient every 2 hours    - Dangle patient 2 times a day  - Stand patient 2 times a day  - Ambulate patient 2 times a day  - Out of bed to chair 2 times a day   - Out of bed for meals 2 times a day  - Out of bed for toileting  - Record patient progress and toleration of activity level   9/19/2022 1047 by Catarina Mcclure RN  Outcome: Progressing  9/19/2022 1047 by Catarina Mcclure RN  Outcome: Progressing     Problem: METABOLIC, FLUID AND ELECTROLYTES - ADULT  Goal: Electrolytes maintained within normal limits  Description: INTERVENTIONS:  - Monitor labs and assess patient for signs and symptoms of electrolyte imbalances  - Administer electrolyte replacement as ordered  - Monitor response to electrolyte replacements, including repeat lab results as appropriate  - Instruct patient on fluid and nutrition as appropriate  Outcome: Progressing     Problem: MUSCULOSKELETAL - ADULT  Goal: Maintain or return mobility to safest level of function  Description: INTERVENTIONS:  - Assess patient's ability to carry out ADLs; assess patient's baseline for ADL function and identify physical deficits which impact ability to perform ADLs (bathing, care of mouth/teeth, toileting, grooming, dressing, etc )  - Assess/evaluate cause of self-care deficits   - Assess range of motion  - Assess patient's mobility  - Assess patient's need for assistive devices and provide as appropriate  - Encourage maximum independence but intervene and supervise when necessary  - Involve family in performance of ADLs  - Assess for home care needs following discharge   - Consider OT consult to assist with ADL evaluation and planning for discharge  - Provide patient education as appropriate  Outcome: Progressing     Problem: Potential for Falls  Goal: Patient will remain free of falls  Description: INTERVENTIONS:  - Educate patient/family on patient safety including physical limitations  - Instruct patient to call for assistance with activity   - Consult OT/PT to assist with strengthening/mobility   - Keep Call bell within reach  - Keep bed low and locked with side rails adjusted as appropriate  - Keep care items and personal belongings within reach  - Initiate and maintain comfort rounds  - Make Fall Risk Sign visible to staff  - Apply yellow socks and bracelet for high fall risk patients  - Consider moving patient to room near nurses station  Outcome: Progressing     Problem: RESPIRATORY - ADULT  Goal: Achieves optimal ventilation and oxygenation  Description: INTERVENTIONS:  - Assess for changes in respiratory status  - Assess for changes in mentation and behavior  - Position to facilitate oxygenation and minimize respiratory effort  - Oxygen administered by appropriate delivery if ordered  - Initiate smoking cessation education as indicated  - Encourage broncho-pulmonary hygiene including cough, deep breathe, Incentive Spirometry  - Assess the need for suctioning and aspirate as needed  - Assess and instruct to report SOB or any respiratory difficulty  - Respiratory Therapy support as indicated  Outcome: Progressing

## 2022-09-19 NOTE — PROGRESS NOTES
Progress Note - Cardiology   [facility]     Nic Ayala 59 y o  male MRN: [id number]  : 1965  Unit/Bed#: [address] [place] [address]- Encounter: [id number]    Assessment and Plan:   1  Life vest discharge:  Inappropriate for SVT, patient back on home medications    -  telemetry monitored demonstrates sinus rhythm without ectopy    2  Nonischemic cardiomyopathy:  EF 25-30% on echo in 2022      -  will recheck echo today to see if EF has improved, although this is unlikely as patient has not been compliant with medications    -  life vest at bedside    3  Alcohol abuse:  Ongoing drinking, will drink up to 8-10 airplane bottles per day of liquor    -  have discussed cessation multiple times during admissions patient always appears to be agreeable during hospitalization but then does not continue with therapy in the outpatient setting    4  Paroxysmal atrial fibrillation:  Appears to be maintaining sinus rhythm, ordered for beta-blocker and Xarelto    5  Tobacco abuse/COPD:  History of spontaneous pneumothorax with VATS procedure secondary to blebs    6  Electrolyte abnormalities:  Magnesium 1 5 again today, potassium 3 5  Supplementation ordered    -  continue monitor    7  Healthcare and medication noncompliance    Subjective / Objective:   Patient seen and examined  No specific complaints offered at time  Magnesium 1 5, 2 g Mag sulfate ordered for replacement  Will also replace potassium with 40 mEq    Vitals: Blood pressure 128/79, pulse 70, temperature 98 6 °F (37 °C), temperature source Oral, resp  rate 17, height 5' 6" (1 676 m), weight 60 9 kg (134 lb 3 2 oz), SpO2 97 %  Vitals:    22 0600 22 0600   Weight: 59 1 kg (130 lb 3 2 oz) 60 9 kg (134 lb 3 2 oz)     Body mass index is 21 66 kg/m²    BP Readings from Last 3 Encounters:   22 128/79   22 104/60   22 131/77     Orthostatic Blood Pressures    Flowsheet Row Most Recent Value   Blood Pressure 128/79 filed at 09/19/2022 0740   Patient Position - Orthostatic VS Lying filed at 09/19/2022 0740        I/O       09/17 0701 09/18 0700 09/18 0701 09/19 0700 09/19 0701 09/20 0700    P  O  1440      I V  (mL/kg) 10 (0 2)      IV Piggyback 50      Total Intake(mL/kg) 1500 (25 4)      Urine (mL/kg/hr) 250 400 (0 3) 150 (1 6)    Total Output 250 400 150    Net +1250 -400 -150               Invasive Devices  Report    Peripheral Intravenous Line  Duration           Peripheral IV 09/17/22 Left Antecubital 1 day                  Intake/Output Summary (Last 24 hours) at 9/19/2022 2182  Last data filed at 9/19/2022 0742  Gross per 24 hour   Intake --   Output 300 ml   Net -300 ml         Physical Exam:   Physical Exam  Vitals and nursing note reviewed  Constitutional:       General: He is not in acute distress  Appearance: Normal appearance  He is normal weight  HENT:      Right Ear: External ear normal       Left Ear: External ear normal       Nose: Nose normal    Eyes:      General: No scleral icterus  Right eye: No discharge  Left eye: No discharge  Cardiovascular:      Rate and Rhythm: Normal rate and regular rhythm  Pulses: Normal pulses  Heart sounds: Normal heart sounds  Pulmonary:      Effort: Pulmonary effort is normal  No respiratory distress  Breath sounds: Normal breath sounds  No wheezing, rhonchi or rales  Abdominal:      General: Bowel sounds are normal  There is no distension  Palpations: Abdomen is soft  Musculoskeletal:      Right lower leg: No edema  Left lower leg: No edema  Skin:     General: Skin is warm and dry  Neurological:      General: No focal deficit present  Mental Status: He is alert and oriented to person, place, and time  Mental status is at baseline     Psychiatric:         Mood and Affect: Mood normal                    Medications/ Allergies:     Current Facility-Administered Medications   Medication Dose Route Frequency Provider Last Rate    acetaminophen  650 mg Oral Q6H PRN Shirley Ying, DO      folic acid  1 mg Oral Daily [facility], DO      ipratropium-albuterol  3 mL Nebulization Q6H PRN Shirley Ying, DO      losartan  50 mg Oral Daily [facility], [facility]      magnesium sulfate  2 g Intravenous Once Milta Grout, CRNP      metoprolol succinate  50 mg Oral Daily [facility], DO      multivitamin-minerals  1 tablet Oral Daily [facility], DO      nicotine  1 patch Transdermal Daily [facility], DO      pneumococcal 23-valent polysaccharide vaccine  0 5 mL Subcutaneous Prior to discharge Rigo Ybarra, DO      potassium chloride  40 mEq Oral Once [address] MD Javi      potassium chloride  40 mEq Oral Once Milta Grout, CRNP      rivaroxaban  20 mg Oral Daily With Breakfast Shirley Ying, DO      thiamine  100 mg Oral Daily [facility], DO      trimethobenzamide  200 mg Intramuscular Q6H PRN Olivia Stapleton MD       acetaminophen, 650 mg, Q6H PRN  ipratropium-albuterol, 3 mL, Q6H PRN  pneumococcal 23-valent polysaccharide vaccine, 0 5 mL, Prior to discharge  trimethobenzamide, 200 mg, Q6H PRN      No Known Allergies    VTE Pharmacologic Prophylaxis:   Sequential compression device (Venodyne)     Labs:   Troponins:  Results from last 7 days   Lab Units 09/17/22  2314   HSTNI D2 ng/L <0     CBC with diff:  Results from last 7 days   Lab Units 09/19/22  0559 09/18/22  0613 09/17/22  2102   WBC Thousand/uL 8 59 6 60 7 02   HEMOGLOBIN g/dL 13 3 12 7 15 6   HEMATOCRIT % 40 4 37 7 48 4   MCV fL 94 92 95   PLATELETS Thousands/uL 237 237 284   MCH pg 30 9 31 0 30 6   MCHC g/dL 32 9 33 7 32 2   RDW % 14 4 14 4 14 6   MPV fL 8 3* 8 3* 8 6*   NRBC AUTO /100 WBCs 0 0 0     CMP:  Results from last 7 days   Lab Units 09/19/22  0559 09/18/22  0613 09/17/22  2102   SODIUM mmol/L 135 134* 144   POTASSIUM mmol/L 3 5 3 5 4 1   CHLORIDE mmol/L 100 99 101   CO2 mmol/L 29 26 24   ANION GAP mmol/L 6 9 19*   BUN mg/dL 8 10 13   CREATININE mg/dL 0 62 0 72 1 09 CALCIUM mg/dL 8 7 7 7* 8 8   AST U/L  --  26 38   ALT U/L  --  14 20   ALK PHOS U/L  --  52 67   TOTAL PROTEIN g/dL  --  6 1* 7 9   ALBUMIN g/dL  --  3 1* 4 2   TOTAL BILIRUBIN mg/dL  --  0 33 0 36   EGFR ml/min/1 73sq m 108 102 73     Magnesium:  Results from last 7 days   Lab Units 09/19/22  0559 09/18/22  0613 09/17/22 2102   MAGNESIUM mg/dL 1 5* 2 1 1 5*     Coags:  Results from last 7 days   Lab Units 09/17/22 2102   PTT seconds 23   INR  0 99       Imaging & Testing   I have personally reviewed pertinent reports  XR chest 1 view portable    Result Date: 9/18/2022  Narrative: CHEST INDICATION:   chest pain  COMPARISON:  CXR 8/10/2022 and chest CT 7/20/2022  EXAM PERFORMED/VIEWS:  XR CHEST PORTABLE FINDINGS: Normal heart size  Redemonstration of pulmonary artery enlargement  Median sternotomy  Life vest  Loop recorder in the left chest wall  The lungs are clear  No pneumothorax or pleural effusion  Osseous structures appear within normal limits for patient age  Impression: No acute cardiopulmonary disease  Redemonstration of pulmonary artery enlargement suggestive of pulmonary hypertension  Workstation performed: [id number]     CT head without contrast    Result Date: 9/17/2022  Narrative: CT BRAIN - WITHOUT CONTRAST INDICATION:   Head trauma, moderate-severe fall  COMPARISON:  CT brain dated August 11, 2022  TECHNIQUE:  CT examination of the brain was performed  In addition to axial images, sagittal and coronal 2D reformatted images were created and submitted for interpretation  Radiation dose length product (DLP) for this visit:  915 mGy-cm   This examination, like all CT scans performed in the [facility], was performed utilizing techniques to minimize radiation dose exposure, including the use of iterative reconstruction and automated exposure control  IMAGE QUALITY:  Diagnostic  FINDINGS: PARENCHYMA:  No intracranial mass, mass effect or midline shift   No CT signs of acute infarction  No acute parenchymal hemorrhage  There is mild periventricular white matter low attenuation which is nonspecific and most likely related to chronic small vessel ischemic changes  VENTRICLES AND EXTRA-AXIAL SPACES:  Normal for the patient's age  VISUALIZED ORBITS AND PARANASAL SINUSES:  Unremarkable  CALVARIUM AND EXTRACRANIAL SOFT TISSUES:  Normal      Impression: No acute intracranial abnormality  Workstation performed: [id number]        EKG / Monitor: Personally reviewed  Rhythm, no ectopy        Abhilash Ramachandran, [address]   Cardiology      "This note was completed in part utilizing [employer] direct voice recognition software  Grammatical errors, random word insertion, spelling mistakes, and incomplete sentences may be an occasional consequence of the system secondary to software limitations, ambient noise and hardware issues  Please read the chart carefully and recognize, using context, where substitutions have occurred    If you have any questions or concerns about the context, text or information contained within the body of this dictation, please contact myself, the provider, for further clarification "

## 2022-09-19 NOTE — UTILIZATION REVIEW
Continued Stay Review    Date: 9-19-22                        Current Patient Class: inpatient  Current Level of Care: med surg    HPI:57 y o  male initially admitted on 5- 17-22    Assessment/Plan:   Cardiology assessment today finds patient with Mag 1 5 and K+ 3 5  Plan to replete with iv mag x1 and kdur 40 meq  Continue telemetry- maintaining sinus rhythm  Betablocker and xarelto ordered  Recheck Echo today to see if EF has improved from 25-30% in June 2022  Ciwa today is 1 ( tremor)        Vital Signs:     Date/Time Temp Pulse Resp BP MAP (mmHg) SpO2 O2 Device   09/19/22 07:40:16 98 6 °F (37 °C) 70 17 128/79 95 97 % None (Room air)   09/19/22 03:43:09 -- 72 16 128/79 95 97 % --             Pertinent Labs/Diagnostic Results:       Results from last 7 days   Lab Units 09/19/22  0559 09/18/22  0613 09/17/22 2102   WBC Thousand/uL 8 59 6 60 7 02   HEMOGLOBIN g/dL 13 3 12 7 15 6   HEMATOCRIT % 40 4 37 7 48 4   PLATELETS Thousands/uL 237 237 284   NEUTROS ABS Thousands/µL 5 62 3 65 5 20         Results from last 7 days   Lab Units 09/19/22  0559 09/18/22  0613 09/17/22 2102   SODIUM mmol/L 135 134* 144   POTASSIUM mmol/L 3 5 3 5 4 1   CHLORIDE mmol/L 100 99 101   CO2 mmol/L 29 26 24   ANION GAP mmol/L 6 9 19*   BUN mg/dL 8 10 13   CREATININE mg/dL 0 62 0 72 1 09   EGFR ml/min/1 73sq m 108 102 73   CALCIUM mg/dL 8 7 7 7* 8 8   MAGNESIUM mg/dL 1 5* 2 1 1 5*   PHOSPHORUS mg/dL  --  3 1  --      Results from last 7 days   Lab Units 09/18/22  0613 09/17/22 2102   AST U/L 26 38   ALT U/L 14 20   ALK PHOS U/L 52 67   TOTAL PROTEIN g/dL 6 1* 7 9   ALBUMIN g/dL 3 1* 4 2   TOTAL BILIRUBIN mg/dL 0 33 0 36         Results from last 7 days   Lab Units 09/19/22  0559 09/18/22  0613 09/17/22 2102   GLUCOSE RANDOM mg/dL 96 135 69       Results from last 7 days   Lab Units 09/17/22  2314 09/17/22  8698   HS TNI 0HR ng/L  --  2   HS TNI 2HR ng/L <2  --    HSTNI D2 ng/L <0  --          Results from last 7 days   Lab Units 09/17/22  2102   PROTIME seconds 13 2   INR  0 99   PTT seconds 23       Results from last 7 days   Lab Units 09/17/22 2102   ETHANOL LVL mg/dL 345*         Medications:   Scheduled Medications:  folic acid, 1 mg, Oral, Daily  losartan, 50 mg, Oral, Daily  magnesium sulfate, 2 g, Intravenous, Once  metoprolol succinate, 50 mg, Oral, Daily  multivitamin-minerals, 1 tablet, Oral, Daily  nicotine, 1 patch, Transdermal, Daily  pantoprazole, 40 mg, Oral, Early Morning  potassium chloride, 40 mEq, Oral, Once  rivaroxaban, 20 mg, Oral, Daily With Breakfast  thiamine, 100 mg, Oral, Daily      Continuous IV Infusions:     PRN Meds:  acetaminophen, 650 mg, Oral, Q6H PRN  ipratropium-albuterol, 3 mL, Nebulization, Q6H PRN  pneumococcal 23-valent polysaccharide vaccine, 0 5 mL, Subcutaneous, Prior to discharge  trimethobenzamide, 200 mg, Intramuscular, Q6H PRN        Discharge Plan: to be determined     Network Utilization Review Department  ATTENTION: Please call with any questions or concerns to [phone] and carefully listen to the prompts so that you are directed to the right person  All voicemails are confidential   Ty Limb all requests for admission clinical reviews, approved or denied determinations and any other requests to dedicated fax number below belonging to the campus where the patient is receiving treatment   List of dedicated fax numbers for the Facilities:  [address] DENIALS (Administrative/Medical Necessity) [phone]   [address] (Maternity/NICU/Pediatrics) [phone]   [address] [place] [address] [address] [address] [place] [phone] [facility] Lyudmila Archera [address]ona My[address] [phone]

## 2022-09-19 NOTE — OCCUPATIONAL THERAPY NOTE
Occupational Therapy Evaluation/Treatment       09/19/22 1245   Note Type   Note type Evaluation   Restrictions/Precautions   Other Precautions Chair Alarm; Bed Alarm; Fall Risk  (AICD vest)   Pain Assessment   Pain Assessment Tool 0-10   Pain Score No Pain   Home Living   Type of Home House   Home Layout Two level;Bed/bath upstairs  (2 WANDER)   Bathroom Shower/Tub Tub/shower unit   Bathroom Toilet Standard   Bathroom Equipment Shower chair;Hand-held shower   [address] Walker   Additional Comments Pt reports using RW only occasionally PTA  Prior Function   Level of [place] Independent with ADLs and functional mobility; Needs assistance with IADLs   Lives With Family  (sister's house)   Receives Help From Family   ADL Assistance Independent   IADLs Needs assistance   Falls in the last 6 months 1 to 4  (pt reports his AICD shot off three times and on the 3rd discharge, he fell- brought to ED )   Psychosocial   Psychosocial (WDL) WDL   ADL   Where Assessed Edge of bed   Eating Assistance 5  Supervision/Setup   Grooming Assistance 4  [address] [facility] 4  Minimal [address] 3  Moderate Assistance   UB Dressing Assistance 4  Minimal [facility] 200 3  Moderate [address]  3  Moderate Assistance   Functional Deficit Setup;Steadying;Verbal cueing; Increased time to complete   Additional Comments for all ADLS due to decreased strength, decreased endurance and decreased balance at this time   Bed Mobility   Rolling R 4  Minimal assistance   Additional items Bedrails;Verbal cues   Rolling L 4  Minimal assistance   Additional items Bedrails;Verbal cues   Supine to Sit Unable to assess   Additional Comments pt refused because he wanted to finish his meal    Transfers   Stand pivot 3  Moderate assistance   Additional items Assist x 1;Verbal cues  (per PT; some ataxia observed)   Activity Tolerance   Activity Tolerance Patient limited by fatigue   Nurse Made Aware Uvaldo Pierce, EMILE   RUE Assessment   RUE Assessment WFL   LUE Assessment   LUE Assessment WFL   Vision-Basic Assessment   Current Vision Does not wear glasses   Cognition   Overall Cognitive Status [facility]   Arousal/Participation Alert   Attention Attends with cues to redirect   Orientation Level Oriented X4   Memory Within functional limits   Following Commands Follows multistep commands with increased time or repetition   Comments h/o non-compliance and poor judgement   Assessment   Limitation Decreased ADL status; Decreased UE strength;Decreased Safe judgement during ADL;Decreased endurance;Decreased self-care trans;Decreased high-level ADLs  (decreased balance and mobility)   Prognosis Good   Assessment Patient evaluated by Occupational Therapy  Patient admitted with AICD discharge  The patients occupational profile, medical and therapy history includes a extensive additional review of physical, cognitive, or psychosocial history related to current functional performance  Comorbidities affecting functional mobility and ADLS include:non ischemic cardiomyopathy, paroxysmal afib, alcohol dependence/abuse, atrial flutter and tobacco abuse, who presents after a fall on the sidewalk and AICD discharge; Pt reports compliance with AICD and denies following up on rehab services since last discharge on 8/15/22  Prior to admission, patient was independent with functional mobility with occasional RW use, independent with ADLS, requiring assist for IADLS and living with sister and her family in a 2 level home with 2 steps to enter    The evaluation identifies the following performance deficits: weakness, impaired balance, decreased endurance, increased fall risk, new onset of impairment of functional mobility, decreased ADLS, decreased IADLS, decreased activity tolerance, decreased safety awareness, impaired judgement and decreased strength, that result in activity limitations and/or participation restrictions  This evaluation requires clinical decision making of high complexity, because the patient presents with comorbidites that affect occupational performance and required significant modification of tasks or assistance with consideration of multiple treatment options  The Barthel Index was used as a functional outcome tool presenting with a score of Barthel Index Score: 45, indicating marked limitations of functional mobility and ADLS  The patient's raw score on the -PAC Daily Activity inpatient short form is 16, standardized score is 35 96, less than 39 4  Patients at this level are likely to benefit from DC to post-acute rehabilitation services  However, please refer to the recommendation of the Occupational Therapist for safe DC planning  Patient will benefit from skilled Occupational Therapy services to address above deficits and facilitate a safe return to prior level of function  Goals   Patient Goals "I want food stamps"   STG Time Frame   (1-7)   Short Term Goal #1 Patient will increase standing tolerance to 3 minutes during ADL task to decrease assistance level and decrease fall risk; Patient will increase bed mobility to min assist in preparation for ADLS and transfers;  Patient will increase functional mobility to and from bathroom with rolling walker with min assist to increase performance with ADLS and to use a toilet; Patient will tolerate 10 minutes of UE ROM/strengthening to increase general activity tolerance and performance in ADLS/IADLS; Patient will improve functional activity tolerance to 10 minutes of sustained functional tasks to increase participation in basic self-care and decrease assistance level;  Patient will be able to to verbalize understanding and perform energy conservation/proper body mechanics during ADLS and functional mobility at least 75% of the time with minimal cueing to decrease signs of fatigue and increase stamina to return to prior level of function; Patient will increase static sitting balance to fair to improve the ability to sit at edge of bed or on a chair for ADLS;  Patient will increase static standing balance to fair- to improve postural stability and decrease fall risk during standing ADLS and transfers  LTG Time Frame   (8-14)   Long Term Goal #1 Patient will increase standing tolerance to 5 minutes during ADL task to decrease assistance level and decrease fall risk; Patient will increase bed mobility to independent in preparation for ADLS and transfers; Patient will increase functional mobility to and from bathroom with rolling walker independently to increase performance with ADLS and to use a toilet; Patient will tolerate 15 minutes of UE ROM/strengthening to increase general activity tolerance and performance in ADLS/IADLS; Patient will improve functional activity tolerance to 15 minutes of sustained functional tasks to increase participation in basic self-care and decrease assistance level;  Patient will be able to to verbalize understanding and perform energy conservation/proper body mechanics during ADLS and functional mobility at least 90% of the time with no cueing to decrease signs of fatigue and increase stamina to return to prior level of function; Patient will increase static/dynamic sitting balance to fair+ to improve the ability to sit at edge of bed or on a chair for ADLS;  Patient will increase static/dynamic standi  ng balance to fair to improve postural stability and decrease fall risk during standing ADLS and transfers  Pt will score >/= 20/24 on AM-PAC Daily Activity Inpatient scale to promote safe independence with ADLs and functional mobility; Pt will score >/= 65/100 on Barthel Index in order to decrease caregiver assistance needed and increase ability to perform ADLs and functional mobility     Functional Transfer Goals   Pt Will Perform All Functional Transfers With mod assist;With assistive devices; With cueing; With good judgment/safety  (LTG- Supervision)   ADL Goals   Pt Will Perform All ADL's At edge of bed; With min assist;With adaptive equipment; With good judgment/safety; With setup; With cues  (LTG- Independent)   Plan   Treatment Interventions ADL retraining;Functional transfer training;UE strengthening/ROM; Endurance training;Patient/family training;Equipment evaluation/education; Compensatory technique education; Energy conservation; Activityengagement   Goal Expiration Date 10/03/22   OT Frequency 3-5x/wk   Additional Treatment Session   Start Time 0345   End Time 5022   Treatment Assessment Pt seen for ADL training and therapeutic exercises  Education and training with teachback method begun with pt regarding energy conservation/proper body mechanics during ADLS and functional mobility to decrease fall risks, decrease signs of fatigue and increase stamina needed to return to prior level of function  Pt able to perform BUE strengthening exercises, shoulder/elbow/wrist/hands flexion/extension, 10 reps each, with min verbal and visual cues while sitting up in bed  Pt refusing OOB at this time because he was already up earlier today  Pt able to wash hands and face with set-up and cues for thoroughness  Pt able to feed himself with set-up only  AICD beeping and pt able to adjust vest with Amelia and cues to stop alarms  Pt demonstrating good verbal understanding of all information provided and all questions answered  Patient left in bed with all needs within reach, and bed alarm in place  Continue OT per POC     Recommendation   OT Discharge Recommendation Home with home health rehabilitation   AM-PAC Daily Activity Inpatient   Lower Body Dressing 2   Bathing 2   Toileting 2   Upper Body Dressing 3   Grooming 3   Eating 4   Daily Activity Raw Score 16   Daily Activity Standardized Score (Calc for Raw Score >=11) 35 96   AM-PAC Applied Cognition Inpatient   Following a Speech/Presentation 4   Understanding Ordinary Conversation 4   Taking Medications 3   Remembering Where Things Are Placed or Put Away 4   Remembering List of 4-5 Errands 3   Taking Care of Complicated Tasks 3   Applied Cognition Raw Score 21   Applied Cognition Standardized Score 44 3   Barthel Index   Feeding 10   Bathing 0   Grooming Score 0   Dressing Score 5   Bladder Score 10   Bowels Score 10   Toilet Use Score 5   Transfers (Bed/Chair) Score 5   Mobility (Level Surface) Score 0   Stairs Score 0   Barthel Index Score 45   Licensure   [place] License Number  Bernice Hoover Agustin 85, OTR/L, [place] Lic# [id number]

## 2022-09-19 NOTE — PHYSICAL THERAPY NOTE
PHYSICAL THERAPY EVALUATION/TREATMENT     09/19/22 1105   Note Type   Note type Evaluation   Pain Assessment   Pain Assessment Tool Obando-Baker FACES   Obando-Baker FACES Pain Rating 2  (abdominal area)   Restrictions/Precautions   Other Precautions Chair Alarm; Bed Alarm; Fall Risk;Pain  (AICD vest)   Home Living   Type of [address] [place] [address] Two level;Stairs to enter with rails  (Two steps to enter)   [address] [place] chair   Home Equipment Walker   Additional Comments Patient states using walker prior to admission although not consistently   Prior Function   Lives With Family  (Sister)   Receives Help From Family   ADL Assistance Independent   IADLs Needs assistance   Falls in the last 6 months 1 to 4   Comments As per documentation patient is noncompliant at times with medication and medical follow-through  Patient also continuing to drink alcohol   General   Additional Pertinent History Chart reviewed, patient admitted with AICD discharge with fall  Patient intoxicated upon arrival   Family/Caregiver Present No   Cognition   Overall Cognitive Status WFL   Arousal/Participation Cooperative   Attention Attends with cues to redirect   Orientation Level Oriented X4   Following Commands Follows multistep commands with increased time or repetition   Comments Patient distractible at times   Subjective   Subjective Reports abdominal pain   RLE Assessment   RLE Assessment   (ROM WFL, strength 3+/4-)   LLE Assessment   LLE Assessment   (ROm WFL   strength 3+/4-)   Coordination   Movements are Fluid and Coordinated 0   Bed Mobility   Supine to Sit 4  Minimal assistance   Additional items Assist x 1   Sit to Supine 4  Minimal assistance   Additional items Assist x 1   Transfers   Sit to Stand 4  Minimal assistance   Additional items Assist x 1;Verbal cues   Stand to Sit 4  Minimal assistance   Additional items Assist x 1;Verbal cues   Ambulation/Elevation   Gait Assistance 3  Moderate assist   Additional items Assist x 1;Verbal cues; Tactile cues   Assistive Device Rolling walker   Distance 10 ft with change in direction assist required for safety and verbal cuing to change base of support for balance   Balance   Static Sitting Fair   Dynamic Sitting Fair -   Static Standing Fair -   Dynamic Standing Poor +   Ambulatory Poor +   Activity Tolerance   Activity Tolerance Patient limited by fatigue   Nurse Made Aware Yes   Assessment   Prognosis Good   Problem List Decreased strength;Decreased range of motion;Decreased endurance; Impaired balance;Decreased mobility; Decreased coordination; Impaired judgement;Decreased safety awareness;Pain   Assessment Patient seen for Physical Therapy evaluation  Patient admitted with AICD discharge  Comorbidities affecting patient's physical performance include:non ischemic cardiomyopathy, paroxysmal afib, alcohol dependence/abuse, atrial flutter and tobacco abuse   Personal factors affecting patient at time of initial evaluation include: lives in two story house, ambulating with assistive device, inability to ambulate household distances, inability to navigate community distances, inability to navigate level surfaces without external assistance, inability to perform dynamic tasks in community, limited home support, positive fall history, inability to perform physical activity, inability to perform ADLS and inability to perform IADLS    Prior to admission, patient was requiring assist for functional mobility with walker, independent with ADLS, requiring assist for IADLS, ambulating household distance, ambulating community distances and home with family assist   Please find objective findings from Physical Therapy assessment regarding body systems outlined above with impairments and limitations including weakness, decreased ROM, impaired balance, decreased endurance, impaired coordination, gait deviations, pain, decreased activity tolerance, decreased functional mobility tolerance and fall risk  The Barthel Index was used as a functional outcome tool presenting with a score of Barthel Index Score: 45 today indicating marked limitations of functional mobility and ADLS  Patient's clinical presentation is currently unstable/unpredictable as seen in patient's presentation of vital sign response, changing level of pain, increased fall risk, new onset of impairment of functional mobility, decreased endurance and new onset of weakness  Pt would benefit from continued Physical Therapy treatment to address deficits as defined above and maximize level of functional mobility  As demonstrated by objective findings, the assigned level of complexity for this evaluation is high  The patient's -[facility] Basic Mobility Inpatient Short Form Raw Score is 16  A Raw score of less than or equal to 16 suggests the patient may benefit from discharge to post-acute rehabilitation services although patient will be able to return home with improvement with CIWA protocol  Please also refer to the recommendation of the Physical Therapist for safe discharge planning  Goals   Patient Goals to go home   STG Expiration Date 09/26/22   Short Term Goal #1 Transfers and gait with roller walker with supervision   Short Term Goal #2 Gait endurance to functional household   LTG Expiration Date 10/03/22   Long Term Goal #1 Strength bilateral lower extremities 4/5   Long Term Goal #2 Gait endurance to functional community distances   Plan   Treatment/Interventions ADL retraining;Functional transfer training;LE strengthening/ROM; Therapeutic exercise; Endurance training;Patient/family training;Equipment eval/education; Bed mobility;Gait training; Compensatory technique education;Elevations   PT Frequency Other (Comment)  (5 times per week)   Recommendation   PT Discharge Recommendation Home with home health rehabilitation   Additional Comments Case management stating that home health care had signed off the case due to patient noncompliance in the past   AM-PAC Basic Mobility Inpatient   Turning in Bed Without Bedrails 4   Lying on Back to Sitting on Edge of Flat Bed 3   Moving Bed to Chair 2   Standing Up From Chair 3   Walk in Room 2   Climb 3-5 Stairs 2   Basic Mobility Inpatient Raw Score 16   Basic Mobility Standardized Score 38 32   Highest Level Of Mobility   -[facility] Goal 5: Stand one or more mins   -HLM Achieved 6: Walk 10 steps or more   Barthel Index   Feeding 10   Bathing 0   Grooming Score 0   Dressing Score 5   Bladder Score 10   Bowels Score 10   Toilet Use Score 5   Transfers (Bed/Chair) Score 10   Mobility (Level Surface) Score 0   Stairs Score 5   Barthel Index Score 55   Additional Treatment Session   Start Time 1050   End Time 1105   Treatment Assessment S:  Patient reports abdominal pain O: Bilateral lower extremity exercise completed as listed below a:  Patient will benefit from increasing functional mobility and continued physical therapy with progression as tolerated   Exercises   Hamstring Sets Supine;5 reps;Bilateral   Quad Sets Supine;10 reps;Bilateral   Heelslides Supine;10 reps;Bilateral   Hip Flexion Sitting;10 reps;Bilateral   Hip Abduction Sitting;10 reps;Bilateral   Knee AROM Short Arc Quad Supine;5 reps;Bilateral   Knee AROM Long Arc Quad Sitting;10 reps;Bilateral   Ankle Pumps Sitting;10 reps;Bilateral   Balance training  Side stepping and backward walking completed for balance and coordination   [facility] License Number  Gloria Roland [id number]

## 2022-09-19 NOTE — ED NOTES
PES received a text about the patient wanting inpt etoh rehab  [facility]/Blair notified of the OORP request @ 15:15  OCAESAR/Marilynn arrived 16:50 - face sheet provided and card key given for return to ER  Danny Lucien came back to [facility] office after meeting - patient is not yet medically clear but will not be in need of detox when he is cleared (inpt since 9/17/22)  Marilynn spoke with his supervisor, Travon Yang on the phone - then with Dr Larry Lambert in the [facility] office with marilynn - possible referral to [facility] in [facility] - may be the only in-network provider for [facility]  Should clear tomorrow or 9/21 latest     OORP paperwork submitted for Epic inclusion
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
## 2022-09-20 LAB
ANION GAP SERPL CALCULATED.3IONS-SCNC: 7 MMOL/L (ref 4–13)
ATRIAL RATE: 73 BPM
BASOPHILS # BLD AUTO: 0.06 THOUSANDS/ΜL (ref 0–0.1)
BASOPHILS NFR BLD AUTO: 1 % (ref 0–1)
BUN SERPL-MCNC: 9 MG/DL (ref 5–25)
CALCIUM SERPL-MCNC: 9.2 MG/DL (ref 8.3–10.1)
CHLORIDE SERPL-SCNC: 98 MMOL/L (ref 96–108)
CO2 SERPL-SCNC: 26 MMOL/L (ref 21–32)
CREAT SERPL-MCNC: 0.74 MG/DL (ref 0.6–1.3)
EOSINOPHIL # BLD AUTO: 0.24 THOUSAND/ΜL (ref 0–0.61)
EOSINOPHIL NFR BLD AUTO: 3 % (ref 0–6)
ERYTHROCYTE [DISTWIDTH] IN BLOOD BY AUTOMATED COUNT: 13.9 % (ref 11.6–15.1)
GFR SERPL CREATININE-BSD FRML MDRD: 100 ML/MIN/1.73SQ M
GLUCOSE SERPL-MCNC: 126 MG/DL (ref 65–140)
GLUCOSE SERPL-MCNC: 210 MG/DL (ref 65–140)
HCT VFR BLD AUTO: 44.3 % (ref 36.5–49.3)
HGB BLD-MCNC: 14.5 G/DL (ref 12–17)
IMM GRANULOCYTES # BLD AUTO: 0.03 THOUSAND/UL (ref 0–0.2)
IMM GRANULOCYTES NFR BLD AUTO: 0 % (ref 0–2)
LYMPHOCYTES # BLD AUTO: 1.87 THOUSANDS/ΜL (ref 0.6–4.47)
LYMPHOCYTES NFR BLD AUTO: 21 % (ref 14–44)
MCH RBC QN AUTO: 30.7 PG (ref 26.8–34.3)
MCHC RBC AUTO-ENTMCNC: 32.7 G/DL (ref 31.4–37.4)
MCV RBC AUTO: 94 FL (ref 82–98)
MONOCYTES # BLD AUTO: 0.94 THOUSAND/ΜL (ref 0.17–1.22)
MONOCYTES NFR BLD AUTO: 11 % (ref 4–12)
NEUTROPHILS # BLD AUTO: 5.59 THOUSANDS/ΜL (ref 1.85–7.62)
NEUTS SEG NFR BLD AUTO: 64 % (ref 43–75)
NRBC BLD AUTO-RTO: 0 /100 WBCS
P AXIS: 35 DEGREES
PLATELET # BLD AUTO: 275 THOUSANDS/UL (ref 149–390)
PMV BLD AUTO: 8.4 FL (ref 8.9–12.7)
POTASSIUM SERPL-SCNC: 4.4 MMOL/L (ref 3.5–5.3)
PR INTERVAL: 174 MS
QRS AXIS: -30 DEGREES
QRSD INTERVAL: 92 MS
QT INTERVAL: 398 MS
QTC INTERVAL: 438 MS
RBC # BLD AUTO: 4.72 MILLION/UL (ref 3.88–5.62)
SODIUM SERPL-SCNC: 131 MMOL/L (ref 135–147)
T WAVE AXIS: 34 DEGREES
VENTRICULAR RATE: 73 BPM
WBC # BLD AUTO: 8.73 THOUSAND/UL (ref 4.31–10.16)

## 2022-09-20 PROCEDURE — 80048 BASIC METABOLIC PNL TOTAL CA: CPT

## 2022-09-20 PROCEDURE — 99232 SBSQ HOSP IP/OBS MODERATE 35: CPT | Performed by: INTERNAL MEDICINE

## 2022-09-20 PROCEDURE — 82948 REAGENT STRIP/BLOOD GLUCOSE: CPT

## 2022-09-20 PROCEDURE — 99232 SBSQ HOSP IP/OBS MODERATE 35: CPT | Performed by: FAMILY MEDICINE

## 2022-09-20 PROCEDURE — 93005 ELECTROCARDIOGRAM TRACING: CPT

## 2022-09-20 PROCEDURE — 93010 ELECTROCARDIOGRAM REPORT: CPT | Performed by: INTERNAL MEDICINE

## 2022-09-20 PROCEDURE — 85025 COMPLETE CBC W/AUTO DIFF WBC: CPT

## 2022-09-20 RX ORDER — LORAZEPAM 1 MG/1
2 TABLET ORAL ONCE
Status: COMPLETED | OUTPATIENT
Start: 2022-09-20 | End: 2022-09-20

## 2022-09-20 RX ADMIN — LOSARTAN POTASSIUM 50 MG: 50 TABLET, FILM COATED ORAL at 10:11

## 2022-09-20 RX ADMIN — RIVAROXABAN 20 MG: 20 TABLET, FILM COATED ORAL at 10:11

## 2022-09-20 RX ADMIN — FOLIC ACID 1 MG: 1 TABLET ORAL at 10:11

## 2022-09-20 RX ADMIN — METOPROLOL SUCCINATE 50 MG: 50 TABLET, EXTENDED RELEASE ORAL at 10:11

## 2022-09-20 RX ADMIN — Medication 1 TABLET: at 10:11

## 2022-09-20 RX ADMIN — PANTOPRAZOLE SODIUM 40 MG: 40 TABLET, DELAYED RELEASE ORAL at 05:16

## 2022-09-20 RX ADMIN — THIAMINE HCL TAB 100 MG 100 MG: 100 TAB at 10:11

## 2022-09-20 RX ADMIN — NICOTINE 1 PATCH: 21 PATCH, EXTENDED RELEASE TRANSDERMAL at 10:19

## 2022-09-20 RX ADMIN — ACETAMINOPHEN 650 MG: 325 TABLET ORAL at 14:07

## 2022-09-20 RX ADMIN — LORAZEPAM 2 MG: 1 TABLET ORAL at 11:00

## 2022-09-20 NOTE — CASE MANAGEMENT
Case Management Assessment & Discharge Planning Note    Patient name Alcon Fernandez  Location [facility] 36-* MRN [id number]  : 1965 Date 2022       Current Admission Date: 2022  Current Admission Diagnosis:AICD discharge   Patient Active Problem List    Diagnosis Date Noted    Metabolic acidosis, increased anion gap 2022    Combined systolic and diastolic cardiac dysfunction 2022    Essential hypertension 2022    Hypomagnesemia 2022    Alcohol abuse 2022    Screen for colon cancer 2022    Anticoagulant long-term use 2022    Poor dental hygiene 2022    Noncompliance 2022    Brain aneurysm 2022    [facility]ch[facility] 08/10/2022    AICD discharge 08/10/2022    Hepatic steatosis 2022    Alcohol dependence ([facility] )     Fall 2022    Paroxysmal atrial fibrillation ([facility] ) 2017    Tobacco abuse 2017    Atrial flutter ([facility] ) 06/10/2016    Cardiomyopathy, 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 06/10/2016      LOS (days): 2  Geometric Mean LOS (GMLOS) (days): 2 80  Days to GMLOS:0 9     OBJECTIVE:    Risk of Unplanned Readmission Score: 26 61       Current admission status: Inpatient  Referral Reason: Drug/Alcohol Abuse, Other (Discharge planning)    Preferred Pharmacy:   [facility]/pharmacy #[id number] Druscilla Goodell, [address] [place] [address]  Phone: [phone] Fax: [phone]    Primary Care Provider: Maddie Michelle DO    Primary Insurance: [facility] REP  Secondary Insurance: [facility] [address] [facility]    ASSESSMENT:  [facility] Representative - Sister   Primary Phone: [phone] (Mobile)                Readmission Root Cause  30 Day Readmission: No    Patient Information  Admitted from[de-identified] Home  Mental Status: Alert  During Assessment patient was accompanied by: Not accompanied during assessment  Assessment information provided by[de-identified] Patient  Primary Caregiver: Self  Support Systems: Family members  [place] of Residence: [address] [place], [place] [address] do you live in?: [address] entry access options  Select all that apply : Stairs  Number of steps to enter home : One Flight  Type of Current Residence: Apartment  Floor Level: 2  Upon entering residence, is there a bedroom on the main floor (no further steps)?: Yes  Upon entering residence, is there a bathroom on the main floor (no further steps)?: Yes  In the last 12 months, was there a time when you were not able to pay the mortgage or rent on time?: No  In the last 12 months, how many places have you lived?: 2  In the last 12 months, was there a time when you did not have a steady place to sleep or slept in a shelter (including now)?: No  Homeless/housing insecurity resource given?: N/A  Living Arrangements: Lives w/ Extended Family    Activities of Daily Living Prior to Admission  Functional Status: Independent  Completes ADLs independently?: Yes  Ambulates independently?: Yes  Does patient use assisted devices?: Yes  Assisted Devices (DME) used:  Other (Comment) (LifeVest)  Does patient currently own DME?: Yes  What DME does the patient currently own?: Other (Comment) (LifeVest)  Does patient have a history of Outpatient Therapy (PT/OT)?: No  Does the patient have a history of Short-Term Rehab?: No  Does patient have a history of HHC?: Yes [facility])  Does patient currently have [address]?: No    Patient Information Continued  Income Source: SSI/SSD  Does patient have prescription coverage?: Yes  Within the past 12 months, you worried that your food would run out before you got the money to buy more : Never true  Within the past 12 months, the food you bought just didn't last and you didn't have money to get more : Never true  Food insecurity resource given?: N/A  Does patient receive dialysis treatments?: No  Does patient have a history of substance abuse?: Yes  Historical substance use preference: Alcohol/ETOH  Is patient currently in treatment for substance abuse?: No  Treatment options provided (Patient has been referred to [facility] via PES, resources are being explored for inpatient rehab)    Means of Transportation  Means of Transport to Appts[de-identified] Family transport ([address])    DISCHARGE DETAILS:    Discharge planning discussed with[de-identified] Patient and sister Delaney Salgado of Choice: Yes  Comments - Freedom of Choice: FOC reviewed with patient, patient aware that preferences will be considered if referrals are indicated    CM contacted family/caregiver?: Yes  Were Treatment Team discharge recommendations reviewed with patient/caregiver?: Yes  Did patient/caregiver verbalize understanding of patient care needs?: Yes  Were patient/caregiver advised of the risks associated with not following Treatment Team discharge recommendations?: Yes    Contacts  Patient Contacts: Prisca Mahoney (sister)  Relationship to Patient[de-identified] Family  Contact Method: Phone  Phone Number: [phone]  Reason/Outcome: Emergency Contact, Continuity of Care, Discharge [address]         Is the patient interested in [employer] [facility] at discharge?: Yes (referral placed to [facility] but agency unwilling to accept at this time due to patient's ongoing alcohol abuse)  [address] requested[de-identified] [address] Name[de-identified] [address] Provider[de-identified] PCP  Home Health Services Needed[de-identified] COPD Management, Heart Failure Management  Homebound Criteria Met[de-identified] Uses an Assist Device (i e  cane, walker, etc), Requires the Assistance of Another Person for Safe Ambulation or to Leave the Home  Supporting Clincal Findings[de-identified] Limited Endurance, Fatigues [place] in [employer]    DME Referral Provided  Referral made for DME?: No    Other Referral/Resources/Interventions Provided:  Interventions: [facility]    Would you like to participate in our [address] service program?  : No - Declined    Treatment Team Recommendation: Home with [address]  Discharge Destination Plan[de-identified] Home [facility] agency currently unwilling to accept patient because of alcohol use)  Transport at Discharge : Family      IMM Given (Date):: 09/19/22 (IMM given by registration per chart)      SW spoke with patient at bedside to introduce role of CM and conduct assessment  Patient still lives with his sister in a 2nd floor apartment in [place]  He is independent with all care and relies on family for transportation  He continues to use his LifeVest and is currently admitted due to an AICD discharge  Per chart patient did not  all of his medications after his last admission  Patient noted that he was told by the pharmacy that the medications would cost $89 and this was too much for him  Intervention from his PCP office/outpatient CM brought the cost down to about $4       Patient was referred for alcohol treatment resources and was also referred to PES/crisis for evaluation  Per notes patient is being followed by Slava Wagner and inpatient alcohol rehab is being sought  SW will continue to follow for any CM discharge planning needs

## 2022-09-20 NOTE — PLAN OF CARE
Problem: MOBILITY - ADULT  Goal: Maintain or return to baseline ADL function  Description: INTERVENTIONS:  -  Assess patient's ability to carry out ADLs; assess patient's baseline for ADL function and identify physical deficits which impact ability to perform ADLs (bathing, care of mouth/teeth, toileting, grooming, dressing, etc )  - Assess/evaluate cause of self-care deficits   - Assess range of motion  - Assess patient's mobility; develop plan if impaired  - Assess patient's need for assistive devices and provide as appropriate  - Encourage maximum independence but intervene and supervise when necessary  - Involve family in performance of ADLs  - Assess for home care needs following discharge   - Consider OT consult to assist with ADL evaluation and planning for discharge  - Provide patient education as appropriate  Outcome: Progressing     Problem: CARDIOVASCULAR - ADULT  Goal: Maintains optimal cardiac output and hemodynamic stability  Description: INTERVENTIONS:  - Monitor I/O, vital signs and rhythm  - Monitor for S/S and trends of decreased cardiac output  - Administer and titrate ordered vasoactive medications to optimize hemodynamic stability  - Assess quality of pulses, skin color and temperature  - Assess for signs of decreased coronary artery perfusion  - Instruct patient to report change in severity of symptom  -Ensure life   Problem: METABOLIC, FLUID AND ELECTROLYTES - ADULT  Goal: Electrolytes maintained within normal limits  Description: INTERVENTIONS:  - Monitor labs and assess patient for signs and symptoms of electrolyte imbalances  - Administer electrolyte replacement as ordered  - Monitor response to electrolyte replacements, including repeat lab results as appropriate  - Instruct patient on fluid and nutrition as appropriate  Outcome: Progressing     Problem: MUSCULOSKELETAL - ADULT  Goal: Maintain or return mobility to safest level of function  Description: INTERVENTIONS:  - Assess patient's ability to carry out ADLs; assess patient's baseline for ADL function and identify physical deficits which impact ability to perform ADLs (bathing, care of mouth/teeth, toileting, grooming, dressing, etc )  - Assess/evaluate cause of self-care deficits   - Assess range of motion  - Assess patient's mobility  - Assess patient's need for assistive devices and provide as appropriate  - Encourage maximum independence but intervene and supervise when necessary  - Involve family in performance of ADLs  - Assess for home care needs following discharge   - Consider OT consult to assist with ADL evaluation and planning for discharge  - Provide patient education as appropriate  Outcome: Progressing     Problem: Potential for Falls  Goal: Patient will remain free of falls  Description: INTERVENTIONS:  - Educate patient/family on patient safety including physical limitations  - Instruct patient to call for assistance with activity   - Consult OT/PT to assist with strengthening/mobility   - Keep Call bell within reach  - Keep bed low and locked with side rails adjusted as appropriate  - Keep care items and personal belongings within reach  - Initiate and maintain comfort rounds  - Make Fall Risk Sign visible to staff  - Offer Toileting every 2  Hours, in advance of need  - Initiate/Maintain bed alarm  - Obtain necessary fall risk management equipment:   - Apply yellow socks and bracelet for high fall risk patients  - Consider moving patient to room near nurses station  Outcome: Progressing     Problem: RESPIRATORY - ADULT  Goal: Achieves optimal ventilation and oxygenation  Description: INTERVENTIONS:  - Assess for changes in respiratory status  - Assess for changes in mentation and behavior  - Position to facilitate oxygenation and minimize respiratory effort  - Oxygen administered by appropriate delivery if ordered  - Initiate smoking cessation education as indicated  - Encourage broncho-pulmonary hygiene including cough, deep breathe, Incentive Spirometry  - Assess the need for suctioning and aspirate as needed  - Assess and instruct to report SOB or any respiratory difficulty  - Respiratory Therapy support as indicated  Outcome: Progressing   vest is functioning properly  Change battery every 12 hours     Outcome: Progressing     Problem: CARDIOVASCULAR - ADULT  Goal: Absence of cardiac dysrhythmias or at baseline rhythm  Description: INTERVENTIONS:  - Continuous cardiac monitoring, vital signs, obtain 12 lead EKG if ordered  - Administer antiarrhythmic and heart rate control medications as ordered  - Monitor electrolytes and administer replacement therapy as ordered  Outcome: Progressing

## 2022-09-20 NOTE — PROGRESS NOTES
Daily Progress Note - [address] Residency  Nic Ayala 59 y o  male MRN: [id number]  Unit/Bed#: [address] [address] Encounter: [id number]  Admitting Physician: Jose Tran DO   PCP: Octavio Britt DO  Date of Admission:  9/17/2022  8:44 PM    Assessment and Plan    * AICD discharge  Assessment & Plan  · Per patient, his AICD went off 3 times today  Patient denies any palpitation, chest pain, or SOB  Likely 2/2 alcohol use  · Monitor on telemetry  · Cardiology recs: Life vest interrogation reviewed prelim likely secondary to SVT, cannot r/o underlying flutter given a lot of artifact  Maintain K 4 and Mg 2  Pt followed by Advanced Heart Failure group in [place], [facility] for rpt echo in Oct  If EF not improved, may need ICD  · Rpt echo EF 21%, reduced systolic fxn, R1KM  Cardio recs not candidate for advanced therapy due to alcohol use and noncompliance    Paroxysmal atrial fibrillation (HCC)  Assessment & Plan  · Sinus tachy on admission  Patient denies any chest pain or palpitation  · Continue metoprolol 50 mg daily, losartan 50 mg daily  · Continue Xarelto 20 mg daily  · On Telemetry    Alcohol dependence ([facility] )  Assessment & Plan  · Had about 10 shots of liquor on day of admission  · Ethanol 345  · Continue on thiamine, folate, multivitamin  · Protonix added for epigastric pain reported by patient today, will not be continued on discharge  No nausea or vomiting overnight  · Patient reported right leg weakness, dizziness, blurry vision since noon yesterday  Patient states he did not tell any other providers about the symptoms  Neurological exam showed slightly weakened RLE strength against resistance, no pronator drift, otherwise no focal neurological deficits including cranial nerves  NIH 0  Suspect likely secondary to withdrawal  12 lead EKG performed, no significant change, NSR  · Patient reported seeing a cat today during interview   CIWA score 12, Ativan 2 mg given per CIWA protocol  · Patient agreeable to inpatient alcohol rehab  · [facility] Crisis consulted, Raegan Joshua evaluated, will follow up possible placement for inpatient rehab    Metabolic acidosis, increased anion gap  Assessment & Plan  Anion gap of 19  Likely due to alcohol intoxication  Monitor BMP    Combined systolic and diastolic cardiac dysfunction  Assessment & Plan  Cardiomyopathy likely 2/2 alcohol  Echo done on 07/22 shows EF 25-30% and G1 DD  · Monitor I&O and daily weight  · Continue on metoprolol    Essential hypertension  Assessment & Plan  Patient is stable on losartan and metoprolol at home  Continue home medications  Noncompliance  Assessment & Plan  Hx of noncompliant with the medication and life vest use  Brain aneurysm  Assessment & Plan  · Patient denies any headache or blurry vision on admission  · CT head on previous admission showed a 3 mm of right clinoid segment aneurysm and 1 mm right P comm aneurysm  · Repeat CT head pending  CT head ordered due to possible fall after drinking 10 shots of liquor today  · Patient to follow-up with Dr Dwayne Solis neurosurgery at [facility] outpatient or Dr Virgen Ferr[facility]    Hepatic steatosis  [address] [place] [address]  · Mildly enlarged liver on ultrasound without any signs of cirrhosis  · Likely 2/2 heavy alcohol abuse  · Encourage alcohol cessation and in patient alcohol rehab    [address]  Presented via EMS after falling on the sidewalk on his buttock  He had about 10 shots on the day of fall  He denies any loss of consciousness or injury to the head  · CT head showed no acute intracranial abnormality  · On fall precaution      VTE Pharmacologic Prophylaxis:   Pharmacologic: Rivaroxaban (Xarelto)  Mechanical VTE Prophylaxis in Place: Yes    Patient Centered Rounds: I have performed bedside rounds with nursing staff today      Discussions with Specialists or Other Care Team Provider:  Education and Discussions with Family / Patient:   Patient Information Sharing: With the consent of Nic Ayala , sister Gill Adorno will be notified today by inpatient team of the patients condition and current plan  All questions answered  Time Spent for Care: 30 minutes  More than 50% of total time spent on counseling and coordination of care as described above  Current Length of Stay: 2 day(s)    Current Patient Status: Inpatient   Certification Statement: The patient will continue to require additional inpatient hospital stay due to monitoring for alcohol withdrawal    Discharge Plan: 24-48 hours to monitor alcohol withdrawal, pending inpatient alcohol placement vs home    Code Status: Level 1 - Full Code    Subjective:   Patient seen examined at bedside  Reports that since yesterday noon has had blurry vision, dizziness standing and some right leg weakness  On examination today he states is seeing a cat  Denies nausea, vomiting, chest pain  He does note similar in epigastric pain described as sticking me:  He also reports night sweats overnight  Denies palpitations, shortness of breath, calf tenderness  Objective     Objective:   Vitals:   Temp (24hrs), Av 4 °F (36 9 °C), Min:98 2 °F (36 8 °C), Max:98 5 °F (36 9 °C)    Temp:  [98 2 °F (36 8 °C)-98 5 °F (36 9 °C)] 98 4 °F (36 9 °C)  HR:  [53-88] 83  Resp:  [18] 18  BP: (105-135)/(58-86) 111/75  SpO2:  [96 %-98 %] 98 %  Body mass index is 21 76 kg/m²  Input and Output Summary (last 24 hours): Intake/Output Summary (Last 24 hours) at 2022 1150  Last data filed at 2022 0517  Gross per 24 hour   Intake --   Output 1325 ml   Net -1325 ml       Physical Exam:   Physical Exam  Constitutional:       Appearance: He is not diaphoretic  HENT:      Head: Normocephalic  Mouth/Throat:      Mouth: Mucous membranes are moist    Eyes:      General: No visual field deficit  Extraocular Movements: Extraocular movements intact     Cardiovascular:      Rate and Rhythm: Normal rate and regular rhythm  Pulses: Normal pulses  Heart sounds: No murmur heard  Pulmonary:      Effort: Pulmonary effort is normal  No respiratory distress  Breath sounds: Normal breath sounds  No wheezing  Abdominal:      General: There is no distension  Comments: Epigastric discomfort   Musculoskeletal:      Right lower leg: No edema  Left lower leg: No edema  Skin:     General: Skin is warm and dry  Neurological:      Mental Status: He is alert and oriented to person, place, and time  GCS: GCS eye subscore is 4  GCS verbal subscore is 5  GCS motor subscore is 6  Cranial Nerves: Cranial nerves are intact  No cranial nerve deficit, dysarthria or facial asymmetry  Sensory: Sensation is intact  Motor: No pronator drift  Coordination: Coordination is intact  Finger-Nose-Finger Test and Heel to [place] Test normal       Gait: Gait is intact  Comments: RLE weakness against resistance, no RLE pronator drift  Mild tremors  No UE pronator drift bilaterally  Psychiatric:         Mood and Affect: Mood normal          Additional Data:     Labs:  Results from last 7 days   Lab Units 09/20/22  0515   WBC Thousand/uL 8 73   HEMOGLOBIN g/dL 14 5   HEMATOCRIT % 44 3   PLATELETS Thousands/uL 275   NEUTROS PCT % 64   LYMPHS PCT % 21   MONOS PCT % 11   EOS PCT % 3     Results from last 7 days   Lab Units 09/20/22  0515 09/19/22  0559 09/18/22  0613   POTASSIUM mmol/L 4 4   < > 3 5   CHLORIDE mmol/L 98   < > 99   CO2 mmol/L 26   < > 26   BUN mg/dL 9   < > 10   CREATININE mg/dL 0 74   < > 0 72   CALCIUM mg/dL 9 2   < > 7 7*   ALK PHOS U/L  --   --  52   ALT U/L  --   --  14   AST U/L  --   --  26    < > = values in this interval not displayed  Results from last 7 days   Lab Units 09/17/22  2102   INR  0 99               * I Have Reviewed All Lab Data Listed Above  * Additional Pertinent Lab Tests Reviewed:  All Labs Within Last 24 Hours Reviewed    Imaging:    Imaging Reports Reviewed  Recent Cultures (last 7 days):           Last 24 Hours Medication List:   Current Facility-Administered Medications   Medication Dose Route Frequency Provider Last Rate    acetaminophen  650 mg Oral Q6H PRN Nathaly Drivers, DO      folic acid  1 mg Oral Daily [facility], [facility]      ipratropium-albuterol  3 mL Nebulization Q6H PRN Nathaly Drivers, DO      losartan  50 mg Oral Daily [facility], DO      metoprolol succinate  50 mg Oral Daily [facility], DO      multivitamin-minerals  1 tablet Oral Daily [facility], DO      nicotine  1 patch Transdermal Daily [facility], DO      pantoprazole  40 mg Oral Early Morning Htet Gail Colin MD      pneumococcal 23-valent polysaccharide vaccine  0 5 mL Subcutaneous Prior to discharge Ryland Mccracken, DO      rivaroxaban  20 mg Oral Daily With Breakfast Nathaly Drivers, DO      thiamine  100 mg Oral Daily [place] Drivers, DO      trimethobenzamide  200 mg Intramuscular Q6H PRN Jaimie Lopez MD               ** Please Note: Dictation voice to text software may have been used in the creation of this document   Edelmira Donnelly MD  09/20/22  11:50 AM

## 2022-09-20 NOTE — PROGRESS NOTES
Progress Note - Cardiology   [facility]     Nic Ayala 59 y o  male MRN: [id number]  : 1965  Unit/Bed#: [address] [address] Encounter: [id number]    Assessment and Plan:   1  Life vest discharge:  Inappropriate for SVT, patient back on home medications    -  telemetry monitored demonstrates sinus rhythm without ectopy     2  Nonischemic cardiomyopathy:  EF 25-30% on echo in 2022      -  echo recheck on 2022 with EF visibly estimated 20% with LV systolic function remaining severely reduced  -  life vest at bedside    -  recommend follow-up with advanced heart failure team and placement possible ICD if candidate    3  Alcohol abuse:  Ongoing drinking, will drink up to 8-10 "airplane bottles" per day of liquor    -  have discussed cessation multiple times during admissions patient always appears to be agreeable during hospitalization but then does not continue with therapy in the outpatient setting    -  crisis notified that patient now is requesting alcohol rehab  They are currently working on inpatient placement      4  Paroxysmal atrial fibrillation:  Appears to be maintaining sinus rhythm, ordered for beta-blocker and Xarelto     5  Tobacco abuse/COPD:  History of spontaneous pneumothorax with VATS procedure secondary to blebs     6  Electrolyte abnormalities:  Magnesium 1 5 again today, potassium 3 5  Supplementation ordered    -  continue monitor     7  Healthcare and medication noncompliance    Patient appears to be stable from a cardiac standpoint, he may be discharged when cleared by primary team     Subjective / Objective:   Patient seen and examined  Vital signs of heart rate improved back on optimal heart failure medications  Repeat echocardiogram demonstrated EF visibly estimated 75% and systolic function remains severely reduced with severe global hypokinesis and regional variation  Previous echo EF was 25-30%      Vitals: Blood pressure 135/86, pulse 87, temperature 98 2 °F (36 8 °C), temperature source Oral, resp  rate 18, height 5' 6" (1 676 m), weight 61 1 kg (134 lb 12 8 oz), SpO2 98 %  Vitals:    09/19/22 1119 09/20/22 0600   Weight: 60 9 kg (134 lb 4 2 oz) 61 1 kg (134 lb 12 8 oz)     Body mass index is 21 76 kg/m²  BP Readings from Last 3 Encounters:   09/20/22 135/86   09/12/22 104/60   09/08/22 131/77     Orthostatic Blood Pressures    Flowsheet Row Most Recent Value   Blood Pressure 135/86 filed at 09/20/2022 0301   Patient Position - Orthostatic VS Lying filed at 09/20/2022 0301        I/O       09/18 0701  09/19 0700 09/19 0701  09/20 0700 09/20 0701  09/21 0700    P  O        I V  (mL/kg)       IV Piggyback       Total Intake(mL/kg)       Urine (mL/kg/hr) 400 (0 3) 1475 (1)     Total Output 400 1475     Net -400 -1475                Invasive Devices  Report    Peripheral Intravenous Line  Duration           Peripheral IV 09/17/22 Left Antecubital 2 days                  Intake/Output Summary (Last 24 hours) at 9/20/2022 0830  Last data filed at 9/20/2022 0517  Gross per 24 hour   Intake --   Output 1325 ml   Net -1325 ml         Physical Exam:   Physical Exam  Vitals and nursing note reviewed  Constitutional:       General: He is not in acute distress  Appearance: Normal appearance  He is normal weight  HENT:      Right Ear: External ear normal       Left Ear: External ear normal    Eyes:      General: No scleral icterus  Right eye: No discharge  Left eye: No discharge  Cardiovascular:      Rate and Rhythm: Normal rate and regular rhythm  Pulses: Normal pulses  Heart sounds: Normal heart sounds  No murmur heard  Pulmonary:      Effort: Pulmonary effort is normal  No respiratory distress  Breath sounds: Normal breath sounds  No wheezing, rhonchi or rales  Abdominal:      General: Bowel sounds are normal  There is no distension  Palpations: Abdomen is soft  Musculoskeletal:      Right lower leg: No edema  Left lower leg: No edema  Skin:     General: Skin is warm and dry  Capillary Refill: Capillary refill takes less than 2 seconds  Neurological:      General: No focal deficit present  Mental Status: He is alert and oriented to person, place, and time  Mental status is at baseline     Psychiatric:         Mood and Affect: Mood normal                  Medications/ Allergies:     Current Facility-Administered Medications   Medication Dose Route Frequency Provider Last Rate    acetaminophen  650 mg Oral Q6H PRN Valley Hedger, DO      folic acid  1 mg Oral Daily [facility], DO      ipratropium-albuterol  3 mL Nebulization Q6H PRN Valley Hedger, DO      losartan  50 mg Oral Daily [facility], DO      metoprolol succinate  50 mg Oral Daily [facility], DO      multivitamin-minerals  1 tablet Oral Daily [facility], DO      nicotine  1 patch Transdermal Daily [facility], DO      pantoprazole  40 mg Oral Early Morning Htet Scott Curz MD      pneumococcal 23-valent polysaccharide vaccine  0 5 mL Subcutaneous Prior to discharge Margreta Lab, DO      rivaroxaban  20 mg Oral Daily With [facility], [facility]      thiamine  100 mg Oral Daily [facility], DO      trimethobenzamide  200 mg Intramuscular Q6H PRN Aubrey Dumont MD       acetaminophen, 650 mg, Q6H PRN  ipratropium-albuterol, 3 mL, Q6H PRN  pneumococcal 23-valent polysaccharide vaccine, 0 5 mL, Prior to discharge  trimethobenzamide, 200 mg, Q6H PRN      No Known Allergies    VTE Pharmacologic Prophylaxis:   Sequential compression device (Venodyne)     Labs:   Troponins:  Results from last 7 days   Lab Units 09/17/22  2314   HSTNI D2 ng/L <0     CBC with diff:  Results from last 7 days   Lab Units 09/20/22  0515 09/19/22  0559 09/18/22  0613 09/17/22  2102   WBC Thousand/uL 8 73 8 59 6 60 7 02   HEMOGLOBIN g/dL 14 5 13 3 12 7 15 6   HEMATOCRIT % 44 3 40 4 37 7 48 4   MCV fL 94 94 92 95   PLATELETS Thousands/uL 275 237 237 284   MCH pg 30 7 30 9 31 0 30 6   MCHC g/dL 32 7 32 9 33 7 32 2   RDW % 13 9 14 4 14 4 14 6   MPV fL 8 4* 8 3* 8 3* 8 6*   NRBC AUTO /100 WBCs 0 0 0 0     CMP:  Results from last 7 days   Lab Units 09/20/22  0515 09/19/22  0559 09/18/22  0613 09/17/22 2102   SODIUM mmol/L 131* 135 134* 144   POTASSIUM mmol/L 4 4 3 5 3 5 4 1   CHLORIDE mmol/L 98 100 99 101   CO2 mmol/L 26 29 26 24   ANION GAP mmol/L 7 6 9 19*   BUN mg/dL 9 8 10 13   CREATININE mg/dL 0 74 0 62 0 72 1 09   CALCIUM mg/dL 9 2 8 7 7 7* 8 8   AST U/L  --   --  26 38   ALT U/L  --   --  14 20   ALK PHOS U/L  --   --  52 67   TOTAL PROTEIN g/dL  --   --  6 1* 7 9   ALBUMIN g/dL  --   --  3 1* 4 2   TOTAL BILIRUBIN mg/dL  --   --  0 33 0 36   EGFR ml/min/1 73sq m 100 108 102 73     Magnesium:  Results from last 7 days   Lab Units 09/19/22  0559 09/18/22  0613 09/17/22 2102   MAGNESIUM mg/dL 1 5* 2 1 1 5*     Coags:  Results from last 7 days   Lab Units 09/17/22 2102   PTT seconds 23   INR  0 99       Imaging & Testing   I have personally reviewed pertinent reports  XR chest 1 view portable    Result Date: 9/18/2022  Narrative: CHEST INDICATION:   chest pain  COMPARISON:  CXR 8/10/2022 and chest CT 7/20/2022  EXAM PERFORMED/VIEWS:  XR CHEST PORTABLE FINDINGS: Normal heart size  Redemonstration of pulmonary artery enlargement  Median sternotomy  Life vest  Loop recorder in the left chest wall  The lungs are clear  No pneumothorax or pleural effusion  Osseous structures appear within normal limits for patient age  Impression: No acute cardiopulmonary disease  Redemonstration of pulmonary artery enlargement suggestive of pulmonary hypertension  Workstation performed: [id number]     CT head without contrast    Result Date: 9/17/2022  Narrative: CT BRAIN - WITHOUT CONTRAST INDICATION:   Head trauma, moderate-severe fall  COMPARISON:  CT brain dated August 11, 2022  TECHNIQUE:  CT examination of the brain was performed    In addition to axial images, sagittal and coronal 2D reformatted images were created and submitted for interpretation  Radiation dose length product (DLP) for this visit:  915 mGy-cm   This examination, like all CT scans performed in the [facility], was performed utilizing techniques to minimize radiation dose exposure, including the use of iterative reconstruction and automated exposure control  IMAGE QUALITY:  Diagnostic  FINDINGS: PARENCHYMA:  No intracranial mass, mass effect or midline shift  No CT signs of acute infarction  No acute parenchymal hemorrhage  There is mild periventricular white matter low attenuation which is nonspecific and most likely related to chronic small vessel ischemic changes  VENTRICLES AND EXTRA-AXIAL SPACES:  Normal for the patient's age  VISUALIZED ORBITS AND PARANASAL SINUSES:  Unremarkable  CALVARIUM AND EXTRACRANIAL SOFT TISSUES:  Normal      Impression: No acute intracranial abnormality  Workstation performed: [id number]     Echo follow up/limited w/ contrast if indicated    Result Date: 9/19/2022  Narrative: María Cam  Left Ventricle: Left ventricular cavity size is normal  Wall thickness is mildly increased  The left ventricular ejection fraction is 35%  Systolic function is severely reduced  There is severe global hypokinesis with regional variation  Diastolic function is mildly abnormal, consistent with grade I (abnormal) relaxation    Right Ventricle: Right ventricular cavity size is mildly dilated  Systolic function is low normal    Right Atrium: The atrium is mildly dilated  Compared to prior echo, EF mildly improved but remains globally severely depressed with regional variation- EF near 35%          Abhilash Ramachandran, [address]   Cardiology      "This note was completed in part utilizing m-modal fluency direct voice recognition software     Grammatical errors, random word insertion, spelling mistakes, and incomplete sentences may be an occasional consequence of the system secondary to software limitations, ambient noise and hardware issues  Please read the chart carefully and recognize, using context, where substitutions have occurred    If you have any questions or concerns about the context, text or information contained within the body of this dictation, please contact myself, the provider, for further clarification "

## 2022-09-20 NOTE — PLAN OF CARE
Problem: MOBILITY - ADULT  Goal: Maintain or return to baseline ADL function  Description: INTERVENTIONS:  -  Assess patient's ability to carry out ADLs; assess patient's baseline for ADL function and identify physical deficits which impact ability to perform ADLs (bathing, care of mouth/teeth, toileting, grooming, dressing, etc )  - Assess/evaluate cause of self-care deficits   - Assess range of motion  - Assess patient's mobility; develop plan if impaired  - Assess patient's need for assistive devices and provide as appropriate  - Encourage maximum independence but intervene and supervise when necessary  - Involve family in performance of ADLs  - Assess for home care needs following discharge   - Consider OT consult to assist with ADL evaluation and planning for discharge  - Provide patient education as appropriate  Outcome: Progressing  Goal: Maintains/Returns to pre admission functional level  Description: INTERVENTIONS:  - Perform BMAT or MOVE assessment daily    - Set and communicate daily mobility goal to care team and patient/family/caregiver  - Collaborate with rehabilitation services on mobility goals if consulted  - Perform Range of Motion 2 times a day  - Reposition patient every 2 hours    - Dangle patient 2 times a day  - Stand patient 2 times a day  - Ambulate patient 2 times a day  - Out of bed to chair 2 times a day   - Out of bed for meals 2 times a day  - Out of bed for toileting  - Record patient progress and toleration of activity level   Outcome: Progressing     Problem: CARDIOVASCULAR - ADULT  Goal: Maintains optimal cardiac output and hemodynamic stability  Description: INTERVENTIONS:  - Monitor I/O, vital signs and rhythm  - Monitor for S/S and trends of decreased cardiac output  - Administer and titrate ordered vasoactive medications to optimize hemodynamic stability  - Assess quality of pulses, skin color and temperature  - Assess for signs of decreased coronary artery perfusion  - Instruct patient to report change in severity of symptoms  Outcome: Progressing  Goal: Absence of cardiac dysrhythmias or at baseline rhythm  Description: INTERVENTIONS:  - Continuous cardiac monitoring, vital signs, obtain 12 lead EKG if ordered  - Administer antiarrhythmic and heart rate control medications as ordered  - Monitor electrolytes and administer replacement therapy as ordered  Outcome: Progressing     Problem: METABOLIC, FLUID AND ELECTROLYTES - ADULT  Goal: Electrolytes maintained within normal limits  Description: INTERVENTIONS:  - Monitor labs and assess patient for signs and symptoms of electrolyte imbalances  - Administer electrolyte replacement as ordered  - Monitor response to electrolyte replacements, including repeat lab results as appropriate  - Instruct patient on fluid and nutrition as appropriate  Outcome: Progressing     Problem: MUSCULOSKELETAL - ADULT  Goal: Maintain or return mobility to safest level of function  Description: INTERVENTIONS:  - Assess patient's ability to carry out ADLs; assess patient's baseline for ADL function and identify physical deficits which impact ability to perform ADLs (bathing, care of mouth/teeth, toileting, grooming, dressing, etc )  - Assess/evaluate cause of self-care deficits   - Assess range of motion  - Assess patient's mobility  - Assess patient's need for assistive devices and provide as appropriate  - Encourage maximum independence but intervene and supervise when necessary  - Involve family in performance of ADLs  - Assess for home care needs following discharge   - Consider OT consult to assist with ADL evaluation and planning for discharge  - Provide patient education as appropriate  Outcome: Progressing     Problem: Potential for Falls  Goal: Patient will remain free of falls  Description: INTERVENTIONS:  - Educate patient/family on patient safety including physical limitations  - Instruct patient to call for assistance with activity   - Consult OT/PT to assist with strengthening/mobility   - Keep Call bell within reach  - Keep bed low and locked with side rails adjusted as appropriate  - Keep care items and personal belongings within reach  - Initiate and maintain comfort rounds  - Make Fall Risk Sign visible to staff  - Offer Toileting every 2 Hours, in advance of need  - Initiate/Maintain bed alarm  - Obtain necessary fall risk management equipment: yellow socks  - Apply yellow socks and bracelet for high fall risk patients  - Consider moving patient to room near nurses station  Outcome: Progressing     Problem: RESPIRATORY - ADULT  Goal: Achieves optimal ventilation and oxygenation  Description: INTERVENTIONS:  - Assess for changes in respiratory status  - Assess for changes in mentation and behavior  - Position to facilitate oxygenation and minimize respiratory effort  - Oxygen administered by appropriate delivery if ordered  - Initiate smoking cessation education as indicated  - Encourage broncho-pulmonary hygiene including cough, deep breathe, Incentive Spirometry  - Assess the need for suctioning and aspirate as needed  - Assess and instruct to report SOB or any respiratory difficulty  - Respiratory Therapy support as indicated  Outcome: Progressing

## 2022-09-21 LAB
ANION GAP SERPL CALCULATED.3IONS-SCNC: 8 MMOL/L (ref 4–13)
BASOPHILS # BLD AUTO: 0.07 THOUSANDS/ΜL (ref 0–0.1)
BASOPHILS NFR BLD AUTO: 1 % (ref 0–1)
BUN SERPL-MCNC: 12 MG/DL (ref 5–25)
CALCIUM SERPL-MCNC: 9.2 MG/DL (ref 8.3–10.1)
CHLORIDE SERPL-SCNC: 98 MMOL/L (ref 96–108)
CO2 SERPL-SCNC: 26 MMOL/L (ref 21–32)
CREAT SERPL-MCNC: 0.76 MG/DL (ref 0.6–1.3)
EOSINOPHIL # BLD AUTO: 0.3 THOUSAND/ΜL (ref 0–0.61)
EOSINOPHIL NFR BLD AUTO: 4 % (ref 0–6)
ERYTHROCYTE [DISTWIDTH] IN BLOOD BY AUTOMATED COUNT: 14 % (ref 11.6–15.1)
GFR SERPL CREATININE-BSD FRML MDRD: 99 ML/MIN/1.73SQ M
GLUCOSE SERPL-MCNC: 124 MG/DL (ref 65–140)
HCT VFR BLD AUTO: 44.6 % (ref 36.5–49.3)
HGB BLD-MCNC: 14.7 G/DL (ref 12–17)
IMM GRANULOCYTES # BLD AUTO: 0.05 THOUSAND/UL (ref 0–0.2)
IMM GRANULOCYTES NFR BLD AUTO: 1 % (ref 0–2)
LYMPHOCYTES # BLD AUTO: 1.82 THOUSANDS/ΜL (ref 0.6–4.47)
LYMPHOCYTES NFR BLD AUTO: 24 % (ref 14–44)
MAGNESIUM SERPL-MCNC: 1.5 MG/DL (ref 1.6–2.6)
MCH RBC QN AUTO: 30.8 PG (ref 26.8–34.3)
MCHC RBC AUTO-ENTMCNC: 33 G/DL (ref 31.4–37.4)
MCV RBC AUTO: 93 FL (ref 82–98)
MONOCYTES # BLD AUTO: 0.91 THOUSAND/ΜL (ref 0.17–1.22)
MONOCYTES NFR BLD AUTO: 12 % (ref 4–12)
NEUTROPHILS # BLD AUTO: 4.35 THOUSANDS/ΜL (ref 1.85–7.62)
NEUTS SEG NFR BLD AUTO: 58 % (ref 43–75)
NRBC BLD AUTO-RTO: 0 /100 WBCS
PLATELET # BLD AUTO: 291 THOUSANDS/UL (ref 149–390)
PMV BLD AUTO: 8.8 FL (ref 8.9–12.7)
POTASSIUM SERPL-SCNC: 4 MMOL/L (ref 3.5–5.3)
RBC # BLD AUTO: 4.78 MILLION/UL (ref 3.88–5.62)
SODIUM SERPL-SCNC: 132 MMOL/L (ref 135–147)
WBC # BLD AUTO: 7.5 THOUSAND/UL (ref 4.31–10.16)

## 2022-09-21 PROCEDURE — 83735 ASSAY OF MAGNESIUM: CPT

## 2022-09-21 PROCEDURE — 85025 COMPLETE CBC W/AUTO DIFF WBC: CPT

## 2022-09-21 PROCEDURE — 99232 SBSQ HOSP IP/OBS MODERATE 35: CPT | Performed by: FAMILY MEDICINE

## 2022-09-21 PROCEDURE — 80048 BASIC METABOLIC PNL TOTAL CA: CPT

## 2022-09-21 PROCEDURE — 99232 SBSQ HOSP IP/OBS MODERATE 35: CPT | Performed by: INTERNAL MEDICINE

## 2022-09-21 RX ORDER — MAGNESIUM SULFATE HEPTAHYDRATE 40 MG/ML
2 INJECTION, SOLUTION INTRAVENOUS ONCE
Status: COMPLETED | OUTPATIENT
Start: 2022-09-21 | End: 2022-09-21

## 2022-09-21 RX ADMIN — MAGNESIUM SULFATE HEPTAHYDRATE 2 G: 40 INJECTION, SOLUTION INTRAVENOUS at 09:09

## 2022-09-21 RX ADMIN — RIVAROXABAN 20 MG: 20 TABLET, FILM COATED ORAL at 09:12

## 2022-09-21 RX ADMIN — THIAMINE HCL TAB 100 MG 100 MG: 100 TAB at 09:10

## 2022-09-21 RX ADMIN — PANTOPRAZOLE SODIUM 40 MG: 40 TABLET, DELAYED RELEASE ORAL at 05:37

## 2022-09-21 RX ADMIN — Medication 1 TABLET: at 09:10

## 2022-09-21 RX ADMIN — LOSARTAN POTASSIUM 50 MG: 50 TABLET, FILM COATED ORAL at 09:09

## 2022-09-21 RX ADMIN — FOLIC ACID 1 MG: 1 TABLET ORAL at 09:10

## 2022-09-21 RX ADMIN — NICOTINE 1 PATCH: 21 PATCH, EXTENDED RELEASE TRANSDERMAL at 09:12

## 2022-09-21 NOTE — ED NOTES
9/21/22 @ 1213: Micky [facility] [place] [facility] reports that [facility] discharge, and will be set up with an [facility] program for alcohol treatment    PES notified Dr Rosalio Garcia, MS

## 2022-09-21 NOTE — PROGRESS NOTES
Daily Progress Note - [facility]  Family Medicine Residency  Pj Jamie 59 y o  male MRN: [id number]  Unit/Bed#: [address] [facility] Encounter: [id number]  Admitting Physician: Gurpreet Chand DO   PCP: Ryland Craig DO  Date of Admission:  9/17/2022  8:44 PM    Assessment and Plan    * AICD discharge  Assessment & Plan  · Per patient, his AICD went off 3 times today  Patient denies any palpitation, chest pain, or SOB  Likely 2/2 alcohol use  · Monitor on telemetry  · Cardiology recs: Life vest interrogation reviewed prelim likely secondary to SVT, cannot r/o underlying flutter given a lot of artifact  Maintain K 4 and Mg 2  Pt followed by Advanced Heart Failure group in [place], [facility] for rpt echo in Oct  If EF not improved, may need ICD  · Rpt echo EF 55%, reduced systolic fxn, O8AZ  Cardio recs not candidate for advanced therapy due to alcohol use and noncompliance    Paroxysmal atrial fibrillation (HCC)  Assessment & Plan  · Sinus tachy on admission  Patient denies any chest pain or palpitation  · Continue metoprolol 50 mg daily, losartan 50 mg daily  · Continue Xarelto 20 mg daily  · On Telemetry    Alcohol dependence ([address] )  Assessment & Plan  · Had about 10 shots of liquor on day of admission  · Ethanol 345  · Continue on thiamine, folate, multivitamin  · Protonix added for epigastric pain reported by patient, will not be continued on discharge  No nausea or vomiting overnight  · No further hallucinations or visual disturbances  No further Ativan given overnight per CIWA protocol  · Patient agreeable to inpatient alcohol rehab  · [facility] Crisis consulted, Mino Coyote will reevaluate today    Metabolic acidosis, increased anion gap  Assessment & Plan  Anion gap of 19  Likely due to alcohol intoxication  Monitor BMP    Combined systolic and diastolic cardiac dysfunction  Assessment & Plan  Cardiomyopathy likely 2/2 alcohol   Echo done on 07/22 shows EF 25-30% and G1 DD  · Monitor I&O and daily weight  · Continue on metoprolol    Essential hypertension  Assessment & Plan  Patient is stable on losartan and metoprolol at home  Continue home medications  Noncompliance  Assessment & Plan  Hx of noncompliant with the medication and life vest use  Brain aneurysm  Assessment & Plan  · Patient denies any headache or blurry vision on admission  · CT head on previous admission showed a 3 mm of right clinoid segment aneurysm and 1 mm right P comm aneurysm  · Repeat CT head pending  CT head ordered due to possible fall after drinking 10 shots of liquor today  · Patient to follow-up with Dr Madonna Paiz neurosurgery at [facility] outpatient or Dr Felix Burks [facility]    Hepatic steatosis  [address] [place] [address]  · Mildly enlarged liver on ultrasound without any signs of cirrhosis  · Likely 2/2 heavy alcohol abuse  · Encourage alcohol cessation and in patient alcohol rehab    [address]  Presented via EMS after falling on the sidewalk on his buttock  He had about 10 shots on the day of fall  He denies any loss of consciousness or injury to the head  · CT head showed no acute intracranial abnormality  · On fall precaution      VTE Pharmacologic Prophylaxis:   Pharmacologic: Rivaroxaban (Xarelto)  Mechanical VTE Prophylaxis in Place: Yes    Patient Centered Rounds: I have performed bedside rounds with nursing staff today  Discussions with Specialists or Other Care Team Provider: Slava Wagner    Education and Discussions with Family / Patient:   Patient Information Sharing: With the consent of Kristeen Kehr was notified on phone today by inpatient team of the patients condition and current plan  All questions answered  Time Spent for Care: 30 minutes  More than 50% of total time spent on counseling and coordination of care as described above      Current Length of Stay: 3 day(s)    Current Patient Status: Inpatient   Certification Statement: The patient, who was admitted as an inpatient, is being discharged within 24 hours, medically clear    Discharge Plan: 24H, home    Code Status: Level 1 - Full Code    Subjective:   Patient seen and examined at bedside  He still reports some abdominal discomfort around previous scar  He states he feels well otherwise  Denies further visual disturbances, headaches, chest pain, shortness of breath  Denies nausea, vomiting, diarrhea, calf tenderness  Objective     Objective:   Vitals:   Temp (24hrs), Av 4 °F (36 9 °C), Min:98 2 °F (36 8 °C), Max:98 5 °F (36 9 °C)    Temp:  [98 2 °F (36 8 °C)-98 5 °F (36 9 °C)] 98 4 °F (36 9 °C)  HR:  [75-91] 91  Resp:  [18-20] 20  BP: (101-132)/(63-91) 111/91  SpO2:  [95 %-98 %] 98 %  Body mass index is 21 5 kg/m²  Input and Output Summary (last 24 hours): Intake/Output Summary (Last 24 hours) at 2022 0744  Last data filed at 2022  Gross per 24 hour   Intake 840 ml   Output 400 ml   Net 440 ml       Physical Exam:   Physical Exam  Vitals reviewed  Constitutional:       General: He is not in acute distress  Appearance: Normal appearance  HENT:      Head: Normocephalic  Mouth/Throat:      Mouth: Mucous membranes are moist       Pharynx: No posterior oropharyngeal erythema  Eyes:      Extraocular Movements: Extraocular movements intact  Cardiovascular:      Rate and Rhythm: Normal rate and regular rhythm  Pulses: Normal pulses  Heart sounds: Normal heart sounds  No murmur heard  No friction rub  No gallop  Pulmonary:      Effort: Pulmonary effort is normal  No respiratory distress  Breath sounds: Normal breath sounds  No wheezing  Abdominal:      General: Abdomen is flat  There is no distension  Palpations: Abdomen is soft  Tenderness: There is no guarding  Comments: Mild tenderness epigastric   Musculoskeletal:         General: Normal range of motion  Cervical back: Normal range of motion  Right lower leg: No edema  Left lower leg: No edema  Skin:     General: Skin is warm and dry  Neurological:      General: No focal deficit present  Mental Status: He is alert  Psychiatric:         Mood and Affect: Mood normal        Additional Data:     Labs:  Results from last 7 days   Lab Units 09/21/22  0532   WBC Thousand/uL 7 50   HEMOGLOBIN g/dL 14 7   HEMATOCRIT % 44 6   PLATELETS Thousands/uL 291   NEUTROS PCT % 58   LYMPHS PCT % 24   MONOS PCT % 12   EOS PCT % 4     Results from last 7 days   Lab Units 09/21/22  0532 09/19/22  0559 09/18/22  0613   POTASSIUM mmol/L 4 0   < > 3 5   CHLORIDE mmol/L 98   < > 99   CO2 mmol/L 26   < > 26   BUN mg/dL 12   < > 10   CREATININE mg/dL 0 76   < > 0 72   CALCIUM mg/dL 9 2   < > 7 7*   ALK PHOS U/L  --   --  52   ALT U/L  --   --  14   AST U/L  --   --  26    < > = values in this interval not displayed  Results from last 7 days   Lab Units 09/17/22  2102   INR  0 99     Results from last 7 days   Lab Units 09/20/22  2200   POC GLUCOSE mg/dl 210*           * I Have Reviewed All Lab Data Listed Above  * Additional Pertinent Lab Tests Reviewed:  All Labs Within Last 24 Hours Reviewed    Imaging:    Recent Cultures (last 7 days):           Last 24 Hours Medication List:   Current Facility-Administered Medications   Medication Dose Route Frequency Provider Last Rate    acetaminophen  650 mg Oral Q6H PRN Valley Hedger, DO      folic acid  1 mg Oral Daily Jessenia Simmons, DO      ipratropium-albuterol  3 mL Nebulization Q6H PRN [place] Hedger, DO      losartan  50 mg Oral Daily Jessenia Simmons, DO      metoprolol succinate  50 mg Oral Daily Jessenia Simmons, DO      multivitamin-minerals  1 tablet Oral Daily Jessenia Simmons, DO      nicotine  1 patch Transdermal Daily Jessenia Simmons, DO      pantoprazole  40 mg Oral Early Morning Htet Scott Cruz MD      pneumococcal 23-valent polysaccharide vaccine  0 5 mL Subcutaneous Prior to discharge Jacqueline Toscano, DO      rivaroxaban  20 mg Oral Daily With Breakfast Ramón Velazquezr, DO      thiamine  100 mg Oral Daily Jacqueline Baker,       trimethobenzamide  200 mg Intramuscular Q6H PRN Flynn Rangel MD               ** Please Note: Dictation voice to text software may have been used in the creation of this document   Robi Hernández MD  09/21/22  7:44 AM

## 2022-09-21 NOTE — PLAN OF CARE
Problem: MOBILITY - ADULT  Goal: Maintain or return to baseline ADL function  Description: INTERVENTIONS:  -  Assess patient's ability to carry out ADLs; assess patient's baseline for ADL function and identify physical deficits which impact ability to perform ADLs (bathing, care of mouth/teeth, toileting, grooming, dressing, etc )  - Assess/evaluate cause of self-care deficits   - Assess range of motion  - Assess patient's mobility; develop plan if impaired  - Assess patient's need for assistive devices and provide as appropriate  - Encourage maximum independence but intervene and supervise when necessary  - Involve family in performance of ADLs  - Assess for home care needs following discharge   - Consider OT consult to assist with ADL evaluation and planning for discharge  - Provide patient education as appropriate  Outcome: Progressing     Problem: CARDIOVASCULAR - ADULT  Goal: Maintains optimal cardiac output and hemodynamic stability  Description: INTERVENTIONS:  - Monitor I/O, vital signs and rhythm  - Monitor for S/S and trends of decreased cardiac output  - Administer and titrate ordered vasoactive medications to optimize hemodynamic stability  - Assess quality of pulses, skin color and temperature  - Assess for signs of decreased coronary artery perfusion  - Instruct patient to report change in severity of symptoms  Outcome: Progressing     Problem: CARDIOVASCULAR - ADULT  Goal: Absence of cardiac dysrhythmias or at baseline rhythm  Description: INTERVENTIONS:  - Continuous cardiac monitoring, vital signs, obtain 12 lead EKG if ordered  - Administer antiarrhythmic and heart rate control medications as ordered  - Monitor electrolytes and administer replacement therapy as ordered  Outcome: Progressing     Problem: METABOLIC, FLUID AND ELECTROLYTES - ADULT  Goal: Electrolytes maintained within normal limits  Description: INTERVENTIONS:  - Monitor labs and assess patient for signs and symptoms of electrolyte imbalances  - Administer electrolyte replacement as ordered  - Monitor response to electrolyte replacements, including repeat lab results as appropriate  - Instruct patient on fluid and nutrition as appropriate  Outcome: Progressing     Problem: MUSCULOSKELETAL - ADULT  Goal: Maintain or return mobility to safest level of function  Description: INTERVENTIONS:  - Assess patient's ability to carry out ADLs; assess patient's baseline for ADL function and identify physical deficits which impact ability to perform ADLs (bathing, care of mouth/teeth, toileting, grooming, dressing, etc )  - Assess/evaluate cause of self-care deficits   - Assess range of motion  - Assess patient's mobility  - Assess patient's need for assistive devices and provide as appropriate  - Encourage maximum independence but intervene and supervise when necessary  - Involve family in performance of ADLs  - Assess for home care needs following discharge   - Consider OT consult to assist with ADL evaluation and planning for discharge  - Provide patient education as appropriate  Outcome: Progressing     Problem: Potential for Falls  Goal: Patient will remain free of falls  Description: INTERVENTIONS:  - Educate patient/family on patient safety including physical limitations  - Instruct patient to call for assistance with activity   - Consult OT/PT to assist with strengthening/mobility   - Keep Call bell within reach  - Keep bed low and locked with side rails adjusted as appropriate  - Keep care items and personal belongings within reach  - Initiate and maintain comfort rounds  - Make Fall Risk Sign visible to staff  - Offer Toileting every 2 Hours, in advance of need  - Initiate/Maintain bed alarm  - Obtain necessary fall risk management equipment:   - Apply yellow socks and bracelet for high fall risk patients  - Consider moving patient to room near nurses station  Outcome: Progressing     Problem: RESPIRATORY - ADULT  Goal: Achieves optimal ventilation and oxygenation  Description: INTERVENTIONS:  - Assess for changes in respiratory status  - Assess for changes in mentation and behavior  - Position to facilitate oxygenation and minimize respiratory effort  - Oxygen administered by appropriate delivery if ordered  - Initiate smoking cessation education as indicated  - Encourage broncho-pulmonary hygiene including cough, deep breathe, Incentive Spirometry  - Assess the need for suctioning and aspirate as needed  - Assess and instruct to report SOB or any respiratory difficulty  - Respiratory Therapy support as indicated  Outcome: Progressing

## 2022-09-21 NOTE — PROGRESS NOTES
Progress Note - Cardiology   [facility]     Nic Ayala 59 y o  male MRN: [id number]  : 1965  Unit/Bed#: [address] [address] Encounter: [id number]    Assessment and Plan:   1  Life vest discharge:  Inappropriate for SVT, patient back on home medications    -  telemetry monitored demonstrates sinus rhythm without ectopy     2  Nonischemic cardiomyopathy:  EF 25-30% on echo in 2022      -  echo recheck on 2022 with EF visibly estimated 08% with LV systolic function remaining severely reduced     -  life vest at bedside    -  recommend follow-up with advanced heart failure team and placement possible ICD if candidate     3  Alcohol abuse:  Ongoing drinking, will drink up to 8-10 "airplane bottles" per day of liquor    -  have discussed cessation multiple times during admissions patient always appears to be agreeable during hospitalization but then does not continue with therapy in the outpatient setting    -  crisis notified that patient now is requesting alcohol rehab  They are currently working on inpatient placement      4  Paroxysmal atrial fibrillation:  Appears to be maintaining sinus rhythm, ordered for beta-blocker and Xarelto     5  Tobacco abuse/COPD:  History of spontaneous pneumothorax with VATS procedure secondary to blebs     6  Electrolyte abnormalities:  Magnesium 1 5 again today, potassium 4   Supplementation ordered    -  continue monitor     7  Healthcare and medication noncompliance     Patient appears to be stable from a cardiac standpoint, he may be discharged when cleared by primary team     Subjective / Objective:   Patient seen and examined  No cardiac complaints offered  Vital signs stable with resumption of his home medications  Patient waiting placement for inpatient alcohol rehab  Vitals: Blood pressure 109/71, pulse 105, temperature 98 4 °F (36 9 °C), resp  rate 20, height 5' 6" (1 676 m), weight 60 4 kg (133 lb 3 2 oz), SpO2 97 %    Vitals: 09/20/22 0600 09/21/22 0536   Weight: 61 1 kg (134 lb 12 8 oz) 60 4 kg (133 lb 3 2 oz)     Body mass index is 21 5 kg/m²  BP Readings from Last 3 Encounters:   09/21/22 109/71   09/12/22 104/60   09/08/22 131/77     Orthostatic Blood Pressures    Flowsheet Row Most Recent Value   Blood Pressure 109/71 filed at 09/21/2022 0817   Patient Position - Orthostatic VS Lying filed at 09/21/2022 0317        I/O       09/19 0701  09/20 0700 09/20 0701  09/21 0700 09/21 0701  09/22 0700    P  O   820     I V  (mL/kg)  20 (0 3)     Total Intake(mL/kg)  840 (13 9)     Urine (mL/kg/hr) 1475 (1) 400 (0 3)     Total Output 1475 400     Net -1475 +440                Invasive Devices  Report    Peripheral Intravenous Line  Duration           Peripheral IV 09/21/22 Right;Ventral (anterior) Forearm <1 day                  Intake/Output Summary (Last 24 hours) at 9/21/2022 0831  Last data filed at 9/20/2022 2059  Gross per 24 hour   Intake 840 ml   Output 400 ml   Net 440 ml         Physical Exam:   Physical Exam  Vitals and nursing note reviewed  Constitutional:       Appearance: Normal appearance  He is normal weight  HENT:      Right Ear: External ear normal       Left Ear: External ear normal    Eyes:      General: No scleral icterus  Right eye: No discharge  Left eye: No discharge  Cardiovascular:      Rate and Rhythm: Normal rate and regular rhythm  Pulses: Normal pulses  Heart sounds: Normal heart sounds  Pulmonary:      Effort: Pulmonary effort is normal       Breath sounds: Normal breath sounds  Abdominal:      General: Bowel sounds are normal  There is no distension  Palpations: Abdomen is soft  Musculoskeletal:      Right lower leg: No edema  Left lower leg: No edema  Skin:     General: Skin is warm and dry  Capillary Refill: Capillary refill takes less than 2 seconds  Neurological:      General: No focal deficit present        Mental Status: He is alert and oriented to person, place, and time  Mental status is at baseline              Medications/ Allergies:     Current Facility-Administered Medications   Medication Dose Route Frequency Provider Last Rate    acetaminophen  650 mg Oral Q6H PRN [facility], DO      folic acid  1 mg Oral Daily [facility], DO      ipratropium-albuterol  3 mL Nebulization Q6H PRN [facility], DO      losartan  50 mg Oral Daily [facility], DO      magnesium sulfate  2 g Intravenous Once Roberto Thomson MD      metoprolol succinate  50 mg Oral Daily [facility], DO      multivitamin-minerals  1 tablet Oral Daily [facility], DO      nicotine  1 patch Transdermal Daily [facility], DO      pantoprazole  40 mg Oral Early Morning Htet Mercedes Steward MD      pneumococcal 23-valent polysaccharide vaccine  0 5 mL Subcutaneous Prior to discharge Felipe De Leon, DO      rivaroxaban  20 mg Oral Daily With Breakfast [facility], DO      thiamine  100 mg Oral Daily [facility], DO      trimethobenzamide  200 mg Intramuscular Q6H PRN Roberto Thomson MD       acetaminophen, 650 mg, Q6H PRN  ipratropium-albuterol, 3 mL, Q6H PRN  pneumococcal 23-valent polysaccharide vaccine, 0 5 mL, Prior to discharge  trimethobenzamide, 200 mg, Q6H PRN      No Known Allergies    VTE Pharmacologic Prophylaxis:   Sequential compression device (Venodyne)     Labs:   Troponins:  Results from last 7 days   Lab Units 09/17/22  2314   HSTNI D2 ng/L <0     CBC with diff:  Results from last 7 days   Lab Units 09/21/22  0532 09/20/22  0515 09/19/22  0559 09/18/22  0613 09/17/22  2102   WBC Thousand/uL 7 50 8 73 8 59 6 60 7 02   HEMOGLOBIN g/dL 14 7 14 5 13 3 12 7 15 6   HEMATOCRIT % 44 6 44 3 40 4 37 7 48 4   MCV fL 93 94 94 92 95   PLATELETS Thousands/uL 291 275 237 237 284   MCH pg 30 8 30 7 30 9 31 0 30 6   MCHC g/dL 33 0 32 7 32 9 33 7 32 2   RDW % 14 0 13 9 14 4 14 4 14 6   MPV fL 8 8* 8 4* 8 3* 8 3* 8 6*   NRBC AUTO /100 WBCs 0 0 0 0 0     CMP:  Results from last 7 days   Lab Units 09/21/22  0532 09/20/22  0515 09/19/22  0559 09/18/22  6285 09/17/22 2102   SODIUM mmol/L 132* 131* 135 134* 144   POTASSIUM mmol/L 4 0 4 4 3 5 3 5 4 1   CHLORIDE mmol/L 98 98 100 99 101   CO2 mmol/L 26 26 29 26 24   ANION GAP mmol/L 8 7 6 9 19*   BUN mg/dL 12 9 8 10 13   CREATININE mg/dL 0 76 0 74 0 62 0 72 1 09   CALCIUM mg/dL 9 2 9 2 8 7 7 7* 8 8   AST U/L  --   --   --  26 38   ALT U/L  --   --   --  14 20   ALK PHOS U/L  --   --   --  52 67   TOTAL PROTEIN g/dL  --   --   --  6 1* 7 9   ALBUMIN g/dL  --   --   --  3 1* 4 2   TOTAL BILIRUBIN mg/dL  --   --   --  0 33 0 36   EGFR ml/min/1 73sq m 99 100 108 102 73     Magnesium:  Results from last 7 days   Lab Units 09/21/22  0532 09/19/22  0559 09/18/22  0613 09/17/22 2102   MAGNESIUM mg/dL 1 5* 1 5* 2 1 1 5*     Coags:  Results from last 7 days   Lab Units 09/17/22 2102   PTT seconds 23   INR  0 99       Imaging & Testing   I have personally reviewed pertinent reports  XR chest 1 view portable    Result Date: 9/18/2022  Narrative: CHEST INDICATION:   chest pain  COMPARISON:  CXR 8/10/2022 and chest CT 7/20/2022  EXAM PERFORMED/VIEWS:  XR CHEST PORTABLE FINDINGS: Normal heart size  Redemonstration of pulmonary artery enlargement  Median sternotomy  Life vest  Loop recorder in the left chest wall  The lungs are clear  No pneumothorax or pleural effusion  Osseous structures appear within normal limits for patient age  Impression: No acute cardiopulmonary disease  Redemonstration of pulmonary artery enlargement suggestive of pulmonary hypertension  Workstation performed: [id number]     CT head without contrast    Result Date: 9/17/2022  Narrative: CT BRAIN - WITHOUT CONTRAST INDICATION:   Head trauma, moderate-severe fall  COMPARISON:  CT brain dated August 11, 2022  TECHNIQUE:  CT examination of the brain was performed  In addition to axial images, sagittal and coronal 2D reformatted images were created and submitted for interpretation   Radiation dose length product (DLP) for this visit:  915 mGy-cm   This examination, like all CT scans performed in the [facility], was performed utilizing techniques to minimize radiation dose exposure, including the use of iterative reconstruction and automated exposure control  IMAGE QUALITY:  Diagnostic  FINDINGS: PARENCHYMA:  No intracranial mass, mass effect or midline shift  No CT signs of acute infarction  No acute parenchymal hemorrhage  There is mild periventricular white matter low attenuation which is nonspecific and most likely related to chronic small vessel ischemic changes  VENTRICLES AND EXTRA-AXIAL SPACES:  Normal for the patient's age  VISUALIZED ORBITS AND PARANASAL SINUSES:  Unremarkable  CALVARIUM AND EXTRACRANIAL SOFT TISSUES:  Normal      Impression: No acute intracranial abnormality  Workstation performed: [id number]     Echo follow up/limited w/ contrast if indicated    Result Date: 9/19/2022  Narrative: Brandon Fraser  Left Ventricle: Left ventricular cavity size is normal  Wall thickness is mildly increased  The left ventricular ejection fraction is 35%  Systolic function is severely reduced  There is severe global hypokinesis with regional variation  Diastolic function is mildly abnormal, consistent with grade I (abnormal) relaxation    Right Ventricle: Right ventricular cavity size is mildly dilated  Systolic function is low normal    Right Atrium: The atrium is mildly dilated  Compared to prior echo, EF mildly improved but remains globally severely depressed with regional variation- EF near 35%            Brenda Cleveland, [facility]   Cardiology      "This note was completed in part utilizing m-modal fluency direct voice recognition software  Grammatical errors, random word insertion, spelling mistakes, and incomplete sentences may be an occasional consequence of the system secondary to software limitations, ambient noise and hardware issues      Please read the chart carefully and recognize, using context, where substitutions have occurred    If you have any questions or concerns about the context, text or information contained within the body of this dictation, please contact myself, the provider, for further clarification "

## 2022-09-22 VITALS
OXYGEN SATURATION: 96 % | DIASTOLIC BLOOD PRESSURE: 69 MMHG | WEIGHT: 133.7 LBS | SYSTOLIC BLOOD PRESSURE: 105 MMHG | RESPIRATION RATE: 17 BRPM | HEART RATE: 97 BPM | HEIGHT: 66 IN | TEMPERATURE: 98.3 F | BODY MASS INDEX: 21.49 KG/M2

## 2022-09-22 PROBLEM — E87.29 METABOLIC ACIDOSIS, INCREASED ANION GAP: Status: RESOLVED | Noted: 2022-09-18 | Resolved: 2022-09-22
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
## 2022-09-22 PROBLEM — E87.2 METABOLIC ACIDOSIS, INCREASED ANION GAP: Status: RESOLVED | Noted: 2022-09-18 | Resolved: 2022-09-22

## 2022-09-22 PROBLEM — Z45.02 AICD DISCHARGE: Status: RESOLVED | Noted: 2022-08-10 | Resolved: 2022-09-22

## 2022-09-22 LAB
ANION GAP SERPL CALCULATED.3IONS-SCNC: 9 MMOL/L (ref 4–13)
BASOPHILS # BLD AUTO: 0.06 THOUSANDS/ΜL (ref 0–0.1)
BASOPHILS NFR BLD AUTO: 1 % (ref 0–1)
BUN SERPL-MCNC: 16 MG/DL (ref 5–25)
CALCIUM SERPL-MCNC: 9.1 MG/DL (ref 8.3–10.1)
CHLORIDE SERPL-SCNC: 99 MMOL/L (ref 96–108)
CO2 SERPL-SCNC: 26 MMOL/L (ref 21–32)
CREAT SERPL-MCNC: 0.71 MG/DL (ref 0.6–1.3)
EOSINOPHIL # BLD AUTO: 0.26 THOUSAND/ΜL (ref 0–0.61)
EOSINOPHIL NFR BLD AUTO: 4 % (ref 0–6)
ERYTHROCYTE [DISTWIDTH] IN BLOOD BY AUTOMATED COUNT: 14.1 % (ref 11.6–15.1)
GFR SERPL CREATININE-BSD FRML MDRD: 102 ML/MIN/1.73SQ M
GLUCOSE SERPL-MCNC: 104 MG/DL (ref 65–140)
HCT VFR BLD AUTO: 42.4 % (ref 36.5–49.3)
HGB BLD-MCNC: 14.2 G/DL (ref 12–17)
IMM GRANULOCYTES # BLD AUTO: 0.04 THOUSAND/UL (ref 0–0.2)
IMM GRANULOCYTES NFR BLD AUTO: 1 % (ref 0–2)
LYMPHOCYTES # BLD AUTO: 1.74 THOUSANDS/ΜL (ref 0.6–4.47)
LYMPHOCYTES NFR BLD AUTO: 25 % (ref 14–44)
MCH RBC QN AUTO: 31.1 PG (ref 26.8–34.3)
MCHC RBC AUTO-ENTMCNC: 33.5 G/DL (ref 31.4–37.4)
MCV RBC AUTO: 93 FL (ref 82–98)
MONOCYTES # BLD AUTO: 0.87 THOUSAND/ΜL (ref 0.17–1.22)
MONOCYTES NFR BLD AUTO: 13 % (ref 4–12)
NEUTROPHILS # BLD AUTO: 3.88 THOUSANDS/ΜL (ref 1.85–7.62)
NEUTS SEG NFR BLD AUTO: 56 % (ref 43–75)
NRBC BLD AUTO-RTO: 0 /100 WBCS
PLATELET # BLD AUTO: 261 THOUSANDS/UL (ref 149–390)
PMV BLD AUTO: 8.3 FL (ref 8.9–12.7)
POTASSIUM SERPL-SCNC: 4.1 MMOL/L (ref 3.5–5.3)
RBC # BLD AUTO: 4.57 MILLION/UL (ref 3.88–5.62)
SODIUM SERPL-SCNC: 134 MMOL/L (ref 135–147)
WBC # BLD AUTO: 6.85 THOUSAND/UL (ref 4.31–10.16)

## 2022-09-22 PROCEDURE — 85025 COMPLETE CBC W/AUTO DIFF WBC: CPT

## 2022-09-22 PROCEDURE — 80048 BASIC METABOLIC PNL TOTAL CA: CPT

## 2022-09-22 PROCEDURE — 99238 HOSP IP/OBS DSCHRG MGMT 30/<: CPT | Performed by: FAMILY MEDICINE

## 2022-09-22 RX ORDER — IPRATROPIUM BROMIDE AND ALBUTEROL SULFATE 2.5; .5 MG/3ML; MG/3ML
3 SOLUTION RESPIRATORY (INHALATION) EVERY 6 HOURS PRN
Qty: 120 ML | Refills: 0 | Status: SHIPPED | OUTPATIENT
Start: 2022-09-22 | End: 2022-10-22

## 2022-09-22 RX ADMIN — THIAMINE HCL TAB 100 MG 100 MG: 100 TAB at 09:50

## 2022-09-22 RX ADMIN — Medication 1 TABLET: at 09:50

## 2022-09-22 RX ADMIN — RIVAROXABAN 20 MG: 20 TABLET, FILM COATED ORAL at 09:50

## 2022-09-22 RX ADMIN — FOLIC ACID 1 MG: 1 TABLET ORAL at 09:50

## 2022-09-22 RX ADMIN — NICOTINE 1 PATCH: 21 PATCH, EXTENDED RELEASE TRANSDERMAL at 09:51

## 2022-09-22 RX ADMIN — PANTOPRAZOLE SODIUM 40 MG: 40 TABLET, DELAYED RELEASE ORAL at 05:35

## 2022-09-22 NOTE — UTILIZATION REVIEW
Initial Clinical Review    Admission: Date/Time/Statement:     OBSERVATION 9-17-22 CHANGED TO INPATIENT 9-18-22  FOR ETOH WITHDRAWAL AND CARDIAC MONITORING  Admission Orders (From admission, onward)     Ordered        09/18/22 1140  Inpatient Admission  Once            09/17/22 2251  Place in Observation  Once                      Orders Placed This Encounter   Procedures    Place in Observation     Standing Status:   Standing     Number of Occurrences:   1     Order Specific Question:   Level of Care     Answer:   Med Surg [16]    Inpatient Admission     Standing Status:   Standing     Number of Occurrences:   1     Order Specific Question:   Level of Care     Answer:   Med Surg [16]     Order Specific Question:   Estimated length of stay     Answer:   More than 2 Midnights     Order Specific Question:   Certification     Answer:   I certify that inpatient services are medically necessary for this patient for a duration of greater than two midnights  See H&P and MD Progress Notes for additional information about the patient's course of treatment  ED Arrival Information     Expected   -    Arrival   9/17/2022 20:43    Acuity   Urgent            Means of arrival   Ambulance    Escorted by   [address]    Admission type   Urgent            Arrival complaint   Fall           Chief Complaint   Patient presents with   [employer] AICD Problem     Patient comes via EMS after falling on sidewalk on his buttocks, is intoxicated and his external AICD shot off twice according to patient       Initial Presentation: 59 y o  male presents to ed from community via ems for evaluation and treatment of intoxication, possible injury from fall and AICD firing  Patient wearing his life vest  Clinical assessment significant for ETOH 345, MAG 1 5  Imaging without acute finding  Ekg sinus tach  Initially treated with iv mag x1  Family brought in battery    Admit to observation for AICD discharge  Date: 9-18-22   Day 2: observation to inpatient   Monitor on telemetry for potential arrhythmias  Start serial Ciwa assessments   consulted for [employer]  Cardiology consulted  Cannot rule out underlying flutter  supplement potassium to goal 4  He has had similar episodes when he is binge drinking  Resume home medications  CIWA rising today to 9 ( TREMOR, ANXIETY)   Treated with po ativan x1  Received im tigan x1 for nausea       ED Triage Vitals   09/17/22 2108 09/17/22 2051 09/17/22 2051 09/17/22 2051 09/17/22 2051   97 9 °F (36 6 °C) 104 16 122/69 95 %      Oral Monitor         No Pain          09/18/22 59 1 kg (130 lb 3 2 oz)     Additional Vital Signs:     Date/Time Temp Pulse Resp BP MAP (mmHg) SpO2 O2 Device   09/18/22 15:18:38 97 5 °F (36 4 °C) 88 19 122/72 89 94 % --   09/18/22 11:55:11 -- 95 -- 119/65 83 94 % --   09/18/22 1149 -- 105 -- -- -- -- --   09/18/22 0900 -- -- -- -- -- -- None (Room air)   09/18/22 07:32:58 99 4 °F (37 4 °C) 96 18 111/63 79 94 % --   09/18/22 04:05:10 99 °F (37 2 °C) 120 Abnormal  18 107/62 77 94 % --   09/18/22 0013 -- -- -- -- -- 96 % None (Room air)   09/17/22 23:55:45 97 9 °F (36 6 °C) 91 18 106/65 79 94 % --   09/17/22 2200 -- 99 16 111/68 84 94 % --   09/17/22 2145 -- 101 18 -- -- 92 % --   09/17/22 2108 97 9 °F (36 6 °C) -- -- -- -- -- --   09/17/22 2103 -- -- -- -- -- -- None (Room air)   09/17/22 2051 -- 104 16 122/69 -- 95 % None (Room air)         Pertinent Labs/Diagnostic Test Results:   Date/Time: 9/17/2022 8:54 PM      ECG reviewed by me, the ED Provider: yes    Patient location:  ED  Interpretation:     Interpretation: abnormal    Rate:     ECG rate:  108    ECG rate assessment: tachycardic    Rhythm:     Rhythm: sinus rhythm    Ectopy:     Ectopy: none    QRS:     QRS axis:  Left  Q waves:     Q waves:  II, aVF and III  Other findings:     Other findings: LAE        CT head without contrast   Final  (09/17 2207)      No acute intracranial abnormality  XR chest 1 view portable   Final  (09/18 1436)      No acute cardiopulmonary disease  Redemonstration of pulmonary artery enlargement suggestive of pulmonary hypertension                 Results from last 7 days   Lab Units 09/22/22  0512 09/21/22  0532 09/20/22  0515 09/19/22  0559 09/18/22  0613   WBC Thousand/uL 6 85 7 50 8 73 8 59 6 60   HEMOGLOBIN g/dL 14 2 14 7 14 5 13 3 12 7   HEMATOCRIT % 42 4 44 6 44 3 40 4 37 7   PLATELETS Thousands/uL 261 291 275 237 237   NEUTROS ABS Thousands/µL 3 88 4 35 5 59 5 62 3 65         Results from last 7 days   Lab Units 09/22/22  0512 09/21/22  0532 09/20/22  0515 09/19/22  0559 09/18/22  0613 09/17/22  2102   SODIUM mmol/L 134* 132* 131* 135 134* 144   POTASSIUM mmol/L 4 1 4 0 4 4 3 5 3 5 4 1   CHLORIDE mmol/L 99 98 98 100 99 101   CO2 mmol/L 26 26 26 29 26 24   ANION GAP mmol/L 9 8 7 6 9 19*   BUN mg/dL 16 12 9 8 10 13   CREATININE mg/dL 0 71 0 76 0 74 0 62 0 72 1 09   EGFR ml/min/1 73sq m 102 99 100 108 102 73   CALCIUM mg/dL 9 1 9 2 9 2 8 7 7 7* 8 8   MAGNESIUM mg/dL  --  1 5*  --  1 5* 2 1 1 5*   PHOSPHORUS mg/dL  --   --   --   --  3 1  --      Results from last 7 days   Lab Units 09/18/22  0613 09/17/22  2102   AST U/L 26 38   ALT U/L 14 20   ALK PHOS U/L 52 67   TOTAL PROTEIN g/dL 6 1* 7 9   ALBUMIN g/dL 3 1* 4 2   TOTAL BILIRUBIN mg/dL 0 33 0 36     Results from last 7 days   Lab Units 09/20/22  2200   POC GLUCOSE mg/dl 210*     Results from last 7 days   Lab Units 09/22/22  0512 09/21/22  0532 09/20/22  0515 09/19/22  0559 09/18/22  0613 09/17/22  2102   GLUCOSE RANDOM mg/dL 104 124 126 96 135 69       Results from last 7 days   Lab Units 09/17/22 2314 09/17/22 2102   HS TNI 0HR ng/L  --  2   HS TNI 2HR ng/L <2  --    HSTNI D2 ng/L <0  --          Results from last 7 days   Lab Units 09/17/22 2102   PROTIME seconds 13 2   INR  0 99   PTT seconds 23       Results from last 7 days   Lab Units 09/17/22 2102   ETHANOL LVL mg/dL 345*       ED Treatment:   Medication Administration from 09/17/2022 2043 to 09/17/2022 2324       Date/Time Order Dose Route Action     09/17/2022 3034 magnesium sulfate 2 g/50 mL IVPB (premix) 2 g 2 g Intravenous New Bag        Past Medical History:   Diagnosis Date    Alcohol abuse     1 pint of gin daily on weekends    Alcoholic hepatitis     Mild    Atrial flutter ([facility] ) 07/2015    Recurrent and symptomatic, also occurring on 1/7/2015    Cardiomyopathy, nonischemic ([facility] ) 01/07/2015    in setting of rapid atrial flutter    Congenital heart disease     Type unknown and not evident on echocardiography; "had a hole in heart that they closed up" as a teenager at [place] [address] (Updated 07/21/2022); also had unspecified open heart surgery as infant at [facility] in [address]   COPD (chronic obstructive pulmonary disease) (HCC)     Hypokalemia     Tobacco abuse 1976    One pack per day for 38 years     Present on Admission:   Essential hypertension   Hepatic steatosis   Paroxysmal atrial fibrillation ([facility] )   Alcohol dependence ([facility] )   Brain aneurysm      Admitting Diagnosis:     Hypomagnesemia [E83 42]  Alcohol intoxication ([facility] ) [F10 929]  Back pain [M54 9]  AICD discharge [Z45 02]  Fall, initial encounter [W19  XXXA]    Age/Sex: 59 y o  male    Scheduled Medications:  folic acid, 1 mg, Oral, Daily  losartan, 50 mg, Oral, Daily  metoprolol succinate, 50 mg, Oral, Daily  multivitamin-minerals, 1 tablet, Oral, Daily  nicotine, 1 patch, Transdermal, Daily  pantoprazole, 40 mg, Oral, Early Morning  rivaroxaban, 20 mg, Oral, Daily With Breakfast  thiamine, 100 mg, Oral, Daily      Continuous IV Infusions:     PRN Meds:  acetaminophen, 650 mg, Oral, Q6H PRN  ipratropium-albuterol, 3 mL, Nebulization, Q6H PRN  pneumococcal 23-valent polysaccharide vaccine, 0 5 mL, Subcutaneous, Prior to discharge  trimethobenzamide, 200 mg, Intramuscular, Q6H PRN        IP CONSULT TO CARDIOLOGY  IP CONSULT TO CASE MANAGEMENT    Network Utilization Review Department  ATTENTION: Please call with any questions or concerns to [phone] and carefully listen to the prompts so that you are directed to the right person  All voicemails are confidential   Joi Nixon all requests for admission clinical reviews, approved or denied determinations and any other requests to dedicated fax number below belonging to the campus where the patient is receiving treatment  List of dedicated fax numbers for the Facilities:  [address] DENIALS (Administrative/Medical Necessity) [phone]   [address] (Maternity/NICU/Pediatrics) [phone]   [address]  [address] Medical [place] [address] [address] [place] [address] [address] [facility] [phone]         Continued Stay Review    Date: 9-19-22                        Current Patient Class: inpatient  Current Level of Care: med surg    HPI:57 y o  male initially admitted on 5- 17-22    Assessment/Plan:   Cardiology assessment today finds patient with Mag 1 5 and K+ 3 5  Plan to replete with iv mag x1 and kdur 40 meq  Continue telemetry- maintaining sinus rhythm  Betablocker and xarelto ordered  Recheck Echo today to see if EF has improved from 25-30% in June 2022  Ciwa today is 1 ( tremor)        Vital Signs:     Date/Time Temp Pulse Resp BP MAP (mmHg) SpO2 O2 Device   09/19/22 07:40:16 98 6 °F (37 °C) 70 17 128/79 95 97 % None (Room air)   09/19/22 03:43:09 -- 72 16 128/79 95 97 % --             Pertinent Labs/Diagnostic Results:       Results from last 7 days   Lab Units 09/22/22  0512 09/21/22  0532 09/20/22  0515 09/19/22  0559 09/18/22  0613   WBC Thousand/uL 6 85 7 50 8 73 8 59 6 60   HEMOGLOBIN g/dL 14 2 14 7 14 5 13 3 12 7   HEMATOCRIT % 42 4 44 6 44 3 40 4 37 7   PLATELETS Thousands/uL 261 291 275 237 237   NEUTROS ABS Thousands/µL 3 88 4 35 5 59 5 62 3 65         Results from last 7 days   Lab Units 09/22/22  0512 09/21/22  0532 09/20/22  0515 09/19/22  0559 09/18/22  0613 09/17/22  2102   SODIUM mmol/L 134* 132* 131* 135 134* 144   POTASSIUM mmol/L 4 1 4 0 4 4 3 5 3 5 4 1   CHLORIDE mmol/L 99 98 98 100 99 101   CO2 mmol/L 26 26 26 29 26 24   ANION GAP mmol/L 9 8 7 6 9 19*   BUN mg/dL 16 12 9 8 10 13   CREATININE mg/dL 0 71 0 76 0 74 0 62 0 72 1 09   EGFR ml/min/1 73sq m 102 99 100 108 102 73   CALCIUM mg/dL 9 1 9 2 9 2 8 7 7 7* 8 8   MAGNESIUM mg/dL  --  1 5*  --  1 5* 2 1 1 5*   PHOSPHORUS mg/dL  --   --   --   --  3 1  --      Results from last 7 days   Lab Units 09/18/22  0613 09/17/22  2102   AST U/L 26 38   ALT U/L 14 20   ALK PHOS U/L 52 67   TOTAL PROTEIN g/dL 6 1* 7 9   ALBUMIN g/dL 3 1* 4 2   TOTAL BILIRUBIN mg/dL 0 33 0 36     Results from last 7 days   Lab Units 09/20/22  2200   POC GLUCOSE mg/dl 210*     Results from last 7 days   Lab Units 09/22/22  0512 09/21/22  0532 09/20/22  0515 09/19/22  0559 09/18/22  0613 09/17/22  2102   GLUCOSE RANDOM mg/dL 104 124 126 96 135 69       Results from last 7 days   Lab Units 09/17/22 2314 09/17/22 2102   HS TNI 0HR ng/L  --  2   HS TNI 2HR ng/L <2  --    HSTNI D2 ng/L <0  --          Results from last 7 days   Lab Units 09/17/22 2102   PROTIME seconds 13 2   INR  0 99   PTT seconds 23       Results from last 7 days   Lab Units 09/17/22 2102   ETHANOL LVL mg/dL 345*         Medications:   Scheduled Medications:  folic acid, 1 mg, Oral, Daily  losartan, 50 mg, Oral, Daily  metoprolol succinate, 50 mg, Oral, Daily  multivitamin-minerals, 1 tablet, Oral, Daily  nicotine, 1 patch, Transdermal, Daily  pantoprazole, 40 mg, Oral, Early Morning  rivaroxaban, 20 mg, Oral, Daily With Breakfast  thiamine, 100 mg, Oral, Daily      Continuous IV Infusions:     PRN Meds:  acetaminophen, 650 mg, Oral, Q6H PRN  ipratropium-albuterol, 3 mL, Nebulization, Q6H PRN  pneumococcal 23-valent polysaccharide vaccine, 0 5 mL, Subcutaneous, Prior to discharge  trimethobenzamide, 200 mg, Intramuscular, Q6H PRN        Discharge Plan: to be determined     Network Utilization Review Department  ATTENTION: Please call with any questions or concerns to [phone] and carefully listen to the prompts so that you are directed to the right person  All voicemails are confidential   Houston Tavarez all requests for admission clinical reviews, approved or denied determinations and any other requests to dedicated fax number below belonging to the campus where the patient is receiving treatment   List of dedicated fax numbers for the Facilities:  [address] DENIALS (Administrative/Medical Necessity) [phone]   [address] (Maternity/NICU/Pediatrics) [phone]   [address]  [address] Medical [place] [address] [address] [place] [address] Anna [address] [facility] [address] [facility] [phone]

## 2022-09-22 NOTE — PLAN OF CARE
Problem: MOBILITY - ADULT  Goal: Maintain or return to baseline ADL function  Description: INTERVENTIONS:  -  Assess patient's ability to carry out ADLs; assess patient's baseline for ADL function and identify physical deficits which impact ability to perform ADLs (bathing, care of mouth/teeth, toileting, grooming, dressing, etc )  - Assess/evaluate cause of self-care deficits   - Assess range of motion  - Assess patient's mobility; develop plan if impaired  - Assess patient's need for assistive devices and provide as appropriate  - Encourage maximum independence but intervene and supervise when necessary  - Involve family in performance of ADLs  - Assess for home care needs following discharge   - Consider OT consult to assist with ADL evaluation and planning for discharge  - Provide patient education as appropriate  Outcome: Progressing     Problem: CARDIOVASCULAR - ADULT  Goal: Maintains optimal cardiac output and hemodynamic stability  Description: INTERVENTIONS:  - Monitor I/O, vital signs and rhythm  - Monitor for S/S and trends of decreased cardiac output  - Administer and titrate ordered vasoactive medications to optimize hemodynamic stability  - Assess quality of pulses, skin color and temperature  - Assess for signs of decreased coronary artery perfusion  - Instruct patient to report change in severity of symptoms  Outcome: Progressing     Problem: METABOLIC, FLUID AND ELECTROLYTES - ADULT  Goal: Electrolytes maintained within normal limits  Description: INTERVENTIONS:  - Monitor labs and assess patient for signs and symptoms of electrolyte imbalances  - Administer electrolyte replacement as ordered  - Monitor response to electrolyte replacements, including repeat lab results as appropriate  - Instruct patient on fluid and nutrition as appropriate  Outcome: Progressing     Problem: MUSCULOSKELETAL - ADULT  Goal: Maintain or return mobility to safest level of function  Description: INTERVENTIONS:  - Assess patient's ability to carry out ADLs; assess patient's baseline for ADL function and identify physical deficits which impact ability to perform ADLs (bathing, care of mouth/teeth, toileting, grooming, dressing, etc )  - Assess/evaluate cause of self-care deficits   - Assess range of motion  - Assess patient's mobility  - Assess patient's need for assistive devices and provide as appropriate  - Encourage maximum independence but intervene and supervise when necessary  - Involve family in performance of ADLs  - Assess for home care needs following discharge   - Consider OT consult to assist with ADL evaluation and planning for discharge  - Provide patient education as appropriate  Outcome: Progressing

## 2022-09-22 NOTE — DISCHARGE INSTRUCTIONS
Alcohol Withdrawal   WHAT YOU NEED TO KNOW:   Alcohol withdrawal is a group of symptoms that occur when you drink alcohol daily and suddenly stop  It can begin within 5 hours of your last drink and get worse over 2 to 3 days  Withdrawal may also happen if you suddenly reduce the amount of alcohol that you normally drink  DISCHARGE INSTRUCTIONS:   Call your local emergency number (911 in the [address] [facility] [facility]) for any of the following: You have sudden chest pain or trouble breathing  You pass out or think you had a seizure  You feel like you want to harm yourself or others  Call your doctor if:   Your breathing or heartbeat is faster than usual     You are confused, hallucinating, or extremely agitated  You cannot stop vomiting, or you vomit blood  You are shaking and it does not get better after you take your medicine  You have questions or concerns about your condition or care  Medicines:   Medicines  may be given to calm you and help manage your symptoms  Vitamin supplements such as thiamine (vitamin B1) may be recommended  Take your medicine as directed  Contact your healthcare provider if you think your medicine is not helping or if you have side effects  Tell him of her if you are allergic to any medicine  Keep a list of the medicines, vitamins, and herbs you take  Include the amounts, and when and why you take them  Bring the list or the pill bottles to follow-up visits  Carry your medicine list with you in case of an emergency  Have someone stay with you during withdrawal:  This person should help you take your medicine and keep you in a calm, quiet environment  He or she should also watch your symptoms and know what to do if your symptoms get worse  Learn to stop drinking alcohol safely:  Work with your healthcare provider to develop a plan for you to stop drinking safely  A sudden stop or change can be life-threatening    For support and more information:   Alcoholics Anonymous  Web Address: http://www lezama info/    Substance Abuse and Sam [address] [place] [phone]  Web Address: https://Fitwall/    Follow up with your healthcare provider within 1 day:  Write down your questions so you remember to ask them during your visits  © Copyright [employer] 2022 Information is for End User's use only and may not be sold, redistributed or otherwise used for commercial purposes  All illustrations and images included in CareNotes® are the copyrighted property of A D A M , Inc  or [facility] Neftali Mckeon   The above information is an  only  It is not intended as medical advice for individual conditions or treatments  Talk to your doctor, nurse or pharmacist before following any medical regimen to see if it is safe and effective for you

## 2022-09-22 NOTE — CASE MANAGEMENT
Case Management Discharge Planning Note    Patient name Yong Swain  Location [facility] 36-* MRN [id number]  : 1965 Date 2022       Current Admission Date: 2022  Current Admission Diagnosis:AICD discharge   Patient Active Problem List    Diagnosis Date Noted    Metabolic acidosis, increased anion gap 2022    Combined systolic and diastolic cardiac dysfunction 2022    Essential hypertension 2022    Hypomagnesemia 2022    Alcohol abuse 2022    Screen for colon cancer 2022    Anticoagulant long-term use 2022    Poor dental hygiene 2022    Noncompliance 2022    Brain aneurysm 2022    COPD (chronic obstructive pulmonary disease) ([facility] ) 08/10/2022    AICD discharge 08/10/2022    Hepatic steatosis 2022    Alcohol dependence ([facility] )     Fall 2022    Paroxysmal atrial fibrillation ([facility] ) 2017    Tobacco abuse 2017    Atrial flutter ([facility] ) 06/10/2016    Cardiomyopathy, 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 06/10/2016      LOS (days): 4  Geometric Mean LOS (GMLOS) (days): 2 80  Days to GMLOS:-1 2     OBJECTIVE:  Risk of Unplanned Readmission Score: 25 2       Current admission status: Inpatient   Preferred Pharmacy:   [facility]/pharmacy #[id number] Flavio Cabello, [place] [place]  Phone: [phone] Fax: [phone]    Primary Care Provider: Madalyn Reddy DO    Primary Insurance: [facility] REP  Secondary Insurance: [address] [place] [address]    DISCHARGE DETAILS:    Discharge planning discussed with[de-identified] Patient      Were Treatment Team discharge recommendations reviewed with patient/caregiver?: Yes  Did patient/caregiver verbalize understanding of patient care needs?: Yes  Were patient/caregiver advised of the risks associated with not following Treatment Team discharge recommendations?: Yes    [facility]         Is the patient interested in [address] at discharge?: Yes (Referrals expanded in [address], at this time there are no accepting agencies)  [address] requested[de-identified] Nursing, Occupational Therapy, [address] Name[de-identified] Other  [facility] Provider[de-identified] PCP  Home Health Services Needed[de-identified] COPD Management, Heart Failure Management, Evaluate Functional Status and Safety, Strengthening/Theraputic Exercises to Improve Function, Gait/ADL Training  Homebound Criteria Met[de-identified] Uses an Assist Device (i e  cane, walker, etc)  Supporting Clincal Findings[de-identified] Limited Endurance, Fatigues Easliy in [employer]    DME Referral Provided  Referral made for DME?: No    Other Referral/Resources/Interventions Provided:  Interventions: C    Would you like to participate in our [address] service program?  : No - Declined    Treatment Team Recommendation: Home with [address]  Discharge Destination Plan[de-identified] Home[facility] (Patient stated that his sister is unable to pick him up from the hospital because she is working  Lyft waiver signed and SW will call for Lyft when patient is ready for discharge )      IMM Given (Date):: 09/22/22  IMM Given to[de-identified] Patient (IMM reviewed with and signed by patient  Patient is in agreement with discharge determination  Copy given to patient and copy placed in scan bin for chart )      SW was notified by attending that patient is medically cleared for discharge today  At this time there are no accepting [facility] agencies, referrals are pending  ANDREW spoke with patient at bedside and he is aware to call [facility] when he gets home for IOP program as instructed by LYNETTE  Patient confirmed that he has the number  Lyft waiver signed and in scan bin, ANDREW will call for Lyft when patient is ready for discharge  Patient requested 2 PM or later as there is currently no one home at his apartment to let him in

## 2022-09-22 NOTE — DISCHARGE SUMMARY
Discharge Summary - [facility] Medicine Residency     Patient Information: Nic Ayala 59 y o  male MRN: [id number]  Unit/Bed#: [address] [place] [address] Encounter: [id number]     Admitting Physician: Sammi Guadalupe DO  Discharging Physician/Practitioner: Sammi Guadalupe DO   PCP: Kali Driver DO  Discharge Date:       Reason for Admission: Hypomagnesemia [E83 42]  Alcohol intoxication ([address] ) [F10 929]  Back pain [M54 9]  AICD discharge [Z45 02]     Discharge Diagnoses:      Principal Problem:    AICD discharge  Active Problems:    Paroxysmal atrial fibrillation ([facility] [address] )    Fall    Alcohol dependence ([facility] [address] )    Hepatic steatosis    Brain aneurysm    Noncompliance    Essential hypertension    Combined systolic and diastolic cardiac dysfunction    Metabolic acidosis, increased anion gap  Resolved Problems:    * No resolved hospital problems  *       Consultations During Hospital Stay:  ·       Cardiology      Procedures Performed:   ·     none      Significant Findings / Test Results:   ·     9/20: Na 13, K 4 4  9/19:Na 135, Mg 1 5, K 3 5  9/18: Na 134  Ca 7 7,   9/17:  Ag 19, CR 1 09, CBC WNL mg 1 5,     CT head:  No acute abnormality  CXR: No acute cardiopulmonary dx  Echo EF 35%, reduced sys fxn, F8OG   Complications:   none     Test Results Pending at Discharge (will require follow up):   ·  none      Things to address at first visit after hospitalization   Patient requested Viagra refill, discussed with cardiology team who expressed that it is contraindicated given the condition of his heart  Do not refill Viagra  Did he establish care with the outpatient rehab alcohol cessation program?  Does he have follow-up with Cardiology?     Hospital Course:      Payal Willis is a 59 y o  male patient with significant history of recurrent atrial fibrillation, cardiomyopathy, chronic alcohol and tobacco use, CHF , multiple cardioversions in the past who originally presented to the hospital on 9/17/2022 after a fall on the sidewalk and LifeVest discharge  On admission, patient admitted to have been drinking  Patient and had similar 6 episodes in the past when he was bridged inch drinking  Patient's alcohol level was 345 and he was placed on CIWA protocol  Cardiology was consulted and his device was interrogated showing an episode of SVT  Echo was recheck on 09/19 and EF was estimated to be 35% with left ventricular systolic function severely reduced  He was advised by Cardiology to follow-up with advanced heart failure team for possible placement of ICD if patient is a candidate  Both primary team and Cardiology team discussed with patient the importance of alcohol cessation  Crisis was also consulted to help patient find inpatient alcohol rehab  Patient was agreeable to rehab if placement was found but insurance did not cover this  Patient was referred to outpatient alcohol rehab program      Discharge Day Visit / Exam:      Vitals: Blood Pressure: 132/76 (09/22/22 0328)  Pulse: 72 (09/22/22 0328)  Temperature: 98 2 °F (36 8 °C) (09/22/22 0328)  Temp Source: Oral (09/21/22 2226)  Respirations: 18 (09/22/22 0328)  Height: 5' 6" (167 6 cm) (09/19/22 1119)  Weight - Scale: 60 4 kg (133 lb 3 2 oz) (09/21/22 0536)  SpO2: 97 % (09/22/22 0328)  Exam:   Physical Exam  Constitutional:       Appearance: Normal appearance  HENT:      Head: Normocephalic  Mouth/Throat:      Mouth: Mucous membranes are moist    Eyes:      Extraocular Movements: Extraocular movements intact  Cardiovascular:      Rate and Rhythm: Normal rate and regular rhythm  Pulses: Normal pulses  Heart sounds: Normal heart sounds  Pulmonary:      Effort: Pulmonary effort is normal  No respiratory distress  Breath sounds: Normal breath sounds  No wheezing  Abdominal:      General: There is no distension  Palpations: Abdomen is soft  Tenderness: There is no abdominal tenderness  There is no guarding     Musculoskeletal: Right lower leg: No edema  Left lower leg: No edema  Skin:     General: Skin is warm and dry  Neurological:      Mental Status: He is alert  Psychiatric:         Mood and Affect: Mood normal          Condition at Discharge: stable        Discharge Medications:     Medication List      CONTINUE taking these medications     aspirin 81 mg EC tablet; Commonly known as: [address]; Take 1   tablet (81 mg total) by mouth daily   folic acid 1 mg tablet; Commonly known as: [place]; Take 1 tablet (1 mg   total) by mouth daily   ipratropium-albuterol 0 5-2 5 mg/3 mL nebulizer solution; Commonly known   as: DUO-NEB; Take 3 mL by nebulization every 6 (six) hours as needed for   wheezing or shortness of breath   levalbuterol 1 25 mg/0 5 mL nebulizer solution; Commonly known as:   XOPENEX; Take 0 5 mL (1 25 mg total) by nebulization every 8 (eight) hours   as needed for wheezing or shortness of breath   losartan 50 mg tablet; Commonly known as: COZAAR; Take 1 tablet (50 mg   total) by mouth daily   magnesium oxide 400 mg; Commonly known as: MAG-OX; Take 1 tablet (400 mg   total) by mouth 2 (two) times a day   methocarbamol 500 mg tablet; Commonly known as: ROBAXIN; Take 1 tablet   (500 mg total) by mouth every 6 (six) hours for 14 days   metoprolol succinate 50 mg 24 hr tablet; Commonly known as: TOPROL-XL; Take 1 tablet (50 mg total) by mouth daily   multivitamin capsule; Take 1 capsule by mouth daily   naltrexone 50 mg tablet; Commonly known as: REVIA; Take 1 tablet (50 mg   total) by mouth daily   nicotine 21 mg/24 hr TD 24 hr patch; Commonly known as: [place];   Place 1 patch on the skin daily   potassium chloride 20 mEq tablet; Commonly known as: K-DUR,KLOR-CON; Take 1 tablet (20 mEq total) by mouth daily   thiamine 100 MG tablet;  Take 1 tablet (100 mg total) by mouth daily   Xarelto 20 mg tablet; Generic drug: rivaroxaban; TAKE 1 TABLET BY MOUTH   DAILY WITH BREAKFAST     STOP taking these medications     bisacodyl 5 mg EC tablet; Commonly known as: DULCOLAX   gabapentin 300 mg capsule; Commonly known as: NEURONTIN   neomycin-bacitracin-polymyxin b ointment; Commonly known as: NEOSPORIN   polyethylene glycol 4000 mL solution; Commonly known as: GOLYTELY          Disposition:   Home     Discharge Statement:  I spent 30 minutes discharging the patient  This time was spent on the day of discharge  I had direct contact with the patient on the day of discharge  Greater than 50% of the total time was spent examining patient, answering all patient questions, arranging and discussing plan of care with patient as well as directly providing post-discharge instructions  Additional time then spent on discharge activities       ** Please Note: This note has been constructed using a voice recognition system **     Christine Terrell DO  09/22/22  5:43 AM

## 2022-10-03 ENCOUNTER — OFFICE VISIT (OUTPATIENT)
Dept: NEUROSURGERY | Facility: CLINIC | Age: 57
End: 2022-10-03
Payer: COMMERCIAL

## 2022-10-03 VITALS
BODY MASS INDEX: 22.4 KG/M2 | WEIGHT: 139.4 LBS | OXYGEN SATURATION: 100 % | HEART RATE: 77 BPM | HEIGHT: 66 IN | DIASTOLIC BLOOD PRESSURE: 68 MMHG | SYSTOLIC BLOOD PRESSURE: 100 MMHG

## 2022-10-03 DIAGNOSIS — I72.9 ANEURYSM (HCC): ICD-10-CM

## 2022-10-03 DIAGNOSIS — Z01.812 PRE-PROCEDURE LAB EXAM: Primary | ICD-10-CM

## 2022-10-03 PROCEDURE — 99203 OFFICE O/P NEW LOW 30 MIN: CPT | Performed by: PHYSICIAN ASSISTANT

## 2022-10-03 PROCEDURE — 99213 OFFICE O/P EST LOW 20 MIN: CPT | Performed by: PHYSICIAN ASSISTANT
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
## 2022-10-03 NOTE — ASSESSMENT & PLAN NOTE
· Pt with incidental right ICA 3 mm aneurysm in the clinoid region  Also 1 mm aneurysm projecting from the prominent right PComm infindibulum  · Aneurysm incidentally found during workup for syncope and collapse  · Pt with an AICD and follows with cardiology  · Imaging reviewed personally and by attending  Final results below discussed with the patient  · CTA head and neck w wo 8/10/22: 3 mm right clinoid segment aneurysm  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  Plan  Discussed with patient the natural history of aneurysms is related  to genetics, size, position, and complexity of aneurysm as well as smoking history, hypercholesterolemia and hypertension  With this in mind, modulating these risk factors as much as possible will affect the rupture rate  Patient is advised to follow-up with their Primary Care Provider for risk factor modification management  Recommend patient refrain from smoking and second-hand smoke exposure  Certain genetic conditions also are associated with the incidence and rupture rate of aneurysm  Patients family members should be screened by their Primary Care Providers with MRA head and neck  Recommended that patient discuss this information  with family members  Recommend that patient call 911 and present to Emergency Room if  they experience sudden severe  headache, seizure, mental status change, speech / vision change, sensory / motor change, or other neurological change  · Patient will have a follow-up appointment with neurosurgery in 6 months with repeat CTA head w wo for surveillance  · Discussed plan of care with patient who showed understanding  · Patient made aware to contact neurosurgery with any questions or concerns

## 2022-10-04 ENCOUNTER — HOSPITAL ENCOUNTER (OUTPATIENT)
Dept: NON INVASIVE DIAGNOSTICS | Facility: HOSPITAL | Age: 57
Discharge: HOME | End: 2022-10-04
Attending: INTERNAL MEDICINE
Payer: COMMERCIAL

## 2022-10-04 VITALS
DIASTOLIC BLOOD PRESSURE: 69 MMHG | WEIGHT: 133 LBS | SYSTOLIC BLOOD PRESSURE: 105 MMHG | HEIGHT: 66 IN | BODY MASS INDEX: 21.38 KG/M2 | HEART RATE: 100 BPM

## 2022-10-04 DIAGNOSIS — Z91.199 NONCOMPLIANCE: ICD-10-CM

## 2022-10-04 DIAGNOSIS — F10.10 ALCOHOL ABUSE: ICD-10-CM

## 2022-10-04 DIAGNOSIS — J93.9 PNEUMOTHORAX, UNSPECIFIED TYPE: ICD-10-CM

## 2022-10-04 DIAGNOSIS — J44.9 CHRONIC OBSTRUCTIVE PULMONARY DISEASE, UNSPECIFIED COPD TYPE (HCC): ICD-10-CM

## 2022-10-04 DIAGNOSIS — I42.9 CARDIOMYOPATHY, SECONDARY (HCC): ICD-10-CM

## 2022-10-04 DIAGNOSIS — I48.0 PAROXYSMAL ATRIAL FIBRILLATION (HCC): ICD-10-CM

## 2022-10-04 PROCEDURE — 93306 TTE W/DOPPLER COMPLETE: CPT

## 2022-10-05 ENCOUNTER — OFFICE VISIT (OUTPATIENT)
Dept: FAMILY MEDICINE CLINIC | Facility: CLINIC | Age: 57
End: 2022-10-05
Payer: COMMERCIAL

## 2022-10-05 ENCOUNTER — TELEPHONE (OUTPATIENT)
Dept: FAMILY MEDICINE CLINIC | Facility: CLINIC | Age: 57
End: 2022-10-05

## 2022-10-05 VITALS
SYSTOLIC BLOOD PRESSURE: 90 MMHG | TEMPERATURE: 96 F | HEART RATE: 72 BPM | OXYGEN SATURATION: 99 % | HEIGHT: 66 IN | RESPIRATION RATE: 16 BRPM | BODY MASS INDEX: 22.66 KG/M2 | DIASTOLIC BLOOD PRESSURE: 56 MMHG | WEIGHT: 141 LBS

## 2022-10-05 DIAGNOSIS — Z45.02 AICD DISCHARGE: ICD-10-CM

## 2022-10-05 DIAGNOSIS — F10.20 ALCOHOL DEPENDENCE (HCC): ICD-10-CM

## 2022-10-05 DIAGNOSIS — E87.8 ELECTROLYTE IMBALANCE: ICD-10-CM

## 2022-10-05 DIAGNOSIS — I48.0 PAROXYSMAL ATRIAL FIBRILLATION (HCC): ICD-10-CM

## 2022-10-05 DIAGNOSIS — I10 ESSENTIAL HYPERTENSION: ICD-10-CM

## 2022-10-05 DIAGNOSIS — Z72.0 TOBACCO ABUSE: ICD-10-CM

## 2022-10-05 DIAGNOSIS — F10.929 ALCOHOL INTOXICATION (HCC): ICD-10-CM

## 2022-10-05 DIAGNOSIS — Z23 ENCOUNTER FOR IMMUNIZATION: ICD-10-CM

## 2022-10-05 DIAGNOSIS — R06.2 WHEEZING: ICD-10-CM

## 2022-10-05 DIAGNOSIS — F17.200 NICOTINE USE DISORDER: ICD-10-CM

## 2022-10-05 DIAGNOSIS — Z59.41 FOOD INSECURITY: ICD-10-CM

## 2022-10-05 DIAGNOSIS — Z59.82 INABILITY TO ACQUIRE TRANSPORTATION: ICD-10-CM

## 2022-10-05 DIAGNOSIS — F10.21 ALCOHOL DEPENDENCE IN REMISSION (HCC): Primary | ICD-10-CM

## 2022-10-05 DIAGNOSIS — I42.9 CARDIOMYOPATHY, SECONDARY (HCC): ICD-10-CM

## 2022-10-05 DIAGNOSIS — Z59.9 FINANCIAL DIFFICULTIES: ICD-10-CM

## 2022-10-05 LAB
SL CV LV EF: 40
TRICUSPID ANNULAR PLANE SYSTOLIC EXCURSION: 1.4 CM

## 2022-10-05 PROCEDURE — 90682 RIV4 VACC RECOMBINANT DNA IM: CPT | Performed by: FAMILY MEDICINE

## 2022-10-05 PROCEDURE — 93306 TTE W/DOPPLER COMPLETE: CPT | Performed by: INTERNAL MEDICINE

## 2022-10-05 PROCEDURE — 99495 TRANSJ CARE MGMT MOD F2F 14D: CPT | Performed by: FAMILY MEDICINE

## 2022-10-05 PROCEDURE — 90677 PCV20 VACCINE IM: CPT | Performed by: FAMILY MEDICINE

## 2022-10-05 PROCEDURE — G0008 ADMIN INFLUENZA VIRUS VAC: HCPCS | Performed by: FAMILY MEDICINE

## 2022-10-05 PROCEDURE — G0009 ADMIN PNEUMOCOCCAL VACCINE: HCPCS | Performed by: FAMILY MEDICINE

## 2022-10-05 RX ORDER — POTASSIUM CHLORIDE 20 MEQ/1
20 TABLET, EXTENDED RELEASE ORAL DAILY
Qty: 90 TABLET | Refills: 0 | Status: SHIPPED | OUTPATIENT
Start: 2022-10-05 | End: 2023-01-03

## 2022-10-05 RX ORDER — LOSARTAN POTASSIUM 25 MG/1
25 TABLET ORAL DAILY
Qty: 90 TABLET | Refills: 0 | Status: SHIPPED | OUTPATIENT
Start: 2022-10-05 | End: 2023-01-03

## 2022-10-05 RX ORDER — NALTREXONE HYDROCHLORIDE 50 MG/1
50 TABLET, FILM COATED ORAL DAILY
Qty: 90 TABLET | Refills: 0 | Status: SHIPPED | OUTPATIENT
Start: 2022-10-05 | End: 2023-01-03

## 2022-10-05 RX ORDER — FOLIC ACID 1 MG/1
1 TABLET ORAL DAILY
Qty: 90 TABLET | Refills: 0 | Status: SHIPPED | OUTPATIENT
Start: 2022-10-05 | End: 2023-01-03

## 2022-10-05 RX ORDER — ASPIRIN 81 MG/1
81 TABLET ORAL DAILY
Qty: 90 TABLET | Refills: 1 | Status: SHIPPED | OUTPATIENT
Start: 2022-10-05

## 2022-10-05 RX ORDER — METOPROLOL SUCCINATE 50 MG/1
50 TABLET, EXTENDED RELEASE ORAL DAILY
Qty: 90 TABLET | Refills: 0 | Status: SHIPPED | OUTPATIENT
Start: 2022-10-05 | End: 2023-01-03

## 2022-10-05 RX ORDER — LEVALBUTEROL 1.25 MG/.5ML
1.25 SOLUTION, CONCENTRATE RESPIRATORY (INHALATION) EVERY 8 HOURS PRN
Qty: 30 EACH | Refills: 0 | Status: SHIPPED | OUTPATIENT
Start: 2022-10-05

## 2022-10-05 RX ORDER — NICOTINE 21 MG/24HR
1 PATCH, TRANSDERMAL 24 HOURS TRANSDERMAL DAILY
Qty: 90 PATCH | Refills: 0 | Status: SHIPPED | OUTPATIENT
Start: 2022-10-05 | End: 2023-01-03

## 2022-10-05 SDOH — ECONOMIC STABILITY - TRANSPORTATION SECURITY: TRANSPORTATION INSECURITY: Z59.82

## 2022-10-05 SDOH — ECONOMIC STABILITY - INCOME SECURITY: PROBLEM RELATED TO HOUSING AND ECONOMIC CIRCUMSTANCES, UNSPECIFIED: Z59.9

## 2022-10-05 SDOH — ECONOMIC STABILITY - FOOD INSECURITY: FOOD INSECURITY: Z59.41
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
## 2022-10-05 NOTE — TELEPHONE ENCOUNTER
----- Message from Angelia Patel MD sent at 10/5/2022  9:12 AM EDT -----  Hello,    Whenever somebody has a chance, can someone please call this patient to make sure he called the cardiologist's office to set up an appointment  If he did not, can someone please call the cardiologist's office and request a call him to set up an appointment?   Thank you

## 2022-10-05 NOTE — TELEPHONE ENCOUNTER
I did reach out to patient to remind him to call Cardiology to schedule an appointment, confirmed patient had correct contact number and location

## 2022-10-07 ENCOUNTER — PATIENT OUTREACH (OUTPATIENT)
Dept: FAMILY MEDICINE CLINIC | Facility: CLINIC | Age: 57
End: 2022-10-07

## 2022-10-07 NOTE — PROGRESS NOTES
JODY had received an in basket from [facility], LPN on 12/0 about this paitnet  JODY notes in basket read:    "Just want to give you a heads up that I put in a referral for this gentleman  Eri Blanco is trying to stay sober and also needs assistance for housing (currently lives with sister) and transportation  Eri Blanco is expressed a genuine desire to get his life together and really hopeful for some assistance   Particularly, he states he is 2 weeks sober and he would like to attend AA meetings but has no transport "      JODY responded back to Saadia's in basket yesterday  JODY thanked her for the information and that she would f/u  JODY had completed a chart review  Per chart, patient was in the ED on 91/7 due to fall and alcohol use  Per chart, patient was referred to OP rehab through [facility] program  Per chart, patient has Medicare as primary and [employer] as secondary  JODY notes patient can use [employer] through secondary insurance coverage if still active  JODY had called the patient via phone  JODY left the patient a voicemail with her contact information  JODY will attempt to call again at a later date and time  JODY will continue to be available

## 2022-10-10 ENCOUNTER — TELEPHONE (OUTPATIENT)
Dept: CARDIOLOGY CLINIC | Facility: CLINIC | Age: 57
End: 2022-10-10

## 2022-10-10 NOTE — TELEPHONE ENCOUNTER
----- Message from Ellis Cantrell MD sent at 10/7/2022 12:11 PM EDT -----  Patient's EF has improved to 40%  Based on the as he does not need defibrillator vest at this time  He should keep his appointment and he should also follow-up with electrophysiology

## 2022-10-10 NOTE — TELEPHONE ENCOUNTER
After Visit Summary   7/5/2017    Naga Barlow    MRN: [id number]           Patient Information     Date Of Birth          2013        Visit Information        Provider Department      7/5/2017 12:00 PM Rose Marie Bailey PA-C [facility]        Today's Diagnoses     Scabies    -  1      Care Instructions      Scabies  Scabies is a skin infection. It is caused by a tiny parasitic insect, or mite, that is too small to see directly. It can be seen under a microscope, but it is usually recognized only by the rash and symptoms it causes. This can make it hard to diagnose since the signs and symptoms can be similar to other diseases.  The scabies mite tunnels under the skin. It creates a small burrow, where it leaves its eggs. These eggs cordero and grow into adults. They then create new burrows over the next 1 to 2 weeks. The mites die in about 4 to 6 weeks. The rash and itching are caused by an allergic reaction to the scabies saliva or feces.  Scabies is highly contagious. It is spread by direct skin contact. It is easily spread by close personal contact, sexual contact, or by sharing bed linens or clothing used by an infected person.  It may take 4 to 6 weeks for symptoms to appear after being exposed. Everyone living in the house with you, as well as your sexual partners, should be treated at the same time. After the first treatment, you will no longer be contagious. You may return to work, school or .  Home care    Machine wash in hot water all sheets, towels, pillowcases, underwear, pajamas, and any other clothing you have worn lately. Use the hot cycle of a dryer or use a hot iron to sterilize.    Seal anything that is hard to wash in a plastic trash bag for 4 days. This includes coats, jackets, blankets, and bedspreads. (The insects die after 3 days off the human body.)  Medicines  Scabicides  Medicines used to treat scabies are called scabicides. These are creams  SW patient, made aware of results  that kill the scabies mites. A prescription is needed. When using these medicines:    Always follow instructions provided by your healthcare provider and pharmacist. Also follow the printed instructions that come with the medicine.    Talk with your provider about precautions to take when using these medicines.    Use the cream on your body when your skin is cool and dry. Don t use it after a hot shower or bath.    Usually the cream is put on your whole body. This means from your chin all the way down to your toes. Scabies does not usually affect an adult s head. So cream is not needed there. For children, discuss this with your child s provider.    Leave the cream or lotion on for the recommended amount of time. This is usually 8 to 12 hours.    Don t leave cream or lotion on your skin longer than directed. Don t use more than recommended.    Clean clothes should be worn after the treatment.    If you wash your hands after using the cream, you will need to reapply the cream to your hands.    If you are breastfeeding, wash off your nipples before feeding. Then reapply the cream after breastfeeding.    For babies or infants, put mittens on their hands. This will stop them from licking the cream or lotion. It will also stop them from scratching themselves because of the itching.  Other medicines    An oral medicine called ivermectin may be prescribed for severe cases. It may also be used if you can t apply creams.    Itching may cause the most discomfort. If large areas of your skin are affected, over-the-counter antihistamines may be used to reduce itching. Or you may be given a prescription antihistamine. Some of these medicines may make you sleepy. They are best used at bedtime. Antihistamines that don t make you sleepy can be used during the day. Note: Don t use medicine that has diphenhydramine if you have glaucoma, or if you are a man who has trouble urinating due to an enlarged prostate.    If you were given  antibiotics due to a bacterial infection, take them until they are finished. It is important to finish the antibiotics even if the wound looks better. This is to make sure the infection has cleared.  Follow-up care  Follow up with your healthcare provider, or as advised. Call your provider if your symptoms don t improve after 1 week, or if new burrows or rashes appear.  When to seek medical advice  Call your healthcare provider right away if any of these occur:    Yellow-brown crusts or drainage from the sores    Other signs of infection, including increasing redness, swelling, pain, or pus    Fever of 100.4 F (38 C) or higher, or as directed by your provider  Date Last Reviewed: 8/1/2016 2000-2017 [employer]. [address] [place] [address], [place] [address]. All rights reserved. This information is not intended as a substitute for professional medical care. Always follow your healthcare professional's instructions.                Follow-ups after your visit        Who to contact     If you have questions or need follow up information about today's clinic visit or your schedule please contact [facility] directly at [phone].  Normal or non-critical lab and imaging results will be communicated to you by MyChart, letter or phone within 4 business days after the clinic has received the results. If you do not hear from us within 7 days, please contact the clinic through [employer] or phone. If you have a critical or abnormal lab result, we will notify you by phone as soon as possible.  Submit refill requests through [employer] or call your pharmacy and they will forward the refill request to us. Please allow 3 business days for your refill to be completed.          Additional Information About Your Visit        ScriptRx Information     ScriptRx lets you send messages to your doctor, view your test results, renew your prescriptions, schedule appointments and more. To sign up, go to  [email or web address], contact your [place] clinic or call [phone] during business hours.            Care EveryWhere ID     This is your Care EveryWhere ID. This could be used by other organizations to access your [place] medical records  [id number]        Your Vitals Were     Pulse Temperature Height Pulse Oximetry BMI (Body Mass Index)       100 98.6  F (37  C) (Oral) 3' 6\" (1.067 m) 99% 16.34 kg/m2        Blood Pressure from Last 3 Encounters:   07/05/17 96/54   08/31/16 92/62   08/13/15 92/60    Weight from Last 3 Encounters:   07/05/17 41 lb (18.6 kg) (22 %)*   08/31/16 35 lb (15.9 kg) (9 %)*   08/13/15 32 lb (14.5 kg) (15 %)*     * Growth percentiles are based on [facility] 2-20 Years data.              Today, you had the following     No orders found for display         Today's Medication Changes          These changes are accurate as of: 7/5/17  1:05 PM.  If you have any questions, ask your nurse or doctor.               Start taking these medicines.        Dose/Directions    permethrin 5 % cream   Commonly known as:  ELIMITE   Used for:  Scabies   Started by:  Rose Marie Bailey PA-C        Apply cream from head to toe (except the face); leave on for 8-14 hours then wash off with water; reapply in 1 week if live mites appear.   Quantity:  60 g   Refills:  1            Where to get your medicines      These medications were sent to [place] [address] [place] [facility] [address] [facility]  [address] [facility] [address]     Phone:  [phone]     permethrin 5 % cream                Primary Care Provider Office Phone # Fax #    Alia Zuleta [phone]       [place] [id number] [address]        Equal Access to Services     CATALINA WRAY AH: Anupama xiao hadasho Soomaali, waaxda luqadaha, qaybta kaalmada chetan, gypsy ko. [facility] [phone].    ATENCIÓN: Si habla espsaritha, tiene a soria disposición " servicios gratuitos de asistencia lingüística. Veronica mota [phone].    We comply with applicable federal civil rights laws and [place] laws. We do not discriminate on the basis of race, color, national origin, age, disability sex, sexual orientation or gender identity.            Thank you!     Thank you for choosing [facility]  for your care. Our goal is always to provide you with excellent care. Hearing back from our patients is one way we can continue to improve our services. Please take a few minutes to complete the written survey that you may receive in the mail after your visit with us. Thank you!             Your Updated Medication List - Protect others around you: Learn how to safely use, store and throw away your medicines at www.disposemymeds.org.          This list is accurate as of: 7/5/17  1:05 PM.  Always use your most recent med list.                   Brand Name Dispense Instructions for use Diagnosis    NO ACTIVE MEDICATIONS           permethrin 5 % cream    ELIMITE    60 g    Apply cream from head to toe (except the face); leave on for 8-14 hours then wash off with water; reapply in 1 week if live mites appear.    Scabies

## 2022-10-11 ENCOUNTER — PATIENT OUTREACH (OUTPATIENT)
Dept: FAMILY MEDICINE CLINIC | Facility: CLINIC | Age: 57
End: 2022-10-11

## 2022-10-11 DIAGNOSIS — Z59.41 FOOD INSECURITY: ICD-10-CM

## 2022-10-11 DIAGNOSIS — Z59.819 HOUSING INSTABILITY: Primary | ICD-10-CM

## 2022-10-11 SDOH — ECONOMIC STABILITY - HOUSING INSECURITY: HOUSING INSTABILITY UNSPECIFIED: Z59.819

## 2022-10-11 SDOH — ECONOMIC STABILITY - FOOD INSECURITY: FOOD INSECURITY: Z59.41

## 2022-10-11 NOTE — PROGRESS NOTES
[facility] had called the patient via phone  [facility] had introduced herself and reason for consult  Fabian passed the phone to his sister, Charles Gamez  Charles Gamez mentioned the patient has a learning disability so he has issues with communication  Charles Gamez also mentioned patient cannot read or write  Charles Gamez stated it would be best to speak with her about him moving forward  Charles Gamez stated patient still lives with her  Charles Gamez stated patient has family and friend suport  Charles Gamez stated patient has income SSI about $80  Charles Gamez stated patient does not have SNAP but needs a new address to get it and cannot use her address  Charles Gamez stated patient is also  not legally   [facility] offered to have [facility] help with housing and SNAP if possible  Charles Gamez agreed to this help  Charles Gamez stated patient also has a friend who helps out with setting up transportation  [facility] informed Charles Gamez that patient has Medicaid so he has [employer] covered through the insurance  [facility] also informed Charles Gamez that patient can use [facility] if needed  Charles Gamez thanked [facility] for this information  Charles Gamez stated patient has been sober 3 weeks  Charles Gamez mentioned patient only has alcohol addiction  Charles Gamez mentioned patient does not have any MH issues  Charles Gamez stated patient has an appointment yesterday with OP rehab services through [facility] but missed appointment  Charles Gamez stated she called today to reschedule this appointment she is awaiting call back  Charles Gamez stated patient can be independent on his own but will have family member live with him to help out  Charles Gamez mentioned patient needs to be supervised  Charles Gamez mentioned patient needs help with finance and paperwork  [facility] suggested patient get group home  Charles Gamez denied this  [facility] then suggested patient get services through [employer] as they can get him a  who can arrange aides for the patient  Charles Gamez stated she would have family be an aide for him if anything   Charles Gamez took down information for [employer] and would discuss this further with the patient  Garry Lacy denied any other needs at this time  [facility] provided her contact information  ANDREW encouraged Garry Roots reach out if she has any other needs  Garry Lacy agreed to do so  Garry Lacy agreed to continued [employer]  ANDREW added patient to report under socially complex  SW had placed referral for [facility] had also sent an in basket to [facility] about this patient  [facility] will continue to f/u

## 2022-10-13 ENCOUNTER — PATIENT OUTREACH (OUTPATIENT)
Dept: CASE MANAGEMENT | Facility: HOSPITAL | Age: 57
End: 2022-10-13

## 2022-10-13 NOTE — PROGRESS NOTES
[facility] Outreached pt sister Olya Pena to introduce themselves  Upon reviewing the chart pt sister has taken the lead to get pt some assistance  [facility] was unable to speak with Olya Jean but left a detailed message informing them that I will be assisting with the application  [facility]OC made sure to include cell phone number in the message  [facility] has printed the two applications and will wait for a callback from Olya Pena  If not [facility] will f/u in a week

## 2022-10-14 ENCOUNTER — TELEPHONE (OUTPATIENT)
Dept: FAMILY MEDICINE CLINIC | Facility: CLINIC | Age: 57
End: 2022-10-14

## 2022-10-14 ENCOUNTER — PATIENT OUTREACH (OUTPATIENT)
Dept: CASE MANAGEMENT | Facility: HOSPITAL | Age: 57
End: 2022-10-14

## 2022-10-14 NOTE — TELEPHONE ENCOUNTER
Spoke with cardiology and patient had an appointment that he cancelled  Asked if they would call patient to reschedule appointment

## 2022-10-14 NOTE — PROGRESS NOTES
[facility] received incoming call from pt sister Justyna Hudson was returning my call  [facility] let pt know that they have printed out the application for snap and housing as well as copy of pt's id and insurance card  [facility] offered to meet with pt 10/19 to go apply and informed pt of what was needed in order to successfully complete the application       [facility] will confirm and f/u with Justyna Hudson 10/17

## 2022-10-14 NOTE — TELEPHONE ENCOUNTER
----- Message from Joaquina Rogers MD sent at 10/5/2022  9:12 AM EDT -----  Hello,    Whenever somebody has a chance, can someone please call this patient to make sure he called the cardiologist's office to set up an appointment  If he did not, can someone please call the cardiologist's office and request a call him to set up an appointment?   Thank you

## 2022-10-18 ENCOUNTER — PATIENT OUTREACH (OUTPATIENT)
Dept: CASE MANAGEMENT | Facility: HOSPITAL | Age: 57
End: 2022-10-18

## 2022-10-18 NOTE — PROGRESS NOTES
Estelle Wilson outreached pt's sister Garry Bambi and informed her of the following documents that are needed to be able to complete the application for housing as well as snap  Pt's sister agreed that they will gather those documents as its important for the pt to have  Estelle Wilson asked the pt if she can get the following documents by Friday and if so Estelle Wilson is willing to meet at [facility] to complete the application and submit it via mail  Pt's sister Garry Lacy agreed and Estelle Wilson will meet with them 10/21

## 2022-10-21 ENCOUNTER — PATIENT OUTREACH (OUTPATIENT)
Dept: CASE MANAGEMENT | Facility: HOSPITAL | Age: 57
End: 2022-10-21

## 2022-10-21 NOTE — PROGRESS NOTES
Pt's sister Tolubrandon Chiqui and informed [facility] that they will have to reschedule the visit to [facility] to complete the housing and food stamp application for the pt  Pt had a doctors appointment and 10 am and didn't remember  [facility] told Arvie Harada that she understands and that we can reschedule for another time next week  Arvie Harada will contact [facility] to pick another day that works for her as well as the pt

## 2022-10-24 ENCOUNTER — PATIENT OUTREACH (OUTPATIENT)
Dept: FAMILY MEDICINE CLINIC | Facility: CLINIC | Age: 57
End: 2022-10-24

## 2022-10-24 NOTE — PROGRESS NOTES
[facility] completed a chart review  Per chart, Eric Eid has been working with the patient and his sister, Moses Michaels for housing assistance and Jose Vivas  Per chart, Moses Brando was suppose to meet with [facility] on 10/21 but forgot and will call back to reschedule appointment  [facility] has been keeping [facility] update on this case and will continue to do so  [facility] will continue to f/u

## 2022-10-27 ENCOUNTER — PATIENT OUTREACH (OUTPATIENT)
Dept: CASE MANAGEMENT | Facility: HOSPITAL | Age: 57
End: 2022-10-27

## 2022-10-27 NOTE — PROGRESS NOTES
[facility] received an incoming call from pt's sister Su Campuzano to reschedule the in person visit to assist pt with housing and snap application  [facility] will meet with pt 77/0 to complete applications at [place]

## 2022-10-31 DIAGNOSIS — Z20.822 COVID-19 RULED OUT BY LABORATORY TESTING: Primary | ICD-10-CM

## 2022-11-02 ENCOUNTER — PATIENT OUTREACH (OUTPATIENT)
Dept: CASE MANAGEMENT | Facility: HOSPITAL | Age: 57
End: 2022-11-02

## 2022-11-02 NOTE — PROGRESS NOTES
[facility] met with the patient and his sister at the office today as discussed  Patient lives with sister so unable to use address for housing and Snap application  Patient’s sister lives in housing and does not want her address used  Patient’s sister has a po box for this patient and will provide it for application     [facility] completed snap application online and informed pt that they should be receiving a approval or denial letter in the mail within the next few weeks    [facility] will f/u with pt in two weeks to get an update

## 2022-11-07 ENCOUNTER — PATIENT OUTREACH (OUTPATIENT)
Dept: FAMILY MEDICINE CLINIC | Facility: CLINIC | Age: 57
End: 2022-11-07

## 2022-11-07 PROBLEM — Z12.11 SCREEN FOR COLON CANCER: Status: RESOLVED | Noted: 2022-09-08 | Resolved: 2022-11-07

## 2022-11-07 NOTE — PROGRESS NOTES
JODY had completed a chart review  Per chart, Lyn Cagle met with patient and his sister, Yanet Orantes on 11/2  Per chart, [facility] completed SNAP application  Per chart, housing application not completed yet until PO box is given for patient  Cristin Brooks has been keeping [facility] update on this case and will continue to do so  [facility] routed note to Cristin MORALES will continue to f/u

## 2022-11-16 ENCOUNTER — PATIENT OUTREACH (OUTPATIENT)
Dept: CASE MANAGEMENT | Facility: HOSPITAL | Age: 57
End: 2022-11-16

## 2022-11-16 NOTE — PROGRESS NOTES
[facility] called patient to get an update on if  patient received a letter in the mail stating if he was approved or denied for the snap benefits  [facility] was unable to reach client and the time of outreach  [facility] left a message letting the patient know to return their call  [facility] will outreach patient again to get an update       Next outreach is scheduled for 11/23

## 2022-11-21 ENCOUNTER — PATIENT OUTREACH (OUTPATIENT)
Dept: FAMILY MEDICINE CLINIC | Facility: CLINIC | Age: 57
End: 2022-11-21

## 2022-11-21 ENCOUNTER — PATIENT OUTREACH (OUTPATIENT)
Dept: CASE MANAGEMENT | Facility: HOSPITAL | Age: 57
End: 2022-11-21

## 2022-11-21 NOTE — PROGRESS NOTES
SW[facility] received an update from [facility] via in basket  Mode;yne awaiting on approval or denial for SNAP application  Per in basket, patient's sister, Marie Overton is to update her on this  Donnie Tapia has been keeping SW[facility] update on this case and will continue to do so  SWCM routed note to Donnie Tapia   SWCM will continue to f/u

## 2022-11-21 NOTE — PROGRESS NOTES
[facility] called patient's sister Roseline Fitzgerald after receiving a missed call on 11/20  Inge John for contacting [address] [place] [address] over the weekend and mentioned that she was trying to call for a cab  Patient  stated that she was preparing to go check her P O Box  She thinks she has received a letter from     [address] [place] [address] said to the patient's sister to contact her once patient's benefits have been approved  Patient expressed understanding and thanked [address] [place] [address] for the call       Next outreach is scheduled for 11/23

## 2022-11-23 ENCOUNTER — PATIENT OUTREACH (OUTPATIENT)
Dept: CASE MANAGEMENT | Facility: HOSPITAL | Age: 57
End: 2022-11-23

## 2022-11-23 NOTE — PROGRESS NOTES
Called patient to get an update on their approval or denial for the snap benefits and if they may have received an update from housing   Patient wasn't available at the time of outreach  Estelle Wilson left a voicemail requesting patient to call back      Tushar Fany Wilson will outreach again 11/28

## 2022-11-28 ENCOUNTER — PATIENT OUTREACH (OUTPATIENT)
Dept: CASE MANAGEMENT | Facility: HOSPITAL | Age: 57
End: 2022-11-28

## 2022-11-28 NOTE — PROGRESS NOTES
[facility]OC has outreached patient  Patient was unavailable  [facility] left a voicemail requesting a callback  Phone number and office hours have been provided  [facility] has scheduled next outreach for 11/30

## 2022-11-30 ENCOUNTER — PATIENT OUTREACH (OUTPATIENT)
Dept: CASE MANAGEMENT | Facility: HOSPITAL | Age: 57
End: 2022-11-30

## 2022-11-30 NOTE — PROGRESS NOTES
Called patient after receiving a missed called notification from patient after hours  Patient wasn't available at the time of outreach  [address] F[address] Wilson left a voicemail requesting call back  Office hours and phone number have been provided  Estelle Fany Wilson will follow up at a later date and time

## 2022-12-02 ENCOUNTER — PATIENT OUTREACH (OUTPATIENT)
Dept: CASE MANAGEMENT | Facility: HOSPITAL | Age: 57
End: 2022-12-02

## 2022-12-02 NOTE — LETTER
12/02/22    Dear Abimael Dempsey,    I am a Community Health Worker with [facility]  [address] [address]  I have made several attempts to call you by phone  It is important that you contact me back at [phone] so that I can assist with your care needs       Sincerely,     [facility] Worker

## 2022-12-02 NOTE — PROGRESS NOTES
Second attempt at outreach, left a voicemail requesting a callback  Phone number and office hours have been provided  Unable to reach letter has been sent  Will close in 2 weeks if no response

## 2022-12-06 ENCOUNTER — PATIENT OUTREACH (OUTPATIENT)
Dept: FAMILY MEDICINE CLINIC | Facility: CLINIC | Age: 57
End: 2022-12-06

## 2022-12-06 NOTE — PROGRESS NOTES
ANDREW had called the patient via phone  Per chart, Annetta Mondragon has tried to call the patient 3x and sent a letter due to no response  JODY left a voicemail  ANDREW will attempt to call again at the next scheduled outreach  [facility] routed note to [facility]  ANDREW will continue to f/u

## 2022-12-12 ENCOUNTER — PATIENT OUTREACH (OUTPATIENT)
Dept: CASE MANAGEMENT | Facility: HOSPITAL | Age: 57
End: 2022-12-12

## 2022-12-12 NOTE — PROGRESS NOTES
CMOC has outreached patient severak and hasn't been able to reach the patient  [facility] mailed an unable to reach letter two weeks jessicao and hasn't heard from the patient as of yet  [facility] will be closing patient , CMOC will reopen if patient outreaches

## 2022-12-21 ENCOUNTER — PATIENT OUTREACH (OUTPATIENT)
Dept: FAMILY MEDICINE CLINIC | Facility: CLINIC | Age: 57
End: 2022-12-21

## 2022-12-21 NOTE — PROGRESS NOTES
JODY had completed a chart review  Per chart, Isabel Gallo had closed the case due to lack of communication from this patient  JODY will also be closing the case as of today  Please reconsult for future needs

## 2022-12-23 ENCOUNTER — TELEPHONE (OUTPATIENT)
Dept: GASTROENTEROLOGY | Facility: AMBULARY SURGERY CENTER | Age: 57
End: 2022-12-23

## 2022-12-23 NOTE — TELEPHONE ENCOUNTER
Pt rescheduled to 2/10/2023 at [facility] for colonoscopy w/ dr Marjorie Pineda due to lack of transportation per pt

## 2022-12-31 DIAGNOSIS — I48.0 PAROXYSMAL ATRIAL FIBRILLATION (HCC): ICD-10-CM

## 2023-01-02 DIAGNOSIS — Z45.02 AICD DISCHARGE: ICD-10-CM

## 2023-01-02 DIAGNOSIS — E87.8 ELECTROLYTE IMBALANCE: ICD-10-CM

## 2023-01-02 DIAGNOSIS — I48.0 PAROXYSMAL ATRIAL FIBRILLATION (HCC): ICD-10-CM

## 2023-01-02 DIAGNOSIS — I42.9 CARDIOMYOPATHY, SECONDARY (HCC): ICD-10-CM

## 2023-01-03 RX ORDER — POTASSIUM CHLORIDE 1500 MG/1
TABLET, EXTENDED RELEASE ORAL
Qty: 90 TABLET | Refills: 0 | Status: SHIPPED | OUTPATIENT
Start: 2023-01-03

## 2023-01-03 RX ORDER — LOSARTAN POTASSIUM 25 MG/1
25 TABLET ORAL DAILY
Qty: 90 TABLET | Refills: 0 | Status: SHIPPED | OUTPATIENT
Start: 2023-01-03 | End: 2023-04-03

## 2023-01-03 RX ORDER — RIVAROXABAN 20 MG/1
TABLET, FILM COATED ORAL
Qty: 90 TABLET | Refills: 0 | Status: SHIPPED | OUTPATIENT
Start: 2023-01-03

## 2023-01-20 ENCOUNTER — TELEPHONE (OUTPATIENT)
Dept: FAMILY MEDICINE CLINIC | Facility: CLINIC | Age: 58
End: 2023-01-20

## 2023-01-20 NOTE — TELEPHONE ENCOUNTER
Fax received from [facility]  Copy of from scanned into encounter  Placed in white team folder at nurse station

## 2023-02-03 ENCOUNTER — TELEPHONE (OUTPATIENT)
Dept: GASTROENTEROLOGY | Facility: AMBULARY SURGERY CENTER | Age: 58
End: 2023-02-03

## 2023-02-09 ENCOUNTER — TELEPHONE (OUTPATIENT)
Dept: GASTROENTEROLOGY | Facility: AMBULARY SURGERY CENTER | Age: 58
End: 2023-02-09

## 2023-02-23 ENCOUNTER — APPOINTMENT (EMERGENCY)
Dept: RADIOLOGY | Facility: HOSPITAL | Age: 58
End: 2023-02-23

## 2023-02-23 ENCOUNTER — HOSPITAL ENCOUNTER (INPATIENT)
Facility: HOSPITAL | Age: 58
LOS: 3 days | Discharge: HOME | End: 2023-02-26
Attending: EMERGENCY MEDICINE | Admitting: FAMILY MEDICINE

## 2023-02-23 DIAGNOSIS — E87.29 ALCOHOLIC KETOACIDOSIS: Primary | ICD-10-CM

## 2023-02-23 DIAGNOSIS — F10.20 ALCOHOL DEPENDENCE (HCC): ICD-10-CM

## 2023-02-23 DIAGNOSIS — Z91.199 NONCOMPLIANCE: ICD-10-CM

## 2023-02-23 DIAGNOSIS — I48.0 PAROXYSMAL ATRIAL FIBRILLATION (HCC): ICD-10-CM

## 2023-02-23 DIAGNOSIS — I42.9 CARDIOMYOPATHY, SECONDARY (HCC): ICD-10-CM

## 2023-02-23 DIAGNOSIS — F10.10 ALCOHOL ABUSE: ICD-10-CM

## 2023-02-23 LAB
2HR DELTA HS TROPONIN: -1 NG/L
ALBUMIN SERPL BCP-MCNC: 4.3 G/DL (ref 3.5–5)
ALP SERPL-CCNC: 51 U/L (ref 34–104)
ALT SERPL W P-5'-P-CCNC: 17 U/L (ref 7–52)
AMMONIA PLAS-SCNC: 39 UMOL/L (ref 18–72)
AMPHETAMINES SERPL QL SCN: NEGATIVE
ANION GAP SERPL CALCULATED.3IONS-SCNC: 21 MMOL/L (ref 4–13)
AST SERPL W P-5'-P-CCNC: 68 U/L (ref 13–39)
BACTERIA UR QL AUTO: NORMAL /HPF
BARBITURATES UR QL: NEGATIVE
BASOPHILS # BLD AUTO: 0.07 THOUSANDS/ÂΜL (ref 0–0.1)
BASOPHILS NFR BLD AUTO: 1 % (ref 0–1)
BENZODIAZ UR QL: NEGATIVE
BETA-HYDROXYBUTYRATE: 2.5 MMOL/L
BILIRUB SERPL-MCNC: 0.51 MG/DL (ref 0.2–1)
BILIRUB UR QL STRIP: NEGATIVE
BUN SERPL-MCNC: 15 MG/DL (ref 5–25)
CALCIUM SERPL-MCNC: 8.9 MG/DL (ref 8.4–10.2)
CARDIAC TROPONIN I PNL SERPL HS: 4 NG/L
CARDIAC TROPONIN I PNL SERPL HS: 5 NG/L
CHLORIDE SERPL-SCNC: 98 MMOL/L (ref 96–108)
CLARITY UR: CLEAR
CO2 SERPL-SCNC: 19 MMOL/L (ref 21–32)
COCAINE UR QL: NEGATIVE
COLOR UR: ABNORMAL
CREAT SERPL-MCNC: 0.64 MG/DL (ref 0.6–1.3)
EOSINOPHIL # BLD AUTO: 0 THOUSAND/ÂΜL (ref 0–0.61)
EOSINOPHIL NFR BLD AUTO: 0 % (ref 0–6)
ERYTHROCYTE [DISTWIDTH] IN BLOOD BY AUTOMATED COUNT: 14.8 % (ref 11.6–15.1)
ETHANOL SERPL-MCNC: 353 MG/DL
FLUAV RNA RESP QL NAA+PROBE: NEGATIVE
FLUBV RNA RESP QL NAA+PROBE: NEGATIVE
GFR SERPL CREATININE-BSD FRML MDRD: 107 ML/MIN/1.73SQ M
GLUCOSE SERPL-MCNC: 143 MG/DL (ref 65–140)
GLUCOSE SERPL-MCNC: 187 MG/DL (ref 65–140)
GLUCOSE SERPL-MCNC: 52 MG/DL (ref 65–140)
GLUCOSE UR STRIP-MCNC: NEGATIVE MG/DL
HCT VFR BLD AUTO: 42.3 % (ref 36.5–49.3)
HGB BLD-MCNC: 13.5 G/DL (ref 12–17)
HGB UR QL STRIP.AUTO: ABNORMAL
IMM GRANULOCYTES # BLD AUTO: 0.03 THOUSAND/UL (ref 0–0.2)
IMM GRANULOCYTES NFR BLD AUTO: 1 % (ref 0–2)
KETONES UR STRIP-MCNC: ABNORMAL MG/DL
LEUKOCYTE ESTERASE UR QL STRIP: NEGATIVE
LYMPHOCYTES # BLD AUTO: 0.6 THOUSANDS/ÂΜL (ref 0.6–4.47)
LYMPHOCYTES NFR BLD AUTO: 9 % (ref 14–44)
MAGNESIUM SERPL-MCNC: 1.4 MG/DL (ref 1.9–2.7)
MCH RBC QN AUTO: 31.4 PG (ref 26.8–34.3)
MCHC RBC AUTO-ENTMCNC: 31.9 G/DL (ref 31.4–37.4)
MCV RBC AUTO: 98 FL (ref 82–98)
METHADONE UR QL: NEGATIVE
MONOCYTES # BLD AUTO: 0.27 THOUSAND/ÂΜL (ref 0.17–1.22)
MONOCYTES NFR BLD AUTO: 4 % (ref 4–12)
NEUTROPHILS # BLD AUTO: 5.63 THOUSANDS/ÂΜL (ref 1.85–7.62)
NEUTS SEG NFR BLD AUTO: 85 % (ref 43–75)
NITRITE UR QL STRIP: NEGATIVE
NON-SQ EPI CELLS URNS QL MICRO: NORMAL /HPF
NRBC BLD AUTO-RTO: 0 /100 WBCS
OPIATES UR QL SCN: NEGATIVE
OXYCODONE+OXYMORPHONE UR QL SCN: NEGATIVE
PCP UR QL: NEGATIVE
PH UR STRIP.AUTO: 5.5 [PH]
PHOSPHATE SERPL-MCNC: 5.9 MG/DL (ref 2.7–4.5)
PLATELET # BLD AUTO: 294 THOUSANDS/UL (ref 149–390)
PMV BLD AUTO: 8.6 FL (ref 8.9–12.7)
POTASSIUM SERPL-SCNC: 3.4 MMOL/L (ref 3.5–5.3)
PROT SERPL-MCNC: 7.1 G/DL (ref 6.4–8.4)
PROT UR STRIP-MCNC: NEGATIVE MG/DL
RBC # BLD AUTO: 4.3 MILLION/UL (ref 3.88–5.62)
RBC #/AREA URNS AUTO: NORMAL /HPF
RSV RNA RESP QL NAA+PROBE: NEGATIVE
SARS-COV-2 RNA RESP QL NAA+PROBE: NEGATIVE
SODIUM SERPL-SCNC: 138 MMOL/L (ref 135–147)
SP GR UR STRIP.AUTO: 1.01 (ref 1–1.03)
THC UR QL: NEGATIVE
TSH SERPL DL<=0.05 MIU/L-ACNC: 0.35 UIU/ML (ref 0.45–4.5)
UROBILINOGEN UR QL STRIP.AUTO: 0.2 E.U./DL
WBC # BLD AUTO: 6.6 THOUSAND/UL (ref 4.31–10.16)
WBC #/AREA URNS AUTO: NORMAL /HPF

## 2023-02-23 RX ORDER — ONDANSETRON 4 MG/1
4 TABLET, ORALLY DISINTEGRATING ORAL EVERY 6 HOURS PRN
Status: DISCONTINUED | OUTPATIENT
Start: 2023-02-23 | End: 2023-02-24

## 2023-02-23 RX ORDER — DEXTROSE AND SODIUM CHLORIDE 5; .9 G/100ML; G/100ML
125 INJECTION, SOLUTION INTRAVENOUS CONTINUOUS
Status: DISCONTINUED | OUTPATIENT
Start: 2023-02-23 | End: 2023-02-25

## 2023-02-23 RX ORDER — FOLIC ACID 1 MG/1
1 TABLET ORAL DAILY
Status: DISCONTINUED | OUTPATIENT
Start: 2023-02-24 | End: 2023-02-26 | Stop reason: HOSPADM

## 2023-02-23 RX ORDER — ONDANSETRON 4 MG/1
4 TABLET, ORALLY DISINTEGRATING ORAL ONCE
Status: COMPLETED | OUTPATIENT
Start: 2023-02-23 | End: 2023-02-23

## 2023-02-23 RX ORDER — LOSARTAN POTASSIUM 25 MG/1
25 TABLET ORAL DAILY
Status: DISCONTINUED | OUTPATIENT
Start: 2023-02-24 | End: 2023-02-26 | Stop reason: HOSPADM

## 2023-02-23 RX ORDER — POTASSIUM CHLORIDE 14.9 MG/ML
20 INJECTION INTRAVENOUS ONCE
Status: DISCONTINUED | OUTPATIENT
Start: 2023-02-23 | End: 2023-02-23

## 2023-02-23 RX ORDER — POTASSIUM CHLORIDE 20 MEQ/1
40 TABLET, EXTENDED RELEASE ORAL ONCE
Status: COMPLETED | OUTPATIENT
Start: 2023-02-23 | End: 2023-02-23

## 2023-02-23 RX ORDER — MAGNESIUM SULFATE HEPTAHYDRATE 40 MG/ML
2 INJECTION, SOLUTION INTRAVENOUS ONCE
Status: COMPLETED | OUTPATIENT
Start: 2023-02-23 | End: 2023-02-23

## 2023-02-23 RX ORDER — LEVALBUTEROL INHALATION SOLUTION 1.25 MG/3ML
1.25 SOLUTION RESPIRATORY (INHALATION) EVERY 8 HOURS PRN
Status: DISCONTINUED | OUTPATIENT
Start: 2023-02-23 | End: 2023-02-26 | Stop reason: HOSPADM

## 2023-02-23 RX ORDER — METOPROLOL SUCCINATE 50 MG/1
50 TABLET, EXTENDED RELEASE ORAL
Status: DISCONTINUED | OUTPATIENT
Start: 2023-02-23 | End: 2023-02-26 | Stop reason: HOSPADM

## 2023-02-23 RX ORDER — ACETAMINOPHEN 325 MG/1
650 TABLET ORAL EVERY 6 HOURS PRN
Status: DISCONTINUED | OUTPATIENT
Start: 2023-02-23 | End: 2023-02-26 | Stop reason: HOSPADM

## 2023-02-23 RX ORDER — DEXTROSE MONOHYDRATE 25 G/50ML
25 INJECTION, SOLUTION INTRAVENOUS ONCE
Status: COMPLETED | OUTPATIENT
Start: 2023-02-23 | End: 2023-02-23

## 2023-02-23 RX ORDER — ASPIRIN 81 MG/1
81 TABLET ORAL
Status: DISCONTINUED | OUTPATIENT
Start: 2023-02-23 | End: 2023-02-26 | Stop reason: HOSPADM

## 2023-02-23 RX ADMIN — THIAMINE HYDROCHLORIDE 100 MG: 100 INJECTION, SOLUTION INTRAMUSCULAR; INTRAVENOUS at 15:53

## 2023-02-23 RX ADMIN — POTASSIUM CHLORIDE 40 MEQ: 1500 TABLET, EXTENDED RELEASE ORAL at 19:21

## 2023-02-23 RX ADMIN — IOHEXOL 85 ML: 350 INJECTION, SOLUTION INTRAVENOUS at 16:34

## 2023-02-23 RX ADMIN — ONDANSETRON 4 MG: 4 TABLET, ORALLY DISINTEGRATING ORAL at 19:20

## 2023-02-23 RX ADMIN — SODIUM CHLORIDE 1000 ML: 0.9 INJECTION, SOLUTION INTRAVENOUS at 15:22

## 2023-02-23 RX ADMIN — DEXTROSE AND SODIUM CHLORIDE 125 ML/HR: 5; .9 INJECTION, SOLUTION INTRAVENOUS at 16:31

## 2023-02-23 RX ADMIN — MAGNESIUM SULFATE HEPTAHYDRATE 2 G: 40 INJECTION, SOLUTION INTRAVENOUS at 16:32

## 2023-02-23 RX ADMIN — DEXTROSE MONOHYDRATE 25 ML: 25 INJECTION, SOLUTION INTRAVENOUS at 16:32

## 2023-02-23 RX ADMIN — METOPROLOL SUCCINATE 50 MG: 50 TABLET, EXTENDED RELEASE ORAL at 21:39

## 2023-02-23 RX ADMIN — ASPIRIN 81 MG: 81 TABLET, COATED ORAL at 21:38
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
## 2023-02-23 NOTE — Clinical Note
Sonia Tavarez was seen and treated in our emergency department on 2/23/2023  Diagnosis:     Pj  may return to work on return date  He may return on this date: 02/25/2023         If you have any questions or concerns, please don't hesitate to call        Jb Frye RN    ______________________________           _______________          _______________  Hospital Representative                              Date                                Time

## 2023-02-24 PROBLEM — F10.91 ALCOHOL USE DISORDER IN REMISSION: Status: ACTIVE | Noted: 2022-04-16

## 2023-02-24 LAB
ALBUMIN SERPL BCP-MCNC: 3.6 G/DL (ref 3.5–5)
ALP SERPL-CCNC: 43 U/L (ref 34–104)
ALT SERPL W P-5'-P-CCNC: 12 U/L (ref 7–52)
AMPHETAMINES SERPL QL SCN: NEGATIVE
ANION GAP SERPL CALCULATED.3IONS-SCNC: 8 MMOL/L (ref 4–13)
AST SERPL W P-5'-P-CCNC: 30 U/L (ref 13–39)
BARBITURATES UR QL: NEGATIVE
BASOPHILS # BLD AUTO: 0.03 THOUSANDS/ÂΜL (ref 0–0.1)
BASOPHILS NFR BLD AUTO: 1 % (ref 0–1)
BENZODIAZ UR QL: NEGATIVE
BILIRUB SERPL-MCNC: 0.68 MG/DL (ref 0.2–1)
BUN SERPL-MCNC: 13 MG/DL (ref 5–25)
CALCIUM SERPL-MCNC: 8.3 MG/DL (ref 8.4–10.2)
CHLORIDE SERPL-SCNC: 99 MMOL/L (ref 96–108)
CO2 SERPL-SCNC: 27 MMOL/L (ref 21–32)
COCAINE UR QL: NEGATIVE
CREAT SERPL-MCNC: 0.71 MG/DL (ref 0.6–1.3)
EOSINOPHIL # BLD AUTO: 0.05 THOUSAND/ÂΜL (ref 0–0.61)
EOSINOPHIL NFR BLD AUTO: 1 % (ref 0–6)
ERYTHROCYTE [DISTWIDTH] IN BLOOD BY AUTOMATED COUNT: 14.6 % (ref 11.6–15.1)
GFR SERPL CREATININE-BSD FRML MDRD: 102 ML/MIN/1.73SQ M
GLUCOSE SERPL-MCNC: 105 MG/DL (ref 65–140)
GLUCOSE SERPL-MCNC: 107 MG/DL (ref 65–140)
GLUCOSE SERPL-MCNC: 111 MG/DL (ref 65–140)
GLUCOSE SERPL-MCNC: 117 MG/DL (ref 65–140)
GLUCOSE SERPL-MCNC: 122 MG/DL (ref 65–140)
GLUCOSE SERPL-MCNC: 123 MG/DL (ref 65–140)
HCT VFR BLD AUTO: 37.4 % (ref 36.5–49.3)
HGB BLD-MCNC: 12.3 G/DL (ref 12–17)
IMM GRANULOCYTES # BLD AUTO: 0.01 THOUSAND/UL (ref 0–0.2)
IMM GRANULOCYTES NFR BLD AUTO: 0 % (ref 0–2)
LYMPHOCYTES # BLD AUTO: 1.19 THOUSANDS/ÂΜL (ref 0.6–4.47)
LYMPHOCYTES NFR BLD AUTO: 21 % (ref 14–44)
MAGNESIUM SERPL-MCNC: 1.6 MG/DL (ref 1.9–2.7)
MCH RBC QN AUTO: 31.7 PG (ref 26.8–34.3)
MCHC RBC AUTO-ENTMCNC: 32.9 G/DL (ref 31.4–37.4)
MCV RBC AUTO: 96 FL (ref 82–98)
METHADONE UR QL: NEGATIVE
MONOCYTES # BLD AUTO: 0.61 THOUSAND/ÂΜL (ref 0.17–1.22)
MONOCYTES NFR BLD AUTO: 11 % (ref 4–12)
NEUTROPHILS # BLD AUTO: 3.86 THOUSANDS/ÂΜL (ref 1.85–7.62)
NEUTS SEG NFR BLD AUTO: 66 % (ref 43–75)
NRBC BLD AUTO-RTO: 0 /100 WBCS
OPIATES UR QL SCN: NEGATIVE
OXYCODONE+OXYMORPHONE UR QL SCN: NEGATIVE
PCP UR QL: NEGATIVE
PHOSPHATE SERPL-MCNC: 2.9 MG/DL (ref 2.7–4.5)
PLATELET # BLD AUTO: 271 THOUSANDS/UL (ref 149–390)
PMV BLD AUTO: 8.5 FL (ref 8.9–12.7)
POTASSIUM SERPL-SCNC: 3.7 MMOL/L (ref 3.5–5.3)
PROT SERPL-MCNC: 6 G/DL (ref 6.4–8.4)
RBC # BLD AUTO: 3.88 MILLION/UL (ref 3.88–5.62)
SODIUM SERPL-SCNC: 134 MMOL/L (ref 135–147)
THC UR QL: NEGATIVE
WBC # BLD AUTO: 5.75 THOUSAND/UL (ref 4.31–10.16)

## 2023-02-24 RX ORDER — METOCLOPRAMIDE HYDROCHLORIDE 5 MG/ML
10 INJECTION INTRAMUSCULAR; INTRAVENOUS ONCE
Status: COMPLETED | OUTPATIENT
Start: 2023-02-24 | End: 2023-02-24

## 2023-02-24 RX ORDER — METOCLOPRAMIDE HYDROCHLORIDE 5 MG/ML
10 INJECTION INTRAMUSCULAR; INTRAVENOUS EVERY 6 HOURS PRN
Status: DISCONTINUED | OUTPATIENT
Start: 2023-02-24 | End: 2023-02-26 | Stop reason: HOSPADM

## 2023-02-24 RX ADMIN — DEXTROSE AND SODIUM CHLORIDE 125 ML/HR: 5; .9 INJECTION, SOLUTION INTRAVENOUS at 22:24

## 2023-02-24 RX ADMIN — METOPROLOL SUCCINATE 50 MG: 50 TABLET, EXTENDED RELEASE ORAL at 21:01

## 2023-02-24 RX ADMIN — B-COMPLEX W/ C & FOLIC ACID TAB 1 TABLET: TAB at 08:41

## 2023-02-24 RX ADMIN — DEXTROSE AND SODIUM CHLORIDE 125 ML/HR: 5; .9 INJECTION, SOLUTION INTRAVENOUS at 14:22

## 2023-02-24 RX ADMIN — METOCLOPRAMIDE 10 MG: 5 INJECTION, SOLUTION INTRAMUSCULAR; INTRAVENOUS at 23:50

## 2023-02-24 RX ADMIN — LOSARTAN POTASSIUM 25 MG: 25 TABLET, FILM COATED ORAL at 08:41

## 2023-02-24 RX ADMIN — DEXTROSE AND SODIUM CHLORIDE 125 ML/HR: 5; .9 INJECTION, SOLUTION INTRAVENOUS at 04:00

## 2023-02-24 RX ADMIN — RIVAROXABAN 20 MG: 20 TABLET, FILM COATED ORAL at 08:41

## 2023-02-24 RX ADMIN — FOLIC ACID 1 MG: 1 TABLET ORAL at 08:41

## 2023-02-24 RX ADMIN — ASPIRIN 81 MG: 81 TABLET, COATED ORAL at 21:01

## 2023-02-24 RX ADMIN — METOCLOPRAMIDE 10 MG: 5 INJECTION, SOLUTION INTRAMUSCULAR; INTRAVENOUS at 08:41

## 2023-02-24 NOTE — RESPIRATORY THERAPY NOTE
COPD booklet given  Patient denies having any respiratory issues or having COPD  He does currently smoke cigarettes
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
## 2023-02-24 NOTE — PLAN OF CARE
Problem: Potential for Falls  Goal: Patient will remain free of falls  Description: INTERVENTIONS:  - Educate patient/family on patient safety including physical limitations  - Instruct patient to call for assistance with activity   - Consult OT/PT to assist with strengthening/mobility   - Keep Call bell within reach  - Keep bed low and locked with side rails adjusted as appropriate  - Keep care items and personal belongings within reach  - Initiate and maintain comfort rounds  - Make Fall Risk Sign visible to staff  - Offer Toileting every 2 Hours, in advance of need  - Initiate/Maintain bed alarm  - Obtain necessary fall risk management equipment:   - Apply yellow socks and bracelet for high fall risk patients  - Consider moving patient to room near nurses station  Outcome: Progressing     Problem: MOBILITY - ADULT  Goal: Maintains/Returns to pre admission functional level  Description: INTERVENTIONS:  - Perform BMAT or MOVE assessment daily    - Set and communicate daily mobility goal to care team and patient/family/caregiver  - Collaborate with rehabilitation services on mobility goals if consulted  - Perform Range of Motion 3 times a day  - Actively turns self     - Dangle patient 3 times a day  - Stand patient 3 times a day  - Ambulate patient 3 times a day  - Out of bed to chair 3 times a day   - Out of bed for meals 3 times a day  - Out of bed for toileting  - Record patient progress and toleration of activity level   Outcome: Progressing     Problem: PAIN - ADULT  Goal: Verbalizes/displays adequate comfort level or baseline comfort level  Description: Interventions:  - Encourage patient to monitor pain and request assistance  - Assess pain using appropriate pain scale  - Administer analgesics based on type and severity of pain and evaluate response  - Implement non-pharmacological measures as appropriate and evaluate response  - Consider cultural and social influences on pain and pain management  - Notify physician/advanced practitioner if interventions unsuccessful or patient reports new pain  Outcome: Progressing     Problem: SAFETY ADULT  Goal: Patient will remain free of falls  Description: INTERVENTIONS:  - Educate patient/family on patient safety including physical limitations  - Instruct patient to call for assistance with activity   - Consult OT/PT to assist with strengthening/mobility   - Keep Call bell within reach  - Keep bed low and locked with side rails adjusted as appropriate  - Keep care items and personal belongings within reach  - Initiate and maintain comfort rounds  - Make Fall Risk Sign visible to staff  - Offer Toileting every 2 Hours, in advance of need  - Initiate/Maintain  bed alarm  - Obtain necessary fall risk management equipment:   - Apply yellow socks and bracelet for high fall risk patients  - Consider moving patient to room near nurses station  Outcome: Progressing     Problem: SAFETY ADULT  Goal: Maintains/Returns to pre admission functional level  Description: INTERVENTIONS:  - Perform BMAT or MOVE assessment daily    - Set and communicate daily mobility goal to care team and patient/family/caregiver  - Collaborate with rehabilitation services on mobility goals if consulted  - Perform Range of Motion 3 times a day  - Actively turns self     - Dangle patient 3 times a day  - Stand patient 3 times a day  - Ambulate patient 3 times a day  - Out of bed to chair 3 times a day   - Out of bed for meals 3 times a day  - Out of bed for toileting  - Record patient progress and toleration of activity level   Outcome: Progressing     Problem: DISCHARGE PLANNING  Goal: Discharge to home or other facility with appropriate resources  Description: INTERVENTIONS:  - Identify barriers to discharge w/patient and caregiver  - Arrange for needed discharge resources and transportation as appropriate  - Identify discharge learning needs (meds, wound care, etc )  - Arrange for interpretive services to assist at discharge as needed  - Refer to Case Management Department for coordinating discharge planning if the patient needs post-hospital services based on physician/advanced practitioner order or complex needs related to functional status, cognitive ability, or social support system  Outcome: Progressing     Problem: Knowledge Deficit  Goal: Patient/family/caregiver demonstrates understanding of disease process, treatment plan, medications, and discharge instructions  Description: Complete learning assessment and assess knowledge base  Interventions:  - Provide teaching at level of understanding  - Provide teaching via preferred learning methods  Outcome: Progressing     Problem: NEUROSENSORY - ADULT  Goal: Achieves stable or improved neurological status  Description: INTERVENTIONS  - Monitor and report changes in neurological status  - Monitor vital signs such as temperature, blood pressure, glucose, and any other labs ordered   - Initiate measures to prevent increased intracranial pressure  - Monitor for seizure activity and implement precautions if appropriate      Outcome: Progressing     Problem: NEUROSENSORY - ADULT  Goal: Achieves maximal functionality and self care  Description: INTERVENTIONS  - Monitor swallowing and airway patency with patient fatigue and changes in neurological status  - Encourage and assist patient to increase activity and self care     - Encourage visually impaired, hearing impaired and aphasic patients to use assistive/communication devices  Outcome: Progressing     Problem: CARDIOVASCULAR - ADULT  Goal: Maintains optimal cardiac output and hemodynamic stability  Description: INTERVENTIONS:  - Monitor I/O, vital signs and rhythm  - Monitor for S/S and trends of decreased cardiac output  - Administer and titrate ordered vasoactive medications to optimize hemodynamic stability  - Assess quality of pulses, skin color and temperature  - Assess for signs of decreased coronary artery perfusion  - Instruct patient to report change in severity of symptoms  Outcome: Progressing     Problem: CARDIOVASCULAR - ADULT  Goal: Absence of cardiac dysrhythmias or at baseline rhythm  Description: INTERVENTIONS:  - Continuous cardiac monitoring, vital signs, obtain 12 lead EKG if ordered  - Administer antiarrhythmic and heart rate control medications as ordered  - Monitor electrolytes and administer replacement therapy as ordered  Outcome: Progressing     Problem: METABOLIC, FLUID AND ELECTROLYTES - ADULT  Goal: Electrolytes maintained within normal limits  Description: INTERVENTIONS:  - Monitor labs and assess patient for signs and symptoms of electrolyte imbalances  - Administer electrolyte replacement as ordered  - Monitor response to electrolyte replacements, including repeat lab results as appropriate  - Instruct patient on fluid and nutrition as appropriate  Outcome: Progressing     Problem: METABOLIC, FLUID AND ELECTROLYTES - ADULT  Goal: Fluid balance maintained  Description: INTERVENTIONS:  - Monitor labs   - Monitor I/O and WT  - Instruct patient on fluid and nutrition as appropriate  - Assess for signs & symptoms of volume excess or deficit  Outcome: Progressing

## 2023-02-24 NOTE — PROGRESS NOTES
Daily Progress Note - [address] [facility]  Family Medicine Residency  [address] 59 y o  male MRN: [id number]  Unit/Bed#: [address] [place] [address] Encounter: [id number]  Admitting Physician: Josué Gaines MD   PCP: Mariana Stewart MD  Date of Admission:  2/23/2023  2:31 PM    Assessment and Plan    Hypomagnesemia  Assessment & Plan  Acute, improving  POA, Likely secondary to alcohol intoxication/alcoholic ketoacidosis     · Replete as needed   · Monitor levels daily     Essential hypertension  Assessment & Plan  Chronic, uncontrolled  Secondary to patient noncompliance    · Restart home Losartan 25 mg and Metoprolol succinate 50 mg daily   · Monitor VS closely     Alcohol abuse  Assessment & Plan  Acutely intoxicated in the ED with elevated blood canal   Current everyday drinker of 3 fifths of gin daily  · Thiamine, Folic acid supplementation   · Dextrose 5% in  ml/hr   · Started after initial 100 mg dose of thiamine to decrease the risk of precipitating Wernicke's encephalopathy  · Closely monitor vitals   · Blood glucose checks Q6H  · CIWA protocol: Scores have remained 3-6 and have not required any medical intervention  · Fall, seizure, and aspiration precautions   · Telemetry   · Toxicology consulted: Patient does not require transfer to inpatient detox  Can monitor with CIWA if patient requires additional time in hospital   As he is not developing evidence of withdrawal at this point, not likely he will progress to any significant withdrawal     Noncompliance  Assessment & Plan  History of noncompliance with medications and life vest   Patient now has ICD  Reports has not been taking any of his oral medications at home  · Restart medications   · Case management consult     Paroxysmal atrial fibrillation [facility])  Assessment & Plan  Normal sinus rhythm on admission  Mildly tachycardic  No chest pain elicited on exam, murmur present    Patient noncompliant with atrial fibrillation medications  · Restart Xarelto 20 mg daily, Aspirin 81 mg daily, Metoprolol 50 mg daily, Losartan 25 mg daily   · On Telemetry   · Consider cardiology consult as patient stopped all medications which are now being restarted     Hypokalemia  Assessment & Plan  Acute, improving  POA, 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 intoxication/alcoholic ketoacidosis     · Replete as needed   · Monitor CMP   · Telemetry     * Alcoholic ketoacidosis  Assessment & Plan  Resolved  POA with elevated ethanol, AG, beta hydroxybutyrate, urine ketones  Patient was repleted in ED with IV hydration, electrolytes, vitamin supplementation  · Continue with thiamine supplementation daily   · Start folic acid supplementation daily   · Replete magnesium and potassium as needed  · Replete phosphorus levels as needed  · Continue Dextrose 5% and  mL/hr   · Continue Reglan as needed for nausea/vomiting   · Continuous O2 pulse ox monitoring   · Blood glucose checks Q12H  · Fall, seizure, and aspiration precautions   · CIWA protocol   · Telemetry   · Toxicology consulted: Patient's FANNIE is likely resolved based on ethanol metabolic rates and reassuring vital signs  VTE Pharmacologic Prophylaxis: VTE Score: 3 Moderate Risk (Score 3-4) - Pharmacological DVT Prophylaxis Ordered: rivaroxaban (Xarelto)  Patient Centered Rounds: I have performed bedside rounds with nursing staff today  Discussions with Specialists or Other Care Team Provider: Cardiology, Toxicology    Time Spent for Care: 30 minutes  More than 50% of total time spent on counseling and coordination of care as described above  Current Length of Stay: 1 day(s)    Current Patient Status: Inpatient   Certification Statement: The patient will continue to require additional inpatient hospital stay due to Alcohol withdrawl    Discharge Plan: Home    Code Status: Level 1 - Full Code    Subjective:   Patient was seen and examined at bedside today and on rounds    Patient noted that he did not feel as though he was actively withdrawing but feels many symptoms coming on  By rounds, patient was having multiple bowel movements, nausea, increased sweating and tremors  Patient was not anxious or having visual hallucinations  Patient noted that he would like to stop drinking and he would like to safely withdrawal from alcohol  Patient was asked if he would drink if he was discharged home today  Patient said that he was unsure but probably would  Review of Systems   Constitutional: Positive for diaphoresis  Negative for fever  HENT: Negative  Eyes: Negative  Negative for pain and visual disturbance  Respiratory: Negative for cough and shortness of breath  Cardiovascular: Negative for chest pain, palpitations and leg swelling  Gastrointestinal: Positive for diarrhea and nausea  Negative for abdominal pain, constipation and vomiting  Endocrine: Negative  Genitourinary: Negative  Allergic/Immunologic: Negative  Neurological: Positive for tremors  Hematological: Negative  Psychiatric/Behavioral: Negative  Negative for suicidal ideas  The patient is not nervous/anxious and is not hyperactive  All other systems reviewed and are negative  Objective     Objective:   Vitals:   Temp (24hrs), Av 2 °F (36 8 °C), Min:97 7 °F (36 5 °C), Max:98 7 °F (37 1 °C)    Temp:  [97 7 °F (36 5 °C)-98 7 °F (37 1 °C)] 98 7 °F (37 1 °C)  HR:  [] 86  Resp:  [12-18] 17  BP: (111-144)/() 126/81  SpO2:  [91 %-98 %] 96 %  Body mass index is 22 66 kg/m²  Input and Output Summary (last 24 hours): Intake/Output Summary (Last 24 hours) at 2023 1408  Last data filed at 2023 1300  Gross per 24 hour   Intake 3235 ml   Output 1700 ml   Net 1535 ml       Physical Exam:   Physical Exam  Vitals and nursing note reviewed  Constitutional:       Appearance: Normal appearance  HENT:      Head: Normocephalic and atraumatic     Cardiovascular:      Rate and Rhythm: Normal rate and regular rhythm  Pulses: Normal pulses  Heart sounds: Murmur heard  Pulmonary:      Effort: Pulmonary effort is normal       Breath sounds: Normal breath sounds  Abdominal:      General: Abdomen is flat  Bowel sounds are normal  There is no distension  Palpations: Abdomen is soft  Tenderness: There is no abdominal tenderness  Musculoskeletal:         General: No tenderness  Normal range of motion  Cervical back: Normal range of motion  Right lower leg: No edema  Left lower leg: No edema  Skin:     General: Skin is warm and dry  Neurological:      General: No focal deficit present  Mental Status: He is alert and oriented to person, place, and time  Motor: Tremor (hand) present  Psychiatric:         Mood and Affect: Mood normal          Behavior: Behavior normal        Additional Data:     Labs:  Results from last 7 days   Lab Units 02/24/23  0404   WBC Thousand/uL 5 75   HEMOGLOBIN g/dL 12 3   HEMATOCRIT % 37 4   PLATELETS Thousands/uL 271   NEUTROS PCT % 66   LYMPHS PCT % 21   MONOS PCT % 11   EOS PCT % 1     Results from last 7 days   Lab Units 02/24/23  0404   POTASSIUM mmol/L 3 7   CHLORIDE mmol/L 99   CO2 mmol/L 27   BUN mg/dL 13   CREATININE mg/dL 0 71   CALCIUM mg/dL 8 3*   ALK PHOS U/L 43   ALT U/L 12   AST U/L 30         Results from last 7 days   Lab Units 02/24/23  1108 02/24/23  0713 02/24/23  0558 02/23/23  2201 02/23/23  1741   POC GLUCOSE mg/dl 123 111 122 187* 143*           * I Have Reviewed All Lab Data Listed Above  * Additional Pertinent Lab Tests Reviewed:  All Labs Within Last 24 Hours Reviewed    Recent Cultures (last 7 days):           Last 24 Hours Medication List:   Current Facility-Administered Medications   Medication Dose Route Frequency Provider Last Rate   • acetaminophen  650 mg Oral Q6H PRN [place] Davon, DO     • aspirin  81 mg Oral HS Jessenia Simmons, DO     • dextrose 5 % and sodium chloride 0 9 %  125 mL/hr Intravenous Continuous Jessenia Simmons,  mL/hr (09/46/15 7292)   • folic acid  1 mg Oral Daily Jessenia Simmons, DO     • levalbuterol  1 25 mg Nebulization Q8H PRN Oralee Obinna, DO     • losartan  25 mg Oral Daily Jessenia Simmons, DO     • metoclopramide  10 mg Intravenous Q6H PRN Dav Abbott MD     • metoprolol succinate  50 mg Oral HS Jessenia Simmons, DO     • multivitamin stress formula  1 tablet Oral Daily Jessenia Simmons, DO     • rivaroxaban  20 mg Oral Daily With Breakfast Oralee Obinna, DO             ** Please Note: Dictation voice to text software may have been used in the creation of this document   **    Lakisha Aguilar MD  02/24/23  2:08 PM
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
## 2023-02-24 NOTE — ASSESSMENT & PLAN NOTE
Acute, improving    POA, Likely secondary to alcohol intoxication/alcoholic ketoacidosis     · Replete as needed   · Monitor levels daily

## 2023-02-24 NOTE — ASSESSMENT & PLAN NOTE
Chronic, uncontrolled    Secondary to patient noncompliance    · Continue Losartan 25 mg and Metoprolol succinate 50 mg daily   · Monitor VS closely

## 2023-02-24 NOTE — ASSESSMENT & PLAN NOTE
History of noncompliance with medications and life vest   Patient now has ICD  Reports has not been taking any of his oral medications at home       · Restart medications   · Case management consult

## 2023-02-24 NOTE — ASSESSMENT & PLAN NOTE
Acutely intoxicated in the ED with elevated blood canal   Current everyday drinker of 3 fifths of gin daily  Last drank 2/22  Has not required ativan per CIWA protocol    · Toxicology consult  · Thiamine, Folic acid supplementation   · Tolerating PO, will D/c IVF  May be contributing to ongoing hypokalemia and hypomagnesemia  · Continue CIWA protocol  · Fall, seizure, and aspiration precautions   · Telemetry   · Patient interested in alcohol rehab, discussed with case management  Appreciate assistance in planning rehab placement

## 2023-02-24 NOTE — UTILIZATION REVIEW
Initial Clinical Review    Admission: Date/Time/Statement:   Admission Orders (From admission, onward)     Ordered        02/23/23 1904  INPATIENT ADMISSION  Once                      Orders Placed This Encounter   Procedures   • INPATIENT ADMISSION     Standing Status:   Standing     Number of Occurrences:   1     Order Specific Question:   Level of Care     Answer:   Med Surg [16]     Order Specific Question:   Estimated length of stay     Answer:   More than 2 Midnights     Order Specific Question:   Certification     Answer:   I certify that inpatient services are medically necessary for this patient for a duration of greater than two midnights  See H&P and MD Progress Notes for additional information about the patient's course of treatment  ED Arrival Information     Expected   -    Arrival   2/23/2023 14:26    Acuity   Urgent            Means of arrival   Ambulance    Escorted by   [facility]    Admission type   Emergency            Arrival complaint   weakness           Chief Complaint   Patient presents with   • Dizziness     Pt    states he woke up feeling dizzy and was unable to walk  After that he drank 3 air plane bottles of vodka  He admits to drinking every day  Initial Presentation:  59 yom to ER from home via EMS c/o difficulty walking, weak, felt faint, headache  Hx alcoholism, afib on Xarelto, cardiomyopathy  Presents tachycardic with isolated motor weakness to the left leg, slightly slurred speech, GCS 15  Admission work-up showing ETOH 353, anion gap 21, elevated Beta-hydroxybutyrate, elevated ketones, hypomagnesemia, hypokalemia  Admitted to inpatient status for alcoholic ketoacidosis  Started on IVF, placed on CIWA protocol  Initial CIWA score: 3 for tremors & orientation/clouding of sensorium  Date: 2/24/23   Day 2:   AKA resolved  Oriented to person & place, not to time or situation   CIWA protocol continued with scores: 5-5-6 for tremors, sweats, orientation/clouding of sensorium  IV Reglan given for nausea, IVF maintained at this time  Per toxicology:   1  Alcohol Use Disorder  2  Alcoholic Ketoacidosis  AKA resolved  It is reasonable to monitor with CIWA if the patient requires additional time in the hospital  However, if he is not developing evidence of withdrawal at this point, it is not likely he will progress to any significant withdrawal  If evidence of withdrawal develops today that is not adequately controlled with benzos per CIWA, please reach back out to med tox for additional recs      ED Triage Vitals   Temperature Pulse Respirations Blood Pressure SpO2   02/23/23 1437 02/23/23 1437 02/23/23 1437 02/23/23 1437 02/23/23 1437   97 7 °F (36 5 °C) 96 18 111/62 97 %      Temp Source Heart Rate Source Patient Position - Orthostatic VS BP Location FiO2 (%)   02/23/23 2028 02/23/23 1445 02/23/23 1445 02/23/23 1445 --   Oral Monitor Lying Right arm       Pain Score       02/23/23 1437       No Pain          Wt Readings from Last 1 Encounters:   02/23/23 63 7 kg (140 lb 6 4 oz)     Additional Vital Signs:   02/24/23 07:32:56 98 7 °F (37 1 °C) 87 17 142/80 101 95 % None (Room air) --   02/24/23 05:33:22 -- 90 -- 128/75 93 94 % -- --   02/24/23 0445 -- 92 -- 139/88 105 95 % -- --   02/24/23 0345 -- 94 -- 139/90 106 95 % -- --   02/24/23 0230 -- 93 -- 126/75 92 95 % -- --   02/24/23 0130 -- 101 -- 126/71 89 93 % -- --   02/24/23 0030 -- 99 -- 113/63 80 93 % -- --   02/23/23 2331 -- 110 Abnormal  16 -- -- 96 % None (Room air) --   02/23/23 2330 -- 107 Abnormal  -- 119/68 85 96 % -- --   02/23/23 2230 -- 105 -- 124/64 84 92 % -- --   02/23/23 2130 -- 105 -- 135/113 Abnormal  120 94 % -- --   02/23/23 2040 -- 104 -- -- -- -- -- --   02/23/23 2030 98 2 °F (36 8 °C) -- -- -- -- -- -- --   02/23/23 20:28:15 98 2 °F (36 8 °C) 105 18 133/72 92 96 % None (Room air) --   02/23/23 2028 -- -- -- -- -- 96 % None (Room air) --   02/23/23 2015 -- 100 15 135/67 93 94 % None (Room air) Sitting   02/23/23 1945 -- 102 18 144/69 99 94 % None (Room air) Sitting   02/23/23 1915 -- -- 17 136/66 94 94 % None (Room air) Sitting   02/23/23 1845 -- 94 18 128/72 94 95 % None (Room air) Lying     Pertinent Labs/Diagnostic Test Results:   CTA head and neck with and without contrast   Final Result (02/23 1653)   No evidence of acute intracranial hemorrhage  No significant stenosis of the cervical carotid or vertebral arteries  Stable 3 mm right supraclinoid ICA segment aneurysm  Stable 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  2/23 Ekg=  Normal sinus rhythm   Left axis deviation   No acute ischemic ST or T wave changes   Abnormal EKG      Results from last 7 days   Lab Units 02/23/23  1525   SARS-COV-2  Negative     Results from last 7 days   Lab Units 02/24/23  0404 02/23/23  1525   WBC Thousand/uL 5 75 6 60   HEMOGLOBIN g/dL 12 3 13 5   HEMATOCRIT % 37 4 42 3   PLATELETS Thousands/uL 271 294   NEUTROS ABS Thousands/µL 3 86 5 63     Results from last 7 days   Lab Units 02/24/23  0404 02/23/23  1525   SODIUM mmol/L 134* 138   POTASSIUM mmol/L 3 7 3 4*   CHLORIDE mmol/L 99 98   CO2 mmol/L 27 19*   ANION GAP mmol/L 8 21*   BUN mg/dL 13 15   CREATININE mg/dL 0 71 0 64   EGFR ml/min/1 73sq m 102 107   CALCIUM mg/dL 8 3* 8 9   MAGNESIUM mg/dL 1 6* 1 4*   PHOSPHORUS mg/dL 2 9 5 9*     Results from last 7 days   Lab Units 02/24/23  0404 02/23/23  1525   AST U/L 30 68*   ALT U/L 12 17   ALK PHOS U/L 43 51   TOTAL PROTEIN g/dL 6 0* 7 1   ALBUMIN g/dL 3 6 4 3   TOTAL BILIRUBIN mg/dL 0 68 0 51   AMMONIA umol/L  --  39     Results from last 7 days   Lab Units 02/24/23  0713 02/24/23  0558 02/23/23  2201 02/23/23  1741   POC GLUCOSE mg/dl 111 122 187* 143*     Results from last 7 days   Lab Units 02/24/23  0404 02/23/23  1525   GLUCOSE RANDOM mg/dL 117 52*     BETA-HYDROXYBUTYRATE   Date Value Ref Range Status   02/23/2023 2 5 (H) <0 6 mmol/L Final      Results from last 7 days   Lab Units 02/23/23  1744 02/23/23  1525   HS TNI 0HR ng/L  --  5   HS TNI 2HR ng/L 4  --    HSTNI D2 ng/L -1  --      Results from last 7 days   Lab Units 02/23/23  1525   TSH 3RD GENERATON uIU/mL 0 355*     Results from last 7 days   Lab Units 02/23/23  1831   CLARITY UA  Clear   COLOR UA  Light Yellow   SPEC GRAV UA  1 015   PH UA  5 5   GLUCOSE UA mg/dl Negative   KETONES UA mg/dl 15 (1+)*   BLOOD UA  Trace-Intact*   PROTEIN UA mg/dl Negative   NITRITE UA  Negative   BILIRUBIN UA  Negative   UROBILINOGEN UA E U /dl 0 2   LEUKOCYTES UA  Negative   WBC UA /hpf 0-1   RBC UA /hpf None Seen   BACTERIA UA /hpf None Seen   EPITHELIAL CELLS WET PREP /hpf Occasional     Results from last 7 days   Lab Units 02/23/23  1525   INFLUENZA A PCR  Negative   INFLUENZA B PCR  Negative   RSV PCR  Negative     Results from last 7 days   Lab Units 02/23/23  1831   AMPH/METH  Negative   BARBITURATE UR  Negative   BENZODIAZEPINE UR  Negative   COCAINE UR  Negative   METHADONE URINE  Negative   OPIATE UR  Negative   PCP UR  Negative   THC UR  Negative     Results from last 7 days   Lab Units 02/23/23  1525   ETHANOL LVL mg/dL 353*     ED Treatment:   Medication Administration from 02/23/2023 1426 to 02/23/2023 2023       Date/Time Order Dose Route Action     02/23/2023 1553 EST thiamine (VITAMIN B1) 100 mg in sodium chloride 0 9 % 50 mL IVPB 100 mg Intravenous New Bag     02/23/2023 1522 EST sodium chloride 0 9 % bolus 1,000 mL 1,000 mL Intravenous New Bag     02/23/2023 1632 EST magnesium sulfate 2 g/50 mL IVPB (premix) 2 g 2 g Intravenous New Bag     02/23/2023 1631 EST dextrose 5 % and sodium chloride 0 9 % infusion 125 mL/hr Intravenous New Bag     02/23/2023 1632 EST dextrose 50 % IV solution 25 mL 25 mL Intravenous Given     02/23/2023 1634 EST iohexol (OMNIPAQUE) 350 MG/ML injection (SINGLE-DOSE) 85 mL 85 mL Intravenous Given     02/23/2023 1920 EST ondansetron (ZOFRAN-ODT) dispersible tablet 4 mg 4 mg Oral Given 02/23/2023 1921 EST potassium chloride (K-DUR,KLOR-CON) CR tablet 40 mEq 40 mEq Oral Given        Past Medical History:   Diagnosis Date   • Alcohol abuse     1 pint of gin daily on weekends   • Alcoholic hepatitis     Mild   • Aneurysm ([facility] )     clinoid region   • Atrial fibrillation ([place] [address] )    • Atrial flutter ([place] [address] ) 07/2015    Recurrent and symptomatic, also occurring on 1/7/2015   • Cardiomyopathy, nonischemic ([place] [address] ) 01/07/2015    in setting of rapid atrial flutter   • Congenital heart disease     Type unknown and not evident on echocardiography; "had a hole in heart that they closed up" as a teenager at [place] [address] (Updated 07/21/2022); also had unspecified open heart surgery as infant at [facility] in [address] [facility]     • COPD (chronic obstructive pulmonary disease) ([place] [address] )    • Fall 09/17/2022    [facility] discharged- alcohol consumption noted in ED   • Hypertension    • Hypokalemia    • Poor historian    • Tobacco abuse 1976    One pack per day for 38 years     Present on Admission:  • Paroxysmal atrial fibrillation ([place] [address] )  • Alcohol intoxication ([place] [address] )  • Essential hypertension  • Hypomagnesemia    Admitting Diagnosis: Alcoholic ketoacidosis [T68 94]  Dizziness [R42]  Age/Sex: 59 y o  male  Admission Orders:  CIWA protocol  Cont pulse ox  Telemetry  Scd/foot pumps  accuchecks  Aspiration precautions  Seizure precautions    Scheduled Medications:  aspirin, 81 mg, Oral, HS  folic acid, 1 mg, Oral, Daily  losartan, 25 mg, Oral, Daily  metoclopramide, 10 mg, Intravenous, Once  metoprolol succinate, 50 mg, Oral, HS  multivitamin stress formula, 1 tablet, Oral, Daily  rivaroxaban, 20 mg, Oral, Daily With Breakfast    Continuous IV Infusions:  dextrose 5 % and sodium chloride 0 9 %, 125 mL/hr, Intravenous, Continuous    PRN Meds:  acetaminophen, 650 mg, Oral, Q6H PRN  levalbuterol, 1 25 mg, Nebulization, Q8H PRN  metoclopramide, 10 mg, Intravenous, Q6H PRN    Network Utilization Review Department  ATTENTION: Please call with any questions or concerns to [phone] and carefully listen to the prompts so that you are directed to the right person  All voicemails are confidential   Shay Metcalf all requests for admission clinical reviews, approved or denied determinations and any other requests to dedicated fax number below belonging to the campus where the patient is receiving treatment   List of dedicated fax numbers for the Facilities:  [address] DENIALS (Administrative/Medical Necessity) [phone]   [address] (Maternity/NICU/Pediatrics) [phone]   910 Rukhsana Balderrama [phone]   [facility] [phone]   [address] [address] Juarez Deondre [facility] 28 [phone]   [facility] 134 815 [facility] [phone]

## 2023-02-24 NOTE — ASSESSMENT & PLAN NOTE
Normal sinus rhythm on admission  Mildly tachycardic  No chest pain elicited on exam, murmur present  Patient noncompliant with atrial fibrillation medications      · Continue Xarelto 20 mg daily, Aspirin 81 mg daily, Metoprolol 50 mg daily, Losartan 25 mg daily   · Continue Telemetry

## 2023-02-24 NOTE — RESPIRATORY THERAPY NOTE
RT Protocol Note  Nic Ayala 59 y o  male MRN: [id number]  Unit/Bed#: [address] [place] [address] Encounter: [id number]    Assessment    Principal Problem:    Alcoholic ketoacidosis  Active Problems:    Hypokalemia    Paroxysmal atrial fibrillation (HCC)    Alcohol intoxication ([facility] )    Noncompliance    Essential hypertension    Hypomagnesemia      Home Pulmonary Medications:  none       Past Medical History:   Diagnosis Date   • Alcohol abuse     1 pint of gin daily on weekends   • Alcoholic hepatitis     Mild   • [facility] )     clinoid region   • Atrial fibrillation ([facility] )    • Atrial flutter ([facility] ) 2015    Recurrent and symptomatic, also occurring on 2015   • Cardiomyopathy, nonischemic ([place] [address] ) 2015    in setting of rapid atrial flutter   • Congenital heart disease     Type unknown and not evident on echocardiography; "had a hole in heart that they closed up" as a teenager at [place] [address] (Updated 2022); also had unspecified open heart surgery as infant at [facility] in [address]  • COPD (chronic obstructive pulmonary disease) ([facility] )    • Fall 2022    [facility] discharged- alcohol consumption noted in ED   • Hypertension    • Hypokalemia    • Poor historian    • Tobacco abuse 1976    One pack per day for 38 years     Social History     Socioeconomic History   • Marital status: /Civil Union     Spouse name: None   • Number of children: 3   • Years of education: None   • Highest education level: None   Occupational History   • None   Tobacco Use   • Smoking status: Former     Packs/day: 1 00     Types: Cigarettes     Start date: 10     Quit date: 2022     Years since quittin 4   • Smokeless tobacco: Never   • Tobacco comments:     Began smoking at age 13  Averaging 1 ppd  Few 2-3 months periods with complete cessation (Updated 2022)     Vaping Use   • Vaping Use: Never used   Substance and Sexual Activity   • Alcohol use: Yes     Comment: on weekends -gin   • Drug use: Not Currently   • Sexual activity: None   Other Topics Concern   • None   Social History Narrative    Previously worked in [employer]  Social Determinants of Health     Financial Resource Strain: Medium Risk   • Difficulty of Paying Living Expenses: Somewhat hard   Food Insecurity: Food Insecurity Present   • Worried About Running Out of Food in the Last Year: Sometimes true   • Ran Out of Food in the Last Year: Sometimes true   Transportation Needs: No Transportation Needs   • Lack of Transportation (Medical): No   • Lack of Transportation (Non-Medical): No   Physical Activity: Not on file   Stress: No Stress Concern Present   • Feeling of Stress : Not at all   Social Connections: Socially Isolated   • Frequency of Communication with Friends and Family: Once a week   • Frequency of Social Gatherings with Friends and Family: Once a week   • Attends [employer]ism Services: Never   • Active Member of Clubs or Organizations: No   • Attends Club or Organization Meetings: Never   • Marital Status: Never    Intimate Partner Violence: Not on file   Housing Stability: High Risk   • Unable to Pay for Housing in the Last Year: Yes   • Number of Places Lived in the Last Year: 2   • Unstable Housing in the Last Year: No       Subjective         Objective    Physical Exam:   Assessment Type: Assess only  General Appearance: Alert, Awake  Respiratory Pattern: Normal  Chest Assessment: Chest expansion symmetrical  Bilateral Breath Sounds: Diminished  Cough: None    Vitals:  Blood pressure 133/72, pulse (!) 110, temperature 98 2 °F (36 8 °C), resp  rate 16, height 5' 6" (1 676 m), weight 63 7 kg (140 lb 6 4 oz), SpO2 96 %                  Plan    Respiratory Plan: No distress/Pulmonary history        Resp Comments: Patient denies using respiratory meds, does not see a pulmonary Dr  Denies SOB or need for neb tx at this time

## 2023-02-24 NOTE — ASSESSMENT & PLAN NOTE
Blood ethanol elevated 353 on admission  Currently drinks 1 pint of hard liquor (vodka) daily      · Thiamine, Folic acid supplementation   · Dextrose 5% in  ml/hr   · Started after initial 100 mg dose of thiamine to decrease the risk of precipitating Wernicke's encephalopathy  · Closely monitor vitals   · Blood glucose checks Q6H  · CIWA protocol   · Fall, seizure, and aspiration precautions   · Telemetry

## 2023-02-24 NOTE — PLAN OF CARE
Problem: Potential for Falls  Goal: Patient will remain free of falls  Description: INTERVENTIONS:  - Educate patient/family on patient safety including physical limitations  - Instruct patient to call for assistance with activity   - Consult OT/PT to assist with strengthening/mobility   - Keep Call bell within reach  - Keep bed low and locked with side rails adjusted as appropriate  - Keep care items and personal belongings within reach  - Initiate and maintain comfort rounds  - Make Fall Risk Sign visible to staff  - Offer Toileting every 2 Hours, in advance of need  - Initiate/Maintain bedalarm  - Obtain necessary fall risk management equipment: fall risk sign  - Apply yellow socks and bracelet for high fall risk patients  - Consider moving patient to room near nurses station  Outcome: Progressing     Problem: NEUROSENSORY - ADULT  Goal: Achieves stable or improved neurological status  Description: INTERVENTIONS  - Monitor and report changes in neurological status  - Monitor vital signs such as temperature, blood pressure, glucose, and any other labs ordered   - Initiate measures to prevent increased intracranial pressure  - Monitor for seizure activity and implement precautions if appropriate      Outcome: Progressing     Problem: CARDIOVASCULAR - ADULT  Goal: Maintains optimal cardiac output and hemodynamic stability  Description: INTERVENTIONS:  - Monitor I/O, vital signs and rhythm  - Monitor for S/S and trends of decreased cardiac output  - Administer and titrate ordered vasoactive medications to optimize hemodynamic stability  - Assess quality of pulses, skin color and temperature  - Assess for signs of decreased coronary artery perfusion  - Instruct patient to report change in severity of symptoms  Outcome: Progressing     Problem: METABOLIC, FLUID AND ELECTROLYTES - ADULT  Goal: Electrolytes maintained within normal limits  Description: INTERVENTIONS:  - Monitor labs and assess patient for signs and symptoms of electrolyte imbalances  - Administer electrolyte replacement as ordered  - Monitor response to electrolyte replacements, including repeat lab results as appropriate  - Instruct patient on fluid and nutrition as appropriate  Outcome: Progressing

## 2023-02-24 NOTE — ASSESSMENT & PLAN NOTE
Acute, improving    POA, 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 intoxication/alcoholic ketoacidosis     · Replete as needed   · Monitor CMP   · Telemetry

## 2023-02-24 NOTE — ASSESSMENT & PLAN NOTE
Resolved  POA with elevated ethanol, AG, beta hydroxybutyrate, urine ketones  Patient was repleted in ED with IV hydration, electrolytes, vitamin supplementation      · Continue with thiamine, folate supplementation daily   · Replete electrolytes as needed  · D/c IVF  · Continue Reglan prn nausea/vomiting   · Continuous O2 pulse ox monitoring   · Blood glucose checks Q12H  · Fall, seizure, and aspiration precautions   · CIWA protocol   · Telemetry

## 2023-02-25 PROBLEM — E87.29 ALCOHOLIC KETOACIDOSIS: Status: RESOLVED | Noted: 2023-02-23 | Resolved: 2023-02-25

## 2023-02-25 LAB
ALBUMIN SERPL BCP-MCNC: 3.2 G/DL (ref 3.5–5)
ALP SERPL-CCNC: 32 U/L (ref 34–104)
ALT SERPL W P-5'-P-CCNC: 7 U/L (ref 7–52)
ANION GAP SERPL CALCULATED.3IONS-SCNC: 5 MMOL/L (ref 4–13)
ANION GAP SERPL CALCULATED.3IONS-SCNC: 8 MMOL/L (ref 4–13)
AST SERPL W P-5'-P-CCNC: 17 U/L (ref 13–39)
ATRIAL RATE: 89 BPM
ATRIAL RATE: 97 BPM
BASOPHILS # BLD AUTO: 0.04 THOUSANDS/ÂΜL (ref 0–0.1)
BASOPHILS NFR BLD AUTO: 1 % (ref 0–1)
BILIRUB SERPL-MCNC: 0.77 MG/DL (ref 0.2–1)
BUN SERPL-MCNC: 6 MG/DL (ref 5–25)
BUN SERPL-MCNC: 9 MG/DL (ref 5–25)
CALCIUM ALBUM COR SERPL-MCNC: 8.7 MG/DL (ref 8.3–10.1)
CALCIUM SERPL-MCNC: 8.1 MG/DL (ref 8.4–10.2)
CALCIUM SERPL-MCNC: 9.1 MG/DL (ref 8.4–10.2)
CHLORIDE SERPL-SCNC: 100 MMOL/L (ref 96–108)
CHLORIDE SERPL-SCNC: 100 MMOL/L (ref 96–108)
CO2 SERPL-SCNC: 25 MMOL/L (ref 21–32)
CO2 SERPL-SCNC: 27 MMOL/L (ref 21–32)
CREAT SERPL-MCNC: 0.52 MG/DL (ref 0.6–1.3)
CREAT SERPL-MCNC: 0.63 MG/DL (ref 0.6–1.3)
EOSINOPHIL # BLD AUTO: 0.08 THOUSAND/ÂΜL (ref 0–0.61)
EOSINOPHIL NFR BLD AUTO: 1 % (ref 0–6)
ERYTHROCYTE [DISTWIDTH] IN BLOOD BY AUTOMATED COUNT: 13.9 % (ref 11.6–15.1)
GFR SERPL CREATININE-BSD FRML MDRD: 107 ML/MIN/1.73SQ M
GFR SERPL CREATININE-BSD FRML MDRD: 116 ML/MIN/1.73SQ M
GLUCOSE SERPL-MCNC: 103 MG/DL (ref 65–140)
GLUCOSE SERPL-MCNC: 106 MG/DL (ref 65–140)
GLUCOSE SERPL-MCNC: 109 MG/DL (ref 65–140)
GLUCOSE SERPL-MCNC: 71 MG/DL (ref 65–140)
GLUCOSE SERPL-MCNC: 90 MG/DL (ref 65–140)
HCT VFR BLD AUTO: 36.2 % (ref 36.5–49.3)
HGB BLD-MCNC: 12.1 G/DL (ref 12–17)
IMM GRANULOCYTES # BLD AUTO: 0.02 THOUSAND/UL (ref 0–0.2)
IMM GRANULOCYTES NFR BLD AUTO: 0 % (ref 0–2)
LYMPHOCYTES # BLD AUTO: 1.43 THOUSANDS/ÂΜL (ref 0.6–4.47)
LYMPHOCYTES NFR BLD AUTO: 22 % (ref 14–44)
MAGNESIUM SERPL-MCNC: 1.3 MG/DL (ref 1.9–2.7)
MAGNESIUM SERPL-MCNC: 2 MG/DL (ref 1.9–2.7)
MCH RBC QN AUTO: 31.6 PG (ref 26.8–34.3)
MCHC RBC AUTO-ENTMCNC: 33.4 G/DL (ref 31.4–37.4)
MCV RBC AUTO: 95 FL (ref 82–98)
MONOCYTES # BLD AUTO: 0.7 THOUSAND/ÂΜL (ref 0.17–1.22)
MONOCYTES NFR BLD AUTO: 11 % (ref 4–12)
NEUTROPHILS # BLD AUTO: 4.2 THOUSANDS/ÂΜL (ref 1.85–7.62)
NEUTS SEG NFR BLD AUTO: 65 % (ref 43–75)
NRBC BLD AUTO-RTO: 0 /100 WBCS
P AXIS: 16 DEGREES
P AXIS: 48 DEGREES
PLATELET # BLD AUTO: 254 THOUSANDS/UL (ref 149–390)
PMV BLD AUTO: 8.7 FL (ref 8.9–12.7)
POTASSIUM SERPL-SCNC: 3.3 MMOL/L (ref 3.5–5.3)
POTASSIUM SERPL-SCNC: 4.6 MMOL/L (ref 3.5–5.3)
PR INTERVAL: 186 MS
PR INTERVAL: 188 MS
PROT SERPL-MCNC: 5.5 G/DL (ref 6.4–8.4)
QRS AXIS: -30 DEGREES
QRS AXIS: -36 DEGREES
QRSD INTERVAL: 90 MS
QRSD INTERVAL: 94 MS
QT INTERVAL: 348 MS
QT INTERVAL: 362 MS
QTC INTERVAL: 423 MS
QTC INTERVAL: 459 MS
RBC # BLD AUTO: 3.83 MILLION/UL (ref 3.88–5.62)
SODIUM SERPL-SCNC: 132 MMOL/L (ref 135–147)
SODIUM SERPL-SCNC: 133 MMOL/L (ref 135–147)
T WAVE AXIS: 26 DEGREES
T WAVE AXIS: 35 DEGREES
VENTRICULAR RATE: 89 BPM
VENTRICULAR RATE: 97 BPM
WBC # BLD AUTO: 6.47 THOUSAND/UL (ref 4.31–10.16)

## 2023-02-25 RX ORDER — MAGNESIUM SULFATE HEPTAHYDRATE 40 MG/ML
4 INJECTION, SOLUTION INTRAVENOUS ONCE
Status: COMPLETED | OUTPATIENT
Start: 2023-02-25 | End: 2023-02-26

## 2023-02-25 RX ORDER — MAGNESIUM SULFATE HEPTAHYDRATE 40 MG/ML
2 INJECTION, SOLUTION INTRAVENOUS ONCE
Status: COMPLETED | OUTPATIENT
Start: 2023-02-25 | End: 2023-02-26

## 2023-02-25 RX ORDER — POTASSIUM CHLORIDE 20 MEQ/1
40 TABLET, EXTENDED RELEASE ORAL ONCE
Status: COMPLETED | OUTPATIENT
Start: 2023-02-25 | End: 2023-02-25

## 2023-02-25 RX ORDER — POTASSIUM CHLORIDE 14.9 MG/ML
20 INJECTION INTRAVENOUS ONCE
Status: DISCONTINUED | OUTPATIENT
Start: 2023-02-25 | End: 2023-02-25

## 2023-02-25 RX ADMIN — ASPIRIN 81 MG: 81 TABLET, COATED ORAL at 21:41

## 2023-02-25 RX ADMIN — MAGNESIUM SULFATE HEPTAHYDRATE 4 G: 40 INJECTION, SOLUTION INTRAVENOUS at 08:54

## 2023-02-25 RX ADMIN — DEXTROSE AND SODIUM CHLORIDE 125 ML/HR: 5; .9 INJECTION, SOLUTION INTRAVENOUS at 06:27

## 2023-02-25 RX ADMIN — METOPROLOL SUCCINATE 50 MG: 50 TABLET, EXTENDED RELEASE ORAL at 21:41

## 2023-02-25 RX ADMIN — RIVAROXABAN 20 MG: 20 TABLET, FILM COATED ORAL at 08:48

## 2023-02-25 RX ADMIN — LOSARTAN POTASSIUM 25 MG: 25 TABLET, FILM COATED ORAL at 08:47

## 2023-02-25 RX ADMIN — POTASSIUM CHLORIDE 40 MEQ: 1500 TABLET, EXTENDED RELEASE ORAL at 12:00

## 2023-02-25 RX ADMIN — POTASSIUM CHLORIDE 40 MEQ: 1500 TABLET, EXTENDED RELEASE ORAL at 08:46

## 2023-02-25 RX ADMIN — MAGNESIUM SULFATE HEPTAHYDRATE 2 G: 40 INJECTION, SOLUTION INTRAVENOUS at 23:47

## 2023-02-25 RX ADMIN — B-COMPLEX W/ C & FOLIC ACID TAB 1 TABLET: TAB at 08:46

## 2023-02-25 RX ADMIN — FOLIC ACID 1 MG: 1 TABLET ORAL at 08:46

## 2023-02-25 NOTE — PLAN OF CARE
Problem: Potential for Falls  Goal: Patient will remain free of falls  Description: INTERVENTIONS:  - Educate patient/family on patient safety including physical limitations  - Instruct patient to call for assistance with activity   - Consult OT/PT to assist with strengthening/mobility   - Keep Call bell within reach  - Keep bed low and locked with side rails adjusted as appropriate  - Keep care items and personal belongings within reach  - Initiate and maintain comfort rounds  - Make Fall Risk Sign visible to staff  - Offer Toileting every *** Hours, in advance of need  - Initiate/Maintain ***alarm  - Obtain necessary fall risk management equipment: ***  - Apply yellow socks and bracelet for high fall risk patients  - Consider moving patient to room near nurses station  Outcome: Progressing

## 2023-02-25 NOTE — PLAN OF CARE
Problem: Potential for Falls  Goal: Patient will remain free of falls  Description: INTERVENTIONS:  - Educate patient/family on patient safety including physical limitations  - Instruct patient to call for assistance with activity   - Consult OT/PT to assist with strengthening/mobility   - Keep Call bell within reach  - Keep bed low and locked with side rails adjusted as appropriate  - Keep care items and personal belongings within reach  - Initiate and maintain comfort rounds  - Make Fall Risk Sign visible to staff  - Offer Toileting every 2 Hours, in advance of need  - Initiate/Maintain bed alarm  - Obtain necessary fall risk management equipment:   - Apply yellow socks and bracelet for high fall risk patients  - Consider moving patient to room near nurses station  Outcome: Progressing     Problem: MOBILITY - ADULT  Goal: Maintains/Returns to pre admission functional level  Description: INTERVENTIONS:  - Perform BMAT or MOVE assessment daily    - Set and communicate daily mobility goal to care team and patient/family/caregiver  - Collaborate with rehabilitation services on mobility goals if consulted  - Perform Range of Motion 3 times a day  - Actively turns self     - Dangle patient 3 times a day  - Stand patient 3 times a day  - Ambulate patient 3 times a day  - Out of bed to chair 3 times a day   - Out of bed for meals 3 times a day  - Out of bed for toileting  - Record patient progress and toleration of activity level   Outcome: Progressing     Problem: PAIN - ADULT  Goal: Verbalizes/displays adequate comfort level or baseline comfort level  Description: Interventions:  - Encourage patient to monitor pain and request assistance  - Assess pain using appropriate pain scale  - Administer analgesics based on type and severity of pain and evaluate response  - Implement non-pharmacological measures as appropriate and evaluate response  - Consider cultural and social influences on pain and pain management  - Notify physician/advanced practitioner if interventions unsuccessful or patient reports new pain  Outcome: Progressing     Problem: SAFETY ADULT  Goal: Maintains/Returns to pre admission functional level  Description: INTERVENTIONS:  - Perform BMAT or MOVE assessment daily    - Set and communicate daily mobility goal to care team and patient/family/caregiver  - Collaborate with rehabilitation services on mobility goals if consulted  - Perform Range of Motion 3 times a day  - Actively turns self  - Dangle patient 3 times a day  - Stand patient 3 times a day  - Ambulate patient 3 times a day  - Out of bed to chair 3 times a day   - Out of bed for meals 3 times a day  - Out of bed for toileting  - Record patient progress and toleration of activity level   Outcome: Progressing     Problem: DISCHARGE PLANNING  Goal: Discharge to home or other facility with appropriate resources  Description: INTERVENTIONS:  - Identify barriers to discharge w/patient and caregiver  - Arrange for needed discharge resources and transportation as appropriate  - Identify discharge learning needs (meds, wound care, etc )  - Arrange for interpretive services to assist at discharge as needed  - Refer to Case Management Department for coordinating discharge planning if the patient needs post-hospital services based on physician/advanced practitioner order or complex needs related to functional status, cognitive ability, or social support system  Outcome: Progressing     Problem: Knowledge Deficit  Goal: Patient/family/caregiver demonstrates understanding of disease process, treatment plan, medications, and discharge instructions  Description: Complete learning assessment and assess knowledge base    Interventions:  - Provide teaching at level of understanding  - Provide teaching via preferred learning methods  Outcome: Progressing     Problem: NEUROSENSORY - ADULT  Goal: Achieves stable or improved neurological status  Description: INTERVENTIONS  - Monitor and report changes in neurological status  - Monitor vital signs such as temperature, blood pressure, glucose, and any other labs ordered   - Initiate measures to prevent increased intracranial pressure  - Monitor for seizure activity and implement precautions if appropriate      Outcome: Progressing     Problem: NEUROSENSORY - ADULT  Goal: Achieves maximal functionality and self care  Description: INTERVENTIONS  - Monitor swallowing and airway patency with patient fatigue and changes in neurological status  - Encourage and assist patient to increase activity and self care     - Encourage visually impaired, hearing impaired and aphasic patients to use assistive/communication devices  Outcome: Progressing     Problem: CARDIOVASCULAR - ADULT  Goal: Maintains optimal cardiac output and hemodynamic stability  Description: INTERVENTIONS:  - Monitor I/O, vital signs and rhythm  - Monitor for S/S and trends of decreased cardiac output  - Administer and titrate ordered vasoactive medications to optimize hemodynamic stability  - Assess quality of pulses, skin color and temperature  - Assess for signs of decreased coronary artery perfusion  - Instruct patient to report change in severity of symptoms  Outcome: Progressing     Problem: CARDIOVASCULAR - ADULT  Goal: Absence of cardiac dysrhythmias or at baseline rhythm  Description: INTERVENTIONS:  - Continuous cardiac monitoring, vital signs, obtain 12 lead EKG if ordered  - Administer antiarrhythmic and heart rate control medications as ordered  - Monitor electrolytes and administer replacement therapy as ordered  Outcome: Progressing     Problem: METABOLIC, FLUID AND ELECTROLYTES - ADULT  Goal: Electrolytes maintained within normal limits  Description: INTERVENTIONS:  - Monitor labs and assess patient for signs and symptoms of electrolyte imbalances  - Administer electrolyte replacement as ordered  - Monitor response to electrolyte replacements, including repeat lab results as appropriate  - Instruct patient on fluid and nutrition as appropriate  Outcome: Progressing     Problem: METABOLIC, FLUID AND ELECTROLYTES - ADULT  Goal: Fluid balance maintained  Description: INTERVENTIONS:  - Monitor labs   - Monitor I/O and WT  - Instruct patient on fluid and nutrition as appropriate  - Assess for signs & symptoms of volume excess or deficit  Outcome: Progressing     Problem: RESPIRATORY - ADULT  Goal: Achieves optimal ventilation and oxygenation  Description: INTERVENTIONS:  - Assess for changes in respiratory status  - Assess for changes in mentation and behavior  - Position to facilitate oxygenation and minimize respiratory effort  - Oxygen administered by appropriate delivery if ordered  - Initiate smoking cessation education as indicated  - Encourage broncho-pulmonary hygiene including cough, deep breathe, Incentive Spirometry  - Assess the need for suctioning and aspirate as needed  - Assess and instruct to report SOB or any respiratory difficulty  - Respiratory Therapy support as indicated  Outcome: Progressing

## 2023-02-25 NOTE — DISCHARGE SUMMARY
[facility] Discharge Summary - [facility] [address] 59 y o  male MRN: [id number]    [address] / Bed: [facility] Encounter: [id number]    BRIEF OVERVIEW  Admitting Provider: Jeremy Hamilton MD  Discharge Provider: Jeremy Hamiltno MD    Discharge To: Home  Facility:     Outpatient Follow-Up: [facility]    Things to address at first follow up visit:   · Have you been actively drinking? · Are you interested in rehab? · Have you had any signs of withdrawal?  · Are you taking your medications as prescribed? Labs and results pending at discharge: NA    Admission Date: 2/23/2023     Discharge Date: 2/26/23    Primary Discharge Diagnosis  Principal Problem (Resolved):    Alcoholic ketoacidosis  Active Problems:    Alcohol abuse    Hypokalemia    Paroxysmal atrial fibrillation ([address] )    Noncompliance    Essential hypertension    Hypomagnesemia    Consulting Providers   · Toxicology        [address] STAY    Procedures Performed/Pertinent Test results  2/25: Na 132>133, K 3 3>4 6, Mg 1 3>2  2/24: Na 134, WBC 5 75  2/23: Ethanol 353, Beta-HB 2 5, K 3 4, Mg 1 4, AG 21, TSH 0 355, UA 1+ Ketones     CTA head/neck w/wo contrast (2/23): No acute intracranial hemorrhage  No significant stenosis  Stable 3 mm R supraclinoid ICA segment aneurysm  Stable 1 mm R p-comm aneurysm arising from prominent infundibulum  HPI  Ynes Soto is a 59 y o  male with a pmh of atrial fibrillation, alcoholic cardiomyopathy, alcohol abuse, and AICD in place who presents with weakness and palpitations since this morning  He states he has been drinking alcohol 1 pint of hard liquor (vodka) daily  He has not eaten since yesterday when he had sandwich for lunch  He reports that he has not taken any of his medications in a few months  Patient is currently intoxicated during this evaluation, however he is alerted and oriented to person and place      Hospital Course    * Alcoholic ketoacidosis/ Alcohol abuse  POA with elevated ethanol, AG, beta hydroxybutyrate, urine ketones  Patient was placed on telemetry and treated with IV hydration, electrolytes, thiamine, folate supplementation  Patient placed on CIWA protocol as well as fall, seizure and aspiration precautions  Patient's ketoacidosis resolved and IVFs were d/c  Patient interested in alcohol rehab, discussed with case management  Appreciate assistance in planning rehab placement  Electrolyte imbalance  Patient with hypokalemia and hypomagnesemia, 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 intoxication/alcoholic ketoacidosis  Levels were monitored daily and repletion was given as needed  Essential hypertension  Chronic, uncontrolled secondary to patient noncompliance  Losartan 25 mg and Metoprolol succinate 50 mg daily were continued from home  V/s monitored closely  Paroxysmal atrial fibrillation  Normal sinus rhythm on admission  Mildly tachycardic w/o chest pain on admission  Patient noncompliant with atrial fibrillation medications  Patient placed on telemetry  Home medications contuinued during hospital stay (Xarelto 20 mg daily, Aspirin 81 mg daily, Metoprolol 50 mg daily)  Noncompliance  History of noncompliance with medications and life vest  Patient now has ICD  Reports has not been taking any of his oral medications at home  Case management consulted and education provided on importance of medication compliance as well as alcohol abstention        Physical Exam at Discharge  /80 (BP Location: Right arm)   Pulse 97   Temp 97 5 °F (36 4 °C) (Oral)   Resp 18   Ht 5' 6" (1 676 m)   Wt 63 7 kg (140 lb 6 4 oz)   SpO2 98%   BMI 22 66 kg/m²     General Appearance:    Alert, cooperative, no distress   Head:    Normocephalic, atraumatic   Lungs:     Clear to auscultation bilaterally, respirations unlabored   Chest wall:    No tenderness or deformity   Heart:    Regular rate and rhythm, S1 and S2 normal, no murmur, rub or gallop   Abdomen:     Soft, non-tender, bowel sounds active all four quadrants,     no masses, no organomegaly   Extremities:   Extremities normal, atraumatic, no cyanosis or edema       Skin:   Skin color, texture, turgor normal, no rash   Neurologic:   No focal deficits       Medications   Discharge Medication List as of 2/26/2023 11:44 AM          Discharge Medication List as of 2/26/2023 11:44 AM      CONTINUE these medications which have NOT CHANGED    Details   aspirin (ECOTRIN LOW STRENGTH) 81 mg EC tablet Take 1 tablet (81 mg total) by mouth daily, Starting Wed 87/8/8086, Normal      folic acid (FOLVITE) 1 mg tablet Take 1 tablet (1 mg total) by mouth daily, Starting Wed 10/5/2022, Until Tue 1/3/2023, Normal      Klor-Con M20 20 MEQ tablet TAKE 1 TABLET BY MOUTH EVERY DAY, Normal      levalbuterol (XOPENEX) 1 25 mg/0 5 mL nebulizer solution Take 0 5 mL (1 25 mg total) by nebulization every 8 (eight) hours as needed for wheezing or shortness of breath, Starting Wed 10/5/2022, Normal      losartan (COZAAR) 25 mg tablet TAKE 1 TABLET (25 MG TOTAL) BY MOUTH DAILY  , Starting Tue 1/3/2023, Until Mon 4/3/2023, Normal      magnesium oxide (MAG-OX) 400 mg Take 1 tablet (400 mg total) by mouth 2 (two) times a day, Starting Wed 8/17/2022, Until Wed 11/2/2022, Normal      metoprolol succinate (TOPROL-XL) 50 mg 24 hr tablet Take 1 tablet (50 mg total) by mouth daily, Starting Wed 10/5/2022, Until Tue 1/3/2023, Normal      Multiple Vitamin (multivitamin) capsule Take 1 capsule by mouth daily, Starting Wed 8/17/2022, Normal      naltrexone (REVIA) 50 mg tablet Take 1 tablet (50 mg total) by mouth daily, Starting Wed 10/5/2022, Until Tue 1/3/2023, Normal      Polyethylene Glycol 3350 (MIRALAX PO) Take by mouth once 238 gm with dulcolax, Historical Med      thiamine 100 MG tablet Take 1 tablet (100 mg total) by mouth daily, Starting Wed 8/17/2022, Normal      Xarelto 20 MG tablet TAKE 1 TABLET BY MOUTH DAILY WITH BREAKFAST, Normal            Discharge Medication List as of 2/26/2023 11:44 AM         Discharge Medication List as of 2/26/2023 11:44 AM             Allergies  Allergies   Allergen Reactions   • Viagra [Sildenafil] Other (See Comments)     Severe ischemic heart disease       Diet restrictions: none  Patient should abstain from alcohol  Activity restrictions: none  Code Status: Level 1 - Full Code  Advance Directive and Living Will: <no information>  Power of :    POLST:      Discharge Condition: stable      Discharge  Statement   I spent 30 minutes discharging the patient  This time was spent on the day of discharge  I had direct contact with the patient on the day of discharge  Additional documentation is required if more than 30 minutes were spent on discharge

## 2023-02-25 NOTE — PROGRESS NOTES
[facility] [facility] [address] [facility] Practice Progress Note - Nci Ayala 59 y o  male MRN: [id number]    Unit/Bed#: [address] [place] [address] Encounter: [id number]      Assessment/Plan:   Ketoacidosis has resolved, and patient not requiring ativan for alcohol withdrawal   Patient planned for discharge today, however patient reports that his sister (who he lives with), is at a  all day and patient will not be able to get into home until tomorrow  Unable to discharge today as patient is unsafe to discharge home if unable to get into house  Will monitor overnight, anticipate discharge tomorrow  Hypomagnesemia  Assessment & Plan  Acute, improving  POA, Likely secondary to alcohol intoxication/alcoholic ketoacidosis     · Replete as needed   · Monitor levels daily     Essential hypertension  Assessment & Plan  Chronic, uncontrolled  Secondary to patient noncompliance    · Continue Losartan 25 mg and Metoprolol succinate 50 mg daily   · Monitor VS closely     Alcohol abuse  Assessment & Plan  Acutely intoxicated in the ED with elevated blood canal   Current everyday drinker of 3 fifths of gin daily  Last drank     Toxicology consulted: Patient does not require transfer to inpatient detox  Can monitor with CIWA if patient requires additional time in hospital   As he is not developing evidence of withdrawal at this point, not likely he will progress to any significant withdrawal     : CIWA scores remain low, has not required ativan since admission  · Thiamine, Folic acid supplementation   · Tolerating PO, will D/c IVF  May be contributing to ongoing hypokalemia and hypomagnesemia  · Continue CIWA protocol  · Fall, seizure, and aspiration precautions   · Telemetry   · Patient interested in alcohol rehab, discussed with case management  Appreciate assistance in planning rehab placement  Noncompliance  Assessment & Plan  History of noncompliance with medications and life vest   Patient now has ICD    Reports has not been taking any of his oral medications at home  · Restart medications   · Case management consult     Paroxysmal atrial fibrillation [facility])  Assessment & Plan  Normal sinus rhythm on admission  Mildly tachycardic  No chest pain elicited on exam, murmur present  Patient noncompliant with atrial fibrillation medications  · Continue Xarelto 20 mg daily, Aspirin 81 mg daily, Metoprolol 50 mg daily, Losartan 25 mg daily   · Continue Telemetry    Hypokalemia  Assessment & Plan  Acute, improving  POA, 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 intoxication/alcoholic ketoacidosis     · Replete as needed   · Monitor CMP   · Telemetry     * Alcoholic ketoacidosis-resolved as of 2/25/2023  Assessment & Plan  Resolved  POA with elevated ethanol, AG, beta hydroxybutyrate, urine ketones  Patient was repleted in ED with IV hydration, electrolytes, vitamin supplementation  · Continue with thiamine, folate supplementation daily   · Replete electrolytes as needed  · D/c IVF  · Continue Reglan prn nausea/vomiting   · Continuous O2 pulse ox monitoring   · Blood glucose checks Q12H  · Fall, seizure, and aspiration precautions   · CIWA protocol   · Telemetry           Subjective:   PT seen and examined at bedside  PT in no acute distress, denies any chest pain, SOB, nausea, vomitting, constipation, diarrhea, leg pain, or leg swelling  His CIWA scores have remained low, and he has not required any ativan during this admission  Pt is interested in alcohol rehab, crisis consulted for placement  Objective:     Vitals: Blood pressure 140/74, pulse 74, temperature 98 2 °F (36 8 °C), resp  rate 18, height 5' 6" (1 676 m), weight 63 7 kg (140 lb 6 4 oz), SpO2 94 %  ,Body mass index is 22 66 kg/m²    Wt Readings from Last 3 Encounters:   02/23/23 63 7 kg (140 lb 6 4 oz)   10/05/22 64 kg (141 lb)   10/04/22 60 3 kg (133 lb)       Intake/Output Summary (Last 24 hours) at 2/25/2023 1521  Last data filed at 2/25/2023 1301  Gross per 24 hour   Intake 4532 09 ml   Output 4450 ml   Net 82 09 ml       Physical Exam:   Physical Exam  Constitutional:       General: He is not in acute distress  Cardiovascular:      Rate and Rhythm: Normal rate and regular rhythm  Pulses: Normal pulses  Heart sounds: Normal heart sounds  No murmur heard  No friction rub  No gallop  Pulmonary:      Effort: Pulmonary effort is normal  No respiratory distress  Breath sounds: Normal breath sounds  No wheezing or rhonchi  Abdominal:      General: Abdomen is flat  Bowel sounds are normal  There is no distension  Palpations: Abdomen is soft  Tenderness: There is no abdominal tenderness  Musculoskeletal:      Right lower leg: No edema  Left lower leg: No edema  Skin:     Capillary Refill: Capillary refill takes less than 2 seconds  Neurological:      Mental Status: He is alert and oriented to person, place, and time              Recent Results (from the past 24 hour(s))   Fingerstick Glucose (POCT)    Collection Time: 02/24/23  3:40 PM   Result Value Ref Range    POC Glucose 107 65 - 140 mg/dl   Fingerstick Glucose (POCT)    Collection Time: 02/24/23  8:56 PM   Result Value Ref Range    POC Glucose 105 65 - 140 mg/dl   Magnesium    Collection Time: 02/25/23  5:29 AM   Result Value Ref Range    Magnesium 1 3 (L) 1 9 - 2 7 mg/dL   CBC and differential    Collection Time: 02/25/23  5:29 AM   Result Value Ref Range    WBC 6 47 4 31 - 10 16 Thousand/uL    RBC 3 83 (L) 3 88 - 5 62 Million/uL    Hemoglobin 12 1 12 0 - 17 0 g/dL    Hematocrit 36 2 (L) 36 5 - 49 3 %    MCV 95 82 - 98 fL    MCH 31 6 26 8 - 34 3 pg    MCHC 33 4 31 4 - 37 4 g/dL    RDW 13 9 11 6 - 15 1 %    MPV 8 7 (L) 8 9 - 12 7 fL    Platelets 383 949 - 203 Thousands/uL    nRBC 0 /100 WBCs    Neutrophils Relative 65 43 - 75 %    Immat GRANS % 0 0 - 2 %    Lymphocytes Relative 22 14 - 44 %    Monocytes Relative 11 4 - 12 %    Eosinophils Relative 1 0 - 6 %    Basophils Relative 1 0 - 1 % Neutrophils Absolute 4 20 1 85 - 7 62 Thousands/µL    Immature Grans Absolute 0 02 0 00 - 0 20 Thousand/uL    Lymphocytes Absolute 1 43 0 60 - 4 47 Thousands/µL    Monocytes Absolute 0 70 0 17 - 1 22 Thousand/µL    Eosinophils Absolute 0 08 0 00 - 0 61 Thousand/µL    Basophils Absolute 0 04 0 00 - 0 10 Thousands/µL   Comprehensive metabolic panel    Collection Time: 02/25/23  5:29 AM   Result Value Ref Range    Sodium 132 (L) 135 - 147 mmol/L    Potassium 3 3 (L) 3 5 - 5 3 mmol/L    Chloride 100 96 - 108 mmol/L    CO2 27 21 - 32 mmol/L    ANION GAP 5 4 - 13 mmol/L    BUN 6 5 - 25 mg/dL    Creatinine 0 52 (L) 0 60 - 1 30 mg/dL    Glucose 109 65 - 140 mg/dL    Calcium 8 1 (L) 8 4 - 10 2 mg/dL    Corrected Calcium 8 7 8 3 - 10 1 mg/dL    AST 17 13 - 39 U/L    ALT 7 7 - 52 U/L    Alkaline Phosphatase 32 (L) 34 - 104 U/L    Total Protein 5 5 (L) 6 4 - 8 4 g/dL    Albumin 3 2 (L) 3 5 - 5 0 g/dL    Total Bilirubin 0 77 0 20 - 1 00 mg/dL    eGFR 116 ml/min/1 73sq m   Fingerstick Glucose (POCT)    Collection Time: 02/25/23  7:10 AM   Result Value Ref Range    POC Glucose 106 65 - 140 mg/dl       Current Facility-Administered Medications   Medication Dose Route Frequency Provider Last Rate Last Admin   • acetaminophen (TYLENOL) tablet 650 mg  650 mg Oral Q6H PRN Jessenia Simmons, DO       • aspirin (ECOTRIN LOW STRENGTH) EC tablet 81 mg  81 mg Oral HS Jessenia Simmons, DO   81 mg at 91/64/31 4023   • folic acid (FOLVITE) tablet 1 mg  1 mg Oral Daily Jessenia Simmons, DO   1 mg at 02/25/23 0846   • levalbuterol (XOPENEX) inhalation solution 1 25 mg  1 25 mg Nebulization Q8H PRN Wilner Ott DO       • losartan (COZAAR) tablet 25 mg  25 mg Oral Daily Jessenia Simmons, DO   25 mg at 02/25/23 0847   • metoclopramide (REGLAN) injection 10 mg  10 mg Intravenous Q6H PRN Allen Mae MD   10 mg at 02/24/23 2350   • metoprolol succinate (TOPROL-XL) 24 hr tablet 50 mg  50 mg Oral HS Jessenia Simmons DO   50 mg at 02/24/23 2101   • multivitamin stress formula tablet 1 tablet  1 tablet Oral Daily Jessenia Simmons, DO   1 tablet at 02/25/23 8432   • rivaroxaban (XARELTO) tablet 20 mg  20 mg Oral Daily With Breakfast Jessenia Simmons, DO   20 mg at 02/25/23 0848       Invasive Devices     Peripheral Intravenous Line  Duration           Peripheral IV 02/25/23 Proximal;Right;Ventral (anterior) Forearm <1 day                Lab, Imaging and other studies: I have personally reviewed pertinent reports      VTE Pharmacologic Prophylaxis: Xarelto  VTE Mechanical Prophylaxis: sequential compression device    Alessandro Chisholm MD

## 2023-02-26 VITALS
TEMPERATURE: 97.5 F | WEIGHT: 140.4 LBS | RESPIRATION RATE: 18 BRPM | HEIGHT: 66 IN | BODY MASS INDEX: 22.56 KG/M2 | OXYGEN SATURATION: 98 % | DIASTOLIC BLOOD PRESSURE: 80 MMHG | HEART RATE: 87 BPM | SYSTOLIC BLOOD PRESSURE: 120 MMHG

## 2023-02-26 PROBLEM — E83.42 HYPOMAGNESEMIA: Status: RESOLVED | Noted: 2022-09-12 | Resolved: 2023-02-26

## 2023-02-26 PROBLEM — E87.6 HYPOKALEMIA: Status: RESOLVED | Noted: 2017-09-22 | Resolved: 2023-02-26

## 2023-02-26 LAB — GLUCOSE SERPL-MCNC: 108 MG/DL (ref 65–140)

## 2023-02-26 RX ADMIN — LOSARTAN POTASSIUM 25 MG: 25 TABLET, FILM COATED ORAL at 08:48

## 2023-02-26 RX ADMIN — RIVAROXABAN 20 MG: 20 TABLET, FILM COATED ORAL at 08:48

## 2023-02-26 RX ADMIN — FOLIC ACID 1 MG: 1 TABLET ORAL at 08:48

## 2023-02-26 RX ADMIN — B-COMPLEX W/ C & FOLIC ACID TAB 1 TABLET: TAB at 08:48

## 2023-02-26 NOTE — DISCHARGE INSTRUCTIONS
Alcohol Dependence   WHAT YOU NEED TO KNOW:   Alcohol dependence is the need to drink alcohol often to function in your daily life  DISCHARGE INSTRUCTIONS:   Call your local emergency number (911 in the [address]) if:   You have a seizure  Call your doctor if:   Your heart is beating faster than normal     You have hallucinations  You cannot remember what happens while you are drinking  You are anxious and have nausea  Your hands are shaky and you are sweating heavily  You have questions or concerns about your condition or care  Alcohol dependence  includes at least 3 of the following during the past 12 months:  You keep drinking alcohol even if you know it increases your risk for health problems  Health problems include liver problems, stomach ulcers, high blood pressure, and stroke  You develop a tolerance for alcohol  Tolerance means the amount of alcohol you usually drink no longer causes the effects you desire  You need to drink even more alcohol to get the same effect  You have physical or mental withdrawal symptoms after not drinking for a short period  The same amount of alcohol is needed to relieve or prevent withdrawal symptoms  You may also have to drink to stop tremors (shakes) or to cure a hangover  You crave alcohol  You have a desire to drink more often and to drink larger amounts of alcohol  You have problems managing alcohol use  You are not able to control your drinking habits  You keep going back to drinking even after you quit  You spend less time doing more important things  You spend much of your time drinking alcohol or being with people who also drink  You have trouble with social or daily activities at school, work, or home  You often choose events or activities that will include drinking  Do not try to stop drinking on your own: Your healthcare provider will help you withdraw from alcohol safely   He or she may need to admit you to the hospital  You may also need any of the following treatments:  Medicines to decrease your craving for alcohol    Support groups such as Alcoholics Anonymous     Psychiatrist or psychologist for therapy     Admission to an inpatient facility for treatment for severe dependence    For support and more information:   Alcoholics Anonymous  Web Address: http://www [employer]/    Substance Abuse and [address] , [address] [place]  Web Address: https://[employer]/  Follow up with your healthcare provider as directed:  Write down your questions so you remember to ask them during your visits  © Copyright [facility] 2022 Information is for End User's use only and may not be sold, redistributed or otherwise used for commercial purposes  The above information is an  only  It is not intended as medical advice for individual conditions or treatments  Talk to your doctor, nurse or pharmacist before following any medical regimen to see if it is safe and effective for you

## 2023-02-26 NOTE — PLAN OF CARE
Problem: Potential for Falls  Goal: Patient will remain free of falls  Description: INTERVENTIONS:  - Educate patient/family on patient safety including physical limitations  - Instruct patient to call for assistance with activity   - Consult OT/PT to assist with strengthening/mobility   - Keep Call bell within reach  - Keep bed low and locked with side rails adjusted as appropriate  - Keep care items and personal belongings within reach  - Initiate and maintain comfort rounds  - Make Fall Risk Sign visible to staff  - Offer Toileting every Hour  Problem: MOBILITY - ADULT  Goal: Maintain or return to baseline ADL function  Description: INTERVENTIONS:  -  Assess patient's ability to carry out ADLs; assess patient's baseline for ADL function and identify physical deficits which impact ability to perform ADLs (bathing, care of mouth/teeth, toileting, grooming, dressing, etc )  - Assess/evaluate cause of self-care deficits   - Assess range of motion  - Assess patient's mobility; develop plan if impaired  - Assess patient's need for assistive devices and provide as appropriate  - Encourage maximum independence but intervene and supervise when necessary  - Involve family in performance of ADLs  - Assess for home care needs following discharge   - Consider OT consult to assist with ADL evaluation and planning for discharge  - Provide patient education as appropriate  Outcome: Progressing  Goal: Maintains/Returns to pre admission functional level  Description: INTERVENTIONS:  - Perform BMAT or MOVE assessment daily    - Set and communicate daily mobility goal to care team and patient/family/caregiver     - Collaborate with rehabilitation services on mobility goals if consulted  Problem: PAIN - ADULT  Goal: Verbalizes/displays adequate comfort level or baseline comfort level  Description: Interventions:  - Encourage patient to monitor pain and request assistance  - Assess pain using appropriate pain scale  - Administer analgesics based on type and severity of pain and evaluate response  - Implement non-pharmacological measures as appropriate and evaluate response  - Consider cultural and social influences on pain and pain management  - Notify physician/advanced practitioner if interventions unsuccessful or patient reports new pain  Outcome: Progressing     Problem: SAFETY ADULT  Goal: Patient will remain free of falls  Description: INTERVENTIONS:  - Educate patient/family on patient safety including physical limitations  - Instruct patient to call for assistance with activity   - Consult OT/PT to assist with strengthening/mobility   - Keep Call bell within reach  - Keep bed low and locked with side rails adjusted as appropriate  - Keep care items and personal belongings within reach  - Initiate and maintain comfort rounds  - Make Fall Risk Sign visible to staff  - Offer Toileting every  Hour  Problem: DISCHARGE PLANNING  Goal: Discharge to home or other facility with appropriate resources  Description: INTERVENTIONS:  - Identify barriers to discharge w/patient and caregiver  - Arrange for needed discharge resources and transportation as appropriate  - Identify discharge learning needs (meds, wound care, etc )  - Arrange for interpretive services to assist at discharge as needed  - Refer to Case Management Department for coordinating discharge planning if the patient needs post-hospital services based on physician/advanced practitioner order or complex needs related to functional status, cognitive ability, or social support system  Outcome: Progressing     Problem: Knowledge Deficit  Goal: Patient/family/caregiver demonstrates understanding of disease process, treatment plan, medications, and discharge instructions  Description: Complete learning assessment and assess knowledge base    Interventions:  - Provide teaching at level of understanding  - Provide teaching via preferred learning methods  Outcome: Progressing     Problem: NEUROSENSORY - ADULT  Goal: Achieves stable or improved neurological status  Description: INTERVENTIONS  - Monitor and report changes in neurological status  - Monitor vital signs such as temperature, blood pressure, glucose, and any other labs ordered   - Initiate measures to prevent increased intracranial pressure  - Monitor for seizure activity and implement precautions if appropriate      Outcome: Progressing  Goal: Achieves maximal functionality and self care  Description: INTERVENTIONS  - Monitor swallowing and airway patency with patient fatigue and changes in neurological status  - Encourage and assist patient to increase activity and self care     - Encourage visually impaired, hearing impaired and aphasic patients to use assistive/communication devices  Outcome: Progressing     Problem: CARDIOVASCULAR - ADULT  Goal: Maintains optimal cardiac output and hemodynamic stability  Description: INTERVENTIONS:  - Monitor I/O, vital signs and rhythm  - Monitor for S/S and trends of decreased cardiac output  - Administer and titrate ordered vasoactive medications to optimize hemodynamic stability  - Assess quality of pulses, skin color and temperature  - Assess for signs of decreased coronary artery perfusion  - Instruct patient to report change in severity of symptoms  Outcome: Progressing  Goal: Absence of cardiac dysrhythmias or at baseline rhythm  Description: INTERVENTIONS:  - Continuous cardiac monitoring, vital signs, obtain 12 lead EKG if ordered  - Administer antiarrhythmic and heart rate control medications as ordered  - Monitor electrolytes and administer replacement therapy as ordered  Outcome: Progressing     Problem: METABOLIC, FLUID AND ELECTROLYTES - ADULT  Goal: Electrolytes maintained within normal limits  Description: INTERVENTIONS:  - Monitor labs and assess patient for signs and symptoms of electrolyte imbalances  - Administer electrolyte replacement as ordered  - Monitor response to electrolyte replacements, including repeat lab results as appropriate  - Instruct patient on fluid and nutrition as appropriate  Outcome: Progressing  Goal: Fluid balance maintained  Description: INTERVENTIONS:  - Monitor labs   - Monitor I/O and WT  - Instruct patient on fluid and nutrition as appropriate  - Assess for signs & symptoms of volume excess or deficit  Outcome: Progressing     Problem: RESPIRATORY - ADULT  Goal: Achieves optimal ventilation and oxygenation  Description: INTERVENTIONS:  - Assess for changes in respiratory status  - Assess for changes in mentation and behavior  - Position to facilitate oxygenation and minimize respiratory effort  - Oxygen administered by appropriate delivery if ordered  - Initiate smoking cessation education as indicated  - Encourage broncho-pulmonary hygiene including cough, deep breathe, Incentive Spirometry  - Assess the need for suctioning and aspirate as needed  - Assess and instruct to report SOB or any respiratory difficulty  - Respiratory Therapy support as indicated  Outcome: Progressing   , in advance of need  - Initiate/Maintain bed/chair alarm  - Obtain necessary fall risk management equipment: pt's own footwear  - Apply yellow socks and bracelet for high fall risk patients  - Consider moving patient to room near nurses station  Outcome: Progressing  Goal: Maintains/Returns to pre admission functional level  Description: INTERVENTIONS:  - Perform BMAT or MOVE assessment daily    - Set and communicate daily mobility goal to care team and patient/family/caregiver     - Collaborate with rehabilitation services on mobility goals if consulted  - Ambulate patient 2 times a day  - Out of bed to chair 3 times a day   - Out of bed for meals 3 times a day  - Out of bed for toileting  - Record patient progress and toleration of activity level   Outcome: Progressing     - Ambulate patient 2 times a day  - Out of bed to chair 3 times a day   - Out of bed for meals 3 times a day  - Out of bed for toileting  - Record patient progress and toleration of activity level   Outcome: Progressing   , in advance of need  - Initiate/Maintain bed/chair alarm  - Obtain necessary fall risk management equipment:pt's own footwear  - Apply yellow socks and bracelet for high fall risk patients  - Consider moving patient to room near nurses station  Outcome: Progressing

## 2023-02-26 NOTE — NURSING NOTE
AVS was printed for the patient and highlighted the medication information with follow up appointments and phone numbers, reviewed with the patient at the bedside, copy of same was given to the patient who is discharged to home  He is accompanied to the lobby by a CNA and transportation arranged by [facility]  The patient is discharged to home in stable condition

## 2023-02-27 ENCOUNTER — TRANSITIONAL CARE MANAGEMENT (OUTPATIENT)
Dept: FAMILY MEDICINE CLINIC | Facility: CLINIC | Age: 58
End: 2023-02-27

## 2023-02-27 NOTE — UTILIZATION REVIEW
NOTIFICATION OF ADMISSION DISCHARGE   This is a Notification of Discharge from [facility]  Please be advised that this patient has been discharge from our facility  Below you will find the admission and discharge date and time including the patient’s disposition  UTILIZATION REVIEW CONTACT:  Jose Vargas  Utilization   Network Utilization Review Department  Phone: [phone] x carefully listen to the prompts  All voicemails are confidential   Email: [email or web address]     ADMISSION INFORMATION  PRESENTATION DATE: 2/23/2023  2:31 PM  OBERVATION ADMISSION DATE:   INPATIENT ADMISSION DATE: 2/23/23  7:04 PM   DISCHARGE DATE: 2/26/2023 12:58 PM   DISPOSITION:Home/Self Care    IMPORTANT INFORMATION:  Send all requests for admission clinical reviews, approved or denied determinations and any other requests to dedicated fax number below belonging to the campus where the patient is receiving treatment   List of dedicated fax numbers:  [address] DENIALS (Administrative/Medical Necessity) [phone]   [address] (Maternity/NICU/Pediatrics) [phone]   [facility] [phone]   [facility] [phone]   [address] [phone]   [address] [facility] [phone]   [address] [place] [address] [phone]   [address] [place] [address] 119 [phone]   [facility]  [phone]   [address] [facility] [phone]   [address] [facility] [address] [facility] [phone]

## 2023-04-03 ENCOUNTER — TELEPHONE (OUTPATIENT)
Dept: NEUROSURGERY | Facility: CLINIC | Age: 58
End: 2023-04-03

## 2023-05-09 ENCOUNTER — HOSPITAL ENCOUNTER (EMERGENCY)
Facility: HOSPITAL | Age: 58
Discharge: HOME | End: 2023-05-09
Attending: EMERGENCY MEDICINE

## 2023-05-09 VITALS
WEIGHT: 143.96 LBS | TEMPERATURE: 98.4 F | RESPIRATION RATE: 20 BRPM | HEART RATE: 86 BPM | SYSTOLIC BLOOD PRESSURE: 137 MMHG | BODY MASS INDEX: 23.24 KG/M2 | OXYGEN SATURATION: 94 % | DIASTOLIC BLOOD PRESSURE: 91 MMHG

## 2023-05-09 DIAGNOSIS — F10.929 ALCOHOL INTOXICATION (HCC): Primary | ICD-10-CM

## 2023-05-09 LAB
ETHANOL EXG-MCNC: 0.32 MG/DL
GLUCOSE SERPL-MCNC: 110 MG/DL (ref 65–140)

## 2023-05-09 RX ORDER — FOLIC ACID 1 MG/1
1 TABLET ORAL ONCE
Status: DISCONTINUED | OUTPATIENT
Start: 2023-05-09 | End: 2023-05-09 | Stop reason: HOSPADM

## 2023-05-09 RX ORDER — LANOLIN ALCOHOL/MO/W.PET/CERES
100 CREAM (GRAM) TOPICAL ONCE
Status: DISCONTINUED | OUTPATIENT
Start: 2023-05-09 | End: 2023-05-09 | Stop reason: HOSPADM

## 2023-05-09 NOTE — ED CARE HANDOFF
Emergency Department Sign Out Note        Sign out and transfer of care from Dr Higginbotham Min  See Separate Emergency Department note  The patient, Nic Ayala, was evaluated by the previous provider for ETOH intox  Workup Completed:  POC ETOH    ED Course / Workup Pending (followup): Patient received in signout acutely intoxicated  Plan for discharge to home when sober, as his girlfriend is unable to pick him up  Patient is sober at this time, requesting a sandwich  Will discharge to home with recommendation for outpatient follow-up with primary care physician as needed  Procedures  MDM        Disposition  Final diagnoses:   Alcohol intoxication ([address] )     Time reflects when diagnosis was documented in both MDM as applicable and the Disposition within this note     Time User Action Codes Description Comment    5/9/2023  3:59 PM Almita Yuan [[id number]] Alcohol intoxication [facility])       ED Disposition     ED Disposition   Discharge    Condition   Stable    Date/Time   Tue May 9, 2023  3:59 PM    Comment   Nic Ayala discharge to home/self care  Follow-up Information     Follow up With Specialties Details Why Franco Schedule an appointment as soon as possible for a visit in 1 day for follow up [address]  [phone]          Patient's Medications   Discharge Prescriptions    No medications on file     No discharge procedures on file         ED Provider  Electronically Signed by     Michael Mclain DO  05/09/23 [id number]

## 2023-05-09 NOTE — ED PROVIDER NOTES
Final Diagnosis:  No diagnosis found  Chief Complaint   Patient presents with   • Alcohol Intoxication     Pt arrives from home via ems, states girlfriend called because he was drunk and has cardiac history         HPI  ***    EMS reports if applicable: N/A ***    - Previous charting underwent limited review with attention to last ED visits, labs, ekgs, and prior imaging    Chart review reveals : ***    Admission on 04/11/2023, Discharged on 04/13/2023   Component Date Value Ref Range Status   • WBC 04/11/2023 5 74  4 31 - 10 16 Thousand/uL Final   • RBC 04/11/2023 4 30  3 88 - 5 62 Million/uL Final   • Hemoglobin 04/11/2023 13 8  12 0 - 17 0 g/dL Final   • Hematocrit 04/11/2023 41 4  36 5 - 49 3 % Final   • MCV 04/11/2023 96  82 - 98 fL Final   • MCH 04/11/2023 32 1  26 8 - 34 3 pg Final   • MCHC 04/11/2023 33 3  31 4 - 37 4 g/dL Final   • RDW 04/11/2023 13 6  11 6 - 15 1 % Final   • MPV 04/11/2023 9 4  8 9 - 12 7 fL Final   • Platelets 13/64/5349 156  149 - 390 Thousands/uL Final   • nRBC 04/11/2023 0  /100 WBCs Final   • Neutrophils Relative 04/11/2023 51  43 - 75 % Final   • Immat GRANS % 04/11/2023 0  0 - 2 % Final   • Lymphocytes Relative 04/11/2023 37  14 - 44 % Final   • Monocytes Relative 04/11/2023 9  4 - 12 % Final   • Eosinophils Relative 04/11/2023 2  0 - 6 % Final   • Basophils Relative 04/11/2023 1  0 - 1 % Final   • Neutrophils Absolute 04/11/2023 2 94  1 85 - 7 62 Thousands/µL Final   • Immature Grans Absolute 04/11/2023 0 01  0 00 - 0 20 Thousand/uL Final   • Lymphocytes Absolute 04/11/2023 2 10  0 60 - 4 47 Thousands/µL Final   • Monocytes Absolute 04/11/2023 0 52  0 17 - 1 22 Thousand/µL Final   • Eosinophils Absolute 04/11/2023 0 09  0 00 - 0 61 Thousand/µL Final   • Basophils Absolute 04/11/2023 0 08  0 00 - 0 10 Thousands/µL Final   • Sodium 04/11/2023 143  135 - 147 mmol/L Final   • Potassium 04/11/2023 3 1 (L)  3 5 - 5 3 mmol/L Final   • Chloride 04/11/2023 101  96 - 108 mmol/L Final   • "CO2 04/11/2023 32  21 - 32 mmol/L Final   • ANION GAP 04/11/2023 10  4 - 13 mmol/L Final   • BUN 04/11/2023 9  5 - 25 mg/dL Final   • Creatinine 04/11/2023 0 54 (L)  0 60 - 1 30 mg/dL Final    Standardized to IDMS reference method   • Glucose 04/11/2023 72  65 - 140 mg/dL Final    If the patient is fasting, the ADA then defines impaired fasting glucose as > 100 mg/dL and diabetes as > or equal to 123 mg/dL  • Calcium 04/11/2023 8 9  8 4 - 10 2 mg/dL Final   • AST 04/11/2023 132 (H)  13 - 39 U/L Final   • ALT 04/11/2023 41  7 - 52 U/L Final    Specimen collection should occur prior to Sulfasalazine administration due to the potential for falsely depressed results  • Alkaline Phosphatase 04/11/2023 48  34 - 104 U/L Final   • Total Protein 04/11/2023 7 7  6 4 - 8 4 g/dL Final   • Albumin 04/11/2023 4 7  3 5 - 5 0 g/dL Final   • Total Bilirubin 04/11/2023 0 55  0 20 - 1 00 mg/dL Final    Use of this assay is not recommended for patients undergoing treatment with eltrombopag due to the potential for falsely elevated results  N-acetyl-p-benzoquinone imine (metabolite of Acetaminophen) will generate erroneously low results in samples for patients that have taken an overdose of Acetaminophen  • eGFR 04/11/2023 114  ml/min/1 73sq m Final   • hs TnI 0hr 04/11/2023 5  \"Refer to ACS Flowchart\"- see link ng/L Final    Comment:                                              Initial (time 0) result  If >=50 ng/L, Myocardial injury suggested ;  Type of myocardial injury and treatment strategy  to be determined  If 5-49 ng/L, a delta result at 2 hours and or 4 hours will be needed to further evaluate  If <4 ng/L, and chest pain has been >3 hours since onset, patient may qualify for discharge based on the HEART score in the ED  If <5 ng/L and <3hours since onset of chest pain, a delta result at 2 hours will be needed to further evaluate      HS Troponin 99th Percentile URL of a Health Population=12 ng/L with a 95% Confidence " "Interval of 8-18 ng/L  Second Troponin (time 2 hours)  If calculated delta >= 20 ng/L,  Myocardial injury suggested ; Type of myocardial injury and treatment strategy to be determined  If 5-49 ng/L and the calculated delta is 5-19 ng/L, consult medical service for evaluation  Continue evaluation for ischemia on ecg and other possible etiology and repeat hs troponin at 4 hours  If delta                            is <5 ng/L at 2 hours, consider discharge based on risk stratification via the HEART score (if in ED), or RUBY risk score in IP/Observation  HS Troponin 99th Percentile URL of a Health Population=12 ng/L with a 95% Confidence Interval of 8-18 ng/L  • SARS-CoV-2 04/11/2023 Negative  Negative Final   • INFLUENZA A PCR 04/11/2023 Negative  Negative Final   • INFLUENZA B PCR 04/11/2023 Negative  Negative Final   • RSV PCR 04/11/2023 Negative  Negative Final   • Ethanol Lvl 04/11/2023 501 (H)  <10 mg/dL Final   • POC Glucose 04/11/2023 77  65 - 140 mg/dl Final   • hs TnI 2hr 04/11/2023 5  \"Refer to ACS Flowchart\"- see link ng/L Final    Comment:                                              Initial (time 0) result  If >=50 ng/L, Myocardial injury suggested ;  Type of myocardial injury and treatment strategy  to be determined  If 5-49 ng/L, a delta result at 2 hours and or 4 hours will be needed to further evaluate  If <4 ng/L, and chest pain has been >3 hours since onset, patient may qualify for discharge based on the HEART score in the ED  If <5 ng/L and <3hours since onset of chest pain, a delta result at 2 hours will be needed to further evaluate  HS Troponin 99th Percentile URL of a Health Population=12 ng/L with a 95% Confidence Interval of 8-18 ng/L  Second Troponin (time 2 hours)  If calculated delta >= 20 ng/L,  Myocardial injury suggested ; Type of myocardial injury and treatment strategy to be determined    If 5-49 ng/L and the calculated delta is 5-19 ng/L, consult medical service for " "evaluation  Continue evaluation for ischemia on ecg and other possible etiology and repeat hs troponin at 4 hours  If delta                            is <5 ng/L at 2 hours, consider discharge based on risk stratification via the HEART score (if in ED), or RUBY risk score in IP/Observation  HS Troponin 99th Percentile URL of a Health Population=12 ng/L with a 95% Confidence Interval of 8-18 ng/L  • Delta 2hr hsTnI 04/11/2023 0  <20 ng/L Final   • hs TnI 4hr 04/11/2023 4  \"Refer to ACS Flowchart\"- see link ng/L Final    Comment:                                              Initial (time 0) result  If >=50 ng/L, Myocardial injury suggested ;  Type of myocardial injury and treatment strategy  to be determined  If 5-49 ng/L, a delta result at 2 hours and or 4 hours will be needed to further evaluate  If <4 ng/L, and chest pain has been >3 hours since onset, patient may qualify for discharge based on the HEART score in the ED  If <5 ng/L and <3hours since onset of chest pain, a delta result at 2 hours will be needed to further evaluate  HS Troponin 99th Percentile URL of a Health Population=12 ng/L with a 95% Confidence Interval of 8-18 ng/L  Second Troponin (time 2 hours)  If calculated delta >= 20 ng/L,  Myocardial injury suggested ; Type of myocardial injury and treatment strategy to be determined  If 5-49 ng/L and the calculated delta is 5-19 ng/L, consult medical service for evaluation  Continue evaluation for ischemia on ecg and other possible etiology and repeat hs troponin at 4 hours  If delta                            is <5 ng/L at 2 hours, consider discharge based on risk stratification via the HEART score (if in ED), or RUBY risk score in IP/Observation  HS Troponin 99th Percentile URL of a Health Population=12 ng/L with a 95% Confidence Interval of 8-18 ng/L     • Delta 4hr hsTnI 04/11/2023 -1  <20 ng/L Final   • Magnesium 04/11/2023 1 7 (L)  1 9 - 2 7 mg/dL Final   • Phosphorus " 04/11/2023 4 3  2 7 - 4 5 mg/dL Final   • Ventricular Rate 04/11/2023 102  BPM Final   • Atrial Rate 04/11/2023 102  BPM Final   • SD Interval 04/11/2023 186  ms Final   • QRSD Interval 04/11/2023 94  ms Final   • QT Interval 04/11/2023 354  ms Final   • QTC Interval 04/11/2023 461  ms Final   • P Axis 04/11/2023 45  degrees Final   • QRS Axis 04/11/2023 -54  degrees Final   • T Wave Axis 04/11/2023 38  degrees Final   • WBC 04/12/2023 8 35  4 31 - 10 16 Thousand/uL Final   • RBC 04/12/2023 4 04  3 88 - 5 62 Million/uL Final   • Hemoglobin 04/12/2023 13 0  12 0 - 17 0 g/dL Final   • Hematocrit 04/12/2023 39 2  36 5 - 49 3 % Final   • MCV 04/12/2023 97  82 - 98 fL Final   • MCH 04/12/2023 32 2  26 8 - 34 3 pg Final   • MCHC 04/12/2023 33 2  31 4 - 37 4 g/dL Final   • RDW 04/12/2023 13 2  11 6 - 15 1 % Final   • MPV 04/12/2023 9 3  8 9 - 12 7 fL Final   • Platelets 84/50/4317 143 (L)  149 - 390 Thousands/uL Final    Results verified by repeat   • nRBC 04/12/2023 0  /100 WBCs Final   • Neutrophils Relative 04/12/2023 77 (H)  43 - 75 % Final   • Immat GRANS % 04/12/2023 0  0 - 2 % Final   • Lymphocytes Relative 04/12/2023 14  14 - 44 % Final   • Monocytes Relative 04/12/2023 8  4 - 12 % Final   • Eosinophils Relative 04/12/2023 1  0 - 6 % Final   • Basophils Relative 04/12/2023 0  0 - 1 % Final   • Neutrophils Absolute 04/12/2023 6 40  1 85 - 7 62 Thousands/µL Final   • Immature Grans Absolute 04/12/2023 0 02  0 00 - 0 20 Thousand/uL Final   • Lymphocytes Absolute 04/12/2023 1 19  0 60 - 4 47 Thousands/µL Final   • Monocytes Absolute 04/12/2023 0 66  0 17 - 1 22 Thousand/µL Final   • Eosinophils Absolute 04/12/2023 0 05  0 00 - 0 61 Thousand/µL Final   • Basophils Absolute 04/12/2023 0 03  0 00 - 0 10 Thousands/µL Final   • Magnesium 04/12/2023 1 0 (L)  1 9 - 2 7 mg/dL Final   • Phosphorus 04/12/2023 2 7  2 7 - 4 5 mg/dL Final   • Sodium 04/12/2023 132 (L)  135 - 147 mmol/L Final   • Potassium 04/12/2023 3 5  3 5 - 5 3 mmol/L Final   • Chloride 04/12/2023 96  96 - 108 mmol/L Final   • CO2 04/12/2023 29  21 - 32 mmol/L Final   • ANION GAP 04/12/2023 7  4 - 13 mmol/L Final   • BUN 04/12/2023 6  5 - 25 mg/dL Final   • Creatinine 04/12/2023 0 45 (L)  0 60 - 1 30 mg/dL Final    Standardized to IDMS reference method   • Glucose 04/12/2023 124  65 - 140 mg/dL Final    If the patient is fasting, the ADA then defines impaired fasting glucose as > 100 mg/dL and diabetes as > or equal to 123 mg/dL  • Calcium 04/12/2023 8 7  8 4 - 10 2 mg/dL Final   • AST 04/12/2023 57 (H)  13 - 39 U/L Final   • ALT 04/12/2023 27  7 - 52 U/L Final    Specimen collection should occur prior to Sulfasalazine administration due to the potential for falsely depressed results  • Alkaline Phosphatase 04/12/2023 36  34 - 104 U/L Final   • Total Protein 04/12/2023 6 6  6 4 - 8 4 g/dL Final   • Albumin 04/12/2023 4 0  3 5 - 5 0 g/dL Final   • Total Bilirubin 04/12/2023 0 95  0 20 - 1 00 mg/dL Final    Use of this assay is not recommended for patients undergoing treatment with eltrombopag due to the potential for falsely elevated results  N-acetyl-p-benzoquinone imine (metabolite of Acetaminophen) will generate erroneously low results in samples for patients that have taken an overdose of Acetaminophen     • eGFR 04/12/2023 123  ml/min/1 73sq m Final   • Magnesium 04/12/2023 1 9  1 9 - 2 7 mg/dL Final   • Color, UA 04/12/2023 Light Yellow   Final   • Clarity, UA 04/12/2023 Clear   Final   • Specific Gravity, UA 04/12/2023 1 025  1 000 - 1 030 Final   • pH, UA 04/12/2023 7 0  5 0, 5 5, 6 0, 6 5, 7 0, 7 5, 8 0, 8 5, 9 0 Final   • Leukocytes, UA 04/12/2023 Negative  Negative Final   • Nitrite, UA 04/12/2023 Negative  Negative Final   • Protein, UA 04/12/2023 Negative  Negative mg/dl Final   • Glucose, UA 04/12/2023 100 (1/10%) (A)  Negative mg/dl Final   • Ketones, UA 04/12/2023 Negative  Negative mg/dl Final   • Urobilinogen, UA 04/12/2023 2 0 (A)  0 2, 1 0 E U /dl E U /dl Final   • Bilirubin, UA 04/12/2023 Negative  Negative Final   • Occult Blood, UA 04/12/2023 Trace-Intact (A)  Negative Final   • RBC, UA 04/12/2023 1-2  None Seen, 0-1, 1-2, 2-4, 0-5 /hpf Final   • WBC, UA 04/12/2023 0-1  None Seen, 0-1, 1-2, 0-5, 2-4 /hpf Final   • Epithelial Cells 04/12/2023 None Seen  None Seen, Occasional /hpf Final   • Bacteria, UA 04/12/2023 Occasional  None Seen, Occasional /hpf Final   • AMORPH URATES 04/12/2023 Moderate  /hpf Final   • WBC 04/13/2023 7 93  4 31 - 10 16 Thousand/uL Final   • RBC 04/13/2023 3 49 (L)  3 88 - 5 62 Million/uL Final   • Hemoglobin 04/13/2023 11 2 (L)  12 0 - 17 0 g/dL Final   • Hematocrit 04/13/2023 34 2 (L)  36 5 - 49 3 % Final   • MCV 04/13/2023 98  82 - 98 fL Final   • MCH 04/13/2023 32 1  26 8 - 34 3 pg Final   • MCHC 04/13/2023 32 7  31 4 - 37 4 g/dL Final   • RDW 04/13/2023 13 0  11 6 - 15 1 % Final   • MPV 04/13/2023 10 7  8 9 - 12 7 fL Final   • Platelets 00/23/7918 134 (L)  149 - 390 Thousands/uL Final   • nRBC 04/13/2023 0  /100 WBCs Final   • Neutrophils Relative 04/13/2023 76 (H)  43 - 75 % Final   • Immat GRANS % 04/13/2023 0  0 - 2 % Final   • Lymphocytes Relative 04/13/2023 14  14 - 44 % Final   • Monocytes Relative 04/13/2023 7  4 - 12 % Final   • Eosinophils Relative 04/13/2023 2  0 - 6 % Final   • Basophils Relative 04/13/2023 1  0 - 1 % Final   • Neutrophils Absolute 04/13/2023 6 11  1 85 - 7 62 Thousands/µL Final   • Immature Grans Absolute 04/13/2023 0 03  0 00 - 0 20 Thousand/uL Final   • Lymphocytes Absolute 04/13/2023 1 10  0 60 - 4 47 Thousands/µL Final   • Monocytes Absolute 04/13/2023 0 52  0 17 - 1 22 Thousand/µL Final   • Eosinophils Absolute 04/13/2023 0 13  0 00 - 0 61 Thousand/µL Final   • Basophils Absolute 04/13/2023 0 04  0 00 - 0 10 Thousands/µL Final   • Sodium 04/13/2023 133 (L)  135 - 147 mmol/L Final   • Potassium 04/13/2023 3 4 (L)  3 5 - 5 3 mmol/L Final   • Chloride 04/13/2023 100  96 - 108 mmol/L Final   • CO2 04/13/2023 24  21 - 32 mmol/L Final   • ANION GAP 04/13/2023 9  4 - 13 mmol/L Final   • BUN 04/13/2023 6  5 - 25 mg/dL Final   • Creatinine 04/13/2023 0 46 (L)  0 60 - 1 30 mg/dL Final    Standardized to IDMS reference method   • Glucose 04/13/2023 147 (H)  65 - 140 mg/dL Final    If the patient is fasting, the ADA then defines impaired fasting glucose as > 100 mg/dL and diabetes as > or equal to 123 mg/dL  • Calcium 04/13/2023 8 4  8 4 - 10 2 mg/dL Final   • AST 04/13/2023 71 (H)  13 - 39 U/L Final   • ALT 04/13/2023 28  7 - 52 U/L Final    Specimen collection should occur prior to Sulfasalazine administration due to the potential for falsely depressed results  • Alkaline Phosphatase 04/13/2023 36  34 - 104 U/L Final   • Total Protein 04/13/2023 6 5  6 4 - 8 4 g/dL Final   • Albumin 04/13/2023 3 9  3 5 - 5 0 g/dL Final   • Total Bilirubin 04/13/2023 0 83  0 20 - 1 00 mg/dL Final    Use of this assay is not recommended for patients undergoing treatment with eltrombopag due to the potential for falsely elevated results  N-acetyl-p-benzoquinone imine (metabolite of Acetaminophen) will generate erroneously low results in samples for patients that have taken an overdose of Acetaminophen  • eGFR 04/13/2023 122  ml/min/1 73sq m Final       - No*** language barrier    - History obtained from patient ***    - There are no*** limitations to the history obtained  - Discuss patient's care, with patient permission or by chart review, with ***     PMH:   has a past medical history of Alcohol abuse, Alcoholic hepatitis, Aneurysm ([facility] [address] ), Atrial fibrillation ([facility] ), Atrial flutter ([facility] ) (07/2015), Cardiomyopathy, nonischemic ([facility] ) (01/07/2015), Congenital heart disease, COPD (chronic obstructive pulmonary disease) ([facility] ), Fall (09/17/2022), Hypertension, Hypokalemia, Poor historian, and Tobacco abuse (1976)  PSH:   has a past surgical history that includes Cardiac surgery (2000);  Abdominal surgery; IR chest tube placement (07/09/2022); Cardiac surgery (1963); pr thoracoscopy w/resection bullae w/wo pleural px (Right, 07/21/2022); THORACOSCOPY VIDEO ASSISTED SURGERY (VATS) (Right, 07/21/2022); and Cardiac defibrillator placement (Left)  Social History:  Tobacco Use: High Risk   • Smoking Tobacco Use: Every Day   • Smokeless Tobacco Use: Never   • Passive Exposure: Not on file     Alcohol Use: Not on file     No illicit use ***      ROS:  Pertinent positives/negatives: ***  Some ROS may be present in the HPI and would take precedent over these standard questions asked below  Review of Systems     CONSTITUTIONAL:  No lethargy  No weakness  No unexpected weight loss  No appetite change  EYES:  No pain, redness, or discharge  No loss of vision  No orbital trauma or pain  ENT:  No tinnitus or decreased hearing  No epistaxis/purulent rhinorrhea  No voice change, airway closing, trismus  CARDIOVASCULAR:  No chest pain  No skin mottling or pallor  No change in exertional capacity  RESPIRATORY:  No hemoptysis  No paroxysmal nocturnal dyspnea  No stridor  No audible wheezing  No production with cough  GASTROINTESTINAL:  Normal appetite  No vomiting, diarrhea  No pain  No bloating  No melena  No hematochezia  GENITOURINARY:  No frequency, urgency, nocturia  No hematuria or dysuria  No discharge  No sores/adenopathy  MUSCULOSKELETAL:  No arthralgias or myalgias that are new  No new deformity  INTEGUMENTARY:  No swelling  No unexpected contusions  No abrasions  No lymphangitis  NEUROLOGIC:  No meningismus  No new numbness of the extremities  No new focal weakness  No postural instability  PSYCHIATRIC:  No SI HI AVH  HEMATOLOGICAL:  No bleeding  No petechiae  No bruising  ALLERGIES:  No urticaria  No sudden abd cramping  No stridor      PE:     Physical exam highlights: ***  Physical Exam       Vitals:    05/09/23 0603   BP: 135/75   BP Location: Right arm   Pulse: (!) 110   Resp: 18   Temp: 98 4 °F (36 9 °C)   TempSrc: Oral   SpO2: 95%   Weight: 65 3 kg (143 lb 15 4 oz)     Vitals reviewed by me  Nursing note reviewed  Chaperone present for all sensitive exam   Const: No acute distress  Alert  Nontoxic  Not diaphoretic  HEENT: External ears normal  No protrusion drainage swelling  Nose normal  No drainage/traumatic deformity  MMM  Mouth with baseline/symmetric movement  No trismus  Eyes: No squinting  No icterus  No tearing/swelling/drainage  Tracks through the room with normal EOM  Neck: ROM normal  No rigidity  No meningismus  Cards: Rate as per vitals Compared to monitor sinus unless documented  Regular Well perfused  Pulm: able to verbalize without additional effort  Effort and excursion normal  No distress  No audible wheezing/ stridor  Normal resp rate without retraction or change in work of breathing  Abd: No distension beyond baseline  No fluctuant wave  Patient without peritoneal pain with shifting/bumping the bed  MSK: ROM normal baseline  No deformity  No contractures from baseline  Skin: No new rashes visible  Well perfused  No wounds visualized on exposed skin  Neuro: Nonfocal  Baseline  CN grossly intact  Moving all four with coordination  Psych: Normal behavior and affect  A:  - Nursing note reviewed  Ddx and MDM  Considered diagnoses  ***          My conversation with consultant reveals: NA ***      Decision rules:                    ED Course as of 05/09/23 0639   Tue May 09, 2023   3032 Procedure Note: EKG  Date/Time: 05/09/23 6:10 AM   Interpreted by: Lizeth Wood  Indications / Diagnosis: intox, tachy  ECG reviewed by me, the ED Provider: yes   The EKG demonstrates:  Rhythm: sinus  tach 111  Intervals: normal intervals  Axis: left  axis  QRS/Blocks: normal QRS  ST Changes: No acute ST Changes, no STD/WANDER    T wave changes: none             My read of the XR/CT scan reveals:  NA ***  No orders to display       Orders Placed This Encounter   Procedures   • Fingerstick Glucose (POCT) • POCT alcohol breath test   • ECG 12 lead     Labs Reviewed - No data to display    *Each of these labs was reviewed  Particular standout labs will be noted in the ED Course above     Final Diagnosis:  No diagnosis found  P:  - hospital tx includes ***  Medications   multivitamin-minerals (CENTRUM) tablet 1 tablet (has no administration in time range)   folic acid (FOLVITE) tablet 1 mg (has no administration in time range)   thiamine tablet 100 mg (has no administration in time range)         - disposition***  ED Disposition     None      Follow-up Information    None         - patient will call their PCP to let them know they were in the emergency department  We discuss return precautions and patient is agreeable with plan and aformentioned disposition  - additional treatment intended, if consistent with primary provider:  - patient to follow with :      Patient's Medications   Discharge Prescriptions    No medications on file     No discharge procedures on file  Prior to Admission Medications   Prescriptions Last Dose Informant Patient Reported? Taking?    Klor-Con M20 20 MEQ tablet   No No   Sig: TAKE 1 TABLET BY MOUTH EVERY DAY   Patient not taking: Reported on 4/11/2023   Multiple Vitamin (multivitamin) capsule   No No   Sig: Take 1 capsule by mouth daily   Polyethylene Glycol 3350 (MIRALAX PO)   Yes No   Sig: Take by mouth once 238 gm with dulcolax   Patient not taking: Reported on 4/11/2023   Xarelto 20 MG tablet   No No   Sig: TAKE 1 TABLET BY MOUTH DAILY WITH BREAKFAST   Patient not taking: Reported on 4/11/2023   aspirin (ECOTRIN LOW STRENGTH) 81 mg EC tablet   No No   Sig: Take 1 tablet (81 mg total) by mouth daily   Patient not taking: Reported on 2/10/2804   folic acid (FOLVITE) 1 mg tablet   No No   Sig: Take 1 tablet (1 mg total) by mouth daily   levalbuterol (XOPENEX) 1 25 mg/0 5 mL nebulizer solution   No No   Sig: Take 0 5 mL (1 25 mg total) by nebulization every 8 (eight) hours as "needed for wheezing or shortness of breath   Patient not taking: Reported on 4/11/2023   losartan (COZAAR) 25 mg tablet   No No   Sig: TAKE 1 TABLET (25 MG TOTAL) BY MOUTH DAILY  magnesium oxide (MAG-OX) 400 mg   No No   Sig: Take 1 tablet (400 mg total) by mouth 2 (two) times a day   metoprolol succinate (TOPROL-XL) 50 mg 24 hr tablet   No No   Sig: Take 1 tablet (50 mg total) by mouth daily   Patient taking differently: Take 50 mg by mouth daily at bedtime Takes 1/2 tablet daily   naltrexone (REVIA) 50 mg tablet   No No   Sig: Take 1 tablet (50 mg total) by mouth daily   Patient taking differently: Take 50 mg by mouth every morning   thiamine 100 MG tablet   No No   Sig: Take 1 tablet (100 mg total) by mouth daily      Facility-Administered Medications: None       Portions of the record may have been created with voice recognition software  Occasional wrong word or \"sound a like\" substitutions may have occurred due to the inherent limitations of voice recognition software  Read the chart carefully and recognize, using context, where substitutions have occurred      Electronically signed by:  Woody Spears MD    " "at bedtime Takes 1/2 tablet daily   naltrexone (REVIA) 50 mg tablet   No No   Sig: Take 1 tablet (50 mg total) by mouth daily   Patient taking differently: Take 50 mg by mouth every morning   thiamine 100 MG tablet   No No   Sig: Take 1 tablet (100 mg total) by mouth daily      Facility-Administered Medications: None       Portions of the record may have been created with voice recognition software  Occasional wrong word or \"sound a like\" substitutions may have occurred due to the inherent limitations of voice recognition software  Read the chart carefully and recognize, using context, where substitutions have occurred      Electronically signed by:  MD Chanda Bazzi MD  05/15/23 0147    "

## 2023-05-11 LAB
ATRIAL RATE: 111 BPM
P AXIS: 43 DEGREES
PR INTERVAL: 178 MS
QRS AXIS: -36 DEGREES
QRSD INTERVAL: 98 MS
QT INTERVAL: 344 MS
QTC INTERVAL: 467 MS
T WAVE AXIS: 38 DEGREES
VENTRICULAR RATE: 111 BPM

## 2023-06-03 NOTE — TELEPHONE ENCOUNTER
LVM for pt to confirm GI procedures scheduled for 2/10/2023 at [facility] w/ Dr Castorena Loss THORACIC SURGERY PROGRESS NOTE    Patient: ZEYNEP ROSSI , 40y ([birth year])Female   MRN: [id number]  Location: [address]  Visit: 05-23-23 Inpatient  Date: 06-03-23 @ 00:17    Hospital Day #: 10  Post-Op Day #:    Procedure/Dx/Injuries: PNA, left effusion    Events of past 24 hours: s/p 16F CT placed on left- to sxn    PAST MEDICAL & SURGICAL HISTORY:      Vitals:   T(F): 99.7 (06-03-23 @ 00:00), Max: 101.8 (06-02-23 @ 04:00)  HR: 128 (06-03-23 @ 00:00)  BP: 149/90 (06-03-23 @ 00:00)  RR: 26 (06-03-23 @ 00:00)  SpO2: 98% (06-03-23 @ 00:00)  Mode: AC/ CMV (Assist Control/ Continuous Mandatory Ventilation), RR (machine): 26, TV (machine): 380, FiO2: 40, PEEP: 10, ITime: 1, MAP: 19, PC: 18, PIP: 33    Diet, NPO with Tube Feed:   Tube Feeding Modality: Orogastric  Peptamen Intense VHP  Total Volume for 24 Hours (mL): 1200  Continuous  Starting Tube Feed Rate mL per Hour: 20  Increase Tube Feed Rate by (mL): 20     Every 4 hours  Until Goal Tube Feed Rate (mL per Hour): 50  Tube Feed Duration (in Hours): 24  Tube Feed Start Time: 12:00  Free Water Flush   Total Volume per Flush (mL): 300   Frequency: Every 6 Hours  Diet, NPO after Midnight:      NPO Start Date: 30-May-2023,   NPO Start Time: 23:59  Except Medications      Fluids:     I & O's:    06-01-23 @ 07:01  -  06-02-23 @ 07:00  --------------------------------------------------------  IN:    Dexmedetomidine: 765.6 mL    FentaNYL: 799 mL    FentaNYL: 212.5 mL    Ketamine: 430.8 mL    Peptamen A.F.: 10 mL    PRBCs (Packed Red Blood Cells): 300 mL  Total IN: 2517.9 mL    OUT:    Indwelling Catheter - Urethral (mL): 2785 mL  Total OUT: 2785 mL    Total NET: -267.1 mL    PHYSICAL EXAM:  General: NAD, sedated and paralyzed   HEENT: NCAT, TRAM, EOMI, Trachea ML, Neck supple  Cardiac: RRR S1, S2, no Murmurs, rubs or gallops  Respiratory: mechanically vented, Left CT to sxn  Abdomen: Soft, non-distended  Vascular: Pulses 2+ throughout, extremities well perfused  Skin: Warm/dry, normal color, no jaundice      MEDICATIONS  (STANDING):  acyclovir IVPB 125 milliGRAM(s) IV Intermittent every 24 hours  albuterol    90 MICROgram(s) HFA Inhaler 4 Puff(s) Inhalation every 6 hours  chlorhexidine 0.12% Liquid 15 milliLiter(s) Oral Mucosa every 12 hours  chlorhexidine 2% Cloths 1 Application(s) Topical daily  chlorhexidine 2% Cloths 1 Application(s) Topical <User Schedule>  cisatracurium Infusion 3 MICROgram(s)/kG/Min (15.3 mL/Hr) IV Continuous <Continuous>  dexMEDEtomidine Infusion 0.2 MICROgram(s)/kG/Hr (4.25 mL/Hr) IV Continuous <Continuous>  dextrose 5%. 1000 milliLiter(s) (60 mL/Hr) IV Continuous <Continuous>  fentaNYL   Infusion. 0.555 MICROgram(s)/kG/Hr (4.72 mL/Hr) IV Continuous <Continuous>  insulin lispro (ADMELOG) corrective regimen sliding scale   SubCutaneous three times a day before meals  ipratropium 17 MICROgram(s) HFA Inhaler 4 Puff(s) Inhalation every 6 hours  ketamine Infusion. 0.25 mG/kG/Hr (2.13 mL/Hr) IV Continuous <Continuous>  meropenem  IVPB 1000 milliGRAM(s) IV Intermittent every 12 hours  midazolam Infusion 0.02 mG/kG/Hr (1.7 mL/Hr) IV Continuous <Continuous>  norepinephrine Infusion 0.05 MICROgram(s)/kG/Min (7.97 mL/Hr) IV Continuous <Continuous>  oseltamivir Suspension 30 milliGRAM(s) Oral every 12 hours  pantoprazole   Suspension 40 milliGRAM(s) Oral daily  polyethylene glycol 3350 17 Gram(s) Oral every 12 hours  propofol Infusion 11.098 MICROgram(s)/kG/Min (5.66 mL/Hr) IV Continuous <Continuous>  senna 2 Tablet(s) Oral at bedtime  sodium bicarbonate 650 milliGRAM(s) Oral every 8 hours  vancomycin  IVPB 1000 milliGRAM(s) IV Intermittent every 24 hours  vitamin A &amp; D Ointment 1 Application(s) Topical daily    MEDICATIONS  (PRN):  acetaminophen     Tablet .. 650 milliGRAM(s) Oral every 6 hours PRN Temp greater or equal to 38C (100.4F)  albuterol    90 MICROgram(s) HFA Inhaler 4 Puff(s) Inhalation every 4 hours PRN Shortness of Breath and/or Wheezing  ipratropium 17 MICROgram(s) HFA Inhaler 4 Puff(s) Inhalation <User Schedule> PRN -  midazolam Injectable 2 milliGRAM(s) IV Push every 4 hours PRN Agitation      DVT PROPHYLAXIS:   GI PROPHYLAXIS: pantoprazole   Suspension 40 milliGRAM(s) Oral daily    ANTICOAGULATION:   ANTIBIOTICS:  acyclovir IVPB 125 milliGRAM(s)  meropenem  IVPB 1000 milliGRAM(s)  oseltamivir Suspension 30 milliGRAM(s)  vancomycin  IVPB 1000 milliGRAM(s)      LAB/STUDIES:  Labs:  CAPILLARY BLOOD GLUCOSE      POCT Blood Glucose.: 110 mg/dL (02 Jun 2023 18:05)  POCT Blood Glucose.: 120 mg/dL (02 Jun 2023 12:31)  POCT Blood Glucose.: 107 mg/dL (02 Jun 2023 07:03)                          7.8    21.97 )-----------( 249      ( 02 Jun 2023 05:00 )             25.0       Auto Neutrophil %: 80.3 % (06-02-23 @ 05:00)  Auto Immature Granulocyte %: 3.2 % (06-02-23 @ 05:00)    06-02    148<H>  |  113<H>  |  78<HH>  ----------------------------<  133<H>  3.9   |  24  |  2.6<H>      Calcium, Total Serum: 8.1 mg/dL (06-02-23 @ 23:32)      LFTs:             6.2  | 0.4  | 20       ------------------[68      ( 02 Jun 2023 05:00 )  2.3  | x    | 10          Lipase:x      Amylase:x         Blood Gas Arterial, Lactate: 1.00 mmol/L (06-02-23 @ 16:09)  Blood Gas Arterial, Lactate: 0.90 mmol/L (06-02-23 @ 14:30)  Blood Gas Arterial, Lactate: 0.80 mmol/L (06-02-23 @ 03:08)  Blood Gas Arterial, Lactate: 0.90 mmol/L (06-01-23 @ 03:30)  Blood Gas Arterial, Lactate: 1.00 mmol/L (05-31-23 @ 03:36)    ABG - ( 02 Jun 2023 16:09 )  pH: 7.34  /  pCO2: 48    /  pO2: 98    / HCO3: 26    / Base Excess: -0.4  /  SaO2: 98.2        ABG - ( 02 Jun 2023 14:30 )  pH: 7.29  /  pCO2: 53    /  pO2: 93    / HCO3: 26    / Base Excess: -1.3  /  SaO2: 98.3        ABG - ( 02 Jun 2023 03:08 )  pH: 7.46  /  pCO2: 34    /  pO2: 102   / HCO3: 24    / Base Excess: 0.5   /  SaO2: 98.3        Coags:     14.80  ----< 1.29    ( 01 Jun 2023 16:49 )     29.3        CARDIAC MARKERS ( 02 Jun 2023 05:00 )  x     / x     / 83 U/L / x     / x      CARDIAC MARKERS ( 01 Jun 2023 04:59 )  x     / x     / 31 U/L / x     / x          Culture - Blood (collected 01 Jun 2023 04:59)  Source: .Blood None  Preliminary Report (02 Jun 2023 19:02):    No growth to date.    Culture - Sputum (collected 01 Jun 2023 02:15)  Source: ET Tube ET Tube  Gram Stain (01 Jun 2023 17:36):    Moderate polymorphonuclear leukocytes per low power field    No Squamous epithelial cells per low power field    Rare Gram positive cocci in pairs per oil power field  Preliminary Report (02 Jun 2023 08:47):    Normal Respiratory Laila present    Culture - Blood (collected 31 May 2023 04:48)  Source: .Blood None  Preliminary Report (01 Jun 2023 16:01):    No growth to date.    IMAGING:      ACCESS/ DEVICES:  [X ] Peripheral IV  [X ] Central Venous Line	[ ] R	[ ] L	[ ] IJ	[ ] Fem	[ ] SC	Placed:   [X ] Arterial Line		[ ] R	[ ] L	[ ] Fem	[ ] Rad	[ ] Ax	Placed:   [ ] PICC:					[ ] Mediport  [ ] Urinary Catheter,  Date Placed:   [X ] Chest tube: [ ] Right, [X ] Left  [ ] CARTER/Anjum Drains

## 2023-06-18 ENCOUNTER — HOSPITAL ENCOUNTER (EMERGENCY)
Facility: HOSPITAL | Age: 58
End: 2023-06-19
Attending: EMERGENCY MEDICINE
Payer: COMMERCIAL

## 2023-06-18 DIAGNOSIS — F10.929 ALCOHOL INTOXICATION (HCC): Primary | ICD-10-CM

## 2023-06-18 DIAGNOSIS — E87.6 HYPOKALEMIA: ICD-10-CM

## 2023-06-18 LAB
ALBUMIN SERPL BCP-MCNC: 4.1 G/DL (ref 3.5–5)
ALP SERPL-CCNC: 68 U/L (ref 34–104)
ALT SERPL W P-5'-P-CCNC: 11 U/L (ref 7–52)
ANION GAP SERPL CALCULATED.3IONS-SCNC: 12 MMOL/L (ref 4–13)
APTT PPP: 29 SECONDS (ref 23–37)
AST SERPL W P-5'-P-CCNC: 42 U/L (ref 13–39)
BASOPHILS # BLD AUTO: 0.04 THOUSANDS/ÂΜL (ref 0–0.1)
BASOPHILS NFR BLD AUTO: 1 % (ref 0–1)
BILIRUB SERPL-MCNC: 0.36 MG/DL (ref 0.2–1)
BUN SERPL-MCNC: 7 MG/DL (ref 5–25)
CALCIUM SERPL-MCNC: 8.5 MG/DL (ref 8.4–10.2)
CARDIAC TROPONIN I PNL SERPL HS: 4 NG/L
CHLORIDE SERPL-SCNC: 99 MMOL/L (ref 96–108)
CO2 SERPL-SCNC: 28 MMOL/L (ref 21–32)
CREAT SERPL-MCNC: 0.5 MG/DL (ref 0.6–1.3)
EOSINOPHIL # BLD AUTO: 0.03 THOUSAND/ÂΜL (ref 0–0.61)
EOSINOPHIL NFR BLD AUTO: 1 % (ref 0–6)
ERYTHROCYTE [DISTWIDTH] IN BLOOD BY AUTOMATED COUNT: 13.7 % (ref 11.6–15.1)
ETHANOL SERPL-MCNC: 486 MG/DL
GFR SERPL CREATININE-BSD FRML MDRD: 117 ML/MIN/1.73SQ M
GLUCOSE SERPL-MCNC: 118 MG/DL (ref 65–140)
HCT VFR BLD AUTO: 36.6 % (ref 36.5–49.3)
HGB BLD-MCNC: 12.5 G/DL (ref 12–17)
IMM GRANULOCYTES # BLD AUTO: 0.02 THOUSAND/UL (ref 0–0.2)
IMM GRANULOCYTES NFR BLD AUTO: 0 % (ref 0–2)
INR PPP: 0.89 (ref 0.84–1.19)
LYMPHOCYTES # BLD AUTO: 1.47 THOUSANDS/ÂΜL (ref 0.6–4.47)
LYMPHOCYTES NFR BLD AUTO: 26 % (ref 14–44)
MCH RBC QN AUTO: 32.9 PG (ref 26.8–34.3)
MCHC RBC AUTO-ENTMCNC: 34.2 G/DL (ref 31.4–37.4)
MCV RBC AUTO: 96 FL (ref 82–98)
MONOCYTES # BLD AUTO: 0.66 THOUSAND/ÂΜL (ref 0.17–1.22)
MONOCYTES NFR BLD AUTO: 12 % (ref 4–12)
NEUTROPHILS # BLD AUTO: 3.35 THOUSANDS/ÂΜL (ref 1.85–7.62)
NEUTS SEG NFR BLD AUTO: 60 % (ref 43–75)
NRBC BLD AUTO-RTO: 0 /100 WBCS
PLATELET # BLD AUTO: 150 THOUSANDS/UL (ref 149–390)
PMV BLD AUTO: 9.1 FL (ref 8.9–12.7)
POTASSIUM SERPL-SCNC: 2.8 MMOL/L (ref 3.5–5.3)
PROT SERPL-MCNC: 6.8 G/DL (ref 6.4–8.4)
PROTHROMBIN TIME: 12.2 SECONDS (ref 11.6–14.5)
RBC # BLD AUTO: 3.8 MILLION/UL (ref 3.88–5.62)
SODIUM SERPL-SCNC: 139 MMOL/L (ref 135–147)
WBC # BLD AUTO: 5.57 THOUSAND/UL (ref 4.31–10.16)

## 2023-06-18 PROCEDURE — 99285 EMERGENCY DEPT VISIT HI MDM: CPT

## 2023-06-18 PROCEDURE — 93005 ELECTROCARDIOGRAM TRACING: CPT

## 2023-06-18 PROCEDURE — 85025 COMPLETE CBC W/AUTO DIFF WBC: CPT | Performed by: EMERGENCY MEDICINE

## 2023-06-18 PROCEDURE — 85610 PROTHROMBIN TIME: CPT | Performed by: EMERGENCY MEDICINE

## 2023-06-18 PROCEDURE — 96361 HYDRATE IV INFUSION ADD-ON: CPT

## 2023-06-18 PROCEDURE — 84484 ASSAY OF TROPONIN QUANT: CPT | Performed by: EMERGENCY MEDICINE

## 2023-06-18 PROCEDURE — 82077 ASSAY SPEC XCP UR&BREATH IA: CPT | Performed by: EMERGENCY MEDICINE

## 2023-06-18 PROCEDURE — 36415 COLL VENOUS BLD VENIPUNCTURE: CPT | Performed by: EMERGENCY MEDICINE

## 2023-06-18 PROCEDURE — 85730 THROMBOPLASTIN TIME PARTIAL: CPT | Performed by: EMERGENCY MEDICINE

## 2023-06-18 PROCEDURE — 80053 COMPREHEN METABOLIC PANEL: CPT | Performed by: EMERGENCY MEDICINE

## 2023-06-18 RX ORDER — POTASSIUM CHLORIDE 20 MEQ/1
40 TABLET, EXTENDED RELEASE ORAL ONCE
Status: COMPLETED | OUTPATIENT
Start: 2023-06-18 | End: 2023-06-18

## 2023-06-18 RX ADMIN — SODIUM CHLORIDE 1000 ML: 0.9 INJECTION, SOLUTION INTRAVENOUS at 23:03

## 2023-06-18 RX ADMIN — POTASSIUM CHLORIDE 40 MEQ: 1500 TABLET, EXTENDED RELEASE ORAL at 23:03

## 2023-06-19 ENCOUNTER — APPOINTMENT (EMERGENCY)
Dept: RADIOLOGY | Facility: HOSPITAL | Age: 58
End: 2023-06-19
Payer: COMMERCIAL

## 2023-06-19 ENCOUNTER — HOSPITAL ENCOUNTER (INPATIENT)
Facility: HOSPITAL | Age: 58
LOS: 17 days | Discharge: OTHER INSTITUTION | DRG: 309 | End: 2023-07-06
Attending: EMERGENCY MEDICINE | Admitting: EMERGENCY MEDICINE
Payer: COMMERCIAL

## 2023-06-19 VITALS
RESPIRATION RATE: 22 BRPM | OXYGEN SATURATION: 98 % | HEIGHT: 66 IN | DIASTOLIC BLOOD PRESSURE: 81 MMHG | TEMPERATURE: 98.4 F | HEART RATE: 104 BPM | BODY MASS INDEX: 22.53 KG/M2 | SYSTOLIC BLOOD PRESSURE: 166 MMHG | WEIGHT: 140.21 LBS

## 2023-06-19 DIAGNOSIS — E87.8 ELECTROLYTE IMBALANCE: ICD-10-CM

## 2023-06-19 DIAGNOSIS — F10.929 ALCOHOL INTOXICATION (HCC): ICD-10-CM

## 2023-06-19 DIAGNOSIS — I42.9 CARDIOMYOPATHY, SECONDARY (HCC): ICD-10-CM

## 2023-06-19 DIAGNOSIS — E83.42 HYPOMAGNESEMIA: ICD-10-CM

## 2023-06-19 DIAGNOSIS — E87.1 HYPONATREMIA: ICD-10-CM

## 2023-06-19 DIAGNOSIS — Z45.02 AICD DISCHARGE: ICD-10-CM

## 2023-06-19 DIAGNOSIS — R00.0 TACHYCARDIA WITH HEART RATE 121-140 BEATS PER MINUTE: ICD-10-CM

## 2023-06-19 DIAGNOSIS — F10.10 ALCOHOL ABUSE: Primary | ICD-10-CM

## 2023-06-19 DIAGNOSIS — I48.0 PAROXYSMAL ATRIAL FIBRILLATION (HCC): ICD-10-CM

## 2023-06-19 DIAGNOSIS — R06.2 WHEEZING: ICD-10-CM

## 2023-06-19 PROBLEM — F10.20 ALCOHOL USE DISORDER, SEVERE, DEPENDENCE (HCC): Status: ACTIVE | Noted: 2023-06-19

## 2023-06-19 LAB
2HR DELTA HS TROPONIN: 0 NG/L
CARDIAC TROPONIN I PNL SERPL HS: 4 NG/L

## 2023-06-19 PROCEDURE — 96375 TX/PRO/DX INJ NEW DRUG ADDON: CPT

## 2023-06-19 PROCEDURE — 36415 COLL VENOUS BLD VENIPUNCTURE: CPT | Performed by: EMERGENCY MEDICINE

## 2023-06-19 PROCEDURE — 84484 ASSAY OF TROPONIN QUANT: CPT | Performed by: EMERGENCY MEDICINE

## 2023-06-19 PROCEDURE — 96374 THER/PROPH/DIAG INJ IV PUSH: CPT

## 2023-06-19 PROCEDURE — C9113 INJ PANTOPRAZOLE SODIUM, VIA: HCPCS | Performed by: EMERGENCY MEDICINE

## 2023-06-19 PROCEDURE — 99291 CRITICAL CARE FIRST HOUR: CPT

## 2023-06-19 PROCEDURE — 71045 X-RAY EXAM CHEST 1 VIEW: CPT

## 2023-06-19 PROCEDURE — HZ2ZZZZ DETOXIFICATION SERVICES FOR SUBSTANCE ABUSE TREATMENT: ICD-10-PCS | Performed by: EMERGENCY MEDICINE

## 2023-06-19 RX ORDER — ONDANSETRON 2 MG/ML
4 INJECTION INTRAMUSCULAR; INTRAVENOUS ONCE
Status: COMPLETED | OUTPATIENT
Start: 2023-06-19 | End: 2023-06-19

## 2023-06-19 RX ORDER — METOPROLOL SUCCINATE 25 MG/1
25 TABLET, EXTENDED RELEASE ORAL DAILY
Status: DISCONTINUED | OUTPATIENT
Start: 2023-06-20 | End: 2023-06-25

## 2023-06-19 RX ORDER — ACETAMINOPHEN 325 MG/1
650 TABLET ORAL EVERY 6 HOURS PRN
Status: DISCONTINUED | OUTPATIENT
Start: 2023-06-19 | End: 2023-07-06 | Stop reason: HOSPADM

## 2023-06-19 RX ORDER — PHENOBARBITAL SODIUM 130 MG/ML
130 INJECTION INTRAMUSCULAR ONCE
Status: COMPLETED | OUTPATIENT
Start: 2023-06-19 | End: 2023-06-19

## 2023-06-19 RX ORDER — LOSARTAN POTASSIUM 25 MG/1
25 TABLET ORAL DAILY
Status: DISCONTINUED | OUTPATIENT
Start: 2023-06-20 | End: 2023-07-06 | Stop reason: HOSPADM

## 2023-06-19 RX ORDER — LANOLIN ALCOHOL/MO/W.PET/CERES
100 CREAM (GRAM) TOPICAL DAILY
Status: DISCONTINUED | OUTPATIENT
Start: 2023-06-20 | End: 2023-06-20

## 2023-06-19 RX ORDER — TRAZODONE HYDROCHLORIDE 50 MG/1
50 TABLET ORAL
Status: DISCONTINUED | OUTPATIENT
Start: 2023-06-19 | End: 2023-06-24

## 2023-06-19 RX ORDER — PHENOBARBITAL SODIUM 130 MG/ML
260 INJECTION INTRAMUSCULAR ONCE
Status: COMPLETED | OUTPATIENT
Start: 2023-06-19 | End: 2023-06-19

## 2023-06-19 RX ORDER — ONDANSETRON 2 MG/ML
4 INJECTION INTRAMUSCULAR; INTRAVENOUS EVERY 6 HOURS PRN
Status: DISCONTINUED | OUTPATIENT
Start: 2023-06-19 | End: 2023-07-06 | Stop reason: HOSPADM

## 2023-06-19 RX ORDER — FOLIC ACID 1 MG/1
1 TABLET ORAL DAILY
Status: DISCONTINUED | OUTPATIENT
Start: 2023-06-20 | End: 2023-07-06 | Stop reason: HOSPADM

## 2023-06-19 RX ORDER — DIAZEPAM 5 MG/ML
5 INJECTION, SOLUTION INTRAMUSCULAR; INTRAVENOUS ONCE
Status: COMPLETED | OUTPATIENT
Start: 2023-06-19 | End: 2023-06-19

## 2023-06-19 RX ORDER — LANOLIN ALCOHOL/MO/W.PET/CERES
100 CREAM (GRAM) TOPICAL ONCE
Status: COMPLETED | OUTPATIENT
Start: 2023-06-19 | End: 2023-06-19

## 2023-06-19 RX ORDER — FOLIC ACID 1 MG/1
1 TABLET ORAL ONCE
Status: COMPLETED | OUTPATIENT
Start: 2023-06-19 | End: 2023-06-19

## 2023-06-19 RX ORDER — ENOXAPARIN SODIUM 100 MG/ML
40 INJECTION SUBCUTANEOUS DAILY
Status: DISCONTINUED | OUTPATIENT
Start: 2023-06-20 | End: 2023-06-19

## 2023-06-19 RX ORDER — ALBUTEROL SULFATE 90 UG/1
2 AEROSOL, METERED RESPIRATORY (INHALATION) EVERY 4 HOURS PRN
Status: DISCONTINUED | OUTPATIENT
Start: 2023-06-19 | End: 2023-07-06 | Stop reason: HOSPADM

## 2023-06-19 RX ORDER — NICOTINE 21 MG/24HR
1 PATCH, TRANSDERMAL 24 HOURS TRANSDERMAL DAILY
Status: DISCONTINUED | OUTPATIENT
Start: 2023-06-20 | End: 2023-07-06 | Stop reason: HOSPADM

## 2023-06-19 RX ORDER — PANTOPRAZOLE SODIUM 40 MG/10ML
40 INJECTION, POWDER, LYOPHILIZED, FOR SOLUTION INTRAVENOUS ONCE
Status: COMPLETED | OUTPATIENT
Start: 2023-06-19 | End: 2023-06-19

## 2023-06-19 RX ADMIN — PHENOBARBITAL SODIUM 130 MG: 130 INJECTION INTRAMUSCULAR at 23:16

## 2023-06-19 RX ADMIN — Medication 1 TABLET: at 02:23

## 2023-06-19 RX ADMIN — Medication 650 MG: at 18:53

## 2023-06-19 RX ADMIN — PANTOPRAZOLE SODIUM 40 MG: 40 INJECTION, POWDER, FOR SOLUTION INTRAVENOUS at 11:13

## 2023-06-19 RX ADMIN — DIAZEPAM 5 MG: 10 INJECTION, SOLUTION INTRAMUSCULAR; INTRAVENOUS at 15:19

## 2023-06-19 RX ADMIN — ONDANSETRON 4 MG: 2 INJECTION INTRAMUSCULAR; INTRAVENOUS at 15:18

## 2023-06-19 RX ADMIN — PHENOBARBITAL SODIUM 260 MG: 130 INJECTION INTRAMUSCULAR at 20:54

## 2023-06-19 RX ADMIN — THIAMINE HCL TAB 100 MG 100 MG: 100 TAB at 02:23

## 2023-06-19 RX ADMIN — FOLIC ACID 1 MG: 1 TABLET ORAL at 02:23

## 2023-06-19 NOTE — ED NOTES
Patient is awake and watching TV  No acute signs of distress  Respirations even and unlabored  Will continue to monitor        Sukumar Marinelli RN  06/19/23 [id number]

## 2023-06-19 NOTE — LETTER
[address] [facility] [address]  [phone]  Dept: [phone]    June 22, 2023     Patient: García Paulino   YOB: 1965   Date of Visit: 6/19/2023       To Whom it May Concern:    García Paulino is under my professional care. He was admitted on 6/19/2023 and at this time no discharge date has been determined. If you have any questions or concerns, please don't hesitate to call.          Sincerely,        ANGEL Hernandez

## 2023-06-19 NOTE — ED NOTES
Patient is sleeping at this time  No acute signs of distress  Respirations appear even and unlabored  Will continue to monitor        Burt Tran RN  06/19/23 [id number]
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
## 2023-06-19 NOTE — ED NOTES
Patient provided with turkey sandwich box and apple juice  O K to do so as per Dr Marisela Araiza, [facility]  06/18/23 1142

## 2023-06-19 NOTE — EMTALA/ACUTE CARE TRANSFER
[address] [address]  Dept: [phone]      EMTALA TRANSFER CONSENT    NAME Nic Ayala                                         1965                              MRN [id number]    I have been informed of my rights regarding examination, treatment, and transfer   by Dr Marian Aaron MD    Benefits: Specialized equipment and/or services available at the receiving facility (Include comment)________________________    Risks: Potential for delay in receiving treatment      Transfer Request   I acknowledge that my medical condition has been evaluated and explained to me by the emergency department physician or other qualified medical person and/or my attending physician who has recommended and offered to me further medical examination and treatment  I understand the Hospital's obligation with respect to the treatment and stabilization of my emergency medical condition  I nevertheless request to be transferred  I release the Hospital, the doctor, and any other persons caring for me from all responsibility or liability for any injury or ill effects that may result from my transfer and agree to accept all responsibility for the consequences of my choice to transfer, rather than receive stabilizing treatment at the Hospital  I understand that because the transfer is my request, my insurance may not provide reimbursement for the services  The Hospital will assist and direct me and my family in how to make arrangements for transfer, but the hospital is not liable for any fees charged by the transport service  In spite of this understanding, I refuse to consent to further medical examination and treatment which has been offered to me, and request transfer to  Kelly Mendosa Name, [address] : [address]   I authorize the performance of emergency medical procedures and treatments upon me in both transit and upon arrival at the receiving facility  Additionally, I authorize the release of any and all medical records to the receiving facility and request they be transported with me, if possible  I authorize the performance of emergency medical procedures and treatments upon me in both transit and upon arrival at the receiving facility  Additionally, I authorize the release of any and all medical records to the receiving facility and request they be transported with me, if possible  I understand that the safest mode of transportation during a medical emergency is an ambulance and that the Hospital advocates the use of this mode of transport  Risks of traveling to the receiving facility by car, including absence of medical control, life sustaining equipment, such as oxygen, and medical personnel has been explained to me and I fully understand them  (LINA CORRECT BOX BELOW)  [  ]  I consent to the stated transfer and to be transported by ambulance/helicopter  [  ]  I consent to the stated transfer, but refuse transportation by ambulance and accept full responsibility for my transportation by car  I understand the risks of non-ambulance transfers and I exonerate the Hospital and its staff from any deterioration in my condition that results from this refusal     X___________________________________________    DATE  23  TIME________  Signature of patient or legally responsible individual signing on patient behalf           RELATIONSHIP TO PATIENT_________________________          Provider Certification    NAME Katie Adame                                        [facility] 1965                              MRN [id number]    A medical screening exam was performed on the above named patient  Based on the examination:    Condition Necessitating Transfer The primary encounter diagnosis was Alcohol intoxication ([facility] )  A diagnosis of Hypokalemia was also pertinent to this visit      Patient Condition: The patient has been stabilized such that within reasonable medical probability, no material deterioration of the patient condition or the condition of the unborn child(oscar) is likely to result from the transfer    Reason for Transfer: Level of Care needed not available at this facility    Transfer Requirements: [id number]   · Space available and qualified personnel available for treatment as acknowledged by    · Agreed to accept transfer and to provide appropriate medical treatment as acknowledged by       Dr Jacek Hess  · Appropriate medical records of the examination and treatment of the patient are provided at the time of transfer   [address] [facility], [address] _______  · Transfer will be performed by qualified personnel from    and appropriate transfer equipment as required, including the use of necessary and appropriate life support measures  Provider Certification: I have examined the patient and explained the following risks and benefits of being transferred/refusing transfer to the patient/family:  General risk, such as traffic hazards, adverse weather conditions, rough terrain or turbulence, possible failure of equipment (including vehicle or aircraft), or consequences of actions of persons outside the control of the transport personnel      Based on these reasonable risks and benefits to the patient and/or the unborn child(oscar), and based upon the information available at the time of the patient’s examination, I certify that the medical benefits reasonably to be expected from the provision of appropriate medical treatments at another medical facility outweigh the increasing risks, if any, to the individual’s medical condition, and in the case of labor to the unborn child, from effecting the transfer      X____________________________________________ DATE 06/19/23        TIME_______      ORIGINAL - SEND TO MEDICAL RECORDS   COPY - SEND WITH PATIENT DURING TRANSFER

## 2023-06-20 PROBLEM — R41.0 CONFUSION AND DISORIENTATION: Status: ACTIVE | Noted: 2023-06-20

## 2023-06-20 PROBLEM — E87.1 HYPONATREMIA: Status: ACTIVE | Noted: 2023-06-20

## 2023-06-20 LAB
ANION GAP SERPL CALCULATED.3IONS-SCNC: 13 MMOL/L (ref 4–13)
BUN SERPL-MCNC: 4 MG/DL (ref 5–25)
CALCIUM SERPL-MCNC: 9.2 MG/DL (ref 8.4–10.2)
CHLORIDE SERPL-SCNC: 90 MMOL/L (ref 96–108)
CO2 SERPL-SCNC: 29 MMOL/L (ref 21–32)
CREAT SERPL-MCNC: 0.48 MG/DL (ref 0.6–1.3)
ERYTHROCYTE [DISTWIDTH] IN BLOOD BY AUTOMATED COUNT: 13.2 % (ref 11.6–15.1)
GFR SERPL CREATININE-BSD FRML MDRD: 119 ML/MIN/1.73SQ M
GLUCOSE SERPL-MCNC: 77 MG/DL (ref 65–140)
HCT VFR BLD AUTO: 38.3 % (ref 36.5–49.3)
HGB BLD-MCNC: 13 G/DL (ref 12–17)
MAGNESIUM SERPL-MCNC: 1.1 MG/DL (ref 1.9–2.7)
MCH RBC QN AUTO: 32.1 PG (ref 26.8–34.3)
MCHC RBC AUTO-ENTMCNC: 33.9 G/DL (ref 31.4–37.4)
MCV RBC AUTO: 95 FL (ref 82–98)
PLATELET # BLD AUTO: 187 THOUSANDS/UL (ref 149–390)
PMV BLD AUTO: 10 FL (ref 8.9–12.7)
POTASSIUM SERPL-SCNC: 3.4 MMOL/L (ref 3.5–5.3)
RBC # BLD AUTO: 4.05 MILLION/UL (ref 3.88–5.62)
SODIUM SERPL-SCNC: 132 MMOL/L (ref 135–147)
WBC # BLD AUTO: 8.7 THOUSAND/UL (ref 4.31–10.16)

## 2023-06-20 PROCEDURE — 80076 HEPATIC FUNCTION PANEL: CPT | Performed by: EMERGENCY MEDICINE

## 2023-06-20 PROCEDURE — 85027 COMPLETE CBC AUTOMATED: CPT | Performed by: EMERGENCY MEDICINE

## 2023-06-20 PROCEDURE — 83735 ASSAY OF MAGNESIUM: CPT | Performed by: EMERGENCY MEDICINE

## 2023-06-20 PROCEDURE — 99233 SBSQ HOSP IP/OBS HIGH 50: CPT | Performed by: EMERGENCY MEDICINE

## 2023-06-20 PROCEDURE — 80048 BASIC METABOLIC PNL TOTAL CA: CPT | Performed by: EMERGENCY MEDICINE

## 2023-06-20 RX ORDER — PHENOBARBITAL SODIUM 130 MG/ML
130 INJECTION INTRAMUSCULAR ONCE
Status: COMPLETED | OUTPATIENT
Start: 2023-06-20 | End: 2023-06-20

## 2023-06-20 RX ORDER — MAGNESIUM SULFATE HEPTAHYDRATE 40 MG/ML
4 INJECTION, SOLUTION INTRAVENOUS ONCE
Status: COMPLETED | OUTPATIENT
Start: 2023-06-20 | End: 2023-06-20

## 2023-06-20 RX ORDER — POTASSIUM CHLORIDE 20 MEQ/1
40 TABLET, EXTENDED RELEASE ORAL ONCE
Status: COMPLETED | OUTPATIENT
Start: 2023-06-20 | End: 2023-06-20

## 2023-06-20 RX ADMIN — POTASSIUM CHLORIDE 40 MEQ: 1500 TABLET, EXTENDED RELEASE ORAL at 09:05

## 2023-06-20 RX ADMIN — PHENOBARBITAL SODIUM 130 MG: 130 INJECTION INTRAMUSCULAR at 03:48

## 2023-06-20 RX ADMIN — RIVAROXABAN 20 MG: 20 TABLET, FILM COATED ORAL at 09:05

## 2023-06-20 RX ADMIN — THIAMINE HYDROCHLORIDE 500 MG: 100 INJECTION, SOLUTION INTRAMUSCULAR; INTRAVENOUS at 02:29

## 2023-06-20 RX ADMIN — THIAMINE HYDROCHLORIDE 500 MG: 100 INJECTION, SOLUTION INTRAMUSCULAR; INTRAVENOUS at 16:16

## 2023-06-20 RX ADMIN — PHENOBARBITAL SODIUM 130 MG: 130 INJECTION INTRAMUSCULAR at 23:09

## 2023-06-20 RX ADMIN — NICOTINE 1 PATCH: 21 PATCH, EXTENDED RELEASE TRANSDERMAL at 09:05

## 2023-06-20 RX ADMIN — MAGNESIUM SULFATE HEPTAHYDRATE 4 G: 40 INJECTION, SOLUTION INTRAVENOUS at 07:44

## 2023-06-20 RX ADMIN — FOLIC ACID 1 MG: 1 TABLET ORAL at 09:05

## 2023-06-20 RX ADMIN — METOPROLOL SUCCINATE 25 MG: 25 TABLET, EXTENDED RELEASE ORAL at 09:05

## 2023-06-20 RX ADMIN — ASPIRIN 81 MG: 81 TABLET, COATED ORAL at 09:05

## 2023-06-20 RX ADMIN — PHENOBARBITAL SODIUM 130 MG: 130 INJECTION INTRAMUSCULAR at 01:38

## 2023-06-20 RX ADMIN — LOSARTAN POTASSIUM 25 MG: 25 TABLET, FILM COATED ORAL at 09:05

## 2023-06-20 RX ADMIN — MULTIPLE VITAMINS W/ MINERALS TAB 1 TABLET: TAB ORAL at 09:05

## 2023-06-20 RX ADMIN — PHENOBARBITAL SODIUM 130 MG: 130 INJECTION INTRAMUSCULAR at 19:34

## 2023-06-20 RX ADMIN — THIAMINE HYDROCHLORIDE 500 MG: 100 INJECTION, SOLUTION INTRAMUSCULAR; INTRAVENOUS at 22:51

## 2023-06-20 NOTE — ASSESSMENT & PLAN NOTE
· K 2.8 on initial labs  · Has a noted hx of hypokalemia per chart review  · Repleted PO in the ED  · Pt is currently tolerating PO intake  · Continue monitoring AM labs and replete electrolytes as indicated

## 2023-06-20 NOTE — UTILIZATION REVIEW
Initial Clinical Review    Pt initially presented to [address] [place] [address] ED. Pt was transferred by EMS to [facility] for its Level IV medically managed intensive inpatient detox unit, not available at [facility]. Admission: Date/Time/Statement:   Admission Orders (From admission, onward)     Ordered        06/19/23 1802  Inpatient Admission  Once                      Orders Placed This Encounter   Procedures   • Inpatient Admission     Standing Status:   Standing     Number of Occurrences:   1     Order Specific Question:   Level of Care     Answer:   Med Surg [16]     Order Specific Question:   Estimated length of stay     Answer:   More than 2 Midnights     Order Specific Question:   Certification     Answer:   I certify that inpatient services are medically necessary for this patient for a duration of greater than two midnights. See H&P and MD Progress Notes for additional information about the patient's course of treatment. Initial Presentation: 59 y.o. male who presented to medical detox. Inpatient admission for evaluation and treatment of alcohol withdrawal syndrome. Presented w/ need for detox from alcohol. Serum ETOH: 486. Reports a fifth to a handle of vodka daily, last drink on 6/18. Has no prior rehab treatment for withdrawal. Reports no hx of withdrawal seizures. On exam, tremors, disoriented to time, wheezing. SEWS 13. Plan: SEWS monitoring w/ phenobarbital management, high-dose IV thiamine/folic acid supplement, IVF, telemetry, continuous pulse ox, continue PTA meds, trend labs, replete electrolytes as needed. Date: 06/20/23       Day 2: Pt reports feeling tired. On exam, b/l injected conjunctiva, wheezing, tremors. SEWS 8. Plan: continue SEWS monitoring w/ phenobarbital management, high-dose IV thiamine/folic acid supplement, telemetry, continuous pulse ox, continue PTA meds, trend labs, replete electrolytes as needed.        Date: 06/21/23    Day 3: Has surpassed a 2nd midnight with active treatments and services. Wt Readings from Last 1 Encounters:   06/19/23 63.6 kg (140 lb 3.4 oz)     Vital Signs:   Date/Time Temp Pulse Resp BP MAP (mmHg) SpO2 Calculated FIO2 (%) - Nasal Cannula Nasal Cannula O2 Flow Rate (L/min) O2 Device   06/21/23 0859 97.5 °F (36.4 °C) 90 18 118/74 88 96 % -- -- None (Room air)   06/21/23 0434 97.4 °F (36.3 °C) Abnormal  110 Abnormal  16 135/86 -- 96 % -- -- None (Room air)   06/20/23 2302 98.3 °F (36.8 °C) 96 18 134/84 -- 93 % -- -- None (Room air)   06/20/23 1926 98.1 °F (36.7 °C) 100 16 123/100 -- 94 % -- -- None (Room air)   06/20/23 1551 98.7 °F (37.1 °C) 105 18 97/61 -- 95 % -- -- None (Room air)   06/20/23 1110 -- 93 18 122/71 -- 98 % -- -- None (Room air)   06/20/23 0700 97.5 °F (36.4 °C) 89 18 129/83 98 97 % -- -- None (Room air)   06/20/23 0331 98.2 °F (36.8 °C) 105 18 131/88 -- 92 % -- -- None (Room air)   06/20/23 0127 98.8 °F (37.1 °C) 111 Abnormal  18 138/84 -- 95 % -- -- None (Room air)   06/20/23 0108 -- 102 16 -- -- 94 % -- -- None (Room air)   06/19/23 2303 99 °F (37.2 °C) 98 16 141/87 -- 96 % -- -- None (Room air)   06/19/23 2038 98.2 °F (36.8 °C) 105 18 153/92 -- 95 % 28 2 L/min Nasal cannula   06/19/23 1916 99.1 °F (37.3 °C) 102 20 170/92 -- 90 % 28 2 L/min Nasal cannula   06/19/23 1808 98.2 °F (36.8 °C) 104 20 179/91   Abnormal  -- 98 % -- -- None (Room air)       Severity of Ethanol Withdrawal Scale (SEWS):   Row Name 06/21/23 0908 06/21/23 0437 06/20/23 2304 06/20/23 1929 06/20/23 1600   Severity of Ethanol Withdrawal Scale (SEWS)   ANXIETY: Do you feel that something bad is about to happen to you right now? 0  -TB 0  -TO 3  -TO 3  -TO 0  -BH   NAUSEA and DRY HEAVES or VOMITING? 0  -TB 0  -TO 0  -TO 0  -TO 0  -BH   SWEATING: (includes moist palms, sweating now)? Score 0 or 2 0  -TB 0  -TO 2  -TO 0  -TO 0  -BH   TREMOR: with arms extended eyes closed?   0  -TB 0  -TO 2  -TO 2  -TO 0  -BH   AGITATION: Fidgety, restless, pacing? 0  -TB 0  -TO 0  -TO 0  -TO 0  -BH   DISORIENTATION: 0  -TB 0  -TO 1  -TO 0  -TO 0  -BH   HALLUCINATIONS: 0  -TB 0  -TO 0  -TO 0  -TO 0  -BH   VITAL SIGNS: ANY (Pulse >358, Diastolic BP >35, Temp >10.3) 0  -TB 0  -TO 0  -TO 3  -TO 0  -BH   SEWS Total Score 0  -TB 0  -TO 8  -TO 8  -TO 0  -BH   Galdamez Agitation Sedation Scale (RASS)   Galdamez Agitation Sedation Scale (RASS) 0  -TB 0  -TO 0  -TO 0  -TO -2  -BH   Row Name 06/20/23 1100 06/20/23 0730 06/20/23 0333 06/20/23 0128 06/20/23 0108   Severity of Ethanol Withdrawal Scale (SEWS)   ANXIETY: Do you feel that something bad is about to happen to you right now? 0  -BH 0  -BH 3  -TO 0  -TO 0  -TO   NAUSEA and DRY HEAVES or VOMITING? 0  -BH 0  -BH 0  -TO 0  -TO 0  -TO   SWEATING: (includes moist palms, sweating now)? Score 0 or 2 0  -BH 0  -BH 0  -TO 0  -TO 0  -TO   TREMOR: with arms extended eyes closed? 0  -BH 0  -BH 2  -TO 2  -TO 0  -TO   AGITATION: Fidgety, restless, pacing? 0  -BH 0  -BH 0  -TO 0  -TO 0  -TO   DISORIENTATION: 0  -BH 0  -BH 3  -TO 3  -TO 0  -TO   HALLUCINATIONS: 0  -BH 0  -BH 0  -TO 0  -TO 0  -TO   VITAL SIGNS: ANY (Pulse >706, Diastolic BP >12, Temp >11.4) 0  -BH 0  sleeping  -BH 0  -TO 3  -TO 0  -TO   SEWS Total Score 0  -BH 0  -BH 8  -TO 8  -TO 0  -TO   Galdamez Agitation Sedation Scale (RASS)   Galdamez Agitation Sedation Scale (RASS) -2  -BH -2  -BH 0  -TO 0  -TO 0  -TO   Row Name 06/19/23 2305 06/19/23 2039 06/19/23 1813     Severity of Ethanol Withdrawal Scale (SEWS)   ANXIETY: Do you feel that something bad is about to happen to you right now? 3  -TO 3  -TO 3  -BH     NAUSEA and DRY HEAVES or VOMITING? 0  -TO 3  -TO 0  -BH     SWEATING: (includes moist palms, sweating now)? Score 0 or 2 2  -TO 2  -TO 2  -BH     TREMOR: with arms extended eyes closed? 2  -TO 2  -TO 2  -BH     AGITATION: Fidgety, restless, pacing?  0  -TO 0  -TO 3  -BH     DISORIENTATION: 0  -TO 0  -TO 0  -BH     HALLUCINATIONS: 0  -TO 0  -TO 0  -BH VITAL SIGNS: ANY (Pulse >595, Diastolic BP >15, Temp >61.1) 0  -TO 3  -TO 3  -BH     SEWS Total Score 7  -TO 13  -TO 13  -BH           Pertinent Labs/Diagnostic Test Results:   Results from last 7 days   Lab Units 06/20/23  0333 06/18/23  2229   WBC Thousand/uL 8.70 5.57   HEMOGLOBIN g/dL 13.0 12.5   HEMATOCRIT % 38.3 36.6   PLATELETS Thousands/uL 187 150   NEUTROS ABS Thousands/µL  --  3.35         Results from last 7 days   Lab Units 06/20/23  0333 06/18/23  2229   SODIUM mmol/L 132* 139   POTASSIUM mmol/L 3.4* 2.8*   CHLORIDE mmol/L 90* 99   CO2 mmol/L 29 28   ANION GAP mmol/L 13 12   BUN mg/dL 4* 7   CREATININE mg/dL 0.48* 0.50*   EGFR ml/min/1.73sq m 119 117   CALCIUM mg/dL 9.2 8.5   MAGNESIUM mg/dL 1.1*  --      Results from last 7 days   Lab Units 06/18/23  2229   AST U/L 42*   ALT U/L 11   ALK PHOS U/L 68   TOTAL PROTEIN g/dL 6.8   ALBUMIN g/dL 4.1   TOTAL BILIRUBIN mg/dL 0.36         Results from last 7 days   Lab Units 06/20/23  0333 06/18/23  2229   GLUCOSE RANDOM mg/dL 77 118     Results from last 7 days   Lab Units 06/19/23  0030 06/18/23  2229   HS TNI 0HR ng/L  --  4   HS TNI 2HR ng/L 4  --    HSTNI D2 ng/L 0  --          Results from last 7 days   Lab Units 06/18/23  2229   PROTIME seconds 12.2   INR  0.89   PTT seconds 29       Results from last 7 days   Lab Units 06/18/23  2229   ETHANOL LVL mg/dL 486*         Past Medical History:   Diagnosis Date   • Alcohol abuse     1 pint of gin daily on weekends   • Alcoholic hepatitis     Mild   • 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 • COPD (chronic obstructive pulmonary disease) ([address])    • Fall 09/17/2022    [facility] discharged- alcohol consumption noted in ED   • Hypertension    • Hypokalemia    • Poor historian    • Tobacco abuse 1976    One pack per day for 38 years     Present on Admission:  • Alcohol withdrawal syndrome with complication (HCC)  • Paroxysmal atrial fibrillation (HCC)  • Elevated LFTs  • COPD (chronic obstructive pulmonary disease) ([address])  • Cardiomyopathy, 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  • Chest pain  • Hypokalemia  • Tobacco abuse  • Alcohol use disorder, severe, dependence (HCC)  • Essential hypertension  • Confusion and disorientation      Admitting Diagnosis: Alcohol intoxication ([address]) [F10.929]  Age/Sex: 59 y.o. male  Admission Orders:  Regular Diet. SCDs. Fall & Seizure Precautions. SEWS monitoring. Telemetry & Continuous Pulse Ox. Scheduled Medications:  aspirin, 81 mg, Oral, Daily  folic acid, 1 mg, Oral, Daily  losartan, 25 mg, Oral, Daily  metoprolol succinate, 25 mg, Oral, Daily  multivitamin-minerals, 1 tablet, Oral, Daily  nicotine, 1 patch, Transdermal, Daily  rivaroxaban, 20 mg, Oral, Daily With Breakfast  thiamine, 500 mg, Intravenous, Q8H [facility]    Continuous IV Infusions: none     PRN Meds:  acetaminophen, 650 mg, Oral, Q6H PRN  albuterol, 2 puff, Inhalation, Q4H PRN  magnesium sulfate, 4 g, Intravenous; 6/20 x1  ondansetron, 4 mg, Intravenous, Q6H PRN; 6/21 x1  potassium chloride, 40 mEq, Oral; 6/20 x1  traZODone, 50 mg, Oral, HS PRN  phenobarbital, 650 mg, Intravenous; 6/19 x1  phenobarbital, 260 mg, Intravenous; 6/19 x1  phenobarbital, 130 mg, Intravenous; 6/19 x1, 6/20 x4        IP CONSULT TO CASE MANAGEMENT    Network Utilization Review Department  ATTENTION: Please call with any questions or concerns to [phone] and carefully listen to the prompts so that you are directed to the right person.  All voicemails are confidential.  Josias Mcgraw all requests for admission clinical reviews, approved or denied determinations and any other requests to dedicated fax number below belonging to the campus where the patient is receiving treatment.  List of dedicated fax numbers for the Facilities:  [place] DENIALS (Administrative/Medical Necessity) [phone] [address] (Maternity/NICU/Pediatrics) [phone]   [address] [place] [address] [phone]   [facility] [phone]   [address] [facility] [address] [place] [address] [phone]   [address] [place] [address] [address] [address] [facility] [phone]

## 2023-06-20 NOTE — ASSESSMENT & PLAN NOTE
Lab Results   Component Value Date    ALT 11 06/18/2023    AST 42 (H) 06/18/2023    ALKPHOS 68 06/18/2023    TBILI 0.36 06/18/2023        · Mildly Elevated AST on admission as above  · Likely 2/2 chronic alcohol use/alcoholic liver disease  · No acute abd pain on exam  · Initiate IVFs  · Continue monitoring CMP  · Encourage alcohol cessation

## 2023-06-20 NOTE — ASSESSMENT & PLAN NOTE
· Pt has a hx of paroxysmal atrial fibrillation and atrial flutter  · Has an ICD/loop recorder in place  · Currently in NSR with occasional unifocal PVCs on telemetry  · Continue home medications- metoprolol, ASA, and Xarelto  · Telemetry monitoring

## 2023-06-20 NOTE — PROGRESS NOTES
PROGRESS NOTE  DEPARTMENT OF MEDICAL TOXICOLOGY  LEVEL 4 MEDICAL DETOX UNIT  Nic Ayala 59 y.o. male MRN: [id number]  Unit/Bed#:  DETOX 515-01 Encounter: [id number]      Reason for Admission/Principal Problem: ETOH wd  Rounding Provider: Rajwinder Weeks MD  Attending Provider: Cher Castellanos*   6/19/2023  6:02 PM           Hyponatremia  Assessment & Plan  · Na 132  · Encourage PO intake  · Monitor and consider fluid restriction  · Continue to monitor  · Nephrology should Na decline    Confusion and disorientation  Assessment & Plan  · Pt disoriented to time and noted to have outbursts throughout the night, thinking that people are knocking on his door or his room door is "the outside door" -- this AM, mentation is intact  · Initiate high dose thiamine for possible Wernicke encephalopathy  · Withdrawal management as above  · Will continue to monitor    Alcohol use disorder, severe, dependence ([address])  Assessment & Plan  · Pt with a h/o chronic heavy alcohol use  · Drinks 1 fifth-1 handle of vodka daily  · Denies H/o withdrawal seizures  · Withdrawal management as above  · Initiate IVFs, thiamine/folic acid supplementation, and MVI  · Consult case management for assistance with aftercare resources - pt interested in OP resources at this time    Essential hypertension  Assessment & Plan  · Patient with a history of HTN  · Home medication regimen: losartan and metoprolol  · Pt notes medication noncompliance  · BP hypertensive upon arrival to the unit in the setting of acute alcohol withdrawal and chronic HTN, improving with phenobarbital administration  · Will resume home medications in the setting of concomitant hx of paroxysmal AFib  · Continue monitoring BP    COPD (chronic obstructive pulmonary disease) ([address])  Assessment & Plan  • Pt with a h/o COPD  • Home medications include PRN albuterol  • +Scattered inspiratory/expiratory wheezes on exam, no respiratory distress noted, VSS with SpO2 97% on RA  • CXR with No evidence of acute cardiopulmonary disease  • Continue home inhaler regimen  • Supplemental O2 via NC PRN maintain SpO2 >90%      Elevated LFTs  Assessment & Plan  Lab Results   Component Value Date    ALT 11 06/18/2023    AST 42 (H) 06/18/2023    ALKPHOS 68 06/18/2023    TBILI 0.36 06/18/2023        · Mildly Elevated AST on admission as above  · Likely 2/2 chronic alcohol use/alcoholic liver disease  · No acute abd pain on exam  · Initiate IVFs  · Continue monitoring CMP  · Encourage alcohol cessation      Tobacco abuse  Assessment & Plan  · Daily tobacco user  · Offer NRT  · Encourage cessation      Paroxysmal atrial fibrillation ([address])  Assessment & Plan  · Pt has a hx of paroxysmal atrial fibrillation and atrial flutter  · Has an ICD/loop recorder in place  · Currently in NSR with occasional unifocal PVCs on telemetry  · Continue home medications- metoprolol, ASA, and Xarelto  · Telemetry monitoring    Hypokalemia  Assessment & Plan  · K 2.8 on initial labs --> 3/4  · Has a noted hx of hypokalemia per chart review  · Repleted PO in the ED  · Pt is currently tolerating PO intake  · Continue monitoring AM labs and replete electrolytes as indicated    Chest pain  Assessment & Plan  · Pt initially presented to the [address] [place] [address] Drive ED for chest pain in the setting of acute alcohol intoxication, subsequently denied CP when evaluated by the ED provider  · EKG: sinus tachycardia, incomplete RBBB, no evidence of acute ischemia  · Troponins 4-4  · Currently, pt denies CP, palpitations, SOB  · Likely non-cardiac in origin, 2/2 alcohol use  · Supportive care  · Continue monitoring      Cardiomyopathy, 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  Assessment & Plan  · Pt has a hx of non-ischemic cardiomyopathy, likley 2/2 alcohol use  · Last ECHO 10/4/22 showed LVEF 40%, moderately reduced LV systolic function, N2HU  · Continue to monitor  · Encourage alcohol cessation    * Alcohol withdrawal syndrome with complication [facility])  Assessment & Plan  · Patient with a history of chronic daily alcohol use  · Last drink PTA in the ED last evening 6/18  · Serum alcohol 486 in the ED (6/18, 2229)  · Received Valium 5 mg IV in the ED PTA  · Initiate SEWS protocol for medical management of alcohol withdrawal  · Current alcohol withdrawal signs/symptoms include: none  · Received 650 mg of phenobarbital as initial dose + administering 260 mg now for unchanged SEWS score 13  · Total phenobarbital: 910 mg so far  · Continue monitoring under protocol and administer phenobarbital as indicated  · Continuous pulse ox and telemetry monitoring            VTE Pharmacologic Prophylaxis:   Pharmacologic: Rivaroxaban (Xarelto)  Mechanical VTE Prophylaxis in Place: no    Code Status: Level 1 - Full Code    Patient Centered Rounds: I have performed bedside rounds with nursing staff today. Discussions with Specialists or Other Care Team Provider: no     Education and Discussions with Family / Patient: no    Time Spent for Care: 20 minutes. More than 50% of total time spent on counseling and coordination of care as described above. Current Length of Stay: 1 day(s)    Current Patient Status: Inpatient     Certification Statement: The patient will continue to require additional inpatient hospital stay due to ETOH wd     Discharge Plan: Unclear at this time whether outpatient versus inpatient rehabilitation, anticipate medical clearance within 24 to 48 hours      Subjective:   Patient has no complaints at this time. Denies chest pain, shortness of breath, nausea, vomiting, abdominal pain. Was able to eat a small amount of breakfast.  States that he feels tired.     Objective:     Clinical Opiate Withdrawal Scale  Pulse: 89    SEWS Total Score: 8 (6/20/2023  3:33 AM)        Last 24 Hours Medication List:   Current Facility-Administered Medications   Medication Dose Route Frequency Provider Last Rate   • acetaminophen  650 mg Oral Q6H PRN Gianna Jacobsen MD     • albuterol  2 puff Inhalation Q4H PRN Talita Clemente PA-C     • aspirin  81 mg Oral Daily Talita Clemente PA-C     • folic acid  1 mg Oral Daily Mariola Rodriguez MD     • losartan  25 mg Oral Daily Talita Clemente PA-C     • magnesium sulfate  4 g Intravenous Once Arminda Swain PA-C 4 g (23 0744)   • metoprolol succinate  25 mg Oral Daily Talita Clemente PA-C     • multivitamin-minerals  1 tablet Oral Daily Mariola Rodriguez MD     • nicotine  1 patch Transdermal Daily Mariola Rodriguez MD     • ondansetron  4 mg Intravenous Q6H PRN Mariola Rodriguez MD     • rivaroxaban  20 mg Oral Daily With Breakfast Talita Clemente PA-C     • thiamine  500 mg Intravenous [facility] Talita Clemente PA-C Stopped (23 1030)   • traZODone  50 mg Oral HS PRN Mariola Rodriguez MD           Vitals:   Temp (24hrs), Av.4 °F (36.9 °C), Min:97.5 °F (36.4 °C), Max:99.1 °F (37.3 °C)    Temp:  [97.5 °F (36.4 °C)-99.1 °F (37.3 °C)] 97.5 °F (36.4 °C)  HR:  [] 89  Resp:  [16-36] 18  BP: (129-179)/(81-92) 129/83  SpO2:  [90 %-99 %] 97 %  Body mass index is 22.63 kg/m². Input and Output Summary (last 24 hours):No intake or output data in the 24 hours ending 23 1058    Physical Exam:   Physical Exam  Vitals and nursing note reviewed. Constitutional:       General: He is not in acute distress. Appearance: He is well-developed. He is not ill-appearing, toxic-appearing or diaphoretic. Comments: sleeping but arousable   HENT:      Head: Normocephalic and atraumatic. Right Ear: External ear normal.      Left Ear: External ear normal.      Nose: Nose normal.   Eyes:      General:         Right eye: No discharge. Left eye: No discharge. Pupils: Pupils are equal, round, and reactive to light. Comments: Bilateral injected conjunctiva   Cardiovascular:      Rate and Rhythm: Normal rate and regular rhythm. Heart sounds: Normal heart sounds. No friction rub. No gallop. Pulmonary:      Effort: Pulmonary effort is normal. No respiratory distress. Breath sounds: No stridor. Wheezing (Very slight occasional expiratory wheeze) present. No rales. Chest:      Chest wall: No tenderness. Abdominal:      General: Bowel sounds are normal.      Palpations: Abdomen is soft. Tenderness: There is no abdominal tenderness. Musculoskeletal:         General: No tenderness or deformity. Normal range of motion. Cervical back: Normal range of motion and neck supple. Right lower leg: No edema. Left lower leg: No edema. Skin:     General: Skin is warm and dry. Capillary Refill: Capillary refill takes less than 2 seconds. Neurological:      Mental Status: He is alert and oriented to person, place, and time. Motor: No tremor. Additional Data:     Labs:   Results from last 7 days   Lab Units 06/20/23  0333 06/18/23  2229   WBC Thousand/uL 8.70 5.57   HEMOGLOBIN g/dL 13.0 12.5   HEMATOCRIT % 38.3 36.6   PLATELETS Thousands/uL 187 150   NEUTROS PCT %  --  60   LYMPHS PCT %  --  26   MONOS PCT %  --  12   EOS PCT %  --  1      Results from last 7 days   Lab Units 06/20/23  0333 06/18/23  2229   SODIUM mmol/L 132* 139   POTASSIUM mmol/L 3.4* 2.8*   CHLORIDE mmol/L 90* 99   CO2 mmol/L 29 28   BUN mg/dL 4* 7   CREATININE mg/dL 0.48* 0.50*   ANION GAP mmol/L 13 12   CALCIUM mg/dL 9.2 8.5   ALBUMIN g/dL  --  4.1   TOTAL BILIRUBIN mg/dL  --  0.36   ALK PHOS U/L  --  68   ALT U/L  --  11   AST U/L  --  42*   GLUCOSE RANDOM mg/dL 77 118      Results from last 7 days   Lab Units 06/18/23  2229   INR  0.89                         * I Have Reviewed All Lab Data Listed Above. * Additional Pertinent Lab Tests Reviewed: [address] [place] [address] Admission Reviewed      Imaging Studies: I have personally reviewed pertinent reports. Recent Cultures (last 7 days):           Today, Patient Was Seen By: Italia Pablo MD    ** Please Note: Dictation voice to text software may have been used in the creation of this document.  **

## 2023-06-20 NOTE — PLAN OF CARE
Problem: SUBSTANCE USE/ABUSE  Goal: By discharge, will develop insight into their chemical dependency and sustain motivation to continue in recovery  Description: INTERVENTIONS:  - Attends all daily group sessions and scheduled AA groups  - Actively practices coping skills through participation in the therapeutic community and adherence to program rules  - Reviews and completes assignments from individual treatment plan  - Assist patient development of understanding of their personal cycle of addiction and relapse triggers  Outcome: Progressing  Goal: By discharge, patient will have ongoing treatment plan addressing chemical dependency  Description: INTERVENTIONS:  - Assist patient with resources and/or appointments for ongoing recovery based living  Outcome: Progressing     Problem: Knowledge Deficit  Goal: Patient/family/caregiver demonstrates understanding of disease process, treatment plan, medications, and discharge instructions  Description: Complete learning assessment and assess knowledge base.   Interventions:  - Provide teaching at level of understanding  - Provide teaching via preferred learning methods  Outcome: Progressing

## 2023-06-20 NOTE — ASSESSMENT & PLAN NOTE
· Pt disoriented to time and noted to have outbursts throughout the night, thinking that people are knocking on his door or his room door is "the outside door" -- this AM, mentation is intact  · Initiate high dose thiamine for possible Wernicke encephalopathy  · Withdrawal management as above  · Will continue to monitor

## 2023-06-20 NOTE — ASSESSMENT & PLAN NOTE
· Patient with a history of HTN  · Home medication regimen: losartan and metoprolol  · Pt notes medication noncompliance  · BP hypertensive upon arrival to the unit in the setting of acute alcohol withdrawal and chronic HTN, improving with phenobarbital administration  · Will resume home medications in the setting of concomitant hx of paroxysmal AFib  · Continue monitoring BP

## 2023-06-20 NOTE — ASSESSMENT & PLAN NOTE
· Pt has a hx of non-ischemic cardiomyopathy, javier 2/2 alcohol use  · Last ECHO 10/4/22 showed LVEF 40%, moderately reduced LV systolic function, [id number]  · Continue to monitor  · Encourage alcohol cessation

## 2023-06-20 NOTE — ASSESSMENT & PLAN NOTE
· Na 132  · Encourage PO intake  · Monitor and consider fluid restriction  · Continue to monitor  · Nephrology should Na decline

## 2023-06-20 NOTE — ASSESSMENT & PLAN NOTE
• Pt with a h/o COPD  • Home medications include PRN albuterol  • +Scattered inspiratory/expiratory wheezes on exam, no respiratory distress noted, VSS with SpO2 93% on RA  • CXR with No evidence of acute cardiopulmonary disease  • Continue home inhaler regimen  • Supplemental O2 via NC PRN maintain SpO2 >90%

## 2023-06-20 NOTE — ASSESSMENT & PLAN NOTE
· Patient with a history of chronic daily alcohol use  · Last drink PTA in the ED last evening 6/18  · Serum alcohol 486 in the ED (6/18, 2229)  · Received Valium 5 mg IV in the ED PTA  · Initiate SEWS protocol for medical management of alcohol withdrawal  · Current alcohol withdrawal signs/symptoms include anxiety, tremor, diaphoresis, tachycardia, HTN  · Received 650 mg of phenobarbital as initial dose + administering 260 mg now for unchanged SEWS score 13  · Total phenobarbital: 910 mg so far  · Continue monitoring under protocol and administer phenobarbital as indicated  · Continuous pulse ox and telemetry monitoring

## 2023-06-20 NOTE — ASSESSMENT & PLAN NOTE
· Pt initially presented to the [facility] ED for chest pain in the setting of acute alcohol intoxication, subsequently denied CP when evaluated by the ED provider  · EKG: sinus tachycardia, incomplete RBBB, no evidence of acute ischemia  · Troponins 4-4  · Currently, pt denies CP, palpitations, SOB  · Likely non-cardiac in origin, 2/2 alcohol use  · Supportive care  · Continue monitoring

## 2023-06-20 NOTE — ASSESSMENT & PLAN NOTE
· Pt disoriented to time and noted to have outbursts throughout the night, thinking that people are knocking on his door or his room door is "the outside door"  · Initiate high dose thiamine for possible Wernicke encephalopathy  · Withdrawal management as above  · Will continue to monitor

## 2023-06-20 NOTE — ASSESSMENT & PLAN NOTE
· Pt initially presented to the [facility] ED for chest pain in the setting of acute alcohol intoxication, subsequently denied CP when evaluated by the ED provider  · EKG: sinus tachycardia, incomplete RBBB, no evidence of acute ischemia  · Troponins 4-4  · Currently, pt denies CP, palpitations, SOB  · Likely non-cardiac in origin, 2/2 alcohol use  · Supportive care  · Continue monitoring

## 2023-06-20 NOTE — PROGRESS NOTES
06/20/23 6663   Referral Data   Referral Source Other (Comment)   Referral Name [facility] ED   Ref[place] [address] [place]   Readmission Root Cause   30 Day Readmission No   Patient Information   Mental Status Alert   Primary Caregiver Self   Support System Immediate family   Legal Information   Legal Issues Pt is currently on [facility] for selling drugs   Activities of Daily Living Prior to Admission   Functional Status Independent   Assistive Device No device needed   Living Arrangement Lives with someone  (Pt lives with his sister)   Ambulation Independent   Access to Firearms   Access to Firearms No   Income [address] SSI/SSD  (Pt recevies SSI $800 a month)   Means of Transportation   People[employer]al Transport to Appts: [employer]      06/20/23 6173   Substance Abuse Addendum Details   History of Withdrawal Symptoms Elevated BP; Other withdrawal symptoms (specify in comment); Hallucinations  (tremors, headaches, nausea)   Medical Complications A-Fib/HTN/cardiomyopathy/COPD   Sober Supports Sister   Present Treatment None   Substance Abuse Treatment Hx Denies past history   ASAM Level & Criteria 3.5 inpatient   Stage of Change   Stage of Change Precontemplation     Additional Substance Use Detail    Questions Responses   Problems Due to Past Use of Alcohol? Yes   Alcohol Use Frequency Daily   Alcohol Drink of Choice Vodka   1st Use of Alcohol 35   Last Use of Alcohol & Amount 1/5 of vodka, 6/19/2023   Longest Abstinence from Alcohol 2 days         Worker completed assessment. Worker explained role of case management. Worker confirmed name, address and phone number. Pt was picked up by EMS after he was found on a strangers porch, was brought to [facility] ED and transferred to  for withdrawal management. Pt reports he has been drinking 1/5-1 handle of vodka daily for over 20 years.   Pt reports first age of use 26, when he began drinking daily. Pt was unable to report what contributed to his use. Pt denies any blackouts. Pt reports longest period of clean time is 2 days. Pt denies any street drugs. Pt denies any previous inpatient or outpatient rehab. Pt was previously at [facility] for alcohol intoxication in February 2023. Pt at this time uncertain if he wants outpatient rehab, declining inpatient. Alcohol: 486  UDS: none done    Pt is currently single, has 3 adult children. Pt lives with his sister at [address] [place] [address]. [place] [facility]. Pt receives SSI $800 a month. Pt's highest level of education is 6th grade. Pt reports he is currently on [facility] in [place] for selling drugs. Pt denies any mental health history. Pt denies any inpatient psychiatric hospitalizations. Pt denies any outpatient psychiatric provider. Pt denies any suicide attempts. Pt denies any abuse or losses. Pt reported various medical issues, A-Fib, HTN, COPD, and cardiomyopathy, PCP is [facility], signed MANJULA. Pt reports having health insurance of [address] [place] [address], signed MANJULA's for both. Pt signed Medicare IMM form. Pt signed MANJULA for his sister Cayetano Trevino, worker left message at [phone]. Relapse prevention plan was completed. Pt was able to identify his warning signs. Pt reported no coping skills. Pt reports his sister is his primary support. Clinical impression, pt was calm and cooperative throughout assessment. Pt did appear guarded and vague regarding his use pattern. Pt appears to have limited insight into his alcohol use and need for further treatment on discharge. Pt's alcohol use appears to be impacting his severe medical issues. Pt would benefit from inpatient rehab, at this time pt is declining. Pt considering outpatient but reports it would need to be close due to limited transportation. Pt is in the pre-contemplation stage of change.

## 2023-06-20 NOTE — ASSESSMENT & PLAN NOTE
· Patient with a history of chronic daily alcohol use  · Last drink PTA in the ED last evening 6/18  · Serum alcohol 486 in the ED (6/18, 2229)  · Received Valium 5 mg IV in the ED PTA  · Initiate SEWS protocol for medical management of alcohol withdrawal  · Current alcohol withdrawal signs/symptoms include: none  · Received 650 mg of phenobarbital as initial dose + administering 260 mg now for unchanged SEWS score 13  · Total phenobarbital: 910 mg so far  · Continue monitoring under protocol and administer phenobarbital as indicated  · Continuous pulse ox and telemetry monitoring

## 2023-06-20 NOTE — ASSESSMENT & PLAN NOTE
· K 2.8 on initial labs --> 3/4  · Has a noted hx of hypokalemia per chart review  · Repleted PO in the ED  · Pt is currently tolerating PO intake  · Continue monitoring AM labs and replete electrolytes as indicated

## 2023-06-20 NOTE — PLAN OF CARE
Problem: DISCHARGE PLANNING  Goal: Discharge to home or other facility with appropriate resources  Description: INTERVENTIONS:  - Identify barriers to discharge w/patient and caregiver  - Arrange for needed discharge resources and transportation as appropriate  - Identify discharge learning needs (meds, wound care, etc.)  - Arrange for interpretive services to assist at discharge as needed  - Refer to Case Management Department for coordinating discharge planning if the patient needs post-hospital services based on physician/advanced practitioner order or complex needs related to functional status, cognitive ability, or social support system  Outcome: Progressing     Problem: SUBSTANCE USE/ABUSE  Goal: By discharge, will develop insight into their chemical dependency and sustain motivation to continue in recovery  Description: INTERVENTIONS:  - Attends all daily group sessions and scheduled AA groups  - Actively practices coping skills through participation in the therapeutic community and adherence to program rules  - Reviews and completes assignments from individual treatment plan  - Assist patient development of understanding of their personal cycle of addiction and relapse triggers  Outcome: Progressing  Goal: By discharge, patient will have ongoing treatment plan addressing chemical dependency  Description: INTERVENTIONS:  - Assist patient with resources and/or appointments for ongoing recovery based living  Outcome: Progressing     Problem: Knowledge Deficit  Goal: Patient/family/caregiver demonstrates understanding of disease process, treatment plan, medications, and discharge instructions  Description: Complete learning assessment and assess knowledge base.   Interventions:  - Provide teaching at level of understanding  - Provide teaching via preferred learning methods  Outcome: Progressing

## 2023-06-20 NOTE — ASSESSMENT & PLAN NOTE
• Pt with a h/o COPD  • Home medications include PRN albuterol  • +Scattered inspiratory/expiratory wheezes on exam, no respiratory distress noted, VSS with SpO2 97% on RA  • CXR with No evidence of acute cardiopulmonary disease  • Continue home inhaler regimen  • Supplemental O2 via NC PRN maintain SpO2 >90%

## 2023-06-20 NOTE — ASSESSMENT & PLAN NOTE
· Pt has a hx of non-ischemic cardiomyopathy, javier 2/2 alcohol use  · Last ECHO 10/4/22 showed LVEF 40%, moderately reduced LV systolic function, L6WG  · Continue to monitor  · Encourage alcohol cessation

## 2023-06-20 NOTE — H&P
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 spent with the patient and / or family counseling and / or coordination of care.        Minutes of critical care time 35  -Critical care time was exclusive of separately billable procedures and teaching time.   -Critical care was necessary to treat or prevent imminent or life-threatening deterioration of the following condition: CNS failure/compromise, toxidrome (ethanol withdrawal),  toxidrome, withdrawal and metabolic crisis  -Critical care time was spent personally by me on the following activities as well as the above as per the course and rest of chart: obtaining history from patient/surrogate, development of a treatment plan, discussions with referring provider(s), evaluation of patient's response to the treatment, examination of the patient, performing treatments and interventions, re-evaluation of the patient's condition, review of old charts, ordering/interpreting laboratory studies, ordering/interpreting of radiographic studies. ** Please Note: This note has been constructed using a voice recognition system.  **

## 2023-06-20 NOTE — ASSESSMENT & PLAN NOTE
· Pt with a h/o chronic heavy alcohol use  · Drinks 1 fifth-1 handle of vodka daily  · Denies H/o withdrawal seizures  · Withdrawal management as above  · Initiate IVFs, thiamine/folic acid supplementation, and MVI  · Consult case management for assistance with aftercare resources - pt interested in OP resources at this time

## 2023-06-21 LAB
ALBUMIN SERPL BCP-MCNC: 3.6 G/DL (ref 3.5–5)
ALBUMIN SERPL BCP-MCNC: 4.1 G/DL (ref 3.5–5)
ALP SERPL-CCNC: 49 U/L (ref 34–104)
ALP SERPL-CCNC: 56 U/L (ref 34–104)
ALT SERPL W P-5'-P-CCNC: 12 U/L (ref 7–52)
ALT SERPL W P-5'-P-CCNC: 9 U/L (ref 7–52)
ANION GAP SERPL CALCULATED.3IONS-SCNC: 9 MMOL/L
AST SERPL W P-5'-P-CCNC: 22 U/L (ref 13–39)
AST SERPL W P-5'-P-CCNC: 45 U/L (ref 13–39)
BASOPHILS # BLD AUTO: 0.05 THOUSANDS/ÂΜL (ref 0–0.1)
BASOPHILS NFR BLD AUTO: 1 % (ref 0–1)
BILIRUB DIRECT SERPL-MCNC: 0.16 MG/DL (ref 0–0.2)
BILIRUB SERPL-MCNC: 0.53 MG/DL (ref 0.2–1)
BILIRUB SERPL-MCNC: 0.92 MG/DL (ref 0.2–1)
BUN SERPL-MCNC: 9 MG/DL (ref 5–25)
CALCIUM SERPL-MCNC: 8.6 MG/DL (ref 8.4–10.2)
CHLORIDE SERPL-SCNC: 96 MMOL/L (ref 96–108)
CO2 SERPL-SCNC: 28 MMOL/L (ref 21–32)
CREAT SERPL-MCNC: 0.7 MG/DL (ref 0.6–1.3)
EOSINOPHIL # BLD AUTO: 0.17 THOUSAND/ÂΜL (ref 0–0.61)
EOSINOPHIL NFR BLD AUTO: 2 % (ref 0–6)
ERYTHROCYTE [DISTWIDTH] IN BLOOD BY AUTOMATED COUNT: 13.1 % (ref 11.6–15.1)
GFR SERPL CREATININE-BSD FRML MDRD: 102 ML/MIN/1.73SQ M
GLUCOSE SERPL-MCNC: 130 MG/DL (ref 65–140)
HCT VFR BLD AUTO: 38 % (ref 36.5–49.3)
HGB BLD-MCNC: 12.4 G/DL (ref 12–17)
IMM GRANULOCYTES # BLD AUTO: 0.08 THOUSAND/UL (ref 0–0.2)
IMM GRANULOCYTES NFR BLD AUTO: 1 % (ref 0–2)
LYMPHOCYTES # BLD AUTO: 1.26 THOUSANDS/ÂΜL (ref 0.6–4.47)
LYMPHOCYTES NFR BLD AUTO: 17 % (ref 14–44)
MCH RBC QN AUTO: 31.7 PG (ref 26.8–34.3)
MCHC RBC AUTO-ENTMCNC: 32.6 G/DL (ref 31.4–37.4)
MCV RBC AUTO: 97 FL (ref 82–98)
MONOCYTES # BLD AUTO: 1.2 THOUSAND/ÂΜL (ref 0.17–1.22)
MONOCYTES NFR BLD AUTO: 17 % (ref 4–12)
NEUTROPHILS # BLD AUTO: 4.51 THOUSANDS/ÂΜL (ref 1.85–7.62)
NEUTS SEG NFR BLD AUTO: 62 % (ref 43–75)
NRBC BLD AUTO-RTO: 0 /100 WBCS
PLATELET # BLD AUTO: 232 THOUSANDS/UL (ref 149–390)
PMV BLD AUTO: 10.2 FL (ref 8.9–12.7)
POTASSIUM SERPL-SCNC: 3.6 MMOL/L (ref 3.5–5.3)
PROT SERPL-MCNC: 6.3 G/DL (ref 6.4–8.4)
PROT SERPL-MCNC: 7.1 G/DL (ref 6.4–8.4)
RBC # BLD AUTO: 3.91 MILLION/UL (ref 3.88–5.62)
SODIUM SERPL-SCNC: 133 MMOL/L (ref 135–147)
WBC # BLD AUTO: 7.27 THOUSAND/UL (ref 4.31–10.16)

## 2023-06-21 PROCEDURE — 80053 COMPREHEN METABOLIC PANEL: CPT | Performed by: EMERGENCY MEDICINE

## 2023-06-21 PROCEDURE — 99232 SBSQ HOSP IP/OBS MODERATE 35: CPT | Performed by: EMERGENCY MEDICINE

## 2023-06-21 PROCEDURE — 85025 COMPLETE CBC W/AUTO DIFF WBC: CPT | Performed by: EMERGENCY MEDICINE

## 2023-06-21 RX ADMIN — ONDANSETRON 4 MG: 2 INJECTION INTRAMUSCULAR; INTRAVENOUS at 05:21

## 2023-06-21 RX ADMIN — TRAZODONE HYDROCHLORIDE 50 MG: 50 TABLET ORAL at 22:43

## 2023-06-21 RX ADMIN — RIVAROXABAN 20 MG: 20 TABLET, FILM COATED ORAL at 09:03

## 2023-06-21 RX ADMIN — MULTIPLE VITAMINS W/ MINERALS TAB 1 TABLET: TAB ORAL at 09:03

## 2023-06-21 RX ADMIN — THIAMINE HYDROCHLORIDE 500 MG: 100 INJECTION, SOLUTION INTRAMUSCULAR; INTRAVENOUS at 22:43

## 2023-06-21 RX ADMIN — METOPROLOL SUCCINATE 25 MG: 25 TABLET, EXTENDED RELEASE ORAL at 09:03

## 2023-06-21 RX ADMIN — NICOTINE 1 PATCH: 21 PATCH, EXTENDED RELEASE TRANSDERMAL at 09:03

## 2023-06-21 RX ADMIN — ASPIRIN 81 MG: 81 TABLET, COATED ORAL at 09:03

## 2023-06-21 RX ADMIN — THIAMINE HYDROCHLORIDE 500 MG: 100 INJECTION, SOLUTION INTRAMUSCULAR; INTRAVENOUS at 05:24

## 2023-06-21 RX ADMIN — FOLIC ACID 1 MG: 1 TABLET ORAL at 09:03

## 2023-06-21 RX ADMIN — LOSARTAN POTASSIUM 25 MG: 25 TABLET, FILM COATED ORAL at 09:03

## 2023-06-21 RX ADMIN — THIAMINE HYDROCHLORIDE 500 MG: 100 INJECTION, SOLUTION INTRAMUSCULAR; INTRAVENOUS at 14:55

## 2023-06-21 NOTE — PROGRESS NOTES
PROGRESS NOTE  DEPARTMENT OF MEDICAL TOXICOLOGY  LEVEL 4 MEDICAL DETOX UNIT  Nic Ayala 59 y.o. male MRN: [id number]  Unit/Bed#: 5T DETOX 515-01 Encounter: [id number]      Reason for Admission/Principal Problem: ETOH wd  Rounding Provider: Bisi Acuña MD  Attending Provider: Lucy Casillas*   6/19/2023  6:02 PM           Hyponatremia  Assessment & Plan  · Na 132 - AM labs pending at this time  · Encourage PO intake  · Monitor and consider fluid restriction  · Continue to monitor  · Nephrology should Na decline    Confusion and disorientation  Assessment & Plan  · Pt disoriented to time and noted to have outbursts throughout the night, thinking that people are knocking on his door or his room door is "the outside door" -- this AM, mentation is intact  · Patient reports that he thought he saw his sister in his room in the middle of the night  · Continue high dose thiamine for possible Wernicke encephalopathy  · Withdrawal management as above  · Will continue to monitor    Alcohol use disorder, severe, dependence ([address])  Assessment & Plan  · Pt with a h/o chronic heavy alcohol use  · Drinks 1 fifth-1 handle of vodka daily  · Denies H/o withdrawal seizures  · Withdrawal management as above  · Initiate IVFs, thiamine/folic acid supplementation, and MVI  · Consult case management for assistance with aftercare resources - pt interested in OP resources at this time    Essential hypertension  Assessment & Plan  · Patient with a history of HTN  · Home medication regimen: losartan and metoprolol  · Pt reports not taking his medication  · BP hypertensive upon arrival to the unit in the setting of acute alcohol withdrawal and chronic HTN, improving with phenobarbital administration  · Will resume home medications in the setting of concomitant hx of paroxysmal Afib  · 06/21/23 /87, HR 81  · Continue monitoring BP     COPD (chronic obstructive pulmonary disease) ([address])  Assessment & Plan  • Pt with a h/o COPD  • Home medications include PRN albuterol  • +Scattered inspiratory/expiratory wheezes on exam, no respiratory distress noted, VSS with SpO2 97% on RA  • CXR with No evidence of acute cardiopulmonary disease  • Continue home inhaler regimen  • Supplemental O2 via NC PRN maintain SpO2 >90%      Elevated LFTs  Assessment & Plan  Lab Results   Component Value Date    ALT 11 06/18/2023    AST 42 (H) 06/18/2023    ALKPHOS 68 06/18/2023    TBILI 0.36 06/18/2023        · Mildly Elevated AST on admission as above - labwork pending at this time  · Likely 2/2 chronic alcohol use/alcoholic liver disease  · No acute abd pain on exam  · Continue monitoring CMP  · Encourage alcohol cessation      Tobacco abuse  Assessment & Plan  · Daily tobacco user  · Offer NRT  · Encourage cessation      Paroxysmal atrial fibrillation ([address])  Assessment & Plan  · Pt has a hx of paroxysmal atrial fibrillation and atrial flutter  · Has an ICD/loop recorder in place  · Currently in NSR with occasional unifocal PVCs on telemetry  · Continue home medications- metoprolol, ASA, and Xarelto  · Telemetry monitoring    Hypokalemia  Assessment & Plan  · K 2.8 on initial labs --> 3/4  · Has a noted hx of hypokalemia per chart review  · Repleted PO in the ED  · Pt is currently tolerating PO intake  · Continue monitoring AM labs and replete electrolytes as indicated    Chest pain  Assessment & Plan  · Pt initially presented to the [facility] ED for chest pain in the setting of acute alcohol intoxication, subsequently denied CP when evaluated by the ED provider  · EKG: sinus tachycardia, incomplete RBBB, no evidence of acute ischemia  · Troponins 4-4  · Currently, pt denies CP, palpitations, SOB  · Likely non-cardiac in origin, 2/2 alcohol use  · Supportive care  · Continue monitoring      Cardiomyopathy, 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  Assessment & Plan  · Pt has a hx of non-ischemic cardiomyopathy, likley 2/2 alcohol use  · Last ECHO 10/4/22 showed LVEF 40%, moderately reduced LV systolic function, I1BX  · Continue to monitor  · Encourage alcohol cessation    * Alcohol withdrawal syndrome with complication [facility])  Assessment & Plan  · Patient with a history of chronic daily alcohol use  · Last drink PTA in the ED last evening 6/18  · Serum alcohol 486 in the ED (6/18, 2229)  · Received Valium 5 mg IV in the ED PTA  · Initiate SEWS protocol for medical management of alcohol withdrawal  · Current alcohol withdrawal signs/symptoms include: none  · Received 650 mg of phenobarbital as initial dose + administering 260 mg further for unchanged SEWS score 13 initially  · Total phenobarbital: 910 mg, last dose   · Continue monitoring under protocol and 06/20 23:09,  · Anticipate discontinuation of SEWS  · Continuous pulse ox and telemetry monitoring            VTE Pharmacologic Prophylaxis:   Pharmacologic: Enoxaparin (Lovenox)  Mechanical VTE Prophylaxis in Place: no    Code Status: Level 1 - Full Code    Patient Centered Rounds: I have performed bedside rounds with nursing staff today. Discussions with Specialists or Other Care Team Provider: no     Education and Discussions with Family / Patient: no    Time Spent for Care: 30 minutes. More than 50% of total time spent on counseling and coordination of care as described above. Current Length of Stay: 2 day(s)    Current Patient Status: Inpatient     Certification Statement: The patient will continue to require additional inpatient hospital stay due to ETOH withdrawal, Wernicke's hyponatremia     Discharge Plan: Anticipate stabilization within 48-72 hours  Unsure if wants inpatient vs outpatient at this time. Subjective:   States that overall has generalized feeling of unwellness, poor appetite, nausea, but improved from admission.   (-) chest pain, shortness of breath, vomiting, abdominal pain, urinary symptoms, changes in stool, leg pain/swelling, dizziness    Objective:     Clinical Opiate Withdrawal Scale  Pulse: 81    SEWS Total Score: 0 (2023  1:00 PM)        Last 24 Hours Medication List:   Current Facility-Administered Medications   Medication Dose Route Frequency Provider Last Rate   • acetaminophen  650 mg Oral Q6H PRN Gianna Hi MD     • albuterol  2 puff Inhalation Q4H PRN Talita Hill PA-C     • aspirin  81 mg Oral Daily Talita Hill PA-C     • folic acid  1 mg Oral Daily Gianna Hi MD     • losartan  25 mg Oral Daily Talita Hill PA-C     • metoprolol succinate  25 mg Oral Daily Talita Hill PA-C     • multivitamin-minerals  1 tablet Oral Daily Petty Zhang MD     • nicotine  1 patch Transdermal Daily Petty Zhang MD     • ondansetron  4 mg Intravenous Q6H PRN Petty Zhang MD     • rivaroxaban  20 mg Oral Daily With Breakfast Gaurav Pena PA-C     • thiamine  500 mg Intravenous [facility] Gaurav Pena PA-C 500 mg (23 0524)   • traZODone  50 mg Oral HS PRN Petty Zhang MD           Vitals:   Temp (24hrs), Av °F (36.7 °C), Min:97.4 °F (36.3 °C), Max:98.7 °F (37.1 °C)    Temp:  [97.4 °F (36.3 °C)-98.7 °F (37.1 °C)] 97.5 °F (36.4 °C)  HR:  [] 81  Resp:  [16-18] 16  BP: ()/() 112/78  SpO2:  [90 %-96 %] 90 %  Body mass index is 22.63 kg/m².      Input and Output Summary (last 24 hours):No intake or output data in the 24 hours ending 23 1420    Physical Exam:   Physical Exam    Additional Data:     Labs:   Results from last 7 days   Lab Units 23  0333 23  2229   WBC Thousand/uL 8.70 5.57   HEMOGLOBIN g/dL 13.0 12.5   HEMATOCRIT % 38.3 36.6   PLATELETS Thousands/uL 187 150   NEUTROS PCT %  --  60   LYMPHS PCT %  --  26   MONOS PCT %  --  12   EOS PCT %  --  1      Results from last 7 days   Lab Units 23  0333 23  2229   SODIUM mmol/L 132* 139   POTASSIUM mmol/L 3.4* 2.8*   CHLORIDE mmol/L 90* 99   CO2 mmol/L 29 28   BUN mg/dL 4* 7   CREATININE mg/dL 0.48* 0.50*   ANION GAP mmol/L 13 12   CALCIUM mg/dL 9.2 8.5   ALBUMIN g/dL  --  4.1   TOTAL BILIRUBIN mg/dL  --  0.36   ALK PHOS U/L  --  68   ALT U/L  --  11   AST U/L  --  42*   GLUCOSE RANDOM mg/dL 77 118      Results from last 7 days   Lab Units 06/18/23  2229   INR  0.89                         * I Have Reviewed All Lab Data Listed Above. * Additional Pertinent Lab Tests Reviewed: [address] [facility] Admission Reviewed      Imaging Studies: I have personally reviewed pertinent reports. Recent Cultures (last 7 days): Today, Patient Was Seen By: Eva Marie MD    ** Please Note: Dictation voice to text software may have been used in the creation of this document.  **

## 2023-06-21 NOTE — ASSESSMENT & PLAN NOTE
· Patient with a history of HTN  · Home medication regimen: losartan and metoprolol  · Pt reports not taking his medication  · BP hypertensive upon arrival to the unit in the setting of acute alcohol withdrawal and chronic HTN, improving with phenobarbital administration  · Will resume home medications in the setting of concomitant hx of paroxysmal Afib  · 06/21/23 /87, HR 81  · Continue monitoring BP

## 2023-06-21 NOTE — ASSESSMENT & PLAN NOTE
· Patient with a history of chronic daily alcohol use  · Last drink PTA in the ED last evening 6/18  · Serum alcohol 486 in the ED (6/18, 2229)  · Received Valium 5 mg IV in the ED PTA  · Initiate SEWS protocol for medical management of alcohol withdrawal  · Current alcohol withdrawal signs/symptoms include: none  · Received 650 mg of phenobarbital as initial dose + administering 260 mg further for unchanged SEWS score 13 initially  · Total phenobarbital: 910 mg, last dose   · Continue monitoring under protocol and 06/20 23:09,  · Anticipate discontinuation of SEWS  · Continuous pulse ox and telemetry monitoring

## 2023-06-21 NOTE — ASSESSMENT & PLAN NOTE
· Pt disoriented to time and noted to have outbursts throughout the night, thinking that people are knocking on his door or his room door is "the outside door" -- this AM, mentation is intact  · Patient reports that he thought he saw his sister in his room in the middle of the night  · Continue high dose thiamine for possible Wernicke encephalopathy  · Withdrawal management as above  · Will continue to monitor

## 2023-06-21 NOTE — PLAN OF CARE
Problem: SAFETY ADULT  Goal: Patient will remain free of falls  Description: INTERVENTIONS:  - Educate patient/family on patient safety including physical limitations  - Instruct patient to call for assistance with activity   - Consult OT/PT to assist with strengthening/mobility   - Keep Call bell within reach  - Keep bed low and locked with side rails adjusted as appropriate  - Keep care items and personal belongings within reach  - Initiate and maintain comfort rounds  - Make Fall Risk Sign visible to staff  - Obtain necessary fall risk management equipment. - Apply yellow socks and bracelet for high fall risk patients  - Consider moving patient to room near nurses station  Outcome: Progressing  Goal: Maintain or return to baseline ADL function  Description: INTERVENTIONS:  -  Assess patient's ability to carry out ADLs; assess patient's baseline for ADL function and identify physical deficits which impact ability to perform ADLs (bathing, care of mouth/teeth, toileting, grooming, dressing, etc.)  - Assess/evaluate cause of self-care deficits   - Assess range of motion  - Assess patient's mobility; develop plan if impaired  - Assess patient's need for assistive devices and provide as appropriate  - Encourage maximum independence but intervene and supervise when necessary  - Involve family in performance of ADLs  - Assess for home care needs following discharge   - Consider OT consult to assist with ADL evaluation and planning for discharge  - Provide patient education as appropriate  Outcome: Progressing  Goal: Maintains/Returns to pre admission functional level  Description: INTERVENTIONS:  - Perform BMAT or MOVE assessment daily.   - Set and communicate daily mobility goal to care team and patient/family/caregiver.    - Collaborate with rehabilitation services on mobility goals if consulted  - Out of bed for toileting  - Record patient progress and toleration of activity level   Outcome: Progressing Problem: DISCHARGE PLANNING  Goal: Discharge to home or other facility with appropriate resources  Description: INTERVENTIONS:  - Identify barriers to discharge w/patient and caregiver  - Arrange for needed discharge resources and transportation as appropriate  - Identify discharge learning needs (meds, wound care, etc.)  - Arrange for interpretive services to assist at discharge as needed  - Refer to Case Management Department for coordinating discharge planning if the patient needs post-hospital services based on physician/advanced practitioner order or complex needs related to functional status, cognitive ability, or social support system  Outcome: Progressing     Problem: Knowledge Deficit  Goal: Patient/family/caregiver demonstrates understanding of disease process, treatment plan, medications, and discharge instructions  Description: Complete learning assessment and assess knowledge base.   Interventions:  - Provide teaching at level of understanding  - Provide teaching via preferred learning methods  Outcome: Progressing     Problem: SUBSTANCE USE/ABUSE  Goal: By discharge, will develop insight into their chemical dependency and sustain motivation to continue in recovery  Description: INTERVENTIONS:  - Attends all daily group sessions and scheduled AA groups  - Actively practices coping skills through participation in the therapeutic community and adherence to program rules  - Reviews and completes assignments from individual treatment plan  - Assist patient development of understanding of their personal cycle of addiction and relapse triggers  Outcome: Progressing  Goal: By discharge, patient will have ongoing treatment plan addressing chemical dependency  Description: INTERVENTIONS:  - Assist patient with resources and/or appointments for ongoing recovery based living  Outcome: Progressing     Problem: MOBILITY - ADULT  Goal: Maintain or return to baseline ADL function  Description: INTERVENTIONS:  - Assess patient's ability to carry out ADLs; assess patient's baseline for ADL function and identify physical deficits which impact ability to perform ADLs (bathing, care of mouth/teeth, toileting, grooming, dressing, etc.)  - Assess/evaluate cause of self-care deficits   - Assess range of motion  - Assess patient's mobility; develop plan if impaired  - Assess patient's need for assistive devices and provide as appropriate  - Encourage maximum independence but intervene and supervise when necessary  - Involve family in performance of ADLs  - Assess for home care needs following discharge   - Consider OT consult to assist with ADL evaluation and planning for discharge  - Provide patient education as appropriate  Outcome: Progressing  Goal: Maintains/Returns to pre admission functional level  Description: INTERVENTIONS:  - Perform BMAT or MOVE assessment daily.   - Set and communicate daily mobility goal to care team and patient/family/caregiver.    - Collaborate with rehabilitation services on mobility goals if consulted  - Out of bed for toileting  - Record patient progress and toleration of activity level   Outcome: Progressing

## 2023-06-21 NOTE — ASSESSMENT & PLAN NOTE
· Pt initially presented to the [facility] ED for chest pain in the setting of acute alcohol intoxication, subsequently denied CP when evaluated by the ED provider  · EKG: sinus tachycardia, incomplete RBBB, no evidence of acute ischemia  · Troponins 4-4  · Currently, pt denies CP, palpitations, SOB  · Likely non-cardiac in origin, 2/2 alcohol use  · Supportive care  · Continue monitoring

## 2023-06-21 NOTE — ASSESSMENT & PLAN NOTE
· Na 132 - AM labs pending at this time  · Encourage PO intake  · Monitor and consider fluid restriction  · Continue to monitor  · Nephrology should Na decline

## 2023-06-21 NOTE — ASSESSMENT & PLAN NOTE
· Pt has a hx of non-ischemic cardiomyopathy, javier 2/2 alcohol use  · Last ECHO 10/4/22 showed LVEF 40%, moderately reduced LV systolic function, R6BA  · Continue to monitor  · Encourage alcohol cessation

## 2023-06-21 NOTE — CASE MANAGEMENT
Worker received phone call from Enio at [facility] regarding pt. Worker informed her that no information could be released at this time due to no MANJULA. Worker did attempt to ask pt for MANJULA but pt was sound asleep and was difficult to waken. Worker will ask pt regarding MANJULA once he is more alert.

## 2023-06-21 NOTE — PLAN OF CARE
Problem: SUBSTANCE USE/ABUSE  Goal: By discharge, will develop insight into their chemical dependency and sustain motivation to continue in recovery  Description: INTERVENTIONS:  - Attends all daily group sessions and scheduled AA groups  - Actively practices coping skills through participation in the therapeutic community and adherence to program rules  - Reviews and completes assignments from individual treatment plan  - Assist patient development of understanding of their personal cycle of addiction and relapse triggers  Outcome: Progressing  Goal: By discharge, patient will have ongoing treatment plan addressing chemical dependency  Description: INTERVENTIONS:  - Assist patient with resources and/or appointments for ongoing recovery based living  Outcome: Progressing     Problem: SAFETY ADULT  Goal: Patient will remain free of falls  Description: INTERVENTIONS:  - Educate patient/family on patient safety including physical limitations  - Instruct patient to call for assistance with activity   - Consult OT/PT to assist with strengthening/mobility   - Keep Call bell within reach  - Keep bed low and locked with side rails adjusted as appropriate  - Keep care items and personal belongings within reach  - Initiate and maintain comfort rounds  - Make Fall Risk Sign visible to staff  - Apply yellow socks and bracelet for high fall risk patients  - Consider moving patient to room near nurses station  Outcome: Progressing  Goal: Maintain or return to baseline ADL function  Description: INTERVENTIONS:  -  Assess patient's ability to carry out ADLs; assess patient's baseline for ADL function and identify physical deficits which impact ability to perform ADLs (bathing, care of mouth/teeth, toileting, grooming, dressing, etc.)  - Assess/evaluate cause of self-care deficits   - Assess range of motion  - Assess patient's mobility; develop plan if impaired  - Assess patient's need for assistive devices and provide as appropriate  - Encourage maximum independence but intervene and supervise when necessary  - Involve family in performance of ADLs  - Assess for home care needs following discharge   - Consider OT consult to assist with ADL evaluation and planning for discharge  - Provide patient education as appropriate  Outcome: Progressing  Goal: Maintains/Returns to pre admission functional level  Description: INTERVENTIONS:  - Perform BMAT or MOVE assessment daily.   - Set and communicate daily mobility goal to care team and patient/family/caregiver.    - Collaborate with rehabilitation services on mobility goals if consulted  - Out of bed for toileting  - Record patient progress and toleration of activity level   Outcome: Progressing

## 2023-06-21 NOTE — ASSESSMENT & PLAN NOTE
Lab Results   Component Value Date    ALT 11 06/18/2023    AST 42 (H) 06/18/2023    ALKPHOS 68 06/18/2023    TBILI 0.36 06/18/2023        · Mildly Elevated AST on admission as above - labwork pending at this time  · Likely 2/2 chronic alcohol use/alcoholic liver disease  · No acute abd pain on exam  · Continue monitoring CMP  · Encourage alcohol cessation

## 2023-06-21 NOTE — UTILIZATION REVIEW
NOTIFICATION OF INPATIENT ADMISSION   AUTHORIZATION REQUEST   SERVICING FACILITY:   [address] [place] [address], [facility], [address] [place] [address]  Tax ID: [id number]  NPI: [id number] ATTENDING PROVIDER:  Attending Name and NPI#: Petty Zhang Md [[id number]]  Address: [address] [place] [address], [facility], [address] [place] [address]  Phone: [phone]     ADMISSION INFORMATION:  Place of Service: Inpatient [address]  Place of Service Code: 21  Inpatient Admission Date/Time: 6/19/23  6:02 PM  Discharge Date/Time: No discharge date for patient encounter. Admitting Diagnosis Code/Description:  Alcohol intoxication ([address]) [F10.929]     UTILIZATION REVIEW CONTACT:  Kathy Rao Utilization   Network Utilization Review Department  Phone: [phone]  Fax [phone]  Email: [email or web address]  Contact for approvals/pending authorizations, clinical reviews, and discharge. PHYSICIAN ADVISORY SERVICES:  Medical Necessity Denial & [id number] Review  Phone: [phone]  Fax: [phone]  Email: [email or web address]

## 2023-06-22 PROBLEM — E87.6 HYPOKALEMIA: Status: RESOLVED | Noted: 2017-09-22 | Resolved: 2023-06-22

## 2023-06-22 LAB
ANION GAP SERPL CALCULATED.3IONS-SCNC: 6 MMOL/L
BUN SERPL-MCNC: 10 MG/DL (ref 5–25)
CALCIUM SERPL-MCNC: 8.7 MG/DL (ref 8.4–10.2)
CHLORIDE SERPL-SCNC: 99 MMOL/L (ref 96–108)
CO2 SERPL-SCNC: 28 MMOL/L (ref 21–32)
CREAT SERPL-MCNC: 0.57 MG/DL (ref 0.6–1.3)
GFR SERPL CREATININE-BSD FRML MDRD: 111 ML/MIN/1.73SQ M
GLUCOSE SERPL-MCNC: 72 MG/DL (ref 65–140)
MAGNESIUM SERPL-MCNC: 1.5 MG/DL (ref 1.9–2.7)
POTASSIUM SERPL-SCNC: 4 MMOL/L (ref 3.5–5.3)
SODIUM SERPL-SCNC: 133 MMOL/L (ref 135–147)

## 2023-06-22 PROCEDURE — 97163 PT EVAL HIGH COMPLEX 45 MIN: CPT

## 2023-06-22 PROCEDURE — 83735 ASSAY OF MAGNESIUM: CPT

## 2023-06-22 PROCEDURE — 80048 BASIC METABOLIC PNL TOTAL CA: CPT

## 2023-06-22 PROCEDURE — 99232 SBSQ HOSP IP/OBS MODERATE 35: CPT

## 2023-06-22 RX ORDER — MAGNESIUM SULFATE HEPTAHYDRATE 40 MG/ML
2 INJECTION, SOLUTION INTRAVENOUS ONCE
Status: COMPLETED | OUTPATIENT
Start: 2023-06-22 | End: 2023-06-22

## 2023-06-22 RX ADMIN — THIAMINE HYDROCHLORIDE 500 MG: 100 INJECTION, SOLUTION INTRAMUSCULAR; INTRAVENOUS at 13:45

## 2023-06-22 RX ADMIN — LOSARTAN POTASSIUM 25 MG: 25 TABLET, FILM COATED ORAL at 08:33

## 2023-06-22 RX ADMIN — ACETAMINOPHEN 650 MG: 325 TABLET ORAL at 03:24

## 2023-06-22 RX ADMIN — MAGNESIUM SULFATE HEPTAHYDRATE 2 G: 2 INJECTION, SOLUTION INTRAVENOUS at 11:31

## 2023-06-22 RX ADMIN — NICOTINE 1 PATCH: 21 PATCH, EXTENDED RELEASE TRANSDERMAL at 08:33

## 2023-06-22 RX ADMIN — THIAMINE HYDROCHLORIDE 500 MG: 100 INJECTION, SOLUTION INTRAMUSCULAR; INTRAVENOUS at 05:50

## 2023-06-22 RX ADMIN — ASPIRIN 81 MG: 81 TABLET, COATED ORAL at 08:33

## 2023-06-22 RX ADMIN — FOLIC ACID 1 MG: 1 TABLET ORAL at 08:33

## 2023-06-22 RX ADMIN — RIVAROXABAN 20 MG: 20 TABLET, FILM COATED ORAL at 07:43

## 2023-06-22 RX ADMIN — MULTIPLE VITAMINS W/ MINERALS TAB 1 TABLET: TAB ORAL at 08:33

## 2023-06-22 RX ADMIN — METOPROLOL SUCCINATE 25 MG: 25 TABLET, EXTENDED RELEASE ORAL at 08:33

## 2023-06-22 NOTE — ASSESSMENT & PLAN NOTE
· Pt initially presented to the [facility] ED for chest pain in the setting of acute alcohol intoxication, subsequently denied CP when evaluated by the ED provider  · EKG: sinus tachycardia, incomplete RBBB, no evidence of acute ischemia  · Troponins 4-4  · Currently, pt denies CP, palpitations, SOB  · Likely non-cardiac in origin, 2/2 alcohol use  · Supportive care  · Continue monitoring

## 2023-06-22 NOTE — PLAN OF CARE
Problem: SAFETY ADULT  Goal: Patient will remain free of falls  Description: INTERVENTIONS:  - Educate patient/family on patient safety including physical limitations  - Instruct patient to call for assistance with activity   - Consult OT/PT to assist with strengthening/mobility   - Keep Call bell within reach  - Keep bed low and locked with side rails adjusted as appropriate  - Keep care items and personal belongings within reach  - Initiate and maintain comfort rounds  - Make Fall Risk Sign visible to staff  - Offer Toileting every 2 Hours, in advance of need  - Initiate/Maintain bed alarm  - Obtain necessary fall risk management equipment: alarm   - Apply yellow socks and bracelet for high fall risk patients  - Consider moving patient to room near nurses station  Outcome: Progressing  Goal: Maintain or return to baseline ADL function  Description: INTERVENTIONS:  -  Assess patient's ability to carry out ADLs; assess patient's baseline for ADL function and identify physical deficits which impact ability to perform ADLs (bathing, care of mouth/teeth, toileting, grooming, dressing, etc.)  - Assess/evaluate cause of self-care deficits   - Assess range of motion  - Assess patient's mobility; develop plan if impaired  - Assess patient's need for assistive devices and provide as appropriate  - Encourage maximum independence but intervene and supervise when necessary  - Involve family in performance of ADLs  - Assess for home care needs following discharge   - Consider OT consult to assist with ADL evaluation and planning for discharge  - Provide patient education as appropriate  Outcome: Progressing  Goal: Maintains/Returns to pre admission functional level  Description: INTERVENTIONS:  - Perform BMAT or MOVE assessment daily.   - Set and communicate daily mobility goal to care team and patient/family/caregiver.    - Collaborate with rehabilitation services on mobility goals if consulted  - Perform Range of Motion 4 times a day. - Reposition patient every 2 hours. - Dangle patient 4 times a day  - Stand patient 4 times a day  - Ambulate patient 4 times a day  - Out of bed to chair 4 times a day   - Out of bed for meals 4 times a day  - Out of bed for toileting  - Record patient progress and toleration of activity level   Outcome: Progressing     Problem: DISCHARGE PLANNING  Goal: Discharge to home or other facility with appropriate resources  Description: INTERVENTIONS:  - Identify barriers to discharge w/patient and caregiver  - Arrange for needed discharge resources and transportation as appropriate  - Identify discharge learning needs (meds, wound care, etc.)  - Arrange for interpretive services to assist at discharge as needed  - Refer to Case Management Department for coordinating discharge planning if the patient needs post-hospital services based on physician/advanced practitioner order or complex needs related to functional status, cognitive ability, or social support system  Outcome: Progressing     Problem: Knowledge Deficit  Goal: Patient/family/caregiver demonstrates understanding of disease process, treatment plan, medications, and discharge instructions  Description: Complete learning assessment and assess knowledge base.   Interventions:  - Provide teaching at level of understanding  - Provide teaching via preferred learning methods  Outcome: Progressing     Problem: SUBSTANCE USE/ABUSE  Goal: By discharge, will develop insight into their chemical dependency and sustain motivation to continue in recovery  Description: INTERVENTIONS:  - Attends all daily group sessions and scheduled AA groups  - Actively practices coping skills through participation in the therapeutic community and adherence to program rules  - Reviews and completes assignments from individual treatment plan  - Assist patient development of understanding of their personal cycle of addiction and relapse triggers  Outcome: Progressing  Goal: By discharge, patient will have ongoing treatment plan addressing chemical dependency  Description: INTERVENTIONS:  - Assist patient with resources and/or appointments for ongoing recovery based living  Outcome: Progressing     Problem: MOBILITY - ADULT  Goal: Maintain or return to baseline ADL function  Description: INTERVENTIONS:  -  Assess patient's ability to carry out ADLs; assess patient's baseline for ADL function and identify physical deficits which impact ability to perform ADLs (bathing, care of mouth/teeth, toileting, grooming, dressing, etc.)  - Assess/evaluate cause of self-care deficits   - Assess range of motion  - Assess patient's mobility; develop plan if impaired  - Assess patient's need for assistive devices and provide as appropriate  - Encourage maximum independence but intervene and supervise when necessary  - Involve family in performance of ADLs  - Assess for home care needs following discharge   - Consider OT consult to assist with ADL evaluation and planning for discharge  - Provide patient education as appropriate  Outcome: Progressing  Goal: Maintains/Returns to pre admission functional level  Description: INTERVENTIONS:  - Perform BMAT or MOVE assessment daily.   - Set and communicate daily mobility goal to care team and patient/family/caregiver. - Collaborate with rehabilitation services on mobility goals if consulted  - Perform Range of Motion 4 times a day. - Reposition patient every 2 hours.   - Dangle patient 4 times a day  - Stand patient 4 times a day  - Ambulate patient 4 times a day  - Out of bed to chair 4 times a day   - Out of bed for meals 4 times a day  - Out of bed for toileting  - Record patient progress and toleration of activity level   Outcome: Progressing

## 2023-06-22 NOTE — ASSESSMENT & PLAN NOTE
· Pt with a h/o chronic heavy alcohol use  · Drinks 1 fifth-1 handle of vodka daily  · Denies H/o withdrawal seizures  · Withdrawal management as above  · Discontinue  IVFs, thiamine/folic acid supplementation, and MVI  · Consult case management for assistance with aftercare resources - pt interested in inpatient drug and alcohol rehab upon discharge.

## 2023-06-22 NOTE — ASSESSMENT & PLAN NOTE
· Patient with a history of HTN  · Home medication regimen: losartan and metoprolol  · Pt reports not taking his medication  · BP hypertensive upon arrival to the unit in the setting of acute alcohol withdrawal and chronic HTN, improving with phenobarbital administration  · Will resume home medications in the setting of concomitant hx of paroxysmal Afib  · 06/22/23 /87, HR 81  · Continue monitoring BP

## 2023-06-22 NOTE — PLAN OF CARE
Problem: PHYSICAL THERAPY ADULT  Goal: Performs mobility at highest level of function for planned discharge setting. See evaluation for individualized goals. Description: Treatment/Interventions: Functional transfer training, ADL retraining, LE strengthening/ROM, Elevations, Therapeutic exercise, Endurance training, Patient/family training, Cognitive reorientation, Equipment eval/education, Bed mobility, Gait training, Spoke to case management, Spoke to nursing, OT  Equipment Recommended: Santiago Ames       See flowsheet documentation for full assessment, interventions and recommendations. Outcome: Progressing  Note: Prognosis: Good  Problem List: Decreased strength, Decreased endurance, Impaired balance, Decreased mobility, Decreased coordination, Decreased cognition, Impaired judgement, Decreased safety awareness     Barriers to Discharge: Inaccessible home environment, Decreased caregiver support     PT Discharge Recommendation:  (TBD)    See flowsheet documentation for full assessment.

## 2023-06-22 NOTE — ASSESSMENT & PLAN NOTE
· Pt continues to be disoriented to time. Had one reported outburst last evening in which he states he received a phone call that his significant other passed away. Staff has been unable to confirm the accuracy of this information. Patient has not further mentioned any information about his spouse this morning. States that he lives with his sister for the last year and she is his caretaker. · He is oriented to person, not to time, month, or year.    · Continue high dose thiamine for Wernicke encephalopathy  · PT consulted to assess ambulation   · Will continue to monitor and transition to PO thiamine 100mg TID, once patient has received 9 doses of high dose thiamine

## 2023-06-22 NOTE — CASE MANAGEMENT
Worker received voicemail from [place] at [facility] regarding pt. She requested worker fax over letter for proof that pt is at our facility, worker emailed letter to [email or web address].

## 2023-06-22 NOTE — ASSESSMENT & PLAN NOTE
· Pt has a hx of non-ischemic cardiomyopathy, javier 2/2 alcohol use  · Last ECHO 10/4/22 showed LVEF 40%, moderately reduced LV systolic function, C8RN  · Continue to monitor  · Encourage alcohol cessation

## 2023-06-22 NOTE — ASSESSMENT & PLAN NOTE
· Patient with a history of chronic daily alcohol use  · Last drink PTA in the ED last evening 6/18  · Serum alcohol 486 in the ED (6/18, 2229)  · Received Valium 5 mg IV in the ED PTA  ·  SEWS protocol discontinued on 6/20/23  ·  Current alcohol withdrawal signs/symptoms include: no  · Received a total of 1560 mg of phenobarbital without complication. Last Dose administered on 6/20/23 at 2309   · Continue high dose thiamine due to confusion.  Currently received 6/9 doses

## 2023-06-22 NOTE — PLAN OF CARE
Problem: SAFETY ADULT  Goal: Patient will remain free of falls  Description: INTERVENTIONS:  - Educate patient/family on patient safety including physical limitations  - Instruct patient to call for assistance with activity   - Consult OT/PT to assist with strengthening/mobility   - Keep Call bell within reach  - Keep bed low and locked with side rails adjusted as appropriate  - Keep care items and personal belongings within reach  - Initiate and maintain comfort rounds    - Apply yellow socks and bracelet for high fall risk patients  - Consider moving patient to room near nurses station  Outcome: Adequate for Discharge  Goal: Maintain or return to baseline ADL function  Description: INTERVENTIONS:  -  Assess patient's ability to carry out ADLs; assess patient's baseline for ADL function and identify physical deficits which impact ability to perform ADLs (bathing, care of mouth/teeth, toileting, grooming, dressing, etc.)  - Assess/evaluate cause of self-care deficits   - Assess range of motion  - Assess patient's mobility; develop plan if impaired  - Assess patient's need for assistive devices and provide as appropriate  - Encourage maximum independence but intervene and supervise when necessary  - Involve family in performance of ADLs  - Assess for home care needs following discharge   - Consider OT consult to assist with ADL evaluation and planning for discharge  - Provide patient education as appropriate  Outcome: Adequate for Discharge  Goal: Maintains/Returns to pre admission functional level  Description: INTERVENTIONS:  - Perform BMAT or MOVE assessment daily.   - Set and communicate daily mobility goal to care team and patient/family/caregiver.      - Out of bed for toileting  - Record patient progress and toleration of activity level   Outcome: Adequate for Discharge
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
## 2023-06-22 NOTE — ASSESSMENT & PLAN NOTE
· K 2.8 on initial labs --> 4.0   · Has a noted hx of hypokalemia per chart review  · Repleted PO in the ED  · Pt is currently tolerating PO intake  · Continue monitoring AM labs and replete electrolytes as indicated

## 2023-06-22 NOTE — ASSESSMENT & PLAN NOTE
Recent Labs     06/22/23  0444   MG 1.5*     · Replenish with 2g Mag Sulfate   · Recheck BMP and Mag in AM   · Continue to replenish as necessary

## 2023-06-22 NOTE — CASE MANAGEMENT
Worker consulted with medical provider, pt not medically cleared for discharge today. Worker spoke with pt regarding aftercare planning, pt expressed interest in inpatient rehab. Worker explained the difficulties with placing [address] [place]  residents to inpatient rehab. Pt also has extensive medical issues that may prevent pt from being accepted. Pt did sign MANJUL[facility] for [facility]. Worker left message for Samina Salcedo at [facility] at [phone] ext [phone].

## 2023-06-22 NOTE — PROGRESS NOTES
Progress Note - Medical Toxicology    Pj Jamie 59 y.o. male MRN: [id number]  Unit/Bed#: 5T DETOX 515-01 Encounter: [id number]  MEDICAL DETOX UNIT, LEVEL 4  Department of Medical Toxicology  Reason for Admission/Principal Problem: alcohol withdrawal   Rounding Provider: Chago Cavazos PA-C, Ji Oliva DO         * Alcohol withdrawal syndrome with complication [facility])  Assessment & Plan  · Patient with a history of chronic daily alcohol use  · Last drink PTA in the ED last evening 6/18  · Serum alcohol 486 in the ED (6/18, 2229)  · Received Valium 5 mg IV in the ED PTA  ·  SEWS protocol discontinued on 6/20/23  ·  Current alcohol withdrawal signs/symptoms include: no  · Received a total of 1560 mg of phenobarbital without complication. Last Dose administered on 6/20/23 at 2309   · Continue high dose thiamine due to confusion. Currently received 6/9 doses     Alcohol use disorder, severe, dependence ([address])  Assessment & Plan  · Pt with a h/o chronic heavy alcohol use  · Drinks 1 fifth-1 handle of vodka daily  · Denies H/o withdrawal seizures  · Withdrawal management as above  · Discontinue  IVFs, thiamine/folic acid supplementation, and MVI  · Consult case management for assistance with aftercare resources - pt interested in inpatient drug and alcohol rehab upon discharge. Hyponatremia  Assessment & Plan  · Na 133 - stable   · Encourage PO intake  · Monitor and consider fluid restriction  · Continue to monitor  · Nephrology should Na decline    Confusion and disorientation  Assessment & Plan  · Pt continues to be disoriented to time. Had one reported outburst last evening in which he states he received a phone call that his significant other passed away. Staff has been unable to confirm the accuracy of this information. Patient has not further mentioned any information about his spouse this morning. States that he lives with his sister for the last year and she is his caretaker.    · He is oriented to person, not to time, month, or year.    · Continue high dose thiamine for Wernicke encephalopathy  · PT consulted to assess ambulation   · Will continue to monitor and transition to PO thiamine 100mg TID, once patient has received 9 doses of high dose thiamine     Hypomagnesemia  Assessment & Plan  Recent Labs     06/22/23  0444   MG 1.5*     · Replenish with 2g Mag Sulfate   · Recheck BMP and Mag in AM   · Continue to replenish as necessary     Essential hypertension  Assessment & Plan  · Patient with a history of HTN  · Home medication regimen: losartan and metoprolol  · Pt reports not taking his medication  · BP hypertensive upon arrival to the unit in the setting of acute alcohol withdrawal and chronic HTN, improving with phenobarbital administration  · Will resume home medications in the setting of concomitant hx of paroxysmal Afib  · 06/22/23 /87, HR 81  · Continue monitoring BP     COPD (chronic obstructive pulmonary disease) ([address])  Assessment & Plan  • Pt with a h/o COPD  • Home medications include PRN albuterol  • +Scattered inspiratory/expiratory wheezes on exam, no respiratory distress noted, VSS with SpO2 97% on RA  • CXR with no evidence of acute cardiopulmonary disease  • Continue home inhaler regimen  • Supplemental O2 via NC PRN maintain SpO2 >90%      Elevated LFTs  Assessment & Plan  Lab Results   Component Value Date    ALT 9 06/21/2023    AST 22 06/21/2023    ALKPHOS 56 06/21/2023    TBILI 0.53 06/21/2023        · Mildly Elevated AST on admission as above - labwork pending at this time  · Likely 2/2 chronic alcohol use/alcoholic liver disease  · No acute abd pain on exam  · Continue monitoring CMP  · Encourage alcohol cessation      Tobacco abuse  Assessment & Plan  · Daily tobacco user  · Offer NRT  · Encourage cessation      Paroxysmal atrial fibrillation ([address])  Assessment & Plan  · Pt has a hx of paroxysmal atrial fibrillation and atrial flutter  · Has an ICD/loop recorder in place  · Currently in NSR with occasional unifocal PVCs on telemetry  · Continue home medications- metoprolol, ASA, and Xarelto  · Telemetry monitoring    Chest pain  Assessment & Plan  · Pt initially presented to the [facility] ED for chest pain in the setting of acute alcohol intoxication, subsequently denied CP when evaluated by the ED provider  · EKG: sinus tachycardia, incomplete RBBB, no evidence of acute ischemia  · Troponins 4-4  · Currently, pt denies CP, palpitations, SOB  · Likely non-cardiac in origin, 2/2 alcohol use  · Supportive care  · Continue monitoring      Cardiomyopathy, 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  Assessment & Plan  · Pt has a hx of non-ischemic cardiomyopathy, likley 2/2 alcohol use  · Last ECHO 10/4/22 showed LVEF 40%, moderately reduced LV systolic function, Z4PG  · Continue to monitor  · Encourage alcohol cessation    Hypokalemia-resolved as of 6/22/2023  Assessment & Plan  · K 2.8 on initial labs --> 4.0   · Has a noted hx of hypokalemia per chart review  · Repleted PO in the ED  · Pt is currently tolerating PO intake  · Continue monitoring AM labs and replete electrolytes as indicated            VTE Pharmacologic Prophylaxis:   Pharmacologic: Enoxaparin (Lovenox)  Mechanical VTE Prophylaxis in Place: yes    Code Status: Level 1 - Full Code    Patient Centered Rounds: I have performed bedside rounds with nursing staff today. Discussions with Specialists or Other Care Team Provider: Case Management, Attending Dr. Jose Salmon   Education and Discussions with Family / Patient: I personally answered all of the patient's questions to the best of my ability     Time Spent for Care: 30 minutes. More than 50% of total time spent on counseling and coordination of care as described above.     Current Length of Stay: 3 day(s)    Current Patient Status: Inpatient     Certification Statement: The patient will continue to require additional inpatient hospital stay due to PT consult, requiring high dose thiamine Discharge Plan: Case Management Disposition     Subjective:   Patient seen and examined. He reports to feeling nauseous this morning but is tolerating PO diet and fluids. Patient does state he feels better than yesterday but at times states " I am still confused". He is unable to recall the year states it is  and is not aware of the month or the president. He does report that he resides with his sister who is primarily his care-giver. He states he is interested in inpatient drug and alcohol rehab upon discharge.      Objective:     Clinical Opiate Withdrawal Scale  Pulse: 83    SEWS Total Score: 0 (2023  6:00 PM)        Last 24 Hours Medication List:   Current Facility-Administered Medications   Medication Dose Route Frequency Provider Last Rate   • acetaminophen  650 mg Oral Q6H PRN Gianna Escobar MD     • albuterol  2 puff Inhalation Q4H PRN Talita Bejarano PA-C     • aspirin  81 mg Oral Daily Talita Bejarano PA-C     • folic acid  1 mg Oral Daily Gianna Escobar MD     • losartan  25 mg Oral Daily Talita Bejarano PA-C     • magnesium sulfate  2 g Intravenous Once Annita Diaz PA-C 2 g (23 1131)   • metoprolol succinate  25 mg Oral Daily Talita Bejarano PA-C     • multivitamin-minerals  1 tablet Oral Daily Spike Moser MD     • nicotine  1 patch Transdermal Daily Spike Moser MD     • ondansetron  4 mg Intravenous Q6H PRN Spike Moser MD     • rivaroxaban  20 mg Oral Daily With Breakfast Rafia Moss PA-C     • thiamine  500 mg Intravenous [facility] Rafia Moss PA-C 500 mg (23 0550)   • traZODone  50 mg Oral HS PRN Spike Moser MD           Vitals:   Temp (24hrs), Av.9 °F (36.6 °C), Min:97.3 °F (36.3 °C), Max:98.9 °F (37.2 °C)    Temp:  [97.3 °F (36.3 °C)-98.9 °F (37.2 °C)] 97.3 °F (36.3 °C)  HR:  [78-88] 83  Resp:  [16] 16  BP: (112-130)/(69-87) 125/69  SpO2:  [90 %-100 %] 98 %  Body mass index is 22.63 kg/m². Input and Output Summary (last 24 hours): Intake/Output Summary (Last 24 hours) at 6/22/2023 1133  Last data filed at 6/22/2023 0741  Gross per 24 hour   Intake --   Output 1050 ml   Net -1050 ml       Physical Exam:   Physical Exam  Constitutional:       General: He is not in acute distress. Appearance: He is not diaphoretic. Eyes:      General: No scleral icterus. Cardiovascular:      Rate and Rhythm: Normal rate and regular rhythm. Heart sounds: No murmur heard. Pulmonary:      Breath sounds: Normal breath sounds. No decreased breath sounds. Abdominal:      General: Bowel sounds are normal. There is no distension. Palpations: Abdomen is soft. Tenderness: There is no abdominal tenderness. Neurological:      Mental Status: He is alert. He is confused. Motor: No tremor. Psychiatric:         Mood and Affect: Mood is anxious. Mood is not depressed. Additional Data:     Labs: keep all most recent labs as listed on admission templates   Results from last 7 days   Lab Units 06/21/23  1436   WBC Thousand/uL 7.27   HEMOGLOBIN g/dL 12.4   HEMATOCRIT % 38.0   PLATELETS Thousands/uL 232   NEUTROS PCT % 62   LYMPHS PCT % 17   MONOS PCT % 17*   EOS PCT % 2      Results from last 7 days   Lab Units 06/22/23  0444 06/21/23  1436   SODIUM mmol/L 133* 133*   POTASSIUM mmol/L 4.0 3.6   CHLORIDE mmol/L 99 96   CO2 mmol/L 28 28   BUN mg/dL 10 9   CREATININE mg/dL 0.57* 0.70   ANION GAP mmol/L 6 9   CALCIUM mg/dL 8.7 8.6   ALBUMIN g/dL  --  3.6   TOTAL BILIRUBIN mg/dL  --  0.53   ALK PHOS U/L  --  56   ALT U/L  --  9   AST U/L  --  22   GLUCOSE RANDOM mg/dL 72 130      Results from last 7 days   Lab Units 06/18/23  2229   INR  0.89                         * I Have Reviewed All Lab Data Listed Above. * Additional Pertinent Lab Tests Reviewed:  All [facility] Admission Reviewed      Imaging Studies: I have personally reviewed pertinent reports. Recent Cultures (last 7 days): Today, Patient Was Seen By: Benton Borges PA-C    ** Please Note: Dictation voice to text software may have been used in the creation of this document.  **

## 2023-06-22 NOTE — ASSESSMENT & PLAN NOTE
Lab Results   Component Value Date    ALT 9 06/21/2023    AST 22 06/21/2023    ALKPHOS 56 06/21/2023    TBILI 0.53 06/21/2023        · Mildly Elevated AST on admission as above - labwork pending at this time  · Likely 2/2 chronic alcohol use/alcoholic liver disease  · No acute abd pain on exam  · Continue monitoring CMP  · Encourage alcohol cessation

## 2023-06-22 NOTE — ASSESSMENT & PLAN NOTE
· Na 133 - stable   · Encourage PO intake  · Monitor and consider fluid restriction  · Continue to monitor  · Nephrology should Na decline

## 2023-06-23 PROBLEM — R79.89 ELEVATED LFTS: Status: RESOLVED | Noted: 2022-07-08 | Resolved: 2023-06-23

## 2023-06-23 LAB
ALBUMIN SERPL BCP-MCNC: 3.5 G/DL (ref 3.5–5)
ALP SERPL-CCNC: 51 U/L (ref 34–104)
ALT SERPL W P-5'-P-CCNC: 7 U/L (ref 7–52)
ANION GAP SERPL CALCULATED.3IONS-SCNC: 7 MMOL/L
AST SERPL W P-5'-P-CCNC: 15 U/L (ref 13–39)
ATRIAL RATE: 102 BPM
ATRIAL RATE: 105 BPM
ATRIAL RATE: 80 BPM
ATRIAL RATE: 81 BPM
BASOPHILS # BLD AUTO: 0.05 THOUSANDS/ÂΜL (ref 0–0.1)
BASOPHILS NFR BLD AUTO: 1 % (ref 0–1)
BILIRUB SERPL-MCNC: 0.31 MG/DL (ref 0.2–1)
BUN SERPL-MCNC: 8 MG/DL (ref 5–25)
CALCIUM SERPL-MCNC: 8.6 MG/DL (ref 8.4–10.2)
CHLORIDE SERPL-SCNC: 96 MMOL/L (ref 96–108)
CO2 SERPL-SCNC: 24 MMOL/L (ref 21–32)
CREAT SERPL-MCNC: 0.49 MG/DL (ref 0.6–1.3)
EOSINOPHIL # BLD AUTO: 0.13 THOUSAND/ÂΜL (ref 0–0.61)
EOSINOPHIL NFR BLD AUTO: 2 % (ref 0–6)
ERYTHROCYTE [DISTWIDTH] IN BLOOD BY AUTOMATED COUNT: 12.8 % (ref 11.6–15.1)
GFR SERPL CREATININE-BSD FRML MDRD: 118 ML/MIN/1.73SQ M
GLUCOSE SERPL-MCNC: 93 MG/DL (ref 65–140)
HCT VFR BLD AUTO: 35 % (ref 36.5–49.3)
HGB BLD-MCNC: 11.8 G/DL (ref 12–17)
IMM GRANULOCYTES # BLD AUTO: 0.1 THOUSAND/UL (ref 0–0.2)
IMM GRANULOCYTES NFR BLD AUTO: 1 % (ref 0–2)
LYMPHOCYTES # BLD AUTO: 1.42 THOUSANDS/ÂΜL (ref 0.6–4.47)
LYMPHOCYTES NFR BLD AUTO: 18 % (ref 14–44)
MAGNESIUM SERPL-MCNC: 1.5 MG/DL (ref 1.9–2.7)
MCH RBC QN AUTO: 32.1 PG (ref 26.8–34.3)
MCHC RBC AUTO-ENTMCNC: 33.7 G/DL (ref 31.4–37.4)
MCV RBC AUTO: 95 FL (ref 82–98)
MONOCYTES # BLD AUTO: 1.33 THOUSAND/ÂΜL (ref 0.17–1.22)
MONOCYTES NFR BLD AUTO: 17 % (ref 4–12)
NEUTROPHILS # BLD AUTO: 4.92 THOUSANDS/ÂΜL (ref 1.85–7.62)
NEUTS SEG NFR BLD AUTO: 61 % (ref 43–75)
NRBC BLD AUTO-RTO: 0 /100 WBCS
OSMOLALITY UR: 511 MMOL/KG
P AXIS: 23 DEGREES
P AXIS: 34 DEGREES
P AXIS: 34 DEGREES
P AXIS: 37 DEGREES
PLATELET # BLD AUTO: 273 THOUSANDS/UL (ref 149–390)
PMV BLD AUTO: 9.4 FL (ref 8.9–12.7)
POTASSIUM SERPL-SCNC: 3.8 MMOL/L (ref 3.5–5.3)
PR INTERVAL: 172 MS
PR INTERVAL: 174 MS
PR INTERVAL: 180 MS
PR INTERVAL: 194 MS
PROT SERPL-MCNC: 6.2 G/DL (ref 6.4–8.4)
QRS AXIS: -33 DEGREES
QRS AXIS: -34 DEGREES
QRS AXIS: -34 DEGREES
QRS AXIS: -35 DEGREES
QRSD INTERVAL: 100 MS
QRSD INTERVAL: 102 MS
QRSD INTERVAL: 80 MS
QRSD INTERVAL: 80 MS
QT INTERVAL: 358 MS
QT INTERVAL: 376 MS
QT INTERVAL: 384 MS
QT INTERVAL: 392 MS
QTC INTERVAL: 442 MS
QTC INTERVAL: 455 MS
QTC INTERVAL: 466 MS
QTC INTERVAL: 496 MS
RBC # BLD AUTO: 3.68 MILLION/UL (ref 3.88–5.62)
SODIUM 24H UR-SCNC: 180 MOL/L
SODIUM SERPL-SCNC: 127 MMOL/L (ref 135–147)
T WAVE AXIS: 12 DEGREES
T WAVE AXIS: 19 DEGREES
T WAVE AXIS: 34 DEGREES
T WAVE AXIS: 37 DEGREES
VENTRICULAR RATE: 102 BPM
VENTRICULAR RATE: 105 BPM
VENTRICULAR RATE: 80 BPM
VENTRICULAR RATE: 81 BPM
WBC # BLD AUTO: 7.95 THOUSAND/UL (ref 4.31–10.16)

## 2023-06-23 PROCEDURE — 84300 ASSAY OF URINE SODIUM: CPT | Performed by: INTERNAL MEDICINE

## 2023-06-23 PROCEDURE — 93005 ELECTROCARDIOGRAM TRACING: CPT

## 2023-06-23 PROCEDURE — 93010 ELECTROCARDIOGRAM REPORT: CPT | Performed by: INTERNAL MEDICINE

## 2023-06-23 PROCEDURE — 85025 COMPLETE CBC W/AUTO DIFF WBC: CPT

## 2023-06-23 PROCEDURE — 99222 1ST HOSP IP/OBS MODERATE 55: CPT | Performed by: INTERNAL MEDICINE

## 2023-06-23 PROCEDURE — 80053 COMPREHEN METABOLIC PANEL: CPT

## 2023-06-23 PROCEDURE — 83735 ASSAY OF MAGNESIUM: CPT

## 2023-06-23 PROCEDURE — 83935 ASSAY OF URINE OSMOLALITY: CPT | Performed by: INTERNAL MEDICINE

## 2023-06-23 PROCEDURE — 99232 SBSQ HOSP IP/OBS MODERATE 35: CPT

## 2023-06-23 RX ORDER — MAGNESIUM HYDROXIDE/ALUMINUM HYDROXICE/SIMETHICONE 120; 1200; 1200 MG/30ML; MG/30ML; MG/30ML
30 SUSPENSION ORAL EVERY 4 HOURS PRN
Status: DISCONTINUED | OUTPATIENT
Start: 2023-06-23 | End: 2023-07-06 | Stop reason: HOSPADM

## 2023-06-23 RX ORDER — MAGNESIUM SULFATE HEPTAHYDRATE 40 MG/ML
4 INJECTION, SOLUTION INTRAVENOUS ONCE
Status: COMPLETED | OUTPATIENT
Start: 2023-06-23 | End: 2023-06-24

## 2023-06-23 RX ADMIN — FOLIC ACID 1 MG: 1 TABLET ORAL at 08:43

## 2023-06-23 RX ADMIN — RIVAROXABAN 20 MG: 20 TABLET, FILM COATED ORAL at 08:43

## 2023-06-23 RX ADMIN — MAGNESIUM SULFATE HEPTAHYDRATE 4 G: 40 INJECTION, SOLUTION INTRAVENOUS at 09:22

## 2023-06-23 RX ADMIN — THIAMINE HYDROCHLORIDE 500 MG: 100 INJECTION, SOLUTION INTRAMUSCULAR; INTRAVENOUS at 08:44

## 2023-06-23 RX ADMIN — NICOTINE 1 PATCH: 21 PATCH, EXTENDED RELEASE TRANSDERMAL at 08:44

## 2023-06-23 RX ADMIN — THIAMINE HYDROCHLORIDE 100 MG: 100 INJECTION, SOLUTION INTRAMUSCULAR; INTRAVENOUS at 21:07

## 2023-06-23 RX ADMIN — LOSARTAN POTASSIUM 25 MG: 25 TABLET, FILM COATED ORAL at 08:43

## 2023-06-23 RX ADMIN — METOPROLOL SUCCINATE 25 MG: 25 TABLET, EXTENDED RELEASE ORAL at 08:43

## 2023-06-23 RX ADMIN — ASPIRIN 81 MG: 81 TABLET, COATED ORAL at 08:43

## 2023-06-23 RX ADMIN — MULTIPLE VITAMINS W/ MINERALS TAB 1 TABLET: TAB ORAL at 08:43

## 2023-06-23 RX ADMIN — THIAMINE HYDROCHLORIDE 100 MG: 100 INJECTION, SOLUTION INTRAMUSCULAR; INTRAVENOUS at 16:26

## 2023-06-23 NOTE — CASE MANAGEMENT
Cm spoke with pt's sister, Kalli Benjamin, to discuss pt discharge plan. Kalli Benjamin indicated pt was not to return to her home but was to enter a rehab program after detox. Cm explained that pt may not have be able to enter rehab program due to due pt's limitations or rehab program availability. Kalli Benjamin insisted pt was to enter a rehab program and that pt's  would arrange this. Cm explained that an email was sent to probation with no response. Kalli Benjamin stated that she would reach out to probation herself. Cm questioned pt's mental status when not in the hospital. Kalli Benjamin stated pt was able to care for self and was oriented when not drinking. Cm informed medical staff of this information.
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
## 2023-06-23 NOTE — ASSESSMENT & PLAN NOTE
· Pt with a h/o chronic heavy alcohol use  · Drinks 1 fifth-1 handle of vodka daily  · Denies H/o withdrawal seizures  · Withdrawal management as above  · Discontinue  IVFs, thiamine/folic acid supplementation, and MVI  · Consult case management for assistance with aftercare resources

## 2023-06-23 NOTE — ASSESSMENT & PLAN NOTE
· Patient with a history of chronic daily alcohol use  · Last drink PTA in the ED last evening 6/18  · Serum alcohol 486 in the ED (6/18, 2229)  · Received Valium 5 mg IV in the ED PTA  ·  SEWS protocol discontinued on 6/20/23  ·  Current alcohol withdrawal signs/symptoms include: no  · Received a total of 1560 mg of phenobarbital without complication.  Last Dose administered on 6/20/23 at 2309   · Received 9 doses of high dose thiamine

## 2023-06-23 NOTE — PROGRESS NOTES
Progress Note - Medical Toxicology    Nci Ayala 59 y.o. male MRN: [id number]  Unit/Bed#: 5T DETOX 515-01 Encounter: [id number]  MEDICAL DETOX UNIT, LEVEL 4  Department of Medical Toxicology  Reason for Admission/Principal Problem: alcohol withdrawal   Rounding Provider: Jeny Peacock PA-C, Luan Watson, DO         * Alcohol withdrawal syndrome with complication [facility])  Assessment & Plan  · Patient with a history of chronic daily alcohol use  · Last drink PTA in the ED last evening 6/18  · Serum alcohol 486 in the ED (6/18, 2229)  · Received Valium 5 mg IV in the ED PTA  ·  SEWS protocol discontinued on 6/20/23  ·  Current alcohol withdrawal signs/symptoms include: no  · Received a total of 1560 mg of phenobarbital without complication. Last Dose administered on 6/20/23 at 2309   · Received 9 doses of high dose thiamine     Alcohol use disorder, severe, dependence ([address])  Assessment & Plan  · Pt with a h/o chronic heavy alcohol use  · Drinks 1 fifth-1 handle of vodka daily  · Denies H/o withdrawal seizures  · Withdrawal management as above  · Discontinue  IVFs, thiamine/folic acid supplementation, and MVI  · Consult case management for assistance with aftercare resources     Hyponatremia  Assessment & Plan  Recent Labs     06/21/23  1436 06/22/23  0444 06/23/23  0600   SODIUM 133* 133* 127*     · Sodium decreased this morning to 127, initiated Fluid Restriction of 1.5L. Consulted Nephrology for further recommendations   · Continue to monitor    Confusion and disorientation  Assessment & Plan  · Mental Status has improved this morning following course of high dose thiamine. Patient is now alert and oriented to person, place, and time.    · Will continue thiamine 100mg TID   · PT consulted to assess ambulation       Hypomagnesemia  Assessment & Plan  Recent Labs     06/23/23  0600   MG 1.5*     · Replenish with 4g Mag Sulfate   · Recheck BMP and Mag in AM   · Continue to replenish as necessary     Essential hypertension  Assessment & Plan  · Patient with a history of HTN  · Home medication regimen: losartan and metoprolol  · Pt reports not taking his medication  · BP hypertensive upon arrival to the unit in the setting of acute alcohol withdrawal and chronic HTN, improving with phenobarbital administration   · Will resume home medications in the setting of concomitant hx of paroxysmal Afib  · 06/23/23 /87, HR 81  · Continue monitoring BP     COPD (chronic obstructive pulmonary disease) ([address])  Assessment & Plan  • Pt with a h/o COPD  • Home medications include PRN albuterol  • +Scattered inspiratory/expiratory wheezes on exam, no respiratory distress noted, VSS with SpO2 97% on RA  • CXR with no evidence of acute cardiopulmonary disease  • Continue home inhaler regimen  • Supplemental O2 via NC PRN maintain SpO2 >90%      Tobacco abuse  Assessment & Plan  · Daily tobacco user  · Offer NRT  · Encourage cessation      Paroxysmal atrial fibrillation (HCC)  Assessment & Plan  · Pt has a hx of paroxysmal atrial fibrillation and atrial flutter  · Has an ICD/loop recorder in place  · Currently in NSR with occasional unifocal PVCs on telemetry  · Continue home medications- metoprolol, ASA, and Xarelto  · Telemetry monitoring    Chest pain  Assessment & Plan  · Pt initially presented to the [facility] ED for chest pain in the setting of acute alcohol intoxication, subsequently denied CP when evaluated by the ED provider  · Continues to deny any CP, palpitations, or SOB   · EKG: sinus tachycardia, incomplete RBBB, no evidence of acute ischemia  · Troponins 4-4  · Continue monitoring      Cardiomyopathy, 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  Assessment & Plan  · Pt has a hx of non-ischemic cardiomyopathy, likley 2/2 alcohol use  · Last ECHO 10/4/22 showed LVEF 40%, moderately reduced LV systolic function, B1KK  · Continue to monitor  · Encourage alcohol cessation    Elevated LFTs-resolved as of 6/23/2023  Assessment & Plan  Lab Results Component Value Date    ALT 7 06/23/2023    AST 15 06/23/2023    ALKPHOS 51 06/23/2023    TBILI 0.31 06/23/2023        · Mildly Elevated AST on admission as above - labwork pending at this time  · Likely 2/2 chronic alcohol use/alcoholic liver disease  · No acute abd pain on exam  · Continue monitoring CMP  · Encourage alcohol cessation              VTE Pharmacologic Prophylaxis:   Pharmacologic: Rivaroxaban (Xarelto)  Mechanical VTE Prophylaxis in Place: yes    Code Status: Level 1 - Full Code    Patient Centered Rounds: I have performed bedside rounds with nursing staff today. Discussions with Specialists or Other Care Team Provider: Case Management, Nephrology, Dr. Chantal Pat   Education and Discussions with Family / Patient: I personally answered all of the patient's questions to the best of my ability     Time Spent for Care: 30 minutes. More than 50% of total time spent on counseling and coordination of care as described above. Current Length of Stay: 4 day(s)    Current Patient Status: Inpatient     Certification Statement: The patient will continue to require additional inpatient hospital stay due to monitoring electolytes, PT  Discharge Plan: Anticipated Discharge within 48-72 hours       Subjective:   Patient seen at bedside. Mental status has greatly improved since yesterday, he is alert and oriented to person, place, and time. Patient still reports to having difficulty with ambulation and needing a walker for assistance. Agreeable to continue to work with PT. Does endorse dizziness with change of position, denies any headache, vision changes, chest pain, shortness of breath or abdominal pain. Tolerating PO diet and fluids.      Objective:     Clinical Opiate Withdrawal Scale  Pulse: 87    SEWS Total Score: 0 (6/21/2023  6:00 PM)        Last 24 Hours Medication List:   Current Facility-Administered Medications   Medication Dose Route Frequency Provider Last Rate   • acetaminophen  650 mg Oral Q6H PRN Lulu Singer MD     • albuterol  2 puff Inhalation Q4H PRN Meghan Bethanne Ormond, PA-C     • aluminum-magnesium hydroxide-simethicone  30 mL Oral Q4H PRN Oscar Sol PA-C     • aspirin  81 mg Oral Daily Meghan Bethanne Ormond, PA-C     • folic acid  1 mg Oral Daily Gianna Hobbs MD     • losartan  25 mg Oral Daily Meghan Bethanne Ormond, PA-C     • magnesium sulfate  4 g Intravenous Once Naren Vicente PA-C 4 g (23 1608)   • metoprolol succinate  25 mg Oral Daily Meghan Bethanne Ormond, PA-C     • multivitamin-minerals  1 tablet Oral Daily Lulu Singer MD     • nicotine  1 patch Transdermal Daily Lulu Singer MD     • ondansetron  4 mg Intravenous Q6H PRN Lulu Singer MD     • rivaroxaban  20 mg Oral Daily With Breakfast Meghan Bethanne Ormond, PA-C     • thiamine  100 mg Intravenous TID Oscar Sol PA-C     • traZODone  50 mg Oral HS PRN Gianna Hobbs MD           Vitals:   Temp (24hrs), Av.4 °F (36.3 °C), Min:97.1 °F (36.2 °C), Max:97.5 °F (36.4 °C)    Temp:  [97.1 °F (36.2 °C)-97.5 °F (36.4 °C)] 97.5 °F (36.4 °C)  HR:  [75-91] 87  Resp:  [16-18] 18  BP: (116-144)/(57-92) 116/57  SpO2:  [94 %-98 %] 97 %  Body mass index is 22.63 kg/m². Input and Output Summary (last 24 hours): Intake/Output Summary (Last 24 hours) at 2023 1122  Last data filed at 2023 1020  Gross per 24 hour   Intake 1080 ml   Output 450 ml   Net 630 ml       Physical Exam:   Physical Exam  Constitutional:       General: He is not in acute distress. Appearance: He is not diaphoretic. Eyes:      General: No scleral icterus. Pupils: Pupils are equal, round, and reactive to light. Cardiovascular:      Rate and Rhythm: Normal rate and regular rhythm. Heart sounds: No murmur heard. Pulmonary:      Effort: No respiratory distress. Breath sounds: Normal breath sounds. No wheezing.    Neurological: Mental Status: He is oriented to person, place, and time. Motor: No tremor. Coordination: Finger-Nose-Finger Test normal.   Psychiatric:         Mood and Affect: Mood is anxious. Mood is not depressed. Additional Data:     Labs: keep all most recent labs as listed on admission templates   Results from last 7 days   Lab Units 06/23/23  0600   WBC Thousand/uL 7.95   HEMOGLOBIN g/dL 11.8*   HEMATOCRIT % 35.0*   PLATELETS Thousands/uL 273   NEUTROS PCT % 61   LYMPHS PCT % 18   MONOS PCT % 17*   EOS PCT % 2      Results from last 7 days   Lab Units 06/23/23  0600   SODIUM mmol/L 127*   POTASSIUM mmol/L 3.8   CHLORIDE mmol/L 96   CO2 mmol/L 24   BUN mg/dL 8   CREATININE mg/dL 0.49*   ANION GAP mmol/L 7   CALCIUM mg/dL 8.6   ALBUMIN g/dL 3.5   TOTAL BILIRUBIN mg/dL 0.31   ALK PHOS U/L 51   ALT U/L 7   AST U/L 15   GLUCOSE RANDOM mg/dL 93      Results from last 7 days   Lab Units 06/18/23  2229   INR  0.89                         * I Have Reviewed All Lab Data Listed Above. * Additional Pertinent Lab Tests Reviewed: [facility] Admission Reviewed      Imaging Studies: I have personally reviewed pertinent reports. Recent Cultures (last 7 days): Today, Patient Was Seen By: Elkin Severino PA-C    ** Please Note: Dictation voice to text software may have been used in the creation of this document.  **

## 2023-06-23 NOTE — CASE MANAGEMENT
Worker consulted with medical provider, pt not medically cleared for discharge. Worker left message for pt's sister James Kaminski at [phone] regarding pt.

## 2023-06-23 NOTE — ASSESSMENT & PLAN NOTE
· Patient with a history of HTN  · Home medication regimen: losartan and metoprolol  · Pt reports not taking his medication  · BP hypertensive upon arrival to the unit in the setting of acute alcohol withdrawal and chronic HTN, improving with phenobarbital administration   · Will resume home medications in the setting of concomitant hx of paroxysmal Afib  · 06/23/23 /87, HR 81  · Continue monitoring BP

## 2023-06-23 NOTE — ASSESSMENT & PLAN NOTE
Recent Labs     06/21/23  1436 06/22/23  0444 06/23/23  0600   SODIUM 133* 133* 127*     · Sodium decreased this morning to 127, initiated Fluid Restriction of 1.5L.  Consulted Nephrology for further recommendations   · Continue to monitor

## 2023-06-23 NOTE — ASSESSMENT & PLAN NOTE
· Pt has a hx of non-ischemic cardiomyopathy, javier 2/2 alcohol use  · Last ECHO 10/4/22 showed LVEF 40%, moderately reduced LV systolic function, A8DY  · Continue to monitor  · Encourage alcohol cessation

## 2023-06-23 NOTE — ASSESSMENT & PLAN NOTE
· Mental Status has improved this morning following course of high dose thiamine. Patient is now alert and oriented to person, place, and time.    · Will continue thiamine 100mg TID   · PT consulted to assess ambulation

## 2023-06-23 NOTE — ASSESSMENT & PLAN NOTE
Recent Labs     06/23/23  0600   MG 1.5*     · Replenish with 4g Mag Sulfate   · Recheck BMP and Mag in AM   · Continue to replenish as necessary

## 2023-06-23 NOTE — ASSESSMENT & PLAN NOTE
· Pt initially presented to the [facility] ED for chest pain in the setting of acute alcohol intoxication, subsequently denied CP when evaluated by the ED provider  · Continues to deny any CP, palpitations, or SOB   · EKG: sinus tachycardia, incomplete RBBB, no evidence of acute ischemia  · Troponins 4-4  · Continue monitoring

## 2023-06-23 NOTE — ASSESSMENT & PLAN NOTE
Lab Results   Component Value Date    ALT 7 06/23/2023    AST 15 06/23/2023    ALKPHOS 51 06/23/2023    TBILI 0.31 06/23/2023        · Mildly Elevated AST on admission as above - labwork pending at this time  · Likely 2/2 chronic alcohol use/alcoholic liver disease  · No acute abd pain on exam  · Continue monitoring CMP  · Encourage alcohol cessation

## 2023-06-23 NOTE — PLAN OF CARE
Problem: SAFETY ADULT  Goal: Patient will remain free of falls  Description: INTERVENTIONS:  - Educate patient/family on patient safety including physical limitations  - Instruct patient to call for assistance with activity   - Consult OT/PT to assist with strengthening/mobility   - Keep Call bell within reach  - Keep bed low and locked with side rails adjusted as appropriate  - Keep care items and personal belongings within reach  - Initiate and maintain comfort rounds  - Make Fall Risk Sign visible to staff  - Obtain necessary fall risk management equipment: walker  - Apply yellow socks and bracelet for high fall risk patients  - Consider moving patient to room near nurses station  Outcome: Progressing  Goal: Maintain or return to baseline ADL function  Description: INTERVENTIONS:  -  Assess patient's ability to carry out ADLs; assess patient's baseline for ADL function and identify physical deficits which impact ability to perform ADLs (bathing, care of mouth/teeth, toileting, grooming, dressing, etc.)  - Assess/evaluate cause of self-care deficits   - Assess range of motion  - Assess patient's mobility; develop plan if impaired  - Assess patient's need for assistive devices and provide as appropriate  - Encourage maximum independence but intervene and supervise when necessary  - Involve family in performance of ADLs  - Assess for home care needs following discharge   - Consider OT consult to assist with ADL evaluation and planning for discharge  - Provide patient education as appropriate  Outcome: Progressing  Goal: Maintains/Returns to pre admission functional level  Description: INTERVENTIONS:  - Perform BMAT or MOVE assessment daily.   - Set and communicate daily mobility goal to care team and patient/family/caregiver.    - Collaborate with rehabilitation services on mobility goals if consulted  - Out of bed for toileting  - Record patient progress and toleration of activity level   Outcome: Progressing Problem: DISCHARGE PLANNING  Goal: Discharge to home or other facility with appropriate resources  Description: INTERVENTIONS:  - Identify barriers to discharge w/patient and caregiver  - Arrange for needed discharge resources and transportation as appropriate  - Identify discharge learning needs (meds, wound care, etc.)  - Arrange for interpretive services to assist at discharge as needed  - Refer to Case Management Department for coordinating discharge planning if the patient needs post-hospital services based on physician/advanced practitioner order or complex needs related to functional status, cognitive ability, or social support system  Outcome: Progressing     Problem: Knowledge Deficit  Goal: Patient/family/caregiver demonstrates understanding of disease process, treatment plan, medications, and discharge instructions  Description: Complete learning assessment and assess knowledge base.   Interventions:  - Provide teaching at level of understanding  - Provide teaching via preferred learning methods  Outcome: Progressing     Problem: SUBSTANCE USE/ABUSE  Goal: By discharge, will develop insight into their chemical dependency and sustain motivation to continue in recovery  Description: INTERVENTIONS:  - Attends all daily group sessions and scheduled AA groups  - Actively practices coping skills through participation in the therapeutic community and adherence to program rules  - Reviews and completes assignments from individual treatment plan  - Assist patient development of understanding of their personal cycle of addiction and relapse triggers  Outcome: Progressing  Goal: By discharge, patient will have ongoing treatment plan addressing chemical dependency  Description: INTERVENTIONS:  - Assist patient with resources and/or appointments for ongoing recovery based living  Outcome: Progressing     Problem: MOBILITY - ADULT  Goal: Maintain or return to baseline ADL function  Description: INTERVENTIONS:  - Assess patient's ability to carry out ADLs; assess patient's baseline for ADL function and identify physical deficits which impact ability to perform ADLs (bathing, care of mouth/teeth, toileting, grooming, dressing, etc.)  - Assess/evaluate cause of self-care deficits   - Assess range of motion  - Assess patient's mobility; develop plan if impaired  - Assess patient's need for assistive devices and provide as appropriate  - Encourage maximum independence but intervene and supervise when necessary  - Involve family in performance of ADLs  - Assess for home care needs following discharge   - Consider OT consult to assist with ADL evaluation and planning for discharge  - Provide patient education as appropriate  Outcome: Progressing  Goal: Maintains/Returns to pre admission functional level  Description: INTERVENTIONS:  - Perform BMAT or MOVE assessment daily.   - Set and communicate daily mobility goal to care team and patient/family/caregiver.    - Collaborate with rehabilitation services on mobility goals if consulted  - Out of bed for toileting  - Record patient progress and toleration of activity level   Outcome: Progressing

## 2023-06-24 PROBLEM — E83.42 HYPOMAGNESEMIA: Status: RESOLVED | Noted: 2022-09-12 | Resolved: 2023-06-24

## 2023-06-24 PROBLEM — R07.9 CHEST PAIN: Status: RESOLVED | Noted: 2017-09-22 | Resolved: 2023-06-24

## 2023-06-24 LAB
ANION GAP SERPL CALCULATED.3IONS-SCNC: 10 MMOL/L
ANION GAP SERPL CALCULATED.3IONS-SCNC: 9 MMOL/L
BUN SERPL-MCNC: 14 MG/DL (ref 5–25)
BUN SERPL-MCNC: 9 MG/DL (ref 5–25)
CALCIUM SERPL-MCNC: 8.9 MG/DL (ref 8.4–10.2)
CALCIUM SERPL-MCNC: 9.7 MG/DL (ref 8.4–10.2)
CHLORIDE SERPL-SCNC: 95 MMOL/L (ref 96–108)
CHLORIDE SERPL-SCNC: 95 MMOL/L (ref 96–108)
CO2 SERPL-SCNC: 24 MMOL/L (ref 21–32)
CO2 SERPL-SCNC: 26 MMOL/L (ref 21–32)
CREAT SERPL-MCNC: 0.54 MG/DL (ref 0.6–1.3)
CREAT SERPL-MCNC: 0.76 MG/DL (ref 0.6–1.3)
ERYTHROCYTE [DISTWIDTH] IN BLOOD BY AUTOMATED COUNT: 12.8 % (ref 11.6–15.1)
GFR SERPL CREATININE-BSD FRML MDRD: 114 ML/MIN/1.73SQ M
GFR SERPL CREATININE-BSD FRML MDRD: 99 ML/MIN/1.73SQ M
GLUCOSE SERPL-MCNC: 124 MG/DL (ref 65–140)
GLUCOSE SERPL-MCNC: 142 MG/DL (ref 65–140)
HCT VFR BLD AUTO: 36.4 % (ref 36.5–49.3)
HGB BLD-MCNC: 12.6 G/DL (ref 12–17)
MAGNESIUM SERPL-MCNC: 1.6 MG/DL (ref 1.9–2.7)
MAGNESIUM SERPL-MCNC: 1.9 MG/DL (ref 1.9–2.7)
MCH RBC QN AUTO: 32.8 PG (ref 26.8–34.3)
MCHC RBC AUTO-ENTMCNC: 34.6 G/DL (ref 31.4–37.4)
MCV RBC AUTO: 95 FL (ref 82–98)
PLATELET # BLD AUTO: 308 THOUSANDS/UL (ref 149–390)
PMV BLD AUTO: 8.9 FL (ref 8.9–12.7)
POTASSIUM SERPL-SCNC: 3.9 MMOL/L (ref 3.5–5.3)
POTASSIUM SERPL-SCNC: 4 MMOL/L (ref 3.5–5.3)
RBC # BLD AUTO: 3.84 MILLION/UL (ref 3.88–5.62)
SODIUM SERPL-SCNC: 129 MMOL/L (ref 135–147)
SODIUM SERPL-SCNC: 130 MMOL/L (ref 135–147)
WBC # BLD AUTO: 8.87 THOUSAND/UL (ref 4.31–10.16)

## 2023-06-24 PROCEDURE — 80048 BASIC METABOLIC PNL TOTAL CA: CPT

## 2023-06-24 PROCEDURE — NC001 PR NO CHARGE: Performed by: FAMILY MEDICINE

## 2023-06-24 PROCEDURE — 99232 SBSQ HOSP IP/OBS MODERATE 35: CPT | Performed by: INTERNAL MEDICINE

## 2023-06-24 PROCEDURE — NC001 PR NO CHARGE

## 2023-06-24 PROCEDURE — 82948 REAGENT STRIP/BLOOD GLUCOSE: CPT

## 2023-06-24 PROCEDURE — 99232 SBSQ HOSP IP/OBS MODERATE 35: CPT

## 2023-06-24 PROCEDURE — 84100 ASSAY OF PHOSPHORUS: CPT

## 2023-06-24 PROCEDURE — 80048 BASIC METABOLIC PNL TOTAL CA: CPT | Performed by: INTERNAL MEDICINE

## 2023-06-24 PROCEDURE — 93005 ELECTROCARDIOGRAM TRACING: CPT

## 2023-06-24 PROCEDURE — 97116 GAIT TRAINING THERAPY: CPT

## 2023-06-24 PROCEDURE — 83735 ASSAY OF MAGNESIUM: CPT

## 2023-06-24 PROCEDURE — 85027 COMPLETE CBC AUTOMATED: CPT

## 2023-06-24 RX ORDER — BISACODYL 5 MG/1
5 TABLET, DELAYED RELEASE ORAL DAILY PRN
Status: DISCONTINUED | OUTPATIENT
Start: 2023-06-24 | End: 2023-07-06 | Stop reason: HOSPADM

## 2023-06-24 RX ORDER — METOPROLOL TARTRATE 5 MG/5ML
5 INJECTION INTRAVENOUS ONCE
Status: COMPLETED | OUTPATIENT
Start: 2023-06-24 | End: 2023-06-24

## 2023-06-24 RX ORDER — MAGNESIUM SULFATE HEPTAHYDRATE 40 MG/ML
4 INJECTION, SOLUTION INTRAVENOUS ONCE
Status: COMPLETED | OUTPATIENT
Start: 2023-06-24 | End: 2023-06-25

## 2023-06-24 RX ORDER — FUROSEMIDE 10 MG/ML
20 INJECTION INTRAMUSCULAR; INTRAVENOUS ONCE
Status: COMPLETED | OUTPATIENT
Start: 2023-06-24 | End: 2023-06-24

## 2023-06-24 RX ORDER — METOPROLOL TARTRATE 5 MG/5ML
5 INJECTION INTRAVENOUS ONCE AS NEEDED
Status: DISCONTINUED | OUTPATIENT
Start: 2023-06-24 | End: 2023-07-06 | Stop reason: HOSPADM

## 2023-06-24 RX ORDER — LANOLIN ALCOHOL/MO/W.PET/CERES
100 CREAM (GRAM) TOPICAL DAILY
Status: DISCONTINUED | OUTPATIENT
Start: 2023-06-25 | End: 2023-07-06 | Stop reason: HOSPADM

## 2023-06-24 RX ADMIN — METOPROLOL TARTRATE 5 MG: 1 INJECTION, SOLUTION INTRAVENOUS at 23:45

## 2023-06-24 RX ADMIN — MAGNESIUM SULFATE HEPTAHYDRATE 4 G: 40 INJECTION, SOLUTION INTRAVENOUS at 23:21

## 2023-06-24 RX ADMIN — NICOTINE 1 PATCH: 21 PATCH, EXTENDED RELEASE TRANSDERMAL at 09:17

## 2023-06-24 RX ADMIN — LOSARTAN POTASSIUM 25 MG: 25 TABLET, FILM COATED ORAL at 09:16

## 2023-06-24 RX ADMIN — FOLIC ACID 1 MG: 1 TABLET ORAL at 09:16

## 2023-06-24 RX ADMIN — FUROSEMIDE 20 MG: 10 INJECTION, SOLUTION INTRAMUSCULAR; INTRAVENOUS at 13:27

## 2023-06-24 RX ADMIN — THIAMINE HYDROCHLORIDE 100 MG: 100 INJECTION, SOLUTION INTRAMUSCULAR; INTRAVENOUS at 17:02

## 2023-06-24 RX ADMIN — ALUMINUM HYDROXIDE, MAGNESIUM HYDROXIDE, AND SIMETHICONE 30 ML: 200; 200; 20 SUSPENSION ORAL at 00:14

## 2023-06-24 RX ADMIN — METOPROLOL SUCCINATE 25 MG: 25 TABLET, EXTENDED RELEASE ORAL at 09:16

## 2023-06-24 RX ADMIN — RIVAROXABAN 20 MG: 20 TABLET, FILM COATED ORAL at 09:03

## 2023-06-24 RX ADMIN — BISACODYL 5 MG: 5 TABLET, COATED ORAL at 09:16

## 2023-06-24 RX ADMIN — SODIUM CHLORIDE 500 ML: 0.9 INJECTION, SOLUTION INTRAVENOUS at 22:12

## 2023-06-24 RX ADMIN — MULTIPLE VITAMINS W/ MINERALS TAB 1 TABLET: TAB ORAL at 09:17

## 2023-06-24 RX ADMIN — THIAMINE HYDROCHLORIDE 100 MG: 100 INJECTION, SOLUTION INTRAMUSCULAR; INTRAVENOUS at 09:04

## 2023-06-24 RX ADMIN — ASPIRIN 81 MG: 81 TABLET, COATED ORAL at 09:15

## 2023-06-24 NOTE — PROGRESS NOTES
NEPHROLOGY PROGRESS NOTE   Nic Ayala 59 y.o. male MRN: [id number]  Unit/Bed#: 5T DETOX 515-01 Encounter: [id number]  Reason for Consult: Hyponatremia    78-year-old male with history of cardiomyopathy with EF of 40% and alcohol abuse who presents to the emergency room with alcohol intoxication and chest pain. Currently with prolonged admission due to alcohol detoxification. Nephrology is consulted for hyponatremia. ASSESSMENT/PLAN:  Acute on chronic hyponatremia: History of cardiomyopathy and alcohol abuse, labs suggest SIADH. -Appears baseline sodium level in the mid 130s. -Presented with sodium level of 132 with decrease to 127 on 6/23/2023.  -Repeat sodium level 130 this morning.  -Medication list reviewed, patient has been ordered trazodone which can contribute to SIADH while here in the hospital but was previously not taking as outpatient.  -Work-up: urine osm 511, urine Na 180.   -chest x-ray without acute cardiopulmonary disease.  -Reduced fluid restriction to 1.2 L/24 hours.  -Will give Lasix 20 mg IV x 1 today. Hypertension: Blood pressure overall acceptable. -Continues on losartan 25 mg daily and metoprolol 25 mg daily.  -Recommend avoiding hypotension or high fluctuations with blood pressure. Alcohol withdrawal: History of daily alcohol use. Further management per medical team.    Other: A-fib with ICD/loop recorder, tobacco abuse, COPD. Disposition: Requiring additional stay due to medical needs. SUBJECTIVE:  Patient is resting in his bed. He reports feeling well. He denies chest discomfort or shortness of breath. He reports his appetite is slowly improving. He denies issues with urination.     OBJECTIVE:  Current Weight: Weight - Scale: 63.6 kg (140 lb 3.4 oz)  Vitals:    06/23/23 1100 06/23/23 1519 06/23/23 1930 06/24/23 0800   BP: 116/57 126/73 149/88 128/85   BP Location: Left arm Left arm Left arm Left arm   Pulse: 87 88 79 91   Resp: 18 18 18 18   Temp: 97.5 °F (36.4 °C) 97.5 °F (36.4 °C) (!) 97.4 °F (36.3 °C) 97.6 °F (36.4 °C)   TempSrc: Temporal Temporal Temporal Temporal   SpO2: 97% 100% 98% 95%   Weight:       Height:           Intake/Output Summary (Last 24 hours) at 6/24/2023 1223  Last data filed at 6/23/2023 1626  Gross per 24 hour   Intake 530 ml   Output 350 ml   Net 180 ml     General: NAD  Skin: warm, dry, intact, no rash  HEENT: Moist mucous membranes, sclera anicteric, normocephalic, atraumatic  Neck: No apparent JVD appreciated  Chest: lung sounds clear B/L, on RA   CVS:Regular rate and rhythm, no murmer   Abdomen: Soft, round, non-tender, +BS  Extremities: No B/L LE edema present  Neuro: alert and oriented  Psych: appropriate mood and affect     Medications:    Current Facility-Administered Medications:   •  acetaminophen (TYLENOL) tablet 650 mg, 650 mg, Oral, Q6H PRN, Gianna Cisneros MD, 650 mg at 06/22/23 0324  •  albuterol (PROVENTIL HFA,VENTOLIN HFA) inhaler 2 puff, 2 puff, Inhalation, Q4H PRN, Talita Moya PA-C  •  aluminum-magnesium hydroxide-simethicone (MYLANTA) oral suspension 30 mL, 30 mL, Oral, Q4H PRN, Larry Brown PA-C, 30 mL at 06/24/23 0014  •  aspirin (ECOTRIN LOW STRENGTH) EC tablet 81 mg, 81 mg, Oral, Daily, Talita Moya PA-C, 81 mg at 06/24/23 0915  •  bisacodyl (DULCOLAX) EC tablet 5 mg, 5 mg, Oral, Daily PRN, Luz Guzmán PA-C, 5 mg at 56/86/29 0462  •  folic acid (FOLVITE) tablet 1 mg, 1 mg, Oral, Daily, Gianna Cisneros MD, 1 mg at 06/24/23 9011  •  losartan (COZAAR) tablet 25 mg, 25 mg, Oral, Daily, Talita Moya PA-C, 25 mg at 06/24/23 5201  •  metoprolol succinate (TOPROL-XL) 24 hr tablet 25 mg, 25 mg, Oral, Daily, Talita Moya PA-C, 25 mg at 06/24/23 8398  •  multivitamin-minerals (CENTRUM) tablet 1 tablet, 1 tablet, Oral, Daily, Gianna Cisneros MD, 1 tablet at 06/24/23 0917  •  nicotine (NICODERM CQ) 21 mg/24 hr TD 24 hr patch 1 patch, 1 patch, Transdermal, Daily, Gianna Mariee MD, 1 patch at 06/24/23 0917  •  ondansetron (ZOFRAN) injection 4 mg, 4 mg, Intravenous, Q6H PRN, Di Lea MD, 4 mg at 06/21/23 7939  •  rivaroxaban (XARELTO) tablet 20 mg, 20 mg, Oral, Daily With Breakfast, Talita Zelaya PA-C, 20 mg at 06/24/23 5170  •  thiamine (VITAMIN B1) 100 mg in sodium chloride 0.9 % 50 mL IVPB, 100 mg, Intravenous, TID, Malick Subramanian PA-C, Last Rate: 100 mL/hr at 06/24/23 0904, 100 mg at 06/24/23 0904  •  traZODone (DESYREL) tablet 50 mg, 50 mg, Oral, HS PRN, Di Lae MD, 50 mg at 06/21/23 2243    Laboratory Results:  Results from last 7 days   Lab Units 06/24/23  0552 06/23/23  0600 06/22/23  0444 06/21/23  1436 06/20/23  0333   WBC Thousand/uL 8.87 7.95  --  7.27 8.70   HEMOGLOBIN g/dL 12.6 11.8*  --  12.4 13.0   HEMATOCRIT % 36.4* 35.0*  --  38.0 38.3   PLATELETS Thousands/uL 308 273  --  232 187   SODIUM mmol/L 130* 127* 133* 133* 132*   POTASSIUM mmol/L 3.9 3.8 4.0 3.6 3.4*   CHLORIDE mmol/L 95* 96 99 96 90*   CO2 mmol/L 26 24 28 28 29   BUN mg/dL 9 8 10 9 4*   CREATININE mg/dL 0.54* 0.49* 0.57* 0.70 0.48*   CALCIUM mg/dL 8.9 8.6 8.7 8.6 9.2   MAGNESIUM mg/dL 1.9 1.5* 1.5*  --  1.1*   ALK PHOS U/L  --  51  --  56 49   ALT U/L  --  7  --  9 12   AST U/L  --  15  --  22 45*

## 2023-06-24 NOTE — ASSESSMENT & PLAN NOTE
Recent Labs     06/21/23  1436 06/22/23  0444 06/23/23  0600 06/24/23  0552   SODIUM 133* 133* 127* 130*     · Continue  Fluid Restriction of 1.5L.  Consulted Nephrology for further recommendations   · Urine studies unremarkable  · Continue to monitor

## 2023-06-24 NOTE — ASSESSMENT & PLAN NOTE
Recent Labs     06/24/23  0552   MG 1.9     · Replenish with 4g Mag Sulfate   · Recheck BMP and Mag in AM   · Continue to replenish as necessary

## 2023-06-24 NOTE — ASSESSMENT & PLAN NOTE
RD ASSESSMENT 



PMHx: 

hypothyroidism; DVT; DM; gout; morbid obesity



PT INTERACTION: 

Pt was awake and pleasant during nutrition assessment. Pt states current appetite is okay. 
Note avg PO intake 75% x2meal, per chart review. Pt states following a regular diet at home, 
and has no issues with chewing/swallowing food. Pt states no recent issues with nausea, 
vomiting, constipation, or diarrhea. Pt states some recent wt loss, but unsure of 
amount/timeframe. Note unable to determine recent wt hx, per chart review. Pt states current 
DM management is "pretty good." Pt states unsure of recent blood glucose averages. Note 
unable to determine recent HbA1c, per chart review. 



ABNORMAL NUTRITION-RELATED LAB VALUES

LOW: K 3.2;

HIGH: glu 128



Est. kcal needs: 1600 kcal | 25 kcal/kg  

Est. Pro needs:  51 g Pro | 0.8 g Pro/kg 



PES STATEMENT: 

Given current PO intake, no nutrition diagnosis at this time (NO-1.1) 



INTERVENTION:  

Continue with current diet order of CHO 75g/m 0snack diet. 

Offered diet education on DM management, but pt declined at this time. Pt states having good 
control of her DM at this time. Will attempt to offer again prior to discharge. 

Will continue to follow and reassess as pt needs, intake, and status change. 



MONITOR/EVALUATE:  

PO Intake; Plan of Care; Hydration Status; Weight Status; Lab Values 





JULITA Dash, MS, RD, LD · Patient with a history of chronic daily alcohol use  · Last drink PTA in the ED last evening 6/18  · Serum alcohol 486 in the ED (6/18, 2229)  · Received Valium 5 mg IV in the ED PTA  ·  SEWS protocol discontinued on 6/20/23  ·  Current alcohol withdrawal signs/symptoms include: none   · Received a total of 1560 mg of phenobarbital without complication.  Last Dose administered on 6/20/23 at 2309   · Received 9 doses of high dose thiamine

## 2023-06-24 NOTE — ASSESSMENT & PLAN NOTE
· Pt initially presented to the [address] [place] [address] ED for chest pain in the setting of acute alcohol intoxication, subsequently denied CP when evaluated by the ED provider  · Continues to deny any CP, palpitations, or SOB   · EKG: sinus tachycardia, incomplete RBBB, no evidence of acute ischemia  · Troponins 4-4  · Continue monitoring

## 2023-06-24 NOTE — ASSESSMENT & PLAN NOTE
· Patient with a history of HTN  · Home medication regimen: losartan and metoprolol  · Pt reports not taking his medication  · Continue home medications in the setting of concomitant hx of paroxysmal Afib  · 06/24/23 /85  · Continue monitoring BP

## 2023-06-24 NOTE — ASSESSMENT & PLAN NOTE
· Pt with a h/o chronic heavy alcohol use  · Drinks 1 fifth-1 handle of vodka daily  · Denies H/o withdrawal seizures  · Withdrawal management as above  · Discontinue  IVFs  · Continue thiamine/folic acid supplementation, and MVI  · Consult case management for assistance with aftercare resources

## 2023-06-24 NOTE — ASSESSMENT & PLAN NOTE
· Pt has a hx of non-ischemic cardiomyopathy, javier 2/2 alcohol use  · Last ECHO 10/4/22 showed LVEF 40%, moderately reduced LV systolic function, D7QX  · Continue to monitor  · Encourage alcohol cessation

## 2023-06-24 NOTE — ASSESSMENT & PLAN NOTE
· Mental Status has improved this morning following course of high dose thiamine. Patient is now alert and oriented to person, place, and time. · Will continue thiamine 100mg TID   · PT consulted to assess ambulation - patient's gait has improved. He is still reporting to dizziness and is unable to ambulate stairs today.

## 2023-06-24 NOTE — PLAN OF CARE
Problem: PHYSICAL THERAPY ADULT  Goal: Performs mobility at highest level of function for planned discharge setting. See evaluation for individualized goals. Description: Treatment/Interventions: ADL retraining, Functional transfer training, LE strengthening/ROM, Elevations, Therapeutic exercise, Endurance training, Patient/family training, Equipment eval/education, Bed mobility, Gait training, Spoke to nursing, Spoke to case management, OT  Equipment Recommended: Torres Goodrich       See flowsheet documentation for full assessment, interventions and recommendations. Outcome: Progressing  Note: Prognosis: Good  Problem List: Decreased strength, Decreased endurance, Impaired balance, Decreased mobility, Decreased coordination, Decreased cognition, Impaired judgement, Decreased safety awareness  Assessment: Improved balance, endurance, strength, coordination demonstrated by pt today. Pt also more alert and reporting less fatigue and dizziness during treatment. He is able to transfer and ambulate with RW with supervision. Minor unsteadiness at times with RW expect to improve with continued treatment. Will trial stairs next treatment as well. WIll be cleared for D/C to previous living environment. Barriers to Discharge: Inaccessible home environment, Decreased caregiver support     PT Discharge Recommendation: No rehabilitation needs    See flowsheet documentation for full assessment.

## 2023-06-24 NOTE — PLAN OF CARE
Problem: SAFETY ADULT  Goal: Patient will remain free of falls  Description: INTERVENTIONS:  - Educate patient/family on patient safety including physical limitations  - Instruct patient to call for assistance with activity   - Consult OT/PT to assist with strengthening/mobility   - Keep Call bell within reach  - Keep bed low and locked with side rails adjusted as appropriate  - Keep care items and personal belongings within reach  - Initiate and maintain comfort rounds  - Make Fall Risk Sign visible to staff    - Apply yellow socks and bracelet for high fall risk patients  - Consider moving patient to room near nurses station  Outcome: Progressing  Goal: Maintain or return to baseline ADL function  Description: INTERVENTIONS:  -  Assess patient's ability to carry out ADLs; assess patient's baseline for ADL function and identify physical deficits which impact ability to perform ADLs (bathing, care of mouth/teeth, toileting, grooming, dressing, etc.)  - Assess/evaluate cause of self-care deficits   - Assess range of motion  - Assess patient's mobility; develop plan if impaired  - Assess patient's need for assistive devices and provide as appropriate  - Encourage maximum independence but intervene and supervise when necessary  - Involve family in performance of ADLs  - Assess for home care needs following discharge   - Consider OT consult to assist with ADL evaluation and planning for discharge  - Provide patient education as appropriate  Outcome: Progressing  Goal: Maintains/Returns to pre admission functional level  Description: INTERVENTIONS:  - Perform BMAT or MOVE assessment daily.   - Set and communicate daily mobility goal to care team and patient/family/caregiver.      - Out of bed for toileting  - Record patient progress and toleration of activity level   Outcome: Progressing     Problem: SUBSTANCE USE/ABUSE  Goal: By discharge, will develop insight into their chemical dependency and sustain motivation to continue in recovery  Description: INTERVENTIONS:  - Attends all daily group sessions and scheduled AA groups  - Actively practices coping skills through participation in the therapeutic community and adherence to program rules  - Reviews and completes assignments from individual treatment plan  - Assist patient development of understanding of their personal cycle of addiction and relapse triggers  Outcome: Adequate for Discharge  Goal: By discharge, patient will have ongoing treatment plan addressing chemical dependency  Description: INTERVENTIONS:  - Assist patient with resources and/or appointments for ongoing recovery based living  Outcome: Adequate for Discharge

## 2023-06-24 NOTE — PROGRESS NOTES
Progress Note - Medical Toxicology    Pj Jamie 59 y.o. male MRN: [id number]  Unit/Bed#: 5T DETOX 515-01 Encounter: [id number]  MEDICAL DETOX UNIT, LEVEL 4  Department of Medical Toxicology  Reason for Admission/Principal Problem:   Rounding Provider: Alf Still PA-C, Gianna Forbes*         * Alcohol withdrawal syndrome with complication (HCC)-resolved as of 6/24/2023  Assessment & Plan  · Patient with a history of chronic daily alcohol use  · Last drink PTA in the ED last evening 6/18  · Serum alcohol 486 in the ED (6/18, 2229)  · Received Valium 5 mg IV in the ED PTA  ·  SEWS protocol discontinued on 6/20/23  ·  Current alcohol withdrawal signs/symptoms include: none   · Received a total of 1560 mg of phenobarbital without complication. Last Dose administered on 6/20/23 at 2309   · Received 9 doses of high dose thiamine     Alcohol use disorder, severe, dependence ([address])  Assessment & Plan  · Pt with a h/o chronic heavy alcohol use  · Drinks 1 fifth-1 handle of vodka daily  · Denies H/o withdrawal seizures  · Withdrawal management as above  · Discontinue  IVFs  · Continue thiamine/folic acid supplementation, and MVI  · Consult case management for assistance with aftercare resources     Hyponatremia  Assessment & Plan  Recent Labs     06/21/23  1436 06/22/23  0444 06/23/23  0600 06/24/23  0552   SODIUM 133* 133* 127* 130*     · Continue  Fluid Restriction of 1.5L. Consulted Nephrology for further recommendations   · Urine studies unremarkable  · Continue to monitor    Confusion and disorientation  Assessment & Plan  · Mental Status has improved this morning following course of high dose thiamine. Patient is now alert and oriented to person, place, and time. · Will continue thiamine 100mg TID   · PT consulted to assess ambulation - patient's gait has improved. He is still reporting to dizziness and is unable to ambulate stairs today.        Essential hypertension  Assessment & Plan  · Patient with a history of HTN  · Home medication regimen: losartan and metoprolol  · Pt reports not taking his medication  · Continue home medications in the setting of concomitant hx of paroxysmal Afib  · 06/24/23 /85  · Continue monitoring BP     COPD (chronic obstructive pulmonary disease) ([address])  Assessment & Plan  • Pt with a h/o COPD  • Home medications include PRN albuterol  • +Scattered inspiratory/expiratory wheezes on exam, no respiratory distress noted, VSS with SpO2 97% on RA  • CXR with no evidence of acute cardiopulmonary disease  • Continue home inhaler regimen  • Supplemental O2 via NC PRN maintain SpO2 >90%      Tobacco abuse  Assessment & Plan  · Daily tobacco user  · Offer NRT  · Encourage cessation      Paroxysmal atrial fibrillation ([address])  Assessment & Plan  · Pt has a hx of paroxysmal atrial fibrillation and atrial flutter  · Has an ICD/loop recorder in place  · Currently in NSR with occasional unifocal PVCs on telemetry  · Continue home medications- metoprolol, ASA, and Xarelto  · Telemetry monitoring    Cardiomyopathy, 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  Assessment & Plan  · Pt has a hx of non-ischemic cardiomyopathy, likley 2/2 alcohol use  · Last ECHO 10/4/22 showed LVEF 40%, moderately reduced LV systolic function, Q0NF  · Continue to monitor  · Encourage alcohol cessation    Hypomagnesemia-resolved as of 6/24/2023  Assessment & Plan  Recent Labs     06/24/23  0552   MG 1.9     · Replenish with 4g Mag Sulfate   · Recheck BMP and Mag in AM   · Continue to replenish as necessary     Elevated LFTs-resolved as of 6/23/2023  Assessment & Plan  Lab Results   Component Value Date    ALT 7 06/23/2023    AST 15 06/23/2023    ALKPHOS 51 06/23/2023    TBILI 0.31 06/23/2023        · Mildly Elevated AST on admission as above - labwork pending at this time  · Likely 2/2 chronic alcohol use/alcoholic liver disease  · No acute abd pain on exam  · Continue monitoring CMP  · Encourage alcohol cessation      Chest pain-resolved as of 6/24/2023  Assessment & Plan  · Pt initially presented to the [facility] ED for chest pain in the setting of acute alcohol intoxication, subsequently denied CP when evaluated by the ED provider  · Continues to deny any CP, palpitations, or SOB   · EKG: sinus tachycardia, incomplete RBBB, no evidence of acute ischemia  · Troponins 4-4  · Continue monitoring            VTE Pharmacologic Prophylaxis:   Pharmacologic: Rivaroxaban (Xarelto)  Mechanical VTE Prophylaxis in Place: yes    Code Status: Level 1 - Full Code    Patient Centered Rounds: I have performed bedside rounds with nursing staff today. Discussions with Specialists or Other Care Team Provider: Case Management, PT    Education and Discussions with Family / Patient: I personally answered all of the patient's questions to the best of my ability     Time Spent for Care: 30 minutes. More than 50% of total time spent on counseling and coordination of care as described above. Current Length of Stay: 5 day(s)    Current Patient Status: Inpatient     Certification Statement: The patient will continue to require additional inpatient hospital stay due to pending placement Discharge Plan: Case management Disposition        Subjective:   Patient seen and examined at bedside. Denies any further withdrawal symptoms. No acute overnight events. Reports to feeling better than yesterday, he is eager to get up and ambulate today. He is alert and oriented to person, place, and time. Denies any physical complaints of nausea, vomiting, diarrhea, headache, chest pain, or abdominal pain. Does report that he has not been able to produce a BM in three days. Agreeable to start laxative.      Objective:     Clinical Opiate Withdrawal Scale  Pulse: 91    SEWS Total Score: 0 (6/21/2023  6:00 PM)        Last 24 Hours Medication List:   Current Facility-Administered Medications   Medication Dose Route Frequency Provider Last Rate   • acetaminophen  650 mg Oral Q6H PRN Gianna James MD     • albuterol  2 puff Inhalation Q4H PRN Talita Clemente PA-C     • aluminum-magnesium hydroxide-simethicone  30 mL Oral Q4H PRN Luis E Vergara PA-C     • aspirin  81 mg Oral Daily Talita Clemente PA-C     • bisacodyl  5 mg Oral Daily PRN Erendira Astudillo PA-C     • folic acid  1 mg Oral Daily Gianna James MD     • losartan  25 mg Oral Daily Talita Clemente PA-C     • metoprolol succinate  25 mg Oral Daily Talita Clemente PA-C     • multivitamin-minerals  1 tablet Oral Daily Mariola Rodriguez MD     • nicotine  1 patch Transdermal Daily Mariola Rodriguez MD     • ondansetron  4 mg Intravenous Q6H PRN Mariola Rodriguez MD     • rivaroxaban  20 mg Oral Daily With Breakfast Talita Clemente PA-C     • thiamine  100 mg Intravenous TID Luis E Veragra PA-C 100 mg (23 0904)   • traZODone  50 mg Oral HS PRN Gianna James MD           Vitals:   Temp (24hrs), Av.5 °F (36.4 °C), Min:97.4 °F (36.3 °C), Max:97.6 °F (36.4 °C)    Temp:  [97.4 °F (36.3 °C)-97.6 °F (36.4 °C)] 97.6 °F (36.4 °C)  HR:  [79-91] 91  Resp:  [18] 18  BP: (126-149)/(73-88) 128/85  SpO2:  [95 %-100 %] 95 %  Body mass index is 22.63 kg/m². Input and Output Summary (last 24 hours): Intake/Output Summary (Last 24 hours) at 2023 1125  Last data filed at 2023 1626  Gross per 24 hour   Intake 530 ml   Output 350 ml   Net 180 ml       Physical Exam:   Physical Exam  Constitutional:       General: He is not in acute distress. Appearance: He is not diaphoretic. Eyes:      General: No scleral icterus. Pupils: Pupils are equal, round, and reactive to light. Cardiovascular:      Rate and Rhythm: Normal rate and regular rhythm. Pulmonary:      Effort: No respiratory distress. Breath sounds: Normal breath sounds.    Abdominal:      General: Bowel sounds are normal. There is no distension. Palpations: Abdomen is soft. Tenderness: There is no abdominal tenderness. Musculoskeletal:      Right lower leg: No edema. Left lower leg: No edema. Neurological:      Mental Status: He is alert and oriented to person, place, and time. Motor: No tremor. Coordination: Coordination is intact. Finger-Nose-Finger Test normal.         Additional Data:     Labs: keep all most recent labs as listed on admission templates   Results from last 7 days   Lab Units 06/24/23  0552 06/23/23  0600   WBC Thousand/uL 8.87 7.95   HEMOGLOBIN g/dL 12.6 11.8*   HEMATOCRIT % 36.4* 35.0*   PLATELETS Thousands/uL 308 273   NEUTROS PCT %  --  61   LYMPHS PCT %  --  18   MONOS PCT %  --  17*   EOS PCT %  --  2      Results from last 7 days   Lab Units 06/24/23  0552 06/23/23  0600   SODIUM mmol/L 130* 127*   POTASSIUM mmol/L 3.9 3.8   CHLORIDE mmol/L 95* 96   CO2 mmol/L 26 24   BUN mg/dL 9 8   CREATININE mg/dL 0.54* 0.49*   ANION GAP mmol/L 9 7   CALCIUM mg/dL 8.9 8.6   ALBUMIN g/dL  --  3.5   TOTAL BILIRUBIN mg/dL  --  0.31   ALK PHOS U/L  --  51   ALT U/L  --  7   AST U/L  --  15   GLUCOSE RANDOM mg/dL 124 93      Results from last 7 days   Lab Units 06/18/23  2229   INR  0.89                         * I Have Reviewed All Lab Data Listed Above. * Additional Pertinent Lab Tests Reviewed: [facility] Admission Reviewed      Imaging Studies: I have personally reviewed pertinent reports. Recent Cultures (last 7 days): Today, Patient Was Seen By: Jeny Peacock PA-C    ** Please Note: Dictation voice to text software may have been used in the creation of this document.  **

## 2023-06-25 ENCOUNTER — APPOINTMENT (OUTPATIENT)
Dept: RADIOLOGY | Facility: HOSPITAL | Age: 58
DRG: 309 | End: 2023-06-25
Payer: COMMERCIAL

## 2023-06-25 PROBLEM — R41.0 CONFUSION AND DISORIENTATION: Status: RESOLVED | Noted: 2023-06-20 | Resolved: 2023-06-25

## 2023-06-25 PROBLEM — I47.1 SVT (SUPRAVENTRICULAR TACHYCARDIA) (HCC): Status: ACTIVE | Noted: 2023-06-25

## 2023-06-25 PROBLEM — I47.10 SVT (SUPRAVENTRICULAR TACHYCARDIA): Status: ACTIVE | Noted: 2023-06-25

## 2023-06-25 LAB
2HR DELTA HS TROPONIN: 2 NG/L
4HR DELTA HS TROPONIN: 0 NG/L
ANION GAP SERPL CALCULATED.3IONS-SCNC: 9 MMOL/L
ATRIAL RATE: 102 BPM
ATRIAL RATE: 113 BPM
ATRIAL RATE: 126 BPM
ATRIAL RATE: 129 BPM
ATRIAL RATE: 141 BPM
ATRIAL RATE: 90 BPM
ATRIAL RATE: 96 BPM
BACTERIA UR QL AUTO: NORMAL /HPF
BILIRUB UR QL STRIP: NEGATIVE
BUN SERPL-MCNC: 14 MG/DL (ref 5–25)
CALCIUM SERPL-MCNC: 8.7 MG/DL (ref 8.4–10.2)
CARDIAC TROPONIN I PNL SERPL HS: 7 NG/L
CARDIAC TROPONIN I PNL SERPL HS: 7 NG/L
CARDIAC TROPONIN I PNL SERPL HS: 9 NG/L
CHLORIDE SERPL-SCNC: 98 MMOL/L (ref 96–108)
CLARITY UR: CLEAR
CO2 SERPL-SCNC: 23 MMOL/L (ref 21–32)
COLOR UR: ABNORMAL
CREAT SERPL-MCNC: 0.64 MG/DL (ref 0.6–1.3)
D DIMER PPP FEU-MCNC: 0.39 UG/ML FEU
ERYTHROCYTE [DISTWIDTH] IN BLOOD BY AUTOMATED COUNT: 13 % (ref 11.6–15.1)
FLUAV RNA RESP QL NAA+PROBE: NEGATIVE
FLUBV RNA RESP QL NAA+PROBE: POSITIVE
GFR SERPL CREATININE-BSD FRML MDRD: 106 ML/MIN/1.73SQ M
GLUCOSE SERPL-MCNC: 114 MG/DL (ref 65–140)
GLUCOSE SERPL-MCNC: 163 MG/DL (ref 65–140)
GLUCOSE UR STRIP-MCNC: NEGATIVE MG/DL
HCT VFR BLD AUTO: 39.2 % (ref 36.5–49.3)
HGB BLD-MCNC: 12.9 G/DL (ref 12–17)
HGB UR QL STRIP.AUTO: 10
KETONES UR STRIP-MCNC: NEGATIVE MG/DL
LACTATE SERPL-SCNC: 0.8 MMOL/L (ref 0.5–2)
LEUKOCYTE ESTERASE UR QL STRIP: NEGATIVE
MAGNESIUM SERPL-MCNC: 3.2 MG/DL (ref 1.9–2.7)
MCH RBC QN AUTO: 31.8 PG (ref 26.8–34.3)
MCHC RBC AUTO-ENTMCNC: 32.9 G/DL (ref 31.4–37.4)
MCV RBC AUTO: 97 FL (ref 82–98)
NITRITE UR QL STRIP: NEGATIVE
NON-SQ EPI CELLS URNS QL MICRO: NORMAL /HPF
P AXIS: 18 DEGREES
P AXIS: 34 DEGREES
P AXIS: 7 DEGREES
PH UR STRIP.AUTO: 5 [PH]
PHOSPHATE SERPL-MCNC: 3.2 MG/DL (ref 2.3–4.1)
PLATELET # BLD AUTO: 387 THOUSANDS/UL (ref 149–390)
PMV BLD AUTO: 8.8 FL (ref 8.9–12.7)
POTASSIUM SERPL-SCNC: 4 MMOL/L (ref 3.5–5.3)
PR INTERVAL: 112 MS
PR INTERVAL: 186 MS
PR INTERVAL: 194 MS
PR INTERVAL: 196 MS
PROCALCITONIN SERPL-MCNC: 0.09 NG/ML
PROT UR STRIP-MCNC: NEGATIVE MG/DL
QRS AXIS: -30 DEGREES
QRS AXIS: -32 DEGREES
QRS AXIS: -36 DEGREES
QRS AXIS: -37 DEGREES
QRS AXIS: -40 DEGREES
QRS AXIS: -40 DEGREES
QRS AXIS: -43 DEGREES
QRSD INTERVAL: 76 MS
QRSD INTERVAL: 80 MS
QRSD INTERVAL: 80 MS
QRSD INTERVAL: 82 MS
QRSD INTERVAL: 84 MS
QT INTERVAL: 290 MS
QT INTERVAL: 292 MS
QT INTERVAL: 304 MS
QT INTERVAL: 306 MS
QT INTERVAL: 336 MS
QT INTERVAL: 352 MS
QT INTERVAL: 360 MS
QTC INTERVAL: 437 MS
QTC INTERVAL: 440 MS
QTC INTERVAL: 443 MS
QTC INTERVAL: 443 MS
QTC INTERVAL: 444 MS
QTC INTERVAL: 452 MS
QTC INTERVAL: 458 MS
RBC # BLD AUTO: 4.06 MILLION/UL (ref 3.88–5.62)
RBC #/AREA URNS AUTO: NORMAL /HPF
RSV RNA RESP QL NAA+PROBE: NEGATIVE
SARS-COV-2 RNA RESP QL NAA+PROBE: NEGATIVE
SODIUM SERPL-SCNC: 130 MMOL/L (ref 135–147)
SP GR UR STRIP.AUTO: 1.02 (ref 1–1.04)
T WAVE AXIS: -16 DEGREES
T WAVE AXIS: -31 DEGREES
T WAVE AXIS: -65 DEGREES
T WAVE AXIS: 34 DEGREES
T WAVE AXIS: 35 DEGREES
T WAVE AXIS: 41 DEGREES
T WAVE AXIS: 7 DEGREES
TSH SERPL DL<=0.05 MIU/L-ACNC: 3.48 UIU/ML (ref 0.45–4.5)
UROBILINOGEN UA: NEGATIVE MG/DL
VENTRICULAR RATE: 102 BPM
VENTRICULAR RATE: 126 BPM
VENTRICULAR RATE: 128 BPM
VENTRICULAR RATE: 144 BPM
VENTRICULAR RATE: 150 BPM
VENTRICULAR RATE: 90 BPM
VENTRICULAR RATE: 96 BPM
WBC # BLD AUTO: 11.45 THOUSAND/UL (ref 4.31–10.16)
WBC #/AREA URNS AUTO: NORMAL /HPF

## 2023-06-25 PROCEDURE — 81003 URINALYSIS AUTO W/O SCOPE: CPT

## 2023-06-25 PROCEDURE — 81001 URINALYSIS AUTO W/SCOPE: CPT

## 2023-06-25 PROCEDURE — 93010 ELECTROCARDIOGRAM REPORT: CPT | Performed by: INTERNAL MEDICINE

## 2023-06-25 PROCEDURE — 0241U HB NFCT DS VIR RESP RNA 4 TRGT: CPT

## 2023-06-25 PROCEDURE — NC001 PR NO CHARGE

## 2023-06-25 PROCEDURE — 83605 ASSAY OF LACTIC ACID: CPT

## 2023-06-25 PROCEDURE — 71045 X-RAY EXAM CHEST 1 VIEW: CPT

## 2023-06-25 PROCEDURE — 84443 ASSAY THYROID STIM HORMONE: CPT

## 2023-06-25 PROCEDURE — 84145 PROCALCITONIN (PCT): CPT

## 2023-06-25 PROCEDURE — 99232 SBSQ HOSP IP/OBS MODERATE 35: CPT | Performed by: INTERNAL MEDICINE

## 2023-06-25 PROCEDURE — 99232 SBSQ HOSP IP/OBS MODERATE 35: CPT

## 2023-06-25 PROCEDURE — 80048 BASIC METABOLIC PNL TOTAL CA: CPT | Performed by: INTERNAL MEDICINE

## 2023-06-25 PROCEDURE — 87040 BLOOD CULTURE FOR BACTERIA: CPT

## 2023-06-25 PROCEDURE — 83735 ASSAY OF MAGNESIUM: CPT

## 2023-06-25 PROCEDURE — 85027 COMPLETE CBC AUTOMATED: CPT

## 2023-06-25 PROCEDURE — 93005 ELECTROCARDIOGRAM TRACING: CPT

## 2023-06-25 PROCEDURE — 85379 FIBRIN DEGRADATION QUANT: CPT

## 2023-06-25 PROCEDURE — 99223 1ST HOSP IP/OBS HIGH 75: CPT | Performed by: INTERNAL MEDICINE

## 2023-06-25 PROCEDURE — 84484 ASSAY OF TROPONIN QUANT: CPT

## 2023-06-25 RX ADMIN — MULTIPLE VITAMINS W/ MINERALS TAB 1 TABLET: TAB ORAL at 08:39

## 2023-06-25 RX ADMIN — FOLIC ACID 1 MG: 1 TABLET ORAL at 08:40

## 2023-06-25 RX ADMIN — NICOTINE 1 PATCH: 21 PATCH, EXTENDED RELEASE TRANSDERMAL at 08:40

## 2023-06-25 RX ADMIN — RIVAROXABAN 20 MG: 20 TABLET, FILM COATED ORAL at 08:39

## 2023-06-25 RX ADMIN — SODIUM CHLORIDE 500 ML: 0.9 INJECTION, SOLUTION INTRAVENOUS at 01:22

## 2023-06-25 RX ADMIN — ASPIRIN 81 MG: 81 TABLET, COATED ORAL at 08:40

## 2023-06-25 RX ADMIN — METOPROLOL SUCCINATE 25 MG: 25 TABLET, EXTENDED RELEASE ORAL at 09:34

## 2023-06-25 RX ADMIN — METOPROLOL TARTRATE 25 MG: 25 TABLET, FILM COATED ORAL at 22:15

## 2023-06-25 RX ADMIN — THIAMINE HCL TAB 100 MG 100 MG: 100 TAB at 08:40

## 2023-06-25 RX ADMIN — METOPROLOL TARTRATE 25 MG: 25 TABLET, FILM COATED ORAL at 16:19

## 2023-06-25 NOTE — PROGRESS NOTES
Progress Note - Medical Toxicology    Nic Ayala 59 y.o. male MRN: [id number]  Unit/Bed#: 5T DETOX 515-01 Encounter: [id number]  [facility], LEVEL 4  Department of Medical Toxicology  Reason for Admission/Principal Problem: Alcohol withdrawal   Rounding Provider: Claudine Flores PA-C, Dr. Roula Fuentes         Alcohol use disorder, severe, dependence ([address])  Assessment & Plan  · Pt with a h/o chronic heavy alcohol use  · Drinks 1 fifth-1 handle of vodka daily  · Denies H/o withdrawal seizures  · Withdrawal management as above  · Discontinue  IVFs  · Continue thiamine/folic acid supplementation, and MVI  · Consult case management for assistance with aftercare resources- patient is contemplating inpatient drug and alcohol rehab vs. Outpatient services. SVT (supraventricular tachycardia) ([address])  Assessment & Plan  · Patient had to occurrences of spontaneous SVT last evening with HR between 150-200s during this time. He converted back to NSR both times with modified valsalva maneuvers.  Patient was not given adenosine  · He is still persistently tachycardic with -130 is normal sinus rhythm  · Telemetry monitoring  · Cards consulted  · Transfer to [facility] for further medical management     Hyponatremia  Assessment & Plan  Recent Labs     06/23/23  0600 06/24/23  0552 06/24/23  2211 06/25/23  0508   SODIUM 127* 130* 129* 130*     · Consulted Nephrology for further recommendations   · Likely secondary to SIADH  · Given Lasix x 1  · Due to patient's cardiac events last evening he became hypotensive and was given 500 cc NS bolus x 2 with improvement in blood pressure    Essential hypertension  Assessment & Plan  · Patient with a history of HTN  · Home medication regimen: losartan and metoprolol  · Pt reports not taking his medication  · Continue home medications in the setting of concomitant hx of paroxysmal Afib  · 06/25/23 /85  · Continue monitoring BP     COPD (chronic obstructive pulmonary disease) ([address])  Assessment & Plan  • Pt with a h/o COPD  • Home medications include PRN albuterol  • +Scattered inspiratory/expiratory wheezes on exam, no respiratory distress noted, VSS with SpO2 97% on RA  • CXR with no evidence of acute cardiopulmonary disease  • Continue home inhaler regimen  • Supplemental O2 via NC PRN maintain SpO2 >90%      Tobacco abuse  Assessment & Plan  · Daily tobacco user  · Offer NRT  · Encourage cessation      Paroxysmal atrial fibrillation ([address])  Assessment & Plan  · Pt has a hx of paroxysmal atrial fibrillation and atrial flutter  · Has an ICD/loop recorder in place  · Continue home medications- metoprolol, ASA, and Xarelto  · Telemetry monitoring    Cardiomyopathy, 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  Assessment & Plan  · Pt has a hx of non-ischemic cardiomyopathy, likley 2/2 alcohol use  · Last ECHO 10/4/22 showed LVEF 40%, moderately reduced LV systolic function, X6GT  · Continue to monitor  · Encourage alcohol cessation    Confusion and disorientation-resolved as of 6/25/2023  Assessment & Plan  · Mental Status has improved this morning following course of high dose thiamine. Patient is now alert and oriented to person, place, and time. · Will continue thiamine 100mg TID   · PT consulted to assess ambulation - patient's gait has improved. He is still reporting to dizziness and is unable to ambulate stairs today. VTE Pharmacologic Prophylaxis:   Pharmacologic: Rivaroxaban (Xarelto)  Mechanical VTE Prophylaxis in Place: yes    Code Status: Level 1 - Full Code    Patient Centered Rounds: I have performed bedside rounds with nursing staff today. Discussions with Specialists or Other Care Team Provider: Baldo Ochoa, Dr. Brittney Suarez   Education and Discussions with Family / Patient: I personally answered all of the patient's    Time Spent for Care: 30 minutes. More than 50% of total time spent on counseling and coordination of care as described above.     Current Length of Stay: 6 day(s)    Current Patient Status: Inpatient     Certification Statement: The patient will continue to require additional inpatient hospital stay due to Cardiology consult, Hyponatremia- nephrology following, PT  Discharge Plan: Transfer to [facility] service for further medical management as patient is not in active withdrawal     Subjective:   Patient was seen and examined at bedside. He offers no complaints, denies any lightheadedness, palpitations, chest pain or shortness of breathe.        Objective:     Clinical Opiate Withdrawal Scale  Pulse: (!) 130    SEWS Total Score: 0 (2023  6:00 PM)        Last 24 Hours Medication List:   Current Facility-Administered Medications   Medication Dose Route Frequency Provider Last Rate   • acetaminophen  650 mg Oral Q6H PRN Gianna Hi MD     • albuterol  2 puff Inhalation Q4H PRN Talita Hill PA-C     • aluminum-magnesium hydroxide-simethicone  30 mL Oral Q4H PRN Jeff Orellana PA-C     • aspirin  81 mg Oral Daily Talita Hill PA-C     • bisacodyl  5 mg Oral Daily PRN Maureen Richardson PA-C     • folic acid  1 mg Oral Daily Gianna Hi MD     • losartan  25 mg Oral Daily Talita Hill PA-C     • metoprolol  5 mg Intravenous Once PRN Roxana Lanier PA-C     • metoprolol succinate  25 mg Oral Daily Talita Hill PA-C     • multivitamin-minerals  1 tablet Oral Daily Petty Zhang MD     • nicotine  1 patch Transdermal Daily Petty Zhang MD     • ondansetron  4 mg Intravenous Q6H PRN Petty Zhang MD     • rivaroxaban  20 mg Oral Daily With Breakfast Talita Hill PA-C     • thiamine  100 mg Oral Daily Caroline Guzmán PA-C           Vitals:   Temp (24hrs), Av.2 °F (36.8 °C), Min:97.6 °F (36.4 °C), Max:98.4 °F (36.9 °C)    Temp:  [97.6 °F (36.4 °C)-98.4 °F (36.9 °C)] 98.4 °F (36.9 °C)  HR:  [] 130  Resp: [18-22] 18  BP: ()/(53-90) 94/53  SpO2:  [95 %-98 %] 98 %  Body mass index is 22.63 kg/m². Input and Output Summary (last 24 hours): Intake/Output Summary (Last 24 hours) at 6/25/2023 0758  Last data filed at 6/24/2023 1301  Gross per 24 hour   Intake 360 ml   Output 200 ml   Net 160 ml       Physical Exam:   Physical Exam  Constitutional:       General: He is not in acute distress. Appearance: He is not diaphoretic. Eyes:      General: No scleral icterus. Pupils: Pupils are equal, round, and reactive to light. Cardiovascular:      Rate and Rhythm: Regular rhythm. Tachycardia present. Heart sounds: No murmur heard. Pulmonary:      Effort: No respiratory distress. Breath sounds: Normal breath sounds. Abdominal:      General: There is no distension. Palpations: Abdomen is soft. Tenderness: There is no abdominal tenderness. Musculoskeletal:      Right lower leg: No edema. Left lower leg: No edema. Additional Data:     Labs: keep all most recent labs as listed on admission templates   Results from last 7 days   Lab Units 06/25/23  0508 06/24/23  0552 06/23/23  0600   WBC Thousand/uL 11.45*   < > 7.95   HEMOGLOBIN g/dL 12.9   < > 11.8*   HEMATOCRIT % 39.2   < > 35.0*   PLATELETS Thousands/uL 387   < > 273   NEUTROS PCT %  --   --  61   LYMPHS PCT %  --   --  18   MONOS PCT %  --   --  17*   EOS PCT %  --   --  2    < > = values in this interval not displayed.       Results from last 7 days   Lab Units 06/25/23  0508 06/24/23  0552 06/23/23  0600   SODIUM mmol/L 130*   < > 127*   POTASSIUM mmol/L 4.0   < > 3.8   CHLORIDE mmol/L 98   < > 96   CO2 mmol/L 23   < > 24   BUN mg/dL 14   < > 8   CREATININE mg/dL 0.64   < > 0.49*   ANION GAP mmol/L 9   < > 7   CALCIUM mg/dL 8.7   < > 8.6   ALBUMIN g/dL  --   --  3.5   TOTAL BILIRUBIN mg/dL  --   --  0.31   ALK PHOS U/L  --   --  51   ALT U/L  --   --  7   AST U/L  --   --  15   GLUCOSE RANDOM mg/dL 114   < > 93    < > = values in this interval not displayed. Results from last 7 days   Lab Units 06/18/23  2229   INR  0.89      Results from last 7 days   Lab Units 06/24/23  2204   POC GLUCOSE mg/dl 163*           Results from last 7 days   Lab Units 06/25/23  0614 06/25/23  0508   LACTIC ACID mmol/L 0.8  --    PROCALCITONIN ng/ml  --  0.09          * I Have Reviewed All Lab Data Listed Above. * Additional Pertinent Lab Tests Reviewed: [facility] Admission Reviewed      Imaging Studies: I have personally reviewed pertinent reports. Recent Cultures (last 7 days): Today, Patient Was Seen By: Nadja Balderrama PA-C    ** Please Note: Dictation voice to text software may have been used in the creation of this document.  **

## 2023-06-25 NOTE — QUICK NOTE
Patient noted to be increasingly tacycardic with HR 130s-140s on telemtery monitor. EKG performed by RN, which revealed supraventricular tachycardia with ventricular rate 144 bpm, with ST and T-wave abnormality in inferior leads, and LAD unchanged from previous tracings. RRT called around 2327, and modified Valsalva maneuver again performed, first by detox unit staff and again with the help of the rapid response team. Patient's HR was continuously monitored, peaking at 223 bpm before converting to sinus tachycardia with HR 130s-140s. Other VS remained stable and pt offered no complaints, denied any pain or palpitations. Plan:   · Per ED provider recommendations, will treat as sinus tachycardia so long as HR remains in the 140s or lower  · Will administer metoprolol 5 mg IV now for rate control with hx of A fib  · May administer another 5 mg if needed for persistent tachycardia as long as BP remains stable  · Administer 4 g IV magnesium as ordered  · Continue IVF bolus, will administer additional 500 cc bolus as needed  · Will continue to monitor closely on telemetry with frequent VS checks  · Upgrade level of care to Stepdown level 2 due to increased medical complexity and need for frequent monitoring.

## 2023-06-25 NOTE — ASSESSMENT & PLAN NOTE
· Patient had to occurrences of spontaneous SVT last evening with HR between 150-200s during this time. He converted back to NSR both times with modified valsalva maneuvers.  Patient was not given adenosine  · He is still persistently tachycardic with -130 is normal sinus rhythm  · Telemetry monitoring  · Cards consulted  · Transfer to [facility] for further medical management

## 2023-06-25 NOTE — ASSESSMENT & PLAN NOTE
· Patient with a history of HTN  · Home medication regimen: losartan and metoprolol  · Pt reports not taking his medication  · Continue home medications in the setting of concomitant hx of paroxysmal Afib  · 06/25/23 /85  · Continue monitoring BP
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
## 2023-06-25 NOTE — RAPID RESPONSE
Rapid Response Note  Nic Ayala 59 y.o. male MRN: [id number]  Unit/Bed#: 5T [facility] 515-01 Encounter: [id number]    Rapid Response Notification(s):   Level of care:  [facility]  Patient location: Detox. Primary reason for rapid response call:  Acute change in heart rhythm    Rapid Response Intervention(s):   Airway:  None  Breathing:  None  Circulation:  None  Medications administered:  None       Assessment:   · Presence of SVT on EKG, -220, patient awake and alert, following commands  · Vagal maneuver successfully performed, with resolution of arrhythmia. Plan:   · Due to resolution of arrhythmia and patient hemodynamically stable, continue further treatment in Detox unit     Rapid Response Outcome:   Transfer:  Remain on floor  Code Status: Level 1 (Full Code)      Family notified of transfer: Per primary team  Family member contacted: Per primary team     Background/Situation:   Brice Bray is a 59 y.o. male admitted to Detox Unit who had a rapid response called after presenting SVT, triggered by no specific factor. As per nurse staff, patient was resting comfortably in bed prior to event. Cardiovascular medical history is remarkable for paroxysmal Afib. Patient reports palpitations. Review of Systems   Respiratory: Negative for shortness of breath. Cardiovascular: Positive for palpitations. Negative for chest pain. Objective:   Vitals:    06/24/23 2100 06/24/23 2150 06/24/23 2155 06/24/23 2217   BP: 131/80 125/90 164/83 155/90   BP Location: Left arm      Pulse: 82 (!) 156 101 93   Resp: 18 22 22 20   Temp: 98.3 °F (36.8 °C)      TempSrc: Temporal      SpO2: 95% 98% 96% 98%   Weight:       Height:         Physical Exam  Constitutional:       General: He is not in acute distress. HENT:      Head: Normocephalic. Cardiovascular:      Rate and Rhythm: Rhythm irregular. Pulmonary:      Effort: Pulmonary effort is normal. No respiratory distress.    Neurological:      Mental Status: He is alert.   Psychiatric:         Behavior: Behavior normal.         Portions of the record may have been created with voice recognition software. Occasional wrong word or "sound a like" substitutions may have occurred due to the inherent limitations of voice recognition software. Read the chart carefully and recognize, using context, where substitutions have occurred.     Baldo Cano MD

## 2023-06-25 NOTE — QUICK NOTE
The patient is transferred to medical service for ongoing medical needs. Patient is stable from medical toxicology standpoint. Patient was seen by medical toxicology team today and hospitalist service will start the care tomorrow.

## 2023-06-25 NOTE — NURSING NOTE
At approx 2325 pt went into SVT again, confirmed by EKG. This time pt did not call and did not feel like his heart was racing. RN was alerted by telemetry monitor. Rk Maki PA-C was aware and at bedside for EKG. Rapid response called, team arrived. Modified valsalva maneuver attempted again. Pt HR as high as 223. HR did come down to 140s with modified valsalva maneuver. IV metoprolol and NS bolus given per order. Adenosine 6mg IV wasted with Gene Vergara RN.

## 2023-06-25 NOTE — PROCEDURES
EKG 6/25, 0145: Junctional tachycardia, ventricular rate 128 bpm, LAD and T wave inversions in inferior leads, also present on prior EKG. No acute WANDER/STD. No OH interval without discernible P waves in multiple leads, QRS 84 ms, QTc 443 ms.

## 2023-06-25 NOTE — NURSING NOTE
At approx 2145 pt rang call bell to state that he felt like his heart was racing. RN confirmed by manually feeling pulse and checking with intermittent pulse ox. RN spoke to SEB Tate via face-to-face conversation. EKG ordered and performed. EKG reads SVT and SEB confirmed and called rapid response at approx 2150    At 2152 team arrived to room. Modified valsalva maneuver performed with rhythm change into sinus tachycardia at approx 2157. Pt denied CP, SOB, impending sense of doom. Pt's stated only complaint was that he felt like his heart was racing. New orders obtained throughout rapid response. Pt was placed on telemetry monitor, labs drawn, and NS bolus given per order.

## 2023-06-25 NOTE — PROCEDURES
EKG: supraventricular tachycardia, ventricular rate 150 bpm, no acute ST segment/T wave changes, left axis deviation unchanged from prior EKGs, no MA interval without discernible P waves, QRS 76 ms, QTc 458 ms. Compared to previous tracing, rhythm has changed to supraventricular tachycardia.

## 2023-06-25 NOTE — ASSESSMENT & PLAN NOTE
Recent Labs     06/23/23  0600 06/24/23  0552 06/24/23  2211 06/25/23  0508   SODIUM 127* 130* 129* 130*     · Consulted Nephrology for further recommendations   · Likely secondary to SIADH  · Given Lasix x 1  · Due to patient's cardiac events last evening he became hypotensive and was given 500 cc NS bolus x 2 with improvement in blood pressure

## 2023-06-25 NOTE — ASSESSMENT & PLAN NOTE
· Pt has a hx of paroxysmal atrial fibrillation and atrial flutter  · Has an ICD/loop recorder in place  · Continue home medications- metoprolol, ASA, and Xarelto  · Telemetry monitoring

## 2023-06-25 NOTE — ASSESSMENT & PLAN NOTE
· Pt has a hx of non-ischemic cardiomyopathy, javier 2/2 alcohol use  · Last ECHO 10/4/22 showed LVEF 40%, moderately reduced LV systolic function, L1XD  · Continue to monitor  · Encourage alcohol cessation

## 2023-06-25 NOTE — PLAN OF CARE
Problem: SAFETY ADULT  Goal: Patient will remain free of falls  Description: INTERVENTIONS:  - Educate patient/family on patient safety including physical limitations  - Instruct patient to call for assistance with activity   - Consult OT/PT to assist with strengthening/mobility   - Keep Call bell within reach  - Keep bed low and locked with side rails adjusted as appropriate  - Keep care items and personal belongings within reach  - Initiate and maintain comfort rounds  - Make Fall Risk Sign visible to staff  - Obtain necessary fall risk management equipment. - Apply yellow socks and bracelet for high fall risk patients  - Consider moving patient to room near nurses station  Outcome: Progressing  Goal: Maintain or return to baseline ADL function  Description: INTERVENTIONS:  -  Assess patient's ability to carry out ADLs; assess patient's baseline for ADL function and identify physical deficits which impact ability to perform ADLs (bathing, care of mouth/teeth, toileting, grooming, dressing, etc.)  - Assess/evaluate cause of self-care deficits   - Assess range of motion  - Assess patient's mobility; develop plan if impaired  - Assess patient's need for assistive devices and provide as appropriate  - Encourage maximum independence but intervene and supervise when necessary  - Involve family in performance of ADLs  - Assess for home care needs following discharge   - Consider OT consult to assist with ADL evaluation and planning for discharge  - Provide patient education as appropriate  Outcome: Progressing  Goal: Maintains/Returns to pre admission functional level  Description: INTERVENTIONS:  - Perform BMAT or MOVE assessment daily.   - Set and communicate daily mobility goal to care team and patient/family/caregiver.    - Collaborate with rehabilitation services on mobility goals if consulted  - Out of bed for toileting  - Record patient progress and toleration of activity level   Outcome: Progressing Problem: DISCHARGE PLANNING  Goal: Discharge to home or other facility with appropriate resources  Description: INTERVENTIONS:  - Identify barriers to discharge w/patient and caregiver  - Arrange for needed discharge resources and transportation as appropriate  - Identify discharge learning needs (meds, wound care, etc.)  - Arrange for interpretive services to assist at discharge as needed  - Refer to Case Management Department for coordinating discharge planning if the patient needs post-hospital services based on physician/advanced practitioner order or complex needs related to functional status, cognitive ability, or social support system  Outcome: Progressing     Problem: Knowledge Deficit  Goal: Patient/family/caregiver demonstrates understanding of disease process, treatment plan, medications, and discharge instructions  Description: Complete learning assessment and assess knowledge base.   Interventions:  - Provide teaching at level of understanding  - Provide teaching via preferred learning methods  Outcome: Progressing     Problem: SUBSTANCE USE/ABUSE  Goal: By discharge, will develop insight into their chemical dependency and sustain motivation to continue in recovery  Description: INTERVENTIONS:  - Attends all daily group sessions and scheduled AA groups  - Actively practices coping skills through participation in the therapeutic community and adherence to program rules  - Reviews and completes assignments from individual treatment plan  - Assist patient development of understanding of their personal cycle of addiction and relapse triggers  Outcome: Progressing  Goal: By discharge, patient will have ongoing treatment plan addressing chemical dependency  Description: INTERVENTIONS:  - Assist patient with resources and/or appointments for ongoing recovery based living  Outcome: Progressing     Problem: MOBILITY - ADULT  Goal: Maintain or return to baseline ADL function  Description: INTERVENTIONS:  - Assess patient's ability to carry out ADLs; assess patient's baseline for ADL function and identify physical deficits which impact ability to perform ADLs (bathing, care of mouth/teeth, toileting, grooming, dressing, etc.)  - Assess/evaluate cause of self-care deficits   - Assess range of motion  - Assess patient's mobility; develop plan if impaired  - Assess patient's need for assistive devices and provide as appropriate  - Encourage maximum independence but intervene and supervise when necessary  - Involve family in performance of ADLs  - Assess for home care needs following discharge   - Consider OT consult to assist with ADL evaluation and planning for discharge  - Provide patient education as appropriate  Outcome: Progressing  Goal: Maintains/Returns to pre admission functional level  Description: INTERVENTIONS:  - Perform BMAT or MOVE assessment daily.   - Set and communicate daily mobility goal to care team and patient/family/caregiver.    - Collaborate with rehabilitation services on mobility goals if consulted  - Ambulate patient 3 times a day  - Out of bed for toileting  - Record patient progress and toleration of activity level   Outcome: Progressing

## 2023-06-25 NOTE — PROCEDURES
EKG: sinus tachycardia, ventricular rate 126 bpm, unchanged left axis deviation and TWI in inferior leads,  ms, QRS 82 ms, QTc 443 ms. Compared to previous tracing 6/25/23 at 0145, sinus tachycardia has replaced junctional tachycardia. Will continue to monitor pt on telemetry in addition to VS and AM labs.

## 2023-06-25 NOTE — PROGRESS NOTES
NEPHROLOGY PROGRESS NOTE   Nic Ayala 59 y.o. male MRN: [id number]  Unit/Bed#: 7T [facility] 704-01 Encounter: [id number]  Reason for Consult: Acute on chronic hyponatremia    78-year-old male with history of cardiomyopathy with EF of 40% and alcohol abuse who presents to the emergency room with alcohol intoxication and chest pain. Currently with prolonged admission due to alcohol detoxification. Nephrology is consulted for hyponatremia. ASSESSMENT/PLAN:  Acute on chronic hyponatremia: History of cardiomyopathy and alcohol abuse, labs suggest SIADH. -Appears baseline sodium level in the mid 130s. -Presented with sodium level of 132 with decrease to 127 on 6/23/2023.  -Repeat sodium level 130 this morning, unchanged. -Medication list reviewed, patient has been ordered trazodone which can contribute to SIADH while here in the hospital but was previously not taking as outpatient.  -Work-up: urine osm 511, urine Na 180.   -chest x-ray without acute cardiopulmonary disease.  -Reduce fluid restriction to 1 L per 24 hours + nutritional supplements.  -Received total of 1 L bolus of normal saline overnight.  -Received Lasix 20 mg IV x1 6/24.  -Repeat BMP in AM.     Hypertension: Blood pressure low at times.  -Continues on losartan 25 mg daily and metoprolol 25 mg daily.  -Recommend avoiding hypotension or high fluctuations with blood pressure.     Alcohol withdrawal: History of daily alcohol use. Further management per medical team.    SVT: New onset with heart rates in the 130s to 150s overnight.  -Cardiology team following. Initiated on Lopressor.  -Checking echocardiogram.  -On Xarelto for anticoagulation.     Other: A-fib with ICD/loop recorder, tobacco abuse, COPD. Disposition: Requiring additional stay due to medical needs. SUBJECTIVE:  The patient is resting in his bed. Transfer to medical unit overnight due to new onset SVT which is currently resolved. He appears to be comfortable.   He denies chest pain or shortness of breath.     OBJECTIVE:  Current Weight: Weight - Scale: 63.6 kg (140 lb 3.4 oz)  Vitals:    06/25/23 0302 06/25/23 0509 06/25/23 0714 06/25/23 0934   BP: 116/78 100/59 94/53 108/83   BP Location: Left arm Left arm Left arm    Pulse: (!) 129 (!) 136 (!) 130 92   Resp: 20 20 18    Temp:  98.4 °F (36.9 °C)     TempSrc:  Temporal Temporal    SpO2: 98% 98% 98%    Weight:       Height:           Intake/Output Summary (Last 24 hours) at 6/25/2023 1141  Last data filed at 6/25/2023 1001  Gross per 24 hour   Intake 480 ml   Output 200 ml   Net 280 ml     General: NAD  Skin: warm, dry, intact, no rash  HEENT: Moist mucous membranes, sclera anicteric, normocephalic, atraumatic  Neck: No apparent JVD appreciated  Chest: lung sounds clear B/L, on RA   CVS:Regular rate and rhythm, no murmer   Abdomen: Soft, round, non-tender, +BS  Extremities: No B/L LE edema present  Neuro: alert and oriented  Psych: appropriate mood and affect     Medications:    Current Facility-Administered Medications:   •  acetaminophen (TYLENOL) tablet 650 mg, 650 mg, Oral, Q6H PRN, Gianna Cisneros MD, 650 mg at 06/22/23 0324  •  albuterol (PROVENTIL HFA,VENTOLIN HFA) inhaler 2 puff, 2 puff, Inhalation, Q4H PRN, Talita Cruz PA-C  •  aluminum-magnesium hydroxide-simethicone (MYLANTA) oral suspension 30 mL, 30 mL, Oral, Q4H PRN, Anat Ceja PA-C, 30 mL at 06/24/23 0014  •  aspirin (ECOTRIN LOW STRENGTH) EC tablet 81 mg, 81 mg, Oral, Daily, Talita Cruz PA-C, 81 mg at 06/25/23 0840  •  bisacodyl (DULCOLAX) EC tablet 5 mg, 5 mg, Oral, Daily PRN, Prisca Guzmán PA-C, 5 mg at 94/19/73 2412  •  folic acid (FOLVITE) tablet 1 mg, 1 mg, Oral, Daily, Gianna Cisneros MD, 1 mg at 06/25/23 3154  •  losartan (COZAAR) tablet 25 mg, 25 mg, Oral, Daily, Talita Cruz PA-C, 25 mg at 06/24/23 6104  •  metoprolol (LOPRESSOR) injection 5 mg, 5 mg, Intravenous, Once PRN, Thad Dancer, SEB  •  metoprolol tartrate (LOPRESSOR) tablet 25 mg, 25 mg, Oral, Q6H, Lucy Whitfield DO  •  multivitamin-minerals (CENTRUM) tablet 1 tablet, 1 tablet, Oral, Daily, Gianna Hurtado MD, 1 tablet at 06/25/23 0839  •  nicotine (NICODERM CQ) 21 mg/24 hr TD 24 hr patch 1 patch, 1 patch, Transdermal, Daily, Gianna Hurtado MD, 1 patch at 06/25/23 0840  •  ondansetron (ZOFRAN) injection 4 mg, 4 mg, Intravenous, Q6H PRN, Slim Brink MD, 4 mg at 06/21/23 2323  •  rivaroxaban (XARELTO) tablet 20 mg, 20 mg, Oral, Daily With Breakfast, Talita Ga PA-C, 20 mg at 06/25/23 8894  •  thiamine tablet 100 mg, 100 mg, Oral, Daily, Dax Guzmán PA-C, 100 mg at 06/25/23 0840    Laboratory Results:  Results from last 7 days   Lab Units 06/25/23  0508 06/24/23  2211 06/24/23  0552 06/23/23  0600 06/22/23  0444 06/21/23  1436 06/20/23  0333   WBC Thousand/uL 11.45*  --  8.87 7.95  --  7.27 8.70   HEMOGLOBIN g/dL 12.9  --  12.6 11.8*  --  12.4 13.0   HEMATOCRIT % 39.2  --  36.4* 35.0*  --  38.0 38.3   PLATELETS Thousands/uL 387  --  308 273  --  232 187   SODIUM mmol/L 130* 129* 130* 127*   < > 133* 132*   POTASSIUM mmol/L 4.0 4.0 3.9 3.8   < > 3.6 3.4*   CHLORIDE mmol/L 98 95* 95* 96   < > 96 90*   CO2 mmol/L 23 24 26 24   < > 28 29   BUN mg/dL 14 14 9 8   < > 9 4*   CREATININE mg/dL 0.64 0.76 0.54* 0.49*   < > 0.70 0.48*   CALCIUM mg/dL 8.7 9.7 8.9 8.6   < > 8.6 9.2   MAGNESIUM mg/dL 3.2* 1.6* 1.9 1.5*   < >  --  1.1*   PHOSPHORUS mg/dL  --  3.2  --   --   --   --   --    ALK PHOS U/L  --   --   --  51  --  56 49   ALT U/L  --   --   --  7  --  9 12   AST U/L  --   --   --  15  --  22 45*    < > = values in this interval not displayed.

## 2023-06-25 NOTE — QUICK NOTE
RRT called for patient in new-onset SVT as demonstrated on EKG 6/24/23 at 2147. BP, RR, and SpO2 stable, and patient is alert and conversational. He complains of palpitations, "feeling like my heart is beating in my throat," denies any CP, SOB. Pt placed on Zoll cardiac monitor and pads attached. Modified valsalva maneuver was performed with conversion into sinus tachycardia with stable LAE on repeat EKG done at 2159.  cc NS bolus started. STAT BMP and Mag drawn. Pt placed back on telemetry and will continue to monitor closely. 6/24/23, 2257: Labs resulted, significant for Magnesium 1.6 and sodium 129. Normal K 4.0. Will administer IV magnesium 4 g at this time and continue to monitor patient.

## 2023-06-25 NOTE — ASSESSMENT & PLAN NOTE
· Pt with a h/o chronic heavy alcohol use  · Drinks 1 fifth-1 handle of vodka daily  · Denies H/o withdrawal seizures  · Withdrawal management as above  · Discontinue  IVFs  · Continue thiamine/folic acid supplementation, and MVI  · Consult case management for assistance with aftercare resources- patient is contemplating inpatient drug and alcohol rehab vs. Outpatient services.

## 2023-06-26 ENCOUNTER — APPOINTMENT (INPATIENT)
Dept: NON INVASIVE DIAGNOSTICS | Facility: HOSPITAL | Age: 58
DRG: 309 | End: 2023-06-26
Payer: COMMERCIAL

## 2023-06-26 LAB
ANION GAP SERPL CALCULATED.3IONS-SCNC: 9 MMOL/L
AORTIC ROOT: 3 CM
AORTIC VALVE MEAN VELOCITY: 5.6 M/S
APICAL FOUR CHAMBER EJECTION FRACTION: 55 %
AV AREA BY CONTINUOUS VTI: 3.1 CM2
AV AREA PEAK VELOCITY: 3.2 CM2
AV LVOT MEAN GRADIENT: 1 MMHG
AV LVOT PEAK GRADIENT: 2 MMHG
AV MEAN GRADIENT: 1 MMHG
AV PEAK GRADIENT: 2 MMHG
AV VALVE AREA: 3.09 CM2
AV VELOCITY RATIO: 0.83
BASOPHILS # BLD AUTO: 0.09 THOUSANDS/ÂΜL (ref 0–0.1)
BASOPHILS NFR BLD AUTO: 1 % (ref 0–1)
BUN SERPL-MCNC: 13 MG/DL (ref 5–25)
CALCIUM SERPL-MCNC: 9.8 MG/DL (ref 8.4–10.2)
CHLORIDE SERPL-SCNC: 95 MMOL/L (ref 96–108)
CO2 SERPL-SCNC: 26 MMOL/L (ref 21–32)
CREAT SERPL-MCNC: 0.64 MG/DL (ref 0.6–1.3)
DOP CALC AO PEAK VEL: 0.77 M/S
DOP CALC AO VTI: 13.98 CM
DOP CALC LVOT AREA: 3.8 CM2
DOP CALC LVOT CARDIAC INDEX: 1.83 L/MIN/M2
DOP CALC LVOT CARDIAC OUTPUT: 3.13 L/MIN
DOP CALC LVOT DIAMETER: 2.2 CM
DOP CALC LVOT PEAK VEL VTI: 11.38 CM
DOP CALC LVOT PEAK VEL: 0.64 M/S
DOP CALC LVOT STROKE INDEX: 24.6 ML/M2
DOP CALC LVOT STROKE VOLUME: 43.24
DOP CALC RVOT PEAK VEL: 1.04 M/S
DOP CALC RVOT VTI: 17.7 CM
E WAVE DECELERATION TIME: 173 MS
EOSINOPHIL # BLD AUTO: 0.12 THOUSAND/ÂΜL (ref 0–0.61)
EOSINOPHIL NFR BLD AUTO: 1 % (ref 0–6)
ERYTHROCYTE [DISTWIDTH] IN BLOOD BY AUTOMATED COUNT: 12.7 % (ref 11.6–15.1)
FRACTIONAL SHORTENING: 30 (ref 28–44)
GFR SERPL CREATININE-BSD FRML MDRD: 106 ML/MIN/1.73SQ M
GLUCOSE SERPL-MCNC: 139 MG/DL (ref 65–140)
HCT VFR BLD AUTO: 39.8 % (ref 36.5–49.3)
HGB BLD-MCNC: 12.9 G/DL (ref 12–17)
IMM GRANULOCYTES # BLD AUTO: 0.14 THOUSAND/UL (ref 0–0.2)
IMM GRANULOCYTES NFR BLD AUTO: 1 % (ref 0–2)
INTERVENTRICULAR SEPTUM IN DIASTOLE (PARASTERNAL SHORT AXIS VIEW): 1 CM
INTERVENTRICULAR SEPTUM: 1 CM (ref 0.6–1.1)
LAAS-AP4: 20 CM2
LEFT ATRIUM AREA SYSTOLE SINGLE PLANE A4C: 18.3 CM2
LEFT ATRIUM SIZE: 2.9 CM
LEFT INTERNAL DIMENSION IN SYSTOLE: 3.3 CM (ref 2.1–4)
LEFT VENTRICULAR INTERNAL DIMENSION IN DIASTOLE: 4.7 CM (ref 3.5–6)
LEFT VENTRICULAR POSTERIOR WALL IN END DIASTOLE: 1 CM
LEFT VENTRICULAR STROKE VOLUME: 56 ML
LVSV (TEICH): 56 ML
LYMPHOCYTES # BLD AUTO: 1.81 THOUSANDS/ÂΜL (ref 0.6–4.47)
LYMPHOCYTES NFR BLD AUTO: 17 % (ref 14–44)
MCH RBC QN AUTO: 31.3 PG (ref 26.8–34.3)
MCHC RBC AUTO-ENTMCNC: 32.4 G/DL (ref 31.4–37.4)
MCV RBC AUTO: 97 FL (ref 82–98)
MONOCYTES # BLD AUTO: 1.35 THOUSAND/ÂΜL (ref 0.17–1.22)
MONOCYTES NFR BLD AUTO: 13 % (ref 4–12)
MV E'TISSUE VEL-LAT: 12 CM/S
MV E'TISSUE VEL-SEP: 6 CM/S
MV PEAK A VEL: 0.55 M/S
MV PEAK E VEL: 46 CM/S
MV STENOSIS PRESSURE HALF TIME: 50 MS
MV VALVE AREA P 1/2 METHOD: 4.4
NEUTROPHILS # BLD AUTO: 7.05 THOUSANDS/ÂΜL (ref 1.85–7.62)
NEUTS SEG NFR BLD AUTO: 67 % (ref 43–75)
NRBC BLD AUTO-RTO: 0 /100 WBCS
PLATELET # BLD AUTO: 429 THOUSANDS/UL (ref 149–390)
PMV BLD AUTO: 8.7 FL (ref 8.9–12.7)
POTASSIUM SERPL-SCNC: 4.2 MMOL/L (ref 3.5–5.3)
RA PRESSURE ESTIMATED: 5 MMHG
RBC # BLD AUTO: 4.12 MILLION/UL (ref 3.88–5.62)
RIGHT ATRIUM AREA SYSTOLE A4C: 15.9 CM2
RIGHT VENTRICLE ID DIMENSION: 4.1 CM
RV PSP: 25 MMHG
SL CV LV EF: 45
SL CV PED ECHO LEFT VENTRICLE DIASTOLIC VOLUME (MOD BIPLANE) 2D: 100 ML
SL CV PED ECHO LEFT VENTRICLE SYSTOLIC VOLUME (MOD BIPLANE) 2D: 44 ML
SL CV RVOT MAX GRADIENT: 4 MMHG
SL CV RVOT MEAN GRADIENT: 2 MMHG
SL CV RVOT VMEAN: 0.68 M/S
SODIUM SERPL-SCNC: 130 MMOL/L (ref 135–147)
TR MAX PG: 20 MMHG
TR PEAK VELOCITY: 2.3 M/S
TRICUSPID ANNULAR PLANE SYSTOLIC EXCURSION: 1.6 CM
TRICUSPID VALVE PEAK REGURGITATION VELOCITY: 2.25 M/S
WBC # BLD AUTO: 10.56 THOUSAND/UL (ref 4.31–10.16)

## 2023-06-26 PROCEDURE — 99232 SBSQ HOSP IP/OBS MODERATE 35: CPT | Performed by: INTERNAL MEDICINE

## 2023-06-26 PROCEDURE — 85025 COMPLETE CBC W/AUTO DIFF WBC: CPT | Performed by: INTERNAL MEDICINE

## 2023-06-26 PROCEDURE — 93306 TTE W/DOPPLER COMPLETE: CPT

## 2023-06-26 PROCEDURE — 80048 BASIC METABOLIC PNL TOTAL CA: CPT | Performed by: INTERNAL MEDICINE

## 2023-06-26 PROCEDURE — 93306 TTE W/DOPPLER COMPLETE: CPT | Performed by: STUDENT IN AN ORGANIZED HEALTH CARE EDUCATION/TRAINING PROGRAM

## 2023-06-26 PROCEDURE — 99232 SBSQ HOSP IP/OBS MODERATE 35: CPT | Performed by: STUDENT IN AN ORGANIZED HEALTH CARE EDUCATION/TRAINING PROGRAM

## 2023-06-26 RX ORDER — METOPROLOL SUCCINATE 50 MG/1
50 TABLET, EXTENDED RELEASE ORAL 2 TIMES DAILY
Status: DISCONTINUED | OUTPATIENT
Start: 2023-06-26 | End: 2023-07-06 | Stop reason: HOSPADM

## 2023-06-26 RX ADMIN — NICOTINE 1 PATCH: 21 PATCH, EXTENDED RELEASE TRANSDERMAL at 08:58

## 2023-06-26 RX ADMIN — MULTIPLE VITAMINS W/ MINERALS TAB 1 TABLET: TAB ORAL at 08:58

## 2023-06-26 RX ADMIN — METOPROLOL TARTRATE 25 MG: 25 TABLET, FILM COATED ORAL at 08:58

## 2023-06-26 RX ADMIN — THIAMINE HCL TAB 100 MG 100 MG: 100 TAB at 08:58

## 2023-06-26 RX ADMIN — METOPROLOL TARTRATE 25 MG: 25 TABLET, FILM COATED ORAL at 03:26

## 2023-06-26 RX ADMIN — LOSARTAN POTASSIUM 25 MG: 25 TABLET, FILM COATED ORAL at 08:58

## 2023-06-26 RX ADMIN — ASPIRIN 81 MG: 81 TABLET, COATED ORAL at 08:58

## 2023-06-26 RX ADMIN — RIVAROXABAN 20 MG: 20 TABLET, FILM COATED ORAL at 09:02

## 2023-06-26 RX ADMIN — ACETAMINOPHEN 650 MG: 325 TABLET ORAL at 20:07

## 2023-06-26 RX ADMIN — METOPROLOL SUCCINATE 50 MG: 50 TABLET, EXTENDED RELEASE ORAL at 15:58

## 2023-06-26 RX ADMIN — FOLIC ACID 1 MG: 1 TABLET ORAL at 08:58

## 2023-06-26 NOTE — PLAN OF CARE
Problem: SAFETY ADULT  Goal: Patient will remain free of falls  Description: INTERVENTIONS:  - Educate patient/family on patient safety including physical limitations  - Instruct patient to call for assistance with activity   - Consult OT/PT to assist with strengthening/mobility   - Keep Call bell within reach  - Keep bed low and locked with side rails adjusted as appropriate  - Keep care items and personal belongings within reach  - Initiate and maintain comfort rounds  - Make Fall Risk Sign visible to staff  - Apply yellow socks and bracelet for high fall risk patients  - Consider moving patient to room near nurses station  Outcome: Progressing  Goal: Maintain or return to baseline ADL function  Description: INTERVENTIONS:  -  Assess patient's ability to carry out ADLs; assess patient's baseline for ADL function and identify physical deficits which impact ability to perform ADLs (bathing, care of mouth/teeth, toileting, grooming, dressing, etc.)  - Assess/evaluate cause of self-care deficits   - Assess range of motion  - Assess patient's mobility; develop plan if impaired  - Assess patient's need for assistive devices and provide as appropriate  - Encourage maximum independence but intervene and supervise when necessary  - Involve family in performance of ADLs  - Assess for home care needs following discharge   - Consider OT consult to assist with ADL evaluation and planning for discharge  - Provide patient education as appropriate  Outcome: Progressing  Goal: Maintains/Returns to pre admission functional level  Description: INTERVENTIONS:  - Perform BMAT or MOVE assessment daily.   - Set and communicate daily mobility goal to care team and patient/family/caregiver.    - Collaborate with rehabilitation services on mobility goals if consulted  - Out of bed for toileting  - Record patient progress and toleration of activity level   Outcome: Progressing     Problem: DISCHARGE PLANNING  Goal: Discharge to home or other facility with appropriate resources  Description: INTERVENTIONS:  - Identify barriers to discharge w/patient and caregiver  - Arrange for needed discharge resources and transportation as appropriate  - Identify discharge learning needs (meds, wound care, etc.)  - Arrange for interpretive services to assist at discharge as needed  - Refer to Case Management Department for coordinating discharge planning if the patient needs post-hospital services based on physician/advanced practitioner order or complex needs related to functional status, cognitive ability, or social support system  Outcome: Progressing     Problem: Knowledge Deficit  Goal: Patient/family/caregiver demonstrates understanding of disease process, treatment plan, medications, and discharge instructions  Description: Complete learning assessment and assess knowledge base.   Interventions:  - Provide teaching at level of understanding  - Provide teaching via preferred learning methods  Outcome: Progressing     Problem: SUBSTANCE USE/ABUSE  Goal: By discharge, will develop insight into their chemical dependency and sustain motivation to continue in recovery  Description: INTERVENTIONS:  - Attends all daily group sessions and scheduled AA groups  - Actively practices coping skills through participation in the therapeutic community and adherence to program rules  - Reviews and completes assignments from individual treatment plan  - Assist patient development of understanding of their personal cycle of addiction and relapse triggers  Outcome: Progressing  Goal: By discharge, patient will have ongoing treatment plan addressing chemical dependency  Description: INTERVENTIONS:  - Assist patient with resources and/or appointments for ongoing recovery based living  Outcome: Progressing     Problem: MOBILITY - ADULT  Goal: Maintain or return to baseline ADL function  Description: INTERVENTIONS:  -  Assess patient's ability to carry out ADLs; assess patient's baseline for ADL function and identify physical deficits which impact ability to perform ADLs (bathing, care of mouth/teeth, toileting, grooming, dressing, etc.)  - Assess/evaluate cause of self-care deficits   - Assess range of motion  - Assess patient's mobility; develop plan if impaired  - Assess patient's need for assistive devices and provide as appropriate  - Encourage maximum independence but intervene and supervise when necessary  - Involve family in performance of ADLs  - Assess for home care needs following discharge   - Consider OT consult to assist with ADL evaluation and planning for discharge  - Provide patient education as appropriate  Outcome: Progressing  Goal: Maintains/Returns to pre admission functional level  Description: INTERVENTIONS:  - Perform BMAT or MOVE assessment daily.   - Set and communicate daily mobility goal to care team and patient/family/caregiver.    - Collaborate with rehabilitation services on mobility goals if consulted  - Out of bed for toileting  - Record patient progress and toleration of activity level   Outcome: Progressing     Problem: CARDIOVASCULAR - ADULT  Goal: Maintains optimal cardiac output and hemodynamic stability  Description: INTERVENTIONS:  - Monitor I/O, vital signs and rhythm  - Monitor for S/S and trends of decreased cardiac output  - Administer and titrate ordered vasoactive medications to optimize hemodynamic stability  - Assess quality of pulses, skin color and temperature  - Assess for signs of decreased coronary artery perfusion  - Instruct patient to report change in severity of symptoms  Outcome: Progressing  Goal: Absence of cardiac dysrhythmias or at baseline rhythm  Description: INTERVENTIONS:  - Continuous cardiac monitoring, vital signs, obtain 12 lead EKG if ordered  - Administer antiarrhythmic and heart rate control medications as ordered  - Monitor electrolytes and administer replacement therapy as ordered  Outcome: Progressing

## 2023-06-26 NOTE — ASSESSMENT & PLAN NOTE
· Patient now in normal sinus rhythm rate controlled  · Cardiology following  · Continue metoprolol tartrate 25 mg every 6 hours  · Monitor on telemetry

## 2023-06-26 NOTE — PROGRESS NOTES
[facility]  Progress Note  Name: Elli Kumar  MRN: [id number]  Unit/Bed#: 7T [facility] 704-01 I Date of Admission: 6/19/2023   Date of Service: 6/26/2023 I Hospital Day: 7    Assessment/Plan   SVT (supraventricular tachycardia) ([address])  Assessment & Plan  · Patient now in normal sinus rhythm rate controlled  · Cardiology following  · Continue metoprolol tartrate 25 mg every 6 hours  · Monitor on telemetry    Cardiomyopathy, 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  Assessment & Plan  · Most recent echocardiogram with ejection fraction of 40% and grade 1 diastolic dysfunction  · Likely secondary to alcohol abuse  · Obtain 2D echocardiogram  · Cardiology following ; appreciate recommendations  · Continue beta-blocker as above    Paroxysmal atrial fibrillation (HCC)  Assessment & Plan  · Currently in normal sinus rhythm  · Continue beta-blocker as above  · Xarelto for CVA prophylaxis    Hyponatremia  Assessment & Plan  · Likely beer potomania versus SIADH  · Continue fluid restriction 1500 cc/day  · Trend BMP  · Avoid rapid overcorrection    Essential hypertension  Assessment & Plan  · Blood pressures appear stable  · Continue losartan and beta-blocker  · Monitor blood pressures    COPD (chronic obstructive pulmonary disease) (HCC)  Assessment & Plan  · Not in acute exacerbation  · Continue home inhalers    Tobacco abuse  Assessment & Plan  · Nicotine patch while inpatient  · Encouraged tobacco abuse cessation         VTE Pharmacologic Prophylaxis: Xarelto    Patient Centered Rounds:  Patient care rounds were performed with nursing    Discussions with Specialists or Other Care Team Provider: Cardiology, nephrology    Education and Discussions with Family / Patient: Spoke with patient at bedside    Time Spent for Care: 1 hour. More than 50% of total time spent on counseling and coordination of care as described above.     Current Length of Stay: 7 day(s)    Current Patient Status: Inpatient     Certification Statement: The patient will continue to require additional inpatient hospital stay due to SVT    Discharge Plan: Home with family support once medically stable    Code Status: Level 1 - Full Code      Subjective:   Patient seen and examined at bedside. No acute events overnight. Denies chest pain, SOB, diaphoresis, nausea/vomiting/diarrhea, fevers/chills. Objective:     Vitals:   Temp (24hrs), Av.1 °F (36.2 °C), Min:96.7 °F (35.9 °C), Max:97.6 °F (36.4 °C)    Temp:  [96.7 °F (35.9 °C)-97.6 °F (36.4 °C)] 97 °F (36.1 °C)  HR:  [] 87  Resp:  [18-20] 18  BP: ()/(60-89) 111/85  SpO2:  [97 %-99 %] 99 %  Body mass index is 22.42 kg/m². Input and Output Summary (last 24 hours): Intake/Output Summary (Last 24 hours) at 2023 0750  Last data filed at 2023 1300  Gross per 24 hour   Intake 600 ml   Output --   Net 600 ml       Physical Exam:     Physical Exam  Vitals and nursing note reviewed. Constitutional:       General: He is not in acute distress. Appearance: He is well-developed. HENT:      Head: Normocephalic and atraumatic. Eyes:      Conjunctiva/sclera: Conjunctivae normal.   Cardiovascular:      Rate and Rhythm: Normal rate and regular rhythm. Heart sounds: No murmur heard. Pulmonary:      Effort: Pulmonary effort is normal. No respiratory distress. Breath sounds: Normal breath sounds. Abdominal:      Palpations: Abdomen is soft. Tenderness: There is no abdominal tenderness. Musculoskeletal:         General: No swelling. Cervical back: Neck supple. Skin:     General: Skin is warm and dry. Capillary Refill: Capillary refill takes less than 2 seconds. Neurological:      Mental Status: He is alert. Psychiatric:         Mood and Affect: Mood normal.         Additional Data:     Labs:  I have reviewed pertinent results     Results from last 7 days   Lab Units 23  0508 23  0552 23  0600   WBC Thousand/uL 11.45*   < > 7.95 HEMOGLOBIN g/dL 12.9   < > 11.8*   HEMATOCRIT % 39.2   < > 35.0*   PLATELETS Thousands/uL 387   < > 273   NEUTROS PCT %  --   --  61   LYMPHS PCT %  --   --  18   MONOS PCT %  --   --  17*   EOS PCT %  --   --  2    < > = values in this interval not displayed. Results from last 7 days   Lab Units 06/25/23  0508 06/24/23  0552 06/23/23  0600   SODIUM mmol/L 130*   < > 127*   POTASSIUM mmol/L 4.0   < > 3.8   CHLORIDE mmol/L 98   < > 96   CO2 mmol/L 23   < > 24   BUN mg/dL 14   < > 8   CREATININE mg/dL 0.64   < > 0.49*   ANION GAP mmol/L 9   < > 7   CALCIUM mg/dL 8.7   < > 8.6   ALBUMIN g/dL  --   --  3.5   TOTAL BILIRUBIN mg/dL  --   --  0.31   ALK PHOS U/L  --   --  51   ALT U/L  --   --  7   AST U/L  --   --  15   GLUCOSE RANDOM mg/dL 114   < > 93    < > = values in this interval not displayed. Results from last 7 days   Lab Units 06/24/23  2204   POC GLUCOSE mg/dl 163*         Results from last 7 days   Lab Units 06/25/23  0614 06/25/23  0508   LACTIC ACID mmol/L 0.8  --    PROCALCITONIN ng/ml  --  0.09         Imaging: I have reviewed pertinent imaging       Recent Cultures (last 7 days):     Results from last 7 days   Lab Units 06/25/23  0615   BLOOD CULTURE  Received in Microbiology Lab. Culture in Progress. Received in Microbiology Lab. Culture in Progress.        Last 24 Hours Medication List:   Current Facility-Administered Medications   Medication Dose Route Frequency Provider Last Rate   • acetaminophen  650 mg Oral Q6H PRN Gianna Meadows MD     • albuterol  2 puff Inhalation Q4H PRN Talita Reid PA-C     • aluminum-magnesium hydroxide-simethicone  30 mL Oral Q4H PRN Rex Bains PA-C     • aspirin  81 mg Oral Daily Talita Reid PA-C     • bisacodyl  5 mg Oral Daily PRN Lynne Haq PA-C     • folic acid  1 mg Oral Daily Allison Ricci MD     • losartan  25 mg Oral Daily Talita Reid PA-C     • metoprolol  5 mg Intravenous Once PRN Michael Landers PA-C     • metoprolol tartrate  25 mg Oral Q6H Rolan Eaton MD     • multivitamin-minerals  1 tablet Oral Daily Gianna Milner MD     • nicotine  1 patch Transdermal Daily Alfonso Howell MD     • ondansetron  4 mg Intravenous Q6H PRN Gianna Milner MD     • rivaroxaban  20 mg Oral Daily With Breakfast Talita Evangelista PA-C     • thiamine  100 mg Oral Daily Lori Cho          Today, Patient Was Seen By: Brandon DO    ** Please Note: Dictation voice to text software may have been used in the creation of this document.  **

## 2023-06-26 NOTE — PROGRESS NOTES
NEPHROLOGY PROGRESS NOTE   Nic Ayala 59 y.o. male MRN: [id number]  Unit/Bed#: 7T [facility] 704-01 Encounter: [id number]        ASSESSMENT & PLAN    #1  Acute on chronic hyponatremia  • His sodium level did drop from 133-130  • He had a high urine osmolality with a high urine sodium  • Likely secondary to SIADH  • Continue fluid restriction for now  • Follow-up on echocardiogram based on EF we will either plan salt tablets or maintenance Lasix  • Clinically no acute signs of volume overload    #2  Hypertension  • In the setting of cardiomyopathy  • He had recent SVT   • He is on losartan which can contribute to hyponatremia  • Metoprolol XL 50 mg 2 times a day    #3  Recent SVT  • With vagal maneuvers and beta-blockade patient's heart rate has been better  • Ongoing work-up per slim    #4  History of alcohol abuse  • Ongoing efforts with alcohol cessation    DISCUSSION:    Consider maintenance diuretic versus salt tablets pending echocardiogram    SUBJECTIVE:    The patient was seen today  Gets lightheaded when he stands up for long periods of time  Tolerating p.o. intake  Urinating without difficulty    12 point review of systems was otherwise negative besides what is mentioned above.     Medications:    Current Facility-Administered Medications:   •  acetaminophen (TYLENOL) tablet 650 mg, 650 mg, Oral, Q6H PRN, Gianna Cisneros MD, 650 mg at 06/22/23 0324  •  albuterol (PROVENTIL HFA,VENTOLIN HFA) inhaler 2 puff, 2 puff, Inhalation, Q4H PRN, Meghan Harles Brittle, PA-C  •  aluminum-magnesium hydroxide-simethicone (MYLANTA) oral suspension 30 mL, 30 mL, Oral, Q4H PRN, Jayson Dillon PA-C, 30 mL at 06/24/23 0014  •  aspirin (ECOTRIN LOW STRENGTH) EC tablet 81 mg, 81 mg, Oral, Daily, Meghan Harles Brittle, PA-C, 81 mg at 06/26/23 5606  •  bisacodyl (DULCOLAX) EC tablet 5 mg, 5 mg, Oral, Daily PRN, Joesph Guzmán PA-C, 5 mg at 45/63/67 4540  •  folic acid (FOLVITE) tablet 1 mg, 1 mg, Oral, Daily, Gianna Sergey Hi MD, 1 mg at 06/26/23 8916  •  losartan (COZAAR) tablet 25 mg, 25 mg, Oral, Daily, Talita Hill PA-C, 25 mg at 06/26/23 1368  •  metoprolol (LOPRESSOR) injection 5 mg, 5 mg, Intravenous, Once PRN, Gaurav Pena PA-C  •  metoprolol tartrate (LOPRESSOR) tablet 25 mg, 25 mg, Oral, Q6H, Richy Vizcaino MD, 25 mg at 06/26/23 0858  •  multivitamin-minerals (CENTRUM) tablet 1 tablet, 1 tablet, Oral, Daily, Gianna Hi MD, 1 tablet at 06/26/23 0858  •  nicotine (NICODERM CQ) 21 mg/24 hr TD 24 hr patch 1 patch, 1 patch, Transdermal, Daily, Gianna Hi MD, 1 patch at 06/26/23 0858  •  ondansetron (ZOFRAN) injection 4 mg, 4 mg, Intravenous, Q6H PRN, Petty Zhang MD, 4 mg at 06/21/23 5837  •  rivaroxaban (XARELTO) tablet 20 mg, 20 mg, Oral, Daily With Breakfast, Talita Hill PA-C, 20 mg at 06/26/23 5917  •  thiamine tablet 100 mg, 100 mg, Oral, Daily, Maureen Richardson PA-C, 100 mg at 06/26/23 0858    OBJECTIVE:    Vitals:    06/26/23 0305 06/26/23 0713 06/26/23 0857 06/26/23 1430   BP: 137/88 111/85 117/79 117/79   BP Location: Right arm Right arm Right arm    Pulse: 84 87 86 70   Resp: 18 18     Temp: 97.6 °F (36.4 °C) (!) 97 °F (36.1 °C)     TempSrc: Temporal Temporal     SpO2: 98% 99%     Weight:    62.6 kg (138 lb)   Height:    5' 6" (1.676 m)        Temp:  [96.7 °F (35.9 °C)-97.6 °F (36.4 °C)] 97 °F (36.1 °C)  HR:  [] 70  Resp:  [18-20] 18  BP: ()/(60-89) 117/79  SpO2:  [97 %-99 %] 99 %     Body mass index is 22.27 kg/m². Weight (last 2 days)     Date/Time Weight    06/26/23 1430 62.6 (138)    06/25/23 1100 63 (138.89)          I/O last 3 completed shifts:   In: 600 [P.O.:600]  Out: -     I/O this shift:  In: 360 [P.O.:360]  Out: -       Physical exam:    General: no acute distress, cooperative  Eyes: conjunctivae pink, anicteric sclerae  ENT: lips and mucous membranes moist, no exudates, normal external ears  Neck: ROM intact, no JVD  Chest: No respiratory distress, no accessory muscle use  CVS: normal rate, non pericardial friction rub  Abdomen: Distended abdomen  Extremities: no edema of both legs  Skin: no rash  Neuro: awake, alert, oriented, grossly intact  Psych:  Pleasant affect    Invasive Devices:      Lab Results:   Results from last 7 days   Lab Units 06/26/23  0854 06/25/23  0631 06/25/23  0615 06/25/23  0508 06/24/23  2211 06/24/23  0552 06/23/23  0600 06/22/23  0444 06/21/23  1436 06/20/23  0333   WBC Thousand/uL 10.56*  --   --  11.45*  --  8.87 7.95  --  7.27 8.70   HEMOGLOBIN g/dL 12.9  --   --  12.9  --  12.6 11.8*  --  12.4 13.0   HEMATOCRIT % 39.8  --   --  39.2  --  36.4* 35.0*  --  38.0 38.3   PLATELETS Thousands/uL 429*  --   --  387  --  308 273  --  232 187   POTASSIUM mmol/L 4.2  --   --  4.0 4.0 3.9 3.8 4.0 3.6 3.4*   CHLORIDE mmol/L 95*  --   --  98 95* 95* 96 99 96 90*   CO2 mmol/L 26  --   --  23 24 26 24 28 28 29   BUN mg/dL 13  --   --  14 14 9 8 10 9 4*   CREATININE mg/dL 0.64  --   --  0.64 0.76 0.54* 0.49* 0.57* 0.70 0.48*   CALCIUM mg/dL 9.8  --   --  8.7 9.7 8.9 8.6 8.7 8.6 9.2   MAGNESIUM mg/dL  --   --   --  3.2* 1.6* 1.9 1.5* 1.5*  --  1.1*   PHOSPHORUS mg/dL  --   --   --   --  3.2  --   --   --   --   --    ALK PHOS U/L  --   --   --   --   --   --  51  --  56 49   ALT U/L  --   --   --   --   --   --  7  --  9 12   AST U/L  --   --   --   --   --   --  15  --  22 45*   BLOOD CULTURE   --   --  Received in Microbiology Lab. Culture in Progress. Received in Microbiology Lab. Culture in Progress. --   --   --   --   --   --   --    NITRITE UA   --  Negative  --   --   --   --   --   --   --   --    LEUKOCYTES UA   --  Negative  --   --   --   --   --   --   --   --    BLOOD UA   --  10.0*  --   --   --   --   --   --   --   --          Portions of the record may have been created with voice recognition software.  Occasional wrong word or "sound a like" substitutions may have occurred due to the inherent limitations of voice recognition software. Read the chart carefully and recognize, using context, where substitutions have occurred. If you have any questions, please contact the dictating provider.

## 2023-06-26 NOTE — CASE MANAGEMENT
Case Management Discharge Planning Note    Patient name Marie Arnold  Location 7T /7T -01 MRN [id number]  : 1965 Date 2023       Current Admission Date: 2023  Current Admission Diagnosis:Paroxysmal atrial fibrillation [facility])   Patient Active Problem List    Diagnosis Date Noted   • SVT (supraventricular tachycardia) ([address]) 2023   • Hyponatremia 2023   • Alcohol use disorder, severe, dependence ([address]) 2023   • Aneurysm ([address]) 10/03/2022   • Combined systolic and diastolic cardiac dysfunction 2022   • Essential hypertension 2022   • Alcohol abuse 2022   • Anticoagulant long-term use 2022   • Poor dental hygiene 2022   • Noncompliance 2022   • Brain aneurysm 2022   • COPD (chronic obstructive pulmonary disease) ([address]) 08/10/2022   • Hepatic steatosis 2022   • Alcohol intoxication ([address])    • Paroxysmal atrial fibrillation ([address]) 2017   • Tobacco abuse 2017   • Atrial flutter ([address]) 06/10/2016   • Cardiomyopathy, 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 06/10/2016      LOS (days): 7  Geometric Mean LOS (GMLOS) (days):   Days to GMLOS:     OBJECTIVE:  Risk of Unplanned Readmission Score: 30.62         Current admission status: Inpatient   Preferred Pharmacy:   [facility]/pharmacy #[id number] Juliane Fernandez, [address] [place] [address]  Phone: [phone] Fax: [phone]    Primary Care Provider: Tanvi Bobo MD    Primary Insurance: [facility]  Secondary Insurance: [employer]havna GEE [facility] [place]    DISCHARGE DETAILS:     CM was notified by attending that pt is interested in IP rehab for ETOH abuse. CM was also made aware that he currently lives in [address] [place] [address] 18 has [employer]. The [place] insurance is secondary to his [employer]. CM called [facility] who stated they will take the referral anyway & look into his insurance. CM to wait to hear back from [facility]. CM to continue to follow pt care & d/c.

## 2023-06-26 NOTE — ASSESSMENT & PLAN NOTE
· Most recent echocardiogram with ejection fraction of 40% and grade 1 diastolic dysfunction  · Likely secondary to alcohol abuse  · Obtain 2D echocardiogram  · Cardiology following ; appreciate recommendations  · Continue beta-blocker as above

## 2023-06-26 NOTE — ASSESSMENT & PLAN NOTE
· Likely beer potomania versus SIADH  · Continue fluid restriction 1500 cc/day  · Trend BMP  · Avoid rapid overcorrection

## 2023-06-26 NOTE — PROGRESS NOTES
Cardiology Progress Note - Nic Ayala 59 y.o. male MRN: [id number]    Unit/Bed#: 7T [facility] 704-01 Encounter: [id number]      Assessment & Plan:    SVT (supraventricular tachycardia) ([address])  -Patient had SVT with rates in the 130s to 150s on the night of 6/25 into 6/26  - Spontaneously converted back to sinus rhythm  - Tested positive for influenza B  - Troponins negative x3  - On Toprol-XL 50 mg daily at home, changed to Lopressor 25 mg every 6 hours    Cardiomyopathy, 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  - TTE 10//2022 showed EF 40%, grade 1 DD, mildly reduced RV function, mild JACKIE, mild to moderate PI  - TTE 9/19/2022 showed EF 35%  - TTE 7/13/2022 showed EF 25 to 30%  - Pending repeat TTE  - Not on diuretics currently    Alcohol abuse  - Patient admitted on 6/[facility] to [facility] detox for alcohol withdrawal    Paroxysmal atrial fibrillation (HCC)  - Anticoagulated on Xarelto 20 mg daily  - Rate control with Lopressor 25 mg every 6 hours currently    Essential hypertension  - Currently on losartan 25 mg daily, and Lopressor 25 mg every 6 hours    Tobacco abuse    COPD (chronic obstructive pulmonary disease) (HCC)    Hyponatremia    Summary:  -will change Lopressor 25 mg every 6 hours to Toprol-XL 50 mg twice daily  - Pending TTE, given patient's persistent alcohol use, do not expect an improvement in the patient's EF  - appears euvolemic  - Otherwise stable from a cardiac standpoint    Subjective:   No significant events overnight. Patient reports feeling well this afternoon, but does knowledge some abdominal discomfort. Denies chest pain, shortness of breath, orthopnea, nausea, vomiting, fever, chills, headache, dizziness or palpitations. Objective:     Vitals: Blood pressure 117/79, pulse 86, temperature (!) 97 °F (36.1 °C), temperature source Temporal, resp. rate 18, height 5' 6" (1.676 m), weight 63 kg (138 lb 14.2 oz), SpO2 99 %. , Body mass index is 22.42 kg/m².,   Orthostatic Blood Pressures    Flowsheet Row Most Recent Value   Blood Pressure 117/79 filed at 06/26/2023 6909   Patient Position - Orthostatic VS Lying filed at 06/26/2023 0857            Intake/Output Summary (Last 24 hours) at 6/26/2023 1302  Last data filed at 6/26/2023 0900  Gross per 24 hour   Intake 360 ml   Output --   Net 360 ml           Physical Exam:    GEN: Partickenny Galarza appears well, alert and oriented x 3, pleasant and cooperative, + diaphoretic  HEENT: Mucous membranes moist, no scleral icterus, no conjunctival pallor  NECK: No elevated JVD  HEART: Regular rate and rhythm, normal S1 and S2, 2/6 systolic murmur  LUNGS: clear to auscultation bilaterally; no wheezes, rales, or rhonchi   ABDOMEN: normal bowel sounds, soft,+ tenderness, no distention  EXTREMITIES: peripheral pulses normal; no lower extremity edema   NEURO: no focal findings   SKIN: No lesions or rashes on exposed skin        Current Facility-Administered Medications:   •  acetaminophen (TYLENOL) tablet 650 mg, 650 mg, Oral, Q6H PRN, Gianna Cisneros MD, 650 mg at 06/22/23 0324  •  albuterol (PROVENTIL HFA,VENTOLIN HFA) inhaler 2 puff, 2 puff, Inhalation, Q4H PRN, Talita Cruz PA-C  •  aluminum-magnesium hydroxide-simethicone (MYLANTA) oral suspension 30 mL, 30 mL, Oral, Q4H PRN, Anat Ceja PA-C, 30 mL at 06/24/23 0014  •  aspirin (ECOTRIN LOW STRENGTH) EC tablet 81 mg, 81 mg, Oral, Daily, Talita Cruz PA-C, 81 mg at 06/26/23 0769  •  bisacodyl (DULCOLAX) EC tablet 5 mg, 5 mg, Oral, Daily PRN, Prisca Guzmán PA-C, 5 mg at 52/18/19 6809  •  folic acid (FOLVITE) tablet 1 mg, 1 mg, Oral, Daily, Gianna Cisneros MD, 1 mg at 06/26/23 0858  •  losartan (COZAAR) tablet 25 mg, 25 mg, Oral, Daily, Talita Cruz PA-C, 25 mg at 06/26/23 6597  •  metoprolol (LOPRESSOR) injection 5 mg, 5 mg, Intravenous, Once PRN, Thad Dancer, PA-C  •  metoprolol tartrate (LOPRESSOR) tablet 25 mg, 25 mg, Oral, Q6H, Tania Blanco MD, 25 mg at 06/26/23 0858  •  multivitamin-minerals (CENTRUM) tablet 1 tablet, 1 tablet, Oral, Daily, Gianna Cisneros MD, 1 tablet at 06/26/23 0858  •  nicotine (NICODERM CQ) 21 mg/24 hr TD 24 hr patch 1 patch, 1 patch, Transdermal, Daily, Gianna Swanson MD, 1 patch at 06/26/23 0858  •  ondansetron (ZOFRAN) injection 4 mg, 4 mg, Intravenous, Q6H PRN, Gianna Swanson MD, 4 mg at 06/21/23 3255  •  rivaroxaban (XARELTO) tablet 20 mg, 20 mg, Oral, Daily With Breakfast, Talita Winter PA-C, 20 mg at 06/26/23 4495  •  thiamine tablet 100 mg, 100 mg, Oral, Daily, Darío Maldonado PA-C, 100 mg at 06/26/23 8354    Labs & Results:    Lab Results   Component Value Date    TROPONINI <0.02 06/06/2018    TROPONINI <0.02 06/06/2018    TROPONINI <0.02 09/23/2017       Lab Results   Component Value Date    CALCIUM 9.8 06/26/2023    K 4.2 06/26/2023    CO2 26 06/26/2023    CL 95 (L) 06/26/2023    BUN 13 06/26/2023    CREATININE 0.64 06/26/2023       Lab Results   Component Value Date    WBC 10.56 (H) 06/26/2023    HGB 12.9 06/26/2023    HCT 39.8 06/26/2023    MCV 97 06/26/2023     (H) 06/26/2023           No results found for: "CHOL"  Lab Results   Component Value Date    HDL 95 07/10/2022     04/16/2022     Lab Results   Component Value Date    LDLCALC 49 07/10/2022    LDLCALC 90 04/16/2022     Lab Results   Component Value Date    TRIG 44 07/10/2022    TRIG 52 04/16/2022       Lab Results   Component Value Date    ALT 7 06/23/2023    AST 15 06/23/2023    ALKPHOS 51 06/23/2023         EKG personally reviewed by )Shobha Ortiz MD. No acute changes   TELE: No significant arrhythmias seen on telemetry review.

## 2023-06-26 NOTE — PLAN OF CARE
Problem: SAFETY ADULT  Goal: Patient will remain free of falls  Description: INTERVENTIONS:  - Educate patient/family on patient safety including physical limitations  - Instruct patient to call for assistance with activity   - Consult OT/PT to assist with strengthening/mobility   - Keep Call bell within reach  - Keep bed low and locked with side rails adjusted as appropriate  - Keep care items and personal belongings within reach  - Initiate and maintain comfort rounds  - Make Fall Risk Sign visible to staff  - Offer Toileting every  Hours, in advance of need  - Initiate/Maintain alarm  - Obtain necessary fall risk management equipment:   - Apply yellow socks and bracelet for high fall risk patients  - Consider moving patient to room near nurses station  6/25/2023 2022 by Cristela Ahn RN  Outcome: Progressing  6/25/2023 2020 by Cristela Ahn RN  Outcome: Progressing     Problem: DISCHARGE PLANNING  Goal: Discharge to home or other facility with appropriate resources  Description: INTERVENTIONS:  - Identify barriers to discharge w/patient and caregiver  - Arrange for needed discharge resources and transportation as appropriate  - Identify discharge learning needs (meds, wound care, etc.)  - Arrange for interpretive services to assist at discharge as needed  - Refer to Case Management Department for coordinating discharge planning if the patient needs post-hospital services based on physician/advanced practitioner order or complex needs related to functional status, cognitive ability, or social support system  6/25/2023 2022 by Cristela Ahn RN  Outcome: Progressing  6/25/2023 2020 by Cristela Ahn RN  Outcome: Progressing     Problem: Knowledge Deficit  Goal: Patient/family/caregiver demonstrates understanding of disease process, treatment plan, medications, and discharge instructions  Description: Complete learning assessment and assess knowledge base.   Interventions:  - Provide teaching at level of understanding  - Provide teaching via preferred learning methods  6/25/2023 2022 by Nataliya Monreal RN  Outcome: Progressing  6/25/2023 2020 by Nataliya Monreal RN  Outcome: Progressing     Problem: CARDIOVASCULAR - ADULT  Goal: Maintains optimal cardiac output and hemodynamic stability  Description: INTERVENTIONS:  - Monitor I/O, vital signs and rhythm  - Monitor for S/S and trends of decreased cardiac output  - Administer and titrate ordered vasoactive medications to optimize hemodynamic stability  - Assess quality of pulses, skin color and temperature  - Assess for signs of decreased coronary artery perfusion  - Instruct patient to report change in severity of symptoms  Outcome: Progressing     Problem: CARDIOVASCULAR - ADULT  Goal: Absence of cardiac dysrhythmias or at baseline rhythm  Description: INTERVENTIONS:  - Continuous cardiac monitoring, vital signs, obtain 12 lead EKG if ordered  - Administer antiarrhythmic and heart rate control medications as ordered  - Monitor electrolytes and administer replacement therapy as ordered  Outcome: Progressing

## 2023-06-27 LAB
ANION GAP SERPL CALCULATED.3IONS-SCNC: 10 MMOL/L
BASOPHILS # BLD AUTO: 0.13 THOUSANDS/ÂΜL (ref 0–0.1)
BASOPHILS NFR BLD AUTO: 1 % (ref 0–1)
BUN SERPL-MCNC: 21 MG/DL (ref 5–25)
CALCIUM SERPL-MCNC: 10.3 MG/DL (ref 8.4–10.2)
CHLORIDE SERPL-SCNC: 96 MMOL/L (ref 96–108)
CO2 SERPL-SCNC: 27 MMOL/L (ref 21–32)
CREAT SERPL-MCNC: 0.72 MG/DL (ref 0.6–1.3)
EOSINOPHIL # BLD AUTO: 0.15 THOUSAND/ÂΜL (ref 0–0.61)
EOSINOPHIL NFR BLD AUTO: 2 % (ref 0–6)
ERYTHROCYTE [DISTWIDTH] IN BLOOD BY AUTOMATED COUNT: 12.8 % (ref 11.6–15.1)
GFR SERPL CREATININE-BSD FRML MDRD: 101 ML/MIN/1.73SQ M
GLUCOSE SERPL-MCNC: 86 MG/DL (ref 65–140)
HCT VFR BLD AUTO: 39.5 % (ref 36.5–49.3)
HGB BLD-MCNC: 12.9 G/DL (ref 12–17)
IMM GRANULOCYTES # BLD AUTO: 0.14 THOUSAND/UL (ref 0–0.2)
IMM GRANULOCYTES NFR BLD AUTO: 1 % (ref 0–2)
LYMPHOCYTES # BLD AUTO: 1.72 THOUSANDS/ÂΜL (ref 0.6–4.47)
LYMPHOCYTES NFR BLD AUTO: 18 % (ref 14–44)
MAGNESIUM SERPL-MCNC: 1.7 MG/DL (ref 1.9–2.7)
MCH RBC QN AUTO: 31.9 PG (ref 26.8–34.3)
MCHC RBC AUTO-ENTMCNC: 32.7 G/DL (ref 31.4–37.4)
MCV RBC AUTO: 98 FL (ref 82–98)
MONOCYTES # BLD AUTO: 2.03 THOUSAND/ÂΜL (ref 0.17–1.22)
MONOCYTES NFR BLD AUTO: 21 % (ref 4–12)
NEUTROPHILS # BLD AUTO: 5.64 THOUSANDS/ÂΜL (ref 1.85–7.62)
NEUTS SEG NFR BLD AUTO: 57 % (ref 43–75)
NRBC BLD AUTO-RTO: 0 /100 WBCS
PHOSPHATE SERPL-MCNC: 5.5 MG/DL (ref 2.3–4.1)
PLATELET # BLD AUTO: 475 THOUSANDS/UL (ref 149–390)
PMV BLD AUTO: 8.9 FL (ref 8.9–12.7)
POTASSIUM SERPL-SCNC: 4.1 MMOL/L (ref 3.5–5.3)
RBC # BLD AUTO: 4.05 MILLION/UL (ref 3.88–5.62)
SODIUM SERPL-SCNC: 133 MMOL/L (ref 135–147)
WBC # BLD AUTO: 9.81 THOUSAND/UL (ref 4.31–10.16)

## 2023-06-27 PROCEDURE — 83735 ASSAY OF MAGNESIUM: CPT | Performed by: INTERNAL MEDICINE

## 2023-06-27 PROCEDURE — 99232 SBSQ HOSP IP/OBS MODERATE 35: CPT | Performed by: INTERNAL MEDICINE

## 2023-06-27 PROCEDURE — 84100 ASSAY OF PHOSPHORUS: CPT | Performed by: INTERNAL MEDICINE

## 2023-06-27 PROCEDURE — 85025 COMPLETE CBC W/AUTO DIFF WBC: CPT | Performed by: INTERNAL MEDICINE

## 2023-06-27 PROCEDURE — 80048 BASIC METABOLIC PNL TOTAL CA: CPT | Performed by: INTERNAL MEDICINE

## 2023-06-27 RX ORDER — FUROSEMIDE 20 MG/1
20 TABLET ORAL DAILY
Status: DISCONTINUED | OUTPATIENT
Start: 2023-06-27 | End: 2023-06-27

## 2023-06-27 RX ORDER — FUROSEMIDE 20 MG/1
20 TABLET ORAL DAILY
Status: DISCONTINUED | OUTPATIENT
Start: 2023-06-28 | End: 2023-07-06 | Stop reason: HOSPADM

## 2023-06-27 RX ADMIN — ASPIRIN 81 MG: 81 TABLET, COATED ORAL at 08:54

## 2023-06-27 RX ADMIN — METOPROLOL SUCCINATE 50 MG: 50 TABLET, EXTENDED RELEASE ORAL at 17:33

## 2023-06-27 RX ADMIN — FOLIC ACID 1 MG: 1 TABLET ORAL at 08:54

## 2023-06-27 RX ADMIN — RIVAROXABAN 20 MG: 20 TABLET, FILM COATED ORAL at 08:54

## 2023-06-27 RX ADMIN — NICOTINE 1 PATCH: 21 PATCH, EXTENDED RELEASE TRANSDERMAL at 08:54

## 2023-06-27 RX ADMIN — THIAMINE HCL TAB 100 MG 100 MG: 100 TAB at 08:54

## 2023-06-27 RX ADMIN — METOPROLOL SUCCINATE 50 MG: 50 TABLET, EXTENDED RELEASE ORAL at 08:54

## 2023-06-27 RX ADMIN — MULTIPLE VITAMINS W/ MINERALS TAB 1 TABLET: TAB ORAL at 08:54

## 2023-06-27 RX ADMIN — LOSARTAN POTASSIUM 25 MG: 25 TABLET, FILM COATED ORAL at 08:54

## 2023-06-27 RX ADMIN — FUROSEMIDE 20 MG: 20 TABLET ORAL at 16:01

## 2023-06-27 NOTE — ASSESSMENT & PLAN NOTE
· Likely beer potomania versus SIADH  · Continue fluid restriction 1500 cc/day  · Nephrologist on board recommended p.o.  Lasix 20 mg in the setting of cardiomyopathy  · Repeat BMP tomorrow  · Trend BMP  · Avoid rapid overcorrection

## 2023-06-27 NOTE — PROGRESS NOTES
NEPHROLOGY PROGRESS NOTE   Nic Ayala 59 y.o. male MRN: [id number]  Unit/Bed#: 7T [facility] 704-01 Encounter: [id number]        ASSESSMENT & PLAN     #1  Acute on chronic hyponatremia  • Sodium level now improved up to 133  • He had a high urine osmolality with a high urine sodium  • Likely secondary to SIADH  • Continue fluid restriction for now  • Echocardiogram shows EF of 45%  • Clinically no acute signs of volume overload  • We will plan on 20 mg of p.o. Lasix to help his hyponatremia in the setting of cardiomyopathy, repeat BMP tomorrow     #2  Hypertension  • In the setting of cardiomyopathy  • He had recent SVT   • He is on losartan which can contribute to hyponatremia  • Metoprolol XL 50 mg 2 times a day  • His blood pressures are stable and he should be able to tolerate a low-dose of a loop diuretic     #3  Recent SVT  • With vagal maneuvers and beta-blockade patient's heart rate has been better  • Ongoing work-up per slim  • Overall this is stable and he is looking at going into acute inpatient alcohol rehab     #4  History of alcohol abuse  • Ongoing efforts with alcohol cessation  • Potential to go to inpatient rehab for alcohol cessation    DISCUSSION:    Discussed with Dr. Dave Martinez and we were in agreement with adding Lasix and repeating BMP tomorrow    SUBJECTIVE:    The patient was seen today  Resting comfortably with no acute complaints  Tolerating medications without difficulty    12 point review of systems was otherwise negative besides what is mentioned above.     Medications:    Current Facility-Administered Medications:   •  acetaminophen (TYLENOL) tablet 650 mg, 650 mg, Oral, Q6H PRN, Gianna Cisneros MD, 650 mg at 06/26/23 2007  •  albuterol (PROVENTIL HFA,VENTOLIN HFA) inhaler 2 puff, 2 puff, Inhalation, Q4H PRN, Meghan Bethanne Ormond, PA-C  •  aluminum-magnesium hydroxide-simethicone (MYLANTA) oral suspension 30 mL, 30 mL, Oral, Q4H PRN, Oscar Sol PA-C, 30 mL at 06/24/23 0014  • aspirin (ECOTRIN LOW STRENGTH) EC tablet 81 mg, 81 mg, Oral, Daily, Talita Winter, PA-C, 81 mg at 06/27/23 2681  •  bisacodyl (DULCOLAX) EC tablet 5 mg, 5 mg, Oral, Daily PRN, Melinda Guzmán, PA-C, 5 mg at 19/48/49 5770  •  folic acid (FOLVITE) tablet 1 mg, 1 mg, Oral, Daily, Gianna Swanson MD, 1 mg at 06/27/23 3115  •  furosemide (LASIX) tablet 20 mg, 20 mg, Oral, Daily, Dionne Burnham DO  •  losartan (COZAAR) tablet 25 mg, 25 mg, Oral, Daily, TAY Kwong-C, 25 mg at 06/27/23 0215  •  metoprolol (LOPRESSOR) injection 5 mg, 5 mg, Intravenous, Once PRN, Luke Chacon PA-C  •  metoprolol succinate (TOPROL-XL) 24 hr tablet 50 mg, 50 mg, Oral, BID, Shobha Ortiz MD, 50 mg at 06/27/23 0854  •  multivitamin-minerals (CENTRUM) tablet 1 tablet, 1 tablet, Oral, Daily, Gianna Swanson MD, 1 tablet at 06/27/23 0854  •  nicotine (NICODERM CQ) 21 mg/24 hr TD 24 hr patch 1 patch, 1 patch, Transdermal, Daily, Gianna Swanson MD, 1 patch at 06/27/23 0854  •  ondansetron (ZOFRAN) injection 4 mg, 4 mg, Intravenous, Q6H PRN, Cher Castellanos MD, 4 mg at 06/21/23 9000  •  rivaroxaban (XARELTO) tablet 20 mg, 20 mg, Oral, Daily With Breakfast, TAY Kwong-C, 20 mg at 06/27/23 7405  •  thiamine tablet 100 mg, 100 mg, Oral, Daily, Darío Maldonado PA-C, 100 mg at 06/27/23 0854    OBJECTIVE:    Vitals:    06/26/23 1430 06/26/23 1539 06/26/23 2307 06/27/23 0736   BP: 117/79 100/65 100/65 115/68   BP Location:  Right arm Left arm Left arm   Pulse: 70 78 68 70   Resp:  18 18 18   Temp:  (!) 96.9 °F (36.1 °C) (!) 96.8 °F (36 °C) (!) 97 °F (36.1 °C)   TempSrc:  Temporal Temporal Temporal   SpO2:  98% 98% 99%   Weight: 62.6 kg (138 lb)      Height: 5' 6" (1.676 m)           Temp:  [96.8 °F (36 °C)-97 °F (36.1 °C)] 97 °F (36.1 °C)  HR:  [68-78] 70  Resp:  [18] 18  BP: (100-117)/(65-79) 115/68  SpO2:  [98 %-99 %] 99 %     Body mass index is 22.27 kg/m². Weight (last 2 days)     Date/Time Weight    06/26/23 1430 62.6 (138)    06/25/23 1100 63 (138.89)          I/O last 3 completed shifts:   In: 1320 [P.O.:1320]  Out: -     I/O this shift:  In: 245 [P.O.:245]  Out: -       Physical exam:    General: no acute distress, cooperative  Eyes: conjunctivae pink, anicteric sclerae  ENT: lips and mucous membranes moist, no exudates, normal external ears  Neck: ROM intact, no JVD  Chest: No respiratory distress, no accessory muscle use  CVS: normal rate, non pericardial friction rub  Abdomen: soft, non-tender, non-distended, normoactive bowel sounds  Extremities: no edema of both legs  Skin: no rash  Neuro: awake, alert, oriented, grossly intact  Psych:  Pleasant affect    Invasive Devices:      Lab Results:   Results from last 7 days   Lab Units 06/27/23  0451 06/26/23  0854 06/25/23  0631 06/25/23  0615 06/25/23  0508 06/24/23  2211 06/24/23  0552 06/23/23  0600 06/22/23  0444 06/21/23  1436   WBC Thousand/uL 9.81 10.56*  --   --  11.45*  --  8.87 7.95  --  7.27   HEMOGLOBIN g/dL 12.9 12.9  --   --  12.9  --  12.6 11.8*  --  12.4   HEMATOCRIT % 39.5 39.8  --   --  39.2  --  36.4* 35.0*  --  38.0   PLATELETS Thousands/uL 475* 429*  --   --  387  --  308 273  --  232   POTASSIUM mmol/L 4.1 4.2  --   --  4.0 4.0 3.9 3.8   < > 3.6   CHLORIDE mmol/L 96 95*  --   --  98 95* 95* 96   < > 96   CO2 mmol/L 27 26  --   --  23 24 26 24   < > 28   BUN mg/dL 21 13  --   --  14 14 9 8   < > 9   CREATININE mg/dL 0.72 0.64  --   --  0.64 0.76 0.54* 0.49*   < > 0.70   CALCIUM mg/dL 10.3* 9.8  --   --  8.7 9.7 8.9 8.6   < > 8.6   MAGNESIUM mg/dL 1.7*  --   --   --  3.2* 1.6* 1.9 1.5*   < >  --    PHOSPHORUS mg/dL 5.5*  --   --   --   --  3.2  --   --   --   --    ALK PHOS U/L  --   --   --   --   --   --   --  51  --  56   ALT U/L  --   --   --   --   --   --   --  7  --  9   AST U/L  --   --   --   --   --   --   --  15  --  22   BLOOD CULTURE   --   --   --  No Growth at 24 hrs. No Growth at 24 hrs.  --   --   --   --   --   --    NITRITE UA   --   --  Negative  --   --   --   --   --   --   --    LEUKOCYTES UA   --   --  Negative  --   --   --   --   --   --   --    BLOOD UA   --   --  10.0*  --   --   --   --   --   --   --     < > = values in this interval not displayed. Portions of the record may have been created with voice recognition software. Occasional wrong word or "sound a like" substitutions may have occurred due to the inherent limitations of voice recognition software. Read the chart carefully and recognize, using context, where substitutions have occurred. If you have any questions, please contact the dictating provider.

## 2023-06-27 NOTE — ASSESSMENT & PLAN NOTE
· Most recent echocardiogram with ejection fraction of 40% and grade 1 diastolic dysfunction  · Likely secondary to alcohol abuse  · Echo showed LVEF 45%. Mild global hypokinesia. Grade 1 diastolic dysfunction.   · Cardiology following ; appreciate recommendations  · Continue beta-blocker as above

## 2023-06-27 NOTE — ASSESSMENT & PLAN NOTE
· Patient was initially in medical toxicology unit  · Patient is stable from detox standpoint  · Patient is interested in inpatient alcohol rehab  ·  on board

## 2023-06-27 NOTE — ASSESSMENT & PLAN NOTE
· Patient now in normal sinus rhythm rate controlled  · Cardiology following  · Initially on metoprolol tartrate 25 mg every 6 hours, switch to metoprolol succinate 50 mg twice a day  · Monitor on telemetry

## 2023-06-27 NOTE — PROGRESS NOTES
[facility]  Progress Note  Name: Rhona Sofia  MRN: [id number]  Unit/Bed#: 7T [facility] 704-01 I Date of Admission: 6/19/2023   Date of Service: 6/27/2023 I Hospital Day: 8    Assessment/Plan   * SVT (supraventricular tachycardia) ([address])  Assessment & Plan  · Patient now in normal sinus rhythm rate controlled  · Cardiology following  · Initially on metoprolol tartrate 25 mg every 6 hours, switch to metoprolol succinate 50 mg twice a day  · Monitor on telemetry    Hyponatremia  Assessment & Plan  · Likely beer potomania versus SIADH  · Continue fluid restriction 1500 cc/day  · Nephrologist on board recommended p.o. Lasix 20 mg in the setting of cardiomyopathy  · Repeat BMP tomorrow  · Trend BMP  · Avoid rapid overcorrection    Alcohol use disorder, severe, dependence ([address])  Assessment & Plan  · Patient was initially in medical toxicology unit  · Patient is stable from detox standpoint  · Patient is interested in inpatient alcohol rehab  ·  on board    Essential hypertension  Assessment & Plan  · Blood pressures appear stable  · Continue losartan and beta-blocker  · Monitor blood pressures    COPD (chronic obstructive pulmonary disease) ([address])  Assessment & Plan  · Not in acute exacerbation  · Continue home inhalers    Tobacco abuse  Assessment & Plan  · Nicotine patch while inpatient  · Encouraged tobacco abuse cessation    Paroxysmal atrial fibrillation (HCC)  Assessment & Plan  · Currently in normal sinus rhythm  · Continue beta-blocker as above  · Xarelto for CVA prophylaxis    Cardiomyopathy, 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  Assessment & Plan  · Most recent echocardiogram with ejection fraction of 40% and grade 1 diastolic dysfunction  · Likely secondary to alcohol abuse  · Echo showed LVEF 45%. Mild global hypokinesia. Grade 1 diastolic dysfunction.   · Cardiology following ; appreciate recommendations  · Continue beta-blocker as above         VTE Pharmacologic Prophylaxis: Pharmacologic: Rivaroxaban (Xarelto)  Mechanical VTE Prophylaxis in Place: Yes    Patient Centered Rounds: I have performed bedside rounds with nursing staff today. Discussions with Specialists or Other Care Team Provider: Discussed with nephrologist    Education and Discussions with Family / Patient: Discussed with patient    Time Spent for Care: 35 minutes. This time was spent on one or more of the following: performing physical exam; counseling and coordination of care; obtaining or reviewing history; documenting in the medical record; reviewing/ordering tests, medications or procedures; communicating with other healthcare professionals and discussing with patient's family/caregivers. Current Length of Stay: 8 day(s)    Current Patient Status: Inpatient   Certification Statement: The patient will continue to require additional inpatient hospital stay due to SVT, hyponatremia    Discharge Plan / Estimated Discharge Date: Possibly tomorrow      Code Status: Level 1 - Full Code      Subjective:   Patient was seen and examined at bedside. The patient denies any pain, headache, blurry vision, chest pain, palpitation, shortness of breath, N/V, abd pain. Objective:     Vitals:   Temp (24hrs), Av.9 °F (36.1 °C), Min:96.8 °F (36 °C), Max:97 °F (36.1 °C)    Temp:  [96.8 °F (36 °C)-97 °F (36.1 °C)] 97 °F (36.1 °C)  HR:  [68-78] 70  Resp:  [18] 18  BP: (100-115)/(65-68) 115/68  SpO2:  [98 %-99 %] 99 %  Body mass index is 22.27 kg/m². Input and Output Summary (last 24 hours):        Intake/Output Summary (Last 24 hours) at 2023 1439  Last data filed at 2023 0900  Gross per 24 hour   Intake 965 ml   Output --   Net 965 ml       Physical Exam:     Physical Exam  General: breathing well on room air, no acute distress  HEENT: NC/AT, PERRL, EOM - normal  Neck: Supple  Pulm/Chest: Normal chest wall expansion, clear breath sounds on both side, no wheezing/rhonchi or crackles appreciated  CVS: RRR, normal S1&S2, no murmur appreciated, capillary refill <2s  Abd: soft, non tender, non distended, bowel sounds +  MSK: move all 4 extremities spontaneously  Skin: warm  CNS: no acute focal neuro deficit    Additional Data:     Labs:    Results from last 7 days   Lab Units 06/27/23  0451   WBC Thousand/uL 9.81   HEMOGLOBIN g/dL 12.9   HEMATOCRIT % 39.5   PLATELETS Thousands/uL 475*   NEUTROS PCT % 57   LYMPHS PCT % 18   MONOS PCT % 21*   EOS PCT % 2     Results from last 7 days   Lab Units 06/27/23  0451 06/24/23  0552 06/23/23  0600   POTASSIUM mmol/L 4.1   < > 3.8   CHLORIDE mmol/L 96   < > 96   CO2 mmol/L 27   < > 24   BUN mg/dL 21   < > 8   CREATININE mg/dL 0.72   < > 0.49*   CALCIUM mg/dL 10.3*   < > 8.6   ALK PHOS U/L  --   --  51   ALT U/L  --   --  7   AST U/L  --   --  15    < > = values in this interval not displayed. * I Have Reviewed All Lab Data Listed Above. * Additional Pertinent Lab Tests Reviewed: [address] [facility] [address] Admission Reviewed    Imaging:      I have reviewed pertinent imaging. Recent Cultures (last 7 days):     Results from last 7 days   Lab Units 06/25/23  0615   BLOOD CULTURE  No Growth at 24 hrs. No Growth at 24 hrs.        Last 24 Hours Medication List:   Current Facility-Administered Medications   Medication Dose Route Frequency Provider Last Rate   • acetaminophen  650 mg Oral Q6H PRN Gianna Alexandre MD     • albuterol  2 puff Inhalation Q4H PRN Talita Patel PA-C     • aluminum-magnesium hydroxide-simethicone  30 mL Oral Q4H PRN Isaac Hi PA-C     • aspirin  81 mg Oral Daily Talita Patel PA-C     • bisacodyl  5 mg Oral Daily PRN Jeny Peacock PA-C     • folic acid  1 mg Oral Daily Gianna Alexandre MD     • furosemide  20 mg Oral Daily Teresa Primus, DO     • losartan  25 mg Oral Daily Talita Patel PA-C     • metoprolol  5 mg Intravenous Once PRN Talita Patel PA-C     • metoprolol succinate  50 mg Oral BID Lalita Vee MD     • multivitamin-minerals  1 tablet Oral Daily Gianna Kaiser MD     • nicotine  1 patch Transdermal Daily Abel Matt MD     • ondansetron  4 mg Intravenous Q6H PRN Gianna Kaiser MD     • rivaroxaban  20 mg Oral Daily With Breakfast Talita Roy PA-C     • thiamine  100 mg Oral Daily Luisa Bernal PA-C          Today, Patient Was Seen By: Seth Reyes MD    ** Please Note: Dragon 360 Dictation voice to text software may have been used in the creation of this document.  **

## 2023-06-28 LAB
ANION GAP SERPL CALCULATED.3IONS-SCNC: 11 MMOL/L
BASOPHILS # BLD AUTO: 0.17 THOUSANDS/ÂΜL (ref 0–0.1)
BASOPHILS NFR BLD AUTO: 2 % (ref 0–1)
BUN SERPL-MCNC: 21 MG/DL (ref 5–25)
CALCIUM SERPL-MCNC: 10.2 MG/DL (ref 8.4–10.2)
CHLORIDE SERPL-SCNC: 95 MMOL/L (ref 96–108)
CO2 SERPL-SCNC: 27 MMOL/L (ref 21–32)
CREAT SERPL-MCNC: 0.59 MG/DL (ref 0.6–1.3)
EOSINOPHIL # BLD AUTO: 0.1 THOUSAND/ÂΜL (ref 0–0.61)
EOSINOPHIL NFR BLD AUTO: 1 % (ref 0–6)
ERYTHROCYTE [DISTWIDTH] IN BLOOD BY AUTOMATED COUNT: 12.8 % (ref 11.6–15.1)
GFR SERPL CREATININE-BSD FRML MDRD: 110 ML/MIN/1.73SQ M
GLUCOSE SERPL-MCNC: 73 MG/DL (ref 65–140)
HCT VFR BLD AUTO: 39.6 % (ref 36.5–49.3)
HGB BLD-MCNC: 12.9 G/DL (ref 12–17)
IMM GRANULOCYTES # BLD AUTO: 0.12 THOUSAND/UL (ref 0–0.2)
IMM GRANULOCYTES NFR BLD AUTO: 1 % (ref 0–2)
LYMPHOCYTES # BLD AUTO: 1.62 THOUSANDS/ÂΜL (ref 0.6–4.47)
LYMPHOCYTES NFR BLD AUTO: 16 % (ref 14–44)
MAGNESIUM SERPL-MCNC: 1.7 MG/DL (ref 1.9–2.7)
MCH RBC QN AUTO: 31.8 PG (ref 26.8–34.3)
MCHC RBC AUTO-ENTMCNC: 32.6 G/DL (ref 31.4–37.4)
MCV RBC AUTO: 98 FL (ref 82–98)
MONOCYTES # BLD AUTO: 2.03 THOUSAND/ÂΜL (ref 0.17–1.22)
MONOCYTES NFR BLD AUTO: 20 % (ref 4–12)
NEUTROPHILS # BLD AUTO: 6.27 THOUSANDS/ÂΜL (ref 1.85–7.62)
NEUTS SEG NFR BLD AUTO: 60 % (ref 43–75)
NRBC BLD AUTO-RTO: 0 /100 WBCS
PLATELET # BLD AUTO: 556 THOUSANDS/UL (ref 149–390)
PMV BLD AUTO: 8.9 FL (ref 8.9–12.7)
POTASSIUM SERPL-SCNC: 4 MMOL/L (ref 3.5–5.3)
RBC # BLD AUTO: 4.06 MILLION/UL (ref 3.88–5.62)
SODIUM SERPL-SCNC: 133 MMOL/L (ref 135–147)
WBC # BLD AUTO: 10.31 THOUSAND/UL (ref 4.31–10.16)

## 2023-06-28 PROCEDURE — 80048 BASIC METABOLIC PNL TOTAL CA: CPT | Performed by: INTERNAL MEDICINE

## 2023-06-28 PROCEDURE — 85025 COMPLETE CBC W/AUTO DIFF WBC: CPT | Performed by: INTERNAL MEDICINE

## 2023-06-28 PROCEDURE — 99232 SBSQ HOSP IP/OBS MODERATE 35: CPT | Performed by: INTERNAL MEDICINE

## 2023-06-28 PROCEDURE — 83735 ASSAY OF MAGNESIUM: CPT | Performed by: INTERNAL MEDICINE

## 2023-06-28 RX ORDER — MAGNESIUM SULFATE HEPTAHYDRATE 40 MG/ML
2 INJECTION, SOLUTION INTRAVENOUS ONCE
Status: DISCONTINUED | OUTPATIENT
Start: 2023-06-28 | End: 2023-06-28

## 2023-06-28 RX ORDER — LANOLIN ALCOHOL/MO/W.PET/CERES
400 CREAM (GRAM) TOPICAL 2 TIMES DAILY
Status: DISCONTINUED | OUTPATIENT
Start: 2023-06-28 | End: 2023-07-06 | Stop reason: HOSPADM

## 2023-06-28 RX ADMIN — FUROSEMIDE 20 MG: 20 TABLET ORAL at 08:39

## 2023-06-28 RX ADMIN — MAGNESIUM OXIDE TAB 400 MG (241.3 MG ELEMENTAL MG) 400 MG: 400 (241.3 MG) TAB at 17:11

## 2023-06-28 RX ADMIN — MULTIPLE VITAMINS W/ MINERALS TAB 1 TABLET: TAB ORAL at 08:39

## 2023-06-28 RX ADMIN — RIVAROXABAN 20 MG: 20 TABLET, FILM COATED ORAL at 08:39

## 2023-06-28 RX ADMIN — THIAMINE HCL TAB 100 MG 100 MG: 100 TAB at 08:39

## 2023-06-28 RX ADMIN — METOPROLOL SUCCINATE 50 MG: 50 TABLET, EXTENDED RELEASE ORAL at 08:39

## 2023-06-28 RX ADMIN — ASPIRIN 81 MG: 81 TABLET, COATED ORAL at 08:39

## 2023-06-28 RX ADMIN — NICOTINE 1 PATCH: 21 PATCH, EXTENDED RELEASE TRANSDERMAL at 08:40

## 2023-06-28 RX ADMIN — FOLIC ACID 1 MG: 1 TABLET ORAL at 08:39

## 2023-06-28 RX ADMIN — METOPROLOL SUCCINATE 50 MG: 50 TABLET, EXTENDED RELEASE ORAL at 17:11

## 2023-06-28 NOTE — PROGRESS NOTES
[address] [facility]  Progress Note  Name: Luna Childress  MRN: [id number]  Unit/Bed#: 7T [facility] 704-01 I Date of Admission: 6/19/2023   Date of Service: 6/28/2023 I Hospital Day: 9    Assessment/Plan   * SVT (supraventricular tachycardia) ([address])  Assessment & Plan  · Patient now in normal sinus rhythm rate controlled  · Cardiology following  · Initially on metoprolol tartrate 25 mg every 6 hours, switch to metoprolol succinate 50 mg twice a day  · Monitor on telemetry  · Patient is medically clear, awaiting inpatient alcohol rehab placement    Hyponatremia  Assessment & Plan  · Likely beer potomania versus SIADH  · Continue fluid restriction 1500 cc/day  · Nephrologist on board recommended p.o. Lasix 20 mg in the setting of cardiomyopathy  · Repeat BMP tomorrow  · Trend BMP  · Avoid rapid overcorrection    Alcohol use disorder, severe, dependence ([address])  Assessment & Plan  · Patient was initially in medical toxicology unit  · Patient is stable from detox standpoint  · Patient is interested in inpatient alcohol rehab  ·  on board    Essential hypertension  Assessment & Plan  · Blood pressures appear stable  · Continue losartan and beta-blocker  · Monitor blood pressures    COPD (chronic obstructive pulmonary disease) ([address])  Assessment & Plan  · Not in acute exacerbation  · Continue home inhalers    Tobacco abuse  Assessment & Plan  · Nicotine patch while inpatient  · Encouraged tobacco abuse cessation    Paroxysmal atrial fibrillation (HCC)  Assessment & Plan  · Currently in normal sinus rhythm  · Continue beta-blocker as above  · Xarelto for CVA prophylaxis    Cardiomyopathy, 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  Assessment & Plan  · Most recent echocardiogram with ejection fraction of 40% and grade 1 diastolic dysfunction  · Likely secondary to alcohol abuse  · Echo showed LVEF 45%. Mild global hypokinesia. Grade 1 diastolic dysfunction.   · Cardiology following ; appreciate recommendations  · Continue beta-blocker as above           VTE Pharmacologic Prophylaxis:   Pharmacologic: Rivaroxaban (Xarelto)  Mechanical VTE Prophylaxis in Place: Yes    Patient Centered Rounds: I have performed bedside rounds with nursing staff today. Discussions with Specialists or Other Care Team Provider: Discussed with nephrologist, , RN    Education and Discussions with Family / Patient: Discussed with patient    Time Spent for Care: 35 minutes. This time was spent on one or more of the following: performing physical exam; counseling and coordination of care; obtaining or reviewing history; documenting in the medical record; reviewing/ordering tests, medications or procedures; communicating with other healthcare professionals and discussing with patient's family/caregivers. Current Length of Stay: 9 day(s)    Current Patient Status: Inpatient   Certification Statement: The patient will continue to require additional inpatient hospital stay due to Patient is medically clear, awaiting inpatient alcohol rehab placement    Discharge Plan / Estimated Discharge Date: Awaiting inpatient alcohol rehab      Code Status: Level 1 - Full Code      Subjective:   Patient was seen and examined at bedside. The patient denies any pain, headache, blurry vision, chest pain, palpitation, shortness of breath, N/V, abd pain. Objective:     Vitals:   Temp (24hrs), Av.1 °F (36.2 °C), Min:96.6 °F (35.9 °C), Max:97.5 °F (36.4 °C)    Temp:  [96.6 °F (35.9 °C)-97.5 °F (36.4 °C)] 96.6 °F (35.9 °C)  HR:  [80-87] 80  Resp:  [18] 18  BP: (104-113)/(68-72) 104/68  SpO2:  [96 %-97 %] 96 %  Body mass index is 22.27 kg/m². Input and Output Summary (last 24 hours):        Intake/Output Summary (Last 24 hours) at 2023 1553  Last data filed at 2023 1300  Gross per 24 hour   Intake 720 ml   Output 200 ml   Net 520 ml       Physical Exam:     Physical Exam  General: breathing well on room air, no acute distress  HEENT: NC/AT, PERRL, EOM - normal  Neck: Supple  Pulm/Chest: Normal chest wall expansion, clear breath sounds on both side, no wheezing/rhonchi or crackles appreciated  CVS: RRR, normal S1&S2, no murmur appreciated, capillary refill <2s  Abd: soft, non tender, non distended, bowel sounds +  MSK: move all 4 extremities spontaneously  Skin: warm  CNS: no acute focal neuro deficit      Additional Data:     Labs:    Results from last 7 days   Lab Units 06/28/23  0416   WBC Thousand/uL 10.31*   HEMOGLOBIN g/dL 12.9   HEMATOCRIT % 39.6   PLATELETS Thousands/uL 556*   NEUTROS PCT % 60   LYMPHS PCT % 16   MONOS PCT % 20*   EOS PCT % 1     Results from last 7 days   Lab Units 06/28/23  0416 06/24/23  0552 06/23/23  0600   POTASSIUM mmol/L 4.0   < > 3.8   CHLORIDE mmol/L 95*   < > 96   CO2 mmol/L 27   < > 24   BUN mg/dL 21   < > 8   CREATININE mg/dL 0.59*   < > 0.49*   CALCIUM mg/dL 10.2   < > 8.6   ALK PHOS U/L  --   --  51   ALT U/L  --   --  7   AST U/L  --   --  15    < > = values in this interval not displayed. * I Have Reviewed All Lab Data Listed Above. * Additional Pertinent Lab Tests Reviewed: [facility] Admission Reviewed    Imaging:      I have reviewed pertinent imaging. Recent Cultures (last 7 days):     Results from last 7 days   Lab Units 06/25/23  0615   BLOOD CULTURE  No Growth at 72 hrs. No Growth at 72 hrs.        Last 24 Hours Medication List:   Current Facility-Administered Medications   Medication Dose Route Frequency Provider Last Rate   • acetaminophen  650 mg Oral Q6H PRN Gianna Jacobsen MD     • albuterol  2 puff Inhalation Q4H PRN Talita Riley PA-C     • aluminum-magnesium hydroxide-simethicone  30 mL Oral Q4H PRN Roya Odell PA-C     • aspirin  81 mg Oral Daily Talita Riley PA-C     • bisacodyl  5 mg Oral Daily PRN Karyn Jimenez PA-C     • folic acid  1 mg Oral Daily Avtar Conway MD     • furosemide  20 mg Oral Daily Richy Vizcaino MD     • losartan  25 mg Oral Daily Talita Hill PA-C     • metoprolol  5 mg Intravenous Once PRN Roxana Lanier PA-C     • metoprolol succinate  50 mg Oral BID Lynne Shah MD     • multivitamin-minerals  1 tablet Oral Daily Gianna Hi MD     • nicotine  1 patch Transdermal Daily Petty Zhang MD     • ondansetron  4 mg Intravenous Q6H PRN Gianna Hi MD     • rivaroxaban  20 mg Oral Daily With Breakfast Talita Hill PA-C     • thiamine  100 mg Oral Daily Maureen Richardson PA-C          Today, Patient Was Seen By: Richy Vizcaino MD    ** Please Note: Dragon 360 Dictation voice to text software may have been used in the creation of this document.  **

## 2023-06-28 NOTE — PROGRESS NOTES
NEPHROLOGY PROGRESS NOTE   Nic Ayala 59 y.o. male MRN: [id number]  Unit/Bed#: 7T [facility] 704-01 Encounter: [id number]        ASSESSMENT & PLAN     #1  Acute on chronic hyponatremia  • Sodium level now improved up to 133 and has remained stable  • He had a high urine osmolality with a high urine sodium  • Likely secondary to SIADH  • Continue fluid restriction for now  • Echocardiogram shows EF of 45%  • Clinically no acute signs of volume overload  • Continue Lasix 20 mg p.o. daily     #2  Hypertension  • In the setting of cardiomyopathy  • He had recent SVT   • He is on losartan which can contribute to hyponatremia  • Metoprolol XL 50 mg 2 times a day  • Blood pressures have remained stable with starting low-dose loop diuretic     #3  Recent SVT  • With vagal maneuvers and beta-blockade patient's heart rate has been better  • Ongoing work-up per slim  • Overall this is stable and he is looking at going into acute inpatient alcohol rehab     #4  History of alcohol abuse  • Ongoing efforts with alcohol cessation  • Potential to go to inpatient rehab for alcohol cessation    DISCUSSION:    Okay for discharge when otherwise medically stable from a renal standpoint    SUBJECTIVE:    Patient feels well, he is sitting up he is resting comfortably ambulating without difficulty    12 point review of systems was otherwise negative besides what is mentioned above.     Medications:    Current Facility-Administered Medications:   •  acetaminophen (TYLENOL) tablet 650 mg, 650 mg, Oral, Q6H PRN, Gianna Cisneros MD, 650 mg at 06/26/23 2007  •  albuterol (PROVENTIL HFA,VENTOLIN HFA) inhaler 2 puff, 2 puff, Inhalation, Q4H PRN, Talita Garcia PA-C  •  aluminum-magnesium hydroxide-simethicone (MYLANTA) oral suspension 30 mL, 30 mL, Oral, Q4H PRN, Jillian Cabral PA-C, 30 mL at 06/24/23 0014  •  aspirin (ECOTRIN LOW STRENGTH) EC tablet 81 mg, 81 mg, Oral, Daily, [facility], 81 mg at 06/28/23 0830  • bisacodyl (DULCOLAX) EC tablet 5 mg, 5 mg, Oral, Daily PRN, Twin Guzmán PA-C, 5 mg at 01/74/93 3090  •  folic acid (FOLVITE) tablet 1 mg, 1 mg, Oral, Daily, Gianna Meadows MD, 1 mg at 06/28/23 9421  •  furosemide (LASIX) tablet 20 mg, 20 mg, Oral, Daily, Davis Greer MD, 20 mg at 06/28/23 3635  •  losartan (COZAAR) tablet 25 mg, 25 mg, Oral, Daily, Talita Reid PA-C, 25 mg at 06/27/23 1894  •  metoprolol (LOPRESSOR) injection 5 mg, 5 mg, Intravenous, Once PRN, Mabel Grossman PA-C  •  metoprolol succinate (TOPROL-XL) 24 hr tablet 50 mg, 50 mg, Oral, BID, Lidya Lo MD, 50 mg at 06/28/23 2841  •  multivitamin-minerals (CENTRUM) tablet 1 tablet, 1 tablet, Oral, Daily, Gianna Meadows MD, 1 tablet at 06/28/23 0839  •  nicotine (NICODERM CQ) 21 mg/24 hr TD 24 hr patch 1 patch, 1 patch, Transdermal, Daily, Gianna Meadows MD, 1 patch at 06/28/23 0840  •  ondansetron (ZOFRAN) injection 4 mg, 4 mg, Intravenous, Q6H PRN, Allison Ricci MD, 4 mg at 06/21/23 8674  •  rivaroxaban (XARELTO) tablet 20 mg, 20 mg, Oral, Daily With Breakfast, Talita Reid PA-C, 20 mg at 06/28/23 0727  •  thiamine tablet 100 mg, 100 mg, Oral, Daily, Lynne Haq PA-C, 100 mg at 06/28/23 0839    OBJECTIVE:    Vitals:    06/27/23 1551 06/27/23 1700 06/27/23 2317 06/28/23 1434   BP: 102/63 106/69 113/72 104/68   BP Location: Left arm  Left arm Left arm   Pulse: 66 87 80 80   Resp: 18  18 18   Temp: 97.9 °F (36.6 °C)  97.5 °F (36.4 °C) (!) 96.6 °F (35.9 °C)   TempSrc: Temporal  Temporal Temporal   SpO2: 98%  97% 96%   Weight:       Height:            Temp:  [96.6 °F (35.9 °C)-97.9 °F (36.6 °C)] 96.6 °F (35.9 °C)  HR:  [66-87] 80  Resp:  [18] 18  BP: (102-113)/(63-72) 104/68  SpO2:  [96 %-98 %] 96 %     Body mass index is 22.27 kg/m².     Weight (last 2 days)     Date/Time Weight    06/26/23 1430 62.6 (138)          I/O last 3 completed shifts: In: 825 [P.O.:825]  Out: 200 [Urine:200]    I/O this shift: In: 480 [P.O.:480]  Out: -       Physical exam:    General: no acute distress, cooperative  Eyes: conjunctivae pink, anicteric sclerae  ENT: lips and mucous membranes moist, no exudates, normal external ears  Neck: ROM intact, no JVD  Chest: No respiratory distress, no accessory muscle use  CVS: normal rate, non pericardial friction rub  Abdomen: soft, non-tender, non-distended, normoactive bowel sounds  Extremities: no edema of both legs  Skin: no rash  Neuro: awake, alert, oriented, grossly intact  Psych:  Pleasant affect    Invasive Devices:      Lab Results:   Results from last 7 days   Lab Units 06/28/23  0416 06/27/23  0451 06/26/23  0854 06/25/23  0631 06/25/23  0615 06/25/23  0508 06/24/23  2211 06/24/23  0552 06/23/23  0600   WBC Thousand/uL 10.31* 9.81 10.56*  --   --  11.45*  --  8.87 7.95   HEMOGLOBIN g/dL 12.9 12.9 12.9  --   --  12.9  --  12.6 11.8*   HEMATOCRIT % 39.6 39.5 39.8  --   --  39.2  --  36.4* 35.0*   PLATELETS Thousands/uL 556* 475* 429*  --   --  387  --  308 273   POTASSIUM mmol/L 4.0 4.1 4.2  --   --  4.0 4.0 3.9 3.8   CHLORIDE mmol/L 95* 96 95*  --   --  98 95* 95* 96   CO2 mmol/L 27 27 26  --   --  23 24 26 24   BUN mg/dL 21 21 13  --   --  14 14 9 8   CREATININE mg/dL 0.59* 0.72 0.64  --   --  0.64 0.76 0.54* 0.49*   CALCIUM mg/dL 10.2 10.3* 9.8  --   --  8.7 9.7 8.9 8.6   MAGNESIUM mg/dL  --  1.7*  --   --   --  3.2* 1.6* 1.9 1.5*   PHOSPHORUS mg/dL  --  5.5*  --   --   --   --  3.2  --   --    ALK PHOS U/L  --   --   --   --   --   --   --   --  51   ALT U/L  --   --   --   --   --   --   --   --  7   AST U/L  --   --   --   --   --   --   --   --  15   BLOOD CULTURE   --   --   --   --  No Growth at 72 hrs.   No Growth at 72 hrs.  --   --   --   --    NITRITE UA   --   --   --  Negative  --   --   --   --   --    LEUKOCYTES UA   --   --   --  Negative  --   --   --   --   --    BLOOD UA   --   --   --  10.0*  -- --   --   --   --          Portions of the record may have been created with voice recognition software. Occasional wrong word or "sound a like" substitutions may have occurred due to the inherent limitations of voice recognition software. Read the chart carefully and recognize, using context, where substitutions have occurred. If you have any questions, please contact the dictating provider.

## 2023-06-28 NOTE — PLAN OF CARE
Problem: SAFETY ADULT  Goal: Patient will remain free of falls  Description: INTERVENTIONS:  - Educate patient/family on patient safety including physical limitations  - Instruct patient to call for assistance with activity   - Consult OT/PT to assist with strengthening/mobility   - Keep Call bell within reach  - Keep bed low and locked with side rails adjusted as appropriate  - Keep care items and personal belongings within reach  - Initiate and maintain comfort rounds  - Make Fall Risk Sign visible to staff  - Apply yellow socks and bracelet for high fall risk patients  - Consider moving patient to room near nurses station  Outcome: Progressing  Goal: Maintain or return to baseline ADL function  Description: INTERVENTIONS:  -  Assess patient's ability to carry out ADLs; assess patient's baseline for ADL function and identify physical deficits which impact ability to perform ADLs (bathing, care of mouth/teeth, toileting, grooming, dressing, etc.)  - Assess/evaluate cause of self-care deficits   - Assess range of motion  - Assess patient's mobility; develop plan if impaired  - Assess patient's need for assistive devices and provide as appropriate  - Encourage maximum independence but intervene and supervise when necessary  - Involve family in performance of ADLs  - Assess for home care needs following discharge   - Consider OT consult to assist with ADL evaluation and planning for discharge  - Provide patient education as appropriate  Outcome: Progressing  Goal: Maintains/Returns to pre admission functional level  Description: INTERVENTIONS:  - Perform BMAT or MOVE assessment daily.   - Set and communicate daily mobility goal to care team and patient/family/caregiver.    - Collaborate with rehabilitation services on mobility goals if consulted  - Out of bed for toileting  - Record patient progress and toleration of activity level   Outcome: Progressing     Problem: DISCHARGE PLANNING  Goal: Discharge to home or other facility with appropriate resources  Description: INTERVENTIONS:  - Identify barriers to discharge w/patient and caregiver  - Arrange for needed discharge resources and transportation as appropriate  - Identify discharge learning needs (meds, wound care, etc.)  - Arrange for interpretive services to assist at discharge as needed  - Refer to Case Management Department for coordinating discharge planning if the patient needs post-hospital services based on physician/advanced practitioner order or complex needs related to functional status, cognitive ability, or social support system  Outcome: Progressing     Problem: Knowledge Deficit  Goal: Patient/family/caregiver demonstrates understanding of disease process, treatment plan, medications, and discharge instructions  Description: Complete learning assessment and assess knowledge base.   Interventions:  - Provide teaching at level of understanding  - Provide teaching via preferred learning methods  Outcome: Progressing

## 2023-06-28 NOTE — ASSESSMENT & PLAN NOTE
· Patient now in normal sinus rhythm rate controlled  · Cardiology following  · Initially on metoprolol tartrate 25 mg every 6 hours, switch to metoprolol succinate 50 mg twice a day  · Monitor on telemetry  · Patient is medically clear, awaiting inpatient alcohol rehab placement

## 2023-06-28 NOTE — PLAN OF CARE
Problem: SAFETY ADULT  Goal: Patient will remain free of falls  Description: INTERVENTIONS:  - Educate patient/family on patient safety including physical limitations  - Instruct patient to call for assistance with activity   - Consult OT/PT to assist with strengthening/mobility   - Keep Call bell within reach  - Keep bed low and locked with side rails adjusted as appropriate  - Keep care items and personal belongings within reach  - Initiate and maintain comfort rounds  - Make Fall Risk Sign visible to staff  - Apply yellow socks and bracelet for high fall risk patients  - Consider moving patient to room near nurses station  Outcome: Progressing  Goal: Maintain or return to baseline ADL function  Description: INTERVENTIONS:  -  Assess patient's ability to carry out ADLs; assess patient's baseline for ADL function and identify physical deficits which impact ability to perform ADLs (bathing, care of mouth/teeth, toileting, grooming, dressing, etc.)  - Assess/evaluate cause of self-care deficits   - Assess range of motion  - Assess patient's mobility; develop plan if impaired  - Assess patient's need for assistive devices and provide as appropriate  - Encourage maximum independence but intervene and supervise when necessary  - Involve family in performance of ADLs  - Assess for home care needs following discharge   - Consider OT consult to assist with ADL evaluation and planning for discharge  - Provide patient education as appropriate  Outcome: Progressing  Goal: Maintains/Returns to pre admission functional level  Description: INTERVENTIONS:  - Perform BMAT or MOVE assessment daily.   - Set and communicate daily mobility goal to care team and patient/family/caregiver.    - Collaborate with rehabilitation services on mobility goals if consulted  - Out of bed for toileting  - Record patient progress and toleration of activity level   Outcome: Progressing     Problem: DISCHARGE PLANNING  Goal: Discharge to home or other facility with appropriate resources  Description: INTERVENTIONS:  - Identify barriers to discharge w/patient and caregiver  - Arrange for needed discharge resources and transportation as appropriate  - Identify discharge learning needs (meds, wound care, etc.)  - Arrange for interpretive services to assist at discharge as needed  - Refer to Case Management Department for coordinating discharge planning if the patient needs post-hospital services based on physician/advanced practitioner order or complex needs related to functional status, cognitive ability, or social support system  Outcome: Progressing     Problem: Knowledge Deficit  Goal: Patient/family/caregiver demonstrates understanding of disease process, treatment plan, medications, and discharge instructions  Description: Complete learning assessment and assess knowledge base.   Interventions:  - Provide teaching at level of understanding  - Provide teaching via preferred learning methods  Outcome: Progressing     Problem: SUBSTANCE USE/ABUSE  Goal: By discharge, will develop insight into their chemical dependency and sustain motivation to continue in recovery  Description: INTERVENTIONS:  - Attends all daily group sessions and scheduled AA groups  - Actively practices coping skills through participation in the therapeutic community and adherence to program rules  - Reviews and completes assignments from individual treatment plan  - Assist patient development of understanding of their personal cycle of addiction and relapse triggers  Outcome: Progressing  Goal: By discharge, patient will have ongoing treatment plan addressing chemical dependency  Description: INTERVENTIONS:  - Assist patient with resources and/or appointments for ongoing recovery based living  Outcome: Progressing     Problem: SUBSTANCE USE/ABUSE  Goal: By discharge, will develop insight into their chemical dependency and sustain motivation to continue in recovery  Description: INTERVENTIONS:  - Attends all daily group sessions and scheduled AA groups  - Actively practices coping skills through participation in the therapeutic community and adherence to program rules  - Reviews and completes assignments from individual treatment plan  - Assist patient development of understanding of their personal cycle of addiction and relapse triggers  Outcome: Progressing  Goal: By discharge, patient will have ongoing treatment plan addressing chemical dependency  Description: INTERVENTIONS:  - Assist patient with resources and/or appointments for ongoing recovery based living  Outcome: Progressing     Problem: SUBSTANCE USE/ABUSE  Goal: By discharge, will develop insight into their chemical dependency and sustain motivation to continue in recovery  Description: INTERVENTIONS:  - Attends all daily group sessions and scheduled AA groups  - Actively practices coping skills through participation in the therapeutic community and adherence to program rules  - Reviews and completes assignments from individual treatment plan  - Assist patient development of understanding of their personal cycle of addiction and relapse triggers  Outcome: Progressing  Goal: By discharge, patient will have ongoing treatment plan addressing chemical dependency  Description: INTERVENTIONS:  - Assist patient with resources and/or appointments for ongoing recovery based living  Outcome: Progressing     Problem: MOBILITY - ADULT  Goal: Maintain or return to baseline ADL function  Description: INTERVENTIONS:  -  Assess patient's ability to carry out ADLs; assess patient's baseline for ADL function and identify physical deficits which impact ability to perform ADLs (bathing, care of mouth/teeth, toileting, grooming, dressing, etc.)  - Assess/evaluate cause of self-care deficits   - Assess range of motion  - Assess patient's mobility; develop plan if impaired  - Assess patient's need for assistive devices and provide as appropriate  - Encourage maximum independence but intervene and supervise when necessary  - Involve family in performance of ADLs  - Assess for home care needs following discharge   - Consider OT consult to assist with ADL evaluation and planning for discharge  - Provide patient education as appropriate  Outcome: Progressing  Goal: Maintains/Returns to pre admission functional level  Description: INTERVENTIONS:  - Perform BMAT or MOVE assessment daily.   - Set and communicate daily mobility goal to care team and patient/family/caregiver.    - Collaborate with rehabilitation services on mobility goals if consulted  - Out of bed for toileting  - Record patient progress and toleration of activity level   Outcome: Progressing     Problem: CARDIOVASCULAR - ADULT  Goal: Maintains optimal cardiac output and hemodynamic stability  Description: INTERVENTIONS:  - Monitor I/O, vital signs and rhythm  - Monitor for S/S and trends of decreased cardiac output  - Administer and titrate ordered vasoactive medications to optimize hemodynamic stability  - Assess quality of pulses, skin color and temperature  - Assess for signs of decreased coronary artery perfusion  - Instruct patient to report change in severity of symptoms  Outcome: Progressing  Goal: Absence of cardiac dysrhythmias or at baseline rhythm  Description: INTERVENTIONS:  - Continuous cardiac monitoring, vital signs, obtain 12 lead EKG if ordered  - Administer antiarrhythmic and heart rate control medications as ordered  - Monitor electrolytes and administer replacement therapy as ordered  Outcome: Progressing

## 2023-06-29 LAB
ANION GAP SERPL CALCULATED.3IONS-SCNC: 10 MMOL/L
BUN SERPL-MCNC: 19 MG/DL (ref 5–25)
CALCIUM SERPL-MCNC: 9.9 MG/DL (ref 8.4–10.2)
CHLORIDE SERPL-SCNC: 97 MMOL/L (ref 96–108)
CO2 SERPL-SCNC: 27 MMOL/L (ref 21–32)
CREAT SERPL-MCNC: 0.61 MG/DL (ref 0.6–1.3)
GFR SERPL CREATININE-BSD FRML MDRD: 108 ML/MIN/1.73SQ M
GLUCOSE SERPL-MCNC: 73 MG/DL (ref 65–140)
MAGNESIUM SERPL-MCNC: 1.8 MG/DL (ref 1.9–2.7)
POTASSIUM SERPL-SCNC: 4 MMOL/L (ref 3.5–5.3)
SODIUM SERPL-SCNC: 134 MMOL/L (ref 135–147)

## 2023-06-29 PROCEDURE — 83735 ASSAY OF MAGNESIUM: CPT | Performed by: INTERNAL MEDICINE

## 2023-06-29 PROCEDURE — 99232 SBSQ HOSP IP/OBS MODERATE 35: CPT | Performed by: INTERNAL MEDICINE

## 2023-06-29 PROCEDURE — 80048 BASIC METABOLIC PNL TOTAL CA: CPT | Performed by: INTERNAL MEDICINE

## 2023-06-29 RX ADMIN — METOPROLOL SUCCINATE 50 MG: 50 TABLET, EXTENDED RELEASE ORAL at 08:35

## 2023-06-29 RX ADMIN — THIAMINE HCL TAB 100 MG 100 MG: 100 TAB at 08:34

## 2023-06-29 RX ADMIN — FOLIC ACID 1 MG: 1 TABLET ORAL at 08:36

## 2023-06-29 RX ADMIN — MULTIPLE VITAMINS W/ MINERALS TAB 1 TABLET: TAB ORAL at 08:35

## 2023-06-29 RX ADMIN — MAGNESIUM OXIDE TAB 400 MG (241.3 MG ELEMENTAL MG) 400 MG: 400 (241.3 MG) TAB at 08:36

## 2023-06-29 RX ADMIN — MAGNESIUM OXIDE TAB 400 MG (241.3 MG ELEMENTAL MG) 400 MG: 400 (241.3 MG) TAB at 17:10

## 2023-06-29 RX ADMIN — ASPIRIN 81 MG: 81 TABLET, COATED ORAL at 08:35

## 2023-06-29 RX ADMIN — METOPROLOL SUCCINATE 50 MG: 50 TABLET, EXTENDED RELEASE ORAL at 17:10

## 2023-06-29 RX ADMIN — NICOTINE 1 PATCH: 21 PATCH, EXTENDED RELEASE TRANSDERMAL at 08:36

## 2023-06-29 RX ADMIN — FUROSEMIDE 20 MG: 20 TABLET ORAL at 08:35

## 2023-06-29 RX ADMIN — RIVAROXABAN 20 MG: 20 TABLET, FILM COATED ORAL at 08:34

## 2023-06-29 NOTE — PROGRESS NOTES
NEPHROLOGY PROGRESS NOTE   Nic Ayala 59 y.o. male MRN: [id number]  Unit/Bed#: 7T [facility] 704-01 Encounter: [id number]        ASSESSMENT & PLAN    #1  Acute on chronic hyponatremia  • Sodium improved to 134  • He had a high urine osmolality with a high urine sodium  • Likely secondary to SIADH  • Continue fluid restriction for now  • Echocardiogram shows EF of 45%  • Clinically no acute signs of volume overload  • Continue Lasix 20 mg p.o. daily tolerating well and sodium improving     #2  Hypertension  • In the setting of cardiomyopathy  • He had recent SVT   • He is on losartan which can contribute to hyponatremia  • Metoprolol XL 50 mg 2 times a day  • Tolerating diuretic and renal function remained stable     #3  Recent SVT  • With vagal maneuvers and beta-blockade patient's heart rate has been better  • Ongoing work-up per slim  • This has been stable     #4  History of alcohol abuse  • Ongoing efforts with alcohol cessation  • Awaiting placement for Rehab    SUBJECTIVE:    Has no acute complaints  No cp, sob, fevers, chills    12 point review of systems was otherwise negative besides what is mentioned above.     Medications:    Current Facility-Administered Medications:   •  acetaminophen (TYLENOL) tablet 650 mg, 650 mg, Oral, Q6H PRN, Gianna Cisneros MD, 650 mg at 06/26/23 2007  •  albuterol (PROVENTIL HFA,VENTOLIN HFA) inhaler 2 puff, 2 puff, Inhalation, Q4H PRN, Talita Gibbs PA-C  •  aluminum-magnesium hydroxide-simethicone (MYLANTA) oral suspension 30 mL, 30 mL, Oral, Q4H PRN, Lashonda Bowles PA-C, 30 mL at 06/24/23 0014  •  aspirin (ECOTRIN LOW STRENGTH) EC tablet 81 mg, 81 mg, Oral, Daily, Talita Gibbs PA-C, 81 mg at 06/29/23 6827  •  bisacodyl (DULCOLAX) EC tablet 5 mg, 5 mg, Oral, Daily PRN, Zeus Guzmán PA-C, 5 mg at 22/51/50 9036  •  folic acid (FOLVITE) tablet 1 mg, 1 mg, Oral, Daily, Rachel Reyes MD, 1 mg at 06/29/23 5968  •  furosemide (LASIX) tablet 20 mg, 20 mg, Oral, Daily, Jacqueline Rodgers MD, 20 mg at 06/29/23 6158  •  losartan (COZAAR) tablet 25 mg, 25 mg, Oral, Daily, Talita Camacho PA-C, 25 mg at 06/27/23 8287  •  magnesium Oxide (MAG-OX) tablet 400 mg, 400 mg, Oral, BID, Jacqueline Rodgers MD, 400 mg at 06/29/23 0836  •  metoprolol (LOPRESSOR) injection 5 mg, 5 mg, Intravenous, Once PRN, Taye Justice PA-C  •  metoprolol succinate (TOPROL-XL) 24 hr tablet 50 mg, 50 mg, Oral, BID, Renato Reeder MD, 50 mg at 06/29/23 3163  •  multivitamin-minerals (CENTRUM) tablet 1 tablet, 1 tablet, Oral, Daily, Gianna Feliz MD, 1 tablet at 06/29/23 0835  •  nicotine (NICODERM CQ) 21 mg/24 hr TD 24 hr patch 1 patch, 1 patch, Transdermal, Daily, Gianna Feliz MD, 1 patch at 06/29/23 0836  •  ondansetron (ZOFRAN) injection 4 mg, 4 mg, Intravenous, Q6H PRN, Roshni Cameron MD, 4 mg at 06/21/23 3947  •  rivaroxaban (XARELTO) tablet 20 mg, 20 mg, Oral, Daily With Breakfast, Talita Camacho PA-C, 20 mg at 06/29/23 3843  •  thiamine tablet 100 mg, 100 mg, Oral, Daily, Mirella Ceballos PA-C, 100 mg at 06/29/23 0834    OBJECTIVE:    Vitals:    06/28/23 1434 06/28/23 1700 06/28/23 2307 06/29/23 0745   BP: 104/68 116/81 128/79 109/70   BP Location: Left arm  Left arm Left arm   Pulse: 80 87 91 85   Resp: 18 18 18   Temp: (!) 96.6 °F (35.9 °C)  (!) 96.7 °F (35.9 °C) (!) 97.3 °F (36.3 °C)   TempSrc: Temporal  Temporal Temporal   SpO2: 96%  96% 97%   Weight:       Height:            Temp:  [96.7 °F (35.9 °C)-97.3 °F (36.3 °C)] 97.3 °F (36.3 °C)  HR:  [85-91] 85  Resp:  [18] 18  BP: (109-128)/(70-81) 109/70  SpO2:  [96 %-97 %] 97 %     Body mass index is 22.27 kg/m². Weight (last 2 days)     None          I/O last 3 completed shifts:   In: 600 [P.O.:600]  Out: -     I/O this shift:  In: 290 [P.O.:290]  Out: -       Physical exam:    General: no acute distress, cooperative  Eyes: conjunctivae pink, anicteric sclerae  ENT: lips and mucous membranes moist, no exudates, normal external ears  Neck: ROM intact, no JVD  Chest: No respiratory distress, no accessory muscle use  CVS: normal rate, non pericardial friction rub  Abdomen: soft, non-tender, non-distended, normoactive bowel sounds  Extremities: no edema of both legs  Skin: no rash  Neuro: awake, alert, oriented, grossly intact  Psych:  Pleasant affect    Invasive Devices:      Lab Results:   Results from last 7 days   Lab Units 06/29/23  0419 06/28/23  0416 06/27/23  0451 06/26/23  0854 06/25/23  0631 06/25/23  0615 06/25/23  0508 06/24/23  2211 06/24/23  0552 06/23/23  0600   WBC Thousand/uL  --  10.31* 9.81 10.56*  --   --  11.45*  --  8.87 7.95   HEMOGLOBIN g/dL  --  12.9 12.9 12.9  --   --  12.9  --  12.6 11.8*   HEMATOCRIT %  --  39.6 39.5 39.8  --   --  39.2  --  36.4* 35.0*   PLATELETS Thousands/uL  --  556* 475* 429*  --   --  387  --  308 273   POTASSIUM mmol/L 4.0 4.0 4.1 4.2  --   --  4.0 4.0 3.9 3.8   CHLORIDE mmol/L 97 95* 96 95*  --   --  98 95* 95* 96   CO2 mmol/L 27 27 27 26  --   --  23 24 26 24   BUN mg/dL 19 21 21 13  --   --  14 14 9 8   CREATININE mg/dL 0.61 0.59* 0.72 0.64  --   --  0.64 0.76 0.54* 0.49*   CALCIUM mg/dL 9.9 10.2 10.3* 9.8  --   --  8.7 9.7 8.9 8.6   MAGNESIUM mg/dL 1.8* 1.7* 1.7*  --   --   --  3.2* 1.6* 1.9 1.5*   PHOSPHORUS mg/dL  --   --  5.5*  --   --   --   --  3.2  --   --    ALK PHOS U/L  --   --   --   --   --   --   --   --   --  51   ALT U/L  --   --   --   --   --   --   --   --   --  7   AST U/L  --   --   --   --   --   --   --   --   --  15   BLOOD CULTURE   --   --   --   --   --  No Growth at 72 hrs.   No Growth at 72 hrs.  --   --   --   --    NITRITE UA   --   --   --   --  Negative  --   --   --   --   --    LEUKOCYTES UA   --   --   --   --  Negative  --   --   --   --   --    BLOOD UA   --   --   --   --  10.0*  --   --   --   --   --          Portions of the record may have been created with voice recognition software. Occasional wrong word or "sound a like" substitutions may have occurred due to the inherent limitations of voice recognition software. Read the chart carefully and recognize, using context, where substitutions have occurred. If you have any questions, please contact the dictating provider.

## 2023-06-29 NOTE — PROGRESS NOTES
[facility]  Progress Note  Name: Elli Kumar  MRN: [id number]  Unit/Bed#: 7T [facility] 704-01 I Date of Admission: 6/19/2023   Date of Service: 6/29/2023 I Hospital Day: 10    Assessment/Plan   * SVT (supraventricular tachycardia) ([address])  Assessment & Plan  · Patient now in normal sinus rhythm rate controlled  · Cardiology following  · Initially on metoprolol tartrate 25 mg every 6 hours, switch to metoprolol succinate 50 mg twice a day  · Monitor on telemetry  · Patient is medically clear, awaiting inpatient alcohol rehab placement    Hyponatremia  Assessment & Plan  · Likely beer potomania versus SIADH  · Continue fluid restriction 1500 cc/day  · Nephrologist on board recommended p.o. Lasix 20 mg in the setting of cardiomyopathy  · Repeat BMP tomorrow  · Trend BMP  · Avoid rapid overcorrection    Alcohol use disorder, severe, dependence ([address])  Assessment & Plan  · Patient was initially in medical toxicology unit  · Patient is stable from detox standpoint  · Patient is interested in inpatient alcohol rehab  ·  on board    Essential hypertension  Assessment & Plan  · Blood pressures appear stable  · Continue losartan and beta-blocker  · Monitor blood pressures    COPD (chronic obstructive pulmonary disease) ([address])  Assessment & Plan  · Not in acute exacerbation  · Continue home inhalers    Tobacco abuse  Assessment & Plan  · Nicotine patch while inpatient  · Encouraged tobacco abuse cessation    Paroxysmal atrial fibrillation (HCC)  Assessment & Plan  · Currently in normal sinus rhythm  · Continue beta-blocker as above  · Xarelto for CVA prophylaxis    Cardiomyopathy, 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  Assessment & Plan  · Most recent echocardiogram with ejection fraction of 40% and grade 1 diastolic dysfunction  · Likely secondary to alcohol abuse  · Echo showed LVEF 45%. Mild global hypokinesia. Grade 1 diastolic dysfunction.   · Cardiology following ; appreciate recommendations  · Continue beta-blocker as above           VTE Pharmacologic Prophylaxis:   Pharmacologic: Rivaroxaban (Xarelto)  Mechanical VTE Prophylaxis in Place: Yes    Patient Centered Rounds: I have performed bedside rounds with nursing staff today. Discussions with Specialists or Other Care Team Provider: Discussed with , RN    Education and Discussions with Family / Patient: Discussed with patient    Time Spent for Care: 35 minutes. This time was spent on one or more of the following: performing physical exam; counseling and coordination of care; obtaining or reviewing history; documenting in the medical record; reviewing/ordering tests, medications or procedures; communicating with other healthcare professionals and discussing with patient's family/caregivers. Current Length of Stay: 10 day(s)    Current Patient Status: Inpatient   Certification Statement: The patient will continue to require additional inpatient hospital stay due to Patient is medically clear, awaiting inpatient alcohol rehab placement    Discharge Plan / Estimated Discharge Date: Awaiting placement      Code Status: Level 1 - Full Code      Subjective:   Patient was seen and examined at bedside. The patient denies any pain, headache, blurry vision, chest pain, palpitation, shortness of breath, N/V, abd pain. Objective:     Vitals:   Temp (24hrs), Av.9 °F (36.1 °C), Min:96.6 °F (35.9 °C), Max:97.3 °F (36.3 °C)    Temp:  [96.6 °F (35.9 °C)-97.3 °F (36.3 °C)] 97.3 °F (36.3 °C)  HR:  [80-91] 85  Resp:  [18] 18  BP: (104-128)/(68-81) 109/70  SpO2:  [96 %-97 %] 97 %  Body mass index is 22.27 kg/m². Input and Output Summary (last 24 hours):        Intake/Output Summary (Last 24 hours) at 2023 1418  Last data filed at 2023 0900  Gross per 24 hour   Intake 290 ml   Output --   Net 290 ml       Physical Exam:     Physical Exam  General: breathing well on room air, no acute distress  HEENT: NC/AT, PERRL, EOM - normal  Neck: Supple  Pulm/Chest: Normal chest wall expansion, clear breath sounds on both side, no wheezing/rhonchi or crackles appreciated  CVS: RRR, normal S1&S2, no murmur appreciated, capillary refill <2s  Abd: soft, non tender, non distended, bowel sounds +  MSK: move all 4 extremities spontaneously  Skin: warm  CNS: no acute focal neuro deficit      Additional Data:     Labs:    Results from last 7 days   Lab Units 06/28/23  0416   WBC Thousand/uL 10.31*   HEMOGLOBIN g/dL 12.9   HEMATOCRIT % 39.6   PLATELETS Thousands/uL 556*   NEUTROS PCT % 60   LYMPHS PCT % 16   MONOS PCT % 20*   EOS PCT % 1     Results from last 7 days   Lab Units 06/29/23  0419 06/24/23  0552 06/23/23  0600   POTASSIUM mmol/L 4.0   < > 3.8   CHLORIDE mmol/L 97   < > 96   CO2 mmol/L 27   < > 24   BUN mg/dL 19   < > 8   CREATININE mg/dL 0.61   < > 0.49*   CALCIUM mg/dL 9.9   < > 8.6   ALK PHOS U/L  --   --  51   ALT U/L  --   --  7   AST U/L  --   --  15    < > = values in this interval not displayed. * I Have Reviewed All Lab Data Listed Above. * Additional Pertinent Lab Tests Reviewed: [address] Admission Reviewed    Imaging:      I have reviewed pertinent imaging. Recent Cultures (last 7 days):     Results from last 7 days   Lab Units 06/25/23  0615   BLOOD CULTURE  No Growth at 72 hrs. No Growth at 72 hrs.        Last 24 Hours Medication List:   Current Facility-Administered Medications   Medication Dose Route Frequency Provider Last Rate   • acetaminophen  650 mg Oral Q6H PRN Gianna Swanson MD     • albuterol  2 puff Inhalation Q4H PRN Talita Winter PA-C     • aluminum-magnesium hydroxide-simethicone  30 mL Oral Q4H PRN George Ramos PA-C     • aspirin  81 mg Oral Daily Talita Winter PA-C     • bisacodyl  5 mg Oral Daily PRN Darío Maldonado PA-C     • folic acid  1 mg Oral Daily Yoana Nur MD     • furosemide  20 mg Oral Daily Giovanni Mick MD Dashawn     • losartan  25 mg Oral Daily Meghan Romayne Castillo, PA-C     • magnesium Oxide  400 mg Oral BID Sunshine Swann MD     • metoprolol  5 mg Intravenous Once PRN Jacqui Lanier PA-C     • metoprolol succinate  50 mg Oral BID Rashaun Olivia MD     • multivitamin-minerals  1 tablet Oral Daily Gianna Howard MD     • nicotine  1 patch Transdermal Daily Mary Poe MD     • ondansetron  4 mg Intravenous Q6H PRN Gianna Howard MD     • rivaroxaban  20 mg Oral Daily With Breakfast Meghan Romayne Castillo, PA-C     • thiamine  100 mg Oral Daily Nish Finch PA-C          Today, Patient Was Seen By: Sunshine Swann MD    ** Please Note: Dragon 360 Dictation voice to text software may have been used in the creation of this document.  **

## 2023-06-29 NOTE — CASE MANAGEMENT
Case Management Discharge Planning Note    Patient name Uri Galarza  Location 7T U 704/7T -01 MRN [id number]  : 1965 Date 2023       Current Admission Date: 2023  Current Admission Diagnosis:SVT (supraventricular tachycardia) [facility])   Patient Active Problem List    Diagnosis Date Noted   • SVT (supraventricular tachycardia) ([address]) 2023   • Hyponatremia 2023   • Alcohol use disorder, severe, dependence ([address]) 2023   • Aneurysm ([address]) 10/03/2022   • Combined systolic and diastolic cardiac dysfunction 2022   • Essential hypertension 2022   • Alcohol abuse 2022   • Anticoagulant long-term use 2022   • Poor dental hygiene 2022   • Noncompliance 2022   • Brain aneurysm 2022   • COPD (chronic obstructive pulmonary disease) ([address]) 08/10/2022   • Hepatic steatosis 2022   • Alcohol intoxication ([address])    • Paroxysmal atrial fibrillation ([address]) 2017   • Tobacco abuse 2017   • Atrial flutter ([address]) 06/10/2016   • Cardiomyopathy, 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 06/10/2016      LOS (days): 10  Geometric Mean LOS (GMLOS) (days):   Days to GMLOS:     OBJECTIVE:  Risk of Unplanned Readmission Score: 31.48         Current admission status: Inpatient   Preferred Pharmacy:   [facility], [address]  Phone: [phone] Fax: [phone]    Primary Care Provider: Mikki Mendosa MD    Primary Insurance: [facility]  Secondary Insurance: Bhavna SANDERS    DISCHARGE DETAILS:     CM has been checking in w/ HOST every day on bed search for pt for IP rehab. HOST is still in search of a bed for pt. CM to continue to follow pt care & d/c.

## 2023-06-30 LAB
ANION GAP SERPL CALCULATED.3IONS-SCNC: 9 MMOL/L
BACTERIA BLD CULT: NORMAL
BACTERIA BLD CULT: NORMAL
BUN SERPL-MCNC: 15 MG/DL (ref 5–25)
CALCIUM SERPL-MCNC: 9.2 MG/DL (ref 8.4–10.2)
CHLORIDE SERPL-SCNC: 99 MMOL/L (ref 96–108)
CO2 SERPL-SCNC: 26 MMOL/L (ref 21–32)
CREAT SERPL-MCNC: 0.63 MG/DL (ref 0.6–1.3)
GFR SERPL CREATININE-BSD FRML MDRD: 107 ML/MIN/1.73SQ M
GLUCOSE SERPL-MCNC: 93 MG/DL (ref 65–140)
POTASSIUM SERPL-SCNC: 3.7 MMOL/L (ref 3.5–5.3)
SODIUM SERPL-SCNC: 134 MMOL/L (ref 135–147)

## 2023-06-30 PROCEDURE — 80048 BASIC METABOLIC PNL TOTAL CA: CPT | Performed by: INTERNAL MEDICINE

## 2023-06-30 PROCEDURE — 99232 SBSQ HOSP IP/OBS MODERATE 35: CPT | Performed by: INTERNAL MEDICINE

## 2023-06-30 RX ADMIN — FUROSEMIDE 20 MG: 20 TABLET ORAL at 08:48

## 2023-06-30 RX ADMIN — MAGNESIUM OXIDE TAB 400 MG (241.3 MG ELEMENTAL MG) 400 MG: 400 (241.3 MG) TAB at 17:13

## 2023-06-30 RX ADMIN — ASPIRIN 81 MG: 81 TABLET, COATED ORAL at 08:48

## 2023-06-30 RX ADMIN — THIAMINE HCL TAB 100 MG 100 MG: 100 TAB at 08:48

## 2023-06-30 RX ADMIN — FOLIC ACID 1 MG: 1 TABLET ORAL at 08:48

## 2023-06-30 RX ADMIN — METOPROLOL SUCCINATE 50 MG: 50 TABLET, EXTENDED RELEASE ORAL at 08:48

## 2023-06-30 RX ADMIN — LOSARTAN POTASSIUM 25 MG: 25 TABLET, FILM COATED ORAL at 08:48

## 2023-06-30 RX ADMIN — MAGNESIUM OXIDE TAB 400 MG (241.3 MG ELEMENTAL MG) 400 MG: 400 (241.3 MG) TAB at 08:48

## 2023-06-30 RX ADMIN — MULTIPLE VITAMINS W/ MINERALS TAB 1 TABLET: TAB ORAL at 08:48

## 2023-06-30 RX ADMIN — RIVAROXABAN 20 MG: 20 TABLET, FILM COATED ORAL at 08:49

## 2023-06-30 RX ADMIN — METOPROLOL SUCCINATE 50 MG: 50 TABLET, EXTENDED RELEASE ORAL at 17:13

## 2023-06-30 RX ADMIN — NICOTINE 1 PATCH: 21 PATCH, EXTENDED RELEASE TRANSDERMAL at 08:49

## 2023-06-30 NOTE — PROGRESS NOTES
NEPHROLOGY PROGRESS NOTE   Nic Ayala 59 y.o. male MRN: [id number]  Unit/Bed#: 7T [facility] 704-01 Encounter: [id number]        ASSESSMENT & PLAN     #1  Acute on chronic hyponatremia  • Sodium is now stabilized to 134  • He had a high urine osmolality with a high urine sodium  • Likely secondary to SIADH  • Continue fluid restriction  • Echocardiogram shows EF of 45%  • Clinically no acute signs of volume overload  • Overall stable from a hyponatremia standpoint continue current treatment and check a BMP next week     #2  Hypertension  • In the setting of cardiomyopathy  • He had recent SVT   • He is on losartan which can contribute to hyponatremia  • Metoprolol XL 50 mg 2 times a day  • Tolerating diuretic and renal function remained stable, blood pressures remained stable continue to monitor     #3  Recent SVT  • With vagal maneuvers and beta-blockade patient's heart rate has been better  • Ongoing work-up per slim  • This has been stable and patient has been cleared     #4  History of alcohol abuse  • Ongoing efforts with alcohol cessation  • Awaiting placement for Rehab      The patient is stable from a renal standpoint and okay for discharge to inpatient rehab  We will see the patient next on July 3rd, please call us if there are any questions or concerns    SUBJECTIVE:    Patient was resting comfortably he offers no acute complaints and is waiting for rehab    12 point review of systems was otherwise negative besides what is mentioned above.     Medications:    Current Facility-Administered Medications:   •  acetaminophen (TYLENOL) tablet 650 mg, 650 mg, Oral, Q6H PRN, Gianna Cisneros MD, 650 mg at 06/26/23 2007  •  albuterol (PROVENTIL HFA,VENTOLIN HFA) inhaler 2 puff, 2 puff, Inhalation, Q4H PRN, Talita Isaac Se, PA-C  •  aluminum-magnesium hydroxide-simethicone (MYLANTA) oral suspension 30 mL, 30 mL, Oral, Q4H PRN, Maxim Ayala PA-C, 30 mL at 06/24/23 0014  •  aspirin ([address]) EC tablet 81 mg, 81 mg, Oral, Daily, Talita Ga PA-C, 81 mg at 06/30/23 0848  •  bisacodyl (DULCOLAX) EC tablet 5 mg, 5 mg, Oral, Daily PRN, Dax Guzmán PA-C, 5 mg at 68/89/72 4891  •  folic acid (FOLVITE) tablet 1 mg, 1 mg, Oral, Daily, Gianna Hurtado MD, 1 mg at 06/30/23 0848  •  furosemide (LASIX) tablet 20 mg, 20 mg, Oral, Daily, Reginaldo Hood MD, 20 mg at 06/30/23 0848  •  losartan (COZAAR) tablet 25 mg, 25 mg, Oral, Daily, Talita Ga PA-C, 25 mg at 06/30/23 5742  •  magnesium Oxide (MAG-OX) tablet 400 mg, 400 mg, Oral, BID, Reginaldo Hood MD, 400 mg at 06/30/23 0848  •  metoprolol (LOPRESSOR) injection 5 mg, 5 mg, Intravenous, Once PRN, Rohan Nassar PA-C  •  metoprolol succinate (TOPROL-XL) 24 hr tablet 50 mg, 50 mg, Oral, BID, Warren Shearer MD, 50 mg at 06/30/23 0848  •  multivitamin-minerals (CENTRUM) tablet 1 tablet, 1 tablet, Oral, Daily, Gianna Hurtado MD, 1 tablet at 06/30/23 0848  •  nicotine (NICODERM CQ) 21 mg/24 hr TD 24 hr patch 1 patch, 1 patch, Transdermal, Daily, Gianna Hurtado MD, 1 patch at 06/30/23 0849  •  ondansetron (ZOFRAN) injection 4 mg, 4 mg, Intravenous, Q6H PRN, Janelle Chou MD, 4 mg at 06/21/23 9460  •  rivaroxaban (XARELTO) tablet 20 mg, 20 mg, Oral, Daily With Breakfast, Talita Ga PA-C, 20 mg at 06/30/23 8132  •  thiamine tablet 100 mg, 100 mg, Oral, Daily, Dax Guzmán PA-C, 100 mg at 06/30/23 0848    OBJECTIVE:    Vitals:    06/29/23 1500 06/29/23 1710 06/29/23 2332 06/30/23 0754   BP: 104/78 117/68 128/73 114/81   BP Location: Right arm  Right arm Right arm   Pulse: 73 72 67 72   Resp: 18  18 18   Temp: (!) 96.2 °F (35.7 °C)  (!) 97.4 °F (36.3 °C) (!) 96.9 °F (36.1 °C)   TempSrc: Temporal  Temporal Temporal   SpO2: 99%  98% 97%   Weight:       Height:            Temp:  [96.2 °F (35.7 °C)-97.4 °F (36.3 °C)] 96.9 °F (36.1 °C)  HR:  [67-73] 72  Resp: [18] 18  BP: (104-128)/(68-81) 114/81  SpO2:  [97 %-99 %] 97 %     Body mass index is 22.27 kg/m². Weight (last 2 days)     None          I/O last 3 completed shifts: In: 650 [P.O.:650]  Out: -     I/O this shift:  In: 120 [P.O.:120]  Out: -       Physical exam:    General: no acute distress, cooperative  Eyes: conjunctivae pink, anicteric sclerae  ENT: lips and mucous membranes moist, no exudates, normal external ears  Neck: ROM intact, no JVD  Chest: No respiratory distress, no accessory muscle use  CVS: normal rate, non pericardial friction rub  Abdomen: soft, non-tender, non-distended, normoactive bowel sounds  Extremities: no edema of both legs  Skin: no rash  Neuro: awake, alert, oriented, grossly intact  Psych:  Pleasant affect    Invasive Devices:      Lab Results:   Results from last 7 days   Lab Units 06/30/23  0451 06/29/23  0419 06/28/23  0416 06/27/23  0451 06/26/23  0854 06/25/23  0631 06/25/23  0615 06/25/23  0508 06/24/23  2211 06/24/23  0552   WBC Thousand/uL  --   --  10.31* 9.81 10.56*  --   --  11.45*  --  8.87   HEMOGLOBIN g/dL  --   --  12.9 12.9 12.9  --   --  12.9  --  12.6   HEMATOCRIT %  --   --  39.6 39.5 39.8  --   --  39.2  --  36.4*   PLATELETS Thousands/uL  --   --  556* 475* 429*  --   --  387  --  308   POTASSIUM mmol/L 3.7 4.0 4.0 4.1 4.2  --   --  4.0 4.0 3.9   CHLORIDE mmol/L 99 97 95* 96 95*  --   --  98 95* 95*   CO2 mmol/L 26 27 27 27 26  --   --  23 24 26   BUN mg/dL 15 19 21 21 13  --   --  14 14 9   CREATININE mg/dL 0.63 0.61 0.59* 0.72 0.64  --   --  0.64 0.76 0.54*   CALCIUM mg/dL 9.2 9.9 10.2 10.3* 9.8  --   --  8.7 9.7 8.9   MAGNESIUM mg/dL  --  1.8* 1.7* 1.7*  --   --   --  3.2* 1.6* 1.9   PHOSPHORUS mg/dL  --   --   --  5.5*  --   --   --   --  3.2  --    BLOOD CULTURE   --   --   --   --   --   --  No Growth After 4 Days. No Growth After 4 Days.   --   --   --    NITRITE UA   --   --   --   --   --  Negative  --   --   --   --    LEUKOCYTES UA   --   --   --   -- --  Negative  --   --   --   --    BLOOD UA   --   --   --   --   --  10.0*  --   --   --   --          Portions of the record may have been created with voice recognition software. Occasional wrong word or "sound a like" substitutions may have occurred due to the inherent limitations of voice recognition software. Read the chart carefully and recognize, using context, where substitutions have occurred. If you have any questions, please contact the dictating provider.

## 2023-06-30 NOTE — ASSESSMENT & PLAN NOTE
· Likely beer potomania versus SIADH  · Continue fluid restriction 1000 cc/day  · Nephrologist on board recommended p.o.  Lasix 20 mg in the setting of cardiomyopathy  · Avoid rapid overcorrection

## 2023-06-30 NOTE — PROGRESS NOTES
[facility]  Progress Note  Name: Elli Kumar  MRN: [id number]  Unit/Bed#: 7T [facility] 704-01 I Date of Admission: 6/19/2023   Date of Service: 6/30/2023 I Hospital Day: 11    Assessment/Plan   * SVT (supraventricular tachycardia) ([address])  Assessment & Plan  · Patient now in normal sinus rhythm rate controlled  · Cardiology following  · Initially on metoprolol tartrate 25 mg every 6 hours, switch to metoprolol succinate 50 mg twice a day  · Monitor on telemetry  · Patient is medically clear, awaiting inpatient alcohol rehab placement    Hyponatremia  Assessment & Plan  · Likely beer potomania versus SIADH  · Continue fluid restriction 1000 cc/day  · Nephrologist on board recommended p.o. Lasix 20 mg in the setting of cardiomyopathy  · Avoid rapid overcorrection    Alcohol use disorder, severe, dependence ([address])  Assessment & Plan  · Patient was initially in medical toxicology unit  · Patient is stable from detox standpoint  · Patient is interested in inpatient alcohol rehab  ·  on board    Essential hypertension  Assessment & Plan  · Blood pressures appear stable  · Continue losartan and beta-blocker  · Monitor blood pressures    COPD (chronic obstructive pulmonary disease) ([address])  Assessment & Plan  · Not in acute exacerbation  · Continue home inhalers    Tobacco abuse  Assessment & Plan  · Nicotine patch while inpatient  · Encouraged tobacco abuse cessation    Paroxysmal atrial fibrillation (HCC)  Assessment & Plan  · Currently in normal sinus rhythm  · Continue beta-blocker as above  · Xarelto for CVA prophylaxis    Cardiomyopathy, 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  Assessment & Plan  · Most recent echocardiogram with ejection fraction of 40% and grade 1 diastolic dysfunction  · Likely secondary to alcohol abuse  · Echo showed LVEF 45%. Mild global hypokinesia. Grade 1 diastolic dysfunction.   · Cardiology following ; appreciate recommendations  · Continue beta-blocker as above VTE Pharmacologic Prophylaxis:   Pharmacologic: Rivaroxaban (Xarelto)  Mechanical VTE Prophylaxis in Place: Yes    Patient Centered Rounds: I have performed bedside rounds with nursing staff today. Discussions with Specialists or Other Care Team Provider: Discussed with , RN    Education and Discussions with Family / Patient: Discussed with patient    Time Spent for Care: 35 minutes. This time was spent on one or more of the following: performing physical exam; counseling and coordination of care; obtaining or reviewing history; documenting in the medical record; reviewing/ordering tests, medications or procedures; communicating with other healthcare professionals and discussing with patient's family/caregivers. Current Length of Stay: 11 day(s)    Current Patient Status: Inpatient   Certification Statement: The patient will continue to require additional inpatient hospital stay due to Patient is medically clear, awaiting inpatient alcohol rehab    Discharge Plan / Estimated Discharge Date: Pending placement      Code Status: Level 1 - Full Code      Subjective:   Patient was seen and examined at bedside. The patient denies any pain, headache, blurry vision, chest pain, palpitation, shortness of breath, N/V, abd pain. Objective:     Vitals:   Temp (24hrs), Av.8 °F (36 °C), Min:96.2 °F (35.7 °C), Max:97.4 °F (36.3 °C)    Temp:  [96.2 °F (35.7 °C)-97.4 °F (36.3 °C)] 96.9 °F (36.1 °C)  HR:  [67-73] 72  Resp:  [18] 18  BP: (104-128)/(68-81) 114/81  SpO2:  [97 %-99 %] 97 %  Body mass index is 22.27 kg/m². Input and Output Summary (last 24 hours):        Intake/Output Summary (Last 24 hours) at 2023 1227  Last data filed at 2023 0547  Gross per 24 hour   Intake 360 ml   Output --   Net 360 ml       Physical Exam:     Physical Exam  General: breathing well on room air, no acute distress  HEENT: NC/AT, PERRL, EOM - normal  Neck: Supple  Pulm/Chest: Normal chest wall expansion, clear breath sounds on both side, no wheezing/rhonchi or crackles appreciated  CVS: RRR, normal S1&S2, no murmur appreciated, capillary refill <2s  Abd: soft, non tender, non distended, bowel sounds +  MSK: move all 4 extremities spontaneously  Skin: warm  CNS: no acute focal neuro deficit    Additional Data:     Labs:    Results from last 7 days   Lab Units 06/28/23  0416   WBC Thousand/uL 10.31*   HEMOGLOBIN g/dL 12.9   HEMATOCRIT % 39.6   PLATELETS Thousands/uL 556*   NEUTROS PCT % 60   LYMPHS PCT % 16   MONOS PCT % 20*   EOS PCT % 1     Results from last 7 days   Lab Units 06/30/23  0451   POTASSIUM mmol/L 3.7   CHLORIDE mmol/L 99   CO2 mmol/L 26   BUN mg/dL 15   CREATININE mg/dL 0.63   CALCIUM mg/dL 9.2           * I Have Reviewed All Lab Data Listed Above. * Additional Pertinent Lab Tests Reviewed: [address] Admission Reviewed    Imaging:      I have reviewed pertinent imaging. Recent Cultures (last 7 days):     Results from last 7 days   Lab Units 06/25/23  0615   BLOOD CULTURE  No Growth After 4 Days. No Growth After 4 Days.        Last 24 Hours Medication List:   Current Facility-Administered Medications   Medication Dose Route Frequency Provider Last Rate   • acetaminophen  650 mg Oral Q6H PRN Gianna Alexandre MD     • albuterol  2 puff Inhalation Q4H PRN Talita Patel PA-C     • aluminum-magnesium hydroxide-simethicone  30 mL Oral Q4H PRN Isaac Hi PA-C     • aspirin  81 mg Oral Daily Talita Patel PA-C     • bisacodyl  5 mg Oral Daily PRN Jeny Peacock PA-C     • folic acid  1 mg Oral Daily Asia Keith MD     • furosemide  20 mg Oral Daily Julianna Miranda MD     • losartan  25 mg Oral Daily Talita Patel PA-C     • magnesium Oxide  400 mg Oral BID Julianna Miranda MD     • metoprolol  5 mg Intravenous Once PRN aCrie Lanier PA-C     • metoprolol succinate  50 mg Oral BID Iris Patrick MD     • multivitamin-minerals  1 tablet Oral Daily Alfonso Howell MD     • nicotine  1 patch Transdermal Daily Alfonso Howell MD     • ondansetron  4 mg Intravenous Q6H PRN Gianna Milner MD     • rivaroxaban  20 mg Oral Daily With Breakfast Talita Evangelista PA-C     • thiamine  100 mg Oral Daily Chago Cavazos PA-C          Today, Patient Was Seen By: Rolan Eaton MD    ** Please Note: Dragon 360 Dictation voice to text software may have been used in the creation of this document.  **

## 2023-06-30 NOTE — PLAN OF CARE
Problem: SAFETY ADULT  Goal: Patient will remain free of falls  Description: INTERVENTIONS:  - Educate patient/family on patient safety including physical limitations  - Instruct patient to call for assistance with activity   - Consult OT/PT to assist with strengthening/mobility   - Keep Call bell within reach  - Keep bed low and locked with side rails adjusted as appropriate  - Keep care items and personal belongings within reach  - Initiate and maintain comfort rounds  - Make Fall Risk Sign visible to staff  - Apply yellow socks and bracelet for high fall risk patients  - Consider moving patient to room near nurses station  Outcome: Progressing     Problem: Knowledge Deficit  Goal: Patient/family/caregiver demonstrates understanding of disease process, treatment plan, medications, and discharge instructions  Description: Complete learning assessment and assess knowledge base.   Interventions:  - Provide teaching at level of understanding  - Provide teaching via preferred learning methods  Outcome: Progressing

## 2023-06-30 NOTE — CASE MANAGEMENT
Case Management Discharge Planning Note    Patient name Samm Aviles  Location 7T /7T -01 MRN [id number]  : 1965 Date 2023       Current Admission Date: 2023  Current Admission Diagnosis:SVT (supraventricular tachycardia) [facility])   Patient Active Problem List    Diagnosis Date Noted   • SVT (supraventricular tachycardia) ([address]) 2023   • Hyponatremia 2023   • Alcohol use disorder, severe, dependence ([address]) 2023   • Aneurysm ([address]) 10/03/2022   • Combined systolic and diastolic cardiac dysfunction 2022   • Essential hypertension 2022   • Alcohol abuse 2022   • Anticoagulant long-term use 2022   • Poor dental hygiene 2022   • Noncompliance 2022   • Brain aneurysm 2022   • COPD (chronic obstructive pulmonary disease) ([address]) 08/10/2022   • Hepatic steatosis 2022   • Alcohol intoxication ([address])    • Paroxysmal atrial fibrillation ([address]) 2017   • Tobacco abuse 2017   • Atrial flutter ([address]) 06/10/2016   • Cardiomyopathy, 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 06/10/2016      LOS (days): 11  Geometric Mean LOS (GMLOS) (days):   Days to GMLOS:     OBJECTIVE:  Risk of Unplanned Readmission Score: 29.62         Current admission status: Inpatient   Preferred Pharmacy:   [facility]/pharmacy #[id number] Sammie Guy, [address] [facility]  Phone: [phone] Fax: [phone]    Primary Care Provider: Nori Paul MD    Primary Insurance: [facility]  Secondary Insurance: Bhavna SANDERS    DISCHARGE DETAILS:     HOST called this CM & stated they are still looking for an IP bed for him. They also reached out to pt this morning to update him & give home [place] resources for IP rehabs as well. CM, HOST & attending all agree that we will wait throughout the weekend to see if any beds open up, and if not, pt will d/c back to sisters home.  HOST will still follow pt to find IP rehab if not found over the weekend. All parties agree w/ d/c plan. CM to continue to follow pt care & d/c.

## 2023-07-01 PROCEDURE — 99232 SBSQ HOSP IP/OBS MODERATE 35: CPT | Performed by: INTERNAL MEDICINE

## 2023-07-01 RX ADMIN — FUROSEMIDE 20 MG: 20 TABLET ORAL at 08:43

## 2023-07-01 RX ADMIN — THIAMINE HCL TAB 100 MG 100 MG: 100 TAB at 08:43

## 2023-07-01 RX ADMIN — METOPROLOL SUCCINATE 50 MG: 50 TABLET, EXTENDED RELEASE ORAL at 08:43

## 2023-07-01 RX ADMIN — RIVAROXABAN 20 MG: 20 TABLET, FILM COATED ORAL at 08:43

## 2023-07-01 RX ADMIN — FOLIC ACID 1 MG: 1 TABLET ORAL at 08:43

## 2023-07-01 RX ADMIN — ASPIRIN 81 MG: 81 TABLET, COATED ORAL at 08:43

## 2023-07-01 RX ADMIN — MAGNESIUM OXIDE TAB 400 MG (241.3 MG ELEMENTAL MG) 400 MG: 400 (241.3 MG) TAB at 17:07

## 2023-07-01 RX ADMIN — NICOTINE 1 PATCH: 21 PATCH, EXTENDED RELEASE TRANSDERMAL at 08:44

## 2023-07-01 RX ADMIN — MULTIPLE VITAMINS W/ MINERALS TAB 1 TABLET: TAB ORAL at 08:43

## 2023-07-01 RX ADMIN — LOSARTAN POTASSIUM 25 MG: 25 TABLET, FILM COATED ORAL at 08:43

## 2023-07-01 RX ADMIN — METOPROLOL SUCCINATE 50 MG: 50 TABLET, EXTENDED RELEASE ORAL at 17:07

## 2023-07-01 RX ADMIN — MAGNESIUM OXIDE TAB 400 MG (241.3 MG ELEMENTAL MG) 400 MG: 400 (241.3 MG) TAB at 08:43

## 2023-07-01 NOTE — PROGRESS NOTES
[address] [facility]  Progress Note  Name: Latanya Monique  MRN: [id number]  Unit/Bed#: 7T [facility] 704-01 I Date of Admission: 6/19/2023   Date of Service: 7/1/2023 I Hospital Day: 12    Assessment/Plan   * SVT (supraventricular tachycardia) ([address])  Assessment & Plan  · Patient now in normal sinus rhythm rate controlled  · Cardiology following  · Initially on metoprolol tartrate 25 mg every 6 hours, switch to metoprolol succinate 50 mg twice a day  · Monitor on telemetry  · Patient is medically clear, awaiting inpatient alcohol rehab placement    Hyponatremia  Assessment & Plan  · Likely beer potomania versus SIADH  · Continue fluid restriction 1000 cc/day  · Nephrologist on board recommended p.o. Lasix 20 mg in the setting of cardiomyopathy  · Avoid rapid overcorrection    Alcohol use disorder, severe, dependence ([address])  Assessment & Plan  · Patient was initially in medical toxicology unit  · Patient is stable from detox standpoint  · Patient is interested in inpatient alcohol rehab  ·  on board    Essential hypertension  Assessment & Plan  · Blood pressures appear stable  · Continue losartan and beta-blocker  · Monitor blood pressures    COPD (chronic obstructive pulmonary disease) ([address])  Assessment & Plan  · Not in acute exacerbation  · Continue home inhalers    Tobacco abuse  Assessment & Plan  · Nicotine patch while inpatient  · Encouraged tobacco abuse cessation    Paroxysmal atrial fibrillation (HCC)  Assessment & Plan  · Currently in normal sinus rhythm  · Continue beta-blocker as above  · Xarelto for CVA prophylaxis    Cardiomyopathy, 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  Assessment & Plan  · Most recent echocardiogram with ejection fraction of 40% and grade 1 diastolic dysfunction  · Likely secondary to alcohol abuse  · Echo showed LVEF 45%. Mild global hypokinesia. Grade 1 diastolic dysfunction.   · Cardiology following ; appreciate recommendations  · Continue beta-blocker as above    Hypomagnesemia-resolved as of 2023  Assessment & Plan  · Continue p.o. magnesium 400 mg twice a day           VTE Pharmacologic Prophylaxis:   Pharmacologic: Rivaroxaban (Xarelto)  Mechanical VTE Prophylaxis in Place: Yes    Patient Centered Rounds: I have performed bedside rounds with nursing staff today. Discussions with Specialists or Other Care Team Provider: Discussed with RN    Education and Discussions with Family / Patient: Discussed with patient    Time Spent for Care: 35 minutes. This time was spent on one or more of the following: performing physical exam; counseling and coordination of care; obtaining or reviewing history; documenting in the medical record; reviewing/ordering tests, medications or procedures; communicating with other healthcare professionals and discussing with patient's family/caregivers. Current Length of Stay: 12 day(s)    Current Patient Status: Inpatient   Certification Statement: The patient will continue to require additional inpatient hospital stay due to Patient is medically clear, awaiting inpatient alcohol rehab placement    Discharge Plan / Estimated Discharge Date: Awaiting rehab placement      Code Status: Level 1 - Full Code      Subjective:   Patient was seen and examined at bedside. The patient denies any pain, headache, blurry vision, chest pain, palpitation, shortness of breath, N/V, abd pain. Objective:     Vitals:   Temp (24hrs), Av.9 °F (36.1 °C), Min:96.8 °F (36 °C), Max:97.1 °F (36.2 °C)    Temp:  [96.8 °F (36 °C)-97.1 °F (36.2 °C)] 97.1 °F (36.2 °C)  HR:  [68-75] 75  Resp:  [18] 18  BP: (106-136)/(66-83) 136/83  SpO2:  [97 %-100 %] 100 %  Body mass index is 22.27 kg/m². Input and Output Summary (last 24 hours):        Intake/Output Summary (Last 24 hours) at 2023 1041  Last data filed at 2023 0345  Gross per 24 hour   Intake 840 ml   Output --   Net 840 ml       Physical Exam:     Physical Exam  General: breathing well on room air, no acute distress  HEENT: NC/AT, PERRL, EOM - normal  Neck: Supple  Pulm/Chest: Normal chest wall expansion, clear breath sounds on both side, no wheezing/rhonchi or crackles appreciated  CVS: RRR, normal S1&S2, no murmur appreciated, capillary refill <2s  Abd: soft, non tender, non distended, bowel sounds +  MSK: move all 4 extremities spontaneously  Skin: warm  CNS: no acute focal neuro deficit    Additional Data:     Labs:    Results from last 7 days   Lab Units 06/28/23  0416   WBC Thousand/uL 10.31*   HEMOGLOBIN g/dL 12.9   HEMATOCRIT % 39.6   PLATELETS Thousands/uL 556*   NEUTROS PCT % 60   LYMPHS PCT % 16   MONOS PCT % 20*   EOS PCT % 1     Results from last 7 days   Lab Units 06/30/23  0451   POTASSIUM mmol/L 3.7   CHLORIDE mmol/L 99   CO2 mmol/L 26   BUN mg/dL 15   CREATININE mg/dL 0.63   CALCIUM mg/dL 9.2           * I Have Reviewed All Lab Data Listed Above. * Additional Pertinent Lab Tests Reviewed: [facility] Admission Reviewed    Imaging:      I have reviewed pertinent imaging. Recent Cultures (last 7 days):     Results from last 7 days   Lab Units 06/25/23  0615   BLOOD CULTURE  No Growth After 5 Days. No Growth After 5 Days.        Last 24 Hours Medication List:   Current Facility-Administered Medications   Medication Dose Route Frequency Provider Last Rate   • acetaminophen  650 mg Oral Q6H PRN Gianna Harper MD     • albuterol  2 puff Inhalation Q4H PRN Talita Garcia PA-C     • aluminum-magnesium hydroxide-simethicone  30 mL Oral Q4H PRN Jillian Cabral PA-C     • aspirin  81 mg Oral Daily Talita Garcia PA-C     • bisacodyl  5 mg Oral Daily PRN Elkin Severino PA-C     • folic acid  1 mg Oral Daily Simona Bautista MD     • furosemide  20 mg Oral Daily Viviane Nguyen MD     • losartan  25 mg Oral Daily Talita Garcia PA-C     • magnesium Oxide  400 mg Oral BID Viviane Nguyen MD     • metoprolol  5 mg Intravenous Once PRN Lalo Cleaning PA-C     • metoprolol succinate  50 mg Oral BID Roselia Ramon MD     • multivitamin-minerals  1 tablet Oral Daily Gianna Torres MD     • nicotine  1 patch Transdermal Daily Lucy Casillas MD     • ondansetron  4 mg Intravenous Q6H PRN Gianna Torres MD     • rivaroxaban  20 mg Oral Daily With Breakfast Talita Resendiz PA-C     • thiamine  100 mg Oral Daily Mey Sharif PA-C          Today, Patient Was Seen By: Marin Perez MD    ** Please Note: Dragon 360 Dictation voice to text software may have been used in the creation of this document.  **

## 2023-07-02 LAB
ATRIAL RATE: 80 BPM
ATRIAL RATE: 81 BPM
P AXIS: 23 DEGREES
P AXIS: 34 DEGREES
PR INTERVAL: 172 MS
PR INTERVAL: 174 MS
QRS AXIS: -33 DEGREES
QRS AXIS: -34 DEGREES
QRSD INTERVAL: 80 MS
QRSD INTERVAL: 80 MS
QT INTERVAL: 384 MS
QT INTERVAL: 392 MS
QTC INTERVAL: 442 MS
QTC INTERVAL: 455 MS
T WAVE AXIS: 12 DEGREES
T WAVE AXIS: 19 DEGREES
VENTRICULAR RATE: 80 BPM
VENTRICULAR RATE: 81 BPM

## 2023-07-02 PROCEDURE — 93010 ELECTROCARDIOGRAM REPORT: CPT | Performed by: STUDENT IN AN ORGANIZED HEALTH CARE EDUCATION/TRAINING PROGRAM

## 2023-07-02 PROCEDURE — 99232 SBSQ HOSP IP/OBS MODERATE 35: CPT | Performed by: INTERNAL MEDICINE

## 2023-07-02 RX ADMIN — NICOTINE 1 PATCH: 21 PATCH, EXTENDED RELEASE TRANSDERMAL at 08:26

## 2023-07-02 RX ADMIN — METOPROLOL SUCCINATE 50 MG: 50 TABLET, EXTENDED RELEASE ORAL at 17:16

## 2023-07-02 RX ADMIN — LOSARTAN POTASSIUM 25 MG: 25 TABLET, FILM COATED ORAL at 08:26

## 2023-07-02 RX ADMIN — FOLIC ACID 1 MG: 1 TABLET ORAL at 08:26

## 2023-07-02 RX ADMIN — ASPIRIN 81 MG: 81 TABLET, COATED ORAL at 08:26

## 2023-07-02 RX ADMIN — MAGNESIUM OXIDE TAB 400 MG (241.3 MG ELEMENTAL MG) 400 MG: 400 (241.3 MG) TAB at 17:16

## 2023-07-02 RX ADMIN — METOPROLOL SUCCINATE 50 MG: 50 TABLET, EXTENDED RELEASE ORAL at 08:25

## 2023-07-02 RX ADMIN — MULTIPLE VITAMINS W/ MINERALS TAB 1 TABLET: TAB ORAL at 08:25

## 2023-07-02 RX ADMIN — FUROSEMIDE 20 MG: 20 TABLET ORAL at 08:26

## 2023-07-02 RX ADMIN — THIAMINE HCL TAB 100 MG 100 MG: 100 TAB at 08:26

## 2023-07-02 RX ADMIN — RIVAROXABAN 20 MG: 20 TABLET, FILM COATED ORAL at 08:25

## 2023-07-02 RX ADMIN — MAGNESIUM OXIDE TAB 400 MG (241.3 MG ELEMENTAL MG) 400 MG: 400 (241.3 MG) TAB at 08:26

## 2023-07-02 NOTE — PROGRESS NOTES
[address] [facility]  Progress Note  Name: Mary Jane Davey  MRN: [id number]  Unit/Bed#: 7T [facility] 704-01 I Date of Admission: 6/19/2023   Date of Service: 7/2/2023 I Hospital Day: 13    Assessment/Plan   * SVT (supraventricular tachycardia) ([address])  Assessment & Plan  · Patient now in normal sinus rhythm rate controlled  · Cardiology following  · Initially on metoprolol tartrate 25 mg every 6 hours, switch to metoprolol succinate 50 mg twice a day  · Monitor on telemetry  · Patient is medically clear, awaiting inpatient alcohol rehab placement    Hyponatremia  Assessment & Plan  · Likely beer potomania versus SIADH  · Continue fluid restriction 1000 cc/day  · Nephrologist on board recommended p.o. Lasix 20 mg in the setting of cardiomyopathy  · Avoid rapid overcorrection    Alcohol use disorder, severe, dependence ([address])  Assessment & Plan  · Patient was initially in medical toxicology unit  · Patient is stable from detox standpoint  · Patient is interested in inpatient alcohol rehab  ·  on board    Essential hypertension  Assessment & Plan  · Blood pressures appear stable  · Continue losartan and beta-blocker  · Monitor blood pressures    COPD (chronic obstructive pulmonary disease) ([address])  Assessment & Plan  · Not in acute exacerbation  · Continue home inhalers    Tobacco abuse  Assessment & Plan  · Nicotine patch while inpatient  · Encouraged tobacco abuse cessation    Paroxysmal atrial fibrillation (HCC)  Assessment & Plan  · Currently in normal sinus rhythm  · Continue beta-blocker as above  · Xarelto for CVA prophylaxis    Cardiomyopathy, 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  Assessment & Plan  · Most recent echocardiogram with ejection fraction of 40% and grade 1 diastolic dysfunction  · Likely secondary to alcohol abuse  · Echo showed LVEF 45%. Mild global hypokinesia. Grade 1 diastolic dysfunction.   · Cardiology following ; appreciate recommendations  · Continue beta-blocker as above VTE Pharmacologic Prophylaxis:   Pharmacologic: Rivaroxaban (Xarelto)  Mechanical VTE Prophylaxis in Place: Yes    Patient Centered Rounds: I have performed bedside rounds with nursing staff today. Discussions with Specialists or Other Care Team Provider: Discussed with RN    Education and Discussions with Family / Patient: Discussed with patient    Time Spent for Care: 35 minutes. This time was spent on one or more of the following: performing physical exam; counseling and coordination of care; obtaining or reviewing history; documenting in the medical record; reviewing/ordering tests, medications or procedures; communicating with other healthcare professionals and discussing with patient's family/caregivers. Current Length of Stay: 13 day(s)    Current Patient Status: Inpatient   Certification Statement: The patient will continue to require additional inpatient hospital stay due to Patient is medically clear, awaiting placement    Discharge Plan / Estimated Discharge Date: Awaiting placement      Code Status: Level 1 - Full Code      Subjective:   Patient was seen and examined at bedside. The patient denies any pain, headache, blurry vision, chest pain, palpitation, shortness of breath, N/V, abd pain. Objective:     Vitals:   Temp (24hrs), Av.7 °F (35.9 °C), Min:96.6 °F (35.9 °C), Max:96.8 °F (36 °C)    Temp:  [96.6 °F (35.9 °C)-96.8 °F (36 °C)] 96.8 °F (36 °C)  HR:  [72-79] 73  Resp:  [18-19] 18  BP: (108-120)/(65-73) 112/69  SpO2:  [95 %-100 %] 95 %  Body mass index is 22.27 kg/m². Input and Output Summary (last 24 hours):        Intake/Output Summary (Last 24 hours) at 2023 1028  Last data filed at 2023 0900  Gross per 24 hour   Intake 840 ml   Output --   Net 840 ml       Physical Exam:     Physical Exam  General: breathing well on room air, no acute distress  HEENT: NC/AT, PERRL, EOM - normal  Neck: Supple  Pulm/Chest: Normal chest wall expansion, clear breath sounds on both side, no wheezing/rhonchi or crackles appreciated  CVS: RRR, normal S1&S2, no murmur appreciated, capillary refill <2s  Abd: soft, non tender, non distended, bowel sounds +  MSK: move all 4 extremities spontaneously  Skin: warm  CNS: no acute focal neuro deficit      Additional Data:     Labs:    Results from last 7 days   Lab Units 06/28/23  0416   WBC Thousand/uL 10.31*   HEMOGLOBIN g/dL 12.9   HEMATOCRIT % 39.6   PLATELETS Thousands/uL 556*   NEUTROS PCT % 60   LYMPHS PCT % 16   MONOS PCT % 20*   EOS PCT % 1     Results from last 7 days   Lab Units 06/30/23  0451   POTASSIUM mmol/L 3.7   CHLORIDE mmol/L 99   CO2 mmol/L 26   BUN mg/dL 15   CREATININE mg/dL 0.63   CALCIUM mg/dL 9.2           * I Have Reviewed All Lab Data Listed Above. * Additional Pertinent Lab Tests Reviewed: [address] Admission Reviewed    Imaging:      I have reviewed pertinent imaging.       Recent Cultures (last 7 days):           Last 24 Hours Medication List:   Current Facility-Administered Medications   Medication Dose Route Frequency Provider Last Rate   • acetaminophen  650 mg Oral Q6H PRN Gianna Hurtado MD     • albuterol  2 puff Inhalation Q4H PRN Talita Ga PA-C     • aluminum-magnesium hydroxide-simethicone  30 mL Oral Q4H PRN Raisa Lemon PA-C     • aspirin  81 mg Oral Daily Talita Ga PA-C     • bisacodyl  5 mg Oral Daily PRN Gabriella Melton PA-C     • folic acid  1 mg Oral Daily Janelle Chou MD     • furosemide  20 mg Oral Daily Reginaldo Hood MD     • losartan  25 mg Oral Daily Talita Ga PA-C     • magnesium Oxide  400 mg Oral BID Reginaldo Hood MD     • metoprolol  5 mg Intravenous Once PRN Rohan Nassar PA-C     • metoprolol succinate  50 mg Oral BID Warren Shearer MD     • multivitamin-minerals  1 tablet Oral Daily Janelle Chou MD     • nicotine  1 patch Transdermal Daily Janelle Chou MD     • ondansetron  4 mg Intravenous Q6H PRN Gianna Sandee Escobar MD     • rivaroxaban  20 mg Oral Daily With Breakfast Talita Bejarano PA-C     • thiamine  100 mg Oral Daily Annita Diaz PA-C          Today, Patient Was Seen By: Estevan Weir MD    ** Please Note: Dragon 360 Dictation voice to text software may have been used in the creation of this document.  **

## 2023-07-03 PROCEDURE — 99232 SBSQ HOSP IP/OBS MODERATE 35: CPT | Performed by: INTERNAL MEDICINE

## 2023-07-03 RX ADMIN — LOSARTAN POTASSIUM 25 MG: 25 TABLET, FILM COATED ORAL at 08:21

## 2023-07-03 RX ADMIN — MAGNESIUM OXIDE TAB 400 MG (241.3 MG ELEMENTAL MG) 400 MG: 400 (241.3 MG) TAB at 08:21

## 2023-07-03 RX ADMIN — MULTIPLE VITAMINS W/ MINERALS TAB 1 TABLET: TAB ORAL at 08:21

## 2023-07-03 RX ADMIN — THIAMINE HCL TAB 100 MG 100 MG: 100 TAB at 08:21

## 2023-07-03 RX ADMIN — RIVAROXABAN 20 MG: 20 TABLET, FILM COATED ORAL at 08:21

## 2023-07-03 RX ADMIN — METOPROLOL SUCCINATE 50 MG: 50 TABLET, EXTENDED RELEASE ORAL at 08:21

## 2023-07-03 RX ADMIN — NICOTINE 1 PATCH: 21 PATCH, EXTENDED RELEASE TRANSDERMAL at 08:24

## 2023-07-03 RX ADMIN — FOLIC ACID 1 MG: 1 TABLET ORAL at 08:21

## 2023-07-03 RX ADMIN — ASPIRIN 81 MG: 81 TABLET, COATED ORAL at 08:21

## 2023-07-03 RX ADMIN — MAGNESIUM OXIDE TAB 400 MG (241.3 MG ELEMENTAL MG) 400 MG: 400 (241.3 MG) TAB at 17:00

## 2023-07-03 RX ADMIN — FUROSEMIDE 20 MG: 20 TABLET ORAL at 08:21

## 2023-07-03 RX ADMIN — METOPROLOL SUCCINATE 50 MG: 50 TABLET, EXTENDED RELEASE ORAL at 17:00

## 2023-07-03 NOTE — CASE MANAGEMENT
Case Management Discharge Planning Note    Patient name Karl Man  Location 7T U 704/7T -01 MRN [id number]  : 1965 Date 7/3/2023       Current Admission Date: 2023  Current Admission Diagnosis:SVT (supraventricular tachycardia) [facility])   Patient Active Problem List    Diagnosis Date Noted   • SVT (supraventricular tachycardia) ([address]) 2023   • Hyponatremia 2023   • Alcohol use disorder, severe, dependence ([address]) 2023   • Aneurysm ([address]) 10/03/2022   • Combined systolic and diastolic cardiac dysfunction 2022   • Essential hypertension 2022   • Alcohol abuse 2022   • Anticoagulant long-term use 2022   • Poor dental hygiene 2022   • Noncompliance 2022   • Brain aneurysm 2022   • COPD (chronic obstructive pulmonary disease) ([address]) 08/10/2022   • Hepatic steatosis 2022   • Alcohol intoxication ([address])    • Paroxysmal atrial fibrillation ([address]) 2017   • Tobacco abuse 2017   • Atrial flutter ([address]) 06/10/2016   • Cardiomyopathy, 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 06/10/2016      LOS (days): 14  Geometric Mean LOS (GMLOS) (days):   Days to GMLOS:     OBJECTIVE:  Risk of Unplanned Readmission Score: 30.66         Current admission status: Inpatient   Preferred Pharmacy:   [facility]/pharmacy [address] [facility], [address] [place] [address]  Phone: [phone] Fax: [phone]    Primary Care Provider: Martine Irvin MD    Primary Insurance: [facility] [employer]  Secondary Insurance: Bhavna SANDERS    DISCHARGE DETAILS:    Discharge planning discussed with[de-identified] Patient  Freedom of Choice: Yes  Comments - Freedom of Choice: Agreeable to IP D&A rehab  CM contacted family/caregiver?: No- see comments (declined)  Were Treatment Team discharge recommendations reviewed with patient/caregiver?: Yes  Did patient/caregiver verbalize understanding of patient care needs?: Yes  Were patient/caregiver advised of the risks associated with not following Treatment Team discharge recommendations?: Yes         Request[address]         Is the patient interested in [address] at discharge?: No    DME Referral Provided  Referral made for DME?: No    Other Referral/Resources/Interventions Provided:  Interventions: Substance Abuse Treatment  Referral Comments: HOST working on IP bed         Treatment Team Recommendation: Substance Abuse Treatment  Discharge Destination Plan[de-identified] Substance Abuse Treatment                                         Additional Comments: CM met with pt at the bedside to discuss current search for IP bed. CM saw previous documentation stating if no bed found over the weekend, pt would discharge home with his sister. When CM brought this up, pt reported that he is not able to return to his sisters and needs to get to IP rehab in order to keep from relapsing. Addressed with provider. Pt will stay IP as HOST continues with bed search. CM provided phone number for [facility] to see if they can assist with placement into rehab.

## 2023-07-03 NOTE — QUICK NOTE
May continue 1L/day fluid restriction and lasix 20mg daily. Renal will sign off. May reconsult if sNa drops. Would d/c lasix 20mg in the event of lowering BP vs sNa rising > 140.

## 2023-07-03 NOTE — PLAN OF CARE
Problem: MOBILITY - ADULT  Goal: Maintain or return to baseline ADL function  Description: INTERVENTIONS:  -  Assess patient's ability to carry out ADLs; assess patient's baseline for ADL function and identify physical deficits which impact ability to perform ADLs (bathing, care of mouth/teeth, toileting, grooming, dressing, etc.)  - Assess/evaluate cause of self-care deficits   - Assess range of motion  - Assess patient's mobility; develop plan if impaired  - Assess patient's need for assistive devices and provide as appropriate  - Encourage maximum independence but intervene and supervise when necessary  - Involve family in performance of ADLs  - Assess for home care needs following discharge   - Consider OT consult to assist with ADL evaluation and planning for discharge  - Provide patient education as appropriate  Outcome: Progressing     Problem: CARDIOVASCULAR - ADULT  Goal: Maintains optimal cardiac output and hemodynamic stability  Description: INTERVENTIONS:  - Monitor I/O, vital signs and rhythm  - Monitor for S/S and trends of decreased cardiac output  - Administer and titrate ordered vasoactive medications to optimize hemodynamic stability  - Assess quality of pulses, skin color and temperature  - Assess for signs of decreased coronary artery perfusion  - Instruct patient to report change in severity of symptoms  Outcome: Progressing

## 2023-07-03 NOTE — CASE MANAGEMENT
Case Management Progress Note    Patient name Gissell Marinelli  Location 7T /7T -01 MRN [id number]  : 1965 Date 7/3/2023       LOS (days): 14  Geometric Mean LOS (GMLOS) (days):   Days to GMLOS:        OBJECTIVE:        Current admission status: Inpatient  Preferred Pharmacy:   [facility] [facility], [address] [place] [address]  Phone: [phone] Fax: [phone]    Primary Care Provider: Lily Alcocer MD    Primary Insurance: [facility] [employer]  Secondary Insurance: Bhavna GEE [place]    PROGRESS NOTE:    Received call from HOST. They found a potential bed at a facility called [facility] in [place]. They will be calling the patient this afternoon to complete assessment. If approved for a bed, they are not accepting over the holiday, so potential bed available Wednesday.

## 2023-07-03 NOTE — PROGRESS NOTES
[facility]  Progress Note  Name: Elli Kumar  MRN: [id number]  Unit/Bed#: 7T [facility] 704-01 I Date of Admission: 6/19/2023   Date of Service: 7/3/2023 I Hospital Day: 14    Assessment/Plan   * SVT (supraventricular tachycardia) ([address])  Assessment & Plan  · Patient now in normal sinus rhythm rate controlled  · Cardiology following  · Initially on metoprolol tartrate 25 mg every 6 hours, switch to metoprolol succinate 50 mg twice a day  · Monitor on telemetry  · Patient is medically clear, awaiting inpatient alcohol rehab placement    Hyponatremia  Assessment & Plan  · Likely beer potomania versus SIADH  · Continue fluid restriction 1000 cc/day  · Nephrologist on board recommended p.o. Lasix 20 mg in the setting of cardiomyopathy  · Avoid rapid overcorrection    Alcohol use disorder, severe, dependence ([address])  Assessment & Plan  · Patient was initially in medical toxicology unit  · Patient is stable from detox standpoint  · Patient is interested in inpatient alcohol rehab  ·  on board    Essential hypertension  Assessment & Plan  · Blood pressures appear stable  · Continue losartan and beta-blocker  · Monitor blood pressures    COPD (chronic obstructive pulmonary disease) ([address])  Assessment & Plan  · Not in acute exacerbation  · Continue home inhalers    Tobacco abuse  Assessment & Plan  · Nicotine patch while inpatient  · Encouraged tobacco abuse cessation    Paroxysmal atrial fibrillation (HCC)  Assessment & Plan  · Currently in normal sinus rhythm  · Continue beta-blocker as above  · Xarelto for CVA prophylaxis    Cardiomyopathy, 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  Assessment & Plan  · Most recent echocardiogram with ejection fraction of 40% and grade 1 diastolic dysfunction  · Likely secondary to alcohol abuse  · Echo showed LVEF 45%. Mild global hypokinesia. Grade 1 diastolic dysfunction.   · Cardiology following ; appreciate recommendations  · Continue beta-blocker as above VTE Pharmacologic Prophylaxis:   Pharmacologic: Rivaroxaban (Xarelto)  Mechanical VTE Prophylaxis in Place: Yes    Patient Centered Rounds: I have performed bedside rounds with nursing staff today. Discussions with Specialists or Other Care Team Provider: Discussed with nephrologist    Education and Discussions with Family / Patient: Discussed with patient    Time Spent for Care: 35 minutes. This time was spent on one or more of the following: performing physical exam; counseling and coordination of care; obtaining or reviewing history; documenting in the medical record; reviewing/ordering tests, medications or procedures; communicating with other healthcare professionals and discussing with patient's family/caregivers. Current Length of Stay: 14 day(s)    Current Patient Status: Inpatient   Certification Statement: The patient will continue to require additional inpatient hospital stay due to Patient is medically clear, awaiting inpatient alcohol rehab    Discharge Plan / Estimated Discharge Date: Awaiting inpatient alcohol rehab      Code Status: Level 1 - Full Code      Subjective:   Patient was seen and examined at bedside. The patient denies any pain, headache, blurry vision, chest pain, palpitation, shortness of breath, N/V, abd pain. Objective:     Vitals:   Temp (24hrs), Av.2 °F (36.2 °C), Min:96.8 °F (36 °C), Max:97.7 °F (36.5 °C)    Temp:  [96.8 °F (36 °C)-97.7 °F (36.5 °C)] 97.7 °F (36.5 °C)  HR:  [72-85] 72  Resp:  [18] 18  BP: (116-149)/(70-87) 149/87  SpO2:  [95 %-96 %] 95 %  Body mass index is 22.27 kg/m². Input and Output Summary (last 24 hours):        Intake/Output Summary (Last 24 hours) at 7/3/2023 0954  Last data filed at 2023 1701  Gross per 24 hour   Intake 480 ml   Output --   Net 480 ml       Physical Exam:     Physical Exam  General: breathing well on room air, no acute distress  HEENT: NC/AT, PERRL, EOM - normal  Neck: Supple  Pulm/Chest: Normal chest wall expansion, clear breath sounds on both side, no wheezing/rhonchi or crackles appreciated  CVS: RRR, normal S1&S2, no murmur appreciated, capillary refill <2s  Abd: soft, non tender, non distended, bowel sounds +  MSK: move all 4 extremities spontaneously  Skin: warm  CNS: no acute focal neuro deficit    Additional Data:     Labs:    Results from last 7 days   Lab Units 06/28/23  0416   WBC Thousand/uL 10.31*   HEMOGLOBIN g/dL 12.9   HEMATOCRIT % 39.6   PLATELETS Thousands/uL 556*   NEUTROS PCT % 60   LYMPHS PCT % 16   MONOS PCT % 20*   EOS PCT % 1     Results from last 7 days   Lab Units 06/30/23  0451   POTASSIUM mmol/L 3.7   CHLORIDE mmol/L 99   CO2 mmol/L 26   BUN mg/dL 15   CREATININE mg/dL 0.63   CALCIUM mg/dL 9.2           * I Have Reviewed All Lab Data Listed Above. * Additional Pertinent Lab Tests Reviewed: [address] Admission Reviewed    Imaging:      I have reviewed pertinent imaging.       Recent Cultures (last 7 days):           Last 24 Hours Medication List:   Current Facility-Administered Medications   Medication Dose Route Frequency Provider Last Rate   • acetaminophen  650 mg Oral Q6H PRN Gianna Abdi MD     • albuterol  2 puff Inhalation Q4H PRN Talita Zamudio PA-C     • aluminum-magnesium hydroxide-simethicone  30 mL Oral Q4H PRN Vahe Olmedo PA-C     • aspirin  81 mg Oral Daily Talita Zamudio PA-C     • bisacodyl  5 mg Oral Daily PRN Benton Borges PA-C     • folic acid  1 mg Oral Daily Sukhjinder Coyle MD     • furosemide  20 mg Oral Daily Hipolito La MD     • losartan  25 mg Oral Daily Talita Zamudio PA-C     • magnesium Oxide  400 mg Oral BID Hipolito La MD     • metoprolol  5 mg Intravenous Once PRN Gayathri Hansen PA-C     • metoprolol succinate  50 mg Oral BID Gertrude Rodarte MD     • multivitamin-minerals  1 tablet Oral Daily Sukhjinder Coyle MD     • nicotine  1 patch Transdermal Daily Efraín Jernigan MD     • ondansetron  4 mg Intravenous Q6H PRN Gianna Cagle MD     • rivaroxaban  20 mg Oral Daily With Breakfast Meghan Harles Brittle, PA-C     • thiamine  100 mg Oral Daily Alf Still PA-C          Today, Patient Was Seen By: Mono Torres MD    ** Please Note: Dragon 360 Dictation voice to text software may have been used in the creation of this document.  **

## 2023-07-04 PROCEDURE — 99232 SBSQ HOSP IP/OBS MODERATE 35: CPT | Performed by: INTERNAL MEDICINE

## 2023-07-04 RX ADMIN — METOPROLOL SUCCINATE 50 MG: 50 TABLET, EXTENDED RELEASE ORAL at 17:09

## 2023-07-04 RX ADMIN — MULTIPLE VITAMINS W/ MINERALS TAB 1 TABLET: TAB ORAL at 08:28

## 2023-07-04 RX ADMIN — LOSARTAN POTASSIUM 25 MG: 25 TABLET, FILM COATED ORAL at 08:28

## 2023-07-04 RX ADMIN — NICOTINE 1 PATCH: 21 PATCH, EXTENDED RELEASE TRANSDERMAL at 08:29

## 2023-07-04 RX ADMIN — MAGNESIUM OXIDE TAB 400 MG (241.3 MG ELEMENTAL MG) 400 MG: 400 (241.3 MG) TAB at 08:28

## 2023-07-04 RX ADMIN — FOLIC ACID 1 MG: 1 TABLET ORAL at 08:28

## 2023-07-04 RX ADMIN — RIVAROXABAN 20 MG: 20 TABLET, FILM COATED ORAL at 08:28

## 2023-07-04 RX ADMIN — FUROSEMIDE 20 MG: 20 TABLET ORAL at 08:28

## 2023-07-04 RX ADMIN — ASPIRIN 81 MG: 81 TABLET, COATED ORAL at 08:28

## 2023-07-04 RX ADMIN — METOPROLOL SUCCINATE 50 MG: 50 TABLET, EXTENDED RELEASE ORAL at 08:28

## 2023-07-04 RX ADMIN — THIAMINE HCL TAB 100 MG 100 MG: 100 TAB at 08:28

## 2023-07-04 RX ADMIN — MAGNESIUM OXIDE TAB 400 MG (241.3 MG ELEMENTAL MG) 400 MG: 400 (241.3 MG) TAB at 17:09

## 2023-07-04 NOTE — PLAN OF CARE
Problem: SAFETY ADULT  Goal: Maintain or return to baseline ADL function  Description: INTERVENTIONS:  -  Assess patient's ability to carry out ADLs; assess patient's baseline for ADL function and identify physical deficits which impact ability to perform ADLs (bathing, care of mouth/teeth, toileting, grooming, dressing, etc.)  - Assess/evaluate cause of self-care deficits   - Assess range of motion  - Assess patient's mobility; develop plan if impaired  - Assess patient's need for assistive devices and provide as appropriate  - Encourage maximum independence but intervene and supervise when necessary  - Involve family in performance of ADLs  - Assess for home care needs following discharge   - Consider OT consult to assist with ADL evaluation and planning for discharge  - Provide patient education as appropriate  Outcome: Progressing     Problem: SAFETY ADULT  Goal: Patient will remain free of falls  Description: INTERVENTIONS:  - Educate patient/family on patient safety including physical limitations  - Instruct patient to call for assistance with activity   - Consult OT/PT to assist with strengthening/mobility   - Keep Call bell within reach  - Keep bed low and locked with side rails adjusted as appropriate  - Keep care items and personal belongings within reach  - Initiate and maintain comfort rounds  - Make Fall Risk Sign visible to staff  - Apply yellow socks and bracelet for high fall risk patients  - Consider moving patient to room near nurses station  Outcome: Progressing     Problem: DISCHARGE PLANNING  Goal: Discharge to home or other facility with appropriate resources  Description: INTERVENTIONS:  - Identify barriers to discharge w/patient and caregiver  - Arrange for needed discharge resources and transportation as appropriate  - Identify discharge learning needs (meds, wound care, etc.)  - Arrange for interpretive services to assist at discharge as needed  - Refer to Case Management Department for coordinating discharge planning if the patient needs post-hospital services based on physician/advanced practitioner order or complex needs related to functional status, cognitive ability, or social support system  Outcome: Progressing

## 2023-07-04 NOTE — PLAN OF CARE
Problem: SAFETY ADULT  Goal: Maintain or return to baseline ADL function  Description: INTERVENTIONS:  -  Assess patient's ability to carry out ADLs; assess patient's baseline for ADL function and identify physical deficits which impact ability to perform ADLs (bathing, care of mouth/teeth, toileting, grooming, dressing, etc.)  - Assess/evaluate cause of self-care deficits   - Assess range of motion  - Assess patient's mobility; develop plan if impaired  - Assess patient's need for assistive devices and provide as appropriate  - Encourage maximum independence but intervene and supervise when necessary  - Involve family in performance of ADLs  - Assess for home care needs following discharge   - Consider OT consult to assist with ADL evaluation and planning for discharge  - Provide patient education as appropriate  Outcome: Progressing     Problem: CARDIOVASCULAR - ADULT  Goal: Maintains optimal cardiac output and hemodynamic stability  Description: INTERVENTIONS:  - Monitor I/O, vital signs and rhythm  - Monitor for S/S and trends of decreased cardiac output  - Administer and titrate ordered vasoactive medications to optimize hemodynamic stability  - Assess quality of pulses, skin color and temperature  - Assess for signs of decreased coronary artery perfusion  - Instruct patient to report change in severity of symptoms  Outcome: Progressing     Problem: CARDIOVASCULAR - ADULT  Goal: Absence of cardiac dysrhythmias or at baseline rhythm  Description: INTERVENTIONS:  - Continuous cardiac monitoring, vital signs, obtain 12 lead EKG if ordered  - Administer antiarrhythmic and heart rate control medications as ordered  - Monitor electrolytes and administer replacement therapy as ordered  Outcome: Progressing

## 2023-07-04 NOTE — PROGRESS NOTES
[facility]  Progress Note  Name: Lc Linda  MRN: [id number]  Unit/Bed#: 7T [facility] 704-01 I Date of Admission: 6/19/2023   Date of Service: 7/4/2023 I Hospital Day: 15    Assessment/Plan   * SVT (supraventricular tachycardia) ([address])  Assessment & Plan  · Patient now in normal sinus rhythm rate controlled  · Cardiology following  · Initially on metoprolol tartrate 25 mg every 6 hours, switch to metoprolol succinate 50 mg twice a day  · Monitor on telemetry  · Patient is medically clear, awaiting inpatient alcohol rehab placement    Hyponatremia  Assessment & Plan  · Likely beer potomania versus SIADH  · Continue fluid restriction 1000 cc/day  · Nephrologist on board recommended p.o. Lasix 20 mg in the setting of cardiomyopathy  · Avoid rapid overcorrection    Alcohol use disorder, severe, dependence ([address])  Assessment & Plan  · Patient was initially in medical toxicology unit  · Patient is stable from detox standpoint  · Patient is interested in inpatient alcohol rehab  ·  on board    Essential hypertension  Assessment & Plan  · Blood pressures appear stable  · Continue losartan and beta-blocker  · Monitor blood pressures    COPD (chronic obstructive pulmonary disease) ([address])  Assessment & Plan  · Not in acute exacerbation  · Continue home inhalers    Tobacco abuse  Assessment & Plan  · Nicotine patch while inpatient  · Encouraged tobacco abuse cessation    Paroxysmal atrial fibrillation (HCC)  Assessment & Plan  · Currently in normal sinus rhythm  · Continue beta-blocker as above  · Xarelto for CVA prophylaxis    Cardiomyopathy, 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  Assessment & Plan  · Most recent echocardiogram with ejection fraction of 40% and grade 1 diastolic dysfunction  · Likely secondary to alcohol abuse  · Echo showed LVEF 45%. Mild global hypokinesia. Grade 1 diastolic dysfunction.   · Cardiology following ; appreciate recommendations  · Continue beta-blocker as above VTE Pharmacologic Prophylaxis:   Pharmacologic: Rivaroxaban (Xarelto)  Mechanical VTE Prophylaxis in Place: Yes    Patient Centered Rounds: I have performed bedside rounds with nursing staff today. Discussions with Specialists or Other Care Team Provider: Discussed with RN    Education and Discussions with Family / Patient: Discussed with patient    Time Spent for Care: 35 minutes. This time was spent on one or more of the following: performing physical exam; counseling and coordination of care; obtaining or reviewing history; documenting in the medical record; reviewing/ordering tests, medications or procedures; communicating with other healthcare professionals and discussing with patient's family/caregivers. Current Length of Stay: 15 day(s)    Current Patient Status: Inpatient   Certification Statement: The patient will continue to require additional inpatient hospital stay due to Patient is medically clear, awaiting inpatient alcohol rehab placement    Discharge Plan / Estimated Discharge Date: Awaiting inpatient alcohol rehab placement      Code Status: Level 1 - Full Code      Subjective:   Patient was seen and examined at bedside. The patient denies any pain, headache, blurry vision, chest pain, palpitation, shortness of breath, N/V, abd pain. Objective:     Vitals:   Temp (24hrs), Av.9 °F (36.1 °C), Min:96.7 °F (35.9 °C), Max:97.1 °F (36.2 °C)    Temp:  [96.7 °F (35.9 °C)-97.1 °F (36.2 °C)] 96.7 °F (35.9 °C)  HR:  [70-79] 79  Resp:  [18] 18  BP: (109-127)/(59-78) 116/78  SpO2:  [96 %-98 %] 98 %  Body mass index is 22.27 kg/m². Input and Output Summary (last 24 hours):        Intake/Output Summary (Last 24 hours) at 2023 0854  Last data filed at 2023 0844  Gross per 24 hour   Intake 1260 ml   Output --   Net 1260 ml       Physical Exam:     Physical Exam  General: breathing well on room air, no acute distress  HEENT: NC/AT, PERRL, EOM - normal  Neck: Supple  Pulm/Chest: Normal chest wall expansion, clear breath sounds on both side, no wheezing/rhonchi or crackles appreciated  CVS: RRR, normal S1&S2, no murmur appreciated, capillary refill <2s  Abd: soft, non tender, non distended, bowel sounds +  MSK: move all 4 extremities spontaneously  Skin: warm  CNS: no acute focal neuro deficit    Additional Data:     Labs:    Results from last 7 days   Lab Units 06/28/23  0416   WBC Thousand/uL 10.31*   HEMOGLOBIN g/dL 12.9   HEMATOCRIT % 39.6   PLATELETS Thousands/uL 556*   NEUTROS PCT % 60   LYMPHS PCT % 16   MONOS PCT % 20*   EOS PCT % 1     Results from last 7 days   Lab Units 06/30/23  0451   POTASSIUM mmol/L 3.7   CHLORIDE mmol/L 99   CO2 mmol/L 26   BUN mg/dL 15   CREATININE mg/dL 0.63   CALCIUM mg/dL 9.2           * I Have Reviewed All Lab Data Listed Above. * Additional Pertinent Lab Tests Reviewed: [address] Admission Reviewed    Imaging:      I have reviewed pertinent imaging.       Recent Cultures (last 7 days):           Last 24 Hours Medication List:   Current Facility-Administered Medications   Medication Dose Route Frequency Provider Last Rate   • acetaminophen  650 mg Oral Q6H PRN Gianna Mariee MD     • albuterol  2 puff Inhalation Q4H PRN Talita Zelaya PA-C     • aluminum-magnesium hydroxide-simethicone  30 mL Oral Q4H PRN Josh Sen PA-C     • aspirin  81 mg Oral Daily Talita Zelaya PA-C     • bisacodyl  5 mg Oral Daily PRN Nadja Balderrama PA-C     • folic acid  1 mg Oral Daily Di Lea MD     • furosemide  20 mg Oral Daily Pedro Menjivar MD     • losartan  25 mg Oral Daily Talita Zelaya PA-C     • magnesium Oxide  400 mg Oral BID Pedro Menjivar MD     • metoprolol  5 mg Intravenous Once PRN Salvatore Barbosa PA-C     • metoprolol succinate  50 mg Oral BID Noam Miranda MD     • multivitamin-minerals  1 tablet Oral Daily Di Lea MD     • nicotine  1 patch Transdermal Daily Bryson Samano MD     • ondansetron  4 mg Intravenous Q6H PRN Gianna Velai Cagle MD     • rivaroxaban  20 mg Oral Daily With Breakfast Talita Valle PA-C     • thiamine  100 mg Oral Daily Nikolay Demarco PA-C          Today, Patient Was Seen By: Yara Posadas MD    ** Please Note: Dragon 360 Dictation voice to text software may have been used in the creation of this document.  **

## 2023-07-04 NOTE — PLAN OF CARE
Problem: SAFETY ADULT  Goal: Patient will remain free of falls  Description: INTERVENTIONS:  - Educate patient/family on patient safety including physical limitations  - Instruct patient to call for assistance with activity   - Consult OT/PT to assist with strengthening/mobility   - Keep Call bell within reach  - Keep bed low and locked with side rails adjusted as appropriate  - Keep care items and personal belongings within reach  - Initiate and maintain comfort rounds  - Make Fall Risk Sign visible to staff  - Apply yellow socks and bracelet for high fall risk patients  - Consider moving patient to room near nurses station  7/4/2023 0719 by Cathie Dave RN  Outcome: Progressing  7/4/2023 0719 by Cathie Dave RN  Outcome: Progressing     Problem: Knowledge Deficit  Goal: Patient/family/caregiver demonstrates understanding of disease process, treatment plan, medications, and discharge instructions  Description: Complete learning assessment and assess knowledge base.   Interventions:  - Provide teaching at level of understanding  - Provide teaching via preferred learning methods  7/4/2023 0719 by Cathie Dave RN  Outcome: Progressing  7/4/2023 0719 by Cathie Dave RN  Outcome: Progressing

## 2023-07-05 PROCEDURE — 99232 SBSQ HOSP IP/OBS MODERATE 35: CPT | Performed by: INTERNAL MEDICINE

## 2023-07-05 RX ADMIN — MAGNESIUM OXIDE TAB 400 MG (241.3 MG ELEMENTAL MG) 400 MG: 400 (241.3 MG) TAB at 17:08

## 2023-07-05 RX ADMIN — THIAMINE HCL TAB 100 MG 100 MG: 100 TAB at 08:22

## 2023-07-05 RX ADMIN — RIVAROXABAN 20 MG: 20 TABLET, FILM COATED ORAL at 08:22

## 2023-07-05 RX ADMIN — METOPROLOL SUCCINATE 50 MG: 50 TABLET, EXTENDED RELEASE ORAL at 17:08

## 2023-07-05 RX ADMIN — ASPIRIN 81 MG: 81 TABLET, COATED ORAL at 08:22

## 2023-07-05 RX ADMIN — METOPROLOL SUCCINATE 50 MG: 50 TABLET, EXTENDED RELEASE ORAL at 08:22

## 2023-07-05 RX ADMIN — FOLIC ACID 1 MG: 1 TABLET ORAL at 08:22

## 2023-07-05 RX ADMIN — FUROSEMIDE 20 MG: 20 TABLET ORAL at 08:22

## 2023-07-05 RX ADMIN — MAGNESIUM OXIDE TAB 400 MG (241.3 MG ELEMENTAL MG) 400 MG: 400 (241.3 MG) TAB at 08:22

## 2023-07-05 RX ADMIN — NICOTINE 1 PATCH: 21 PATCH, EXTENDED RELEASE TRANSDERMAL at 08:22

## 2023-07-05 RX ADMIN — MULTIPLE VITAMINS W/ MINERALS TAB 1 TABLET: TAB ORAL at 08:22

## 2023-07-05 RX ADMIN — LOSARTAN POTASSIUM 25 MG: 25 TABLET, FILM COATED ORAL at 08:22

## 2023-07-05 NOTE — PROGRESS NOTES
[facility]  Progress Note  Name: Cadence Resendiz  MRN: [id number]  Unit/Bed#: 7T [facility] 704-01 I Date of Admission: 6/19/2023   Date of Service: 7/5/2023 I Hospital Day: 16    Assessment/Plan   * SVT (supraventricular tachycardia) ([address])  Assessment & Plan  · Patient now in normal sinus rhythm rate controlled  · Cardiology following  · Initially on metoprolol tartrate 25 mg every 6 hours, switch to metoprolol succinate 50 mg twice a day  · Monitor on telemetry  · Patient is medically clear, awaiting inpatient alcohol rehab placement    Hyponatremia  Assessment & Plan  · Likely beer potomania versus SIADH  · Continue fluid restriction 1000 cc/day  · Nephrologist on board recommended p.o. Lasix 20 mg in the setting of cardiomyopathy  · Avoid rapid overcorrection    Alcohol use disorder, severe, dependence ([address])  Assessment & Plan  · Patient was initially in medical toxicology unit  · Patient is stable from detox standpoint  · Patient is interested in inpatient alcohol rehab  ·  on board    Essential hypertension  Assessment & Plan  · Blood pressures appear stable  · Continue losartan and beta-blocker  · Monitor blood pressures    COPD (chronic obstructive pulmonary disease) ([address])  Assessment & Plan  · Not in acute exacerbation  · Continue home inhalers    Tobacco abuse  Assessment & Plan  · Nicotine patch while inpatient  · Encouraged tobacco abuse cessation    Paroxysmal atrial fibrillation (HCC)  Assessment & Plan  · Currently in normal sinus rhythm  · Continue beta-blocker as above  · Xarelto for CVA prophylaxis    Cardiomyopathy, 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  Assessment & Plan  · Most recent echocardiogram with ejection fraction of 40% and grade 1 diastolic dysfunction  · Likely secondary to alcohol abuse  · Echo showed LVEF 45%. Mild global hypokinesia. Grade 1 diastolic dysfunction.   · Cardiology following ; appreciate recommendations  · Continue beta-blocker as above VTE Pharmacologic Prophylaxis:   Pharmacologic: Rivaroxaban (Xarelto)  Mechanical VTE Prophylaxis in Place: Yes    Patient Centered Rounds: I have performed bedside rounds with nursing staff today. Discussions with Specialists or Other Care Team Provider: Discussed with , RN    Education and Discussions with Family / Patient: Discussed with patient    Time Spent for Care: 35 minutes. This time was spent on one or more of the following: performing physical exam; counseling and coordination of care; obtaining or reviewing history; documenting in the medical record; reviewing/ordering tests, medications or procedures; communicating with other healthcare professionals and discussing with patient's family/caregivers. Current Length of Stay: 16 day(s)    Current Patient Status: Inpatient   Certification Statement: The patient will continue to require additional inpatient hospital stay due to Patient is medically clear, awaiting inpatient alcohol rehab    Discharge Plan / Estimated Discharge Date: Pending placement      Code Status: Level 1 - Full Code      Subjective:   Patient was seen and examined at bedside. The patient denies any pain, headache, blurry vision, chest pain, palpitation, shortness of breath, N/V, abd pain. Objective:     Vitals:   Temp (24hrs), Av.7 °F (35.9 °C), Min:96.1 °F (35.6 °C), Max:97.1 °F (36.2 °C)    Temp:  [96.1 °F (35.6 °C)-97.1 °F (36.2 °C)] 96.5 °F (35.8 °C)  HR:  [67-82] 81  Resp:  [18] 18  BP: (109-146)/(63-77) 146/70  SpO2:  [95 %-98 %] 95 %  Body mass index is 22.27 kg/m². Input and Output Summary (last 24 hours):        Intake/Output Summary (Last 24 hours) at 2023 1528  Last data filed at 2023 1245  Gross per 24 hour   Intake 880 ml   Output --   Net 880 ml       Physical Exam:     Physical Exam  General: breathing well on room air, no acute distress  HEENT: NC/AT, PERRL, EOM - normal  Neck: Supple  Pulm/Chest: Normal chest wall expansion, clear breath sounds on both side, no wheezing/rhonchi or crackles appreciated  CVS: RRR, normal S1&S2, no murmur appreciated, capillary refill <2s  Abd: soft, non tender, non distended, bowel sounds +  MSK: move all 4 extremities spontaneously  Skin: warm  CNS: no acute focal neuro deficit    Additional Data:     Labs:        Results from last 7 days   Lab Units 06/30/23  0451   POTASSIUM mmol/L 3.7   CHLORIDE mmol/L 99   CO2 mmol/L 26   BUN mg/dL 15   CREATININE mg/dL 0.63   CALCIUM mg/dL 9.2           * I Have Reviewed All Lab Data Listed Above. * Additional Pertinent Lab Tests Reviewed: [address] Admission Reviewed    Imaging:      I have reviewed pertinent imaging.       Recent Cultures (last 7 days):           Last 24 Hours Medication List:   Current Facility-Administered Medications   Medication Dose Route Frequency Provider Last Rate   • acetaminophen  650 mg Oral Q6H PRN Gianna Remy MD     • albuterol  2 puff Inhalation Q4H PRN Talita Beauchamp PA-C     • aluminum-magnesium hydroxide-simethicone  30 mL Oral Q4H PRN Gaurav Walker PA-C     • aspirin  81 mg Oral Daily Talita Beauchamp PA-C     • bisacodyl  5 mg Oral Daily PRN Xiang Joya PA-C     • folic acid  1 mg Oral Daily Gianna Remy MD     • furosemide  20 mg Oral Daily Hiro Abraham MD     • losartan  25 mg Oral Daily Talita Beauchamp PA-C     • magnesium Oxide  400 mg Oral BID Hiro Abraham MD     • metoprolol  5 mg Intravenous Once PRN Rickey Hubbard PA-C     • metoprolol succinate  50 mg Oral BID Danielle Daniels MD     • multivitamin-minerals  1 tablet Oral Daily Tapan Jones MD     • nicotine  1 patch Transdermal Daily Tapan Jones MD     • ondansetron  4 mg Intravenous Q6H PRN Tapan Jones MD     • rivaroxaban  20 mg Oral Daily With Breakfast Talita Beauchamp PA-C     • thiamine  100 mg Oral Daily Alice Garcia Phil Aguirre PA-C          Today, Patient Was Seen By: Hipolito La MD    ** Please Note: Dragon 360 Dictation voice to text software may have been used in the creation of this document.  **

## 2023-07-05 NOTE — PLAN OF CARE
Problem: MOBILITY - ADULT  Goal: Maintain or return to baseline ADL function  Description: INTERVENTIONS:  -  Assess patient's ability to carry out ADLs; assess patient's baseline for ADL function and identify physical deficits which impact ability to perform ADLs (bathing, care of mouth/teeth, toileting, grooming, dressing, etc.)  - Assess/evaluate cause of self-care deficits   - Assess range of motion  - Assess patient's mobility; develop plan if impaired  - Assess patient's need for assistive devices and provide as appropriate  - Encourage maximum independence but intervene and supervise when necessary  - Involve family in performance of ADLs  - Assess for home care needs following discharge   - Consider OT consult to assist with ADL evaluation and planning for discharge  - Provide patient education as appropriate  Outcome: Progressing     Problem: SAFETY ADULT  Goal: Patient will remain free of falls  Description: INTERVENTIONS:  - Educate patient/family on patient safety including physical limitations  - Instruct patient to call for assistance with activity   - Consult OT/PT to assist with strengthening/mobility   - Keep Call bell within reach  - Keep bed low and locked with side rails adjusted as appropriate  - Keep care items and personal belongings within reach  - Initiate and maintain comfort rounds  - Make Fall Risk Sign visible to staff  - Apply yellow socks and bracelet for high fall risk patients  - Consider moving patient to room near nurses station  Outcome: Progressing

## 2023-07-06 VITALS
OXYGEN SATURATION: 97 % | BODY MASS INDEX: 22.18 KG/M2 | HEIGHT: 66 IN | SYSTOLIC BLOOD PRESSURE: 119 MMHG | DIASTOLIC BLOOD PRESSURE: 66 MMHG | HEART RATE: 78 BPM | TEMPERATURE: 96.5 F | WEIGHT: 138 LBS | RESPIRATION RATE: 18 BRPM

## 2023-07-06 PROCEDURE — 99239 HOSP IP/OBS DSCHRG MGMT >30: CPT | Performed by: INTERNAL MEDICINE

## 2023-07-06 RX ORDER — METOPROLOL SUCCINATE 50 MG/1
50 TABLET, EXTENDED RELEASE ORAL 2 TIMES DAILY
Qty: 90 TABLET | Refills: 0 | Status: SHIPPED | OUTPATIENT
Start: 2023-07-06 | End: 2023-10-04

## 2023-07-06 RX ORDER — LANOLIN ALCOHOL/MO/W.PET/CERES
400 CREAM (GRAM) TOPICAL 2 TIMES DAILY
Qty: 30 TABLET | Refills: 0 | Status: SHIPPED | OUTPATIENT
Start: 2023-07-06

## 2023-07-06 RX ORDER — FUROSEMIDE 20 MG/1
20 TABLET ORAL DAILY
Qty: 30 TABLET | Refills: 0 | Status: SHIPPED | OUTPATIENT
Start: 2023-07-07

## 2023-07-06 RX ORDER — LOSARTAN POTASSIUM 25 MG/1
25 TABLET ORAL DAILY
Qty: 90 TABLET | Refills: 0 | Status: SHIPPED | OUTPATIENT
Start: 2023-07-06 | End: 2023-10-04

## 2023-07-06 RX ORDER — LANOLIN ALCOHOL/MO/W.PET/CERES
100 CREAM (GRAM) TOPICAL DAILY
Qty: 30 TABLET | Refills: 0 | Status: SHIPPED | OUTPATIENT
Start: 2023-07-06

## 2023-07-06 RX ORDER — LEVALBUTEROL 1.25 MG/.5ML
1.25 SOLUTION, CONCENTRATE RESPIRATORY (INHALATION) EVERY 8 HOURS PRN
Qty: 30 EACH | Refills: 0 | Status: SHIPPED | OUTPATIENT
Start: 2023-07-06

## 2023-07-06 RX ORDER — FOLIC ACID 1 MG/1
1 TABLET ORAL DAILY
Qty: 90 TABLET | Refills: 0 | Status: SHIPPED | OUTPATIENT
Start: 2023-07-06 | End: 2023-10-04

## 2023-07-06 RX ADMIN — FUROSEMIDE 20 MG: 20 TABLET ORAL at 08:25

## 2023-07-06 RX ADMIN — MAGNESIUM OXIDE TAB 400 MG (241.3 MG ELEMENTAL MG) 400 MG: 400 (241.3 MG) TAB at 08:25

## 2023-07-06 RX ADMIN — FOLIC ACID 1 MG: 1 TABLET ORAL at 08:25

## 2023-07-06 RX ADMIN — NICOTINE 1 PATCH: 21 PATCH, EXTENDED RELEASE TRANSDERMAL at 08:25

## 2023-07-06 RX ADMIN — ASPIRIN 81 MG: 81 TABLET, COATED ORAL at 08:25

## 2023-07-06 RX ADMIN — THIAMINE HCL TAB 100 MG 100 MG: 100 TAB at 08:25

## 2023-07-06 RX ADMIN — LOSARTAN POTASSIUM 25 MG: 25 TABLET, FILM COATED ORAL at 08:25

## 2023-07-06 RX ADMIN — MULTIPLE VITAMINS W/ MINERALS TAB 1 TABLET: TAB ORAL at 08:25

## 2023-07-06 RX ADMIN — METOPROLOL SUCCINATE 50 MG: 50 TABLET, EXTENDED RELEASE ORAL at 08:25

## 2023-07-06 RX ADMIN — RIVAROXABAN 20 MG: 20 TABLET, FILM COATED ORAL at 08:25

## 2023-07-06 NOTE — DISCHARGE INSTR - AVS FIRST PAGE
Discharge instructions from hospitalist  1. Follow-up with your primary care physician in 1 week in regards to recent hospitalization. 2. Take medications regularly    3. Come back to the ER if symptoms recur or worsen  4. Activity as tolerated  5.  Diet :  Heart healthy diet; information packet has more detailed information

## 2023-07-06 NOTE — PLAN OF CARE
Problem: SUBSTANCE USE/ABUSE  Goal: By discharge, will develop insight into their chemical dependency and sustain motivation to continue in recovery  Description: INTERVENTIONS:  - Attends all daily group sessions and scheduled AA groups  - Actively practices coping skills through participation in the therapeutic community and adherence to program rules  - Reviews and completes assignments from individual treatment plan  - Assist patient development of understanding of their personal cycle of addiction and relapse triggers  Outcome: Progressing     Problem: MOBILITY - ADULT  Goal: Maintain or return to baseline ADL function  Description: INTERVENTIONS:  -  Assess patient's ability to carry out ADLs; assess patient's baseline for ADL function and identify physical deficits which impact ability to perform ADLs (bathing, care of mouth/teeth, toileting, grooming, dressing, etc.)  - Assess/evaluate cause of self-care deficits   - Assess range of motion  - Assess patient's mobility; develop plan if impaired  - Assess patient's need for assistive devices and provide as appropriate  - Encourage maximum independence but intervene and supervise when necessary  - Involve family in performance of ADLs  - Assess for home care needs following discharge   - Consider OT consult to assist with ADL evaluation and planning for discharge  - Provide patient education as appropriate  Outcome: Progressing     Problem: CARDIOVASCULAR - ADULT  Goal: Maintains optimal cardiac output and hemodynamic stability  Description: INTERVENTIONS:  - Monitor I/O, vital signs and rhythm  - Monitor for S/S and trends of decreased cardiac output  - Administer and titrate ordered vasoactive medications to optimize hemodynamic stability  - Assess quality of pulses, skin color and temperature  - Assess for signs of decreased coronary artery perfusion  - Instruct patient to report change in severity of symptoms  Outcome: Progressing

## 2023-07-06 NOTE — CASE MANAGEMENT
Case Management Discharge Planning Note    Patient name Lisset Aranda  Location 7T U 704/7T -01 MRN [id number]  : 1965 Date 2023       Current Admission Date: 2023  Current Admission Diagnosis:SVT (supraventricular tachycardia) [facility])   Patient Active Problem List    Diagnosis Date Noted   • SVT (supraventricular tachycardia) ([address]) 2023   • Hyponatremia 2023   • Alcohol use disorder, severe, dependence ([address]) 2023   • Aneurysm ([address]) 10/03/2022   • Combined systolic and diastolic cardiac dysfunction 2022   • Essential hypertension 2022   • Alcohol abuse 2022   • Anticoagulant long-term use 2022   • Poor dental hygiene 2022   • Noncompliance 2022   • Brain aneurysm 2022   • COPD (chronic obstructive pulmonary disease) ([address]) 08/10/2022   • Hepatic steatosis 2022   • Alcohol intoxication ([address])    • Paroxysmal atrial fibrillation ([address]) 2017   • Tobacco abuse 2017   • Atrial flutter ([address]) 06/10/2016   • Cardiomyopathy, 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 06/10/2016      LOS (days): 17  Geometric Mean LOS (GMLOS) (days):   Days to GMLOS:     OBJECTIVE:  Risk of Unplanned Readmission Score: 31.79         Current admission status: Inpatient   Preferred Pharmacy:   [facility]/pharmacy [address] [facility], [address] [place] [address]  Phone: [phone] Fax: [phone]    Primary Care Provider: Sal Wilder MD    Primary Insurance: [facility]  Secondary Insurance: Bhavna SANDERS    DISCHARGE DETAILS:    Discharge planning discussed with[de-identified] Patient  Freedom of Choice: Yes  Comments - Freedom of Choice: Agreeable to IP D&A rehab  CM contacted family/caregiver?: No- see comments (declined)  Were Treatment Team discharge recommendations reviewed with patient/caregiver?: Yes  Did patient/caregiver verbalize understanding of patient care needs?: N/A- going to facility  Were patient/caregiver advised of the risks associated with not following Treatment Team discharge recommendations?: Yes         Requested [address]         Is the patient interested in [facility] at discharge?: No    DME Referral Provided  Referral made for DME?: No    Other Referral/Resources/Interventions Provided:  Interventions: Substance Abuse Treatment         Treatment Team Recommendation: Substance Abuse Treatment  Discharge Destination Plan[de-identified] Substance Abuse Treatment                                IMM Given (Date):: 07/06/23  IMM Given to[de-identified] Patient   IMM was reviewed with the pt. Pt reports understanding of the ability to appeal discharge if feeling as though not medically stable for discharge. IMM was signed and placed in the scan bin.

## 2023-07-06 NOTE — DISCHARGE SUMMARY
[facility]  Discharge- [address] [place] [address] 1965, 59 y.o. male MRN: [id number]  Unit/Bed#: 7T [facility] 704-01 Encounter: [id number]  Primary Care Provider: Dontae Coleman MD   Date and time admitted to hospital: 6/19/2023  6:02 PM    * SVT (supraventricular tachycardia) ([address])  Assessment & Plan  · Patient now in normal sinus rhythm rate controlled  · Cardiology following  · Initially on metoprolol tartrate 25 mg every 6 hours, switch to metoprolol succinate 50 mg twice a day  · Monitor on telemetry  · Patient is medically clear, awaiting inpatient alcohol rehab placement    Hyponatremia  Assessment & Plan  · Likely beer potomania versus SIADH  · Continue fluid restriction 1000 cc/day  · Nephrologist on board recommended p.o. Lasix 20 mg in the setting of cardiomyopathy  · Avoid rapid overcorrection    Alcohol use disorder, severe, dependence ([address])  Assessment & Plan  · Patient was initially in medical toxicology unit  · Patient is stable from detox standpoint  · Patient is interested in inpatient alcohol rehab  ·  on board    Essential hypertension  Assessment & Plan  · Blood pressures appear stable  · Continue losartan and beta-blocker  · Monitor blood pressures    COPD (chronic obstructive pulmonary disease) ([address])  Assessment & Plan  · Not in acute exacerbation  · Continue home inhalers    Tobacco abuse  Assessment & Plan  · Nicotine patch while inpatient  · Encouraged tobacco abuse cessation    Paroxysmal atrial fibrillation (HCC)  Assessment & Plan  · Currently in normal sinus rhythm  · Continue beta-blocker as above  · Xarelto for CVA prophylaxis    Cardiomyopathy, 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  Assessment & Plan  · Most recent echocardiogram with ejection fraction of 40% and grade 1 diastolic dysfunction  · Likely secondary to alcohol abuse  · Echo showed LVEF 45%. Mild global hypokinesia. Grade 1 diastolic dysfunction.   · Cardiology following ; appreciate recommendations  · Continue beta-blocker as above        Discharging Physician / Practitioner: Francisco J Bennett MD  PCP: Nori Paul MD  Admission Date:   Admission Orders (From admission, onward)     Ordered        06/19/23 1802  Inpatient Admission  Once                      Discharge Date: 07/06/23    Medical Problems     Resolved Problems  Date Reviewed: 7/6/2023          Resolved    Alcohol withdrawal syndrome with complication ([address]) 6/22/4395     Resolved by  Francisco J Bennett MD    Chest pain 6/24/2023     Resolved by  TAY Medley-VERNON    Hypokalemia 6/22/2023     Resolved by  Ken Junior PA-C    Elevated LFTs 6/23/2023     Resolved by  TAY Medley-VERNON    Hypomagnesemia 6/24/2023     Resolved by  Ken Junior PA-C    Confusion and disorientation 6/25/2023     Resolved by  Ken Junior PA-C          Consultations During Hospital Stay:  · None    Procedures Performed:   · None    Significant Findings / Test Results:   Echo complete w/ contrast if indicated Result Date: 6/26/2023  Narrative: •  Left Ventricle: Left ventricular cavity size is normal. Wall thickness is normal. The left ventricular ejection fraction is 45%. Systolic function is mildly reduced. There is mild global hypokinesis with regional variation. Diastolic function is mildly abnormal, consistent with grade I (abnormal) relaxation. •  Right Ventricle: Right ventricular cavity size is normal. Systolic function is mildly reduced. •  Right Atrium: The atrium is mildly dilated. •  Tricuspid Valve: There is mild regurgitation. •  Pulmonic Valve: There is mild regurgitation. •  Prior TTE study available for comparison. Prior study date: 10/5/2022. No significant changes noted compared to the prior study. XR chest portable Result Date: 6/25/2023  Impression: No acute cardiopulmonary disease.  Workstation performed: [id number]     XR chest 1 view portable Result Date: 6/20/2023  · Impression: No acute cardiopulmonary disease. Workstation performed: [id number]       Incidental Findings:   · See above    Test Results Pending at Discharge (will require follow up): · None     Outpatient Tests Requested:  · None    Complications: None    Reason for Admission: SVT    Hospital Course:     Duarte Yates is a 59 y.o. male patient who was initially in detox unit for alcohol withdrawal symptoms. Patient was transferred to medical floor for SVT management when he was stable from medical toxicology service. On the medical floor, patient metoprolol dose was titrated and patient was initiated on metoprolol succinate 50 mg twice a day. Cardiology team was on board. Patient heart rate has been well controlled with metoprolol succinate. His echo showed LVEF 45%, mild global hypokinesia, grade 1 diastolic dysfunction. His hyponatremia also got improved after initiating Lasix 20 mg in the setting of cardiomyopathy. Nephrology team was also on board for sodium management. Patient was interested in inpatient alcohol rehab therefore  was on board for safe disposition planning. Today, patient got accepted into [facility] for inpatient alcohol rehab. Patient is recommended to follow-up with PCP in 1 week. Patient is clinically and hemodynamically stable to be discharged today. Condition at Discharge: stable     Discharge Day Visit / Exam:     Subjective:  Patient was seen and examined at bedside. The patient denies any pain, headache, blurry vision, chest pain, palpitation, shortness of breath, N/V, abd pain.     Vitals: Blood Pressure: 119/66 (07/06/23 0723)  Pulse: 78 (07/06/23 0723)  Temperature: (!) 96.5 °F (35.8 °C) (07/06/23 0723)  Temp Source: Temporal (07/06/23 0723)  Respirations: 18 (07/06/23 0723)  Height: 5' 6" (167.6 cm) (06/26/23 1430)  Weight - Scale: 62.6 kg (138 lb) (06/26/23 1430)  SpO2: 97 % (07/06/23 0723)  Exam:   Physical Exam  General: breathing well on room air, no acute distress  HEENT: NC/AT, PERRL, EOM - normal  Neck: Supple  Pulm/Chest: Normal chest wall expansion, clear breath sounds on both side, no wheezing/rhonchi or crackles appreciated  CVS: RRR, normal S1&S2, no murmur appreciated, capillary refill <2s  Abd: soft, non tender, non distended, bowel sounds +  MSK: move all 4 extremities spontaneously  Skin: warm  CNS: no acute focal neuro deficit    Discussion with Family: Discussed with patient. Patient declined calling a family. Discharge instructions/Information to patient and family:   See after visit summary for information provided to patient and family. Provisions for Follow-Up Care:  See after visit summary for information related to follow-up care and any pertinent home health orders. Disposition:     Other [facility] alcohol rehab    For Discharges to [facility]:   · Not Applicable to this Patient - Not Applicable to this Patient    Planned Readmission: No     Discharge Statement:  I spent 45 minutes discharging the patient. This time was spent on the day of discharge. I had direct contact with the patient on the day of discharge. Greater than 50% of the total time was spent examining patient, answering all patient questions, arranging and discussing plan of care with patient as well as directly providing post-discharge instructions. Additional time then spent on discharge activities. Discharge Medications:  See after visit summary for reconciled discharge medications provided to patient and family.       ** Please Note: This note has been constructed using a voice recognition system **

## 2023-07-06 NOTE — NURSING NOTE
Went over discharge instructions with the patient. Patient was sent with paper scripts for facility. [place] sent ride for him, and he was taken to lobby.

## 2023-07-06 NOTE — PLAN OF CARE
Problem: SAFETY ADULT  Goal: Maintain or return to baseline ADL function  Description: INTERVENTIONS:  -  Assess patient's ability to carry out ADLs; assess patient's baseline for ADL function and identify physical deficits which impact ability to perform ADLs (bathing, care of mouth/teeth, toileting, grooming, dressing, etc.)  - Assess/evaluate cause of self-care deficits   - Assess range of motion  - Assess patient's mobility; develop plan if impaired  - Assess patient's need for assistive devices and provide as appropriate  - Encourage maximum independence but intervene and supervise when necessary  - Involve family in performance of ADLs  - Assess for home care needs following discharge   - Consider OT consult to assist with ADL evaluation and planning for discharge  - Provide patient education as appropriate  Outcome: Progressing     Problem: CARDIOVASCULAR - ADULT  Goal: Maintains optimal cardiac output and hemodynamic stability  Description: INTERVENTIONS:  - Monitor I/O, vital signs and rhythm  - Monitor for S/S and trends of decreased cardiac output  - Administer and titrate ordered vasoactive medications to optimize hemodynamic stability  - Assess quality of pulses, skin color and temperature  - Assess for signs of decreased coronary artery perfusion  - Instruct patient to report change in severity of symptoms  Outcome: Progressing

## 2023-07-07 ENCOUNTER — TRANSITIONAL CARE MANAGEMENT (OUTPATIENT)
Dept: FAMILY MEDICINE CLINIC | Facility: CLINIC | Age: 58
End: 2023-07-07

## 2023-07-07 NOTE — UTILIZATION REVIEW
NOTIFICATION OF ADMISSION DISCHARGE   This is a Notification of Discharge from [facility]. Please be advised that this patient has been discharge from our facility. Below you will find the admission and discharge date and time including the patient’s disposition. UTILIZATION REVIEW CONTACT:  Robina Contreras MA  Utilization   Network Utilization Review Department  Phone: [phone] x carefully listen to the prompts. All voicemails are confidential.  Email: [email or web address]     ADMISSION INFORMATION  PRESENTATION DATE: 6/19/2023  6:02 PM  OBERVATION ADMISSION DATE:   INPATIENT ADMISSION DATE: 6/19/23  6:02 PM   DISCHARGE DATE: 7/6/2023  1:38 PM   DISPOSITION:Discharge/Transfer to not defined Healthcare Facility    IMPORTANT INFORMATION:  Send all requests for admission clinical reviews, approved or denied determinations and any other requests to dedicated fax number below belonging to the campus where the patient is receiving treatment.  List of dedicated fax numbers:  [place] DENIALS (Administrative/Medical Necessity) [phone] [address] [facility] (Maternity/NICU/Pediatrics) [phone]   [facility] [phone]   [facility] [phone] [facility] [phone]   [address] [place] [address] [phone]   [facility] [phone]   [facility] [phone]   [address] [facility] [phone]   [facility] [phone] [address] [facility] [address] [facility] [phone]

## 2023-08-08 ENCOUNTER — VBI (OUTPATIENT)
Dept: ADMINISTRATIVE | Facility: OTHER | Age: 58
End: 2023-08-08

## 2023-12-05 ENCOUNTER — VBI (OUTPATIENT)
Dept: ADMINISTRATIVE | Facility: OTHER | Age: 58
End: 2023-12-05

## 2024-01-25 ENCOUNTER — VBI (OUTPATIENT)
Dept: ADMINISTRATIVE | Facility: OTHER | Age: 59
End: 2024-01-25

## 2024-01-30 ENCOUNTER — VBI (OUTPATIENT)
Dept: ADMINISTRATIVE | Facility: OTHER | Age: 59
End: 2024-01-30

## 2024-02-05 ENCOUNTER — VBI (OUTPATIENT)
Dept: ADMINISTRATIVE | Facility: OTHER | Age: 59
End: 2024-02-05

## 2024-02-23 ENCOUNTER — APPOINTMENT (OUTPATIENT)
Dept: RADIOLOGY | Facility: CLINIC | Age: 59
End: 2024-02-23
Payer: COMMERCIAL

## 2024-02-23 ENCOUNTER — OFFICE VISIT (OUTPATIENT)
Dept: OBGYN CLINIC | Facility: CLINIC | Age: 59
End: 2024-02-23
Payer: COMMERCIAL

## 2024-02-23 VITALS
SYSTOLIC BLOOD PRESSURE: 131 MMHG | DIASTOLIC BLOOD PRESSURE: 77 MMHG | HEIGHT: 66 IN | WEIGHT: 155 LBS | BODY MASS INDEX: 24.91 KG/M2 | HEART RATE: 79 BPM

## 2024-02-23 DIAGNOSIS — M75.82 TENDINITIS OF BOTH ROTATOR CUFFS: Primary | ICD-10-CM

## 2024-02-23 DIAGNOSIS — M75.81 TENDINITIS OF BOTH ROTATOR CUFFS: Primary | ICD-10-CM

## 2024-02-23 DIAGNOSIS — M79.18 MYOFASCIAL PAIN: ICD-10-CM

## 2024-02-23 DIAGNOSIS — M25.511 CHRONIC PAIN OF BOTH SHOULDERS: ICD-10-CM

## 2024-02-23 DIAGNOSIS — M25.512 ACUTE PAIN OF BOTH SHOULDERS: ICD-10-CM

## 2024-02-23 DIAGNOSIS — G89.29 CHRONIC PAIN OF BOTH SHOULDERS: ICD-10-CM

## 2024-02-23 DIAGNOSIS — M25.512 CHRONIC PAIN OF BOTH SHOULDERS: ICD-10-CM

## 2024-02-23 DIAGNOSIS — M25.511 ACUTE PAIN OF BOTH SHOULDERS: ICD-10-CM

## 2024-02-23 PROCEDURE — 99203 OFFICE O/P NEW LOW 30 MIN: CPT | Performed by: ORTHOPAEDIC SURGERY

## 2024-02-23 PROCEDURE — 73030 X-RAY EXAM OF SHOULDER: CPT

## 2024-02-23 RX ORDER — IBUPROFEN 400 MG/1
400 TABLET ORAL EVERY 6 HOURS PRN
Qty: 30 TABLET | Refills: 0 | Status: CANCELLED | OUTPATIENT
Start: 2024-02-23

## 2024-02-23 RX ORDER — SENNOSIDES 8.6 MG
650 CAPSULE ORAL EVERY 8 HOURS PRN
Qty: 30 TABLET | Refills: 0 | Status: SHIPPED | OUTPATIENT
Start: 2024-02-23
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
## 2024-08-12 ENCOUNTER — VBI (OUTPATIENT)
Dept: ADMINISTRATIVE | Facility: OTHER | Age: 59
End: 2024-08-12

## 2024-08-12 NOTE — TELEPHONE ENCOUNTER
08/12/24 10:58 AM     Chart reviewed for CRC: Colonoscopy ; nothing is submitted to the patient's insurance at this time.     Annette Gao   [facility]

## 2024-11-19 ENCOUNTER — VBI (OUTPATIENT)
Dept: ADMINISTRATIVE | Facility: OTHER | Age: 59
End: 2024-11-19

## 2024-11-19 NOTE — TELEPHONE ENCOUNTER
11/19/24 8:37 AM     Chart reviewed for blood pressure was/were submitted to the patient's insurance.     Annette Gao   [facility]

## 2025-01-17 ENCOUNTER — TELEPHONE (OUTPATIENT)
Age: 60
End: 2025-01-17

## 2025-01-17 NOTE — TELEPHONE ENCOUNTER
Contacted patient via phone number on file, message stated that person is no longer taking calls at this time. Unable to leave a message to schedule overdue AWV.

## 2025-04-25 ENCOUNTER — TELEPHONE (OUTPATIENT)
Age: 60
End: 2025-04-25

## 2025-04-25 NOTE — TELEPHONE ENCOUNTER
Contacted patient via phone number on file, unable to place a call at this time. Could not leave a VM.

## (undated) DEVICE — SUT PROLENE 0 CT-1 30 IN 8424H

## (undated) DEVICE — NEEDLE SPINAL 20G X 3.5 LF

## (undated) DEVICE — INTENDED FOR TISSUE SEPARATION, AND OTHER PROCEDURES THAT REQUIRE A SHARP SURGICAL BLADE TO PUNCTURE OR CUT.: Brand: BARD-PARKER SAFETY BLADES SIZE 10, STERILE

## (undated) DEVICE — SUT VICRYL 2-0 SH 27 IN UNDYED J417H

## (undated) DEVICE — SUT VICRYL 0 CT-1 27 IN J260H

## (undated) DEVICE — WOUND RETRACTOR AND PROTECTOR: Brand: ALEXIS WOUND PROTECTOR-RETRACTOR

## (undated) DEVICE — INTENDED FOR TISSUE SEPARATION, AND OTHER PROCEDURES THAT REQUIRE A SHARP SURGICAL BLADE TO PUNCTURE OR CUT.: Brand: BARD-PARKER SAFETY BLADES SIZE 15, STERILE

## (undated) DEVICE — SUT MONOCRYL 4-0 PS-2 18 IN Y496G

## (undated) DEVICE — GAUZE SPONGES,USP TYPE VII GAUZE, 12 PLY: Brand: CURITY

## (undated) DEVICE — ADHESIVE SKIN HIGH VISCOSITY EXOFIN 1ML

## (undated) DEVICE — THE ECHELON FLEX POWERED PLUS ARTICULATING ENDOSCOPIC LINEAR CUTTERS ARE STERILE, SINGLE PATIENT USE INSTRUMENTS THAT SIMULTANEOUSLYCUT AND STAPLE TISSUE. THERE ARE SIX STAGGERED ROWS OF STAPLES, THREE ON EITHER SIDE OF THE CUT LINE. THE ECHELON FLEX 45 POWERED PLUSINSTRUMENTS HAVE A STAPLE LINE THAT IS APPROXIMATELY 45 MM LONG AND A CUT LINE THAT IS APPROXIMATELY 42 MM LONG. THE SHAFT CAN ROTATE FREELYIN BOTH DIRECTIONS AND AN ARTICULATION MECHANISM ENABLES THE DISTAL PORTION OF THE SHAFT TO PIVOT TO FACILITATE LATERAL ACCESS TO THE OPERATIVESITE.THE INSTRUMENTS ARE PACKAGED WITH A PRIMARY LITHIUM BATTERY PACK THAT MUST BE INSTALLED PRIOR TO USE. THERE ARE SPECIFIC REQUIREMENTS FORDISPOSING OF THE BATTERY PACK. REFER TO THE BATTERY PACK DISPOSAL SECTION.THE INSTRUMENTS ARE PACKAGED WITHOUT A RELOAD AND MUST BE LOADED PRIOR TO USE. A STAPLE RETAINING CAP ON THE RELOAD PROTECTS THE STAPLE LEGPOINTS DURING SHIPPING AND TRANSPORTATION. THE INSTRUMENTS’ LOCK-OUT FEATURE IS DESIGNED TO PREVENT A USED OR IMPROPERLY INSTALLED RELOADFROM BEING REFIRED OR AN INSTRUMENT FROM BEING FIRED WITHOUT A RELOAD.: Brand: ECHELON FLEX

## (undated) DEVICE — TROCAR: Brand: KII FIOS FIRST ENTRY

## (undated) DEVICE — GLOVE SRG BIOGEL ECLIPSE 7.5

## (undated) DEVICE — STERILE THORACIC PACK: Brand: CARDINAL HEALTH

## (undated) DEVICE — THE ECHELON, ECHELON ENDOPATH™ AND ECHELON FLEX™ FAMILIES OF ENDOSCOPIC LINEAR CUTTERS AND RELOADS ARE STERILE, SINGLE PATIENT USE INSTRUMENTS THAT SIMULTANEOUSLY CUT AND STAPLE TISSUE. THERE ARE SIX STAGGERED ROWS OF STAPLES, THREE ON EITHER SIDE OF THE CUT LINE. THE 45 MM INSTRUMENTS HAVE A STAPLE LINE THATIS APPROXIMATELY 45 MM LONG AND A CUT LINE THAT IS APPROXIMATELY 42 MM LONG. THE SHAFT CAN ROTATE FREELY IN BOTH DIRECTIONS AND AN ARTICULATION MECHANISM ON ARTICULATING INSTRUMENTS ENABLES BENDING THE DISTAL PORTIONOF THE SHAFT TO FACILITATE LATERAL ACCESS OF THE OPERATIVE SITE.THE INSTRUMENTS ARE SHIPPED WITHOUT A RELOAD AND MUST BE LOADED PRIOR TO USE. A STAPLE RETAINING CAP ON THE RELOAD PROTECTS THE STAPLE LEG POINTS DURING SHIPPING AND TRANSPORTATION. THE INSTRUMENTS’ LOCK-OUT FEATURE IS DESIGNED TO PREVENT A USED RELOAD FROM BEING REFIRED.: Brand: ECHELON ENDOPATH

## (undated) DEVICE — GAUZE SPONGES,16 PLY: Brand: CURITY

## (undated) DEVICE — PAD GROUNDING ADULT

## (undated) DEVICE — ELECTRODE BLADE MOD E-Z CLEAN 2.5IN 6.4CM -0012M

## (undated) DEVICE — SUT VICRYL 3-0 SH 27 IN J416H

## (undated) DEVICE — GLOVE INDICATOR PI UNDERGLOVE SZ 8 BLUE

## (undated) DEVICE — NEEDLE 25G X 1 1/2